# Patient Record
Sex: MALE | Race: WHITE | NOT HISPANIC OR LATINO | Employment: FULL TIME | URBAN - NONMETROPOLITAN AREA
[De-identification: names, ages, dates, MRNs, and addresses within clinical notes are randomized per-mention and may not be internally consistent; named-entity substitution may affect disease eponyms.]

---

## 2017-04-12 DIAGNOSIS — I73.9 PAD (PERIPHERAL ARTERY DISEASE) (HCC): Primary | ICD-10-CM

## 2017-04-13 ENCOUNTER — OFFICE VISIT (OUTPATIENT)
Dept: CARDIAC SURGERY | Facility: CLINIC | Age: 42
End: 2017-04-13

## 2017-04-13 VITALS
HEART RATE: 72 BPM | SYSTOLIC BLOOD PRESSURE: 148 MMHG | DIASTOLIC BLOOD PRESSURE: 80 MMHG | BODY MASS INDEX: 28.19 KG/M2 | HEIGHT: 68 IN | WEIGHT: 186 LBS

## 2017-04-13 DIAGNOSIS — I73.9 PERIPHERAL ARTERIAL DISEASE (HCC): Primary | ICD-10-CM

## 2017-04-13 PROCEDURE — 99205 OFFICE O/P NEW HI 60 MIN: CPT | Performed by: THORACIC SURGERY (CARDIOTHORACIC VASCULAR SURGERY)

## 2017-04-13 RX ORDER — METFORMIN HYDROCHLORIDE 750 MG/1
750 TABLET, EXTENDED RELEASE ORAL
Status: ON HOLD | COMMUNITY
End: 2017-04-25

## 2017-04-13 RX ORDER — FENOFIBRATE 160 MG/1
160 TABLET ORAL NIGHTLY
COMMUNITY

## 2017-04-13 RX ORDER — ROSUVASTATIN CALCIUM 20 MG/1
20 TABLET, COATED ORAL NIGHTLY
COMMUNITY

## 2017-04-13 RX ORDER — LISINOPRIL 20 MG/1
20 TABLET ORAL NIGHTLY
COMMUNITY

## 2017-04-13 RX ORDER — HYDROCODONE BITARTRATE AND ACETAMINOPHEN 7.5; 325 MG/1; MG/1
1 TABLET ORAL EVERY 6 HOURS PRN
COMMUNITY

## 2017-04-13 RX ORDER — ASPIRIN 81 MG/1
81 TABLET ORAL NIGHTLY
COMMUNITY

## 2017-04-13 RX ORDER — CLOPIDOGREL BISULFATE 75 MG/1
75 TABLET ORAL NIGHTLY
COMMUNITY

## 2017-04-13 NOTE — PROGRESS NOTES
04/13/2017  Patient Information  Justo Oquendo                                                                                          [address] [place] [address]   1975  'PCP/Referring Physician'  Melo Whitaker MD  [phone]  No ref. provider found    Chief Complaint   Patient presents with   • Consult     NP per Dr. Melo Whitaker for PAD       History of Present Illness:   This patient was referred to me with significant peripheral vascular disease.  This is a very pleasant patient who is only 41 years of age.  However he has smoked for many years and has ongoing tobacco abuse.  Recently he was noted to have diabetes and he and his wife suspect he could have had diabetes for a number of years but had not seen a physician.  He has claudication in both calves.  He does not have rest pain.  He does not have slow or nonhealing ulcers.  He does not have thyroid or buttocks claudication.  However his calf claudication is significant.  He cannot [facility] without significant pain.  He said the left calf is the most painful and it starts hurting prior to the right.  After a period of rest, the pain resolves.  He had a CT angiogram that demonstrated bilateral SFA occlusions that appeared to reconstitute at the above-knee popliteal.  However he only has 2 vessel runoff to each foot.  Patient Active Problem List   Diagnosis   • Peripheral arterial disease     Past Medical History:   Diagnosis Date   • Arthritis    • Diabetes    • Dyslipidemia    • HTN (hypertension)      Past Surgical History:   Procedure Laterality Date   • CYST REMOVAL      OFF OF BACK       Current Outpatient Prescriptions:   •  aspirin 81 MG EC tablet, Take 81 mg by mouth Daily., Disp: , Rfl:   •  clopidogrel (PLAVIX) 75 MG tablet, Take 75 mg by mouth Daily., Disp: , Rfl:   •  fenofibrate 160 MG tablet, Take 160 mg by mouth Daily., Disp: , Rfl:   •  HYDROcodone-acetaminophen (NORCO) 7.5-325 MG  per tablet, Take 1 tablet by mouth Every 6 (Six) Hours As Needed for Moderate Pain (4-6)., Disp: , Rfl:   •  lisinopril (PRINIVIL,ZESTRIL) 20 MG tablet, Take 20 mg by mouth Daily., Disp: , Rfl:   •  metFORMIN ER (GLUCOPHAGE-XR) 750 MG 24 hr tablet, Take 750 mg by mouth Daily With Breakfast., Disp: , Rfl:   •  rosuvastatin (CRESTOR) 20 MG tablet, Take 20 mg by mouth Daily., Disp: , Rfl:   No Known Allergies  Social History     Social History   • Marital status:      Spouse name: N/A   • Number of children: 2   • Years of education: N/A     Occupational History   •       [employer]     Social History Main Topics   • Smoking status: Current Every Day Smoker     Packs/day: 0.50     Years: 30.00     Types: Cigarettes   • Smokeless tobacco: Never Used   • Alcohol use No   • Drug use: No   • Sexual activity: Not on file     Other Topics Concern   • Not on file     Social History Narrative     Family History   Problem Relation Age of Onset   • Arthritis Mother    • Liver disease Father    • Kidney disease Father      Review of Systems   Constitution: Positive for malaise/fatigue. Negative for chills, fever, night sweats and weight loss.   HENT: Negative for headaches, hearing loss, odynophagia and sore throat.    Eyes: Positive for blurred vision.   Cardiovascular: Positive for chest pain and palpitations. Negative for dyspnea on exertion, leg swelling and orthopnea.   Respiratory: Positive for cough. Negative for hemoptysis.    Endocrine: Negative for cold intolerance, heat intolerance, polydipsia, polyphagia and polyuria.   Hematologic/Lymphatic: Does not bruise/bleed easily.   Skin: Positive for poor wound healing. Negative for itching and rash.   Musculoskeletal: Positive for back pain, joint pain, joint swelling, muscle cramps, neck pain and stiffness. Negative for myalgias.   Gastrointestinal: Positive for constipation. Negative for abdominal pain, diarrhea, hematemesis, hematochezia,  "melena, nausea and vomiting.   Genitourinary: Negative for dysuria, frequency and hematuria.   Neurological: Positive for numbness (and tingling in feet and toes). Negative for focal weakness and seizures.   Psychiatric/Behavioral: Negative for suicidal ideas. The patient is nervous/anxious.    Allergic/Immunologic: Positive for environmental allergies.   All other systems reviewed and are negative.    Vitals:    04/13/17 0848   BP: 148/80   BP Location: Left arm   Patient Position: Sitting   Pulse: 72   Weight: 186 lb (84.4 kg)   Height: 68\" (172.7 cm)      Physical Exam   CONSTITUTIONAL: Alert and conversant, Well dressed, Well nourished, No acute distress  EYES: Sclera clean, Anicteric, Pupils equal  ENT: No nasal deviation, Trachea midline  NECK: No neck masses, Supple  LUNGS: No wheezing, Cough, non-congested  HEART: No rubs, No murmurs  ABDOMEN: Soft, non-distended, No masses, Non tender to palpation  NEURO: No motor deficits, No sensory deficits, Cranial Nerves 2 through 12 grossly intact  PSYCHIATRIC: Oriented to person, place and time, No memory deficits, Mood appropriate  VASCULAR:  The feet are warm and viable; however, the toes are somewhat cool bilaterally and are hyperemic.  I am unable to palpate a posterior tibial or dorsalis pedis pulse in either foot.    Labs/Imaging:   I have reviewed the CT angiogram report and have reviewed the CT angiogram images.  The patient's wife is here to confirm his smoking history and his recent diagnosis of diabetes and the fact that he has not seen a physician in a number of years and his diabetes may be longer standing than is known.    Assessment/Plan:   Significant vascular disease in this 41-year-old patient.  He is a smoker with recently diagnosed diabetes which may have been unrecognized for a period of time.  CT angiogram demonstrates occlusion of the superficial femoral arteries bilaterally and two-vessel runoff.  His left leg is by far the most symptomatic.  " We have discussed various options and ultimately may come to bilateral femoral to popliteal bypass.  However my plan is for general anesthesia in our hybrid room 15 operating room.  I will plan to proceed with a left femoral artery cutdown with a formal aortogram and runoff to get a good assessment of his vasculature.  If I am able to open up the chronic total occlusion of the left superficial femoral artery we will place a stent.  If we are unable to open this, we will proceed with femoral to popliteal bypass.  We could then return at a later time to address the right leg.  He is aware that this is a complex procedure, that he might have either a stent and/or bypass graft, that there is a risk of bleeding, infection, graft failure and infection and limb loss.  He and his wife understand this and agree to proceed.     Patient Active Problem List   Diagnosis   • Peripheral arterial disease     Signed by: Brett Ryan M.D.    4/13/2017    CC:  MD Katlin Brown, , editing for Brett Ryan M.D.    I, Brett Ryan, have read and agree with the editing done by Margot Rees, .

## 2017-04-17 ENCOUNTER — PREP FOR SURGERY (OUTPATIENT)
Dept: CARDIAC SURGERY | Facility: CLINIC | Age: 42
End: 2017-04-17

## 2017-04-17 DIAGNOSIS — I73.9 PAD (PERIPHERAL ARTERY DISEASE) (HCC): Primary | ICD-10-CM

## 2017-04-17 RX ORDER — CHLORHEXIDINE GLUCONATE 500 MG/1
1 CLOTH TOPICAL EVERY 12 HOURS PRN
Status: CANCELLED | OUTPATIENT
Start: 2017-04-24

## 2017-04-24 ENCOUNTER — HOSPITAL ENCOUNTER (OUTPATIENT)
Dept: GENERAL RADIOLOGY | Facility: HOSPITAL | Age: 42
Discharge: HOME | End: 2017-04-24
Admitting: PHYSICIAN ASSISTANT

## 2017-04-24 ENCOUNTER — ANESTHESIA EVENT (OUTPATIENT)
Dept: PERIOP | Facility: HOSPITAL | Age: 42
End: 2017-04-24

## 2017-04-24 ENCOUNTER — APPOINTMENT (OUTPATIENT)
Dept: PREADMISSION TESTING | Facility: HOSPITAL | Age: 42
End: 2017-04-24

## 2017-04-24 VITALS — HEIGHT: 68 IN | WEIGHT: 186.51 LBS | BODY MASS INDEX: 28.27 KG/M2 | OXYGEN SATURATION: 99 %

## 2017-04-24 DIAGNOSIS — I73.9 PAD (PERIPHERAL ARTERY DISEASE) (HCC): ICD-10-CM

## 2017-04-24 LAB
ABO GROUP BLD: NORMAL
ANION GAP SERPL CALCULATED.3IONS-SCNC: 7 MMOL/L (ref 3–11)
APTT PPP: 29.4 SECONDS (ref 24–31)
BLD GP AB SCN SERPL QL: NEGATIVE
BUN BLD-MCNC: 14 MG/DL (ref 9–23)
BUN/CREAT SERPL: 15.6 (ref 7–25)
CALCIUM SPEC-SCNC: 10.2 MG/DL (ref 8.7–10.4)
CHLORIDE SERPL-SCNC: 107 MMOL/L (ref 99–109)
CO2 SERPL-SCNC: 27 MMOL/L (ref 20–31)
CREAT BLD-MCNC: 0.9 MG/DL (ref 0.6–1.3)
DEPRECATED RDW RBC AUTO: 41.6 FL (ref 37–54)
ERYTHROCYTE [DISTWIDTH] IN BLOOD BY AUTOMATED COUNT: 12.8 % (ref 11.3–14.5)
GFR SERPL CREATININE-BSD FRML MDRD: 92 ML/MIN/1.73
GLUCOSE BLD-MCNC: 165 MG/DL (ref 70–100)
HBA1C MFR BLD: 8.2 % (ref 4.8–5.6)
HCT VFR BLD AUTO: 37.8 % (ref 38.9–50.9)
HGB BLD-MCNC: 12.8 G/DL (ref 13.1–17.5)
INR PPP: 1.06
MCH RBC QN AUTO: 30.5 PG (ref 27–31)
MCHC RBC AUTO-ENTMCNC: 33.9 G/DL (ref 32–36)
MCV RBC AUTO: 90 FL (ref 80–99)
PLATELET # BLD AUTO: 175 10*3/MM3 (ref 150–450)
PMV BLD AUTO: 10.5 FL (ref 6–12)
POTASSIUM BLD-SCNC: 4.5 MMOL/L (ref 3.5–5.5)
PROTHROMBIN TIME: 11.6 SECONDS (ref 9.6–11.5)
RBC # BLD AUTO: 4.2 10*6/MM3 (ref 4.2–5.76)
RH BLD: POSITIVE
SODIUM BLD-SCNC: 141 MMOL/L (ref 132–146)
WBC NRBC COR # BLD: 6.32 10*3/MM3 (ref 3.5–10.8)

## 2017-04-24 PROCEDURE — 86920 COMPATIBILITY TEST SPIN: CPT

## 2017-04-25 ENCOUNTER — APPOINTMENT (OUTPATIENT)
Dept: GENERAL RADIOLOGY | Facility: HOSPITAL | Age: 42
End: 2017-04-25

## 2017-04-25 ENCOUNTER — ANESTHESIA (OUTPATIENT)
Dept: PERIOP | Facility: HOSPITAL | Age: 42
End: 2017-04-25

## 2017-04-25 ENCOUNTER — HOSPITAL ENCOUNTER (OUTPATIENT)
Facility: HOSPITAL | Age: 42
Setting detail: OBSERVATION
Discharge: HOME | End: 2017-04-26
Attending: THORACIC SURGERY (CARDIOTHORACIC VASCULAR SURGERY) | Admitting: THORACIC SURGERY (CARDIOTHORACIC VASCULAR SURGERY)

## 2017-04-25 DIAGNOSIS — I73.9 PAD (PERIPHERAL ARTERY DISEASE) (HCC): ICD-10-CM

## 2017-04-25 PROBLEM — E11.9 DIABETES MELLITUS TYPE II, NON INSULIN DEPENDENT (HCC): Status: ACTIVE | Noted: 2017-04-25

## 2017-04-25 PROBLEM — Z72.0 TOBACCO ABUSE: Status: ACTIVE | Noted: 2017-04-25

## 2017-04-25 PROBLEM — E78.5 HYPERLIPIDEMIA: Status: ACTIVE | Noted: 2017-04-25

## 2017-04-25 PROBLEM — I10 HYPERTENSION: Status: ACTIVE | Noted: 2017-04-25

## 2017-04-25 LAB
ABO GROUP BLD: NORMAL
GLUCOSE BLDC GLUCOMTR-MCNC: 121 MG/DL (ref 70–130)
GLUCOSE BLDC GLUCOMTR-MCNC: 127 MG/DL (ref 70–130)
GLUCOSE BLDC GLUCOMTR-MCNC: 169 MG/DL (ref 70–130)
GLUCOSE BLDC GLUCOMTR-MCNC: 200 MG/DL (ref 70–130)
RH BLD: POSITIVE

## 2017-04-25 PROCEDURE — 25010000002 ONDANSETRON PER 1 MG: Performed by: NURSE ANESTHETIST, CERTIFIED REGISTERED

## 2017-04-25 PROCEDURE — 75716 ARTERY X-RAYS ARMS/LEGS: CPT | Performed by: THORACIC SURGERY (CARDIOTHORACIC VASCULAR SURGERY)

## 2017-04-25 PROCEDURE — 75625 CONTRAST EXAM ABDOMINL AORTA: CPT

## 2017-04-25 PROCEDURE — 86900 BLOOD TYPING SEROLOGIC ABO: CPT

## 2017-04-25 PROCEDURE — C1887 CATHETER, GUIDING: HCPCS | Performed by: THORACIC SURGERY (CARDIOTHORACIC VASCULAR SURGERY)

## 2017-04-25 PROCEDURE — 25010000002 PHENYLEPHRINE PER 1 ML: Performed by: NURSE ANESTHETIST, CERTIFIED REGISTERED

## 2017-04-25 PROCEDURE — 25010000002 CEFUROXIME: Performed by: PHYSICIAN ASSISTANT

## 2017-04-25 PROCEDURE — 25010000002 HEPARIN (PORCINE) PER 1000 UNITS: Performed by: THORACIC SURGERY (CARDIOTHORACIC VASCULAR SURGERY)

## 2017-04-25 PROCEDURE — 86901 BLOOD TYPING SEROLOGIC RH(D): CPT

## 2017-04-25 PROCEDURE — G0378 HOSPITAL OBSERVATION PER HR: HCPCS

## 2017-04-25 PROCEDURE — C1894 INTRO/SHEATH, NON-LASER: HCPCS | Performed by: THORACIC SURGERY (CARDIOTHORACIC VASCULAR SURGERY)

## 2017-04-25 PROCEDURE — 25010000002 NEOSTIGMINE PER 0.5 MG: Performed by: NURSE ANESTHETIST, CERTIFIED REGISTERED

## 2017-04-25 PROCEDURE — 25010000002 MIDAZOLAM PER 1 MG: Performed by: NURSE ANESTHETIST, CERTIFIED REGISTERED

## 2017-04-25 PROCEDURE — 63710000001 INSULIN LISPRO (HUMAN) PER 5 UNITS: Performed by: PHYSICIAN ASSISTANT

## 2017-04-25 PROCEDURE — 99243 OFF/OP CNSLTJ NEW/EST LOW 30: CPT | Performed by: PHYSICIAN ASSISTANT

## 2017-04-25 PROCEDURE — 36200 PLACE CATHETER IN AORTA: CPT | Performed by: THORACIC SURGERY (CARDIOTHORACIC VASCULAR SURGERY)

## 2017-04-25 PROCEDURE — C1769 GUIDE WIRE: HCPCS | Performed by: THORACIC SURGERY (CARDIOTHORACIC VASCULAR SURGERY)

## 2017-04-25 PROCEDURE — 25010000002 FENTANYL CITRATE (PF) 100 MCG/2ML SOLUTION: Performed by: NURSE ANESTHETIST, CERTIFIED REGISTERED

## 2017-04-25 PROCEDURE — 25010000002 PROPOFOL 10 MG/ML EMULSION: Performed by: NURSE ANESTHETIST, CERTIFIED REGISTERED

## 2017-04-25 PROCEDURE — 75716 ARTERY X-RAYS ARMS/LEGS: CPT

## 2017-04-25 PROCEDURE — 25010000002 DEXAMETHASONE PER 1 MG: Performed by: NURSE ANESTHETIST, CERTIFIED REGISTERED

## 2017-04-25 PROCEDURE — 75625 CONTRAST EXAM ABDOMINL AORTA: CPT | Performed by: THORACIC SURGERY (CARDIOTHORACIC VASCULAR SURGERY)

## 2017-04-25 PROCEDURE — 82962 GLUCOSE BLOOD TEST: CPT

## 2017-04-25 RX ORDER — DEXTROSE MONOHYDRATE 25 G/50ML
25 INJECTION, SOLUTION INTRAVENOUS
Status: DISCONTINUED | OUTPATIENT
Start: 2017-04-25 | End: 2017-04-26 | Stop reason: HOSPADM

## 2017-04-25 RX ORDER — FAMOTIDINE 10 MG/ML
20 INJECTION, SOLUTION INTRAVENOUS ONCE
Status: DISCONTINUED | OUTPATIENT
Start: 2017-04-25 | End: 2017-04-25 | Stop reason: HOSPADM

## 2017-04-25 RX ORDER — ATORVASTATIN CALCIUM 40 MG/1
40 TABLET, FILM COATED ORAL NIGHTLY
Status: DISCONTINUED | OUTPATIENT
Start: 2017-04-25 | End: 2017-04-26 | Stop reason: HOSPADM

## 2017-04-25 RX ORDER — HYDROCODONE BITARTRATE AND ACETAMINOPHEN 5; 325 MG/1; MG/1
1 TABLET ORAL EVERY 6 HOURS PRN
Status: DISCONTINUED | OUTPATIENT
Start: 2017-04-25 | End: 2017-04-26 | Stop reason: HOSPADM

## 2017-04-25 RX ORDER — LABETALOL HYDROCHLORIDE 5 MG/ML
5 INJECTION, SOLUTION INTRAVENOUS
Status: DISCONTINUED | OUTPATIENT
Start: 2017-04-25 | End: 2017-04-25 | Stop reason: HOSPADM

## 2017-04-25 RX ORDER — CLOPIDOGREL BISULFATE 75 MG/1
75 TABLET ORAL DAILY
Status: DISCONTINUED | OUTPATIENT
Start: 2017-04-26 | End: 2017-04-26 | Stop reason: HOSPADM

## 2017-04-25 RX ORDER — SODIUM CHLORIDE 0.9 % (FLUSH) 0.9 %
1-10 SYRINGE (ML) INJECTION AS NEEDED
Status: DISCONTINUED | OUTPATIENT
Start: 2017-04-25 | End: 2017-04-25 | Stop reason: HOSPADM

## 2017-04-25 RX ORDER — NICOTINE 21 MG/24HR
1 PATCH, TRANSDERMAL 24 HOURS TRANSDERMAL EVERY 24 HOURS
Qty: 28 PATCH | Refills: 0 | Status: CANCELLED | OUTPATIENT
Start: 2017-04-25

## 2017-04-25 RX ORDER — HYDROCODONE BITARTRATE AND ACETAMINOPHEN 7.5; 325 MG/1; MG/1
1 TABLET ORAL EVERY 4 HOURS PRN
Status: DISCONTINUED | OUTPATIENT
Start: 2017-04-25 | End: 2017-04-26 | Stop reason: HOSPADM

## 2017-04-25 RX ORDER — DEXAMETHASONE SODIUM PHOSPHATE 4 MG/ML
INJECTION, SOLUTION INTRA-ARTICULAR; INTRALESIONAL; INTRAMUSCULAR; INTRAVENOUS; SOFT TISSUE AS NEEDED
Status: DISCONTINUED | OUTPATIENT
Start: 2017-04-25 | End: 2017-04-25 | Stop reason: SURG

## 2017-04-25 RX ORDER — CHLORHEXIDINE GLUCONATE 500 MG/1
1 CLOTH TOPICAL EVERY 12 HOURS PRN
Status: DISCONTINUED | OUTPATIENT
Start: 2017-04-25 | End: 2017-04-25 | Stop reason: HOSPADM

## 2017-04-25 RX ORDER — ONDANSETRON 2 MG/ML
INJECTION INTRAMUSCULAR; INTRAVENOUS AS NEEDED
Status: DISCONTINUED | OUTPATIENT
Start: 2017-04-25 | End: 2017-04-25 | Stop reason: SURG

## 2017-04-25 RX ORDER — LIDOCAINE HYDROCHLORIDE 10 MG/ML
1 INJECTION, SOLUTION EPIDURAL; INFILTRATION; INTRACAUDAL; PERINEURAL ONCE
Status: COMPLETED | OUTPATIENT
Start: 2017-04-25 | End: 2017-04-25

## 2017-04-25 RX ORDER — LISINOPRIL 20 MG/1
20 TABLET ORAL DAILY
Status: DISCONTINUED | OUTPATIENT
Start: 2017-04-25 | End: 2017-04-26 | Stop reason: HOSPADM

## 2017-04-25 RX ORDER — METFORMIN HYDROCHLORIDE 750 MG/1
750 TABLET, EXTENDED RELEASE ORAL
Status: DISCONTINUED | OUTPATIENT
Start: 2017-04-25 | End: 2017-04-25

## 2017-04-25 RX ORDER — PROPOFOL 10 MG/ML
VIAL (ML) INTRAVENOUS AS NEEDED
Status: DISCONTINUED | OUTPATIENT
Start: 2017-04-25 | End: 2017-04-25 | Stop reason: SURG

## 2017-04-25 RX ORDER — FENTANYL CITRATE 50 UG/ML
50 INJECTION, SOLUTION INTRAMUSCULAR; INTRAVENOUS
Status: DISCONTINUED | OUTPATIENT
Start: 2017-04-25 | End: 2017-04-25 | Stop reason: HOSPADM

## 2017-04-25 RX ORDER — ONDANSETRON 2 MG/ML
4 INJECTION INTRAMUSCULAR; INTRAVENOUS EVERY 6 HOURS PRN
Status: DISCONTINUED | OUTPATIENT
Start: 2017-04-25 | End: 2017-04-26 | Stop reason: HOSPADM

## 2017-04-25 RX ORDER — SODIUM CHLORIDE, SODIUM LACTATE, POTASSIUM CHLORIDE, CALCIUM CHLORIDE 600; 310; 30; 20 MG/100ML; MG/100ML; MG/100ML; MG/100ML
9 INJECTION, SOLUTION INTRAVENOUS CONTINUOUS
Status: DISCONTINUED | OUTPATIENT
Start: 2017-04-25 | End: 2017-04-26 | Stop reason: HOSPADM

## 2017-04-25 RX ORDER — SODIUM CHLORIDE 450 MG/100ML
100 INJECTION, SOLUTION INTRAVENOUS CONTINUOUS
Status: DISCONTINUED | OUTPATIENT
Start: 2017-04-25 | End: 2017-04-26 | Stop reason: HOSPADM

## 2017-04-25 RX ORDER — ONDANSETRON 2 MG/ML
4 INJECTION INTRAMUSCULAR; INTRAVENOUS ONCE AS NEEDED
Status: DISCONTINUED | OUTPATIENT
Start: 2017-04-25 | End: 2017-04-25 | Stop reason: HOSPADM

## 2017-04-25 RX ORDER — FAMOTIDINE 20 MG/1
20 TABLET, FILM COATED ORAL ONCE
Status: COMPLETED | OUTPATIENT
Start: 2017-04-25 | End: 2017-04-25

## 2017-04-25 RX ORDER — MIDAZOLAM HYDROCHLORIDE 1 MG/ML
INJECTION INTRAMUSCULAR; INTRAVENOUS AS NEEDED
Status: DISCONTINUED | OUTPATIENT
Start: 2017-04-25 | End: 2017-04-25 | Stop reason: SURG

## 2017-04-25 RX ORDER — LIDOCAINE HYDROCHLORIDE 40 MG/ML
SOLUTION TOPICAL AS NEEDED
Status: DISCONTINUED | OUTPATIENT
Start: 2017-04-25 | End: 2017-04-25 | Stop reason: SURG

## 2017-04-25 RX ORDER — ATRACURIUM BESYLATE 10 MG/ML
INJECTION, SOLUTION INTRAVENOUS AS NEEDED
Status: DISCONTINUED | OUTPATIENT
Start: 2017-04-25 | End: 2017-04-25 | Stop reason: SURG

## 2017-04-25 RX ORDER — LIDOCAINE HYDROCHLORIDE 10 MG/ML
INJECTION, SOLUTION INFILTRATION; PERINEURAL AS NEEDED
Status: DISCONTINUED | OUTPATIENT
Start: 2017-04-25 | End: 2017-04-25 | Stop reason: SURG

## 2017-04-25 RX ORDER — NICOTINE POLACRILEX 4 MG
15 LOZENGE BUCCAL
Status: DISCONTINUED | OUTPATIENT
Start: 2017-04-25 | End: 2017-04-26 | Stop reason: HOSPADM

## 2017-04-25 RX ORDER — ASPIRIN 81 MG/1
81 TABLET ORAL DAILY
Status: DISCONTINUED | OUTPATIENT
Start: 2017-04-26 | End: 2017-04-26 | Stop reason: HOSPADM

## 2017-04-25 RX ORDER — FENTANYL CITRATE 50 UG/ML
INJECTION, SOLUTION INTRAMUSCULAR; INTRAVENOUS AS NEEDED
Status: DISCONTINUED | OUTPATIENT
Start: 2017-04-25 | End: 2017-04-25 | Stop reason: SURG

## 2017-04-25 RX ORDER — GLYCOPYRROLATE 0.2 MG/ML
INJECTION INTRAMUSCULAR; INTRAVENOUS AS NEEDED
Status: DISCONTINUED | OUTPATIENT
Start: 2017-04-25 | End: 2017-04-25 | Stop reason: SURG

## 2017-04-25 RX ORDER — FENOFIBRATE 145 MG/1
145 TABLET, COATED ORAL NIGHTLY
Status: DISCONTINUED | OUTPATIENT
Start: 2017-04-25 | End: 2017-04-26 | Stop reason: HOSPADM

## 2017-04-25 RX ORDER — METFORMIN HYDROCHLORIDE 750 MG/1
750 TABLET, EXTENDED RELEASE ORAL
Start: 2017-04-25 | End: 2017-04-26 | Stop reason: HOSPADM

## 2017-04-25 RX ADMIN — LIDOCAINE HYDROCHLORIDE 1 EACH: 40 SOLUTION TOPICAL at 10:19

## 2017-04-25 RX ADMIN — FENTANYL CITRATE 50 MCG: 50 INJECTION, SOLUTION INTRAMUSCULAR; INTRAVENOUS at 10:19

## 2017-04-25 RX ADMIN — DEXAMETHASONE SODIUM PHOSPHATE 4 MG: 4 INJECTION, SOLUTION INTRAMUSCULAR; INTRAVENOUS at 10:26

## 2017-04-25 RX ADMIN — LIDOCAINE HYDROCHLORIDE 50 MG: 10 INJECTION, SOLUTION INFILTRATION; PERINEURAL at 10:19

## 2017-04-25 RX ADMIN — PHENYLEPHRINE HYDROCHLORIDE 100 MCG: 10 INJECTION INTRAVENOUS at 10:37

## 2017-04-25 RX ADMIN — ATRACURIUM BESYLATE 30 MG: 10 INJECTION, SOLUTION INTRAVENOUS at 10:19

## 2017-04-25 RX ADMIN — EPHEDRINE SULFATE 5 MG: 50 INJECTION INTRAMUSCULAR; INTRAVENOUS; SUBCUTANEOUS at 10:27

## 2017-04-25 RX ADMIN — PHENYLEPHRINE HYDROCHLORIDE 100 MCG: 10 INJECTION INTRAVENOUS at 10:32

## 2017-04-25 RX ADMIN — EPHEDRINE SULFATE 10 MG: 50 INJECTION INTRAMUSCULAR; INTRAVENOUS; SUBCUTANEOUS at 10:40

## 2017-04-25 RX ADMIN — LIDOCAINE HYDROCHLORIDE 0.5 ML: 10 INJECTION, SOLUTION EPIDURAL; INFILTRATION; INTRACAUDAL; PERINEURAL at 08:25

## 2017-04-25 RX ADMIN — EPHEDRINE SULFATE 5 MG: 50 INJECTION INTRAMUSCULAR; INTRAVENOUS; SUBCUTANEOUS at 10:32

## 2017-04-25 RX ADMIN — HYDROCODONE BITARTRATE AND ACETAMINOPHEN 1 TABLET: 7.5; 325 TABLET ORAL at 12:33

## 2017-04-25 RX ADMIN — EPHEDRINE SULFATE 5 MG: 50 INJECTION INTRAMUSCULAR; INTRAVENOUS; SUBCUTANEOUS at 10:37

## 2017-04-25 RX ADMIN — INSULIN LISPRO 4 UNITS: 100 INJECTION, SOLUTION INTRAVENOUS; SUBCUTANEOUS at 21:53

## 2017-04-25 RX ADMIN — ONDANSETRON 4 MG: 2 INJECTION INTRAMUSCULAR; INTRAVENOUS at 10:46

## 2017-04-25 RX ADMIN — INSULIN LISPRO 2 UNITS: 100 INJECTION, SOLUTION INTRAVENOUS; SUBCUTANEOUS at 17:20

## 2017-04-25 RX ADMIN — PROPOFOL 200 MG: 10 INJECTION, EMULSION INTRAVENOUS at 10:19

## 2017-04-25 RX ADMIN — SODIUM CHLORIDE, POTASSIUM CHLORIDE, SODIUM LACTATE AND CALCIUM CHLORIDE 9 ML/HR: 600; 310; 30; 20 INJECTION, SOLUTION INTRAVENOUS at 08:25

## 2017-04-25 RX ADMIN — MIDAZOLAM HYDROCHLORIDE 2 MG: 1 INJECTION, SOLUTION INTRAMUSCULAR; INTRAVENOUS at 10:10

## 2017-04-25 RX ADMIN — ROBINUL 0.1 MG: 0.2 INJECTION INTRAMUSCULAR; INTRAVENOUS at 10:40

## 2017-04-25 RX ADMIN — FAMOTIDINE 20 MG: 20 TABLET ORAL at 08:31

## 2017-04-25 RX ADMIN — SODIUM CHLORIDE 100 ML/HR: 4.5 INJECTION, SOLUTION INTRAVENOUS at 12:29

## 2017-04-25 RX ADMIN — LISINOPRIL 20 MG: 20 TABLET ORAL at 21:53

## 2017-04-25 RX ADMIN — CEFUROXIME 1.5 G: 1.5 INJECTION, POWDER, FOR SOLUTION INTRAVENOUS at 10:25

## 2017-04-25 RX ADMIN — FENOFIBRATE 145 MG: 145 TABLET ORAL at 21:53

## 2017-04-25 RX ADMIN — ATORVASTATIN CALCIUM 40 MG: 40 TABLET, FILM COATED ORAL at 21:53

## 2017-04-25 RX ADMIN — FENTANYL CITRATE 50 MCG: 50 INJECTION, SOLUTION INTRAMUSCULAR; INTRAVENOUS at 10:10

## 2017-04-25 RX ADMIN — Medication 2 MG: at 10:56

## 2017-04-25 RX ADMIN — PHENYLEPHRINE HYDROCHLORIDE 100 MCG: 10 INJECTION INTRAVENOUS at 10:27

## 2017-04-25 RX ADMIN — ROBINUL 0.4 MG: 0.2 INJECTION INTRAMUSCULAR; INTRAVENOUS at 10:56

## 2017-04-25 NOTE — ANESTHESIA PROCEDURE NOTES
Airway  Airway not difficult    General Information and Staff    Patient location during procedure: OR  CRNA: PATRICIA WARNER    Indications and Patient Condition  Indications for airway management: airway protection    Preoxygenated: yes  MILS not maintained throughout  Mask difficulty assessment: 1 - vent by mask    Final Airway Details  Final airway type: endotracheal airway      Successful airway: ETT  Cuffed: yes   Successful intubation technique: direct laryngoscopy  Facilitating devices/methods: Bougie  Endotracheal tube insertion site: oral  Blade: Salma  Blade size: #3  ETT size: 7.5 mm  Cormack-Lehane Classification: grade III - view of epiglottis only  Placement verified by: chest auscultation and capnometry   Measured from: lips  ETT to lips (cm): 23  Number of attempts at approach: 1    Additional Comments  Negative epigastric sounds, Breath sound equal bilaterally with symmetric chest rise and fall

## 2017-04-25 NOTE — ANESTHESIA POSTPROCEDURE EVALUATION
Patient: Justo Oquendo    Procedure Summary     Date Anesthesia Start Anesthesia Stop Room / Location    04/25/17 1009  [facility] / [facility]       Procedure Diagnosis Provider Provider    AORTAGRAM WITH OR WITHOUT RUNOFFS POSSIBLE STENT with left femoral cutdown (N/A Abdomen); FEMORAL POPLITEAL BYPASS (Left Thigh) PAD (peripheral artery disease)  (PAD (peripheral artery disease) [I73.9]) MD Christo Garrett MD          Anesthesia Type: MAC  Last vitals  /67 (04/25/17 1108)    Temp 97.9 °F (36.6 °C) (04/25/17 1108)    Pulse 68 (04/25/17 1108)   Resp 16 (04/25/17 1108)    SpO2 100 % (04/25/17 1108)      Post Anesthesia Care and Evaluation    Patient location during evaluation: PACU  Patient participation: complete - patient participated  Level of consciousness: awake and alert  Pain score: 0  Pain management: adequate  Airway patency: patent  Anesthetic complications: No anesthetic complications  PONV Status: none  Cardiovascular status: hemodynamically stable and acceptable  Respiratory status: nonlabored ventilation, acceptable and nasal cannula  Hydration status: acceptable

## 2017-04-25 NOTE — ANESTHESIA PREPROCEDURE EVALUATION
Anesthesia Evaluation     Patient summary reviewed and Nursing notes reviewed      Airway   Mallampati: I  TM distance: >3 FB  Neck ROM: full  Dental      Pulmonary - negative pulmonary ROS   Cardiovascular     (+) hypertension,       Neuro/Psych- negative ROS  GI/Hepatic/Renal/Endo    (+)  diabetes mellitus,     Musculoskeletal (-) negative ROS    Abdominal    Substance History - negative use     OB/GYN negative ob/gyn ROS         Other                                Anesthesia Plan    ASA 3     MAC     intravenous induction   Anesthetic plan and risks discussed with patient.    Plan discussed with CRNA.

## 2017-04-26 VITALS
WEIGHT: 186.51 LBS | HEIGHT: 68 IN | SYSTOLIC BLOOD PRESSURE: 125 MMHG | HEART RATE: 83 BPM | TEMPERATURE: 97.4 F | RESPIRATION RATE: 16 BRPM | BODY MASS INDEX: 28.27 KG/M2 | OXYGEN SATURATION: 97 % | DIASTOLIC BLOOD PRESSURE: 67 MMHG

## 2017-04-26 LAB
ANION GAP SERPL CALCULATED.3IONS-SCNC: 7 MMOL/L (ref 3–11)
BASOPHILS # BLD AUTO: 0.01 10*3/MM3 (ref 0–0.2)
BASOPHILS NFR BLD AUTO: 0.1 % (ref 0–1)
BUN BLD-MCNC: 18 MG/DL (ref 9–23)
BUN/CREAT SERPL: 22.5 (ref 7–25)
CALCIUM SPEC-SCNC: 10.3 MG/DL (ref 8.7–10.4)
CHLORIDE SERPL-SCNC: 102 MMOL/L (ref 99–109)
CO2 SERPL-SCNC: 27 MMOL/L (ref 20–31)
CREAT BLD-MCNC: 0.8 MG/DL (ref 0.6–1.3)
DEPRECATED RDW RBC AUTO: 44.1 FL (ref 37–54)
EOSINOPHIL # BLD AUTO: 0.03 10*3/MM3 (ref 0.1–0.3)
EOSINOPHIL NFR BLD AUTO: 0.2 % (ref 0–3)
ERYTHROCYTE [DISTWIDTH] IN BLOOD BY AUTOMATED COUNT: 13 % (ref 11.3–14.5)
FERRITIN SERPL-MCNC: 215 NG/ML (ref 22–322)
FOLATE SERPL-MCNC: 5.06 NG/ML (ref 3.2–20)
GFR SERPL CREATININE-BSD FRML MDRD: 106 ML/MIN/1.73
GLUCOSE BLD-MCNC: 158 MG/DL (ref 70–100)
GLUCOSE BLDC GLUCOMTR-MCNC: 156 MG/DL (ref 70–130)
HCT VFR BLD AUTO: 40.6 % (ref 38.9–50.9)
HGB BLD-MCNC: 13.1 G/DL (ref 13.1–17.5)
IMM GRANULOCYTES # BLD: 0.05 10*3/MM3 (ref 0–0.03)
IMM GRANULOCYTES NFR BLD: 0.4 % (ref 0–0.6)
IRON 24H UR-MRATE: 42 MCG/DL (ref 50–175)
IRON SATN MFR SERPL: 12 % (ref 20–50)
LYMPHOCYTES # BLD AUTO: 2.32 10*3/MM3 (ref 0.6–4.8)
LYMPHOCYTES NFR BLD AUTO: 16.3 % (ref 24–44)
MCH RBC QN AUTO: 29.8 PG (ref 27–31)
MCHC RBC AUTO-ENTMCNC: 32.3 G/DL (ref 32–36)
MCV RBC AUTO: 92.3 FL (ref 80–99)
MONOCYTES # BLD AUTO: 1.01 10*3/MM3 (ref 0–1)
MONOCYTES NFR BLD AUTO: 7.1 % (ref 0–12)
NEUTROPHILS # BLD AUTO: 10.77 10*3/MM3 (ref 1.5–8.3)
NEUTROPHILS NFR BLD AUTO: 75.9 % (ref 41–71)
PLATELET # BLD AUTO: 205 10*3/MM3 (ref 150–450)
PMV BLD AUTO: 10.8 FL (ref 6–12)
POTASSIUM BLD-SCNC: 3.8 MMOL/L (ref 3.5–5.5)
RBC # BLD AUTO: 4.4 10*6/MM3 (ref 4.2–5.76)
RETICS/RBC NFR AUTO: 1.2 % (ref 0.5–1.5)
SODIUM BLD-SCNC: 136 MMOL/L (ref 132–146)
TIBC SERPL-MCNC: 340 MCG/DL (ref 250–450)
VIT B12 BLD-MCNC: 534 PG/ML (ref 211–911)
WBC NRBC COR # BLD: 14.19 10*3/MM3 (ref 3.5–10.8)

## 2017-04-26 PROCEDURE — 80048 BASIC METABOLIC PNL TOTAL CA: CPT | Performed by: PHYSICIAN ASSISTANT

## 2017-04-26 PROCEDURE — 83540 ASSAY OF IRON: CPT | Performed by: PHYSICIAN ASSISTANT

## 2017-04-26 PROCEDURE — 82728 ASSAY OF FERRITIN: CPT | Performed by: PHYSICIAN ASSISTANT

## 2017-04-26 PROCEDURE — 85045 AUTOMATED RETICULOCYTE COUNT: CPT | Performed by: PHYSICIAN ASSISTANT

## 2017-04-26 PROCEDURE — 85025 COMPLETE CBC W/AUTO DIFF WBC: CPT | Performed by: PHYSICIAN ASSISTANT

## 2017-04-26 PROCEDURE — 82962 GLUCOSE BLOOD TEST: CPT

## 2017-04-26 PROCEDURE — 99225 PR SBSQ OBSERVATION CARE/DAY 25 MINUTES: CPT | Performed by: NURSE PRACTITIONER

## 2017-04-26 PROCEDURE — 99231 SBSQ HOSP IP/OBS SF/LOW 25: CPT | Performed by: THORACIC SURGERY (CARDIOTHORACIC VASCULAR SURGERY)

## 2017-04-26 PROCEDURE — 82607 VITAMIN B-12: CPT | Performed by: PHYSICIAN ASSISTANT

## 2017-04-26 PROCEDURE — 82746 ASSAY OF FOLIC ACID SERUM: CPT | Performed by: PHYSICIAN ASSISTANT

## 2017-04-26 PROCEDURE — 83550 IRON BINDING TEST: CPT | Performed by: PHYSICIAN ASSISTANT

## 2017-04-26 PROCEDURE — G0378 HOSPITAL OBSERVATION PER HR: HCPCS

## 2017-04-26 RX ORDER — NICOTINE 21 MG/24HR
1 PATCH, TRANSDERMAL 24 HOURS TRANSDERMAL EVERY 24 HOURS
Qty: 21 PATCH | Refills: 1 | Status: SHIPPED | OUTPATIENT
Start: 2017-04-26

## 2017-04-26 RX ADMIN — SODIUM CHLORIDE 100 ML/HR: 4.5 INJECTION, SOLUTION INTRAVENOUS at 03:15

## 2017-04-26 RX ADMIN — HYDROCODONE BITARTRATE AND ACETAMINOPHEN 1 TABLET: 7.5; 325 TABLET ORAL at 11:09

## 2017-04-26 RX ADMIN — HYDROCODONE BITARTRATE AND ACETAMINOPHEN 1 TABLET: 5; 325 TABLET ORAL at 05:55

## 2017-04-28 LAB
ABO + RH BLD: NORMAL
ABO + RH BLD: NORMAL
BH BB BLOOD EXPIRATION DATE: NORMAL
BH BB BLOOD EXPIRATION DATE: NORMAL
BH BB BLOOD TYPE BARCODE: 5100
BH BB BLOOD TYPE BARCODE: 5100
BH BB DISPENSE STATUS: NORMAL
BH BB DISPENSE STATUS: NORMAL
BH BB PRODUCT CODE: NORMAL
BH BB PRODUCT CODE: NORMAL
BH BB UNIT NUMBER: NORMAL
BH BB UNIT NUMBER: NORMAL
CROSSMATCH INTERPRETATION: NORMAL
CROSSMATCH INTERPRETATION: NORMAL
UNIT  ABO: NORMAL
UNIT  ABO: NORMAL
UNIT  RH: NORMAL
UNIT  RH: NORMAL

## 2017-05-11 ENCOUNTER — OFFICE VISIT (OUTPATIENT)
Dept: CARDIAC SURGERY | Facility: CLINIC | Age: 42
End: 2017-05-11

## 2017-05-11 VITALS
WEIGHT: 186 LBS | HEART RATE: 71 BPM | BODY MASS INDEX: 27.55 KG/M2 | SYSTOLIC BLOOD PRESSURE: 148 MMHG | DIASTOLIC BLOOD PRESSURE: 71 MMHG | HEIGHT: 69 IN

## 2017-05-11 DIAGNOSIS — I73.9 PAD (PERIPHERAL ARTERY DISEASE) (HCC): Primary | ICD-10-CM

## 2017-05-11 PROCEDURE — 99213 OFFICE O/P EST LOW 20 MIN: CPT | Performed by: THORACIC SURGERY (CARDIOTHORACIC VASCULAR SURGERY)

## 2017-05-11 RX ORDER — CEPHALEXIN 500 MG/1
500 CAPSULE ORAL 3 TIMES DAILY
COMMUNITY
End: 2017-06-26

## 2017-05-12 DIAGNOSIS — I73.9 PAD (PERIPHERAL ARTERY DISEASE) (HCC): Primary | ICD-10-CM

## 2017-06-08 ENCOUNTER — PREP FOR SURGERY (OUTPATIENT)
Dept: OTHER | Facility: HOSPITAL | Age: 42
End: 2017-06-08

## 2017-06-08 DIAGNOSIS — I73.9 PERIPHERAL ARTERIAL DISEASE (HCC): Primary | ICD-10-CM

## 2017-06-08 RX ORDER — CHLORHEXIDINE GLUCONATE 500 MG/1
1 CLOTH TOPICAL EVERY 12 HOURS PRN
Status: CANCELLED | OUTPATIENT
Start: 2017-06-26

## 2017-06-26 ENCOUNTER — HOSPITAL ENCOUNTER (OUTPATIENT)
Dept: GENERAL RADIOLOGY | Facility: HOSPITAL | Age: 42
Discharge: HOME | End: 2017-06-26
Admitting: PHYSICIAN ASSISTANT

## 2017-06-26 ENCOUNTER — APPOINTMENT (OUTPATIENT)
Dept: PREADMISSION TESTING | Facility: HOSPITAL | Age: 42
End: 2017-06-26

## 2017-06-26 VITALS — HEIGHT: 69 IN | WEIGHT: 188.49 LBS | BODY MASS INDEX: 27.92 KG/M2 | OXYGEN SATURATION: 97 %

## 2017-06-26 DIAGNOSIS — I73.9 PERIPHERAL ARTERIAL DISEASE (HCC): ICD-10-CM

## 2017-06-26 LAB
ABO GROUP BLD: NORMAL
ANION GAP SERPL CALCULATED.3IONS-SCNC: 11 MMOL/L (ref 3–11)
APTT PPP: 28.5 SECONDS (ref 24–31)
BLD GP AB SCN SERPL QL: NEGATIVE
BUN BLD-MCNC: 22 MG/DL (ref 9–23)
BUN/CREAT SERPL: 16.9 (ref 7–25)
CALCIUM SPEC-SCNC: 9.9 MG/DL (ref 8.7–10.4)
CHLORIDE SERPL-SCNC: 105 MMOL/L (ref 99–109)
CO2 SERPL-SCNC: 26 MMOL/L (ref 20–31)
CREAT BLD-MCNC: 1.3 MG/DL (ref 0.6–1.3)
DEPRECATED RDW RBC AUTO: 41.4 FL (ref 37–54)
ERYTHROCYTE [DISTWIDTH] IN BLOOD BY AUTOMATED COUNT: 12.3 % (ref 11.3–14.5)
GFR SERPL CREATININE-BSD FRML MDRD: 60 ML/MIN/1.73
GLUCOSE BLD-MCNC: 113 MG/DL (ref 70–100)
HBA1C MFR BLD: 6.8 % (ref 4.8–5.6)
HCT VFR BLD AUTO: 42.4 % (ref 38.9–50.9)
HGB BLD-MCNC: 14 G/DL (ref 13.1–17.5)
INR PPP: 1.01
MCH RBC QN AUTO: 30.2 PG (ref 27–31)
MCHC RBC AUTO-ENTMCNC: 33 G/DL (ref 32–36)
MCV RBC AUTO: 91.6 FL (ref 80–99)
PLATELET # BLD AUTO: 166 10*3/MM3 (ref 150–450)
PMV BLD AUTO: 10.5 FL (ref 6–12)
POTASSIUM BLD-SCNC: 4.5 MMOL/L (ref 3.5–5.5)
PROTHROMBIN TIME: 11 SECONDS (ref 9.6–11.5)
RBC # BLD AUTO: 4.63 10*6/MM3 (ref 4.2–5.76)
RH BLD: POSITIVE
SODIUM BLD-SCNC: 142 MMOL/L (ref 132–146)
WBC NRBC COR # BLD: 6.61 10*3/MM3 (ref 3.5–10.8)

## 2017-06-26 PROCEDURE — 36415 COLL VENOUS BLD VENIPUNCTURE: CPT

## 2017-06-26 PROCEDURE — 85610 PROTHROMBIN TIME: CPT | Performed by: PHYSICIAN ASSISTANT

## 2017-06-26 PROCEDURE — 86920 COMPATIBILITY TEST SPIN: CPT

## 2017-06-26 PROCEDURE — 85027 COMPLETE CBC AUTOMATED: CPT | Performed by: PHYSICIAN ASSISTANT

## 2017-06-26 PROCEDURE — 71020 HC CHEST PA AND LATERAL: CPT

## 2017-06-26 PROCEDURE — 86850 RBC ANTIBODY SCREEN: CPT | Performed by: PHYSICIAN ASSISTANT

## 2017-06-26 PROCEDURE — 83036 HEMOGLOBIN GLYCOSYLATED A1C: CPT | Performed by: PHYSICIAN ASSISTANT

## 2017-06-26 PROCEDURE — 85730 THROMBOPLASTIN TIME PARTIAL: CPT | Performed by: PHYSICIAN ASSISTANT

## 2017-06-26 PROCEDURE — 86901 BLOOD TYPING SEROLOGIC RH(D): CPT | Performed by: PHYSICIAN ASSISTANT

## 2017-06-26 PROCEDURE — 80048 BASIC METABOLIC PNL TOTAL CA: CPT | Performed by: PHYSICIAN ASSISTANT

## 2017-06-26 PROCEDURE — 86900 BLOOD TYPING SEROLOGIC ABO: CPT | Performed by: PHYSICIAN ASSISTANT

## 2017-06-26 RX ORDER — METFORMIN HYDROCHLORIDE 750 MG/1
750 TABLET, EXTENDED RELEASE ORAL NIGHTLY
COMMUNITY

## 2017-06-27 ENCOUNTER — ANESTHESIA EVENT (OUTPATIENT)
Dept: PERIOP | Facility: HOSPITAL | Age: 42
End: 2017-06-27

## 2017-06-27 ENCOUNTER — HOSPITAL ENCOUNTER (INPATIENT)
Facility: HOSPITAL | Age: 42
LOS: 2 days | Discharge: HOME | End: 2017-06-29
Attending: THORACIC SURGERY (CARDIOTHORACIC VASCULAR SURGERY) | Admitting: THORACIC SURGERY (CARDIOTHORACIC VASCULAR SURGERY)

## 2017-06-27 ENCOUNTER — ANESTHESIA (OUTPATIENT)
Dept: PERIOP | Facility: HOSPITAL | Age: 42
End: 2017-06-27

## 2017-06-27 DIAGNOSIS — I73.9 PAD (PERIPHERAL ARTERY DISEASE) (HCC): ICD-10-CM

## 2017-06-27 DIAGNOSIS — I73.9 PERIPHERAL ARTERIAL DISEASE (HCC): ICD-10-CM

## 2017-06-27 LAB
GLUCOSE BLDC GLUCOMTR-MCNC: 130 MG/DL (ref 70–130)
GLUCOSE BLDC GLUCOMTR-MCNC: 158 MG/DL (ref 70–130)
GLUCOSE BLDC GLUCOMTR-MCNC: 167 MG/DL (ref 70–130)
GLUCOSE BLDC GLUCOMTR-MCNC: 185 MG/DL (ref 70–130)
GLUCOSE BLDC GLUCOMTR-MCNC: 205 MG/DL (ref 70–130)

## 2017-06-27 PROCEDURE — 25010000002 HEPARIN (PORCINE) PER 1000 UNITS: Performed by: NURSE ANESTHETIST, CERTIFIED REGISTERED

## 2017-06-27 PROCEDURE — 25010000002 DEXAMETHASONE PER 1 MG: Performed by: NURSE ANESTHETIST, CERTIFIED REGISTERED

## 2017-06-27 PROCEDURE — 63710000001 INSULIN LISPRO (HUMAN) PER 5 UNITS: Performed by: NURSE ANESTHETIST, CERTIFIED REGISTERED

## 2017-06-27 PROCEDURE — 88311 DECALCIFY TISSUE: CPT | Performed by: THORACIC SURGERY (CARDIOTHORACIC VASCULAR SURGERY)

## 2017-06-27 PROCEDURE — 88304 TISSUE EXAM BY PATHOLOGIST: CPT | Performed by: THORACIC SURGERY (CARDIOTHORACIC VASCULAR SURGERY)

## 2017-06-27 PROCEDURE — S0260 H&P FOR SURGERY: HCPCS | Performed by: THORACIC SURGERY (CARDIOTHORACIC VASCULAR SURGERY)

## 2017-06-27 PROCEDURE — 35656 BPG FEMORAL-POPLITEAL: CPT | Performed by: THORACIC SURGERY (CARDIOTHORACIC VASCULAR SURGERY)

## 2017-06-27 PROCEDURE — 041L0JL BYPASS LEFT FEMORAL ARTERY TO POPLITEAL ARTERY WITH SYNTHETIC SUBSTITUTE, OPEN APPROACH: ICD-10-PCS | Performed by: THORACIC SURGERY (CARDIOTHORACIC VASCULAR SURGERY)

## 2017-06-27 PROCEDURE — 99233 SBSQ HOSP IP/OBS HIGH 50: CPT | Performed by: INTERNAL MEDICINE

## 2017-06-27 PROCEDURE — 25010000002 HEPARIN (PORCINE) PER 1000 UNITS: Performed by: THORACIC SURGERY (CARDIOTHORACIC VASCULAR SURGERY)

## 2017-06-27 PROCEDURE — 25010000002 PHENYLEPHRINE PER 1 ML: Performed by: NURSE ANESTHETIST, CERTIFIED REGISTERED

## 2017-06-27 PROCEDURE — 25010000002 FENTANYL CITRATE (PF) 100 MCG/2ML SOLUTION: Performed by: NURSE ANESTHETIST, CERTIFIED REGISTERED

## 2017-06-27 PROCEDURE — 25010000002 PROPOFOL 10 MG/ML EMULSION: Performed by: NURSE ANESTHETIST, CERTIFIED REGISTERED

## 2017-06-27 PROCEDURE — 25010000002 MORPHINE SULFATE (PF) 2 MG/ML SOLUTION: Performed by: THORACIC SURGERY (CARDIOTHORACIC VASCULAR SURGERY)

## 2017-06-27 PROCEDURE — 35371 RECHANNELING OF ARTERY: CPT | Performed by: THORACIC SURGERY (CARDIOTHORACIC VASCULAR SURGERY)

## 2017-06-27 PROCEDURE — 94799 UNLISTED PULMONARY SVC/PX: CPT

## 2017-06-27 PROCEDURE — 35372 RECHANNELING OF ARTERY: CPT | Performed by: THORACIC SURGERY (CARDIOTHORACIC VASCULAR SURGERY)

## 2017-06-27 PROCEDURE — 25010000002 PROTAMINE SULFATE PER 10 MG: Performed by: NURSE ANESTHETIST, CERTIFIED REGISTERED

## 2017-06-27 PROCEDURE — C1768 GRAFT, VASCULAR: HCPCS | Performed by: THORACIC SURGERY (CARDIOTHORACIC VASCULAR SURGERY)

## 2017-06-27 PROCEDURE — 25010000002 CEFUROXIME: Performed by: PHYSICIAN ASSISTANT

## 2017-06-27 PROCEDURE — 35656 BPG FEMORAL-POPLITEAL: CPT | Performed by: PHYSICIAN ASSISTANT

## 2017-06-27 PROCEDURE — 63710000001 INSULIN LISPRO (HUMAN) PER 5 UNITS: Performed by: NURSE PRACTITIONER

## 2017-06-27 PROCEDURE — 35372 RECHANNELING OF ARTERY: CPT | Performed by: PHYSICIAN ASSISTANT

## 2017-06-27 PROCEDURE — 25010000002 MIDAZOLAM PER 1 MG: Performed by: NURSE ANESTHETIST, CERTIFIED REGISTERED

## 2017-06-27 PROCEDURE — 25010000002 ONDANSETRON PER 1 MG: Performed by: NURSE ANESTHETIST, CERTIFIED REGISTERED

## 2017-06-27 PROCEDURE — 25010000002 ONDANSETRON PER 1 MG: Performed by: NURSE PRACTITIONER

## 2017-06-27 PROCEDURE — 25010000002 NEOSTIGMINE PER 0.5 MG: Performed by: NURSE ANESTHETIST, CERTIFIED REGISTERED

## 2017-06-27 PROCEDURE — 82962 GLUCOSE BLOOD TEST: CPT

## 2017-06-27 DEVICE — GRFT VASC PROPAT THNSTRCH REMVRNG8X80X70: Type: IMPLANTABLE DEVICE | Status: FUNCTIONAL

## 2017-06-27 RX ORDER — PROTAMINE SULFATE 10 MG/ML
INJECTION, SOLUTION INTRAVENOUS AS NEEDED
Status: DISCONTINUED | OUTPATIENT
Start: 2017-06-27 | End: 2017-06-27 | Stop reason: SURG

## 2017-06-27 RX ORDER — DEXTROSE MONOHYDRATE 25 G/50ML
25 INJECTION, SOLUTION INTRAVENOUS
Status: DISCONTINUED | OUTPATIENT
Start: 2017-06-27 | End: 2017-06-29 | Stop reason: HOSPADM

## 2017-06-27 RX ORDER — NICOTINE 21 MG/24HR
1 PATCH, TRANSDERMAL 24 HOURS TRANSDERMAL EVERY 24 HOURS
Status: DISCONTINUED | OUTPATIENT
Start: 2017-06-27 | End: 2017-06-29 | Stop reason: HOSPADM

## 2017-06-27 RX ORDER — ATRACURIUM BESYLATE 10 MG/ML
INJECTION, SOLUTION INTRAVENOUS AS NEEDED
Status: DISCONTINUED | OUTPATIENT
Start: 2017-06-27 | End: 2017-06-27 | Stop reason: SURG

## 2017-06-27 RX ORDER — PROMETHAZINE HYDROCHLORIDE 25 MG/1
25 SUPPOSITORY RECTAL ONCE AS NEEDED
Status: DISCONTINUED | OUTPATIENT
Start: 2017-06-27 | End: 2017-06-27 | Stop reason: HOSPADM

## 2017-06-27 RX ORDER — FENTANYL CITRATE 50 UG/ML
INJECTION, SOLUTION INTRAMUSCULAR; INTRAVENOUS AS NEEDED
Status: DISCONTINUED | OUTPATIENT
Start: 2017-06-27 | End: 2017-06-27 | Stop reason: SURG

## 2017-06-27 RX ORDER — PROMETHAZINE HYDROCHLORIDE 25 MG/ML
6.25 INJECTION, SOLUTION INTRAMUSCULAR; INTRAVENOUS ONCE AS NEEDED
Status: DISCONTINUED | OUTPATIENT
Start: 2017-06-27 | End: 2017-06-27 | Stop reason: HOSPADM

## 2017-06-27 RX ORDER — MORPHINE SULFATE 2 MG/ML
2 INJECTION, SOLUTION INTRAMUSCULAR; INTRAVENOUS EVERY 4 HOURS PRN
Status: DISCONTINUED | OUTPATIENT
Start: 2017-06-27 | End: 2017-06-29 | Stop reason: HOSPADM

## 2017-06-27 RX ORDER — HEPARIN SODIUM 1000 [USP'U]/ML
INJECTION, SOLUTION INTRAVENOUS; SUBCUTANEOUS AS NEEDED
Status: DISCONTINUED | OUTPATIENT
Start: 2017-06-27 | End: 2017-06-27 | Stop reason: SURG

## 2017-06-27 RX ORDER — FENOFIBRATE 145 MG/1
145 TABLET, COATED ORAL NIGHTLY
Status: DISCONTINUED | OUTPATIENT
Start: 2017-06-27 | End: 2017-06-29 | Stop reason: HOSPADM

## 2017-06-27 RX ORDER — FENOFIBRATE 48 MG/1
48 TABLET, COATED ORAL DAILY
Status: DISCONTINUED | OUTPATIENT
Start: 2017-06-27 | End: 2017-06-27

## 2017-06-27 RX ORDER — CLOPIDOGREL BISULFATE 75 MG/1
75 TABLET ORAL NIGHTLY
Status: DISCONTINUED | OUTPATIENT
Start: 2017-06-27 | End: 2017-06-29 | Stop reason: HOSPADM

## 2017-06-27 RX ORDER — HYDROMORPHONE HYDROCHLORIDE 1 MG/ML
0.5 INJECTION, SOLUTION INTRAMUSCULAR; INTRAVENOUS; SUBCUTANEOUS
Status: DISCONTINUED | OUTPATIENT
Start: 2017-06-27 | End: 2017-06-27 | Stop reason: HOSPADM

## 2017-06-27 RX ORDER — GLYCOPYRROLATE 0.2 MG/ML
INJECTION INTRAMUSCULAR; INTRAVENOUS AS NEEDED
Status: DISCONTINUED | OUTPATIENT
Start: 2017-06-27 | End: 2017-06-27 | Stop reason: SURG

## 2017-06-27 RX ORDER — LIDOCAINE HYDROCHLORIDE 10 MG/ML
INJECTION, SOLUTION INFILTRATION; PERINEURAL AS NEEDED
Status: DISCONTINUED | OUTPATIENT
Start: 2017-06-27 | End: 2017-06-27 | Stop reason: SURG

## 2017-06-27 RX ORDER — DEXAMETHASONE SODIUM PHOSPHATE 4 MG/ML
INJECTION, SOLUTION INTRA-ARTICULAR; INTRALESIONAL; INTRAMUSCULAR; INTRAVENOUS; SOFT TISSUE AS NEEDED
Status: DISCONTINUED | OUTPATIENT
Start: 2017-06-27 | End: 2017-06-27 | Stop reason: SURG

## 2017-06-27 RX ORDER — FAMOTIDINE 20 MG/1
20 TABLET, FILM COATED ORAL ONCE
Status: COMPLETED | OUTPATIENT
Start: 2017-06-27 | End: 2017-06-27

## 2017-06-27 RX ORDER — NICOTINE POLACRILEX 4 MG
15 LOZENGE BUCCAL
Status: DISCONTINUED | OUTPATIENT
Start: 2017-06-27 | End: 2017-06-29 | Stop reason: HOSPADM

## 2017-06-27 RX ORDER — ONDANSETRON 2 MG/ML
4 INJECTION INTRAMUSCULAR; INTRAVENOUS ONCE AS NEEDED
Status: DISCONTINUED | OUTPATIENT
Start: 2017-06-27 | End: 2017-06-27 | Stop reason: HOSPADM

## 2017-06-27 RX ORDER — HYDROCODONE BITARTRATE AND ACETAMINOPHEN 7.5; 325 MG/1; MG/1
1 TABLET ORAL ONCE AS NEEDED
Status: DISCONTINUED | OUTPATIENT
Start: 2017-06-27 | End: 2017-06-27 | Stop reason: HOSPADM

## 2017-06-27 RX ORDER — ONDANSETRON 2 MG/ML
4 INJECTION INTRAMUSCULAR; INTRAVENOUS EVERY 6 HOURS PRN
Status: DISCONTINUED | OUTPATIENT
Start: 2017-06-27 | End: 2017-06-29 | Stop reason: HOSPADM

## 2017-06-27 RX ORDER — FENOFIBRATE 48 MG/1
48 TABLET, COATED ORAL NIGHTLY
Status: DISCONTINUED | OUTPATIENT
Start: 2017-06-27 | End: 2017-06-27

## 2017-06-27 RX ORDER — ASPIRIN 81 MG/1
81 TABLET ORAL NIGHTLY
Status: DISCONTINUED | OUTPATIENT
Start: 2017-06-27 | End: 2017-06-29 | Stop reason: HOSPADM

## 2017-06-27 RX ORDER — PROPOFOL 10 MG/ML
VIAL (ML) INTRAVENOUS AS NEEDED
Status: DISCONTINUED | OUTPATIENT
Start: 2017-06-27 | End: 2017-06-27 | Stop reason: SURG

## 2017-06-27 RX ORDER — METFORMIN HYDROCHLORIDE 750 MG/1
750 TABLET, EXTENDED RELEASE ORAL NIGHTLY
Status: DISCONTINUED | OUTPATIENT
Start: 2017-06-27 | End: 2017-06-27

## 2017-06-27 RX ORDER — FENTANYL CITRATE 50 UG/ML
50 INJECTION, SOLUTION INTRAMUSCULAR; INTRAVENOUS
Status: DISCONTINUED | OUTPATIENT
Start: 2017-06-27 | End: 2017-06-27 | Stop reason: HOSPADM

## 2017-06-27 RX ORDER — SENNA AND DOCUSATE SODIUM 50; 8.6 MG/1; MG/1
2 TABLET, FILM COATED ORAL NIGHTLY
Status: DISCONTINUED | OUTPATIENT
Start: 2017-06-27 | End: 2017-06-29 | Stop reason: HOSPADM

## 2017-06-27 RX ORDER — PROMETHAZINE HYDROCHLORIDE 25 MG/1
25 TABLET ORAL ONCE AS NEEDED
Status: DISCONTINUED | OUTPATIENT
Start: 2017-06-27 | End: 2017-06-27 | Stop reason: HOSPADM

## 2017-06-27 RX ORDER — SODIUM CHLORIDE, SODIUM LACTATE, POTASSIUM CHLORIDE, CALCIUM CHLORIDE 600; 310; 30; 20 MG/100ML; MG/100ML; MG/100ML; MG/100ML
9 INJECTION, SOLUTION INTRAVENOUS CONTINUOUS
Status: DISCONTINUED | OUTPATIENT
Start: 2017-06-27 | End: 2017-06-27

## 2017-06-27 RX ORDER — LISINOPRIL 20 MG/1
20 TABLET ORAL NIGHTLY
Status: DISCONTINUED | OUTPATIENT
Start: 2017-06-27 | End: 2017-06-29 | Stop reason: HOSPADM

## 2017-06-27 RX ORDER — SODIUM CHLORIDE 0.9 % (FLUSH) 0.9 %
1-10 SYRINGE (ML) INJECTION AS NEEDED
Status: DISCONTINUED | OUTPATIENT
Start: 2017-06-27 | End: 2017-06-27 | Stop reason: HOSPADM

## 2017-06-27 RX ORDER — ROSUVASTATIN CALCIUM 20 MG/1
20 TABLET, COATED ORAL NIGHTLY
Status: DISCONTINUED | OUTPATIENT
Start: 2017-06-27 | End: 2017-06-29 | Stop reason: HOSPADM

## 2017-06-27 RX ORDER — MORPHINE SULFATE 10 MG/ML
2 INJECTION INTRAMUSCULAR; INTRAVENOUS; SUBCUTANEOUS EVERY 4 HOURS PRN
Status: DISCONTINUED | OUTPATIENT
Start: 2017-06-27 | End: 2017-06-27

## 2017-06-27 RX ORDER — SODIUM CHLORIDE 9 MG/ML
50 INJECTION, SOLUTION INTRAVENOUS CONTINUOUS
Status: DISCONTINUED | OUTPATIENT
Start: 2017-06-27 | End: 2017-06-29 | Stop reason: HOSPADM

## 2017-06-27 RX ORDER — HYDROCODONE BITARTRATE AND ACETAMINOPHEN 7.5; 325 MG/1; MG/1
1 TABLET ORAL EVERY 6 HOURS PRN
Status: DISCONTINUED | OUTPATIENT
Start: 2017-06-27 | End: 2017-06-29 | Stop reason: HOSPADM

## 2017-06-27 RX ORDER — THROMBIN HUMAN AND FIBRINOGEN 2; 5.5 [USP'U]/1; MG/1
PATCH TOPICAL AS NEEDED
Status: DISCONTINUED | OUTPATIENT
Start: 2017-06-27 | End: 2017-06-27 | Stop reason: HOSPADM

## 2017-06-27 RX ORDER — MIDAZOLAM HYDROCHLORIDE 1 MG/ML
INJECTION INTRAMUSCULAR; INTRAVENOUS AS NEEDED
Status: DISCONTINUED | OUTPATIENT
Start: 2017-06-27 | End: 2017-06-27 | Stop reason: SURG

## 2017-06-27 RX ORDER — ONDANSETRON 2 MG/ML
INJECTION INTRAMUSCULAR; INTRAVENOUS AS NEEDED
Status: DISCONTINUED | OUTPATIENT
Start: 2017-06-27 | End: 2017-06-27 | Stop reason: SURG

## 2017-06-27 RX ORDER — CHLORHEXIDINE GLUCONATE 500 MG/1
1 CLOTH TOPICAL EVERY 12 HOURS PRN
Status: DISCONTINUED | OUTPATIENT
Start: 2017-06-27 | End: 2017-06-27 | Stop reason: HOSPADM

## 2017-06-27 RX ADMIN — FENTANYL CITRATE 50 MCG: 50 INJECTION, SOLUTION INTRAMUSCULAR; INTRAVENOUS at 08:21

## 2017-06-27 RX ADMIN — PHENYLEPHRINE HYDROCHLORIDE 100 MCG: 10 INJECTION INTRAVENOUS at 07:55

## 2017-06-27 RX ADMIN — MORPHINE SULFATE 2 MG: 2 INJECTION, SOLUTION INTRAMUSCULAR; INTRAVENOUS at 20:25

## 2017-06-27 RX ADMIN — SODIUM CHLORIDE, POTASSIUM CHLORIDE, SODIUM LACTATE AND CALCIUM CHLORIDE: 600; 310; 30; 20 INJECTION, SOLUTION INTRAVENOUS at 08:02

## 2017-06-27 RX ADMIN — PHENYLEPHRINE HYDROCHLORIDE 100 MCG: 10 INJECTION INTRAVENOUS at 07:45

## 2017-06-27 RX ADMIN — FAMOTIDINE 20 MG: 20 TABLET, FILM COATED ORAL at 06:43

## 2017-06-27 RX ADMIN — Medication 2 TABLET: at 20:30

## 2017-06-27 RX ADMIN — HYDROCODONE BITARTRATE AND ACETAMINOPHEN 1 TABLET: 7.5; 325 TABLET ORAL at 10:38

## 2017-06-27 RX ADMIN — FENTANYL CITRATE 50 MCG: 50 INJECTION, SOLUTION INTRAMUSCULAR; INTRAVENOUS at 07:02

## 2017-06-27 RX ADMIN — PHENYLEPHRINE HYDROCHLORIDE 100 MCG: 10 INJECTION INTRAVENOUS at 07:10

## 2017-06-27 RX ADMIN — ATRACURIUM BESYLATE 40 MG: 10 INJECTION, SOLUTION INTRAVENOUS at 07:02

## 2017-06-27 RX ADMIN — SODIUM CHLORIDE 50 ML/HR: 9 INJECTION, SOLUTION INTRAVENOUS at 09:35

## 2017-06-27 RX ADMIN — ROBINUL 0.4 MG: 0.2 INJECTION INTRAMUSCULAR; INTRAVENOUS at 08:16

## 2017-06-27 RX ADMIN — FENTANYL CITRATE 50 MCG: 50 INJECTION INTRAMUSCULAR; INTRAVENOUS at 09:15

## 2017-06-27 RX ADMIN — EPHEDRINE SULFATE 10 MG: 50 INJECTION INTRAMUSCULAR; INTRAVENOUS; SUBCUTANEOUS at 08:01

## 2017-06-27 RX ADMIN — PHENYLEPHRINE HYDROCHLORIDE 100 MCG: 10 INJECTION INTRAVENOUS at 08:05

## 2017-06-27 RX ADMIN — FENTANYL CITRATE 50 MCG: 50 INJECTION, SOLUTION INTRAMUSCULAR; INTRAVENOUS at 08:15

## 2017-06-27 RX ADMIN — Medication 3 MG: at 08:16

## 2017-06-27 RX ADMIN — LIDOCAINE HYDROCHLORIDE 50 MG: 10 INJECTION, SOLUTION INFILTRATION; PERINEURAL at 07:02

## 2017-06-27 RX ADMIN — SODIUM CHLORIDE, POTASSIUM CHLORIDE, SODIUM LACTATE AND CALCIUM CHLORIDE 9 ML/HR: 600; 310; 30; 20 INJECTION, SOLUTION INTRAVENOUS at 06:44

## 2017-06-27 RX ADMIN — ASPIRIN 81 MG: 81 TABLET, COATED ORAL at 20:30

## 2017-06-27 RX ADMIN — LISINOPRIL 20 MG: 20 TABLET ORAL at 20:30

## 2017-06-27 RX ADMIN — PHENYLEPHRINE HYDROCHLORIDE 100 MCG: 10 INJECTION INTRAVENOUS at 07:37

## 2017-06-27 RX ADMIN — HYDROCODONE BITARTRATE AND ACETAMINOPHEN 1 TABLET: 7.5; 325 TABLET ORAL at 16:37

## 2017-06-27 RX ADMIN — FENTANYL CITRATE 50 MCG: 50 INJECTION INTRAMUSCULAR; INTRAVENOUS at 09:04

## 2017-06-27 RX ADMIN — HEPARIN SODIUM 5000 UNITS: 1000 INJECTION, SOLUTION INTRAVENOUS; SUBCUTANEOUS at 07:27

## 2017-06-27 RX ADMIN — MORPHINE SULFATE 2 MG: 2 INJECTION, SOLUTION INTRAMUSCULAR; INTRAVENOUS at 10:49

## 2017-06-27 RX ADMIN — SODIUM CHLORIDE, POTASSIUM CHLORIDE, SODIUM LACTATE AND CALCIUM CHLORIDE: 600; 310; 30; 20 INJECTION, SOLUTION INTRAVENOUS at 06:58

## 2017-06-27 RX ADMIN — CLOPIDOGREL BISULFATE 75 MG: 75 TABLET ORAL at 20:30

## 2017-06-27 RX ADMIN — PHENYLEPHRINE HYDROCHLORIDE 100 MCG: 10 INJECTION INTRAVENOUS at 07:16

## 2017-06-27 RX ADMIN — ROSUVASTATIN CALCIUM 20 MG: 20 TABLET, FILM COATED ORAL at 20:30

## 2017-06-27 RX ADMIN — INSULIN LISPRO 2 UNITS: 100 INJECTION, SOLUTION INTRAVENOUS; SUBCUTANEOUS at 17:08

## 2017-06-27 RX ADMIN — PROPOFOL 200 MG: 10 INJECTION, EMULSION INTRAVENOUS at 07:02

## 2017-06-27 RX ADMIN — MORPHINE SULFATE 2 MG: 2 INJECTION, SOLUTION INTRAMUSCULAR; INTRAVENOUS at 14:10

## 2017-06-27 RX ADMIN — FENTANYL CITRATE 50 MCG: 50 INJECTION, SOLUTION INTRAMUSCULAR; INTRAVENOUS at 08:18

## 2017-06-27 RX ADMIN — CEFUROXIME 1.5 G: 1.5 INJECTION, POWDER, FOR SOLUTION INTRAVENOUS at 06:58

## 2017-06-27 RX ADMIN — HYDROCODONE BITARTRATE AND ACETAMINOPHEN 1 TABLET: 7.5; 325 TABLET ORAL at 22:13

## 2017-06-27 RX ADMIN — ONDANSETRON 4 MG: 2 INJECTION INTRAMUSCULAR; INTRAVENOUS at 12:08

## 2017-06-27 RX ADMIN — INSULIN LISPRO 3 UNITS: 100 INJECTION, SOLUTION INTRAVENOUS; SUBCUTANEOUS at 20:35

## 2017-06-27 RX ADMIN — MIDAZOLAM HYDROCHLORIDE 2 MG: 1 INJECTION, SOLUTION INTRAMUSCULAR; INTRAVENOUS at 06:58

## 2017-06-27 RX ADMIN — PHENYLEPHRINE HYDROCHLORIDE 100 MCG: 10 INJECTION INTRAVENOUS at 07:08

## 2017-06-27 RX ADMIN — PROTAMINE SULFATE 25 MG: 10 INJECTION, SOLUTION INTRAVENOUS at 08:00

## 2017-06-27 RX ADMIN — INSULIN LISPRO 2 UNITS: 100 INJECTION, SOLUTION INTRAVENOUS; SUBCUTANEOUS at 12:08

## 2017-06-27 RX ADMIN — DEXAMETHASONE SODIUM PHOSPHATE 4 MG: 4 INJECTION, SOLUTION INTRAMUSCULAR; INTRAVENOUS at 07:13

## 2017-06-27 RX ADMIN — ONDANSETRON 4 MG: 2 INJECTION INTRAMUSCULAR; INTRAVENOUS at 07:55

## 2017-06-27 RX ADMIN — INSULIN LISPRO 2 UNITS: 100 INJECTION, SOLUTION INTRAVENOUS; SUBCUTANEOUS at 09:01

## 2017-06-27 RX ADMIN — PROTAMINE SULFATE 25 MG: 10 INJECTION, SOLUTION INTRAVENOUS at 07:58

## 2017-06-27 NOTE — ANESTHESIA POSTPROCEDURE EVALUATION
Patient: Justo Oquendo    Procedure Summary     Date Anesthesia Start Anesthesia Stop Room / Location    06/27/17 0658 0836 [facility]       Procedure Diagnosis Surgeon Provider    LEFT FEMORAL ENDARTECTOMYN LEFT FEMORAL POPLITEAL BYPASS (Left Thigh) PAD (peripheral artery disease)  (PAD (peripheral artery disease) [I73.9]) MD Josh Garrett MD          Anesthesia Type: general  Last vitals  /54 (06/27/17 0836)    Temp 97.7 °F (36.5 °C) (06/27/17 0836)    Pulse 66 (06/27/17 0836)   Resp 18 (06/27/17 0836)    SpO2 100 % (06/27/17 0836)      Post Anesthesia Care and Evaluation    Patient location during evaluation: PACU  Patient participation: waiting for patient participation  Level of consciousness: sleepy but conscious  Pain score: 0  Pain management: adequate  Airway patency: patent  Anesthetic complications: No anesthetic complications  PONV Status: none  Cardiovascular status: hemodynamically stable and acceptable  Respiratory status: nonlabored ventilation, acceptable, nasal cannula and oral airway  Hydration status: acceptable

## 2017-06-27 NOTE — ANESTHESIA PROCEDURE NOTES
Airway  Urgency: elective    Airway not difficult    General Information and Staff    Patient location during procedure: OR  CRNA: RAMA CAGLE    Indications and Patient Condition  Indications for airway management: airway protection    Preoxygenated: yes  MILS not maintained throughout  Mask difficulty assessment: 1 - vent by mask    Final Airway Details  Final airway type: endotracheal airway      Successful airway: ETT  Cuffed: yes   Successful intubation technique: direct laryngoscopy  Facilitating devices/methods: intubating stylet  Endotracheal tube insertion site: oral  Blade: Salma  Blade size: #3  ETT size: 7.5 mm  Cormack-Lehane Classification: grade IIa - partial view of glottis  Placement verified by: chest auscultation and capnometry   Measured from: lips  ETT to lips (cm): 21  Number of attempts at approach: 1    Additional Comments  Negative epigastric sounds, Breath sound equal bilaterally with symmetric chest rise and fall

## 2017-06-27 NOTE — ANESTHESIA PREPROCEDURE EVALUATION
Anesthesia Evaluation     Patient summary reviewed and Nursing notes reviewed   NPO Solid Status: > 8 hours  NPO Liquid Status: > 8 hours     Airway   Mallampati: II  TM distance: >3 FB  Neck ROM: full  no difficulty expected  Dental      Pulmonary    (+) a smoker,   Cardiovascular     ECG reviewed    (+) hypertension, PVD, hyperlipidemia    ROS comment: EKG NSR    Neuro/Psych  GI/Hepatic/Renal/Endo    Diabetes: .    Musculoskeletal     (+) back pain,   Abdominal    Substance History      OB/GYN          Other   (+) arthritis     ROS/Med Hx Other: Plavix                                Anesthesia Plan    ASA 3     general     intravenous induction   Anesthetic plan and risks discussed with patient.    Plan discussed with CRNA.

## 2017-06-28 LAB
ALBUMIN SERPL-MCNC: 4.3 G/DL (ref 3.2–4.8)
ALBUMIN/GLOB SERPL: 1.7 G/DL (ref 1.5–2.5)
ALP SERPL-CCNC: 61 U/L (ref 25–100)
ALT SERPL W P-5'-P-CCNC: 18 U/L (ref 7–40)
ANION GAP SERPL CALCULATED.3IONS-SCNC: 9 MMOL/L (ref 3–11)
AST SERPL-CCNC: 15 U/L (ref 0–33)
BASOPHILS # BLD AUTO: 0.01 10*3/MM3 (ref 0–0.2)
BASOPHILS NFR BLD AUTO: 0.1 % (ref 0–1)
BILIRUB SERPL-MCNC: 0.3 MG/DL (ref 0.3–1.2)
BUN BLD-MCNC: 17 MG/DL (ref 9–23)
BUN/CREAT SERPL: 18.9 (ref 7–25)
CALCIUM SPEC-SCNC: 9.9 MG/DL (ref 8.7–10.4)
CHLORIDE SERPL-SCNC: 102 MMOL/L (ref 99–109)
CO2 SERPL-SCNC: 25 MMOL/L (ref 20–31)
CREAT BLD-MCNC: 0.9 MG/DL (ref 0.6–1.3)
DEPRECATED RDW RBC AUTO: 41.2 FL (ref 37–54)
EOSINOPHIL # BLD AUTO: 0.01 10*3/MM3 (ref 0–0.3)
EOSINOPHIL NFR BLD AUTO: 0.1 % (ref 0–3)
ERYTHROCYTE [DISTWIDTH] IN BLOOD BY AUTOMATED COUNT: 12.1 % (ref 11.3–14.5)
GFR SERPL CREATININE-BSD FRML MDRD: 92 ML/MIN/1.73
GLOBULIN UR ELPH-MCNC: 2.5 GM/DL
GLUCOSE BLD-MCNC: 145 MG/DL (ref 70–100)
GLUCOSE BLDC GLUCOMTR-MCNC: 130 MG/DL (ref 70–130)
GLUCOSE BLDC GLUCOMTR-MCNC: 156 MG/DL (ref 70–130)
GLUCOSE BLDC GLUCOMTR-MCNC: 171 MG/DL (ref 70–130)
GLUCOSE BLDC GLUCOMTR-MCNC: 182 MG/DL (ref 70–130)
HCT VFR BLD AUTO: 40 % (ref 38.9–50.9)
HGB BLD-MCNC: 13.1 G/DL (ref 13.1–17.5)
IMM GRANULOCYTES # BLD: 0.04 10*3/MM3 (ref 0–0.03)
IMM GRANULOCYTES NFR BLD: 0.4 % (ref 0–0.6)
LYMPHOCYTES # BLD AUTO: 2.45 10*3/MM3 (ref 0.6–4.8)
LYMPHOCYTES NFR BLD AUTO: 21.8 % (ref 24–44)
MAGNESIUM SERPL-MCNC: 2.2 MG/DL (ref 1.3–2.7)
MCH RBC QN AUTO: 30 PG (ref 27–31)
MCHC RBC AUTO-ENTMCNC: 32.8 G/DL (ref 32–36)
MCV RBC AUTO: 91.7 FL (ref 80–99)
MONOCYTES # BLD AUTO: 1.1 10*3/MM3 (ref 0–1)
MONOCYTES NFR BLD AUTO: 9.8 % (ref 0–12)
NEUTROPHILS # BLD AUTO: 7.63 10*3/MM3 (ref 1.5–8.3)
NEUTROPHILS NFR BLD AUTO: 67.8 % (ref 41–71)
PHOSPHATE SERPL-MCNC: 4 MG/DL (ref 2.4–5.1)
PLATELET # BLD AUTO: 175 10*3/MM3 (ref 150–450)
PMV BLD AUTO: 10.5 FL (ref 6–12)
POTASSIUM BLD-SCNC: 4.2 MMOL/L (ref 3.5–5.5)
PROT SERPL-MCNC: 6.8 G/DL (ref 5.7–8.2)
RBC # BLD AUTO: 4.36 10*6/MM3 (ref 4.2–5.76)
SODIUM BLD-SCNC: 136 MMOL/L (ref 132–146)
WBC NRBC COR # BLD: 11.24 10*3/MM3 (ref 3.5–10.8)

## 2017-06-28 PROCEDURE — 84100 ASSAY OF PHOSPHORUS: CPT | Performed by: NURSE PRACTITIONER

## 2017-06-28 PROCEDURE — 82962 GLUCOSE BLOOD TEST: CPT

## 2017-06-28 PROCEDURE — 99232 SBSQ HOSP IP/OBS MODERATE 35: CPT | Performed by: INTERNAL MEDICINE

## 2017-06-28 PROCEDURE — 83735 ASSAY OF MAGNESIUM: CPT | Performed by: NURSE PRACTITIONER

## 2017-06-28 PROCEDURE — 25010000002 MORPHINE SULFATE (PF) 2 MG/ML SOLUTION: Performed by: THORACIC SURGERY (CARDIOTHORACIC VASCULAR SURGERY)

## 2017-06-28 PROCEDURE — 93005 ELECTROCARDIOGRAM TRACING: CPT | Performed by: PHYSICIAN ASSISTANT

## 2017-06-28 PROCEDURE — 99024 POSTOP FOLLOW-UP VISIT: CPT | Performed by: THORACIC SURGERY (CARDIOTHORACIC VASCULAR SURGERY)

## 2017-06-28 PROCEDURE — 63710000001 INSULIN DETEMIR PER 5 UNITS: Performed by: INTERNAL MEDICINE

## 2017-06-28 PROCEDURE — 80053 COMPREHEN METABOLIC PANEL: CPT | Performed by: NURSE PRACTITIONER

## 2017-06-28 PROCEDURE — 85025 COMPLETE CBC W/AUTO DIFF WBC: CPT | Performed by: NURSE PRACTITIONER

## 2017-06-28 RX ORDER — NICOTINE POLACRILEX 4 MG
15 LOZENGE BUCCAL
Status: DISCONTINUED | OUTPATIENT
Start: 2017-06-28 | End: 2017-06-29 | Stop reason: HOSPADM

## 2017-06-28 RX ORDER — DEXTROSE MONOHYDRATE 25 G/50ML
25 INJECTION, SOLUTION INTRAVENOUS
Status: DISCONTINUED | OUTPATIENT
Start: 2017-06-28 | End: 2017-06-29 | Stop reason: HOSPADM

## 2017-06-28 RX ADMIN — HYDROCODONE BITARTRATE AND ACETAMINOPHEN 1 TABLET: 7.5; 325 TABLET ORAL at 22:01

## 2017-06-28 RX ADMIN — Medication 2 TABLET: at 20:35

## 2017-06-28 RX ADMIN — SODIUM CHLORIDE 50 ML/HR: 9 INJECTION, SOLUTION INTRAVENOUS at 04:06

## 2017-06-28 RX ADMIN — INSULIN LISPRO 2 UNITS: 100 INJECTION, SOLUTION INTRAVENOUS; SUBCUTANEOUS at 17:17

## 2017-06-28 RX ADMIN — NICOTINE 1 PATCH: 14 PATCH TRANSDERMAL at 10:08

## 2017-06-28 RX ADMIN — FENOFIBRATE 145 MG: 145 TABLET ORAL at 20:35

## 2017-06-28 RX ADMIN — FENOFIBRATE 145 MG: 145 TABLET ORAL at 00:19

## 2017-06-28 RX ADMIN — MORPHINE SULFATE 2 MG: 2 INJECTION, SOLUTION INTRAMUSCULAR; INTRAVENOUS at 12:03

## 2017-06-28 RX ADMIN — HYDROCODONE BITARTRATE AND ACETAMINOPHEN 1 TABLET: 7.5; 325 TABLET ORAL at 10:07

## 2017-06-28 RX ADMIN — INSULIN DETEMIR 10 UNITS: 100 INJECTION, SOLUTION SUBCUTANEOUS at 11:56

## 2017-06-28 RX ADMIN — MORPHINE SULFATE 2 MG: 2 INJECTION, SOLUTION INTRAMUSCULAR; INTRAVENOUS at 08:18

## 2017-06-28 RX ADMIN — MORPHINE SULFATE 2 MG: 2 INJECTION, SOLUTION INTRAMUSCULAR; INTRAVENOUS at 16:40

## 2017-06-28 RX ADMIN — MORPHINE SULFATE 2 MG: 2 INJECTION, SOLUTION INTRAMUSCULAR; INTRAVENOUS at 19:44

## 2017-06-28 RX ADMIN — CLOPIDOGREL BISULFATE 75 MG: 75 TABLET ORAL at 20:35

## 2017-06-28 RX ADMIN — ROSUVASTATIN CALCIUM 20 MG: 20 TABLET, FILM COATED ORAL at 20:35

## 2017-06-28 RX ADMIN — MORPHINE SULFATE 2 MG: 2 INJECTION, SOLUTION INTRAMUSCULAR; INTRAVENOUS at 00:18

## 2017-06-28 RX ADMIN — INSULIN LISPRO 2 UNITS: 100 INJECTION, SOLUTION INTRAVENOUS; SUBCUTANEOUS at 08:03

## 2017-06-28 RX ADMIN — LISINOPRIL 20 MG: 20 TABLET ORAL at 20:35

## 2017-06-28 RX ADMIN — INSULIN LISPRO 2 UNITS: 100 INJECTION, SOLUTION INTRAVENOUS; SUBCUTANEOUS at 11:57

## 2017-06-28 RX ADMIN — ASPIRIN 81 MG: 81 TABLET, COATED ORAL at 20:35

## 2017-06-28 RX ADMIN — HYDROCODONE BITARTRATE AND ACETAMINOPHEN 1 TABLET: 7.5; 325 TABLET ORAL at 16:23

## 2017-06-28 RX ADMIN — HYDROCODONE BITARTRATE AND ACETAMINOPHEN 1 TABLET: 7.5; 325 TABLET ORAL at 04:04

## 2017-06-29 VITALS
HEART RATE: 90 BPM | TEMPERATURE: 98.1 F | WEIGHT: 188.5 LBS | DIASTOLIC BLOOD PRESSURE: 68 MMHG | SYSTOLIC BLOOD PRESSURE: 114 MMHG | RESPIRATION RATE: 16 BRPM | BODY MASS INDEX: 27.92 KG/M2 | OXYGEN SATURATION: 94 % | HEIGHT: 69 IN

## 2017-06-29 LAB — GLUCOSE BLDC GLUCOMTR-MCNC: 139 MG/DL (ref 70–130)

## 2017-06-29 PROCEDURE — 99024 POSTOP FOLLOW-UP VISIT: CPT | Performed by: THORACIC SURGERY (CARDIOTHORACIC VASCULAR SURGERY)

## 2017-06-29 PROCEDURE — 99024 POSTOP FOLLOW-UP VISIT: CPT | Performed by: PHYSICIAN ASSISTANT

## 2017-06-29 PROCEDURE — 82962 GLUCOSE BLOOD TEST: CPT

## 2017-06-29 PROCEDURE — 63710000001 INSULIN DETEMIR PER 5 UNITS: Performed by: INTERNAL MEDICINE

## 2017-06-29 PROCEDURE — 25010000002 MORPHINE SULFATE (PF) 2 MG/ML SOLUTION: Performed by: THORACIC SURGERY (CARDIOTHORACIC VASCULAR SURGERY)

## 2017-06-29 PROCEDURE — 63710000001 INSULIN LISPRO (HUMAN) PER 5 UNITS: Performed by: INTERNAL MEDICINE

## 2017-06-29 RX ORDER — HYDROCODONE BITARTRATE AND ACETAMINOPHEN 7.5; 325 MG/1; MG/1
1 TABLET ORAL EVERY 6 HOURS PRN
Qty: 20 TABLET | Refills: 0
Start: 2017-06-29 | End: 2017-07-07

## 2017-06-29 RX ADMIN — NICOTINE 1 PATCH: 14 PATCH TRANSDERMAL at 09:20

## 2017-06-29 RX ADMIN — INSULIN DETEMIR 10 UNITS: 100 INJECTION, SOLUTION SUBCUTANEOUS at 06:36

## 2017-06-29 RX ADMIN — INSULIN LISPRO 3 UNITS: 100 INJECTION, SOLUTION INTRAVENOUS; SUBCUTANEOUS at 08:23

## 2017-06-29 RX ADMIN — HYDROCODONE BITARTRATE AND ACETAMINOPHEN 1 TABLET: 7.5; 325 TABLET ORAL at 09:21

## 2017-06-29 RX ADMIN — MORPHINE SULFATE 2 MG: 2 INJECTION, SOLUTION INTRAMUSCULAR; INTRAVENOUS at 02:14

## 2017-06-29 RX ADMIN — HYDROCODONE BITARTRATE AND ACETAMINOPHEN 1 TABLET: 7.5; 325 TABLET ORAL at 06:36

## 2017-06-29 RX ADMIN — MORPHINE SULFATE 2 MG: 2 INJECTION, SOLUTION INTRAMUSCULAR; INTRAVENOUS at 08:23

## 2017-06-30 LAB
ABO + RH BLD: NORMAL
ABO + RH BLD: NORMAL
BH BB BLOOD EXPIRATION DATE: NORMAL
BH BB BLOOD EXPIRATION DATE: NORMAL
BH BB BLOOD TYPE BARCODE: 5100
BH BB BLOOD TYPE BARCODE: 5100
BH BB DISPENSE STATUS: NORMAL
BH BB DISPENSE STATUS: NORMAL
BH BB PRODUCT CODE: NORMAL
BH BB PRODUCT CODE: NORMAL
BH BB UNIT NUMBER: NORMAL
BH BB UNIT NUMBER: NORMAL
CROSSMATCH INTERPRETATION: NORMAL
CROSSMATCH INTERPRETATION: NORMAL
CYTO UR: NORMAL
LAB AP CASE REPORT: NORMAL
LAB AP CLINICAL INFORMATION: NORMAL
Lab: NORMAL
PATH REPORT.FINAL DX SPEC: NORMAL
PATH REPORT.GROSS SPEC: NORMAL
UNIT  ABO: NORMAL
UNIT  ABO: NORMAL
UNIT  RH: NORMAL
UNIT  RH: NORMAL

## 2017-07-17 ENCOUNTER — OFFICE VISIT (OUTPATIENT)
Dept: CARDIAC SURGERY | Facility: CLINIC | Age: 42
End: 2017-07-17

## 2017-07-17 VITALS
TEMPERATURE: 97.2 F | OXYGEN SATURATION: 99 % | HEIGHT: 69 IN | BODY MASS INDEX: 27.4 KG/M2 | HEART RATE: 69 BPM | WEIGHT: 185 LBS | DIASTOLIC BLOOD PRESSURE: 80 MMHG | SYSTOLIC BLOOD PRESSURE: 120 MMHG

## 2017-07-17 DIAGNOSIS — I73.9 PAD (PERIPHERAL ARTERY DISEASE) (HCC): Primary | ICD-10-CM

## 2017-07-17 PROCEDURE — 99024 POSTOP FOLLOW-UP VISIT: CPT | Performed by: THORACIC SURGERY (CARDIOTHORACIC VASCULAR SURGERY)

## 2017-07-17 NOTE — PROGRESS NOTES
07/17/2017  Patient Information  Justo Oquendo                                                                                          [address] [address]   1975  'PCP/Referring Physician'  Melo Whitaker MD  [phone]  No ref. provider found    Chief Complaint   Patient presents with   • Post-op Follow-up     Hosp D/C Left Fem Pop 6/27/17       History of Present Illness:  The patient is status post left femoral to popliteal bypass on June 27, 2017.  His knee incision and groin incision are healing well without signs of infection.  He is ambulatory.  He has some hyperesthesia on the skin of the left pretibial area but his claudication symptoms have resolved.  He states it was very difficult to control his blood sugar after surgery but it is improving.  Patient Active Problem List   Diagnosis   • Peripheral arterial disease   • PAD (peripheral artery disease)   • Hypertension   • Hyperlipidemia   • Tobacco abuse   • Diabetes mellitus type II, non insulin dependent     Past Medical History:   Diagnosis Date   • Arthritis    • Back pain    • Diabetes     SINCE MARCH 2017   • Dyslipidemia    • HTN (hypertension)    • PVD (peripheral vascular disease)    • Wears partial dentures      Past Surgical History:   Procedure Laterality Date   • AORTAGRAM N/A 4/25/2017    Procedure: AORTAGRAM WITH OR WITHOUT RUNOFFS POSSIBLE STENT with left femoral cutdown;  Surgeon: Brett Ryan MD;  Location:  [facility] OR 15;  Service:    • CYST REMOVAL      OFF OF BACK   • DC VEIN IN SITU BYPASS GRAFT,FEM-POP Left 4/25/2017    Procedure: FEMORAL POPLITEAL BYPASS;  Surgeon: Brett Ryan MD;  Location:  [facility] OR 15;  Service: Vascular   • DC VEIN IN SITU BYPASS GRAFT,FEM-POP Left 6/27/2017    Procedure: LEFT FEMORAL ENDARTECTOMYN LEFT FEMORAL POPLITEAL BYPASS;  Surgeon: Brett Ryan MD;  Location:  [facility];  Service: Vascular       Current Outpatient Prescriptions:   •  aspirin  "81 MG EC tablet, Take 81 mg by mouth Every Night., Disp: , Rfl:   •  Canagliflozin (INVOKANA) 100 MG tablet, Take 100 mg by mouth Daily., Disp: , Rfl:   •  clopidogrel (PLAVIX) 75 MG tablet, Take 75 mg by mouth Every Night., Disp: , Rfl:   •  fenofibrate 160 MG tablet, Take 160 mg by mouth Every Night., Disp: , Rfl:   •  HYDROcodone-acetaminophen (NORCO) 7.5-325 MG per tablet, Take 1 tablet by mouth Every 6 (Six) Hours As Needed for Moderate Pain (4-6)., Disp: , Rfl:   •  lisinopril (PRINIVIL,ZESTRIL) 20 MG tablet, Take 20 mg by mouth Every Night., Disp: , Rfl:   •  metFORMIN ER (GLUCOPHAGE-XR) 750 MG 24 hr tablet, Take 750 mg by mouth Every Night., Disp: , Rfl:   •  nicotine (NICOTINE STEP 2) 14 MG/24HR patch, Place 1 patch on the skin Daily., Disp: 21 patch, Rfl: 1  •  rosuvastatin (CRESTOR) 20 MG tablet, Take 20 mg by mouth Every Night., Disp: , Rfl:   No Known Allergies  Social History     Social History   • Marital status:      Spouse name: N/A   • Number of children: 2   • Years of education: N/A     Occupational History   •       [employer]     Social History Main Topics   • Smoking status: Current Every Day Smoker     Packs/day: 1.00     Years: 30.00     Types: Cigarettes, Electronic Cigarette   • Smokeless tobacco: Never Used      Comment: NOW DOWN TO 2-3 CIGS PER DAY SINCE APRIL 2017   • Alcohol use No   • Drug use: No   • Sexual activity: Yes     Partners: Female     Birth control/ protection: None     Other Topics Concern   • Not on file     Social History Narrative     Family History   Problem Relation Age of Onset   • Arthritis Mother    • Liver disease Father    • Kidney disease Father      ROS  Vitals:    07/17/17 0743   BP: 120/80   BP Location: Left arm   Patient Position: Sitting   Pulse: 69   Temp: 97.2 °F (36.2 °C)   SpO2: 99%   Weight: 185 lb (83.9 kg)   Height: 69\" (175.3 cm)      Physical Exam   VASCULAR:  The groin and knee incisions are healing well at this time " 4 cm or 5 cm distal to the knee incision is a completely  pimple with an area of pustule that is very small he says this just appeared.  His posterior tibial pulses palpable    Assessment/Plan:   Patient with poorly controlled diabetes but this is improving.  Currently his incisions are healing well.  I will give him a seven-day course of Keflex and see him back in the office in 2 weeks.            Patient Active Problem List   Diagnosis   • Peripheral arterial disease   • PAD (peripheral artery disease)   • Hypertension   • Hyperlipidemia   • Tobacco abuse   • Diabetes mellitus type II, non insulin dependent     Signed by: Brett Ryan M.D.    7/17/2017    CC:  MD Katlin Brown, , editing for Brett Ryan M.D.    I, Brett Ryan MD, have read and agree with the editing done by Margot Rees, .

## 2017-07-31 ENCOUNTER — OFFICE VISIT (OUTPATIENT)
Dept: CARDIAC SURGERY | Facility: CLINIC | Age: 42
End: 2017-07-31

## 2017-07-31 VITALS
HEART RATE: 68 BPM | TEMPERATURE: 97.3 F | SYSTOLIC BLOOD PRESSURE: 112 MMHG | BODY MASS INDEX: 27.67 KG/M2 | OXYGEN SATURATION: 98 % | DIASTOLIC BLOOD PRESSURE: 64 MMHG | HEIGHT: 69 IN | WEIGHT: 186.8 LBS

## 2017-07-31 DIAGNOSIS — I73.9 PAD (PERIPHERAL ARTERY DISEASE) (HCC): Primary | ICD-10-CM

## 2017-07-31 PROCEDURE — 99024 POSTOP FOLLOW-UP VISIT: CPT | Performed by: THORACIC SURGERY (CARDIOTHORACIC VASCULAR SURGERY)

## 2017-07-31 NOTE — PROGRESS NOTES
07/31/2017  Patient Information  Justo Oquendo                                                                                          [address] [place] [address]   1975  'PCP/Referring Physician'  Melo Whitaker MD  [phone]  No ref. provider found    Chief Complaint   Patient presents with   • Post-op     2wk hosp f/u left fem-pop       History of Present Illness:  The patient is status post left femoral to popliteal bypass.  When I saw him a week or 2 ago, there was a small area that had not completely healed and I wanted to see him back in follow-up at this time.  At this point the patient reports his wounds have completely healed without any problems and his claudication symptoms have totally resolved.      Patient Active Problem List   Diagnosis   • Peripheral arterial disease   • PAD (peripheral artery disease)   • Hypertension   • Hyperlipidemia   • Tobacco abuse   • Diabetes mellitus type II, non insulin dependent     Past Medical History:   Diagnosis Date   • Arthritis    • Back pain    • Diabetes     SINCE MARCH 2017   • Dyslipidemia    • HTN (hypertension)    • PVD (peripheral vascular disease)    • Wears partial dentures      Past Surgical History:   Procedure Laterality Date   • AORTAGRAM N/A 4/25/2017    Procedure: AORTAGRAM WITH OR WITHOUT RUNOFFS POSSIBLE STENT with left femoral cutdown;  Surgeon: Brett Ryan MD;  Location:  [facility];  Service:    • CYST REMOVAL      OFF OF BACK   • FL VEIN IN SITU BYPASS GRAFT,FEM-POP Left 4/25/2017    Procedure: FEMORAL POPLITEAL BYPASS;  Surgeon: Brett Ryan MD;  Location:  [facility]BR[facility] OR 15;  Service: Vascular   • FL VEIN IN SITU BYPASS GRAFT,FEM-POP Left 6/27/2017    Procedure: LEFT FEMORAL ENDARTECTOMYN LEFT FEMORAL POPLITEAL BYPASS;  Surgeon: Brett Ryan MD;  Location:  [facility];  Service: Vascular       Current Outpatient Prescriptions:   •  aspirin 81 MG EC tablet, Take 81 mg by mouth Every  "Night., Disp: , Rfl:   •  Canagliflozin (INVOKANA) 100 MG tablet, Take 100 mg by mouth Daily., Disp: , Rfl:   •  clopidogrel (PLAVIX) 75 MG tablet, Take 75 mg by mouth Every Night., Disp: , Rfl:   •  fenofibrate 160 MG tablet, Take 160 mg by mouth Every Night., Disp: , Rfl:   •  HYDROcodone-acetaminophen (NORCO) 7.5-325 MG per tablet, Take 1 tablet by mouth Every 6 (Six) Hours As Needed for Moderate Pain (4-6)., Disp: , Rfl:   •  lisinopril (PRINIVIL,ZESTRIL) 20 MG tablet, Take 20 mg by mouth Every Night., Disp: , Rfl:   •  metFORMIN ER (GLUCOPHAGE-XR) 750 MG 24 hr tablet, Take 750 mg by mouth Every Night., Disp: , Rfl:   •  nicotine (NICOTINE STEP 2) 14 MG/24HR patch, Place 1 patch on the skin Daily., Disp: 21 patch, Rfl: 1  •  rosuvastatin (CRESTOR) 20 MG tablet, Take 20 mg by mouth Every Night., Disp: , Rfl:   No Known Allergies  Social History     Social History   • Marital status:      Spouse name: N/A   • Number of children: 2   • Years of education: N/A     Occupational History   •       [employer]     Social History Main Topics   • Smoking status: Current Every Day Smoker     Packs/day: 1.00     Years: 30.00     Types: Cigarettes, Electronic Cigarette   • Smokeless tobacco: Never Used      Comment: NOW DOWN TO 2-3 CIGS PER DAY SINCE APRIL 2017   • Alcohol use No   • Drug use: No   • Sexual activity: Yes     Partners: Female     Birth control/ protection: None     Other Topics Concern   • Not on file     Social History Narrative     Family History   Problem Relation Age of Onset   • Arthritis Mother    • Liver disease Father    • Kidney disease Father      ROS  Vitals:    07/31/17 0728   BP: 112/64   BP Location: Right arm   Patient Position: Sitting   Pulse: 68   Temp: 97.3 °F (36.3 °C)   TempSrc: Temporal Artery    SpO2: 98%   Weight: 186 lb 12.8 oz (84.7 kg)   Height: 69\" (175.3 cm)      Physical Exam   VASCULAR:  The left leg groin and knee incisions have completely healed " without any areas of dehiscence or erythema or warmth or redness or drainage.  The small pimple-like area near the incision has totally resolved also.    Assessment/Plan:   Satisfactory progress thus far.  He is to continue with antiplatelet therapy.  The patient is also on statin medications and good glucose control at this time.        Patient Active Problem List   Diagnosis   • Peripheral arterial disease   • PAD (peripheral artery disease)   • Hypertension   • Hyperlipidemia   • Tobacco abuse   • Diabetes mellitus type II, non insulin dependent     Signed by: Brett Ryan M.D.    7/31/2017    CC:  MD Katlin Brown, , editing for Brett Ryan M.D.    I, Brett Ryan MD, have read and agree with the editing done by Katlin Rees, .

## 2019-01-18 ENCOUNTER — TELEPHONE (OUTPATIENT)
Dept: CARDIAC SURGERY | Facility: CLINIC | Age: 44
End: 2019-01-18

## 2019-01-18 NOTE — TELEPHONE ENCOUNTER
Old 2017 patient of Dr. Ryan, s/p left fem pop bypass on 6/27/17. Patient was referred back to see Dr. Ryan for right leg pain and claudication. Patient no showed to this appointment on 9/10/18. The patient's records were placed on Dr. Ryan's desk for review. He states that we will see the patient back PRN. See note from Dr. Ryan scanned in under nurses note on 1/18/19.

## 2023-11-20 ENCOUNTER — OFFICE VISIT (OUTPATIENT)
Dept: CARDIAC SURGERY | Facility: CLINIC | Age: 48
End: 2023-11-20
Payer: COMMERCIAL

## 2023-11-20 ENCOUNTER — ANESTHESIA EVENT (OUTPATIENT)
Dept: PERIOP | Facility: HOSPITAL | Age: 48
End: 2023-11-20
Payer: COMMERCIAL

## 2023-11-20 ENCOUNTER — APPOINTMENT (OUTPATIENT)
Dept: CT IMAGING | Facility: HOSPITAL | Age: 48
DRG: 253 | End: 2023-11-20
Payer: COMMERCIAL

## 2023-11-20 ENCOUNTER — HOSPITAL ENCOUNTER (INPATIENT)
Facility: HOSPITAL | Age: 48
LOS: 4 days | Discharge: HOME | DRG: 253 | End: 2023-11-24
Attending: THORACIC SURGERY (CARDIOTHORACIC VASCULAR SURGERY) | Admitting: THORACIC SURGERY (CARDIOTHORACIC VASCULAR SURGERY)
Payer: COMMERCIAL

## 2023-11-20 VITALS
BODY MASS INDEX: 28.69 KG/M2 | OXYGEN SATURATION: 98 % | HEART RATE: 80 BPM | DIASTOLIC BLOOD PRESSURE: 78 MMHG | WEIGHT: 182.8 LBS | SYSTOLIC BLOOD PRESSURE: 140 MMHG | TEMPERATURE: 98.7 F | HEIGHT: 67 IN

## 2023-11-20 DIAGNOSIS — I73.9 PAD (PERIPHERAL ARTERY DISEASE): Primary | ICD-10-CM

## 2023-11-20 DIAGNOSIS — I73.9 PAD (PERIPHERAL ARTERY DISEASE): ICD-10-CM

## 2023-11-20 LAB
ABO GROUP BLD: NORMAL
ALBUMIN SERPL-MCNC: 4.6 G/DL (ref 3.5–5.2)
ALBUMIN/GLOB SERPL: 2.1 G/DL
ALP SERPL-CCNC: 108 U/L (ref 39–117)
ALT SERPL W P-5'-P-CCNC: 29 U/L (ref 1–41)
AMPHET+METHAMPHET UR QL: NEGATIVE
AMPHETAMINES UR QL: NEGATIVE
ANION GAP SERPL CALCULATED.3IONS-SCNC: 11 MMOL/L (ref 5–15)
APTT PPP: 31 SECONDS (ref 60–90)
AST SERPL-CCNC: 21 U/L (ref 1–40)
BARBITURATES UR QL SCN: NEGATIVE
BASOPHILS # BLD AUTO: 0.03 10*3/MM3 (ref 0–0.2)
BASOPHILS NFR BLD AUTO: 0.3 % (ref 0–1.5)
BENZODIAZ UR QL SCN: NEGATIVE
BILIRUB SERPL-MCNC: 0.3 MG/DL (ref 0–1.2)
BLD GP AB SCN SERPL QL: NEGATIVE
BUN SERPL-MCNC: 15 MG/DL (ref 6–20)
BUN/CREAT SERPL: 16.9 (ref 7–25)
BUPRENORPHINE SERPL-MCNC: NEGATIVE NG/ML
CALCIUM SPEC-SCNC: 10.2 MG/DL (ref 8.6–10.5)
CANNABINOIDS SERPL QL: NEGATIVE
CHLORIDE SERPL-SCNC: 103 MMOL/L (ref 98–107)
CO2 SERPL-SCNC: 25 MMOL/L (ref 22–29)
COCAINE UR QL: NEGATIVE
CREAT SERPL-MCNC: 0.89 MG/DL (ref 0.76–1.27)
D-LACTATE SERPL-SCNC: 1.1 MMOL/L (ref 0.5–2)
D-LACTATE SERPL-SCNC: 2.1 MMOL/L (ref 0.5–2)
DEPRECATED RDW RBC AUTO: 39.3 FL (ref 37–54)
EGFRCR SERPLBLD CKD-EPI 2021: 104.4 ML/MIN/1.73
EOSINOPHIL # BLD AUTO: 0.13 10*3/MM3 (ref 0–0.4)
EOSINOPHIL NFR BLD AUTO: 1.4 % (ref 0.3–6.2)
ERYTHROCYTE [DISTWIDTH] IN BLOOD BY AUTOMATED COUNT: 11.7 % (ref 12.3–15.4)
FENTANYL UR-MCNC: NEGATIVE NG/ML
GLOBULIN UR ELPH-MCNC: 2.2 GM/DL
GLUCOSE BLDC GLUCOMTR-MCNC: 202 MG/DL (ref 70–130)
GLUCOSE BLDC GLUCOMTR-MCNC: 225 MG/DL (ref 70–130)
GLUCOSE BLDC GLUCOMTR-MCNC: 289 MG/DL (ref 70–130)
GLUCOSE SERPL-MCNC: 287 MG/DL (ref 65–99)
HBA1C MFR BLD: 10.6 % (ref 4.8–5.6)
HCT VFR BLD AUTO: 43.5 % (ref 37.5–51)
HGB BLD-MCNC: 14.7 G/DL (ref 13–17.7)
IMM GRANULOCYTES # BLD AUTO: 0.08 10*3/MM3 (ref 0–0.05)
IMM GRANULOCYTES NFR BLD AUTO: 0.9 % (ref 0–0.5)
INR PPP: 0.93 (ref 0.89–1.12)
LYMPHOCYTES # BLD AUTO: 2.13 10*3/MM3 (ref 0.7–3.1)
LYMPHOCYTES NFR BLD AUTO: 23.3 % (ref 19.6–45.3)
MCH RBC QN AUTO: 30.9 PG (ref 26.6–33)
MCHC RBC AUTO-ENTMCNC: 33.8 G/DL (ref 31.5–35.7)
MCV RBC AUTO: 91.4 FL (ref 79–97)
METHADONE UR QL SCN: NEGATIVE
MONOCYTES # BLD AUTO: 0.7 10*3/MM3 (ref 0.1–0.9)
MONOCYTES NFR BLD AUTO: 7.7 % (ref 5–12)
NEUTROPHILS NFR BLD AUTO: 6.07 10*3/MM3 (ref 1.7–7)
NEUTROPHILS NFR BLD AUTO: 66.4 % (ref 42.7–76)
NRBC BLD AUTO-RTO: 0 /100 WBC (ref 0–0.2)
OPIATES UR QL: POSITIVE
OXYCODONE UR QL SCN: NEGATIVE
PCP UR QL SCN: NEGATIVE
PLATELET # BLD AUTO: 174 10*3/MM3 (ref 140–450)
PMV BLD AUTO: 10.8 FL (ref 6–12)
POTASSIUM SERPL-SCNC: 4.5 MMOL/L (ref 3.5–5.2)
PROT SERPL-MCNC: 6.8 G/DL (ref 6–8.5)
PROTHROMBIN TIME: 12.6 SECONDS (ref 12.2–14.5)
QT INTERVAL: 350 MS
QTC INTERVAL: 430 MS
RBC # BLD AUTO: 4.76 10*6/MM3 (ref 4.14–5.8)
RH BLD: POSITIVE
SODIUM SERPL-SCNC: 139 MMOL/L (ref 136–145)
T&S EXPIRATION DATE: NORMAL
TRICYCLICS UR QL SCN: NEGATIVE
UFH PPP CHRO-ACNC: 0.1 IU/ML (ref 0.3–0.7)
UFH PPP CHRO-ACNC: 0.1 IU/ML (ref 0.3–0.7)
WBC NRBC COR # BLD AUTO: 9.14 10*3/MM3 (ref 3.4–10.8)

## 2023-11-20 PROCEDURE — 25010000002 HEPARIN (PORCINE) PER 1000 UNITS

## 2023-11-20 PROCEDURE — 25510000001 IOPAMIDOL PER 1 ML: Performed by: THORACIC SURGERY (CARDIOTHORACIC VASCULAR SURGERY)

## 2023-11-20 PROCEDURE — 99221 1ST HOSP IP/OBS SF/LOW 40: CPT | Performed by: INTERNAL MEDICINE

## 2023-11-20 PROCEDURE — 86900 BLOOD TYPING SEROLOGIC ABO: CPT

## 2023-11-20 PROCEDURE — 83605 ASSAY OF LACTIC ACID: CPT

## 2023-11-20 PROCEDURE — 83036 HEMOGLOBIN GLYCOSYLATED A1C: CPT

## 2023-11-20 PROCEDURE — 63710000001 INSULIN LISPRO (HUMAN) PER 5 UNITS

## 2023-11-20 PROCEDURE — 86850 RBC ANTIBODY SCREEN: CPT

## 2023-11-20 PROCEDURE — 93010 ELECTROCARDIOGRAM REPORT: CPT | Performed by: INTERNAL MEDICINE

## 2023-11-20 PROCEDURE — 99222 1ST HOSP IP/OBS MODERATE 55: CPT

## 2023-11-20 PROCEDURE — 75635 CT ANGIO ABDOMINAL ARTERIES: CPT

## 2023-11-20 PROCEDURE — 25010000002 HEPARIN (PORCINE) 25000-0.45 UT/250ML-% SOLUTION

## 2023-11-20 PROCEDURE — 86901 BLOOD TYPING SEROLOGIC RH(D): CPT

## 2023-11-20 PROCEDURE — 80053 COMPREHEN METABOLIC PANEL: CPT | Performed by: THORACIC SURGERY (CARDIOTHORACIC VASCULAR SURGERY)

## 2023-11-20 PROCEDURE — 85520 HEPARIN ASSAY: CPT

## 2023-11-20 PROCEDURE — 93005 ELECTROCARDIOGRAM TRACING: CPT

## 2023-11-20 PROCEDURE — 99205 OFFICE O/P NEW HI 60 MIN: CPT | Performed by: THORACIC SURGERY (CARDIOTHORACIC VASCULAR SURGERY)

## 2023-11-20 PROCEDURE — 86923 COMPATIBILITY TEST ELECTRIC: CPT

## 2023-11-20 PROCEDURE — 85730 THROMBOPLASTIN TIME PARTIAL: CPT

## 2023-11-20 PROCEDURE — 80305 DRUG TEST PRSMV DIR OPT OBS: CPT

## 2023-11-20 PROCEDURE — 85025 COMPLETE CBC W/AUTO DIFF WBC: CPT

## 2023-11-20 PROCEDURE — 85610 PROTHROMBIN TIME: CPT

## 2023-11-20 PROCEDURE — 82948 REAGENT STRIP/BLOOD GLUCOSE: CPT

## 2023-11-20 PROCEDURE — 25010000002 CEFAZOLIN PER 500 MG

## 2023-11-20 PROCEDURE — 80307 DRUG TEST PRSMV CHEM ANLYZR: CPT

## 2023-11-20 RX ORDER — CHOLECALCIFEROL (VITAMIN D3) 125 MCG
5 CAPSULE ORAL NIGHTLY PRN
Status: DISCONTINUED | OUTPATIENT
Start: 2023-11-20 | End: 2023-11-24 | Stop reason: HOSPADM

## 2023-11-20 RX ORDER — EMPAGLIFLOZIN AND METFORMIN HYDROCHLORIDE 12.5; 1 MG/1; MG/1
TABLET ORAL
COMMUNITY

## 2023-11-20 RX ORDER — ONDANSETRON 2 MG/ML
4 INJECTION INTRAMUSCULAR; INTRAVENOUS EVERY 6 HOURS PRN
Status: DISCONTINUED | OUTPATIENT
Start: 2023-11-20 | End: 2023-11-21 | Stop reason: SDUPTHER

## 2023-11-20 RX ORDER — POLYETHYLENE GLYCOL 3350 17 G/17G
17 POWDER, FOR SOLUTION ORAL DAILY PRN
Status: DISCONTINUED | OUTPATIENT
Start: 2023-11-20 | End: 2023-11-21

## 2023-11-20 RX ORDER — NICOTINE 21 MG/24HR
1 PATCH, TRANSDERMAL 24 HOURS TRANSDERMAL EVERY 24 HOURS
Status: DISCONTINUED | OUTPATIENT
Start: 2023-11-20 | End: 2023-11-24 | Stop reason: HOSPADM

## 2023-11-20 RX ORDER — HEPARIN SODIUM 1000 [USP'U]/ML
2000 INJECTION, SOLUTION INTRAVENOUS; SUBCUTANEOUS AS NEEDED
Status: DISCONTINUED | OUTPATIENT
Start: 2023-11-20 | End: 2023-11-20

## 2023-11-20 RX ORDER — FAMOTIDINE 20 MG/1
40 TABLET, FILM COATED ORAL DAILY
Status: DISCONTINUED | OUTPATIENT
Start: 2023-11-20 | End: 2023-11-21 | Stop reason: SDUPTHER

## 2023-11-20 RX ORDER — BISACODYL 10 MG
10 SUPPOSITORY, RECTAL RECTAL DAILY PRN
Status: DISCONTINUED | OUTPATIENT
Start: 2023-11-20 | End: 2023-11-21

## 2023-11-20 RX ORDER — IBUPROFEN 600 MG/1
1 TABLET ORAL
Status: DISCONTINUED | OUTPATIENT
Start: 2023-11-20 | End: 2023-11-24 | Stop reason: HOSPADM

## 2023-11-20 RX ORDER — HEPARIN SODIUM 1000 [USP'U]/ML
4000 INJECTION, SOLUTION INTRAVENOUS; SUBCUTANEOUS AS NEEDED
Status: DISCONTINUED | OUTPATIENT
Start: 2023-11-20 | End: 2023-11-20

## 2023-11-20 RX ORDER — SODIUM CHLORIDE 0.9 % (FLUSH) 0.9 %
10 SYRINGE (ML) INJECTION EVERY 12 HOURS SCHEDULED
Status: DISCONTINUED | OUTPATIENT
Start: 2023-11-20 | End: 2023-11-24 | Stop reason: HOSPADM

## 2023-11-20 RX ORDER — SODIUM CHLORIDE 9 MG/ML
40 INJECTION, SOLUTION INTRAVENOUS AS NEEDED
Status: DISCONTINUED | OUTPATIENT
Start: 2023-11-20 | End: 2023-11-24 | Stop reason: HOSPADM

## 2023-11-20 RX ORDER — SODIUM CHLORIDE 0.9 % (FLUSH) 0.9 %
10 SYRINGE (ML) INJECTION AS NEEDED
Status: DISCONTINUED | OUTPATIENT
Start: 2023-11-20 | End: 2023-11-24 | Stop reason: HOSPADM

## 2023-11-20 RX ORDER — LISINOPRIL 20 MG/1
20 TABLET ORAL NIGHTLY
Status: DISCONTINUED | OUTPATIENT
Start: 2023-11-20 | End: 2023-11-23

## 2023-11-20 RX ORDER — HEPARIN SODIUM 10000 [USP'U]/100ML
17 INJECTION, SOLUTION INTRAVENOUS
Status: DISCONTINUED | OUTPATIENT
Start: 2023-11-20 | End: 2023-11-21

## 2023-11-20 RX ORDER — HEPARIN SODIUM 1000 [USP'U]/ML
4000 INJECTION, SOLUTION INTRAVENOUS; SUBCUTANEOUS ONCE
Status: COMPLETED | OUTPATIENT
Start: 2023-11-20 | End: 2023-11-20

## 2023-11-20 RX ORDER — ROSUVASTATIN CALCIUM 20 MG/1
20 TABLET, COATED ORAL NIGHTLY
Status: DISCONTINUED | OUTPATIENT
Start: 2023-11-20 | End: 2023-11-24 | Stop reason: HOSPADM

## 2023-11-20 RX ORDER — ONDANSETRON 4 MG/1
4 TABLET, FILM COATED ORAL EVERY 6 HOURS PRN
Status: DISCONTINUED | OUTPATIENT
Start: 2023-11-20 | End: 2023-11-21 | Stop reason: SDUPTHER

## 2023-11-20 RX ORDER — ASPIRIN 81 MG/1
81 TABLET ORAL NIGHTLY
Status: DISCONTINUED | OUTPATIENT
Start: 2023-11-20 | End: 2023-11-24 | Stop reason: HOSPADM

## 2023-11-20 RX ORDER — INSULIN LISPRO 100 [IU]/ML
2-9 INJECTION, SOLUTION INTRAVENOUS; SUBCUTANEOUS
Status: DISCONTINUED | OUTPATIENT
Start: 2023-11-20 | End: 2023-11-24 | Stop reason: HOSPADM

## 2023-11-20 RX ORDER — BISACODYL 5 MG/1
5 TABLET, DELAYED RELEASE ORAL DAILY PRN
Status: DISCONTINUED | OUTPATIENT
Start: 2023-11-20 | End: 2023-11-21

## 2023-11-20 RX ORDER — DEXTROSE MONOHYDRATE 25 G/50ML
25 INJECTION, SOLUTION INTRAVENOUS
Status: DISCONTINUED | OUTPATIENT
Start: 2023-11-20 | End: 2023-11-24 | Stop reason: HOSPADM

## 2023-11-20 RX ORDER — HYDROCODONE BITARTRATE AND ACETAMINOPHEN 7.5; 325 MG/1; MG/1
1 TABLET ORAL EVERY 6 HOURS PRN
Status: DISCONTINUED | OUTPATIENT
Start: 2023-11-20 | End: 2023-11-21

## 2023-11-20 RX ORDER — AMOXICILLIN 250 MG
2 CAPSULE ORAL 2 TIMES DAILY
Status: DISCONTINUED | OUTPATIENT
Start: 2023-11-20 | End: 2023-11-21

## 2023-11-20 RX ORDER — NICOTINE POLACRILEX 4 MG
15 LOZENGE BUCCAL
Status: DISCONTINUED | OUTPATIENT
Start: 2023-11-20 | End: 2023-11-24 | Stop reason: HOSPADM

## 2023-11-20 RX ADMIN — HYDROCODONE BITARTRATE AND ACETAMINOPHEN 1 TABLET: 7.5; 325 TABLET ORAL at 20:24

## 2023-11-20 RX ADMIN — ROSUVASTATIN 20 MG: 20 TABLET, FILM COATED ORAL at 19:51

## 2023-11-20 RX ADMIN — INSULIN LISPRO 3 UNITS: 100 INJECTION, SOLUTION INTRAVENOUS; SUBCUTANEOUS at 22:02

## 2023-11-20 RX ADMIN — Medication 10 ML: at 22:04

## 2023-11-20 RX ADMIN — LISINOPRIL 20 MG: 20 TABLET ORAL at 19:51

## 2023-11-20 RX ADMIN — IOPAMIDOL 115 ML: 755 INJECTION, SOLUTION INTRAVENOUS at 14:47

## 2023-11-20 RX ADMIN — SENNOSIDES AND DOCUSATE SODIUM 2 TABLET: 8.6; 5 TABLET ORAL at 19:51

## 2023-11-20 RX ADMIN — NICOTINE 1 PATCH: 14 PATCH, EXTENDED RELEASE TRANSDERMAL at 11:51

## 2023-11-20 RX ADMIN — INSULIN LISPRO 4 UNITS: 100 INJECTION, SOLUTION INTRAVENOUS; SUBCUTANEOUS at 16:58

## 2023-11-20 RX ADMIN — SODIUM CHLORIDE 2000 MG: 900 INJECTION INTRAVENOUS at 23:11

## 2023-11-20 RX ADMIN — HEPARIN SODIUM 4000 UNITS: 1000 INJECTION INTRAVENOUS; SUBCUTANEOUS at 22:02

## 2023-11-20 RX ADMIN — INSULIN LISPRO 6 UNITS: 100 INJECTION, SOLUTION INTRAVENOUS; SUBCUTANEOUS at 12:17

## 2023-11-20 RX ADMIN — Medication 5 MG: at 23:48

## 2023-11-20 RX ADMIN — HEPARIN SODIUM 12 UNITS/KG/HR: 10000 INJECTION, SOLUTION INTRAVENOUS at 12:18

## 2023-11-20 RX ADMIN — FAMOTIDINE 40 MG: 20 TABLET, FILM COATED ORAL at 11:51

## 2023-11-20 RX ADMIN — ASPIRIN 81 MG: 81 TABLET, COATED ORAL at 19:51

## 2023-11-20 NOTE — H&P
[facility] Cardiothoracic Surgery                            History & Physical    Name:  Justo Oquendo  MRN Number:  [id number]  Date of Admission:  11/20/2023    PCP: Melo Whitaker MD    History of Present Illness:    Justo Oquendo is a 48 y.o. male with a history of type 2 diabetes, hypertension, PVD s/p left femoral to above-the-knee popliteal bypass with Dr. Ryan on 4/25/2017, and current smoker.  He was seen in office today by Dr. Ryan to evaluate left foot pain.  It was noted that he had a very cold left foot with no detectable Doppler signal in the left DP or DP.  He was admitted to our facility for further evaluation and treatment.    Review of Systems:  Constitutional: Negative for decreased appetite, fever, malaise/fatigue, weight gain and weight loss.   HENT:  Negative for hearing loss.    Cardiovascular:  Negative for chest pain, claudication, cyanosis, dyspnea on exertion, irregular heartbeat, leg swelling, near-syncope, orthopnea, palpitations, paroxysmal nocturnal dyspnea and syncope.   Endocrine: Negative for polydipsia, polyphagia and polyuria.   Hematologic/Lymphatic: Positive for bleeding problem. Negative for adenopathy. Bruises/bleeds easily.   Skin:  Positive for color change (left foot) and poor wound healing.   Musculoskeletal:  Negative for arthritis, falls, gout, joint pain, joint swelling, muscle weakness and myalgias.   Gastrointestinal:  Negative for abdominal pain, anorexia, dysphagia, heartburn, nausea and vomiting.   Genitourinary:  Negative for dysuria and hematuria.   Neurological:  Positive for numbness (in the left lower foot and leg, cold to the touch). Negative for dizziness, focal weakness, headaches, loss of balance, seizures, vertigo and weakness.   Psychiatric/Behavioral:  Negative for altered mental status, depression, substance abuse and suicidal ideas. The patient is not nervous/anxious.    Allergic/Immunologic: Negative for HIV exposure  and persistent infections.     Past Medical History:    Past Medical History:   Diagnosis Date    Arthritis     Back pain     Diabetes     SINCE MARCH 2017    Dyslipidemia     HTN (hypertension)     PVD (peripheral vascular disease)     Wears partial dentures        Past Surgical History:    Past Surgical History:   Procedure Laterality Date    AORTAGRAM N/A 04/25/2017    Procedure: AORTAGRAM WITH OR WITHOUT RUNOFFS POSSIBLE STENT with left femoral cutdown;  Surgeon: Brett Ryan MD;  Location:  [facility];  Service:     CHOLECYSTECTOMY      CYST REMOVAL      OFF OF BACK    AK IN-SITU VEIN BYPASS FEMORAL-POPLITEAL Left 04/25/2017    Procedure: FEMORAL POPLITEAL BYPASS;  Surgeon: Brett Ryan MD;  Location:  [facility];  Service: Vascular    AK IN-SITU VEIN BYPASS FEMORAL-POPLITEAL Left 06/27/2017    Procedure: LEFT FEMORAL ENDARTECTOMYN LEFT FEMORAL POPLITEAL BYPASS;  Surgeon: Brett Ryan MD;  Location:  [facility];  Service: Vascular       Family History:    Family History   Problem Relation Age of Onset    Arthritis Mother     Liver disease Father     Kidney disease Father        Social History:    Social History     Socioeconomic History    Marital status:     Number of children: 2   Tobacco Use    Smoking status: Every Day     Packs/day: 1.00     Years: 30.00     Additional pack years: 0.00     Total pack years: 30.00     Types: Cigarettes, Electronic Cigarette    Smokeless tobacco: Never    Tobacco comments:     Pt states that he is trying to quit, was once only smoking a few a day but has increased to 1 ppd, Pt states that he has smoked 30 plus years.   Vaping Use    Vaping Use: Never used   Substance and Sexual Activity    Alcohol use: No    Drug use: No    Sexual activity: Yes     Partners: Female     Birth control/protection: None       Allergies:  No Known Allergies    Medications:      Current Facility-Administered Medications:     aspirin EC tablet 81 mg, 81 mg,  Oral, Nightly, Justine Hancock APRN    sennosides-docusate (PERICOLACE) 8.6-50 MG per tablet 2 tablet, 2 tablet, Oral, BID **AND** polyethylene glycol (MIRALAX) packet 17 g, 17 g, Oral, Daily PRN **AND** bisacodyl (DULCOLAX) EC tablet 5 mg, 5 mg, Oral, Daily PRN **AND** bisacodyl (DULCOLAX) suppository 10 mg, 10 mg, Rectal, Daily PRN, Justine Hancock APRN    [START ON 11/21/2023] ceFAZolin 2000 mg IVPB in 100 mL NS (MBP), 2,000 mg, Intravenous, Once, Justine Hancock APRN    famotidine (PEPCID) tablet 40 mg, 40 mg, Oral, Daily, Justine Hancock APRN    heparin (porcine) injection 2,000 Units, 2,000 Units, Intravenous, PRN, Justine Hancock, PRIMO    heparin (porcine) injection 4,000 Units, 4,000 Units, Intravenous, PRN, Justine Hancock APRN    heparin 91404 units/250 mL (100 units/mL) in 0.45 % NaCl infusion, 12 Units/kg/hr, Intravenous, Titrated, Justine Hancock APRN    HYDROcodone-acetaminophen (NORCO) 7.5-325 MG per tablet 1 tablet, 1 tablet, Oral, Q6H PRN, Justine Hancock APRN    lisinopril (PRINIVIL,ZESTRIL) tablet 20 mg, 20 mg, Oral, Nightly, Justine Hancock APRN    nicotine (NICODERM CQ) 14 MG/24HR patch 1 patch, 1 patch, Transdermal, Q24H, Justine Hancock APRN    ondansetron (ZOFRAN) tablet 4 mg, 4 mg, Oral, Q6H PRN **OR** ondansetron (ZOFRAN) injection 4 mg, 4 mg, Intravenous, Q6H PRN, Justine Hancock, PRIMO    Pharmacy to Dose Heparin, , Does not apply, Continuous PRN, Justine Hancock APRN    rosuvastatin (CRESTOR) tablet 20 mg, 20 mg, Oral, Nightly, Karissa, Justine, APRN    sodium chloride 0.9 % flush 10 mL, 10 mL, Intravenous, Q12H, Karissa, Justine, APRN    sodium chloride 0.9 % flush 10 mL, 10 mL, Intravenous, PRN, Karissa, Justine, APRN    sodium chloride 0.9 % infusion 40 mL, 40 mL, Intravenous, PRN, Karissa, Justine, APRN    Vital Signs:    Vitals:    11/20/23 1046 11/20/23 1100   BP: 126/96    BP Location: Right arm    Patient Position: Lying    Pulse: 95 87   Resp: 16    Temp: 97.3 °F (36.3 °C)   "  TempSrc: Oral    SpO2: 98% 92%   Weight: 82.6 kg (182 lb)    Height: 170.2 cm (67\")      Body mass index is 28.51 kg/m².     Physical Exam:  CONSTITUTIONAL: Alert and conversant, Well dressed, Well nourished, No acute distress  EYES: Sclera clean, Anicteric, Pupils equal  ENT: No nasal deviation, Trachea midline  NECK: No neck masses, Supple  LUNGS: No wheezing, Cough, non-congested  HEART: No rubs, No murmurs  GASTROINTESTINAL: Soft, non-distended, No masses, Non tender  to palpation, normal bowel sounds  NEURO: No motor deficits, No sensory deficits, Cranial Nerves 2 through 12 grossly intact  PSYCHIATRIC: Oriented to person, place and time, No memory deficits, Mood appropriate  VASCULAR: No carotid bruits, Femoral pulses palpable and symmetric.  The left foot is cool to touch there is an extremely dampened left posterior tibial Doppler signal and a much stronger right posterior tibial Doppler signal.  We discussed smoking cessation and indicated his long history of smoking coupled with his diabetes is poses a high risk for eventual bilateral amputation with his underlying disease documented by arteriography.  He denies advanced directive  MUSKULOSKELETAL: No extremity trauma or extremity asymmetry        Assessment:    Claudication of lower extremity      Plan:  Start heparin drip  CTA bilateral iliofemoral with complete runoff 3D recon  Preop for arteriogram possible intervention tomorrow with Dr. Ryan  N.p.o. after midnight      Justine Hancock, APRN  11/20/23  11:46 EST      "

## 2023-11-20 NOTE — PLAN OF CARE
Problem: Adult Inpatient Plan of Care  Goal: Plan of Care Review  Outcome: Ongoing, Progressing  Flowsheets (Taken 11/20/2023 1833)  Outcome Evaluation: VSS. RA. No PRN's given for pain. CTA obtained. NPO after midnight for procedure tomorrow. Heparin gtt infusing. No pulse detected in L foot, numbness present. No other concerns at this time. Will continue with the plan of care.   Goal Outcome Evaluation:              Outcome Evaluation: VSS. RA. No PRN's given for pain. CTA obtained. NPO after midnight for procedure tomorrow. Heparin gtt infusing. No pulse detected in L foot, numbness present. No other concerns at this time. Will continue with the plan of care.

## 2023-11-20 NOTE — PROGRESS NOTES
HEPARIN INFUSION  Justo Oquendo is a  48 y.o. male receiving heparin infusion.     Therapy for (VTE/Cardiac):   cardiac  Patient Weight: 80.9 kg standing  Initial Bolus (Y/N):   no initial  Any Bolus (Y/N):   yes  Signs or Symptoms of Bleeding: none noted    Cardiac or Other (Not VTE)   Initial Bolus: 60 units/kg (Max 4,000 units)  Initial rate: 12 units/kg/hr (Max 1,000 units/hr)   Anti Xa Rebolus Infusion Hold time Change infusion Dose (Units/kg/hr) Next Anti Xa or aPTT Level Due   < 0.11 50 Units/kg  (4000 Units Max) None Increase by  3 Units/kg/hr 6 hours   0.11- 0.19 25 Units/kg  (2000 Units Max) None Increase by  2 Units/kg/hr 6 hours   0.2 - 0.29 0 None Increase by  1 Units/kg/hr 6 hours   0.3 - 0.5 0 None No Change 6 hours (after 2 consecutive levels in range check qAM)   0.51 - 0.6 0 None Decrease by  1 Units/kg/hr 6 hours   0.61 - 0.8 0 30 Minutes Decrease by  2 Units/kg/hr 6 hours   0.81 - 1 0 60 Minutes Decrease by  3 Units/kg/hr 6 hours   >1 0 Hold  After Anti Xa less than 0.5 decrease previous rate by  4 Units/kg/hr  Every 2 hours until Anti Xa  less than 0.5 then when infusion restarts in 6 hours     Recommend Xa every 6 hours.            Date   Time   Anti-Xa Current Rate (Unit/kg/hr) Bolus   (Units) Rate Change   (Unit/kg/hr) New Rate (Unit/kg/hr) Next   Anti-Xa Comments  Pump Check Daily   11/20 1148  pending new No initial +12 12 1800 Dw Cheryl RN                                                                                                                                                                                                                                 Catrachita Abdul, PharmD  11/20/2023  11:50 EST

## 2023-11-20 NOTE — CONSULTS
[facility] Medicine Services  CONSULT NOTE      Patient Name: Justo Oquendo  : 1975  MRN: [id number]    Primary Care Physician: Melo Whitaker MD  Provider requesting consultation: No Known Provider    Subjective   Subjective     Reason for Consultation:  diabetes    HPI:  Justo Oquendo is a 48 y.o. male w/ DM2, HTN, PVD s/p prior revascularization, admitted by CTS for cold limb. He takes Syndardy only for his diabetes, was previously Rx'ed Ozempic but has not yet started. Recently Rx'ed a new med but has not yet started. He denies acute complaints at this time.        Personal History     Past Medical History:   Diagnosis Date    Arthritis     Back pain     Diabetes     SINCE 2017    Dyslipidemia     HTN (hypertension)     PVD (peripheral vascular disease)     Wears partial dentures        Past Surgical History:   Procedure Laterality Date    AORTAGRAM N/A 2017    Procedure: AORTAGRAM WITH OR WITHOUT RUNOFFS POSSIBLE STENT with left femoral cutdown;  Surgeon: Brett Ryan MD;  Location:  [facility] ;  Service:     CHOLECYSTECTOMY      CYST REMOVAL      OFF OF BACK    VT IN-SITU VEIN BYPASS FEMORAL-POPLITEAL Left 2017    Procedure: FEMORAL POPLITEAL BYPASS;  Surgeon: Brett Ryan MD;  Location:  [facility];  Service: Vascular    VT IN-SITU VEIN BYPASS FEMORAL-POPLITEAL Left 2017    Procedure: LEFT FEMORAL ENDARTECTOMYN LEFT FEMORAL POPLITEAL BYPASS;  Surgeon: Brett Ryan MD;  Location:  [facility];  Service: Vascular       Family History: family history includes Arthritis in his mother; Kidney disease in his father; Liver disease in his father. Otherwise pertinent FHx was reviewed and unremarkable.     Social History:  reports that he has been smoking cigarettes and electronic cigarette. He has a 30.00 pack-year smoking history. He has never used smokeless tobacco. He reports that he does not drink alcohol and  does not use drugs.    Medications:  Canagliflozin, Empagliflozin-metFORMIN HCl, HYDROcodone-acetaminophen, aspirin, clopidogrel, fenofibrate, lisinopril, metFORMIN ER, nicotine, and rosuvastatin    Scheduled Meds:aspirin, 81 mg, Oral, Nightly  [START ON 11/21/2023] ceFAZolin, 2,000 mg, Intravenous, Once  famotidine, 40 mg, Oral, Daily  insulin lispro, 2-9 Units, Subcutaneous, 4x Daily AC & at Bedtime  lisinopril, 20 mg, Oral, Nightly  nicotine, 1 patch, Transdermal, Q24H  rosuvastatin, 20 mg, Oral, Nightly  senna-docusate sodium, 2 tablet, Oral, BID  sodium chloride, 10 mL, Intravenous, Q12H      Continuous Infusions:heparin, 12 Units/kg/hr, Last Rate: 12 Units/kg/hr (11/20/23 1218)  Pharmacy to Dose Heparin,       PRN Meds:.  senna-docusate sodium **AND** polyethylene glycol **AND** bisacodyl **AND** bisacodyl    dextrose    dextrose    glucagon (human recombinant)    HYDROcodone-acetaminophen    ondansetron **OR** ondansetron    Pharmacy to Dose Heparin    sodium chloride    sodium chloride    No Known Allergies    Objective   Objective     Vital Signs:   Temp:  [97.3 °F (36.3 °C)-98.7 °F (37.1 °C)] 97.3 °F (36.3 °C)  Heart Rate:  [80-95] 88  Resp:  [16] 16  BP: (126-140)/(78-96) 126/96    Physical Exam  Constitutional: Awake, alert, sitting on edge of bed in NAD  HENT: NCAT, mucous membranes moist  Respiratory: Clear to auscultation bilaterally, respiratory effort normal   Cardiovascular: RRR  Gastrointestinal: Soft, ND  Musculoskeletal: No bilateral ankle edema  Psychiatric: Appropriate affect, cooperative      Result Review:  I have personally reviewed the results from the time of admission and agree with these findings:  []  Laboratory  []  Radiology  []  EKG/Telemetry   []  Pathology  [x]  Old records  []  Other:  Most notable findings include:H&P     LAB RESULTS:      Lab 11/20/23  1148   WBC 9.14   HEMOGLOBIN 14.7   HEMATOCRIT 43.5   PLATELETS 174   NEUTROS ABS 6.07   IMMATURE GRANS (ABS) 0.08*   LYMPHS  ABS 2.13   MONOS ABS 0.70   EOS ABS 0.13   MCV 91.4   PROTIME 12.6   APTT 31.0*   HEPARIN ANTI-XA 0.10*                 Lab 11/20/23  1148   PROTIME 12.6   INR 0.93                 Brief Urine Lab Results       None          Microbiology Results (last 10 days)       ** No results found for the last 240 hours. **            No radiology results from the last 24 hrs        Assessment & Plan   Assessment & Plan     Active Hospital Problems    Diagnosis  POA    **Claudication of lower extremity [I73.9]  Yes      Resolved Hospital Problems   No resolved problems to display.     Summary: This is a 47 y/o male w/ DM2, HTN, PVD admitted to CTS for cold limb, medicine has been consulted to diabetes management    Assessment/Plan    PAD  -management per primary service    DM type 2, w/o insulin use  -hold Synjardy; patient is NOT on invokana at home  -agree with SSI    HTN  -lisinopril      Thank you for allowing [facility] Medicine Service to provide consultative care for your patient, we will continue to follow while clinically appropriate.    James Rob, DO  11/20/23

## 2023-11-20 NOTE — PROGRESS NOTES
11/20/2023  Patient Information  Justo Oquendo                                                                                          [address] [place] [address]   1975  'PCP/Referring Physician'  Melo Whitaker MD  [phone]  No ref. provider found    Chief Complaint   Patient presents with    Consult     Former pt last seen in 2017 referred back per Dr. Whitaker for cold lower left leg, with pain in the left foot and leg.        History of Present Illness:   This patient is a 48-year-old male who was referred to me to evaluate left foot pain. This is a gentleman with longstanding diabetes. He potentially had diabetes longer than had been diagnosed as he had not seen a physician in many years. He also is a current active smoker.  Approximately, 7 years ago, I performed a left femoral to above-knee popliteal bypass and he had done well since that time but in the last 2 weeks he has developed pain in the left foot when he walks.  He denies rest pain or true calf claudication but does have a very cold left foot. Today in the office I cannot detect a significant Doppler signal in the left posterior tibial or dorsalis pedis but I can detect a signal in the right DP and PT.      Patient Active Problem List   Diagnosis    Peripheral arterial disease    PAD (peripheral artery disease)    Hypertension    Hyperlipidemia    Tobacco abuse    Diabetes mellitus type II, non insulin dependent     Past Medical History:   Diagnosis Date    Arthritis     Back pain     Diabetes     SINCE MARCH 2017    Dyslipidemia     HTN (hypertension)     PVD (peripheral vascular disease)     Wears partial dentures      Past Surgical History:   Procedure Laterality Date    AORTAGRAM N/A 04/25/2017    Procedure: AORTAGRAM WITH OR WITHOUT RUNOFFS POSSIBLE STENT with left femoral cutdown;  Surgeon: Brett Ryan MD;  Location: [place] [id number];  Service:     CHOLECYSTECTOMY      CYST REMOVAL      OFF OF BACK    OR  IN-SITU VEIN BYPASS FEMORAL-POPLITEAL Left 04/25/2017    Procedure: FEMORAL POPLITEAL BYPASS;  Surgeon: Brett Ryan MD;  Location:  [facility];  Service: Vascular    MI IN-SITU VEIN BYPASS FEMORAL-POPLITEAL Left 06/27/2017    Procedure: LEFT FEMORAL ENDARTECTOMYN LEFT FEMORAL POPLITEAL BYPASS;  Surgeon: Brett Ryan MD;  Location:  [facility];  Service: Vascular     No current outpatient medications on file.  No Known Allergies  Social History     Socioeconomic History    Marital status:     Number of children: 2   Tobacco Use    Smoking status: Every Day     Packs/day: 1.00     Years: 30.00     Additional pack years: 0.00     Total pack years: 30.00     Types: Cigarettes, Electronic Cigarette    Smokeless tobacco: Never    Tobacco comments:     Pt states that he is trying to quit, was once only smoking a few a day but has increased to 1 ppd, Pt states that he has smoked 30 plus years.   Vaping Use    Vaping Use: Never used   Substance and Sexual Activity    Alcohol use: No    Drug use: No    Sexual activity: Yes     Partners: Female     Birth control/protection: None     Family History   Problem Relation Age of Onset    Arthritis Mother     Liver disease Father     Kidney disease Father      Review of Systems   Constitutional: Negative for decreased appetite, fever, malaise/fatigue, weight gain and weight loss.   HENT:  Negative for hearing loss.    Cardiovascular:  Negative for chest pain, claudication, cyanosis, dyspnea on exertion, irregular heartbeat, leg swelling, near-syncope, orthopnea, palpitations, paroxysmal nocturnal dyspnea and syncope.   Endocrine: Negative for polydipsia, polyphagia and polyuria.   Hematologic/Lymphatic: Positive for bleeding problem. Negative for adenopathy. Bruises/bleeds easily.   Skin:  Positive for color change (left foot) and poor wound healing.   Musculoskeletal:  Negative for arthritis, falls, gout, joint pain, joint swelling, muscle weakness and  "myalgias.   Gastrointestinal:  Negative for abdominal pain, anorexia, dysphagia, heartburn, nausea and vomiting.   Genitourinary:  Negative for dysuria and hematuria.   Neurological:  Positive for numbness (in the left lower foot and leg, cold to the touch). Negative for dizziness, focal weakness, headaches, loss of balance, seizures, vertigo and weakness.   Psychiatric/Behavioral:  Negative for altered mental status, depression, substance abuse and suicidal ideas. The patient is not nervous/anxious.    Allergic/Immunologic: Negative for HIV exposure and persistent infections.     Vitals:    11/20/23 0904   BP: 140/78   Pulse: 80   Temp: 98.7 °F (37.1 °C)   SpO2: 98%   Weight: 82.9 kg (182 lb 12.8 oz)   Height: 170.2 cm (67\")      Physical Exam   CONSTITUTIONAL: Alert and conversant, Well dressed, Well nourished, No acute distress  EYES: Sclera clean, Anicteric, Pupils equal  ENT: No nasal deviation, Trachea midline  NECK: No neck masses, Supple  LUNGS: No wheezing, Cough, non-congested  HEART: No rubs, No murmurs  GASTROINTESTINAL: Soft, non-distended, No masses, Non tender  to palpation, normal bowel sounds  NEURO: No motor deficits, No sensory deficits, Cranial Nerves 2 through 12 grossly intact  PSYCHIATRIC: Oriented to person, place and time, No memory deficits, Mood appropriate  VASCULAR: No carotid bruits, Femoral pulses palpable and symmetric.  The left foot is cool to touch there is an extremely dampened left posterior tibial Doppler signal and a much stronger right posterior tibial Doppler signal.  We discussed smoking cessation and indicated his long history of smoking coupled with his diabetes is poses a high risk for eventual bilateral amputation with his underlying disease documented by arteriography.  He denies advanced directive  MUSKULOSKELETAL: No extremity trauma or extremity asymmetry    The ROS, past medical history, surgical history, family history, social history, and vitals were reviewed by " myself and corrected as needed.      Labs/Imaging:  I reviewed the aortogram from 2017 and as I mentioned above under the vascular section. I discussed smoking cessation with this patient and how smoking will highly increase his rate of amputation. He denies having an advanced directive on file at this time.     Assessment/Plan:   This patient is a 48-year-old male every day smoker who has very significant peripheral arterial vascular disease. He has had diabetes which was only diagnosed on his first visit to see me as he had not seen a physician in many years. He also is continuing to smoke. At this point, his left foot is profoundly cool with a very dampened posterior tibial signal. It has been going on for 2 weeks. I will admit this patient to the hospital today and begin him on heparin. We will obtain a CT angiogram of the aorta with runoff and I will schedule him for a formal arteriogram with possible catheter-based intervention for tomorrow, so he will have to be n.p.o. after midnight. His risk of left-sided limb loss is high and he is agreeable to our plan.    Patient Active Problem List   Diagnosis    Peripheral arterial disease    PAD (peripheral artery disease)    Hypertension    Hyperlipidemia    Tobacco abuse    Diabetes mellitus type II, non insulin dependent       CC:  MD Chioma Brown editing for Brett Ryan MD       I, Brett Ryan MD, have read and agree with the editing done by Chioma Petty, .

## 2023-11-21 ENCOUNTER — ANESTHESIA (OUTPATIENT)
Dept: PERIOP | Facility: HOSPITAL | Age: 48
End: 2023-11-21
Payer: COMMERCIAL

## 2023-11-21 ENCOUNTER — APPOINTMENT (OUTPATIENT)
Dept: GENERAL RADIOLOGY | Facility: HOSPITAL | Age: 48
DRG: 253 | End: 2023-11-21
Payer: COMMERCIAL

## 2023-11-21 LAB
ANION GAP SERPL CALCULATED.3IONS-SCNC: 9 MMOL/L (ref 5–15)
BASOPHILS # BLD AUTO: 0.04 10*3/MM3 (ref 0–0.2)
BASOPHILS NFR BLD AUTO: 0.4 % (ref 0–1.5)
BUN SERPL-MCNC: 19 MG/DL (ref 6–20)
BUN/CREAT SERPL: 23.2 (ref 7–25)
CALCIUM SPEC-SCNC: 9.7 MG/DL (ref 8.6–10.5)
CHLORIDE SERPL-SCNC: 101 MMOL/L (ref 98–107)
CO2 SERPL-SCNC: 26 MMOL/L (ref 22–29)
CREAT SERPL-MCNC: 0.82 MG/DL (ref 0.76–1.27)
DEPRECATED RDW RBC AUTO: 39.8 FL (ref 37–54)
EGFRCR SERPLBLD CKD-EPI 2021: 107 ML/MIN/1.73
EOSINOPHIL # BLD AUTO: 0.2 10*3/MM3 (ref 0–0.4)
EOSINOPHIL NFR BLD AUTO: 2.1 % (ref 0.3–6.2)
ERYTHROCYTE [DISTWIDTH] IN BLOOD BY AUTOMATED COUNT: 11.8 % (ref 12.3–15.4)
GLUCOSE BLDC GLUCOMTR-MCNC: 174 MG/DL (ref 70–130)
GLUCOSE BLDC GLUCOMTR-MCNC: 179 MG/DL (ref 70–130)
GLUCOSE BLDC GLUCOMTR-MCNC: 191 MG/DL (ref 70–130)
GLUCOSE BLDC GLUCOMTR-MCNC: 204 MG/DL (ref 70–130)
GLUCOSE BLDC GLUCOMTR-MCNC: 215 MG/DL (ref 70–130)
GLUCOSE SERPL-MCNC: 277 MG/DL (ref 65–99)
HCT VFR BLD AUTO: 43.3 % (ref 37.5–51)
HGB BLD-MCNC: 14.4 G/DL (ref 13–17.7)
IMM GRANULOCYTES # BLD AUTO: 0.08 10*3/MM3 (ref 0–0.05)
IMM GRANULOCYTES NFR BLD AUTO: 0.8 % (ref 0–0.5)
LYMPHOCYTES # BLD AUTO: 3.04 10*3/MM3 (ref 0.7–3.1)
LYMPHOCYTES NFR BLD AUTO: 31.8 % (ref 19.6–45.3)
MCH RBC QN AUTO: 30.6 PG (ref 26.6–33)
MCHC RBC AUTO-ENTMCNC: 33.3 G/DL (ref 31.5–35.7)
MCV RBC AUTO: 92.1 FL (ref 79–97)
MONOCYTES # BLD AUTO: 0.77 10*3/MM3 (ref 0.1–0.9)
MONOCYTES NFR BLD AUTO: 8.1 % (ref 5–12)
NEUTROPHILS NFR BLD AUTO: 5.42 10*3/MM3 (ref 1.7–7)
NEUTROPHILS NFR BLD AUTO: 56.8 % (ref 42.7–76)
NRBC BLD AUTO-RTO: 0 /100 WBC (ref 0–0.2)
PLATELET # BLD AUTO: 187 10*3/MM3 (ref 140–450)
PMV BLD AUTO: 10.7 FL (ref 6–12)
POTASSIUM SERPL-SCNC: 4.1 MMOL/L (ref 3.5–5.2)
RBC # BLD AUTO: 4.7 10*6/MM3 (ref 4.14–5.8)
SODIUM SERPL-SCNC: 136 MMOL/L (ref 136–145)
UFH PPP CHRO-ACNC: 0.16 IU/ML (ref 0.3–0.7)
WBC NRBC COR # BLD AUTO: 9.55 10*3/MM3 (ref 3.4–10.8)

## 2023-11-21 PROCEDURE — 04CN0ZZ EXTIRPATION OF MATTER FROM LEFT POPLITEAL ARTERY, OPEN APPROACH: ICD-10-PCS | Performed by: THORACIC SURGERY (CARDIOTHORACIC VASCULAR SURGERY)

## 2023-11-21 PROCEDURE — 25010000002 FENTANYL CITRATE (PF) 100 MCG/2ML SOLUTION: Performed by: ANESTHESIOLOGY

## 2023-11-21 PROCEDURE — 25010000002 HYDROMORPHONE PER 4 MG: Performed by: NURSE ANESTHETIST, CERTIFIED REGISTERED

## 2023-11-21 PROCEDURE — 25010000002 HEPARIN (PORCINE) PER 1000 UNITS: Performed by: NURSE ANESTHETIST, CERTIFIED REGISTERED

## 2023-11-21 PROCEDURE — 25010000002 ONDANSETRON PER 1 MG: Performed by: NURSE ANESTHETIST, CERTIFIED REGISTERED

## 2023-11-21 PROCEDURE — 99233 SBSQ HOSP IP/OBS HIGH 50: CPT | Performed by: INTERNAL MEDICINE

## 2023-11-21 PROCEDURE — 85520 HEPARIN ASSAY: CPT

## 2023-11-21 PROCEDURE — 25010000002 FENTANYL CITRATE (PF) 100 MCG/2ML SOLUTION: Performed by: NURSE ANESTHETIST, CERTIFIED REGISTERED

## 2023-11-21 PROCEDURE — C1769 GUIDE WIRE: HCPCS | Performed by: THORACIC SURGERY (CARDIOTHORACIC VASCULAR SURGERY)

## 2023-11-21 PROCEDURE — 25010000002 CEFAZOLIN PER 500 MG: Performed by: THORACIC SURGERY (CARDIOTHORACIC VASCULAR SURGERY)

## 2023-11-21 PROCEDURE — 25010000002 ESMOLOL 100 MG/10ML SOLUTION: Performed by: NURSE ANESTHETIST, CERTIFIED REGISTERED

## 2023-11-21 PROCEDURE — 25810000003 LACTATED RINGERS PER 1000 ML: Performed by: ANESTHESIOLOGY

## 2023-11-21 PROCEDURE — 25010000002 LIDOCAINE 1 % SOLUTION: Performed by: NURSE ANESTHETIST, CERTIFIED REGISTERED

## 2023-11-21 PROCEDURE — 82948 REAGENT STRIP/BLOOD GLUCOSE: CPT

## 2023-11-21 PROCEDURE — 25010000002 GENTAMICIN PER 80 MG: Performed by: THORACIC SURGERY (CARDIOTHORACIC VASCULAR SURGERY)

## 2023-11-21 PROCEDURE — 63710000001 INSULIN LISPRO (HUMAN) PER 5 UNITS

## 2023-11-21 PROCEDURE — 25010000002 PROPOFOL 10 MG/ML EMULSION: Performed by: NURSE ANESTHETIST, CERTIFIED REGISTERED

## 2023-11-21 PROCEDURE — 25010000002 HYDROMORPHONE PER 4 MG: Performed by: PHYSICIAN ASSISTANT

## 2023-11-21 PROCEDURE — 25810000003 SODIUM CHLORIDE 0.9 % SOLUTION 250 ML FLEX CONT: Performed by: PHYSICIAN ASSISTANT

## 2023-11-21 PROCEDURE — 04CL0ZZ EXTIRPATION OF MATTER FROM LEFT FEMORAL ARTERY, OPEN APPROACH: ICD-10-PCS | Performed by: THORACIC SURGERY (CARDIOTHORACIC VASCULAR SURGERY)

## 2023-11-21 PROCEDURE — 25010000002 FENTANYL CITRATE (PF) 50 MCG/ML SOLUTION: Performed by: NURSE ANESTHETIST, CERTIFIED REGISTERED

## 2023-11-21 PROCEDURE — C1768 GRAFT, VASCULAR: HCPCS | Performed by: THORACIC SURGERY (CARDIOTHORACIC VASCULAR SURGERY)

## 2023-11-21 PROCEDURE — 25010000002 HEPARIN (PORCINE) PER 1000 UNITS: Performed by: THORACIC SURGERY (CARDIOTHORACIC VASCULAR SURGERY)

## 2023-11-21 PROCEDURE — 25510000001 IOPAMIDOL 61 % SOLUTION: Performed by: THORACIC SURGERY (CARDIOTHORACIC VASCULAR SURGERY)

## 2023-11-21 PROCEDURE — 25010000002 MORPHINE PER 10 MG: Performed by: THORACIC SURGERY (CARDIOTHORACIC VASCULAR SURGERY)

## 2023-11-21 PROCEDURE — 63710000001 INSULIN LISPRO (HUMAN) PER 5 UNITS: Performed by: PHYSICIAN ASSISTANT

## 2023-11-21 PROCEDURE — C1757 CATH, THROMBECTOMY/EMBOLECT: HCPCS | Performed by: THORACIC SURGERY (CARDIOTHORACIC VASCULAR SURGERY)

## 2023-11-21 PROCEDURE — 25010000002 PROTAMINE SULFATE PER 10 MG: Performed by: NURSE ANESTHETIST, CERTIFIED REGISTERED

## 2023-11-21 PROCEDURE — 63710000001 INSULIN DETEMIR PER 5 UNITS: Performed by: PHYSICIAN ASSISTANT

## 2023-11-21 PROCEDURE — C1887 CATHETER, GUIDING: HCPCS | Performed by: THORACIC SURGERY (CARDIOTHORACIC VASCULAR SURGERY)

## 2023-11-21 PROCEDURE — 041K0JJ BYPASS RIGHT FEMORAL ARTERY TO LEFT FEMORAL ARTERY WITH SYNTHETIC SUBSTITUTE, OPEN APPROACH: ICD-10-PCS | Performed by: THORACIC SURGERY (CARDIOTHORACIC VASCULAR SURGERY)

## 2023-11-21 PROCEDURE — 80048 BASIC METABOLIC PNL TOTAL CA: CPT

## 2023-11-21 PROCEDURE — 99024 POSTOP FOLLOW-UP VISIT: CPT | Performed by: THORACIC SURGERY (CARDIOTHORACIC VASCULAR SURGERY)

## 2023-11-21 PROCEDURE — C2615 SEALANT, PULMONARY, LIQUID: HCPCS | Performed by: THORACIC SURGERY (CARDIOTHORACIC VASCULAR SURGERY)

## 2023-11-21 PROCEDURE — C1894 INTRO/SHEATH, NON-LASER: HCPCS | Performed by: THORACIC SURGERY (CARDIOTHORACIC VASCULAR SURGERY)

## 2023-11-21 PROCEDURE — 25010000002 HEPARIN (PORCINE) PER 1000 UNITS

## 2023-11-21 PROCEDURE — 63710000001 INSULIN DETEMIR PER 5 UNITS: Performed by: INTERNAL MEDICINE

## 2023-11-21 PROCEDURE — 85025 COMPLETE CBC W/AUTO DIFF WBC: CPT

## 2023-11-21 PROCEDURE — 25010000002 SUGAMMADEX 200 MG/2ML SOLUTION: Performed by: NURSE ANESTHETIST, CERTIFIED REGISTERED

## 2023-11-21 PROCEDURE — 25010000002 CEFAZOLIN PER 500 MG: Performed by: PHYSICIAN ASSISTANT

## 2023-11-21 PROCEDURE — 25010000002 PHENYLEPHRINE 10 MG/ML SOLUTION: Performed by: NURSE ANESTHETIST, CERTIFIED REGISTERED

## 2023-11-21 PROCEDURE — 25010000002 PHENYLEPHRINE 10 MG/ML SOLUTION 1 ML VIAL: Performed by: NURSE ANESTHETIST, CERTIFIED REGISTERED

## 2023-11-21 PROCEDURE — 88304 TISSUE EXAM BY PATHOLOGIST: CPT | Performed by: THORACIC SURGERY (CARDIOTHORACIC VASCULAR SURGERY)

## 2023-11-21 PROCEDURE — 25810000003 SODIUM CHLORIDE 0.9 % SOLUTION 250 ML FLEX CONT: Performed by: NURSE ANESTHETIST, CERTIFIED REGISTERED

## 2023-11-21 DEVICE — GRFT VASC PROPAT THNSTRCH REMVRNG8X30X40: Type: IMPLANTABLE DEVICE | Site: GROIN | Status: FUNCTIONAL

## 2023-11-21 DEVICE — PROGEL, PLEURAL AIR LEAK SEALANT, 4 ML KIT
Type: IMPLANTABLE DEVICE | Site: AORTA | Status: FUNCTIONAL
Brand: PROGEL

## 2023-11-21 RX ORDER — POLYETHYLENE GLYCOL 3350 17 G/17G
17 POWDER, FOR SOLUTION ORAL DAILY PRN
Status: DISCONTINUED | OUTPATIENT
Start: 2023-11-21 | End: 2023-11-24 | Stop reason: HOSPADM

## 2023-11-21 RX ORDER — SODIUM CHLORIDE 0.9 % (FLUSH) 0.9 %
3 SYRINGE (ML) INJECTION EVERY 12 HOURS SCHEDULED
Status: DISCONTINUED | OUTPATIENT
Start: 2023-11-21 | End: 2023-11-21 | Stop reason: HOSPADM

## 2023-11-21 RX ORDER — MEPERIDINE HYDROCHLORIDE 25 MG/ML
12.5 INJECTION INTRAMUSCULAR; INTRAVENOUS; SUBCUTANEOUS
Status: DISCONTINUED | OUTPATIENT
Start: 2023-11-21 | End: 2023-11-21 | Stop reason: HOSPADM

## 2023-11-21 RX ORDER — THROMBIN HUMAN AND FIBRINOGEN 2; 5.5 [USP'U]/1; MG/1
PATCH TOPICAL AS NEEDED
Status: DISCONTINUED | OUTPATIENT
Start: 2023-11-21 | End: 2023-11-21 | Stop reason: HOSPADM

## 2023-11-21 RX ORDER — DROPERIDOL 2.5 MG/ML
0.62 INJECTION, SOLUTION INTRAMUSCULAR; INTRAVENOUS
Status: DISCONTINUED | OUTPATIENT
Start: 2023-11-21 | End: 2023-11-21 | Stop reason: HOSPADM

## 2023-11-21 RX ORDER — FENTANYL CITRATE 50 UG/ML
50 INJECTION, SOLUTION INTRAMUSCULAR; INTRAVENOUS ONCE
Status: COMPLETED | OUTPATIENT
Start: 2023-11-21 | End: 2023-11-21

## 2023-11-21 RX ORDER — ESMOLOL HYDROCHLORIDE 10 MG/ML
INJECTION INTRAVENOUS AS NEEDED
Status: DISCONTINUED | OUTPATIENT
Start: 2023-11-21 | End: 2023-11-21 | Stop reason: SURG

## 2023-11-21 RX ORDER — SODIUM CHLORIDE 9 MG/ML
40 INJECTION, SOLUTION INTRAVENOUS AS NEEDED
Status: DISCONTINUED | OUTPATIENT
Start: 2023-11-21 | End: 2023-11-21 | Stop reason: HOSPADM

## 2023-11-21 RX ORDER — FAMOTIDINE 20 MG/1
20 TABLET, FILM COATED ORAL EVERY 12 HOURS SCHEDULED
Status: DISCONTINUED | OUTPATIENT
Start: 2023-11-21 | End: 2023-11-24 | Stop reason: HOSPADM

## 2023-11-21 RX ORDER — AMOXICILLIN 250 MG
2 CAPSULE ORAL 2 TIMES DAILY
Status: DISCONTINUED | OUTPATIENT
Start: 2023-11-21 | End: 2023-11-24 | Stop reason: HOSPADM

## 2023-11-21 RX ORDER — SODIUM CHLORIDE 450 MG/100ML
100 INJECTION, SOLUTION INTRAVENOUS CONTINUOUS
Status: DISCONTINUED | OUTPATIENT
Start: 2023-11-21 | End: 2023-11-24 | Stop reason: HOSPADM

## 2023-11-21 RX ORDER — HYDROMORPHONE HYDROCHLORIDE 1 MG/ML
0.5 INJECTION, SOLUTION INTRAMUSCULAR; INTRAVENOUS; SUBCUTANEOUS
Status: DISCONTINUED | OUTPATIENT
Start: 2023-11-21 | End: 2023-11-21 | Stop reason: HOSPADM

## 2023-11-21 RX ORDER — PROMETHAZINE HYDROCHLORIDE 25 MG/1
25 SUPPOSITORY RECTAL ONCE AS NEEDED
Status: DISCONTINUED | OUTPATIENT
Start: 2023-11-21 | End: 2023-11-21 | Stop reason: HOSPADM

## 2023-11-21 RX ORDER — HYDROCODONE BITARTRATE AND ACETAMINOPHEN 7.5; 325 MG/1; MG/1
1 TABLET ORAL EVERY 4 HOURS PRN
Status: DISCONTINUED | OUTPATIENT
Start: 2023-11-21 | End: 2023-11-21

## 2023-11-21 RX ORDER — HYDROCODONE BITARTRATE AND ACETAMINOPHEN 5; 325 MG/1; MG/1
1 TABLET ORAL ONCE AS NEEDED
Status: DISCONTINUED | OUTPATIENT
Start: 2023-11-21 | End: 2023-11-21 | Stop reason: HOSPADM

## 2023-11-21 RX ORDER — FENTANYL CITRATE 50 UG/ML
50 INJECTION, SOLUTION INTRAMUSCULAR; INTRAVENOUS
Status: DISCONTINUED | OUTPATIENT
Start: 2023-11-21 | End: 2023-11-21 | Stop reason: HOSPADM

## 2023-11-21 RX ORDER — IPRATROPIUM BROMIDE AND ALBUTEROL SULFATE 2.5; .5 MG/3ML; MG/3ML
3 SOLUTION RESPIRATORY (INHALATION) ONCE AS NEEDED
Status: DISCONTINUED | OUTPATIENT
Start: 2023-11-21 | End: 2023-11-21 | Stop reason: HOSPADM

## 2023-11-21 RX ORDER — MIDAZOLAM HYDROCHLORIDE 1 MG/ML
1 INJECTION INTRAMUSCULAR; INTRAVENOUS
Status: DISCONTINUED | OUTPATIENT
Start: 2023-11-21 | End: 2023-11-21 | Stop reason: HOSPADM

## 2023-11-21 RX ORDER — PROMETHAZINE HYDROCHLORIDE 25 MG/1
25 TABLET ORAL ONCE AS NEEDED
Status: DISCONTINUED | OUTPATIENT
Start: 2023-11-21 | End: 2023-11-21 | Stop reason: HOSPADM

## 2023-11-21 RX ORDER — BISACODYL 5 MG/1
5 TABLET, DELAYED RELEASE ORAL DAILY PRN
Status: DISCONTINUED | OUTPATIENT
Start: 2023-11-21 | End: 2023-11-24 | Stop reason: HOSPADM

## 2023-11-21 RX ORDER — PROPOFOL 10 MG/ML
VIAL (ML) INTRAVENOUS AS NEEDED
Status: DISCONTINUED | OUTPATIENT
Start: 2023-11-21 | End: 2023-11-21 | Stop reason: SURG

## 2023-11-21 RX ORDER — PHENYLEPHRINE HYDROCHLORIDE 10 MG/ML
INJECTION INTRAVENOUS AS NEEDED
Status: DISCONTINUED | OUTPATIENT
Start: 2023-11-21 | End: 2023-11-21 | Stop reason: SURG

## 2023-11-21 RX ORDER — ONDANSETRON 4 MG/1
4 TABLET, FILM COATED ORAL EVERY 6 HOURS PRN
Status: DISCONTINUED | OUTPATIENT
Start: 2023-11-21 | End: 2023-11-21 | Stop reason: SDUPTHER

## 2023-11-21 RX ORDER — HYDROMORPHONE HYDROCHLORIDE 1 MG/ML
0.5 INJECTION, SOLUTION INTRAMUSCULAR; INTRAVENOUS; SUBCUTANEOUS
Status: DISCONTINUED | OUTPATIENT
Start: 2023-11-21 | End: 2023-11-24 | Stop reason: HOSPADM

## 2023-11-21 RX ORDER — LABETALOL HYDROCHLORIDE 5 MG/ML
INJECTION, SOLUTION INTRAVENOUS AS NEEDED
Status: DISCONTINUED | OUTPATIENT
Start: 2023-11-21 | End: 2023-11-21 | Stop reason: SURG

## 2023-11-21 RX ORDER — ONDANSETRON 4 MG/1
4 TABLET, FILM COATED ORAL EVERY 6 HOURS PRN
Status: DISCONTINUED | OUTPATIENT
Start: 2023-11-21 | End: 2023-11-24 | Stop reason: HOSPADM

## 2023-11-21 RX ORDER — NITROGLYCERIN 0.4 MG/1
0.4 TABLET SUBLINGUAL
Status: DISCONTINUED | OUTPATIENT
Start: 2023-11-21 | End: 2023-11-24 | Stop reason: HOSPADM

## 2023-11-21 RX ORDER — LIDOCAINE HYDROCHLORIDE 10 MG/ML
0.5 INJECTION, SOLUTION EPIDURAL; INFILTRATION; INTRACAUDAL; PERINEURAL ONCE AS NEEDED
Status: DISCONTINUED | OUTPATIENT
Start: 2023-11-21 | End: 2023-11-21 | Stop reason: HOSPADM

## 2023-11-21 RX ORDER — BISACODYL 10 MG
10 SUPPOSITORY, RECTAL RECTAL DAILY PRN
Status: DISCONTINUED | OUTPATIENT
Start: 2023-11-21 | End: 2023-11-24 | Stop reason: HOSPADM

## 2023-11-21 RX ORDER — ONDANSETRON 2 MG/ML
INJECTION INTRAMUSCULAR; INTRAVENOUS AS NEEDED
Status: DISCONTINUED | OUTPATIENT
Start: 2023-11-21 | End: 2023-11-21 | Stop reason: SURG

## 2023-11-21 RX ORDER — SODIUM CHLORIDE, SODIUM LACTATE, POTASSIUM CHLORIDE, CALCIUM CHLORIDE 600; 310; 30; 20 MG/100ML; MG/100ML; MG/100ML; MG/100ML
9 INJECTION, SOLUTION INTRAVENOUS CONTINUOUS
Status: DISCONTINUED | OUTPATIENT
Start: 2023-11-21 | End: 2023-11-22

## 2023-11-21 RX ORDER — AMOXICILLIN 250 MG
2 CAPSULE ORAL 2 TIMES DAILY
Status: DISCONTINUED | OUTPATIENT
Start: 2023-11-21 | End: 2023-11-21

## 2023-11-21 RX ORDER — ROCURONIUM BROMIDE 10 MG/ML
INJECTION, SOLUTION INTRAVENOUS AS NEEDED
Status: DISCONTINUED | OUTPATIENT
Start: 2023-11-21 | End: 2023-11-21 | Stop reason: SURG

## 2023-11-21 RX ORDER — FENTANYL CITRATE 50 UG/ML
INJECTION, SOLUTION INTRAMUSCULAR; INTRAVENOUS AS NEEDED
Status: DISCONTINUED | OUTPATIENT
Start: 2023-11-21 | End: 2023-11-21 | Stop reason: SURG

## 2023-11-21 RX ORDER — MORPHINE SULFATE 4 MG/ML
4 INJECTION, SOLUTION INTRAMUSCULAR; INTRAVENOUS
Status: DISCONTINUED | OUTPATIENT
Start: 2023-11-21 | End: 2023-11-21

## 2023-11-21 RX ORDER — POLYETHYLENE GLYCOL 3350 17 G/17G
17 POWDER, FOR SOLUTION ORAL DAILY
Status: DISCONTINUED | OUTPATIENT
Start: 2023-11-21 | End: 2023-11-21

## 2023-11-21 RX ORDER — FAMOTIDINE 20 MG/1
20 TABLET, FILM COATED ORAL ONCE
Status: DISCONTINUED | OUTPATIENT
Start: 2023-11-21 | End: 2023-11-21

## 2023-11-21 RX ORDER — BISACODYL 5 MG/1
5 TABLET, DELAYED RELEASE ORAL DAILY PRN
Status: DISCONTINUED | OUTPATIENT
Start: 2023-11-21 | End: 2023-11-21

## 2023-11-21 RX ORDER — FAMOTIDINE 10 MG/ML
20 INJECTION, SOLUTION INTRAVENOUS EVERY 12 HOURS SCHEDULED
Status: DISCONTINUED | OUTPATIENT
Start: 2023-11-21 | End: 2023-11-24 | Stop reason: HOSPADM

## 2023-11-21 RX ORDER — DROPERIDOL 2.5 MG/ML
0.62 INJECTION, SOLUTION INTRAMUSCULAR; INTRAVENOUS ONCE AS NEEDED
Status: DISCONTINUED | OUTPATIENT
Start: 2023-11-21 | End: 2023-11-21 | Stop reason: HOSPADM

## 2023-11-21 RX ORDER — ONDANSETRON 2 MG/ML
4 INJECTION INTRAMUSCULAR; INTRAVENOUS ONCE AS NEEDED
Status: DISCONTINUED | OUTPATIENT
Start: 2023-11-21 | End: 2023-11-21 | Stop reason: HOSPADM

## 2023-11-21 RX ORDER — HEPARIN SODIUM 1000 [USP'U]/ML
2000 INJECTION, SOLUTION INTRAVENOUS; SUBCUTANEOUS ONCE
Status: COMPLETED | OUTPATIENT
Start: 2023-11-21 | End: 2023-11-21

## 2023-11-21 RX ORDER — PROTAMINE SULFATE 10 MG/ML
INJECTION, SOLUTION INTRAVENOUS AS NEEDED
Status: DISCONTINUED | OUTPATIENT
Start: 2023-11-21 | End: 2023-11-21 | Stop reason: SURG

## 2023-11-21 RX ORDER — ONDANSETRON 2 MG/ML
4 INJECTION INTRAMUSCULAR; INTRAVENOUS EVERY 6 HOURS PRN
Status: DISCONTINUED | OUTPATIENT
Start: 2023-11-21 | End: 2023-11-24 | Stop reason: HOSPADM

## 2023-11-21 RX ORDER — OXYCODONE AND ACETAMINOPHEN 10; 325 MG/1; MG/1
1 TABLET ORAL EVERY 4 HOURS PRN
Status: DISCONTINUED | OUTPATIENT
Start: 2023-11-21 | End: 2023-11-24 | Stop reason: HOSPADM

## 2023-11-21 RX ORDER — HYDRALAZINE HYDROCHLORIDE 20 MG/ML
5 INJECTION INTRAMUSCULAR; INTRAVENOUS
Status: DISCONTINUED | OUTPATIENT
Start: 2023-11-21 | End: 2023-11-21 | Stop reason: HOSPADM

## 2023-11-21 RX ORDER — LABETALOL HYDROCHLORIDE 5 MG/ML
5 INJECTION, SOLUTION INTRAVENOUS
Status: DISCONTINUED | OUTPATIENT
Start: 2023-11-21 | End: 2023-11-21 | Stop reason: HOSPADM

## 2023-11-21 RX ORDER — LIDOCAINE HYDROCHLORIDE 10 MG/ML
INJECTION, SOLUTION INFILTRATION; PERINEURAL AS NEEDED
Status: DISCONTINUED | OUTPATIENT
Start: 2023-11-21 | End: 2023-11-21 | Stop reason: SURG

## 2023-11-21 RX ORDER — HEPARIN SODIUM 1000 [USP'U]/ML
INJECTION, SOLUTION INTRAVENOUS; SUBCUTANEOUS AS NEEDED
Status: DISCONTINUED | OUTPATIENT
Start: 2023-11-21 | End: 2023-11-21 | Stop reason: SURG

## 2023-11-21 RX ORDER — SODIUM CHLORIDE 0.9 % (FLUSH) 0.9 %
3-10 SYRINGE (ML) INJECTION AS NEEDED
Status: DISCONTINUED | OUTPATIENT
Start: 2023-11-21 | End: 2023-11-21 | Stop reason: HOSPADM

## 2023-11-21 RX ORDER — NALOXONE HCL 0.4 MG/ML
0.4 VIAL (ML) INJECTION AS NEEDED
Status: DISCONTINUED | OUTPATIENT
Start: 2023-11-21 | End: 2023-11-21 | Stop reason: HOSPADM

## 2023-11-21 RX ORDER — BISACODYL 10 MG
10 SUPPOSITORY, RECTAL RECTAL DAILY PRN
Status: DISCONTINUED | OUTPATIENT
Start: 2023-11-21 | End: 2023-11-21

## 2023-11-21 RX ADMIN — INSULIN LISPRO 4 UNITS: 100 INJECTION, SOLUTION INTRAVENOUS; SUBCUTANEOUS at 08:37

## 2023-11-21 RX ADMIN — LABETALOL 20 MG/4 ML (5 MG/ML) INTRAVENOUS SYRINGE 10 MG: at 13:00

## 2023-11-21 RX ADMIN — HYDROMORPHONE HYDROCHLORIDE 0.5 MG: 1 INJECTION, SOLUTION INTRAMUSCULAR; INTRAVENOUS; SUBCUTANEOUS at 19:28

## 2023-11-21 RX ADMIN — FAMOTIDINE 40 MG: 20 TABLET, FILM COATED ORAL at 08:36

## 2023-11-21 RX ADMIN — Medication 10 ML: at 08:37

## 2023-11-21 RX ADMIN — INSULIN LISPRO 2 UNITS: 100 INJECTION, SOLUTION INTRAVENOUS; SUBCUTANEOUS at 21:14

## 2023-11-21 RX ADMIN — SODIUM CHLORIDE 100 ML/HR: 4.5 INJECTION, SOLUTION INTRAVENOUS at 14:12

## 2023-11-21 RX ADMIN — Medication 3 ML: at 14:13

## 2023-11-21 RX ADMIN — NICARDIPINE HYDROCHLORIDE 12.5 MG/HR: 25 INJECTION, SOLUTION INTRAVENOUS at 18:47

## 2023-11-21 RX ADMIN — HYDROCODONE BITARTRATE AND ACETAMINOPHEN 1 TABLET: 7.5; 325 TABLET ORAL at 14:08

## 2023-11-21 RX ADMIN — SODIUM CHLORIDE 2000 MG: 900 INJECTION INTRAVENOUS at 16:39

## 2023-11-21 RX ADMIN — HYDROMORPHONE HYDROCHLORIDE 0.5 MG: 1 INJECTION, SOLUTION INTRAMUSCULAR; INTRAVENOUS; SUBCUTANEOUS at 21:25

## 2023-11-21 RX ADMIN — Medication 10 ML: at 21:15

## 2023-11-21 RX ADMIN — FENTANYL CITRATE 50 MCG: 50 INJECTION, SOLUTION INTRAMUSCULAR; INTRAVENOUS at 12:36

## 2023-11-21 RX ADMIN — ROSUVASTATIN 20 MG: 20 TABLET, FILM COATED ORAL at 21:14

## 2023-11-21 RX ADMIN — SUGAMMADEX 200 MG: 100 INJECTION, SOLUTION INTRAVENOUS at 12:36

## 2023-11-21 RX ADMIN — NICARDIPINE HYDROCHLORIDE 5 MG/HR: 25 INJECTION, SOLUTION INTRAVENOUS at 14:12

## 2023-11-21 RX ADMIN — NICARDIPINE HYDROCHLORIDE 15 MG/HR: 25 INJECTION, SOLUTION INTRAVENOUS at 21:13

## 2023-11-21 RX ADMIN — PHENYLEPHRINE HYDROCHLORIDE 80 MCG: 10 INJECTION INTRAVENOUS at 12:21

## 2023-11-21 RX ADMIN — HYDROMORPHONE HYDROCHLORIDE 0.5 MG: 1 INJECTION, SOLUTION INTRAMUSCULAR; INTRAVENOUS; SUBCUTANEOUS at 17:32

## 2023-11-21 RX ADMIN — FENTANYL CITRATE 50 MCG: 50 INJECTION, SOLUTION INTRAMUSCULAR; INTRAVENOUS at 13:10

## 2023-11-21 RX ADMIN — INSULIN DETEMIR 8 UNITS: 100 INJECTION, SOLUTION SUBCUTANEOUS at 08:37

## 2023-11-21 RX ADMIN — SODIUM CHLORIDE, POTASSIUM CHLORIDE, SODIUM LACTATE AND CALCIUM CHLORIDE: 600; 310; 30; 20 INJECTION, SOLUTION INTRAVENOUS at 12:17

## 2023-11-21 RX ADMIN — OXYCODONE HYDROCHLORIDE AND ACETAMINOPHEN 1 TABLET: 10; 325 TABLET ORAL at 22:19

## 2023-11-21 RX ADMIN — FENTANYL CITRATE 50 MCG: 50 INJECTION, SOLUTION INTRAMUSCULAR; INTRAVENOUS at 10:55

## 2023-11-21 RX ADMIN — HYDROCODONE BITARTRATE AND ACETAMINOPHEN 1 TABLET: 7.5; 325 TABLET ORAL at 16:36

## 2023-11-21 RX ADMIN — FENTANYL CITRATE 50 MCG: 50 INJECTION, SOLUTION INTRAMUSCULAR; INTRAVENOUS at 12:37

## 2023-11-21 RX ADMIN — INSULIN LISPRO 4 UNITS: 100 INJECTION, SOLUTION INTRAVENOUS; SUBCUTANEOUS at 18:40

## 2023-11-21 RX ADMIN — FENTANYL CITRATE 50 MCG: 50 INJECTION, SOLUTION INTRAMUSCULAR; INTRAVENOUS at 13:35

## 2023-11-21 RX ADMIN — ASPIRIN 81 MG: 81 TABLET, COATED ORAL at 21:14

## 2023-11-21 RX ADMIN — PROTAMINE SULFATE 75 MG: 10 INJECTION, SOLUTION INTRAVENOUS at 12:12

## 2023-11-21 RX ADMIN — OXYCODONE HYDROCHLORIDE AND ACETAMINOPHEN 1 TABLET: 10; 325 TABLET ORAL at 17:41

## 2023-11-21 RX ADMIN — ONDANSETRON 4 MG: 2 INJECTION INTRAMUSCULAR; INTRAVENOUS at 12:29

## 2023-11-21 RX ADMIN — SENNOSIDES AND DOCUSATE SODIUM 2 TABLET: 8.6; 5 TABLET ORAL at 21:14

## 2023-11-21 RX ADMIN — SODIUM CHLORIDE 2000 MG: 900 INJECTION INTRAVENOUS at 11:10

## 2023-11-21 RX ADMIN — PHENYLEPHRINE HYDROCHLORIDE 160 MCG: 10 INJECTION INTRAVENOUS at 11:15

## 2023-11-21 RX ADMIN — LIDOCAINE HYDROCHLORIDE 100 MG: 10 INJECTION, SOLUTION INFILTRATION; PERINEURAL at 11:06

## 2023-11-21 RX ADMIN — SENNOSIDES AND DOCUSATE SODIUM 2 TABLET: 8.6; 5 TABLET ORAL at 08:37

## 2023-11-21 RX ADMIN — ESMOLOL HYDROCHLORIDE 70 MG: 10 INJECTION, SOLUTION INTRAVENOUS at 11:06

## 2023-11-21 RX ADMIN — SODIUM CHLORIDE, POTASSIUM CHLORIDE, SODIUM LACTATE AND CALCIUM CHLORIDE 9 ML/HR: 600; 310; 30; 20 INJECTION, SOLUTION INTRAVENOUS at 09:15

## 2023-11-21 RX ADMIN — SENNOSIDES AND DOCUSATE SODIUM 2 TABLET: 8.6; 5 TABLET ORAL at 14:12

## 2023-11-21 RX ADMIN — NICARDIPINE HYDROCHLORIDE 15 MG/HR: 25 INJECTION, SOLUTION INTRAVENOUS at 23:07

## 2023-11-21 RX ADMIN — Medication 5 MG: at 21:14

## 2023-11-21 RX ADMIN — ROCURONIUM BROMIDE 50 MG: 10 SOLUTION INTRAVENOUS at 11:06

## 2023-11-21 RX ADMIN — LABETALOL 20 MG/4 ML (5 MG/ML) INTRAVENOUS SYRINGE 10 MG: at 13:02

## 2023-11-21 RX ADMIN — FAMOTIDINE 20 MG: 20 TABLET, FILM COATED ORAL at 21:14

## 2023-11-21 RX ADMIN — PHENYLEPHRINE HYDROCHLORIDE 0.8 MCG/KG/MIN: 10 INJECTION INTRAVENOUS at 11:15

## 2023-11-21 RX ADMIN — HYDROMORPHONE HYDROCHLORIDE 0.5 MG: 1 INJECTION, SOLUTION INTRAMUSCULAR; INTRAVENOUS; SUBCUTANEOUS at 13:30

## 2023-11-21 RX ADMIN — MORPHINE SULFATE 4 MG: 4 INJECTION, SOLUTION INTRAMUSCULAR; INTRAVENOUS at 14:21

## 2023-11-21 RX ADMIN — HYDROMORPHONE HYDROCHLORIDE 0.5 MG: 1 INJECTION, SOLUTION INTRAMUSCULAR; INTRAVENOUS; SUBCUTANEOUS at 15:29

## 2023-11-21 RX ADMIN — HEPARIN SODIUM 2000 UNITS: 1000 INJECTION INTRAVENOUS; SUBCUTANEOUS at 05:51

## 2023-11-21 RX ADMIN — LISINOPRIL 20 MG: 20 TABLET ORAL at 21:14

## 2023-11-21 RX ADMIN — PROPOFOL 200 MG: 10 INJECTION, EMULSION INTRAVENOUS at 11:06

## 2023-11-21 RX ADMIN — FENTANYL CITRATE 50 MCG: 50 INJECTION, SOLUTION INTRAMUSCULAR; INTRAVENOUS at 09:47

## 2023-11-21 RX ADMIN — INSULIN DETEMIR 8 UNITS: 100 INJECTION, SOLUTION SUBCUTANEOUS at 21:15

## 2023-11-21 RX ADMIN — HEPARIN SODIUM 7000 UNITS: 1000 INJECTION INTRAVENOUS; SUBCUTANEOUS at 11:34

## 2023-11-21 NOTE — PROGRESS NOTES
[facility] Medicine Services  CLINICAL REVIEW NOTE    Patient Name: Justo Oquendo  : 1975  MRN: [id number]    Date of Admission: 2023  Length of Stay: 1  Attending Service:  CTS    Chart reviewed this AM, BG remains slightly above target. Cont SSI, added levemir 8U BID based on patient weight; planned for surgery today and anticipated ICU bed post-op. Please contact us with concerns, we will be happy to resume following the patient once out of the ICU.

## 2023-11-21 NOTE — ANESTHESIA POSTPROCEDURE EVALUATION
Patient: Justo Oquendo    Procedure Summary       Date: 11/21/23 Room / Location: [facility] OR 01 / [facility]    Anesthesia Start: 1102 Anesthesia Stop: 1303    Procedure: THROMECTOMY OF LEFT GRAFT, AORTAGRAM, FEMORAL TO FEMORAL BYPASS (Abdomen) Diagnosis:       PAD (peripheral artery disease)      (PAD (peripheral artery disease) [I73.9])    Surgeons: Brett Ryan MD Provider: Kwabena Laura MD    Anesthesia Type: general ASA Status: 3            Anesthesia Type: general    Vitals  Vitals Value Taken Time   /76 11/21/23 1300   Temp     Pulse 85 11/21/23 1302   Resp     SpO2 98 % 11/21/23 1302   Vitals shown include unfiled device data.        Post Anesthesia Care and Evaluation    Patient location during evaluation: PACU  Patient participation: complete - patient participated  Level of consciousness: awake and alert  Pain management: adequate    Airway patency: patent  Anesthetic complications: No anesthetic complications  PONV Status: none  Cardiovascular status: hemodynamically stable and acceptable  Respiratory status: nonlabored ventilation, acceptable, nasal cannula and spontaneous ventilation  Hydration status: acceptable

## 2023-11-21 NOTE — CASE MANAGEMENT/SOCIAL WORK
Continued Stay Note  [facility]     Patient Name: Justo Oquendo  MRN: [id number]  Today's Date: 11/21/2023    Admit Date: 11/20/2023    Plan: TBD   Discharge Plan       Row Name 11/21/23 1746       Plan    Plan TBD    Patient/Family in Agreement with Plan unable to assess    Plan Comments Per MDR, Mr Oquendo to have Status post femoral to femoral bypass today.  There is no family available at this time.  CM will f/u with Mr. Oquendo in the am to initiate discharge planning.    Final Discharge Disposition Code 30 - still a patient                   Discharge Codes    No documentation.                       Nini Burgess RN

## 2023-11-21 NOTE — ANESTHESIA PREPROCEDURE EVALUATION
Anesthesia Evaluation     Patient summary reviewed and Nursing notes reviewed   NPO Solid Status: > 8 hours  NPO Liquid Status: > 2 hours           Airway   Mallampati: II  TM distance: >3 FB  Neck ROM: full  No difficulty expected  Dental          Pulmonary    (+) a smoker Current,  Cardiovascular     ECG reviewed  Rhythm: regular  Rate: normal    (+) hypertension, PVD, hyperlipidemia      Neuro/Psych  GI/Hepatic/Renal/Endo    (+) diabetes mellitus    Musculoskeletal     (+) back pain  Abdominal    Substance History      OB/GYN          Other   arthritis,     ROS/Med Hx Other: Successful intubation technique: direct laryngoscopy  Facilitating devices/methods: intubating stylet  Endotracheal tube insertion site: oral  Blade: Salma  Blade size: #3  ETT size: 7.5 mm  Cormack-Lehane Classification: grade IIa - partial view of glottis                Anesthesia Plan    ASA 3     general     intravenous induction     Anesthetic plan, risks, benefits, and alternatives have been provided, discussed and informed consent has been obtained with: patient.    Plan discussed with CRNA.    CODE STATUS:    Code Status (Patient has no pulse and is not breathing): CPR (Attempt to Resuscitate)  Medical Interventions (Patient has pulse or is breathing): Full Support

## 2023-11-21 NOTE — PROGRESS NOTES
INTENSIVIST   PROGRESS NOTE     Hospital:  LOS: 1 day     Chief Complaint: Postoperative management    Subjective   S     Interval History: Justo Oquendo is a 48-year-old male with past medical history of diabetes, hypertension, tobacco abuse, PVD status post left femoral above-the-knee popliteal bypass (April 2017).  Patient was evaluated in the office by CT surgery and directly admitted afterwards with a cold, left lower extremity with no detectable dopplerable pulse in the left DP.     The patient's relevant past medical, surgical and social history were reviewed and updated in Epic as appropriate.      ROS: (+) LLE pain, left groin pain, lower back pain     Objective   O     Intake/Ouptut 24 hrs (7:00AM - 6:59 AM)  Intake & Output (last 3 days)         11/18 0701 11/19 0700 11/19 0701 11/20 0700 11/20 0701 11/21 0700 11/21 0701 11/22 0700    P.O.   200     I.V. (mL/kg)    1500 (18.5)    IV Piggyback    100    Total Intake(mL/kg)   200 (2.5) 1600 (19.8)    Net   +200 +1600            Urine Unmeasured Occurrence   3 x           Medications (drips):  heparin, Last Rate: Stopped (11/21/23 0930)  lactated ringers, Last Rate: Stopped (11/21/23 1251)  niCARdipine, Last Rate: 5 mg/hr (11/21/23 1412)  Pharmacy to Dose Heparin  sodium chloride, Last Rate: 100 mL/hr (11/21/23 1412)    Respiratory Support: Room air    Physical Examination:  Vitals:    11/21/23 1412   BP: 124/63   Pulse: 65   Resp:    Temp:    SpO2:      General: The patient appears in no acute distress. Alert, cooperative and interactive.  Chest: Clear to auscultation bilaterally, No wheezing, rhonchi, or rales. Normal work of breathing. Equal chest rise.  Cardiac: Normal rate, S1S2 auscultated. No murmurs, rubs or gallops.   Extremities: Nonpitting lower extremity edema.  Slight fullness at left, surgical site without overt palpable hematoma.  No bruising or redness visible.  Lower extremity slightly cool but pink.  Skin: No rashes, open wounds, or  bruising. Warm, dry, well-perfused.  Neuro: Motor power grossly intact bilaterally. Sensation intact. Speech fluid and fluent. Thought process coherent.  Psych: Alert and oriented x3. Mood stable.    Lines, Drains & Airways       Active LDAs       Name Placement date Placement time Site Days    Peripheral IV 11/20/23 1119 Anterior;Distal;Left;Upper Arm 11/20/23  1119  Arm  1        Results from last 7 days   Lab Units 11/21/23  0356 11/20/23  1148   WBC 10*3/mm3 9.55 9.14   HEMOGLOBIN g/dL 14.4 14.7   MCV fL 92.1 91.4   PLATELETS 10*3/mm3 187 174     Results from last 7 days   Lab Units 11/21/23  0356 11/20/23  1253   SODIUM mmol/L 136 139   POTASSIUM mmol/L 4.1 4.5   CO2 mmol/L 26.0 25.0   CREATININE mg/dL 0.82 0.89   GLUCOSE mg/dL 277* 287*     Estimated Creatinine Clearance: 109.8 mL/min (by C-G formula based on SCr of 0.82 mg/dL).  Results from last 7 days   Lab Units 11/20/23  1253   ALK PHOS U/L 108   BILIRUBIN mg/dL 0.3   ALT (SGPT) U/L 29   AST (SGOT) U/L 21     CTA abdominal aorta (11/20/2023):   Radiology Impression:   -Occlusion of the left common iliac artery, external iliac artery, common femoral artery, and femoral-popliteal bypass graft. Reconstitution of the popliteal artery via profunda collaterals at the knee joint. The popliteal and distal runoff vessels are of very small caliber. All 3 trifurcation vessels are occluded in the mid to distal calf  -Occlusion of the right superficial femoral artery. Reconstitution of the popliteal artery below the abductor canal. Two-vessel distal runoff  -No significant inflow stenosis in the right lower extremity  -Incidental nonvascular findings as noted above     Results: Reviewed.  I reviewed the patient's new laboratory and imaging results.  I independently reviewed the patient's new images.    Medications: Reviewed.    Assessment & Plan    A / P     Active Hospital Problems    Diagnosis     **Claudication of lower extremity     PAD (peripheral artery disease)       -Status post femoral to femoral bypass on 11/21/2023  -ICU to follow  -Postoperative orders per CT surgery.  Thus far postoperative course relatively uncomplicated except for ongoing pain. As needed opioids in place  -Scheduled insulin and sliding scale per unit protocol   -Continue aspirin/statin  -Continue lisinopril for hypertension   -Continue nicotine replacement  -PT/OT to follow  -DVT prophylaxis when cleared by CTS   -AM labs   -Perioperative cefazolin otherwise no indication for ongoing antimicrobials at this time     Advance Directives:   Code Status and Medical Interventions:   Ordered at: 11/21/23 1319     Code Status (Patient has no pulse and is not breathing):    CPR (Attempt to Resuscitate)     Medical Interventions (Patient has pulse or is breathing):    Full Support     High level of risk due to severe exacerbation of chronic illness and illness with threat to life or bodily function.    I conducted multidisciplinary rounds in the plan of care was discussed with the multidisciplinary team at that time. In attendance at multidisciplinary rounds was clinical pharmacist, dietitian, nursing staff and case management.    I discussed the patient's findings and my recommendations with patient, family, and nursing staff    -- Jorge Sorto MD  Pulmonary/Critical Care

## 2023-11-21 NOTE — PROGRESS NOTES
CTS Progress Note       LOS: 1 day   Patient Care Team:  Melo Whitaker MD as PCP - General (Family Medicine)  Niall Mcfarlane MD as Consulting Physician (Cardiology)    Chief Complaint: Claudication of lower extremity    Vital Signs:  Temp:  [97.3 °F (36.3 °C)-98.7 °F (37.1 °C)] 98.5 °F (36.9 °C)  Heart Rate:  [] 74  Resp:  [16-18] 18  BP: (118-145)/(71-96) 118/72    Physical Exam: Left foot cool to touch       Results:     Results from last 7 days   Lab Units 11/21/23  0356   WBC 10*3/mm3 9.55   HEMOGLOBIN g/dL 14.4   HEMATOCRIT % 43.3   PLATELETS 10*3/mm3 187     Results from last 7 days   Lab Units 11/21/23  0356   SODIUM mmol/L 136   POTASSIUM mmol/L 4.1   CHLORIDE mmol/L 101   CO2 mmol/L 26.0   BUN mg/dL 19   CREATININE mg/dL 0.82   GLUCOSE mg/dL 277*   CALCIUM mg/dL 9.7           Imaging Results (Last 24 Hours)       Procedure Component Value Units Date/Time    CT Angio Abdominal Aorta Bilateral Iliofem Runoff [[id number]] Collected: 11/20/23 1455     Updated: 11/20/23 1514    Narrative:      CT ANGIO ABDOMINAL AORTA BILAT ILIOFEM RUNOFF    Date of Exam: 11/20/2023 2:38 PM EST    Indication: Claudication or leg ischemia  Claudication/concern for ischemia  /concern for ischemia.    Comparison: None available.    Technique: CTA of the abdomen, pelvis and both lower extremities was performed after the uneventful intravenous administration of 125 cc Isovue-370. Reconstructed coronal and sagittal images were also obtained. In addition, a 3-D volume rendered image   was created for interpretation. Automated exposure control and iterative reconstruction methods were used.      Findings:  Nonvascular: There is mild interstitial fibrosis at the lung bases. The liver and spleen have a normal appearance. The gallbladder is surgically absent. The adrenal glands and the pancreas have a normal appearance. The right and left kidney are normal.   No dilated or thickened loops of small or large bowel are  identified. There is colonic diverticulosis without diverticulitis. The appendix is normal. The urinary bladder is moderately distended. The prostate does not appear enlarged. There is   degenerative disc disease and facet disease in the lower lumbar spine.    Vascular: The aorta is of normal caliber. The SMA and celiac axis appear patent without significant narrowing. The right and left renal arteries are patent without significant narrowing. The JONAH is patent. There is atherosclerotic plaque in the distal   abdominal aorta. There is dense atherosclerotic plaque extending into both common iliac arteries. There is complete occlusion of the left common iliac artery and left external iliac artery. There is no hemodynamically significant stenosis in the right   common iliac artery or right external iliac artery.    In the right lower extremity, the superficial femoral artery is occluded. The profundofemoral artery is patent. There is reconstitution of the popliteal artery below the abductor canal. The popliteal artery is patent without significant narrowing. There   is mild plaque deposition in the tibioperoneal trunk. There is two-vessel distal runoff via the anterior posterior tibial artery.    The left lower extremity, there is a femoral-popliteal bypass graft that is occluded. There is reconstitution of the profundofemoral artery in its proximal portion. There is collateral filling of the popliteal artery just above the knee joint. It is of   very small caliber. The peroneal and posterior tibial arteries are patent in the calf although they appear significantly diseased. The anterior tibial artery is occluded in the distal calf. The posterior tibial artery does not cross the ankle. The   peroneal artery is occluded in the distal calf.      Impression:      Impression:    1. Occlusion of the left common iliac artery, external iliac artery, common femoral artery, and femoral-popliteal bypass graft. Reconstitution of  the popliteal artery via profunda collaterals at the knee joint. The popliteal and distal runoff vessels are   of very small caliber. All 3 trifurcation vessels are occluded in the mid to distal calf.  2. Occlusion of the right superficial femoral artery. Reconstitution of the popliteal artery below the abductor canal. Two-vessel distal runoff.  3. No significant inflow stenosis in the right lower extremity.  4. Incidental nonvascular findings as noted above        Electronically Signed: George Greer MD    11/20/2023 3:11 PM EST    Workstation ID: [id number]            Assessment      Claudication of lower extremity    PAD (peripheral artery disease)    On my arrival in the room patient is sleeping with his left foot hanging off the side of the bed.  I reviewed his CT angiogram his graft is failed he has incredibly poor runoff disease he has had undiagnosed diabetes for years and has continued to smoke.  He also now has a new finding of an occluded left common and external iliac artery.  We discussed options I believe a right femoral to left sided femoral/profunda bypass will improve his inflow.  I believe that we will salvage his leg but obviously not make flow normal but he is currently lost all flow to his profundus and his common femoral.  This will at least provide flow into the profundus artery is distal disease is essentially not unreconstructable.    Plan   For femoral to femoral bypass later today.  Patient is aware that surgery carries with the risk of limb loss death bleeding infection graft failure and infection and agrees to proceed    Please note that portions of this note were completed with a voice recognition program. Efforts were made to edit the dictations, but occasionally words are mistranscribed.    Brett Ryan MD  11/21/23  07:11 EST

## 2023-11-21 NOTE — ANESTHESIA PROCEDURE NOTES
Airway  Urgency: elective    Date/Time: 11/21/2023 11:07 AM  Airway not difficult    General Information and Staff    Patient location during procedure: OR  SRNA: Carmen Lee, ARISTEO  Indications and Patient Condition  Indications for airway management: airway protection    Preoxygenated: yes  MILS not maintained throughout  Mask difficulty assessment: 1 - vent by mask    Final Airway Details  Final airway type: endotracheal airway      Successful airway: ETT  Cuffed: yes   Successful intubation technique: direct laryngoscopy  Endotracheal tube insertion site: oral  Blade: Valle  Blade size: 2  ETT size (mm): 7.5  Cormack-Lehane Classification: grade IIa - partial view of glottis  Placement verified by: capnometry   Measured from: lips  ETT/EBT  to lips (cm): 22  Number of attempts at approach: 1  Assessment: lips, teeth, and gum same as pre-op and atraumatic intubation    Additional Comments  Negative epigastric sounds, Breath sound equal bilaterally with symmetric chest rise and fall

## 2023-11-21 NOTE — PAYOR COMM NOTE
"Sebastian Reed \"Gal\" (48 y.o. Male)       Date of Birth   1975    Social Security Number       Address   [address] [place] [address]    Home Phone   [phone]    MRN   [id number]       Zoroastrian   Anglican    Marital Status                               Admission Date   11/20/23    Admission Type   Urgent    Admitting Provider   Brett Ryan MD    Attending Provider   Brett Ryan MD    Department, Room/Bed   [facility] OR, [facility] OR/MAIN OR       Discharge Date       Discharge Disposition       Discharge Destination                                 Attending Provider: Brett Ryan MD    Allergies: No Known Allergies    Isolation: None   Infection: None   Code Status: CPR    Ht: 170.2 cm (67\")   Wt: 80.9 kg (178 lb 6.4 oz)    Admission Cmt: None   Principal Problem: Claudication of lower extremity [I73.9]                   Active Insurance as of 11/20/2023       Primary Coverage       Payor Plan Insurance Group Employer/Plan Group    [employer]OSS [employer]UE [employer] BLUE [employer] PPO [id number]       Payor Plan Address Payor Plan Phone Number Payor Plan Fax Number Effective Dates    [address] [phone]  1/1/2017 - None Entered    [place] [address]         Subscriber Name Subscriber Birth Date Member ID       SEBASTIAN REED 1975 [id number]                     Emergency Contacts        (Rel.) Home Phone Work Phone Mobile Phone    Christine Reed (Spouse) [phone] -- [phone]                 History & Physical        Justine Hancock APRN at 11/20/23 1145       Attestation signed by Brett Ryan MD at 11/20/23 0969    I have reviewed this documentation and agree.                       [facility] Cardiothoracic Surgery                            History & Physical    Name:  Sebastian Reed  MRN Number:  [id number]  Date of Admission:  11/20/2023    PCP: Melo Whitaker MD    History of Present " Illness:    Justo Oquendo is a 48 y.o. male with a history of type 2 diabetes, hypertension, PVD s/p left femoral to above-the-knee popliteal bypass with Dr. Ryan on 4/25/2017, and current smoker.  He was seen in office today by Dr. Ryan to evaluate left foot pain.  It was noted that he had a very cold left foot with no detectable Doppler signal in the left DP or DP.  He was admitted to our facility for further evaluation and treatment.    Review of Systems:  Constitutional: Negative for decreased appetite, fever, malaise/fatigue, weight gain and weight loss.   HENT:  Negative for hearing loss.    Cardiovascular:  Negative for chest pain, claudication, cyanosis, dyspnea on exertion, irregular heartbeat, leg swelling, near-syncope, orthopnea, palpitations, paroxysmal nocturnal dyspnea and syncope.   Endocrine: Negative for polydipsia, polyphagia and polyuria.   Hematologic/Lymphatic: Positive for bleeding problem. Negative for adenopathy. Bruises/bleeds easily.   Skin:  Positive for color change (left foot) and poor wound healing.   Musculoskeletal:  Negative for arthritis, falls, gout, joint pain, joint swelling, muscle weakness and myalgias.   Gastrointestinal:  Negative for abdominal pain, anorexia, dysphagia, heartburn, nausea and vomiting.   Genitourinary:  Negative for dysuria and hematuria.   Neurological:  Positive for numbness (in the left lower foot and leg, cold to the touch). Negative for dizziness, focal weakness, headaches, loss of balance, seizures, vertigo and weakness.   Psychiatric/Behavioral:  Negative for altered mental status, depression, substance abuse and suicidal ideas. The patient is not nervous/anxious.    Allergic/Immunologic: Negative for HIV exposure and persistent infections.     Past Medical History:    Past Medical History:   Diagnosis Date    Arthritis     Back pain     Diabetes     SINCE MARCH 2017    Dyslipidemia     HTN (hypertension)     PVD (peripheral vascular  disease)     Wears partial dentures        Past Surgical History:    Past Surgical History:   Procedure Laterality Date    AORTAGRAM N/A 04/25/2017    Procedure: AORTAGRAM WITH OR WITHOUT RUNOFFS POSSIBLE STENT with left femoral cutdown;  Surgeon: Brett Ryan MD;  Location:  [facility];  Service:     CHOLECYSTECTOMY      CYST REMOVAL      OFF OF BACK    DE IN-SITU VEIN BYPASS FEMORAL-POPLITEAL Left 04/25/2017    Procedure: FEMORAL POPLITEAL BYPASS;  Surgeon: Brett Ryan MD;  Location:  [facility];  Service: Vascular    DE IN-SITU VEIN BYPASS FEMORAL-POPLITEAL Left 06/27/2017    Procedure: LEFT FEMORAL ENDARTECTOMYN LEFT FEMORAL POPLITEAL BYPASS;  Surgeon: Brett Ryan MD;  Location:  [facility];  Service: Vascular       Family History:    Family History   Problem Relation Age of Onset    Arthritis Mother     Liver disease Father     Kidney disease Father        Social History:    Social History     Socioeconomic History    Marital status:     Number of children: 2   Tobacco Use    Smoking status: Every Day     Packs/day: 1.00     Years: 30.00     Additional pack years: 0.00     Total pack years: 30.00     Types: Cigarettes, Electronic Cigarette    Smokeless tobacco: Never    Tobacco comments:     Pt states that he is trying to quit, was once only smoking a few a day but has increased to 1 ppd, Pt states that he has smoked 30 plus years.   Vaping Use    Vaping Use: Never used   Substance and Sexual Activity    Alcohol use: No    Drug use: No    Sexual activity: Yes     Partners: Female     Birth control/protection: None       Allergies:  No Known Allergies    Medications:      Current Facility-Administered Medications:     aspirin EC tablet 81 mg, 81 mg, Oral, Nightly, Justine Hancock APRN    sennosides-docusate (PERICOLACE) 8.6-50 MG per tablet 2 tablet, 2 tablet, Oral, BID **AND** polyethylene glycol (MIRALAX) packet 17 g, 17 g, Oral, Daily PRN **AND** bisacodyl (DULCOLAX)  "EC tablet 5 mg, 5 mg, Oral, Daily PRN **AND** bisacodyl (DULCOLAX) suppository 10 mg, 10 mg, Rectal, Daily PRN, Justine Hancock, APRN    [START ON 11/21/2023] ceFAZolin 2000 mg IVPB in 100 mL NS (MBP), 2,000 mg, Intravenous, Once, Karissa, Justine, APRN    famotidine (PEPCID) tablet 40 mg, 40 mg, Oral, Daily, Karissa, Justine, APRN    heparin (porcine) injection 2,000 Units, 2,000 Units, Intravenous, PRN, Karissa, Justine, APRN    heparin (porcine) injection 4,000 Units, 4,000 Units, Intravenous, PRN, Karissa, Justine, APRN    heparin 81918 units/250 mL (100 units/mL) in 0.45 % NaCl infusion, 12 Units/kg/hr, Intravenous, Titrated, Karissa, Justine, APRN    HYDROcodone-acetaminophen (NORCO) 7.5-325 MG per tablet 1 tablet, 1 tablet, Oral, Q6H PRN, Hi Hancockory, APRN    lisinopril (PRINIVIL,ZESTRIL) tablet 20 mg, 20 mg, Oral, Nightly, KarissaHiJustine, APRN    nicotine (NICODERM CQ) 14 MG/24HR patch 1 patch, 1 patch, Transdermal, Q24H, Karissa, Justine, APRN    ondansetron (ZOFRAN) tablet 4 mg, 4 mg, Oral, Q6H PRN **OR** ondansetron (ZOFRAN) injection 4 mg, 4 mg, Intravenous, Q6H PRN, Hi Hancockory, APRN    Pharmacy to Dose Heparin, , Does not apply, Continuous PRN, KarissaHiJustine, APRN    rosuvastatin (CRESTOR) tablet 20 mg, 20 mg, Oral, Nightly, Karissa, Justine, APRN    sodium chloride 0.9 % flush 10 mL, 10 mL, Intravenous, Q12H, Karissa, Justine, APRN    sodium chloride 0.9 % flush 10 mL, 10 mL, Intravenous, PRN, Karissa, Justine, APRN    sodium chloride 0.9 % infusion 40 mL, 40 mL, Intravenous, PRN, Karissa, Justine, APRN    Vital Signs:    Vitals:    11/20/23 1046 11/20/23 1100   BP: 126/96    BP Location: Right arm    Patient Position: Lying    Pulse: 95 87   Resp: 16    Temp: 97.3 °F (36.3 °C)    TempSrc: Oral    SpO2: 98% 92%   Weight: 82.6 kg (182 lb)    Height: 170.2 cm (67\")      Body mass index is 28.51 kg/m².     Physical Exam:  CONSTITUTIONAL: Alert and conversant, Well dressed, Well nourished, No acute " distress  EYES: Sclera clean, Anicteric, Pupils equal  ENT: No nasal deviation, Trachea midline  NECK: No neck masses, Supple  LUNGS: No wheezing, Cough, non-congested  HEART: No rubs, No murmurs  GASTROINTESTINAL: Soft, non-distended, No masses, Non tender  to palpation, normal bowel sounds  NEURO: No motor deficits, No sensory deficits, Cranial Nerves 2 through 12 grossly intact  PSYCHIATRIC: Oriented to person, place and time, No memory deficits, Mood appropriate  VASCULAR: No carotid bruits, Femoral pulses palpable and symmetric.  The left foot is cool to touch there is an extremely dampened left posterior tibial Doppler signal and a much stronger right posterior tibial Doppler signal.  We discussed smoking cessation and indicated his long history of smoking coupled with his diabetes is poses a high risk for eventual bilateral amputation with his underlying disease documented by arteriography.  He denies advanced directive  MUSKULOSKELETAL: No extremity trauma or extremity asymmetry        Assessment:    Claudication of lower extremity      Plan:  Start heparin drip  CTA bilateral iliofemoral with complete runoff 3D recon  Preop for arteriogram possible intervention tomorrow with Dr. Ryan  N.p.o. after midnight      Justine Hancock, PRIMO  11/20/23  11:46 EST        Electronically signed by Brett Ryan MD at 11/20/23 3267       Lab Results (last 24 hours)       Procedure Component Value Units Date/Time    POC Glucose Once [[id number]]  (Abnormal) Collected: 11/21/23 0751    Specimen: Blood Updated: 11/21/23 0752     Glucose 215 mg/dL     Basic Metabolic Panel [[id number]]  (Abnormal) Collected: 11/21/23 0356    Specimen: Blood Updated: 11/21/23 0435     Glucose 277 mg/dL      BUN 19 mg/dL      Creatinine 0.82 mg/dL      Sodium 136 mmol/L      Potassium 4.1 mmol/L      Comment: Slight hemolysis detected by analyzer. Result may be falsely elevated.        Chloride 101 mmol/L      CO2 26.0 mmol/L       Calcium 9.7 mg/dL      BUN/Creatinine Ratio 23.2     Anion Gap 9.0 mmol/L      eGFR 107.0 mL/min/1.73     Narrative:      GFR Normal >60  Chronic Kidney Disease <60  Kidney Failure <15      Heparin Anti-Xa [[id number]]  (Abnormal) Collected: 11/21/23 0356    Specimen: Blood Updated: 11/21/23 0434     Heparin Anti-Xa (UFH) 0.16 IU/ml     CBC & Differential [[id number]]  (Abnormal) Collected: 11/21/23 0356    Specimen: Blood Updated: 11/21/23 0421    Narrative:      The following orders were created for panel order CBC & Differential.  Procedure                               Abnormality         Status                     ---------                               -----------         ------                     CBC Auto Differential[[id number]]        Abnormal            Final result                 Please view results for these tests on the individual orders.    CBC Auto Differential [[id number]]  (Abnormal) Collected: 11/21/23 0356    Specimen: Blood Updated: 11/21/23 0421     WBC 9.55 10*3/mm3      RBC 4.70 10*6/mm3      Hemoglobin 14.4 g/dL      Hematocrit 43.3 %      MCV 92.1 fL      MCH 30.6 pg      MCHC 33.3 g/dL      RDW 11.8 %      RDW-SD 39.8 fl      MPV 10.7 fL      Platelets 187 10*3/mm3      Neutrophil % 56.8 %      Lymphocyte % 31.8 %      Monocyte % 8.1 %      Eosinophil % 2.1 %      Basophil % 0.4 %      Immature Grans % 0.8 %      Neutrophils, Absolute 5.42 10*3/mm3      Lymphocytes, Absolute 3.04 10*3/mm3      Monocytes, Absolute 0.77 10*3/mm3      Eosinophils, Absolute 0.20 10*3/mm3      Basophils, Absolute 0.04 10*3/mm3      Immature Grans, Absolute 0.08 10*3/mm3      nRBC 0.0 /100 WBC     Heparin Anti-Xa [[id number]]  (Abnormal) Collected: 11/20/23 2001    Specimen: Blood Updated: 11/20/23 2124     Heparin Anti-Xa (UFH) 0.10 IU/ml     POC Glucose Once [[id number]]  (Abnormal) Collected: 11/20/23 2108    Specimen: Blood Updated: 11/20/23 2110     Glucose 225 mg/dL     Fentanyl, Urine - Urine, Clean Catch  [[id number]]  (Normal) Collected: 11/20/23 1811    Specimen: Urine, Clean Catch Updated: 11/20/23 1845     Fentanyl, Urine Negative    Narrative:      Negative Threshold:      Fentanyl 5 ng/mL     The normal value for the drug tested is negative. This report includes final unconfirmed screening results to be used for medical treatment purposes only. Unconfirmed results must not be used for non-medical purposes such as employment or legal testing. Clinical consideration should be applied to any drug of abuse test, particularly when unconfirmed results are used.           Urine Drug Screen - Urine, Clean Catch [[id number]]  (Abnormal) Collected: 11/20/23 1811    Specimen: Urine, Clean Catch Updated: 11/20/23 1827     THC, Screen, Urine Negative     Phencyclidine (PCP), Urine Negative     Cocaine Screen, Urine Negative     Methamphetamine, Ur Negative     Opiate Screen Positive     Amphetamine Screen, Urine Negative     Benzodiazepine Screen, Urine Negative     Tricyclic Antidepressants Screen Negative     Methadone Screen, Urine Negative     Barbiturates Screen, Urine Negative     Oxycodone Screen, Urine Negative     Buprenorphine, Screen, Urine Negative    Narrative:      Cutoff For Drugs Screened:    Amphetamines               500 ng/ml  Barbiturates               200 ng/ml  Benzodiazepines            150 ng/ml  Cocaine                    150 ng/ml  Methadone                  200 ng/ml  Opiates                    100 ng/ml  Phencyclidine               25 ng/ml  THC                         50 ng/ml  Methamphetamine            500 ng/ml  Tricyclic Antidepressants  300 ng/ml  Oxycodone                  100 ng/ml  Buprenorphine               10 ng/ml    The normal value for all drugs tested is negative. This report includes unconfirmed screening results, with the cutoff values listed, to be used for medical treatment purposes only.  Unconfirmed results must not be used for non-medical purposes such as employment or legal  testing.  Clinical consideration should be applied to any drug of abuse test, particularly when unconfirmed results are used.      Nicotine Screen, Urine - Urine, Clean Catch [[id number]] Collected: 11/20/23 1811    Specimen: Urine, Clean Catch Updated: 11/20/23 1811    POC Glucose Once [[id number]]  (Abnormal) Collected: 11/20/23 1628    Specimen: Blood Updated: 11/20/23 1631     Glucose 202 mg/dL     STAT Lactic Acid, Reflex [[id number]]  (Normal) Collected: 11/20/23 1544    Specimen: Blood Updated: 11/20/23 1623     Lactate 1.1 mmol/L      Comment: Falsely depressed results may occur on samples drawn from patients receiving N-Acetylcysteine (NAC) or Metamizole.       Comprehensive Metabolic Panel [[id number]]  (Abnormal) Collected: 11/20/23 1253    Specimen: Blood Updated: 11/20/23 1355     Glucose 287 mg/dL      BUN 15 mg/dL      Creatinine 0.89 mg/dL      Sodium 139 mmol/L      Potassium 4.5 mmol/L      Chloride 103 mmol/L      CO2 25.0 mmol/L      Calcium 10.2 mg/dL      Total Protein 6.8 g/dL      Albumin 4.6 g/dL      ALT (SGPT) 29 U/L      AST (SGOT) 21 U/L      Alkaline Phosphatase 108 U/L      Total Bilirubin 0.3 mg/dL      Globulin 2.2 gm/dL      Comment: Calculated Result        A/G Ratio 2.1 g/dL      BUN/Creatinine Ratio 16.9     Anion Gap 11.0 mmol/L      eGFR 104.4 mL/min/1.73     Narrative:      GFR Normal >60  Chronic Kidney Disease <60  Kidney Failure <15      Lactic Acid, Plasma [[id number]]  (Abnormal) Collected: 11/20/23 1253    Specimen: Blood Updated: 11/20/23 1349     Lactate 2.1 mmol/L      Comment: Falsely depressed results may occur on samples drawn from patients receiving N-Acetylcysteine (NAC) or Metamizole.       Hemoglobin A1c [[id number]]  (Abnormal) Collected: 11/20/23 1253    Specimen: Blood Updated: 11/20/23 1344     Hemoglobin A1C 10.60 %     Narrative:      Hemoglobin A1C Ranges:    Increased Risk for Diabetes  5.7% to 6.4%  Diabetes                     >= 6.5%  Diabetic Goal                 < 7.0%    Heparin Anti-Xa [[id number]]  (Abnormal) Collected: 11/20/23 1148    Specimen: Blood Updated: 11/20/23 1222     Heparin Anti-Xa (UFH) 0.10 IU/ml     Protime-INR [[id number]]  (Normal) Collected: 11/20/23 1148    Specimen: Blood Updated: 11/20/23 1221     Protime 12.6 Seconds      INR 0.93    aPTT [[id number]]  (Abnormal) Collected: 11/20/23 1148    Specimen: Blood Updated: 11/20/23 1221     PTT 31.0 seconds     Narrative:      PTT = The equivalent PTT values for the therapeutic range of heparin levels at 0.3 to 0.5 U/ml are 60 to 70 seconds.    CBC & Differential [[id number]]  (Abnormal) Collected: 11/20/23 1148    Specimen: Blood Updated: 11/20/23 1207    Narrative:      The following orders were created for panel order CBC & Differential.  Procedure                               Abnormality         Status                     ---------                               -----------         ------                     CBC Auto Differential[[id number]]        Abnormal            Final result                 Please view results for these tests on the individual orders.    CBC Auto Differential [[id number]]  (Abnormal) Collected: 11/20/23 1148    Specimen: Blood Updated: 11/20/23 1207     WBC 9.14 10*3/mm3      RBC 4.76 10*6/mm3      Hemoglobin 14.7 g/dL      Hematocrit 43.5 %      MCV 91.4 fL      MCH 30.9 pg      MCHC 33.8 g/dL      RDW 11.7 %      RDW-SD 39.3 fl      MPV 10.8 fL      Platelets 174 10*3/mm3      Neutrophil % 66.4 %      Lymphocyte % 23.3 %      Monocyte % 7.7 %      Eosinophil % 1.4 %      Basophil % 0.3 %      Immature Grans % 0.9 %      Neutrophils, Absolute 6.07 10*3/mm3      Lymphocytes, Absolute 2.13 10*3/mm3      Monocytes, Absolute 0.70 10*3/mm3      Eosinophils, Absolute 0.13 10*3/mm3      Basophils, Absolute 0.03 10*3/mm3      Immature Grans, Absolute 0.08 10*3/mm3      nRBC 0.0 /100 WBC     POC Glucose Once [[id number]]  (Abnormal) Collected: 11/20/23 1205    Specimen: Blood Updated:  11/20/23 1206     Glucose 289 mg/dL           Imaging Results (Last 24 Hours)       Procedure Component Value Units Date/Time    CT Angio Abdominal Aorta Bilateral Iliofem Runoff [[id number]] Collected: 11/20/23 1455     Updated: 11/20/23 1514    Narrative:      CT ANGIO ABDOMINAL AORTA BILAT ILIOFEM RUNOFF    Date of Exam: 11/20/2023 2:38 PM EST    Indication: Claudication or leg ischemia  Claudication/concern for ischemia  /concern for ischemia.    Comparison: None available.    Technique: CTA of the abdomen, pelvis and both lower extremities was performed after the uneventful intravenous administration of 125 cc Isovue-370. Reconstructed coronal and sagittal images were also obtained. In addition, a 3-D volume rendered image   was created for interpretation. Automated exposure control and iterative reconstruction methods were used.      Findings:  Nonvascular: There is mild interstitial fibrosis at the lung bases. The liver and spleen have a normal appearance. The gallbladder is surgically absent. The adrenal glands and the pancreas have a normal appearance. The right and left kidney are normal.   No dilated or thickened loops of small or large bowel are identified. There is colonic diverticulosis without diverticulitis. The appendix is normal. The urinary bladder is moderately distended. The prostate does not appear enlarged. There is   degenerative disc disease and facet disease in the lower lumbar spine.    Vascular: The aorta is of normal caliber. The SMA and celiac axis appear patent without significant narrowing. The right and left renal arteries are patent without significant narrowing. The JONAH is patent. There is atherosclerotic plaque in the distal   abdominal aorta. There is dense atherosclerotic plaque extending into both common iliac arteries. There is complete occlusion of the left common iliac artery and left external iliac artery. There is no hemodynamically significant stenosis in the right    common iliac artery or right external iliac artery.    In the right lower extremity, the superficial femoral artery is occluded. The profundofemoral artery is patent. There is reconstitution of the popliteal artery below the abductor canal. The popliteal artery is patent without significant narrowing. There   is mild plaque deposition in the tibioperoneal trunk. There is two-vessel distal runoff via the anterior posterior tibial artery.    The left lower extremity, there is a femoral-popliteal bypass graft that is occluded. There is reconstitution of the profundofemoral artery in its proximal portion. There is collateral filling of the popliteal artery just above the knee joint. It is of   very small caliber. The peroneal and posterior tibial arteries are patent in the calf although they appear significantly diseased. The anterior tibial artery is occluded in the distal calf. The posterior tibial artery does not cross the ankle. The   peroneal artery is occluded in the distal calf.      Impression:      Impression:    1. Occlusion of the left common iliac artery, external iliac artery, common femoral artery, and femoral-popliteal bypass graft. Reconstitution of the popliteal artery via profunda collaterals at the knee joint. The popliteal and distal runoff vessels are   of very small caliber. All 3 trifurcation vessels are occluded in the mid to distal calf.  2. Occlusion of the right superficial femoral artery. Reconstitution of the popliteal artery below the abductor canal. Two-vessel distal runoff.  3. No significant inflow stenosis in the right lower extremity.  4. Incidental nonvascular findings as noted above        Electronically Signed: George Greer MD    11/20/2023 3:11 PM EST    Workstation ID: [id number]             Physician Progress Notes (last 24 hours)        Brett Ryan MD at 11/21/23 0710          CTS Progress Note       LOS: 1 day   Patient Care Team:  Melo Whitaker MD as PCP -  General (Family Medicine)  Niall Mcfarlane MD as Consulting Physician (Cardiology)    Chief Complaint: Claudication of lower extremity    Vital Signs:  Temp:  [97.3 °F (36.3 °C)-98.7 °F (37.1 °C)] 98.5 °F (36.9 °C)  Heart Rate:  [] 74  Resp:  [16-18] 18  BP: (118-145)/(71-96) 118/72    Physical Exam: Left foot cool to touch       Results:     Results from last 7 days   Lab Units 11/21/23  0356   WBC 10*3/mm3 9.55   HEMOGLOBIN g/dL 14.4   HEMATOCRIT % 43.3   PLATELETS 10*3/mm3 187     Results from last 7 days   Lab Units 11/21/23  0356   SODIUM mmol/L 136   POTASSIUM mmol/L 4.1   CHLORIDE mmol/L 101   CO2 mmol/L 26.0   BUN mg/dL 19   CREATININE mg/dL 0.82   GLUCOSE mg/dL 277*   CALCIUM mg/dL 9.7           Imaging Results (Last 24 Hours)       Procedure Component Value Units Date/Time    CT Angio Abdominal Aorta Bilateral Iliofem Runoff [[id number]] Collected: 11/20/23 1455     Updated: 11/20/23 1514    Narrative:      CT ANGIO ABDOMINAL AORTA BILAT ILIOFEM RUNOFF    Date of Exam: 11/20/2023 2:38 PM EST    Indication: Claudication or leg ischemia  Claudication/concern for ischemia  /concern for ischemia.    Comparison: None available.    Technique: CTA of the abdomen, pelvis and both lower extremities was performed after the uneventful intravenous administration of 125 cc Isovue-370. Reconstructed coronal and sagittal images were also obtained. In addition, a 3-D volume rendered image   was created for interpretation. Automated exposure control and iterative reconstruction methods were used.      Findings:  Nonvascular: There is mild interstitial fibrosis at the lung bases. The liver and spleen have a normal appearance. The gallbladder is surgically absent. The adrenal glands and the pancreas have a normal appearance. The right and left kidney are normal.   No dilated or thickened loops of small or large bowel are identified. There is colonic diverticulosis without diverticulitis. The appendix is normal.  The urinary bladder is moderately distended. The prostate does not appear enlarged. There is   degenerative disc disease and facet disease in the lower lumbar spine.    Vascular: The aorta is of normal caliber. The SMA and celiac axis appear patent without significant narrowing. The right and left renal arteries are patent without significant narrowing. The JONAH is patent. There is atherosclerotic plaque in the distal   abdominal aorta. There is dense atherosclerotic plaque extending into both common iliac arteries. There is complete occlusion of the left common iliac artery and left external iliac artery. There is no hemodynamically significant stenosis in the right   common iliac artery or right external iliac artery.    In the right lower extremity, the superficial femoral artery is occluded. The profundofemoral artery is patent. There is reconstitution of the popliteal artery below the abductor canal. The popliteal artery is patent without significant narrowing. There   is mild plaque deposition in the tibioperoneal trunk. There is two-vessel distal runoff via the anterior posterior tibial artery.    The left lower extremity, there is a femoral-popliteal bypass graft that is occluded. There is reconstitution of the profundofemoral artery in its proximal portion. There is collateral filling of the popliteal artery just above the knee joint. It is of   very small caliber. The peroneal and posterior tibial arteries are patent in the calf although they appear significantly diseased. The anterior tibial artery is occluded in the distal calf. The posterior tibial artery does not cross the ankle. The   peroneal artery is occluded in the distal calf.      Impression:      Impression:    1. Occlusion of the left common iliac artery, external iliac artery, common femoral artery, and femoral-popliteal bypass graft. Reconstitution of the popliteal artery via profunda collaterals at the knee joint. The popliteal and distal  runoff vessels are   of very small caliber. All 3 trifurcation vessels are occluded in the mid to distal calf.  2. Occlusion of the right superficial femoral artery. Reconstitution of the popliteal artery below the abductor canal. Two-vessel distal runoff.  3. No significant inflow stenosis in the right lower extremity.  4. Incidental nonvascular findings as noted above        Electronically Signed: George Greer MD    11/20/2023 3:11 PM EST    Workstation ID: [id number]            Assessment      Claudication of lower extremity    PAD (peripheral artery disease)    On my arrival in the room patient is sleeping with his left foot hanging off the side of the bed.  I reviewed his CT angiogram his graft is failed he has incredibly poor runoff disease he has had undiagnosed diabetes for years and has continued to smoke.  He also now has a new finding of an occluded left common and external iliac artery.  We discussed options I believe a right femoral to left sided femoral/profunda bypass will improve his inflow.  I believe that we will salvage his leg but obviously not make flow normal but he is currently lost all flow to his profundus and his common femoral.  This will at least provide flow into the profundus artery is distal disease is essentially not unreconstructable.    Plan   For femoral to femoral bypass later today.  Patient is aware that surgery carries with the risk of limb loss death bleeding infection graft failure and infection and agrees to proceed    Please note that portions of this note were completed with a voice recognition program. Efforts were made to edit the dictations, but occasionally words are mistranscribed.    Brett Ryan MD  11/21/23  07:11 EST        Electronically signed by Brett Ryan MD at 11/21/23 0712          Consult Notes (last 24 hours)        James Rob DO at 11/20/23 1325               [facility] Medicine Services  CONSULT  NOTE      Patient Name: Justo Oquendo  : 1975  MRN: [id number]    Primary Care Physician: Melo Whitaker MD  Provider requesting consultation: No Known Provider    Subjective   Subjective     Reason for Consultation:  diabetes    HPI:  Justo Oquendo is a 48 y.o. male w/ DM2, HTN, PVD s/p prior revascularization, admitted by CTS for cold limb. He takes Syndardy only for his diabetes, was previously Rx'ed Ozempic but has not yet started. Recently Rx'ed a new med but has not yet started. He denies acute complaints at this time.        Personal History     Past Medical History:   Diagnosis Date    Arthritis     Back pain     Diabetes     SINCE 2017    Dyslipidemia     HTN (hypertension)     PVD (peripheral vascular disease)     Wears partial dentures        Past Surgical History:   Procedure Laterality Date    AORTAGRAM N/A 2017    Procedure: AORTAGRAM WITH OR WITHOUT RUNOFFS POSSIBLE STENT with left femoral cutdown;  Surgeon: Brett Ryan MD;  Location:  [facility] OR 15;  Service:     CHOLECYSTECTOMY      CYST REMOVAL      OFF OF BACK    OK IN-SITU VEIN BYPASS FEMORAL-POPLITEAL Left 2017    Procedure: FEMORAL POPLITEAL BYPASS;  Surgeon: Brett Ryan MD;  Location:  [facility];  Service: Vascular    OK IN-SITU VEIN BYPASS FEMORAL-POPLITEAL Left 2017    Procedure: LEFT FEMORAL ENDARTECTOMYN LEFT FEMORAL POPLITEAL BYPASS;  Surgeon: Brett Ryan MD;  Location: [facility] OR;  Service: Vascular       Family History: family history includes Arthritis in his mother; Kidney disease in his father; Liver disease in his father. Otherwise pertinent FHx was reviewed and unremarkable.     Social History:  reports that he has been smoking cigarettes and electronic cigarette. He has a 30.00 pack-year smoking history. He has never used smokeless tobacco. He reports that he does not drink alcohol and does not use drugs.    Medications:  Canagliflozin,  Empagliflozin-metFORMIN HCl, HYDROcodone-acetaminophen, aspirin, clopidogrel, fenofibrate, lisinopril, metFORMIN ER, nicotine, and rosuvastatin    Scheduled Meds:aspirin, 81 mg, Oral, Nightly  [START ON 11/21/2023] ceFAZolin, 2,000 mg, Intravenous, Once  famotidine, 40 mg, Oral, Daily  insulin lispro, 2-9 Units, Subcutaneous, 4x Daily AC & at Bedtime  lisinopril, 20 mg, Oral, Nightly  nicotine, 1 patch, Transdermal, Q24H  rosuvastatin, 20 mg, Oral, Nightly  senna-docusate sodium, 2 tablet, Oral, BID  sodium chloride, 10 mL, Intravenous, Q12H      Continuous Infusions:heparin, 12 Units/kg/hr, Last Rate: 12 Units/kg/hr (11/20/23 1218)  Pharmacy to Dose Heparin,       PRN Meds:.  senna-docusate sodium **AND** polyethylene glycol **AND** bisacodyl **AND** bisacodyl    dextrose    dextrose    glucagon (human recombinant)    HYDROcodone-acetaminophen    ondansetron **OR** ondansetron    Pharmacy to Dose Heparin    sodium chloride    sodium chloride    No Known Allergies    Objective   Objective     Vital Signs:   Temp:  [97.3 °F (36.3 °C)-98.7 °F (37.1 °C)] 97.3 °F (36.3 °C)  Heart Rate:  [80-95] 88  Resp:  [16] 16  BP: (126-140)/(78-96) 126/96    Physical Exam  Constitutional: Awake, alert, sitting on edge of bed in NAD  HENT: NCAT, mucous membranes moist  Respiratory: Clear to auscultation bilaterally, respiratory effort normal   Cardiovascular: RRR  Gastrointestinal: Soft, ND  Musculoskeletal: No bilateral ankle edema  Psychiatric: Appropriate affect, cooperative      Result Review:  I have personally reviewed the results from the time of admission and agree with these findings:  []  Laboratory  []  Radiology  []  EKG/Telemetry   []  Pathology  [x]  Old records  []  Other:  Most notable findings include:H&P     LAB RESULTS:      Lab 11/20/23  1148   WBC 9.14   HEMOGLOBIN 14.7   HEMATOCRIT 43.5   PLATELETS 174   NEUTROS ABS 6.07   IMMATURE GRANS (ABS) 0.08*   LYMPHS ABS 2.13   MONOS ABS 0.70   EOS ABS 0.13   MCV 91.4    PROTIME 12.6   APTT 31.0*   HEPARIN ANTI-XA 0.10*                 Lab 11/20/23  1148   PROTIME 12.6   INR 0.93                 Brief Urine Lab Results       None          Microbiology Results (last 10 days)       ** No results found for the last 240 hours. **            No radiology results from the last 24 hrs        Assessment & Plan   Assessment & Plan     Active Hospital Problems    Diagnosis  POA    **Claudication of lower extremity [I73.9]  Yes      Resolved Hospital Problems   No resolved problems to display.     Summary: This is a 49 y/o male w/ DM2, HTN, PVD admitted to CTS for cold limb, medicine has been consulted to diabetes management    Assessment/Plan    PAD  -management per primary service    DM type 2, w/o insulin use  -hold Synjardy; patient is NOT on invokana at home  -agree with SSI    HTN  -lisinopril      Thank you for allowing [facility] Medicine Service to provide consultative care for your patient, we will continue to follow while clinically appropriate.    James Rob DO  11/20/23              Electronically signed by James Rob DO at 11/20/23 3442

## 2023-11-22 LAB
ANION GAP SERPL CALCULATED.3IONS-SCNC: 9 MMOL/L (ref 5–15)
BUN SERPL-MCNC: 15 MG/DL (ref 6–20)
BUN/CREAT SERPL: 18.8 (ref 7–25)
CALCIUM SPEC-SCNC: 8.4 MG/DL (ref 8.6–10.5)
CHLORIDE SERPL-SCNC: 101 MMOL/L (ref 98–107)
CO2 SERPL-SCNC: 24 MMOL/L (ref 22–29)
COTININE UR QL SCN: NEGATIVE NG/ML
CREAT SERPL-MCNC: 0.8 MG/DL (ref 0.76–1.27)
CYTO UR: NORMAL
DEPRECATED RDW RBC AUTO: 39.8 FL (ref 37–54)
EGFRCR SERPLBLD CKD-EPI 2021: 107.8 ML/MIN/1.73
ERYTHROCYTE [DISTWIDTH] IN BLOOD BY AUTOMATED COUNT: 11.7 % (ref 12.3–15.4)
GLUCOSE BLDC GLUCOMTR-MCNC: 171 MG/DL (ref 70–130)
GLUCOSE BLDC GLUCOMTR-MCNC: 210 MG/DL (ref 70–130)
GLUCOSE BLDC GLUCOMTR-MCNC: 245 MG/DL (ref 70–130)
GLUCOSE BLDC GLUCOMTR-MCNC: 283 MG/DL (ref 70–130)
GLUCOSE SERPL-MCNC: 148 MG/DL (ref 65–99)
HCT VFR BLD AUTO: 34.2 % (ref 37.5–51)
HGB BLD-MCNC: 11.6 G/DL (ref 13–17.7)
LAB AP CASE REPORT: NORMAL
LAB AP CLINICAL INFORMATION: NORMAL
Lab: NORMAL
MCH RBC QN AUTO: 31.3 PG (ref 26.6–33)
MCHC RBC AUTO-ENTMCNC: 33.9 G/DL (ref 31.5–35.7)
MCV RBC AUTO: 92.2 FL (ref 79–97)
PATH REPORT.FINAL DX SPEC: NORMAL
PATH REPORT.GROSS SPEC: NORMAL
PLATELET # BLD AUTO: 161 10*3/MM3 (ref 140–450)
PMV BLD AUTO: 10.6 FL (ref 6–12)
POTASSIUM SERPL-SCNC: 4.2 MMOL/L (ref 3.5–5.2)
RBC # BLD AUTO: 3.71 10*6/MM3 (ref 4.14–5.8)
SODIUM SERPL-SCNC: 134 MMOL/L (ref 136–145)
WBC NRBC COR # BLD AUTO: 9.43 10*3/MM3 (ref 3.4–10.8)

## 2023-11-22 PROCEDURE — 99233 SBSQ HOSP IP/OBS HIGH 50: CPT | Performed by: INTERNAL MEDICINE

## 2023-11-22 PROCEDURE — 25810000003 SODIUM CHLORIDE 0.9 % SOLUTION 250 ML FLEX CONT: Performed by: PHYSICIAN ASSISTANT

## 2023-11-22 PROCEDURE — 99024 POSTOP FOLLOW-UP VISIT: CPT | Performed by: THORACIC SURGERY (CARDIOTHORACIC VASCULAR SURGERY)

## 2023-11-22 PROCEDURE — 99222 1ST HOSP IP/OBS MODERATE 55: CPT | Performed by: INTERNAL MEDICINE

## 2023-11-22 PROCEDURE — 63710000001 INSULIN LISPRO (HUMAN) PER 5 UNITS: Performed by: PHYSICIAN ASSISTANT

## 2023-11-22 PROCEDURE — 34201 REMOVAL OF ARTERY CLOT: CPT | Performed by: PHYSICIAN ASSISTANT

## 2023-11-22 PROCEDURE — 35661 BPG FEMORAL-FEMORAL: CPT | Performed by: THORACIC SURGERY (CARDIOTHORACIC VASCULAR SURGERY)

## 2023-11-22 PROCEDURE — 63710000001 INSULIN DETEMIR PER 5 UNITS: Performed by: PHYSICIAN ASSISTANT

## 2023-11-22 PROCEDURE — 25010000002 CEFAZOLIN PER 500 MG: Performed by: PHYSICIAN ASSISTANT

## 2023-11-22 PROCEDURE — 35875 REMOVAL OF CLOT IN GRAFT: CPT | Performed by: THORACIC SURGERY (CARDIOTHORACIC VASCULAR SURGERY)

## 2023-11-22 PROCEDURE — 80048 BASIC METABOLIC PNL TOTAL CA: CPT

## 2023-11-22 PROCEDURE — 34201 REMOVAL OF ARTERY CLOT: CPT | Performed by: THORACIC SURGERY (CARDIOTHORACIC VASCULAR SURGERY)

## 2023-11-22 PROCEDURE — 25010000002 HYDROMORPHONE PER 4 MG: Performed by: PHYSICIAN ASSISTANT

## 2023-11-22 PROCEDURE — 35661 BPG FEMORAL-FEMORAL: CPT | Performed by: PHYSICIAN ASSISTANT

## 2023-11-22 PROCEDURE — 82948 REAGENT STRIP/BLOOD GLUCOSE: CPT

## 2023-11-22 PROCEDURE — 85027 COMPLETE CBC AUTOMATED: CPT

## 2023-11-22 PROCEDURE — 35875 REMOVAL OF CLOT IN GRAFT: CPT | Performed by: PHYSICIAN ASSISTANT

## 2023-11-22 RX ORDER — ALPRAZOLAM 0.25 MG/1
0.25 TABLET ORAL ONCE
Qty: 1 TABLET | Refills: 0 | Status: COMPLETED | OUTPATIENT
Start: 2023-11-22 | End: 2023-11-22

## 2023-11-22 RX ORDER — SIMETHICONE 80 MG
80 TABLET,CHEWABLE ORAL 4 TIMES DAILY PRN
Status: DISCONTINUED | OUTPATIENT
Start: 2023-11-22 | End: 2023-11-24 | Stop reason: HOSPADM

## 2023-11-22 RX ORDER — CLOPIDOGREL BISULFATE 75 MG/1
75 TABLET ORAL DAILY
Status: DISCONTINUED | OUTPATIENT
Start: 2023-11-23 | End: 2023-11-24 | Stop reason: HOSPADM

## 2023-11-22 RX ORDER — CLONIDINE HYDROCHLORIDE 0.1 MG/1
0.1 TABLET ORAL
Status: DISCONTINUED | OUTPATIENT
Start: 2023-11-22 | End: 2023-11-24 | Stop reason: HOSPADM

## 2023-11-22 RX ORDER — CLOPIDOGREL BISULFATE 75 MG/1
150 TABLET ORAL ONCE
Status: COMPLETED | OUTPATIENT
Start: 2023-11-22 | End: 2023-11-22

## 2023-11-22 RX ORDER — NICARDIPINE HYDROCHLORIDE 2.5 MG/ML
INJECTION INTRAVENOUS
Status: ACTIVE
Start: 2023-11-22 | End: 2023-11-22

## 2023-11-22 RX ADMIN — HYDROMORPHONE HYDROCHLORIDE 0.5 MG: 1 INJECTION, SOLUTION INTRAMUSCULAR; INTRAVENOUS; SUBCUTANEOUS at 08:24

## 2023-11-22 RX ADMIN — NICARDIPINE HYDROCHLORIDE 15 MG/HR: 25 INJECTION, SOLUTION INTRAVENOUS at 09:17

## 2023-11-22 RX ADMIN — SODIUM CHLORIDE 2000 MG: 900 INJECTION INTRAVENOUS at 00:03

## 2023-11-22 RX ADMIN — CLOPIDOGREL BISULFATE 150 MG: 75 TABLET ORAL at 11:07

## 2023-11-22 RX ADMIN — NICARDIPINE HYDROCHLORIDE 15 MG/HR: 25 INJECTION, SOLUTION INTRAVENOUS at 02:23

## 2023-11-22 RX ADMIN — METOPROLOL TARTRATE 25 MG: 25 TABLET, FILM COATED ORAL at 10:10

## 2023-11-22 RX ADMIN — METOPROLOL TARTRATE 5 MG: 5 INJECTION INTRAVENOUS at 00:03

## 2023-11-22 RX ADMIN — INSULIN LISPRO 4 UNITS: 100 INJECTION, SOLUTION INTRAVENOUS; SUBCUTANEOUS at 08:33

## 2023-11-22 RX ADMIN — INSULIN LISPRO 2 UNITS: 100 INJECTION, SOLUTION INTRAVENOUS; SUBCUTANEOUS at 21:18

## 2023-11-22 RX ADMIN — NICARDIPINE HYDROCHLORIDE 15 MG/HR: 25 INJECTION, SOLUTION INTRAVENOUS at 06:42

## 2023-11-22 RX ADMIN — Medication 10 ML: at 08:25

## 2023-11-22 RX ADMIN — NICOTINE 1 PATCH: 14 PATCH, EXTENDED RELEASE TRANSDERMAL at 12:57

## 2023-11-22 RX ADMIN — INSULIN DETEMIR 8 UNITS: 100 INJECTION, SOLUTION SUBCUTANEOUS at 08:33

## 2023-11-22 RX ADMIN — OXYCODONE HYDROCHLORIDE AND ACETAMINOPHEN 1 TABLET: 10; 325 TABLET ORAL at 18:31

## 2023-11-22 RX ADMIN — FAMOTIDINE 20 MG: 20 TABLET, FILM COATED ORAL at 08:32

## 2023-11-22 RX ADMIN — SENNOSIDES AND DOCUSATE SODIUM 2 TABLET: 8.6; 5 TABLET ORAL at 08:33

## 2023-11-22 RX ADMIN — HYDROMORPHONE HYDROCHLORIDE 0.5 MG: 1 INJECTION, SOLUTION INTRAMUSCULAR; INTRAVENOUS; SUBCUTANEOUS at 12:57

## 2023-11-22 RX ADMIN — INSULIN LISPRO 6 UNITS: 100 INJECTION, SOLUTION INTRAVENOUS; SUBCUTANEOUS at 17:28

## 2023-11-22 RX ADMIN — HYDROMORPHONE HYDROCHLORIDE 0.5 MG: 1 INJECTION, SOLUTION INTRAMUSCULAR; INTRAVENOUS; SUBCUTANEOUS at 15:10

## 2023-11-22 RX ADMIN — HYDROMORPHONE HYDROCHLORIDE 0.5 MG: 1 INJECTION, SOLUTION INTRAMUSCULAR; INTRAVENOUS; SUBCUTANEOUS at 10:39

## 2023-11-22 RX ADMIN — ALPRAZOLAM 0.25 MG: 0.25 TABLET ORAL at 20:12

## 2023-11-22 RX ADMIN — HYDROMORPHONE HYDROCHLORIDE 0.5 MG: 1 INJECTION, SOLUTION INTRAMUSCULAR; INTRAVENOUS; SUBCUTANEOUS at 05:15

## 2023-11-22 RX ADMIN — OXYCODONE HYDROCHLORIDE AND ACETAMINOPHEN 1 TABLET: 10; 325 TABLET ORAL at 10:39

## 2023-11-22 RX ADMIN — OXYCODONE HYDROCHLORIDE AND ACETAMINOPHEN 1 TABLET: 10; 325 TABLET ORAL at 14:48

## 2023-11-22 RX ADMIN — Medication 10 ML: at 21:19

## 2023-11-22 RX ADMIN — HYDROMORPHONE HYDROCHLORIDE 0.5 MG: 1 INJECTION, SOLUTION INTRAMUSCULAR; INTRAVENOUS; SUBCUTANEOUS at 03:12

## 2023-11-22 RX ADMIN — SIMETHICONE 80 MG: 80 TABLET, CHEWABLE ORAL at 03:12

## 2023-11-22 RX ADMIN — OXYCODONE HYDROCHLORIDE AND ACETAMINOPHEN 1 TABLET: 10; 325 TABLET ORAL at 06:42

## 2023-11-22 RX ADMIN — ASPIRIN 81 MG: 81 TABLET, COATED ORAL at 20:13

## 2023-11-22 RX ADMIN — SENNOSIDES AND DOCUSATE SODIUM 2 TABLET: 8.6; 5 TABLET ORAL at 20:12

## 2023-11-22 RX ADMIN — NICARDIPINE HYDROCHLORIDE 10 MG/HR: 25 INJECTION, SOLUTION INTRAVENOUS at 11:07

## 2023-11-22 RX ADMIN — SODIUM CHLORIDE 2000 MG: 900 INJECTION INTRAVENOUS at 08:31

## 2023-11-22 RX ADMIN — OXYCODONE HYDROCHLORIDE AND ACETAMINOPHEN 1 TABLET: 10; 325 TABLET ORAL at 02:20

## 2023-11-22 RX ADMIN — HYDROMORPHONE HYDROCHLORIDE 0.5 MG: 1 INJECTION, SOLUTION INTRAMUSCULAR; INTRAVENOUS; SUBCUTANEOUS at 20:12

## 2023-11-22 RX ADMIN — NICARDIPINE HYDROCHLORIDE 15 MG/HR: 25 INJECTION, SOLUTION INTRAVENOUS at 21:14

## 2023-11-22 RX ADMIN — OXYCODONE HYDROCHLORIDE AND ACETAMINOPHEN 1 TABLET: 10; 325 TABLET ORAL at 22:35

## 2023-11-22 RX ADMIN — NICARDIPINE HYDROCHLORIDE 10 MG/HR: 25 INJECTION, SOLUTION INTRAVENOUS at 14:49

## 2023-11-22 RX ADMIN — NICARDIPINE HYDROCHLORIDE 15 MG/HR: 25 INJECTION, SOLUTION INTRAVENOUS at 19:15

## 2023-11-22 RX ADMIN — HYDROMORPHONE HYDROCHLORIDE 0.5 MG: 1 INJECTION, SOLUTION INTRAMUSCULAR; INTRAVENOUS; SUBCUTANEOUS at 23:26

## 2023-11-22 RX ADMIN — HYDROMORPHONE HYDROCHLORIDE 0.5 MG: 1 INJECTION, SOLUTION INTRAMUSCULAR; INTRAVENOUS; SUBCUTANEOUS at 00:03

## 2023-11-22 RX ADMIN — INSULIN DETEMIR 8 UNITS: 100 INJECTION, SOLUTION SUBCUTANEOUS at 21:18

## 2023-11-22 RX ADMIN — HYDROMORPHONE HYDROCHLORIDE 0.5 MG: 1 INJECTION, SOLUTION INTRAMUSCULAR; INTRAVENOUS; SUBCUTANEOUS at 17:22

## 2023-11-22 RX ADMIN — FAMOTIDINE 20 MG: 20 TABLET, FILM COATED ORAL at 20:13

## 2023-11-22 RX ADMIN — ROSUVASTATIN 20 MG: 20 TABLET, FILM COATED ORAL at 20:13

## 2023-11-22 RX ADMIN — NICARDIPINE HYDROCHLORIDE 15 MG/HR: 25 INJECTION, SOLUTION INTRAVENOUS at 22:52

## 2023-11-22 RX ADMIN — NICARDIPINE HYDROCHLORIDE 12.5 MG/HR: 25 INJECTION, SOLUTION INTRAVENOUS at 17:22

## 2023-11-22 RX ADMIN — NICARDIPINE HYDROCHLORIDE 15 MG/HR: 25 INJECTION, SOLUTION INTRAVENOUS at 00:34

## 2023-11-22 RX ADMIN — NICARDIPINE HYDROCHLORIDE 15 MG/HR: 25 INJECTION, SOLUTION INTRAVENOUS at 04:05

## 2023-11-22 RX ADMIN — LISINOPRIL 20 MG: 20 TABLET ORAL at 20:13

## 2023-11-22 RX ADMIN — METOPROLOL TARTRATE 25 MG: 25 TABLET, FILM COATED ORAL at 20:13

## 2023-11-22 RX ADMIN — NICARDIPINE HYDROCHLORIDE 15 MG/HR: 25 INJECTION, SOLUTION INTRAVENOUS at 07:33

## 2023-11-22 RX ADMIN — INSULIN LISPRO 4 UNITS: 100 INJECTION, SOLUTION INTRAVENOUS; SUBCUTANEOUS at 12:57

## 2023-11-22 NOTE — CONSULTS
[facility] at [facility]  Cardiovascular Consultation Note    Reason for consultation: Postop management of hypertension    History of Present Illness:  48-year-old male with diabetes, hypertension, hyperlipidemia, smoker with prior history of peripheral vascular disease and prior bypass surgery.  No history of cardiac issues.  Patient was recently seen by Dr. Ryan at that time was noted to have a cold foot.  Yesterday he underwent a femorofemoral bypass.  His blood pressures been elevated postop requiring Cardene.  We are asked to see the patient for management of hypertension.  The patient states his blood pressure runs well at home..  He thinks the elevated blood pressure secondary to pain.  He denies tightness heaviness squeezing pressure chest jaw throat arms.  He has had no palpitations.  States he does wheeze on occasion.  Has noticed dyspnea on exertion for about 5 months but has not progressed.  He smokes a pack a day.  He states since surgery his left foot feels better    Cardiac risk factors: #1 male sex #2 diabetes #3 hywetension #4 hyperlipidemia #5 smoker    Past medical and surgical history, social and family history reviewed in EMR.    REVIEW OF SYSTEMS:     Past Medical History:   Diagnosis Date    Arthritis     Back pain     Diabetes     SINCE MARCH 2017    Dyslipidemia     HTN (hypertension)     PVD (peripheral vascular disease)     Wears partial dentures      Past Surgical History:   Procedure Laterality Date    AORTAGRAM N/A 04/25/2017    Procedure: AORTAGRAM WITH OR WITHOUT RUNOFFS POSSIBLE STENT with left femoral cutdown;  Surgeon: Brett Ryan MD;  Location: [place] [id number];  Service:     CHOLECYSTECTOMY      CYST REMOVAL      OFF OF BACK    HI IN-SITU VEIN BYPASS FEMORAL-POPLITEAL Left 04/25/2017    Procedure: FEMORAL POPLITEAL BYPASS;  Surgeon: Brett Ryan MD;  Location: [facility] OR ;  Service: Vascular    HI IN-SITU VEIN BYPASS  FEMORAL-POPLITEAL Left 06/27/2017    Procedure: LEFT FEMORAL ENDARTECTOMYN LEFT FEMORAL POPLITEAL BYPASS;  Surgeon: Brett Ryan MD;  Location: [facility];  Service: Vascular     Social History     Socioeconomic History    Marital status:     Number of children: 2   Tobacco Use    Smoking status: Every Day     Packs/day: 1.00     Years: 30.00     Additional pack years: 0.00     Total pack years: 30.00     Types: Cigarettes    Smokeless tobacco: Never    Tobacco comments:     Pt states that he is trying to quit, was once only smoking a few a day but has increased to 1 ppd, Pt states that he has smoked 30 plus years.   Vaping Use    Vaping Use: Never used   Substance and Sexual Activity    Alcohol use: No    Drug use: No    Sexual activity: Yes     Partners: Female     Birth control/protection: None     Family History   Problem Relation Age of Onset    Arthritis Mother     Liver disease Father     Kidney disease Father        H&P ROS reviewed and pertinent CV ROS as noted in HPI.    Cardiac: No palpitations.  No anginal pain.  No edema.  Respiratory: Does wheeze on occasion and has a 5-month history of stable dyspnea  Lower Extremities: Has peripheral vascular disease with prior bypass.  Now status post femorofemoral bypass with [place]-Hank graft  GI: No nausea vomiting diarrhea bright blood per rectum or melena no postprandial pain  Neuro: No history of stroke TIA or neurologic event  Hematology: No bleeding diathesis ecchymosis or petechia  Renal: No history of hematuria, dysuria or CKD  Musculoskeletal: Does not complain of any specific joint pain  Endocrine: Has diabetes.  No hypothyroidism  Constitutional: No fever chills or weight loss  Psych: No depression or suicidal ideation      Physical Exam: General very pleasant well-developed male sitting in his bed 75 degree angle not dyspneic nontachypneic current systolic blood pressure 138 heart rate in the 80s       HEENT: No JVP.  No icterus.  No obvious  carotid bruits       Respiratory: Equal bilateral symmetrical expansion with mildly reduced breath sounds rare crackle       Cardiovascular: Regular rate and rhythm without gallop and no edema palpation       GI: Soft and flat       Lower Extremities: Both lower extremities are cool to the touch       Neuro: Facial expressions symmetrical moves all 4 extremities       Skin: Warm and dry no edema to palpation       Psych: Pleasant affect oriented x 3    Results Review: EKG shows sinus rhythm T wave tenting unchanged from EKG 2017.  Patient is a net 3.6 L positive since admission.  Heart rate 70s to 90s hemoglobin 11.6 .8           Vital Sign Min/Max for last 24 hours  Temp  Min: 97.5 °F (36.4 °C)  Max: 98.6 °F (37 °C)   BP  Min: 97/56  Max: 160/79   Pulse  Min: 64  Max: 92   Resp  Min: 16  Max: 20   SpO2  Min: 90 %  Max: 99 %   No data recorded      Intake/Output Summary (Last 24 hours) at 11/22/2023 0906  Last data filed at 11/22/2023 0831  Gross per 24 hour   Intake 5714.06 ml   Output 1900 ml   Net 3814.06 ml             Current Facility-Administered Medications:     aspirin EC tablet 81 mg, 81 mg, Oral, Nightly, Alma Chester PA-C, 81 mg at 11/21/23 2114    sennosides-docusate (PERICOLACE) 8.6-50 MG per tablet 2 tablet, 2 tablet, Oral, BID, 2 tablet at 11/22/23 0833 **AND** polyethylene glycol (MIRALAX) packet 17 g, 17 g, Oral, Daily PRN **AND** bisacodyl (DULCOLAX) EC tablet 5 mg, 5 mg, Oral, Daily PRN **AND** bisacodyl (DULCOLAX) suppository 10 mg, 10 mg, Rectal, Daily PRN, Alma Chester PA-C    cloNIDine (CATAPRES) tablet 0.1 mg, 0.1 mg, Oral, Q1H PRN, Brett Beckham MD    dextrose (D50W) (25 g/50 mL) IV injection 25 g, 25 g, Intravenous, Q15 Min PRN, Alma Chester PA-C    dextrose (GLUTOSE) oral gel 15 g, 15 g, Oral, Q15 Min PRN, Alma Chester PA-C    Famotidine (PF) (PEPCID) injection 20 mg, 20 mg, Intravenous, Q12H **OR** famotidine (PEPCID) tablet 20 mg, 20 mg, Oral, Q12H, Nemo,  CHEYENNE Marquez, 20 mg at 11/22/23 0832    glucagon (GLUCAGEN) injection 1 mg, 1 mg, Intramuscular, Q15 Min PRN, Alma Chester PA-C    HYDROmorphone (DILAUDID) injection 0.5 mg, 0.5 mg, Intravenous, Q2H PRN, Alma Chester PA-C, 0.5 mg at 11/22/23 0824    insulin detemir (LEVEMIR) injection 8 Units, 8 Units, Subcutaneous, Q12H, Alma Chester PA-C, 8 Units at 11/22/23 0833    Insulin Lispro (humaLOG) injection 2-9 Units, 2-9 Units, Subcutaneous, 4x Daily AC & at Bedtime, Alma Chester PA-C, 4 Units at 11/22/23 0833    lisinopril (PRINIVIL,ZESTRIL) tablet 20 mg, 20 mg, Oral, Nightly, Justine Hancock, APRN, 20 mg at 11/21/23 2114    melatonin tablet 5 mg, 5 mg, Oral, Nightly PRN, Alma Chester PA-C, 5 mg at 11/21/23 2114    metoprolol tartrate (LOPRESSOR) tablet 25 mg, 25 mg, Oral, Q12H, Brett Beckham MD    niCARdipine (CARDENE) 2.5 MG/ML injection  - ADS Override Pull, , , ,     niCARdipine (CARDENE) 25mg in 250mL NS infusion, 5-15 mg/hr, Intravenous, Titrated, Alma Chester PA-C, Last Rate: 150 mL/hr at 11/22/23 0733, 15 mg/hr at 11/22/23 0733    nicotine (NICODERM CQ) 14 MG/24HR patch 1 patch, 1 patch, Transdermal, Q24H, Alma Chester PA-C, 1 patch at 11/20/23 1151    nitroglycerin (NITROSTAT) SL tablet 0.4 mg, 0.4 mg, Sublingual, Q5 Min PRN, Alma Chester PA-C    ondansetron (ZOFRAN) tablet 4 mg, 4 mg, Oral, Q6H PRN **OR** ondansetron (ZOFRAN) injection 4 mg, 4 mg, Intravenous, Q6H PRN, Alma Chester PA-C    oxyCODONE-acetaminophen (PERCOCET)  MG per tablet 1 tablet, 1 tablet, Oral, Q4H PRN, Luca Huff PA, 1 tablet at 11/22/23 0642    rosuvastatin (CRESTOR) tablet 20 mg, 20 mg, Oral, Nightly, Alma Chester PA-C, 20 mg at 11/21/23 2114    simethicone (MYLICON) chewable tablet 80 mg, 80 mg, Oral, 4x Daily PRN, Abby Waite APRN, 80 mg at 11/22/23 0312    sodium chloride 0.45 % infusion, 100 mL/hr, Intravenous, Continuous, Alma Chester PA-C, Last Rate: 100 mL/hr at 11/21/23  1412, 100 mL/hr at 11/21/23 1412    sodium chloride 0.9 % flush 10 mL, 10 mL, Intravenous, Q12H, Alma Chester PA-COREY, 10 mL at 11/22/23 0825    sodium chloride 0.9 % flush 10 mL, 10 mL, Intravenous, PRN, Alma Chester PA-COREY    sodium chloride 0.9 % infusion 40 mL, 40 mL, Intravenous, PRN, Alma Chester PA-COREY    Lab Review:   Results from last 7 days   Lab Units 11/22/23  0313 11/21/23  0356 11/20/23  1148   WBC 10*3/mm3 9.43 9.55 9.14   HEMOGLOBIN g/dL 11.6* 14.4 14.7   PLATELETS 10*3/mm3 161 187 174     Results from last 7 days   Lab Units 11/22/23  0313 11/21/23  0356 11/20/23  1253   SODIUM mmol/L 134* 136 139   POTASSIUM mmol/L 4.2 4.1 4.5   CO2 mmol/L 24.0 26.0 25.0   BUN mg/dL 15 19 15   CREATININE mg/dL 0.80 0.82 0.89   GLUCOSE mg/dL 148* 277* 287*     Estimated Creatinine Clearance: 121.1 mL/min (by C-G formula based on SCr of 0.8 mg/dL).    Results from last 7 days   Lab Units 11/20/23  1253   HEMOGLOBIN A1C % 10.60*     .lipd  Lab Results   Component Value Date    AST 21 11/20/2023    ALT 29 11/20/2023       Radiology Reports:  Imaging Results (Last 72 Hours)       Procedure Component Value Units Date/Time    XR [place] OR Procedure [[id number]] Resulted: 11/21/23 1328     Updated: 11/21/23 1328    CT Angio Abdominal Aorta Bilateral Iliofem Runoff [[id number]] Collected: 11/20/23 1455     Updated: 11/20/23 1514    Narrative:      CT ANGIO ABDOMINAL AORTA BILAT ILIOFEM RUNOFF    Date of Exam: 11/20/2023 2:38 PM EST    Indication: Claudication or leg ischemia  Claudication/concern for ischemia  /concern for ischemia.    Comparison: None available.    Technique: CTA of the abdomen, pelvis and both lower extremities was performed after the uneventful intravenous administration of 125 cc Isovue-370. Reconstructed coronal and sagittal images were also obtained. In addition, a 3-D volume rendered image   was created for interpretation. Automated exposure control and iterative reconstruction methods were  used.      Findings:  Nonvascular: There is mild interstitial fibrosis at the lung bases. The liver and spleen have a normal appearance. The gallbladder is surgically absent. The adrenal glands and the pancreas have a normal appearance. The right and left kidney are normal.   No dilated or thickened loops of small or large bowel are identified. There is colonic diverticulosis without diverticulitis. The appendix is normal. The urinary bladder is moderately distended. The prostate does not appear enlarged. There is   degenerative disc disease and facet disease in the lower lumbar spine.    Vascular: The aorta is of normal caliber. The SMA and celiac axis appear patent without significant narrowing. The right and left renal arteries are patent without significant narrowing. The JONAH is patent. There is atherosclerotic plaque in the distal   abdominal aorta. There is dense atherosclerotic plaque extending into both common iliac arteries. There is complete occlusion of the left common iliac artery and left external iliac artery. There is no hemodynamically significant stenosis in the right   common iliac artery or right external iliac artery.    In the right lower extremity, the superficial femoral artery is occluded. The profundofemoral artery is patent. There is reconstitution of the popliteal artery below the abductor canal. The popliteal artery is patent without significant narrowing. There   is mild plaque deposition in the tibioperoneal trunk. There is two-vessel distal runoff via the anterior posterior tibial artery.    The left lower extremity, there is a femoral-popliteal bypass graft that is occluded. There is reconstitution of the profundofemoral artery in its proximal portion. There is collateral filling of the popliteal artery just above the knee joint. It is of   very small caliber. The peroneal and posterior tibial arteries are patent in the calf although they appear significantly diseased. The anterior  tibial artery is occluded in the distal calf. The posterior tibial artery does not cross the ankle. The   peroneal artery is occluded in the distal calf.      Impression:      Impression:    1. Occlusion of the left common iliac artery, external iliac artery, common femoral artery, and femoral-popliteal bypass graft. Reconstitution of the popliteal artery via profunda collaterals at the knee joint. The popliteal and distal runoff vessels are   of very small caliber. All 3 trifurcation vessels are occluded in the mid to distal calf.  2. Occlusion of the right superficial femoral artery. Reconstitution of the popliteal artery below the abductor canal. Two-vessel distal runoff.  3. No significant inflow stenosis in the right lower extremity.  4. Incidental nonvascular findings as noted above        Electronically Signed: George Greer MD    11/20/2023 3:11 PM EST    Workstation ID: [id number]            Assessment/Plan: Patient has significant hypertension requiring Cardene postop.  The patient feels that the hypertension is secondary to pain.  He states at home his blood pressure is well-controlled.  For now I will start him on metoprolol 25 mg every 12 hours and as needed clonidine.  Hopefully blood pressure will improve and he can be transferred to the floor later.  Does have a history of new onset dyspnea for the last 5 months and a stable pattern.  He has multiple CAD risk factors.  Consider outpatient nuclear perfusion imaging      Brett Beckham MD  11/22/23  09:06 EST

## 2023-11-22 NOTE — PROGRESS NOTES
INTENSIVIST   PROGRESS NOTE     Hospital:  LOS: 2 days     Chief Complaint: Postoperative management    Subjective   S     Interval History: Ongoing with issues with hypertension overnight requiring Cardene.  Otherwise stable.     The patient's relevant past medical, surgical and social history were reviewed and updated in Epic as appropriate.      ROS: (+) LLE pain, left groin pain, lower back pain     Objective   O     Intake/Ouptut 24 hrs (7:00AM - 6:59 AM)  Intake & Output (last 3 days)         11/19 0701 11/20 0700 11/20 0701 11/21 0700 11/21 0701 11/22 0700 11/22 0701 11/23 0700    P.O.  200      I.V. (mL/kg)   1500 (15.9) 4114.1 (43.5)    IV Piggyback   100     Total Intake(mL/kg)  200 (2.5) 1600 (16.9) 4114.1 (43.5)    Urine (mL/kg/hr)   1450 (0.6) 450 (0.4)    Total Output   1450 450    Net  +200 +150 +3664.1            Urine Unmeasured Occurrence  3 x            Medications (drips):  niCARdipine, Last Rate: 12.5 mg/hr (11/22/23 1722)  sodium chloride, Last Rate: 100 mL/hr (11/21/23 1412)    Respiratory Support: Room air    Physical Examination:  Vitals:    11/22/23 1722   BP: 137/65   Pulse: 96   Resp:    Temp:    SpO2:      General: The patient appears in no acute distress. Alert, cooperative and interactive.  Chest: Clear to auscultation bilaterally, No wheezing, rhonchi, or rales. Normal work of breathing. Equal chest rise.  Cardiac: Normal rate, S1S2 auscultated. No murmurs, rubs or gallops.   Extremities: Nonpitting lower extremity edema.  Slight fullness at left, surgical site without overt palpable hematoma.  No bruising or redness visible.  Lower extremity slightly cool but pink.  Skin: No rashes, open wounds, or bruising. Warm, dry, well-perfused.  Neuro: Motor power grossly intact bilaterally. Sensation intact. Speech fluid and fluent. Thought process coherent.  Psych: Alert and oriented x3. Mood stable.    Lines, Drains & Airways       Active LDAs       Name Placement date Placement time  Site Days    Peripheral IV 11/20/23 1119 Anterior;Distal;Left;Upper Arm 11/20/23  1119  Arm  1        Results from last 7 days   Lab Units 11/22/23  0313 11/21/23  0356 11/20/23  1148   WBC 10*3/mm3 9.43 9.55 9.14   HEMOGLOBIN g/dL 11.6* 14.4 14.7   MCV fL 92.2 92.1 91.4   PLATELETS 10*3/mm3 161 187 174     Results from last 7 days   Lab Units 11/22/23  0313 11/21/23  0356 11/20/23  1253   SODIUM mmol/L 134* 136 139   POTASSIUM mmol/L 4.2 4.1 4.5   CO2 mmol/L 24.0 26.0 25.0   CREATININE mg/dL 0.80 0.82 0.89   GLUCOSE mg/dL 148* 277* 287*     Estimated Creatinine Clearance: 121.1 mL/min (by C-G formula based on SCr of 0.8 mg/dL).  Results from last 7 days   Lab Units 11/20/23  1253   ALK PHOS U/L 108   BILIRUBIN mg/dL 0.3   ALT (SGPT) U/L 29   AST (SGOT) U/L 21     CTA abdominal aorta (11/20/2023):   Radiology Impression:   -Occlusion of the left common iliac artery, external iliac artery, common femoral artery, and femoral-popliteal bypass graft. Reconstitution of the popliteal artery via profunda collaterals at the knee joint. The popliteal and distal runoff vessels are of very small caliber. All 3 trifurcation vessels are occluded in the mid to distal calf  -Occlusion of the right superficial femoral artery. Reconstitution of the popliteal artery below the abductor canal. Two-vessel distal runoff  -No significant inflow stenosis in the right lower extremity  -Incidental nonvascular findings as noted above     Results: Reviewed.  I reviewed the patient's new laboratory and imaging results.  I independently reviewed the patient's new images.    Medications: Reviewed.    Assessment & Plan    A / P     Active Hospital Problems    Diagnosis     **Claudication of lower extremity     PAD (peripheral artery disease)      Justo Oquendo is a 48-year-old male with past medical history of diabetes, hypertension, tobacco abuse, PVD status post left femoral above-the-knee popliteal bypass (April 2017).  Patient was evaluated in  the office by CT surgery and directly admitted afterwards with a cold, left lower extremity with no detectable dopplerable pulse in the left DP.     -Status post femoral to femoral bypass on 11/21/2023  -ICU to follow  -Postoperative orders per CT surgery.  Thus far postoperative course relatively uncomplicated except for ongoing pain and HTN. As needed opioids in place. Lisinopril, Metoprolol 25 mg every 12 hours and as needed clonidine for BP--to wean Cardene over the next 12 hours  -Scheduled insulin and sliding scale per unit protocol   -Continue aspirin/statin  -Continue nicotine replacement  -PT/OT to follow  -DVT prophylaxis when cleared by CTS   -AM labs   -Perioperative cefazolin otherwise no indication for ongoing antimicrobials at this time     Advance Directives:   Code Status and Medical Interventions:   Ordered at: 11/21/23 1319     Code Status (Patient has no pulse and is not breathing):    CPR (Attempt to Resuscitate)     Medical Interventions (Patient has pulse or is breathing):    Full Support     High level of risk due to severe exacerbation of chronic illness and illness with threat to life or bodily function.    I conducted multidisciplinary rounds in the plan of care was discussed with the multidisciplinary team at that time. In attendance at multidisciplinary rounds was clinical pharmacist, dietitian, nursing staff and case management.    I discussed the patient's findings and my recommendations with patient, family, and nursing staff    -- Jorge Sorto MD  Pulmonary/Critical Care

## 2023-11-22 NOTE — PROGRESS NOTES
CTS Progress Note       LOS: 2 days   Patient Care Team:  Melo Whitaker MD as PCP - General (Family Medicine)  Niall Mcfarlane MD as Consulting Physician (Cardiology)    Chief Complaint: Claudication of lower extremity    Vital Signs:  Temp:  [97.5 °F (36.4 °C)-98.6 °F (37 °C)] 98.4 °F (36.9 °C)  Heart Rate:  [64-92] 78  Resp:  [16-20] 18  BP: ()/(56-80) 122/66    Physical Exam: Feet warm and viable with Doppler signal in the posterior tibial arteries bilaterally groins are satisfactory       Results:     Results from last 7 days   Lab Units 11/22/23  0313   WBC 10*3/mm3 9.43   HEMOGLOBIN g/dL 11.6*   HEMATOCRIT % 34.2*   PLATELETS 10*3/mm3 161     Results from last 7 days   Lab Units 11/22/23  0313   SODIUM mmol/L 134*   POTASSIUM mmol/L 4.2   CHLORIDE mmol/L 101   CO2 mmol/L 24.0   BUN mg/dL 15   CREATININE mg/dL 0.80   GLUCOSE mg/dL 148*   CALCIUM mg/dL 8.4*           Imaging Results (Last 24 Hours)       Procedure Component Value Units Date/Time    XR [place] OR Procedure [[id number]] Resulted: 11/21/23 1328     Updated: 11/21/23 1328            Assessment      Claudication of lower extremity    PAD (peripheral artery disease)        Plan   Wean and discontinue Cardene for blood pressure 130 systolic or less.  Need to keep blood pressure less than 130 systolic as [place] is sutured to [place]  Plavix 150 mg p.o. this morning and 75 mg p.o. daily thereafter  Aspirin 81 mg p.o. today and daily thereafter  May ambulate but do not sit at 90 degrees    Please note that portions of this note were completed with a voice recognition program. Efforts were made to edit the dictations, but occasionally words are mistranscribed.    Brett Ryan MD  11/22/23  09:00 EST

## 2023-11-22 NOTE — CASE MANAGEMENT/SOCIAL WORK
Discharge Planning Assessment  [facility]     Patient Name: Justo Oquendo  MRN: [id number]  Today's Date: 11/22/2023    Admit Date: 11/20/2023    Plan: Home with Family   Discharge Needs Assessment       Row Name 11/22/23 1047       Living Environment    People in Home child(janis), adult;spouse    Name(s) of People in Home Christine Oquendo- wife, Evan Oquendo- daughter    Current Living Arrangements home    Potentially Unsafe Housing Conditions none    Primary Care Provided by self    Provides Primary Care For no one    Family Caregiver if Needed spouse    Family Caregiver Names Christine Oquendo- wife    Quality of Family Relationships helpful;involved;supportive    Able to Return to Prior Arrangements yes       Transition Planning    Patient/Family Anticipates Transition to home with family    Transportation Anticipated family or friend will provide       Discharge Needs Assessment    Readmission Within the Last 30 Days no previous admission in last 30 days    Equipment Currently Used at Home none    Concerns to be Addressed discharge planning    Anticipated Changes Related to Illness none    Equipment Needed After Discharge none    Current Discharge Risk chronically ill                   Discharge Plan       Row Name 11/22/23 1048       Plan    Plan Home with Family    Patient/Family in Agreement with Plan yes    Plan Comments I have spoken to Mrs. Oquendo today by phone to initiate a discharge plan.  Mr. Oquendo is receiving nursing care at this time and is unable to participate in conversation.  She states that her  lives with she and her daughter in [facility]. She reports that he is independent with activities of daily living and mobility.  He denies use of DME and current receipt of home health services.  Mrs. Oquendo anticipates no discharge needs and states that she will provide assistance and transportation as needed.  She reports that Mr. Oquendo's PCP is Melo Whitaker MD and his insurance is [employer].  She confirms that he has prescription coverage.  CM will cont to follow the plan of care amd assist with discharge needs as recommendations become available.    Final Discharge Disposition Code 01 - home or self-care                  Continued Care and Services - Admitted Since 11/20/2023    Coordination has not been started for this encounter.          Demographic Summary       Row Name 11/22/23 1046       General Information    Admission Type inpatient    Arrived From home    Referral Source admission list    Reason for Consult discharge planning       Contact Information    Permission Granted to Share Info With                    Functional Status       Row Name 11/22/23 1046       Functional Status, IADL    Medications independent    Meal Preparation independent    Housekeeping independent    Laundry independent    Shopping independent       Employment/    Employment Status employed full-time                   Psychosocial    No documentation.                  Abuse/Neglect    No documentation.                  Legal    No documentation.                  Substance Abuse    No documentation.                  Patient Forms    No documentation.                     Nini Burgess RN

## 2023-11-23 ENCOUNTER — APPOINTMENT (OUTPATIENT)
Dept: CARDIOLOGY | Facility: HOSPITAL | Age: 48
DRG: 253 | End: 2023-11-23
Payer: COMMERCIAL

## 2023-11-23 PROBLEM — I73.9 PAD (PERIPHERAL ARTERY DISEASE): Status: RESOLVED | Noted: 2017-04-25 | Resolved: 2023-11-23

## 2023-11-23 LAB
ANION GAP SERPL CALCULATED.3IONS-SCNC: 10 MMOL/L (ref 5–15)
BH BB BLOOD EXPIRATION DATE: NORMAL
BH BB BLOOD TYPE BARCODE: 5100
BH BB DISPENSE STATUS: NORMAL
BH BB PRODUCT CODE: NORMAL
BH BB UNIT NUMBER: NORMAL
BH CV ECHO MEAS - AO MAX PG: 23.8 MMHG
BH CV ECHO MEAS - AO MEAN PG: 13 MMHG
BH CV ECHO MEAS - AO ROOT DIAM: 3.3 CM
BH CV ECHO MEAS - AO V2 MAX: 244 CM/SEC
BH CV ECHO MEAS - AO V2 VTI: 41.2 CM
BH CV ECHO MEAS - AVA(I,D): 2.27 CM2
BH CV ECHO MEAS - EDV(CUBED): 110.6 ML
BH CV ECHO MEAS - EDV(MOD-SP2): 130 ML
BH CV ECHO MEAS - EDV(MOD-SP4): 126 ML
BH CV ECHO MEAS - EF(MOD-BP): 70.4 %
BH CV ECHO MEAS - EF(MOD-SP2): 67.7 %
BH CV ECHO MEAS - EF(MOD-SP4): 71.9 %
BH CV ECHO MEAS - ESV(CUBED): 24.4 ML
BH CV ECHO MEAS - ESV(MOD-SP2): 42 ML
BH CV ECHO MEAS - ESV(MOD-SP4): 35.4 ML
BH CV ECHO MEAS - FS: 39.6 %
BH CV ECHO MEAS - IVS/LVPW: 0.9 CM
BH CV ECHO MEAS - IVSD: 0.9 CM
BH CV ECHO MEAS - LA DIMENSION: 3.4 CM
BH CV ECHO MEAS - LAT PEAK E' VEL: 13.6 CM/SEC
BH CV ECHO MEAS - LV MASS(C)D: 158.8 GRAMS
BH CV ECHO MEAS - LV MAX PG: 12.4 MMHG
BH CV ECHO MEAS - LV MEAN PG: 7 MMHG
BH CV ECHO MEAS - LV V1 MAX: 176 CM/SEC
BH CV ECHO MEAS - LV V1 VTI: 29.8 CM
BH CV ECHO MEAS - LVIDD: 4.8 CM
BH CV ECHO MEAS - LVIDS: 2.9 CM
BH CV ECHO MEAS - LVOT AREA: 3.1 CM2
BH CV ECHO MEAS - LVOT DIAM: 2 CM
BH CV ECHO MEAS - LVPWD: 1 CM
BH CV ECHO MEAS - MED PEAK E' VEL: 15.3 CM/SEC
BH CV ECHO MEAS - MV A MAX VEL: 83.6 CM/SEC
BH CV ECHO MEAS - MV DEC SLOPE: 868 CM/SEC2
BH CV ECHO MEAS - MV DEC TIME: 0.19 SEC
BH CV ECHO MEAS - MV E MAX VEL: 108 CM/SEC
BH CV ECHO MEAS - MV E/A: 1.29
BH CV ECHO MEAS - MV P1/2T: 51 MSEC
BH CV ECHO MEAS - MVA(P1/2T): 4.3 CM2
BH CV ECHO MEAS - PA ACC TIME: 0.14 SEC
BH CV ECHO MEAS - PA V2 MAX: 152 CM/SEC
BH CV ECHO MEAS - SV(LVOT): 93.6 ML
BH CV ECHO MEAS - SV(MOD-SP2): 88 ML
BH CV ECHO MEAS - SV(MOD-SP4): 90.6 ML
BH CV ECHO MEAS - TAPSE (>1.6): 3 CM
BH CV ECHO MEASUREMENTS AVERAGE E/E' RATIO: 7.47
BH CV VAS BP LEFT ARM: NORMAL MMHG
BH CV XLRA - TDI S': 23.9 CM/SEC
BUN SERPL-MCNC: 11 MG/DL (ref 6–20)
BUN/CREAT SERPL: 14.3 (ref 7–25)
CALCIUM SPEC-SCNC: 8.8 MG/DL (ref 8.6–10.5)
CHLORIDE SERPL-SCNC: 102 MMOL/L (ref 98–107)
CO2 SERPL-SCNC: 23 MMOL/L (ref 22–29)
CREAT SERPL-MCNC: 0.77 MG/DL (ref 0.76–1.27)
CROSSMATCH INTERPRETATION: NORMAL
DEPRECATED RDW RBC AUTO: 39.9 FL (ref 37–54)
EGFRCR SERPLBLD CKD-EPI 2021: 109.1 ML/MIN/1.73
ERYTHROCYTE [DISTWIDTH] IN BLOOD BY AUTOMATED COUNT: 11.8 % (ref 12.3–15.4)
GLUCOSE BLDC GLUCOMTR-MCNC: 188 MG/DL (ref 70–130)
GLUCOSE BLDC GLUCOMTR-MCNC: 201 MG/DL (ref 70–130)
GLUCOSE BLDC GLUCOMTR-MCNC: 220 MG/DL (ref 70–130)
GLUCOSE SERPL-MCNC: 172 MG/DL (ref 65–99)
HCT VFR BLD AUTO: 33 % (ref 37.5–51)
HGB BLD-MCNC: 11.1 G/DL (ref 13–17.7)
MCH RBC QN AUTO: 31.4 PG (ref 26.6–33)
MCHC RBC AUTO-ENTMCNC: 33.6 G/DL (ref 31.5–35.7)
MCV RBC AUTO: 93.5 FL (ref 79–97)
PLATELET # BLD AUTO: 150 10*3/MM3 (ref 140–450)
PMV BLD AUTO: 10.5 FL (ref 6–12)
POTASSIUM SERPL-SCNC: 4.3 MMOL/L (ref 3.5–5.2)
RBC # BLD AUTO: 3.53 10*6/MM3 (ref 4.14–5.8)
SODIUM SERPL-SCNC: 135 MMOL/L (ref 136–145)
UNIT  ABO: NORMAL
UNIT  RH: NORMAL
WBC NRBC COR # BLD AUTO: 11.02 10*3/MM3 (ref 3.4–10.8)

## 2023-11-23 PROCEDURE — 63710000001 INSULIN LISPRO (HUMAN) PER 5 UNITS: Performed by: INTERNAL MEDICINE

## 2023-11-23 PROCEDURE — 93306 TTE W/DOPPLER COMPLETE: CPT

## 2023-11-23 PROCEDURE — 99232 SBSQ HOSP IP/OBS MODERATE 35: CPT | Performed by: INTERNAL MEDICINE

## 2023-11-23 PROCEDURE — 63710000001 INSULIN DETEMIR PER 5 UNITS: Performed by: PHYSICIAN ASSISTANT

## 2023-11-23 PROCEDURE — 82948 REAGENT STRIP/BLOOD GLUCOSE: CPT

## 2023-11-23 PROCEDURE — 63710000001 INSULIN DETEMIR PER 5 UNITS: Performed by: INTERNAL MEDICINE

## 2023-11-23 PROCEDURE — 85027 COMPLETE CBC AUTOMATED: CPT

## 2023-11-23 PROCEDURE — 25010000002 HYDROMORPHONE PER 4 MG: Performed by: PHYSICIAN ASSISTANT

## 2023-11-23 PROCEDURE — 80048 BASIC METABOLIC PNL TOTAL CA: CPT

## 2023-11-23 PROCEDURE — 93306 TTE W/DOPPLER COMPLETE: CPT | Performed by: INTERNAL MEDICINE

## 2023-11-23 PROCEDURE — 25810000003 SODIUM CHLORIDE 0.9 % SOLUTION 250 ML FLEX CONT: Performed by: PHYSICIAN ASSISTANT

## 2023-11-23 PROCEDURE — 99024 POSTOP FOLLOW-UP VISIT: CPT | Performed by: THORACIC SURGERY (CARDIOTHORACIC VASCULAR SURGERY)

## 2023-11-23 PROCEDURE — 99233 SBSQ HOSP IP/OBS HIGH 50: CPT | Performed by: INTERNAL MEDICINE

## 2023-11-23 PROCEDURE — 63710000001 INSULIN LISPRO (HUMAN) PER 5 UNITS: Performed by: PHYSICIAN ASSISTANT

## 2023-11-23 RX ORDER — BUMETANIDE 0.25 MG/ML
1 INJECTION INTRAMUSCULAR; INTRAVENOUS ONCE
Status: CANCELLED | OUTPATIENT
Start: 2023-11-24 | End: 2023-11-24

## 2023-11-23 RX ORDER — LISINOPRIL 20 MG/1
20 TABLET ORAL ONCE
Status: COMPLETED | OUTPATIENT
Start: 2023-11-23 | End: 2023-11-23

## 2023-11-23 RX ORDER — INSULIN LISPRO 100 [IU]/ML
5 INJECTION, SOLUTION INTRAVENOUS; SUBCUTANEOUS
Status: DISCONTINUED | OUTPATIENT
Start: 2023-11-23 | End: 2023-11-24 | Stop reason: HOSPADM

## 2023-11-23 RX ORDER — LISINOPRIL 40 MG/1
40 TABLET ORAL NIGHTLY
Status: DISCONTINUED | OUTPATIENT
Start: 2023-11-23 | End: 2023-11-24 | Stop reason: HOSPADM

## 2023-11-23 RX ORDER — NICARDIPINE HYDROCHLORIDE 20 MG/1
20 CAPSULE ORAL EVERY 8 HOURS SCHEDULED
Status: DISCONTINUED | OUTPATIENT
Start: 2023-11-23 | End: 2023-11-24 | Stop reason: HOSPADM

## 2023-11-23 RX ORDER — HYDRALAZINE HYDROCHLORIDE 25 MG/1
25 TABLET, FILM COATED ORAL EVERY 8 HOURS SCHEDULED
Status: DISCONTINUED | OUTPATIENT
Start: 2023-11-23 | End: 2023-11-24 | Stop reason: HOSPADM

## 2023-11-23 RX ADMIN — OXYCODONE HYDROCHLORIDE AND ACETAMINOPHEN 1 TABLET: 10; 325 TABLET ORAL at 10:40

## 2023-11-23 RX ADMIN — INSULIN LISPRO 5 UNITS: 100 INJECTION, SOLUTION INTRAVENOUS; SUBCUTANEOUS at 12:27

## 2023-11-23 RX ADMIN — NICARDIPINE HYDROCHLORIDE 20 MG: 20 CAPSULE ORAL at 13:45

## 2023-11-23 RX ADMIN — INSULIN LISPRO 2 UNITS: 100 INJECTION, SOLUTION INTRAVENOUS; SUBCUTANEOUS at 20:25

## 2023-11-23 RX ADMIN — NICARDIPINE HYDROCHLORIDE 10 MG/HR: 25 INJECTION, SOLUTION INTRAVENOUS at 18:47

## 2023-11-23 RX ADMIN — NICARDIPINE HYDROCHLORIDE 10 MG/HR: 25 INJECTION, SOLUTION INTRAVENOUS at 09:45

## 2023-11-23 RX ADMIN — HYDROMORPHONE HYDROCHLORIDE 0.5 MG: 1 INJECTION, SOLUTION INTRAMUSCULAR; INTRAVENOUS; SUBCUTANEOUS at 08:06

## 2023-11-23 RX ADMIN — HYDROMORPHONE HYDROCHLORIDE 0.5 MG: 1 INJECTION, SOLUTION INTRAMUSCULAR; INTRAVENOUS; SUBCUTANEOUS at 18:12

## 2023-11-23 RX ADMIN — SENNOSIDES AND DOCUSATE SODIUM 2 TABLET: 8.6; 5 TABLET ORAL at 08:06

## 2023-11-23 RX ADMIN — INSULIN LISPRO 4 UNITS: 100 INJECTION, SOLUTION INTRAVENOUS; SUBCUTANEOUS at 17:29

## 2023-11-23 RX ADMIN — CLONIDINE HYDROCHLORIDE 0.1 MG: 0.1 TABLET ORAL at 16:05

## 2023-11-23 RX ADMIN — HYDRALAZINE HYDROCHLORIDE 25 MG: 25 TABLET ORAL at 20:11

## 2023-11-23 RX ADMIN — OXYCODONE HYDROCHLORIDE AND ACETAMINOPHEN 1 TABLET: 10; 325 TABLET ORAL at 18:12

## 2023-11-23 RX ADMIN — OXYCODONE HYDROCHLORIDE AND ACETAMINOPHEN 1 TABLET: 10; 325 TABLET ORAL at 02:42

## 2023-11-23 RX ADMIN — OXYCODONE HYDROCHLORIDE AND ACETAMINOPHEN 1 TABLET: 10; 325 TABLET ORAL at 22:14

## 2023-11-23 RX ADMIN — NICARDIPINE HYDROCHLORIDE 15 MG/HR: 25 INJECTION, SOLUTION INTRAVENOUS at 00:47

## 2023-11-23 RX ADMIN — HYDROMORPHONE HYDROCHLORIDE 0.5 MG: 1 INJECTION, SOLUTION INTRAMUSCULAR; INTRAVENOUS; SUBCUTANEOUS at 03:27

## 2023-11-23 RX ADMIN — FAMOTIDINE 20 MG: 20 TABLET, FILM COATED ORAL at 08:06

## 2023-11-23 RX ADMIN — LISINOPRIL 40 MG: 40 TABLET ORAL at 20:10

## 2023-11-23 RX ADMIN — Medication 10 ML: at 22:50

## 2023-11-23 RX ADMIN — ROSUVASTATIN 20 MG: 20 TABLET, FILM COATED ORAL at 20:10

## 2023-11-23 RX ADMIN — NICARDIPINE HYDROCHLORIDE 12.5 MG/HR: 25 INJECTION, SOLUTION INTRAVENOUS at 04:45

## 2023-11-23 RX ADMIN — OXYCODONE HYDROCHLORIDE AND ACETAMINOPHEN 1 TABLET: 10; 325 TABLET ORAL at 14:35

## 2023-11-23 RX ADMIN — NICOTINE 1 PATCH: 14 PATCH, EXTENDED RELEASE TRANSDERMAL at 12:26

## 2023-11-23 RX ADMIN — NICARDIPINE HYDROCHLORIDE 15 MG/HR: 25 INJECTION, SOLUTION INTRAVENOUS at 02:44

## 2023-11-23 RX ADMIN — CLONIDINE HYDROCHLORIDE 0.1 MG: 0.1 TABLET ORAL at 04:31

## 2023-11-23 RX ADMIN — Medication 5 MG: at 20:12

## 2023-11-23 RX ADMIN — FAMOTIDINE 20 MG: 20 TABLET, FILM COATED ORAL at 20:10

## 2023-11-23 RX ADMIN — HYDROMORPHONE HYDROCHLORIDE 0.5 MG: 1 INJECTION, SOLUTION INTRAMUSCULAR; INTRAVENOUS; SUBCUTANEOUS at 20:13

## 2023-11-23 RX ADMIN — NICARDIPINE HYDROCHLORIDE 7.5 MG/HR: 25 INJECTION, SOLUTION INTRAVENOUS at 15:39

## 2023-11-23 RX ADMIN — NICARDIPINE HYDROCHLORIDE 5 MG/HR: 25 INJECTION, SOLUTION INTRAVENOUS at 22:13

## 2023-11-23 RX ADMIN — INSULIN DETEMIR 8 UNITS: 100 INJECTION, SOLUTION SUBCUTANEOUS at 08:06

## 2023-11-23 RX ADMIN — HYDRALAZINE HYDROCHLORIDE 25 MG: 25 TABLET ORAL at 13:45

## 2023-11-23 RX ADMIN — HYDROMORPHONE HYDROCHLORIDE 0.5 MG: 1 INJECTION, SOLUTION INTRAMUSCULAR; INTRAVENOUS; SUBCUTANEOUS at 16:05

## 2023-11-23 RX ADMIN — INSULIN DETEMIR 15 UNITS: 100 INJECTION, SOLUTION SUBCUTANEOUS at 20:10

## 2023-11-23 RX ADMIN — INSULIN LISPRO 4 UNITS: 100 INJECTION, SOLUTION INTRAVENOUS; SUBCUTANEOUS at 08:06

## 2023-11-23 RX ADMIN — INSULIN LISPRO 5 UNITS: 100 INJECTION, SOLUTION INTRAVENOUS; SUBCUTANEOUS at 17:29

## 2023-11-23 RX ADMIN — HYDRALAZINE HYDROCHLORIDE 25 MG: 25 TABLET ORAL at 22:50

## 2023-11-23 RX ADMIN — METOPROLOL TARTRATE 25 MG: 25 TABLET, FILM COATED ORAL at 20:13

## 2023-11-23 RX ADMIN — CLOPIDOGREL BISULFATE 75 MG: 75 TABLET ORAL at 06:53

## 2023-11-23 RX ADMIN — HYDROMORPHONE HYDROCHLORIDE 0.5 MG: 1 INJECTION, SOLUTION INTRAMUSCULAR; INTRAVENOUS; SUBCUTANEOUS at 12:31

## 2023-11-23 RX ADMIN — OXYCODONE HYDROCHLORIDE AND ACETAMINOPHEN 1 TABLET: 10; 325 TABLET ORAL at 06:49

## 2023-11-23 RX ADMIN — SENNOSIDES AND DOCUSATE SODIUM 2 TABLET: 8.6; 5 TABLET ORAL at 20:10

## 2023-11-23 RX ADMIN — NICARDIPINE HYDROCHLORIDE 12.5 MG/HR: 25 INJECTION, SOLUTION INTRAVENOUS at 06:49

## 2023-11-23 RX ADMIN — INSULIN LISPRO 2 UNITS: 100 INJECTION, SOLUTION INTRAVENOUS; SUBCUTANEOUS at 12:27

## 2023-11-23 RX ADMIN — LISINOPRIL 20 MG: 20 TABLET ORAL at 09:45

## 2023-11-23 RX ADMIN — ASPIRIN 81 MG: 81 TABLET, COATED ORAL at 20:13

## 2023-11-23 RX ADMIN — METOPROLOL TARTRATE 25 MG: 25 TABLET, FILM COATED ORAL at 08:06

## 2023-11-23 RX ADMIN — NICARDIPINE HYDROCHLORIDE 20 MG: 20 CAPSULE ORAL at 22:14

## 2023-11-23 NOTE — PLAN OF CARE
Goal Outcome Evaluation:  Plan of Care Reviewed With: patient           Outcome Evaluation: Pt neuro intact.  Pt ambulated 60ft.  UOP 1100ml. Dr. Beckham consulted today for blood pressure management. Pain meds given for discomfort.will continue to monitor.  at 11/22/2023 2017

## 2023-11-23 NOTE — OP NOTE
Operative Report    Preop Diagnosis: Hemic left lower extremity.  Peripheral arterial vascular disease.  Previous femoral to popliteal bypass.  Diabetes.  Ongoing tobacco abuse              Procedure: Reoperative left femoral artery cutdown.  Thromboembolectomy of a left femoral popliteal bypass graft.  Thromboembolectomy of the profundus femoral artery and thromboembolectomy of the left left popliteal artery.  Aortogram.  Right femoral to left femoral bypass with 8 mm PTFE graft        Surgeons: Brett Ryan MD      Assistant: Alma Chester PA-C    The Assistant provided services of suctioning, irrigation and retraction.  Also, assisted in suture closure of parts of the skin incision.      Indication: This patient is fairly well-known to me and his course is well-documented elsewhere.  I had performed a left femoral to popliteal bypass approximately 6-1/2 years ago.  At the time of that surgery we made a new diagnosis of diabetes and it is suspected this patient has been diabetic for many years that was unrecognized because he had not seen a physician prior to that.  Unfortunately he has continued to smoke since his surgery and presented with a 2-week history of left foot pain and coolness.  CT angiogram demonstrated is a left femoral to popliteal bypass graft had occluded and he had extremely poor runoff in the tibial vessels which almost appeared not unreconstructable.  However the major new finding was that since his surgery he is now occluded both his left common and external iliac arteries from the aorta to the proximal anastomosis of the graft which is essentially cut off all inflow to the left leg including the profundus femoral artery.  He understood our plan for the procedure.  In reviewing his old arteriograms from 6-1/2 years ago his iliac arteries were hypoplastic measuring 3.2 to 3.5 mm in diameter.  He was aware that the surgery had with the risk of limb loss death graft failure graft infection  and no guarantees were made as to outcome        Description: Supine position sterile prep and drape.  Antibiotics given general endotracheal anesthesia.  A reoperative left femoral artery cutdown was performed and a cutdown on the right femoral artery was performed and a 5 [place] sheath was placed in the right common femoral artery.  Heparin was given.  Aortogram again confirmed occlusion of the left common and external iliac artery and left profundus femoral artery.  The right sided iliac system was patent but extremely small.  We then performed a cutdown on the top of the luna of the left proximal graft and I performed thromboembolectomy using a Elliott embolectomy catheter of the left popliteal graft in the left popliteal artery.  It was also noted that the profundus femoral was completely occluded with fresh clot and old clot.  Somewhat remarkable this patient's leg had survived.  I was able to perform a fairly extensive thromboembolectomy of the left profundus femoral artery and obtained good backbleeding.      I then tunneled an 8 mm ringed PTFE propatent graft in the suprapubic space from the right common femoral artery to the left common femoral artery at the luna of the previously placed left femoral to popliteal bypass.  The right common femoral artery was opened and an endarterectomy was performed with specimen sent to pathology.  The graft was then sutured with 6-0 Prolene to the right common femoral arteriotomy.  We then turned our attention to the left leg and the left side of the PTFE graft was fashioned into a cobra head configuration and sutured to the arteriotomy over the left common femoral extending onto the luna of the old vein graft.  This then provided flow to the graft as well as the profundus femoral on the left.  Good hemostasis was obtained protamine was given to reverse the heparin and we detected a fairly good quality Doppler signal in the left posterior tibial vessel following the  procedure.  The incisions were closed with multiple layers of Vicryl suture the sponge and needle count reported as correct.  Estimated blood loss less than 150 mL and no blood was transfused.  There was no apparent early complications.  Total fluoroscopy time approximately 12 seconds.  Total contrast given 45 mL      Please note that portions of this note were completed with a voice recognition program. Efforts were made to edit the dictations, but occasionally words are mistranscribed.

## 2023-11-23 NOTE — PROGRESS NOTES
Subjective:     Encounter Date:11/20/2023      Patient ID: Justo Oquendo is a 48 y.o. male.    Chief Complaint:  History of Present Illness    History of Present Illness:  48-year-old male with diabetes, hypertension, hyperlipidemia, smoker with prior history of peripheral vascular disease and prior bypass surgery.  No history of cardiac issues.  Patient was recently seen by Dr. Ryan at that time was noted to have a cold foot.  Yesterday he underwent a femorofemoral bypass.  His blood pressures been elevated postop requiring Cardene.  We are asked to see the patient for management of hypertension.  The patient states his blood pressure runs well at home..  He thinks the elevated blood pressure secondary to pain.  He denies tightness heaviness squeezing pressure chest jaw throat arms.  He has had no palpitations.  States he does wheeze on occasion.  Has noticed dyspnea on exertion for about 5 months but has not progressed.  He smokes a pack a day.  He states since surgery his left foot feels better     Cardiac risk factors: #1 male sex #2 diabetes #3 hywetension #4 hyperlipidemia #5 smoker     Past medical and surgical history, social and family history reviewed in EMR.       Update 11/23/23  Remains on cardene drip.  About the same    ROS    Procedures       Objective:     Constitutional:       Appearance: Healthy appearance. Not in distress.   Neck:      Vascular: No JVR. JVD normal.   Pulmonary:      Effort: Pulmonary effort is normal.      Breath sounds: Normal breath sounds. No wheezing. No rhonchi. No rales.   Chest:      Chest wall: Not tender to palpatation.   Cardiovascular:      PMI at left midclavicular line. Normal rate. Regular rhythm. Normal S1. Normal S2.       Murmurs: There is no murmur.      No gallop.  No click. No rub.   Pulses:     Intact distal pulses.   Edema:     Peripheral edema absent.   Abdominal:      Palpations: Abdomen is soft.      Tenderness: There is no abdominal tenderness.    Musculoskeletal: Normal range of motion.         General: No tenderness. Skin:     General: Skin is warm and dry.   Neurological:      General: No focal deficit present.      Mental Status: Alert and oriented to person, place and time.         Lab Review:       Assessment/Plan: Patient has significant hypertension requiring Cardene postop.  The patient feels that the hypertension is secondary to pain.  He states at home his blood pressure is well-controlled.  started on clonidine as needed.  Also started on BB.  On ACE.  Will start oral nicardipine and try to titrate the IV off.   Does have a history of new onset dyspnea for the last 5 months and a stable pattern.  He has multiple CAD risk factors.  Consider outpatient eval.  Will get an echo today.  Had CAC and atherosclerosis of arota.  On DAPT and statin

## 2023-11-23 NOTE — PROGRESS NOTES
CTS Progress Note       LOS: 3 days   Patient Care Team:  Melo Whitaker MD as PCP - General (Family Medicine)  Niall Mcfarlane MD as Consulting Physician (Cardiology)    Chief Complaint: Claudication of lower extremity    Vital Signs:  Temp:  [98.1 °F (36.7 °C)-99.7 °F (37.6 °C)] 99.4 °F (37.4 °C)  Heart Rate:  [] 98  Resp:  [18-22] 20  BP: (104-157)/(59-74) 126/63  Groin site satisfactory feet are warm and viable       Results:     Results from last 7 days   Lab Units 11/23/23  0335   WBC 10*3/mm3 11.02*   HEMOGLOBIN g/dL 11.1*   HEMATOCRIT % 33.0*   PLATELETS 10*3/mm3 150     Results from last 7 days   Lab Units 11/23/23  0335   SODIUM mmol/L 135*   POTASSIUM mmol/L 4.3   CHLORIDE mmol/L 102   CO2 mmol/L 23.0   BUN mg/dL 11   CREATININE mg/dL 0.77   GLUCOSE mg/dL 172*   CALCIUM mg/dL 8.8           Imaging Results (Last 24 Hours)       ** No results found for the last 24 hours. **            Assessment      Claudication of lower extremity    PAD (peripheral artery disease)    Patient is ambulatory but is still on some Cardene to keep blood pressure under control.  I discussed this with the patient and he had a rather difficult bypass with PTFE graft sutured to the PTFE graft.  I believe it would be best to let him go home tomorrow Friday, November 24 and he is agreeable to that plan.  I will transition him to Xarelto to start on Monday so he should need a meds to beds prescription for Xarelto to be delivered today or tomorrow so that he can begin this Monday morning at home    Plan   Anticipate discharge home tomorrow  Will need meds to bed prescription Xarelto 2.5 mg p.o. twice daily to begin on Monday    Please note that portions of this note were completed with a voice recognition program. Efforts were made to edit the dictations, but occasionally words are mistranscribed.    Brett Ryan MD  11/23/23  05:50 EST

## 2023-11-23 NOTE — PROGRESS NOTES
Intensive Care Follow-up     Hospital:  LOS: 3 days   Mr. Justo Oquendo, 48 y.o. male is followed for:   Claudication of lower extremity            History of present illness:   Justo Oquendo is a 48-year-old male with past medical history of diabetes, hypertension, tobacco abuse, PVD status post left femoral above-the-knee popliteal bypass (April 2017).  Patient was evaluated in the office by CT surgery and directly admitted afterwards with a cold, left lower extremity with no detectable dopplerable pulse in the left DP.       Subjective   Interval History:  Patient doing well today still on nicardipine.  But no other acute complaints.             The patient's past medical, surgical and social history were reviewed and updated in Epic as appropriate.       Objective     Infusions:  niCARdipine, 5-15 mg/hr, Last Rate: 12.5 mg/hr (11/23/23 0649)  sodium chloride, 100 mL/hr, Last Rate: 100 mL/hr (11/21/23 1412)      Medications:  aspirin, 81 mg, Oral, Nightly  clopidogrel, 75 mg, Oral, Daily  famotidine, 20 mg, Intravenous, Q12H   Or  famotidine, 20 mg, Oral, Q12H  hydrALAZINE, 25 mg, Oral, Q8H  insulin detemir, 8 Units, Subcutaneous, Q12H  insulin lispro, 2-9 Units, Subcutaneous, 4x Daily AC & at Bedtime  lisinopril, 20 mg, Oral, Once  lisinopril, 40 mg, Oral, Nightly  metoprolol tartrate, 25 mg, Oral, Q12H  nicotine, 1 patch, Transdermal, Q24H  rosuvastatin, 20 mg, Oral, Nightly  senna-docusate sodium, 2 tablet, Oral, BID  sodium chloride, 10 mL, Intravenous, Q12H      I reviewed the patient's medications.    Vital Sign Min/Max for last 24 hours  Temp  Min: 98.1 °F (36.7 °C)  Max: 99.7 °F (37.6 °C)   BP  Min: 104/69  Max: 157/65   Pulse  Min: 68  Max: 113   Resp  Min: 16  Max: 22   SpO2  Min: 87 %  Max: 95 %   Flow (L/min)  Min: 2  Max: 2       Input/Output for last 24 hour shift  11/22 0701 - 11/23 0700  In: 7763.1 [I.V.:7763.1]  Out: 4250 [Urine:4250]      GENERAL : NAD, conversant  RESPIRATORY/THORAX : normal  respiratory effort and no intercostal retractions, CTAB  CARDIOVASCULAR : Normal S1/S2, RRR. no lower ext edema.  GASTROINTESTINAL : Soft, NT/ND. BS x 4 normoactive. No hepatosplenomegaly.  MUSCULOSKELETAL : No cyanosis, clubbing, or ischemia  NEUROLOGICAL: alert and oriented to person, place and time  PSYCHOLOGICAL : Appropriate affect     Results from last 7 days   Lab Units 11/23/23  0335 11/22/23 0313 11/21/23  0356   WBC 10*3/mm3 11.02* 9.43 9.55   HEMOGLOBIN g/dL 11.1* 11.6* 14.4   PLATELETS 10*3/mm3 150 161 187     Results from last 7 days   Lab Units 11/23/23  0335 11/22/23 0313 11/21/23  0356   SODIUM mmol/L 135* 134* 136   POTASSIUM mmol/L 4.3 4.2 4.1   CO2 mmol/L 23.0 24.0 26.0   BUN mg/dL 11 15 19   CREATININE mg/dL 0.77 0.80 0.82   GLUCOSE mg/dL 172* 148* 277*     Estimated Creatinine Clearance: 125.8 mL/min (by C-G formula based on SCr of 0.77 mg/dL).          I reviewed the patient's new clinical results.  I reviewed the patient's new imaging results/reports including actual images and agree with reports.         Assessment & Plan   Impression        Claudication of lower extremity s/p femorofemoral bypass    Hypertension    Hyperlipidemia    Tobacco abuse    Diabetes mellitus type II, non insulin dependent       Plan        48-year-old male with a past medical history significant for hypertension, hyperlipidemia, peripheral arterial disease and diabetes mellitus type 2.  Who presented to [facility] you status post femorofemoral bypass per Dr. Ryan on 11/21/2023.    -Postop orders per surgery  -Nicardipine for blood pressure control per protocol, continue lisinopril and metoprolol we will increase lisinopril to 40 mg daily and add hydralazine and attempt trying off the nicardipine.  -Monitor peripheral pulses per protocol  -Aspirin/statin/Plavix  -Continue insulin for blood sugar control, goal blood glucose less than 180  -Ensure adequate pain control  -Mobilize patient per  protocol  -Aggressive pulmonary toilet, goal oxygen saturation greater than 88%  -SCDs for DVT prophylaxis     I conducted multidisciplinary rounds in the plan of care was discussed with the multidisciplinary team at that time. In attendance at multidisciplinary rounds was clinical pharmacist, dietitian, nursing staff, and case management.    I discussed the patient's findings and my recommendations with patient and nursing staff       He Salinas, DO  Pulmonary, Critical care and Sleep Medicine

## 2023-11-24 ENCOUNTER — READMISSION MANAGEMENT (OUTPATIENT)
Dept: CALL CENTER | Facility: HOSPITAL | Age: 48
End: 2023-11-24
Payer: COMMERCIAL

## 2023-11-24 VITALS
OXYGEN SATURATION: 95 % | RESPIRATION RATE: 16 BRPM | DIASTOLIC BLOOD PRESSURE: 73 MMHG | BODY MASS INDEX: 32.7 KG/M2 | HEIGHT: 67 IN | SYSTOLIC BLOOD PRESSURE: 157 MMHG | HEART RATE: 96 BPM | TEMPERATURE: 98.8 F | WEIGHT: 208.34 LBS

## 2023-11-24 LAB
ANION GAP SERPL CALCULATED.3IONS-SCNC: 10 MMOL/L (ref 5–15)
BASOPHILS # BLD AUTO: 0.03 10*3/MM3 (ref 0–0.2)
BASOPHILS NFR BLD AUTO: 0.3 % (ref 0–1.5)
BUN SERPL-MCNC: 11 MG/DL (ref 6–20)
BUN/CREAT SERPL: 18.3 (ref 7–25)
CALCIUM SPEC-SCNC: 9.2 MG/DL (ref 8.6–10.5)
CHLORIDE SERPL-SCNC: 101 MMOL/L (ref 98–107)
CO2 SERPL-SCNC: 26 MMOL/L (ref 22–29)
CREAT SERPL-MCNC: 0.6 MG/DL (ref 0.76–1.27)
DEPRECATED RDW RBC AUTO: 39 FL (ref 37–54)
EGFRCR SERPLBLD CKD-EPI 2021: 117.6 ML/MIN/1.73
EOSINOPHIL # BLD AUTO: 0.1 10*3/MM3 (ref 0–0.4)
EOSINOPHIL NFR BLD AUTO: 0.9 % (ref 0.3–6.2)
ERYTHROCYTE [DISTWIDTH] IN BLOOD BY AUTOMATED COUNT: 11.6 % (ref 12.3–15.4)
GLUCOSE BLDC GLUCOMTR-MCNC: 163 MG/DL (ref 70–130)
GLUCOSE SERPL-MCNC: 160 MG/DL (ref 65–99)
HCT VFR BLD AUTO: 32.7 % (ref 37.5–51)
HGB BLD-MCNC: 10.9 G/DL (ref 13–17.7)
IMM GRANULOCYTES # BLD AUTO: 0.07 10*3/MM3 (ref 0–0.05)
IMM GRANULOCYTES NFR BLD AUTO: 0.7 % (ref 0–0.5)
LYMPHOCYTES # BLD AUTO: 1.26 10*3/MM3 (ref 0.7–3.1)
LYMPHOCYTES NFR BLD AUTO: 11.8 % (ref 19.6–45.3)
MCH RBC QN AUTO: 30.6 PG (ref 26.6–33)
MCHC RBC AUTO-ENTMCNC: 33.3 G/DL (ref 31.5–35.7)
MCV RBC AUTO: 91.9 FL (ref 79–97)
MONOCYTES # BLD AUTO: 1.26 10*3/MM3 (ref 0.1–0.9)
MONOCYTES NFR BLD AUTO: 11.8 % (ref 5–12)
NEUTROPHILS NFR BLD AUTO: 7.97 10*3/MM3 (ref 1.7–7)
NEUTROPHILS NFR BLD AUTO: 74.5 % (ref 42.7–76)
NRBC BLD AUTO-RTO: 0 /100 WBC (ref 0–0.2)
PLATELET # BLD AUTO: 151 10*3/MM3 (ref 140–450)
PMV BLD AUTO: 10.6 FL (ref 6–12)
POTASSIUM SERPL-SCNC: 3.9 MMOL/L (ref 3.5–5.2)
RBC # BLD AUTO: 3.56 10*6/MM3 (ref 4.14–5.8)
SODIUM SERPL-SCNC: 137 MMOL/L (ref 136–145)
WBC NRBC COR # BLD AUTO: 10.69 10*3/MM3 (ref 3.4–10.8)

## 2023-11-24 PROCEDURE — 99232 SBSQ HOSP IP/OBS MODERATE 35: CPT | Performed by: INTERNAL MEDICINE

## 2023-11-24 PROCEDURE — 99024 POSTOP FOLLOW-UP VISIT: CPT | Performed by: THORACIC SURGERY (CARDIOTHORACIC VASCULAR SURGERY)

## 2023-11-24 PROCEDURE — 25810000003 SODIUM CHLORIDE 0.9 % SOLUTION 250 ML FLEX CONT: Performed by: PHYSICIAN ASSISTANT

## 2023-11-24 PROCEDURE — 80048 BASIC METABOLIC PNL TOTAL CA: CPT

## 2023-11-24 PROCEDURE — 63710000001 INSULIN LISPRO (HUMAN) PER 5 UNITS: Performed by: INTERNAL MEDICINE

## 2023-11-24 PROCEDURE — 85025 COMPLETE CBC W/AUTO DIFF WBC: CPT

## 2023-11-24 PROCEDURE — 25010000002 HYDROMORPHONE PER 4 MG: Performed by: PHYSICIAN ASSISTANT

## 2023-11-24 PROCEDURE — 63710000001 INSULIN DETEMIR PER 5 UNITS: Performed by: INTERNAL MEDICINE

## 2023-11-24 PROCEDURE — 63710000001 INSULIN LISPRO (HUMAN) PER 5 UNITS: Performed by: PHYSICIAN ASSISTANT

## 2023-11-24 PROCEDURE — 82948 REAGENT STRIP/BLOOD GLUCOSE: CPT

## 2023-11-24 RX ORDER — LISINOPRIL 40 MG/1
40 TABLET ORAL NIGHTLY
Qty: 30 TABLET | Refills: 6 | Status: SHIPPED | OUTPATIENT
Start: 2023-11-24

## 2023-11-24 RX ORDER — CLOPIDOGREL BISULFATE 75 MG/1
75 TABLET ORAL NIGHTLY
Qty: 3 TABLET | Refills: 0 | Status: SHIPPED | OUTPATIENT
Start: 2023-11-24

## 2023-11-24 RX ORDER — HYDROCODONE BITARTRATE AND ACETAMINOPHEN 10; 325 MG/1; MG/1
1 TABLET ORAL EVERY 8 HOURS PRN
COMMUNITY

## 2023-11-24 RX ADMIN — OXYCODONE HYDROCHLORIDE AND ACETAMINOPHEN 1 TABLET: 10; 325 TABLET ORAL at 02:27

## 2023-11-24 RX ADMIN — INSULIN LISPRO 5 UNITS: 100 INJECTION, SOLUTION INTRAVENOUS; SUBCUTANEOUS at 08:26

## 2023-11-24 RX ADMIN — HYDROMORPHONE HYDROCHLORIDE 0.5 MG: 1 INJECTION, SOLUTION INTRAMUSCULAR; INTRAVENOUS; SUBCUTANEOUS at 02:27

## 2023-11-24 RX ADMIN — SENNOSIDES AND DOCUSATE SODIUM 2 TABLET: 8.6; 5 TABLET ORAL at 08:25

## 2023-11-24 RX ADMIN — NICARDIPINE HYDROCHLORIDE 20 MG: 20 CAPSULE ORAL at 05:00

## 2023-11-24 RX ADMIN — CLOPIDOGREL BISULFATE 75 MG: 75 TABLET ORAL at 08:25

## 2023-11-24 RX ADMIN — METOPROLOL TARTRATE 25 MG: 25 TABLET, FILM COATED ORAL at 08:24

## 2023-11-24 RX ADMIN — FAMOTIDINE 20 MG: 20 TABLET, FILM COATED ORAL at 08:24

## 2023-11-24 RX ADMIN — INSULIN DETEMIR 15 UNITS: 100 INJECTION, SOLUTION SUBCUTANEOUS at 08:26

## 2023-11-24 RX ADMIN — HYDRALAZINE HYDROCHLORIDE 25 MG: 25 TABLET ORAL at 04:19

## 2023-11-24 RX ADMIN — HYDROMORPHONE HYDROCHLORIDE 0.5 MG: 1 INJECTION, SOLUTION INTRAMUSCULAR; INTRAVENOUS; SUBCUTANEOUS at 04:18

## 2023-11-24 RX ADMIN — HYDROMORPHONE HYDROCHLORIDE 0.5 MG: 1 INJECTION, SOLUTION INTRAMUSCULAR; INTRAVENOUS; SUBCUTANEOUS at 00:10

## 2023-11-24 RX ADMIN — NICARDIPINE HYDROCHLORIDE 20 MG: 20 CAPSULE ORAL at 04:20

## 2023-11-24 RX ADMIN — OXYCODONE HYDROCHLORIDE AND ACETAMINOPHEN 1 TABLET: 10; 325 TABLET ORAL at 08:25

## 2023-11-24 RX ADMIN — NICARDIPINE HYDROCHLORIDE 5 MG/HR: 25 INJECTION, SOLUTION INTRAVENOUS at 05:40

## 2023-11-24 RX ADMIN — CLONIDINE HYDROCHLORIDE 0.1 MG: 0.1 TABLET ORAL at 04:22

## 2023-11-24 RX ADMIN — INSULIN LISPRO 2 UNITS: 100 INJECTION, SOLUTION INTRAVENOUS; SUBCUTANEOUS at 08:26

## 2023-11-24 RX ADMIN — HYDRALAZINE HYDROCHLORIDE 25 MG: 25 TABLET ORAL at 05:00

## 2023-11-24 NOTE — OUTREACH NOTE
Prep Survey      Flowsheet Row Responses   [facility] facility patient discharged from? [facility]   Is LACE score < 7 ? No   Eligibility Readm Mgmt   Discharge diagnosis THROMBECTOMY OF LEFT GRAFT, AORTAGRAM, FEMORAL TO FEMORAL BYPASS   Does the patient have one of the following disease processes/diagnoses(primary or secondary)? General Surgery   Does the patient have Home health ordered? No   Is there a DME ordered? No   Prep survey completed? Yes            Delfina BARRERA - Registered Nurse

## 2023-11-24 NOTE — PROGRESS NOTES
Intensive Care Follow-up     Hospital:  LOS: 4 days   Mr. Justo Oquendo, 48 y.o. male is followed for:   Claudication of lower extremity            History of present illness:   Justo Oquendo is a 48-year-old male with past medical history of diabetes, hypertension, tobacco abuse, PVD status post left femoral above-the-knee popliteal bypass (April 2017).  Patient was evaluated in the office by CT surgery and directly admitted afterwards with a cold, left lower extremity with no detectable dopplerable pulse in the left DP.        Subjective   Interval History:  Patient doing well this morning.  Off nicardipine blood pressure well-controlled now.  Denies any chest pain, nausea, fever, or chills.             The patient's past medical, surgical and social history were reviewed and updated in Epic as appropriate.       Objective     Infusions:  niCARdipine, 5-15 mg/hr, Last Rate: 5 mg/hr (11/24/23 0540)  sodium chloride, 100 mL/hr, Last Rate: 100 mL/hr (11/21/23 1412)      Medications:  aspirin, 81 mg, Oral, Nightly  clopidogrel, 75 mg, Oral, Daily  famotidine, 20 mg, Intravenous, Q12H   Or  famotidine, 20 mg, Oral, Q12H  hydrALAZINE, 25 mg, Oral, Q8H  insulin detemir, 15 Units, Subcutaneous, Q12H  insulin lispro, 2-9 Units, Subcutaneous, 4x Daily AC & at Bedtime  Insulin Lispro, 5 Units, Subcutaneous, TID With Meals  lisinopril, 40 mg, Oral, Nightly  metoprolol tartrate, 25 mg, Oral, Q12H  niCARdipine, 20 mg, Oral, Q8H  nicotine, 1 patch, Transdermal, Q24H  rosuvastatin, 20 mg, Oral, Nightly  senna-docusate sodium, 2 tablet, Oral, BID  sodium chloride, 10 mL, Intravenous, Q12H      I reviewed the patient's medications.    Vital Sign Min/Max for last 24 hours  Temp  Min: 98.2 °F (36.8 °C)  Max: 99.3 °F (37.4 °C)   BP  Min: 108/57  Max: 158/75   Pulse  Min: 79  Max: 112   Resp  Min: 16  Max: 22   SpO2  Min: 89 %  Max: 99 %   Flow (L/min)  Min: 2  Max: 2       Input/Output for last 24 hour shift  11/23 0701 - 11/24  0700  In: -   Out: 5650 [Urine:5650]      GENERAL : NAD, conversant  RESPIRATORY/THORAX : normal respiratory effort and no intercostal retractions, CTAB  CARDIOVASCULAR : Normal S1/S2, RRR. no lower ext edema.  GASTROINTESTINAL : Soft, NT/ND. BS x 4 normoactive. No hepatosplenomegaly.  MUSCULOSKELETAL : No cyanosis, clubbing, or ischemia  NEUROLOGICAL: alert and oriented to person, place and time  PSYCHOLOGICAL : Appropriate affect     Results from last 7 days   Lab Units 11/24/23  0439 11/23/23 0335 11/22/23 0313   WBC 10*3/mm3 10.69 11.02* 9.43   HEMOGLOBIN g/dL 10.9* 11.1* 11.6*   PLATELETS 10*3/mm3 151 150 161     Results from last 7 days   Lab Units 11/24/23 0420 11/23/23 0335 11/22/23 0313   SODIUM mmol/L 137 135* 134*   POTASSIUM mmol/L 3.9 4.3 4.2   CO2 mmol/L 26.0 23.0 24.0   BUN mg/dL 11 11 15   CREATININE mg/dL 0.60* 0.77 0.80   GLUCOSE mg/dL 160* 172* 148*     Estimated Creatinine Clearance: 161.5 mL/min (A) (by C-G formula based on SCr of 0.6 mg/dL (L)).          I reviewed the patient's new clinical results.  I reviewed the patient's new imaging results/reports including actual images and agree with reports.           Assessment & Plan   Impression        Claudication of lower extremity s/p femorofemoral bypass    Hypertension    Hyperlipidemia    Tobacco abuse    Diabetes mellitus type II, non insulin dependent       Plan        48-year-old male with a past medical history significant for hypertension, hyperlipidemia, peripheral arterial disease and diabetes mellitus type 2.  Who presented to [facility] you status post femorofemoral bypass per Dr. Ryan on 11/21/2023.     -Postop orders per surgery  Continue lisinopril 40 mg daily and 25 mg hydralazine 3 times daily.  I did asked the patient to measure his blood pressure throughout the day when he gets home if his systolic blood pressure is less than 120 that he should not take the hydralazine.  Also recommend he follow-up with  his PCP in 2 weeks for optimizing his blood pressure medications.  Likely could benefit from something that is less frequent than hydralazine.  -Monitor peripheral pulses per protocol  -Aspirin/statin/Plavix  -Continue insulin for blood sugar control, goal blood glucose less than 180  -Aggressive pulmonary toilet, goal oxygen saturation greater than 88%  -Medically okay to be discharged    I conducted multidisciplinary rounds in the plan of care was discussed with the multidisciplinary team at that time. In attendance at multidisciplinary rounds was clinical pharmacist, dietitian, nursing staff, and case management.    I discussed the patient's findings and my recommendations with patient, family, and nursing staff       He Salinas, DO  Pulmonary, Critical care and Sleep Medicine

## 2023-11-24 NOTE — PROGRESS NOTES
CTS Progress Note       LOS: 4 days   Patient Care Team:  Melo Whitaker MD as PCP - General (Family Medicine)  Niall Mcfarlane MD as Consulting Physician (Cardiology)    Chief Complaint: Claudication of lower extremity    Vital Signs:  Temp:  [98.2 °F (36.8 °C)-99.3 °F (37.4 °C)] 99 °F (37.2 °C)  Heart Rate:  [] 93  Resp:  [16-22] 20  BP: (108-158)/(55-77) 158/75    Physical Exam: Feet are viable       Results:     Results from last 7 days   Lab Units 11/24/23  0439   WBC 10*3/mm3 10.69   HEMOGLOBIN g/dL 10.9*   HEMATOCRIT % 32.7*   PLATELETS 10*3/mm3 151     Results from last 7 days   Lab Units 11/24/23  0420   SODIUM mmol/L 137   POTASSIUM mmol/L 3.9   CHLORIDE mmol/L 101   CO2 mmol/L 26.0   BUN mg/dL 11   CREATININE mg/dL 0.60*   GLUCOSE mg/dL 160*   CALCIUM mg/dL 9.2           Imaging Results (Last 24 Hours)       ** No results found for the last 24 hours. **            Assessment      Claudication of lower extremity s/p femorofemoral bypass    Hypertension    Hyperlipidemia    Tobacco abuse    Diabetes mellitus type II, non insulin dependent    Patient has been ambulatory and is ready for discharge.  We discussed the plan in detail.  I will outline the list below we have decided to begin his Xarelto on [place] morning 11/26/2023    Plan   Discharge home today  Patient will need Narco prescription at discharge check with my nurse practitioner  Patient to remove groin dressings on Monday 11 27 2023  Patient needs some meds to beds prescription for Xarelto 2.5 p.o. twice daily.  He will begin his Xarelto on [place] morning 11/26/2023 and take his last Plavix dose [place] morning 11/27/2023  Needs to follow-up in my office in just 7 to 9 days for staple removal and groin wound incision check.... must not be longer than 9 days for follow-up    Please note that portions of this note were completed with a voice recognition program. Efforts were made to edit the dictations, but occasionally words  are mistranscribed.    Brett Ryan MD  11/24/23  06:19 EST

## 2023-11-28 ENCOUNTER — READMISSION MANAGEMENT (OUTPATIENT)
Dept: CALL CENTER | Facility: HOSPITAL | Age: 48
End: 2023-11-28
Payer: COMMERCIAL

## 2023-11-28 NOTE — DISCHARGE SUMMARY
CTS Discharge Summary    Patient Care Team:  Melo Whitaker MD as PCP - General (Family Medicine)  Niall Mcfarlane MD as Consulting Physician (Cardiology)  Consults:   Consults       No orders found from 10/22/2023 to 11/21/2023.            Date of Admission: 11/20/2023  9:56 AM  Date of Discharge:  11/24/2023    Discharge Diagnosis  Past Medical History:   Diagnosis Date    Arthritis     Back pain     Diabetes     SINCE MARCH 2017    Dyslipidemia     HTN (hypertension)     PVD (peripheral vascular disease)     Wears partial dentures      PAD (peripheral artery disease) [I73.9]  Claudication of lower extremity [I73.9]     Procedures Performed  Procedure(s):  THROMBECTOMY OF LEFT GRAFT, AORTAGRAM, FEMORAL TO FEMORAL BYPASS       Operative Pathology:   Final Diagnosis   LEFT GROIN, THROMBUS:  Benign thrombus       History of Present Illness  Justo Oquendo is a 48 y.o. male with a history of type 2 diabetes, hypertension, PVD s/p left femoral to above-the-knee popliteal bypass with Dr. Ryan on 4/25/2017, and current smoker.  He was seen in office today by Dr. Ryan to evaluate left foot pain.  It was noted that he had a very cold left foot with no detectable Doppler signal in the left DP or DP.  He was admitted to our facility for further evaluation and treatment.     Hospital Course  Patient presented to the OR on 11/21/2023 for Reoperative left femoral artery cutdown.  Thromboembolectomy of a left femoral popliteal bypass graft.  Thromboembolectomy of the profundus femoral artery and thromboembolectomy of the left left popliteal artery.  Aortogram.  Right femoral to left femoral bypass with 8 mm PTFE graft  The patient was transported to recovery in stable condition.    POD1 Feet warm and viable with Doppler signal in the posterior tibial arteries bilaterally groins are satisfactory. Patient remains on cardene for BP control. Plavix started  POD2 Remains on cardene  POD3 The patient met  discharge criteria and was discharged to home. Patient to remove groin dressings on Monday 11 27 2023  Patient needs some meds to beds prescription for Xarelto 2.5 p.o. twice daily.  He will begin his Xarelto on [place] morning 11/26/2023 and take his last Plavix dose [place] morning 11/27/2023  Needs to follow-up in my office in just 7 to 9 days for staple removal and groin wound incision check.... must not be longer than 9 days for follow-up        Discharge Medications     Discharge Medications        New Medications        Instructions Start Date   metoprolol tartrate 25 MG tablet  Commonly known as: LOPRESSOR   25 mg, Oral, Every 12 Hours Scheduled      Xarelto 2.5 MG tablet  Generic drug: Rivaroxaban   Take 1 tablet by mouth 2 (Two) Times a Day. Start taking on Sunday, November 26. This will be your first dose. Take your last dose of Clopidogrel on Sunday, November 26 and stop the Clopidogrel.             Changes to Medications        Instructions Start Date   clopidogrel 75 MG tablet  Commonly known as: PLAVIX  What changed: additional instructions   75 mg, Oral, Nightly, Take your last dose of Plavix on Sunday, November 26. Then stop      lisinopril 40 MG tablet  Commonly known as: PRINIVIL,ZESTRIL  What changed:   medication strength  how much to take   40 mg, Oral, Nightly             Continue These Medications        Instructions Start Date   aspirin 81 MG EC tablet   81 mg, Oral, Nightly      fenofibrate 160 MG tablet   160 mg, Oral, Nightly      HYDROcodone-acetaminophen  MG per tablet  Commonly known as: NORCO   1 tablet, Oral, Every 8 Hours PRN      rosuvastatin 20 MG tablet  Commonly known as: CRESTOR   20 mg, Oral, Nightly      Synjardy 12.5-1000 MG tablet  Generic drug: Empagliflozin-metFORMIN HCl   Oral             ASK your doctor about these medications        Instructions Start Date   Invokana 100 MG tablet tablet  Generic drug: Canagliflozin   100 mg, Daily      metFORMIN  MG 24 hr  tablet  Commonly known as: GLUCOPHAGE-XR   750 mg, Nightly      nicotine 14 MG/24HR patch  Commonly known as: Nicotine Step 2   1 patch, Transdermal, Every 24 Hours                 Activity at Discharge:   Activity Instructions       Bathing Restrictions      Shower daily.  Use a clean wash cloth and antibacterial bar or liquid soap to clean incisions. No tub bath, swimming or hot tub until instructed by MD.    Type of Restriction: Bathing    Bathing Restrictions: No Tub Bath    Driving Restrictions      Type of Restriction: Driving    Driving Restrictions: No Driving Until Next Appointment    Lifting Restrictions      Type of Restriction: Lifting    Lifting Restrictions: Lifting Restriction (Indicate Limit)    Weight Limit (Pounds): 10    Length of Lifting Restriction: until next appointment    Other Activity Instructions      Activity Instructions: Do not sit at 90 degrees until follow up appointment            Follow-up Appointments  Future Appointments   Date Time Provider Department Center   12/8/2023  9:45 AM Celso Hoyt MD [facility]   12/20/2023  1:00 PM Madina Jesus APRN [facility]        WOUND CARE:  Do NOT remove staples or sutures, they will be addressed at the clinic in follow up.   Monitor surgical wounds daily. Keep incisons clean and dry.   Call CT Surgery office [phone]  with any questions or concerns, specifically let them know of increased redness, drainage, or opening up of incision.       Alma Chester PA-C  11/28/23  10:24 EST

## 2023-11-28 NOTE — OUTREACH NOTE
General Surgery Week 1 Survey      Flowsheet Row Responses   [facility] facility patient discharged from? [place]   Does the patient have one of the following disease processes/diagnoses(primary or secondary)? General Surgery   Week 1 attempt successful? No   Unsuccessful attempts Attempt 1            AYDEE Blanca Registered Nurse

## 2023-11-30 LAB — GLUCOSE BLDC GLUCOMTR-MCNC: 173 MG/DL (ref 70–130)

## 2023-12-01 ENCOUNTER — READMISSION MANAGEMENT (OUTPATIENT)
Dept: CALL CENTER | Facility: HOSPITAL | Age: 48
End: 2023-12-01
Payer: COMMERCIAL

## 2023-12-01 NOTE — OUTREACH NOTE
General Surgery Week 1 Survey      Flowsheet Row Responses   [facility] patient discharged from? [place]   Does the patient have one of the following disease processes/diagnoses(primary or secondary)? General Surgery   Week 1 attempt successful? No   Unsuccessful attempts Attempt 2            [place] Blanca Registered Nurse

## 2023-12-07 ENCOUNTER — TELEPHONE (OUTPATIENT)
Dept: CARDIOLOGY | Facility: CLINIC | Age: 48
End: 2023-12-07

## 2023-12-11 ENCOUNTER — READMISSION MANAGEMENT (OUTPATIENT)
Dept: CALL CENTER | Facility: HOSPITAL | Age: 48
End: 2023-12-11
Payer: COMMERCIAL

## 2023-12-11 NOTE — OUTREACH NOTE
General Surgery Week 3 Survey      Flowsheet Row Responses   [facility] patient discharged from? [place]   Does the patient have one of the following disease processes/diagnoses(primary or secondary)? General Surgery   Week 3 attempt successful? No   Unsuccessful attempts Attempt 1   Revoke Decline to participate  [utrx3]            Margie BARRERA - Registered Nurse

## 2023-12-20 ENCOUNTER — HOSPITAL ENCOUNTER (OUTPATIENT)
Dept: CT IMAGING | Facility: HOSPITAL | Age: 48
Discharge: HOME | End: 2023-12-20
Payer: COMMERCIAL

## 2023-12-20 ENCOUNTER — OFFICE VISIT (OUTPATIENT)
Dept: CARDIAC SURGERY | Facility: CLINIC | Age: 48
End: 2023-12-20
Payer: COMMERCIAL

## 2023-12-20 VITALS
BODY MASS INDEX: 26 KG/M2 | SYSTOLIC BLOOD PRESSURE: 139 MMHG | HEART RATE: 105 BPM | DIASTOLIC BLOOD PRESSURE: 70 MMHG | WEIGHT: 166 LBS | OXYGEN SATURATION: 99 % | TEMPERATURE: 98.5 F

## 2023-12-20 DIAGNOSIS — I73.9 PAD (PERIPHERAL ARTERY DISEASE): ICD-10-CM

## 2023-12-20 DIAGNOSIS — I73.9 PAD (PERIPHERAL ARTERY DISEASE): Primary | ICD-10-CM

## 2023-12-20 PROCEDURE — 75635 CT ANGIO ABDOMINAL ARTERIES: CPT

## 2023-12-20 PROCEDURE — 99024 POSTOP FOLLOW-UP VISIT: CPT

## 2023-12-20 PROCEDURE — 25510000001 IOPAMIDOL PER 1 ML

## 2023-12-20 RX ORDER — OXYCODONE HCL 20 MG/1
20 TABLET, FILM COATED, EXTENDED RELEASE ORAL EVERY 12 HOURS
COMMUNITY

## 2023-12-20 RX ORDER — OXYCODONE HYDROCHLORIDE 20 MG/1
20 TABLET ORAL EVERY 4 HOURS PRN
COMMUNITY

## 2023-12-20 RX ADMIN — IOPAMIDOL 114 ML: 755 INJECTION, SOLUTION INTRAVENOUS at 19:13

## 2023-12-20 NOTE — PROGRESS NOTES
[facility] Cardiothoracic Surgery Office Follow Up Note     Date of Encounter: 2023     Name: Justo Oquendo  : 1975     Referred By: No ref. provider found  PCP: Melo Whitaker MD    Chief Complaint:    Chief Complaint   Patient presents with    Peripheral Vascular Disease     Follow-up to evaluate swelling and pain in left ankle/foot. Patient has some discoloration redness in left foot/toes. Swelling has started in the last week        Subjective      History of Present Illness:    Justo Oquendo is a 48 y.o. male with a history of arthritis, type 2 diabetes, hypertension, hyperlipidemia, tobacco abuse, PVD and PAD.  He underwent recent thrombectomy of left femoral-popliteal bypass graft as well as a femorofemoral bypass with Dr. Ryan on 2023.  Pathology was consistent with left groin thrombus and he was discharged home on POD # 4 in stable condition.  He was seen yesterday by his PCP who recommended close follow-up by our office due to LLE swelling and presents today to be seen. He reports 1 week history of swelling to his LLE and redness. This occurred after he was working in his attic and bent over. He was taken off of Plavix but has been compliant with Xarelto and ASA.  He unfortunately continues to smoke but has been trying to quit. He does have a stable, unchanged ulceration to his left third toe that has been present prior to surgery. He denies any claudication or rest pain.     Review of Systems:  Review of Systems   Constitutional: Negative for chills, decreased appetite, diaphoresis, fever, malaise/fatigue, night sweats, weight gain and weight loss.   HENT:  Negative for hoarse voice.    Eyes:  Negative for blurred vision, double vision and visual disturbance.   Cardiovascular:  Positive for leg swelling. Negative for chest pain, claudication, dyspnea on exertion, irregular heartbeat, near-syncope, orthopnea, palpitations, paroxysmal nocturnal dyspnea and  syncope.   Respiratory:  Negative for cough, hemoptysis, shortness of breath, sputum production and wheezing.    Hematologic/Lymphatic: Negative for adenopathy and bleeding problem. Does not bruise/bleed easily.   Skin:  Negative for color change, nail changes, poor wound healing and rash.   Musculoskeletal:  Positive for back pain. Negative for falls and muscle cramps.   Gastrointestinal:  Negative for abdominal pain, dysphagia and heartburn.   Genitourinary:  Negative for flank pain.   Neurological:  Negative for brief paralysis, disturbances in coordination, dizziness, focal weakness, headaches, light-headedness, loss of balance, numbness, paresthesias, sensory change, vertigo and weakness.   Psychiatric/Behavioral:  Positive for depression. Negative for suicidal ideas. The patient is not nervous/anxious.    Allergic/Immunologic: Negative for persistent infections.       I have reviewed the following portions of the patient's history: problem list, current medications, allergies, past surgical history, past medical history, past social history, past family history, and ROS and confirm it's accurate.    Allergies:  No Known Allergies    Medications:      Current Outpatient Medications:     aspirin 81 MG EC tablet, Take 1 tablet by mouth Every Night., Disp: , Rfl:     Canagliflozin (INVOKANA) 100 MG tablet, Take 1 tablet by mouth Daily., Disp: , Rfl:     Empagliflozin-metFORMIN HCl (Synjardy) 12.5-1000 MG tablet, Take  by mouth., Disp: , Rfl:     fenofibrate 160 MG tablet, Take 1 tablet by mouth Every Night., Disp: , Rfl:     HYDROcodone-acetaminophen (NORCO)  MG per tablet, Take 1 tablet by mouth Every 8 (Eight) Hours As Needed for Moderate Pain., Disp: , Rfl:     lisinopril (PRINIVIL,ZESTRIL) 40 MG tablet, Take 1 tablet by mouth Every Night., Disp: 30 tablet, Rfl: 6    metoprolol tartrate (LOPRESSOR) 25 MG tablet, Take 1 tablet by mouth Every 12 (Twelve) Hours., Disp: 30 tablet, Rfl: 6    nicotine (NICOTINE  STEP 2) 14 MG/24HR patch, Place 1 patch on the skin Daily., Disp: 21 patch, Rfl: 1    oxyCODONE (ROXICODONE) 20 MG tablet, Take 1 tablet by mouth Every 4 (Four) Hours As Needed for Moderate Pain., Disp: , Rfl:     oxyCODONE ER (oxyCONTIN) 20 MG 12 hr tablet, Take 1 tablet by mouth Every 12 (Twelve) Hours., Disp: , Rfl:     Rivaroxaban (XARELTO) 2.5 MG tablet, Take 1 tablet by mouth 2 (Two) Times a Day. Start taking on Sunday, November 26. This will be your first dose. Take your last dose of Clopidogrel on Sunday, November 26 and stop the Clopidogrel., Disp: 60 tablet, Rfl: 6    rosuvastatin (CRESTOR) 20 MG tablet, Take 1 tablet by mouth Every Night., Disp: , Rfl:     clopidogrel (PLAVIX) 75 MG tablet, Take 1 tablet by mouth Every Night. Take your last dose of Plavix on Sunday, November 26. Then stop (Patient not taking: Reported on 12/20/2023), Disp: 3 tablet, Rfl: 0    metFORMIN ER (GLUCOPHAGE-XR) 750 MG 24 hr tablet, Take 750 mg by mouth Every Night. (Patient not taking: Reported on 11/20/2023), Disp: , Rfl:     History:   Past Medical History:   Diagnosis Date    Arthritis     Back pain     Diabetes     SINCE MARCH 2017    Dyslipidemia     HTN (hypertension)     PVD (peripheral vascular disease)     Wears partial dentures        Past Surgical History:   Procedure Laterality Date    AORTAGRAM N/A 04/25/2017    Procedure: AORTAGRAM WITH OR WITHOUT RUNOFFS POSSIBLE STENT with left femoral cutdown;  Surgeon: Brett Ryan MD;  Location:  [facility];  Service:     AORTAGRAM N/A 11/21/2023    Procedure: THROMBECTOMY OF LEFT GRAFT, AORTAGRAM, FEMORAL TO FEMORAL BYPASS;  Surgeon: Brett Ryan MD;  Location:  [facility];  Service: Vascular;  Laterality: N/A;  FLUORO- .06 SECS  DOSE- 10 MGY  CONTRAST- 10 ML ISO    CHOLECYSTECTOMY      CYST REMOVAL      OFF OF BACK    PA IN-SITU VEIN BYPASS FEMORAL-POPLITEAL Left 04/25/2017    Procedure: FEMORAL POPLITEAL BYPASS;  Surgeon: Brett Ryan MD;   Location:  [facility];  Service: Vascular    MT IN-SITU VEIN BYPASS FEMORAL-POPLITEAL Left 06/27/2017    Procedure: LEFT FEMORAL ENDARTECTOMYN LEFT FEMORAL POPLITEAL BYPASS;  Surgeon: Brett Ryan MD;  [facility];  Service: Vascular       Social History     Socioeconomic History    Marital status:     Number of children: 2   Tobacco Use    Smoking status: Every Day     Packs/day: 1.00     Years: 30.00     Additional pack years: 0.00     Total pack years: 30.00     Types: Cigarettes    Smokeless tobacco: Never    Tobacco comments:     Pt states that he is trying to quit, was once only smoking a few a day but has increased to 1 ppd, Pt states that he has smoked 30 plus years.   Vaping Use    Vaping Use: Never used   Substance and Sexual Activity    Alcohol use: No    Drug use: No    Sexual activity: Yes     Partners: Female     Birth control/protection: None        Family History   Problem Relation Age of Onset    Arthritis Mother     Liver disease Father     Kidney disease Father        Objective     Physical Exam:  Vitals:    12/20/23 0839   BP: 139/70   BP Location: Right arm   Patient Position: Sitting   Pulse: 105   Temp: 98.5 °F (36.9 °C)   SpO2: 99%   Weight: 75.3 kg (166 lb)      Body mass index is 26 kg/m².    Physical Exam  Vitals and nursing note reviewed.   Constitutional:       Appearance: Normal appearance.   HENT:      Head: Normocephalic.      Mouth/Throat:      Pharynx: Oropharynx is clear.   Eyes:      Pupils: Pupils are equal, round, and reactive to light.   Cardiovascular:      Rate and Rhythm: Normal rate.      Pulses:           Dorsalis pedis pulses are detected w/ Doppler on the right side.      Comments: Monophasic left DP and PT-very faint on doppler  Pulmonary:      Effort: Pulmonary effort is normal.   Abdominal:      General: Bowel sounds are normal.   Musculoskeletal:      Cervical back: Normal range of motion.      Comments: Sensation is intact, LLE is viable    Skin:     Comments: See images below   Neurological:      General: No focal deficit present.      Mental Status: He is alert.   Psychiatric:         Mood and Affect: Mood normal.         Behavior: Behavior normal.   LLE:        Right groin:      Left groin:      Assessment / Plan    Justo Oquendo is a 48 y.o. male with a history of arthritis, type 2 diabetes, hypertension, hyperlipidemia, tobacco abuse, PVD and PAD.  He underwent recent thrombectomy of left femoral-popliteal bypass graft as well as a femorofemoral bypass with Dr. Ryan on 11/21/2023.  Pathology was consistent with left groin thrombus and he was discharged home on POD # 4 in stable condition.  He was seen yesterday by his PCP who recommended close follow-up by our office due to LLE swelling and presents today to be seen. He reports 1 week history of swelling to his LLE and redness. This occurred after he was working in his attic and bent over. He was taken off of Plavix but has been compliant with Xarelto and ASA.  He unfortunately continues to smoke but has been trying to quit. He does have a stable, unchanged ulceration to his left third toe that has been present prior to surgery. He denies any claudication or rest pain.     Assessment / Plan:  Diagnoses and all orders for this visit:    1. PAD (peripheral artery disease) (Primary)    S/p left femoral-popliteal bypass graft thrombectomy and femorofemoral bypass with Dr. Ryan on 11/21/2023  Bilateral groin staples (10) were removed in office without difficulty or complication  STAT CT angio abdominal aorta bilateral iliofemoral w/ runoff and 3D Recon  Case was discussed with Dr. Ryan who recommended STAT CT angio abdominal aorta and bilateral iliofemoral with runoff with 3D recon. No need for inpatient hospitalization at this time.  Continue ASA, Xarelto  Discussed CT scan findings with patient. 3 vessel runoff noted to left foot. Also discussed with Dr. Ryan, felt to be  reperfusion pain.     Patient Education:  Instructed patient to go to the nearest ER with any acute changes to his LLE such as decreased sensation, pain, movement, and coolness. He understood these instructions and had no further questions or concerns.     Follow Up:   As needed    Or sooner for any further concerns or worsening sign and symptoms. If unable to reach us in the office please dial 911 or go to the nearest emergency department.      PRIMO Slater  [facility] Cardiothoracic Surgery        Time Spent: I spent 45 minutes caring for Justo on this date of service. This time includes time spent by me in the following activities: preparing for the visit, reviewing tests, obtaining and/or reviewing a separately obtained history, performing a medically appropriate examination and/or evaluation, counseling and educating the patient/family/caregiver, ordering medications, tests, or procedures, referring and communicating with other health care professionals, documenting information in the medical record, independently interpreting results and communicating that information with the patient/family/caregiver, and care coordination.

## 2023-12-21 ENCOUNTER — TELEPHONE (OUTPATIENT)
Dept: CARDIAC SURGERY | Facility: CLINIC | Age: 48
End: 2023-12-21

## 2023-12-21 NOTE — TELEPHONE ENCOUNTER
Caller: Christine Oquendo    Relationship: Emergency Contact    Best call back number: [phone]    What is the best time to reach you: ANY    Who are you requesting to speak with (clinical staff, provider,  specific staff member): CLINICAL    Do you know the name of the person who called: NA    What was the call regarding: PT SPOUSE CONCERNED REGARDING  CT ANGIO TEST RESULTS FROM 12/20/23    Is it okay if the provider responds through MyChart: N/A

## 2024-01-05 ENCOUNTER — TELEPHONE (OUTPATIENT)
Dept: CARDIAC SURGERY | Facility: CLINIC | Age: 49
End: 2024-01-05
Payer: COMMERCIAL

## 2024-01-05 NOTE — TELEPHONE ENCOUNTER
Dr. Whitaker office called concerned with patient's left foot. Patient had video visit this morning. Dr. Whitaker office states that foot was black and patient was having a lot of pain.   I explained to Dr. Whitaker office, if foot is black patient will need to be evaluated in the ER for ischemic limb.   I spoke with patient- he states he continues to have left foot pain, ulceration on left 4th toe is about the size of a dime and is dark reddish in color. When he is up walking on leg foot is purplish, swells, and painful. He elevates leg and normal color returns, swelling decreases. He denies worsening discoloration in foot.   Appointment with PRIMO Fontenot has been moved up to 1/8/24. Patient has spouse aware.   I have attempted to call Dr. Whitaker office to inform them.

## 2024-01-08 ENCOUNTER — OFFICE VISIT (OUTPATIENT)
Dept: CARDIAC SURGERY | Facility: CLINIC | Age: 49
End: 2024-01-08
Payer: COMMERCIAL

## 2024-01-08 DIAGNOSIS — I73.9 PAD (PERIPHERAL ARTERY DISEASE): Primary | ICD-10-CM

## 2024-01-08 RX ORDER — GABAPENTIN 300 MG/1
CAPSULE ORAL
COMMUNITY
Start: 2023-12-26 | End: 2024-01-14

## 2024-01-08 RX ORDER — GABAPENTIN 600 MG/1
600 TABLET ORAL 3 TIMES DAILY
Status: ON HOLD | COMMUNITY
Start: 2024-01-05

## 2024-01-08 NOTE — PROGRESS NOTES
[facility] Cardiothoracic Surgery Office Follow Up Note    Date of Encounter: 2024     Name: Justo Oquendo  : 1975     Referred By: No ref. provider found  PCP: Melo Whitaker MD    Chief Complaint:    No chief complaint on file.      Subjective      History of Present Illness:    It was nice to see Justo Oquendo in follow up.  He is a pleasant 48 y.o. male with PMH significant for arthritis, type II diabetes mellitus, hypertension, hyperlipidemia on statin therapy, current tobacco dependence, peripheral vascular and arterial disease s/p thrombectomy of left femoral-popliteal bypass graft as well as a femorofemoral bypass by Dr. Ryan on 2023.  Hospital course was uncomplicated and he was deemed appropriate for discharge home on POD #4.    Patient was last seen in office by PRIMO Plaza on 2023 at which time he reported left lower extremity swelling and redness x 1 week.  He had a stable unchanged ulceration to his left third toe that was present prior to surgery.  Patient denied symptoms of claudication or rest pain.  Mr. Oquendo presents today with complaint of constant throbbing pain, redness, and discoloration of the left foot.  He reports pain as being worse at night, preventing him from sleeping.  He reports medication compliance with Xarelto and aspirin but he unfortunately does continue to smoke.     Review of Systems:  Review of Systems   Constitutional: Positive for decreased appetite and malaise/fatigue. Negative for diaphoresis, fever, night sweats, weight gain and weight loss. Chills: 50/50.  HENT:  Negative for hoarse voice.    Eyes:  Negative for blurred vision, double vision and visual disturbance.   Cardiovascular:  Positive for leg swelling. Negative for chest pain, claudication, dyspnea on exertion, irregular heartbeat, near-syncope, orthopnea, palpitations, paroxysmal nocturnal dyspnea and syncope.   Respiratory:  Negative for cough,  hemoptysis, shortness of breath, sputum production and wheezing.    Hematologic/Lymphatic: Positive for bleeding problem. Negative for adenopathy. Bruises/bleeds easily.   Skin:  Positive for color change (left foot), dry skin (left foot) and poor wound healing. Negative for nail changes and rash.   Musculoskeletal:  Positive for joint pain (left foot pain constantly dailt but mouch worse at night). Negative for back pain, falls and muscle cramps.   Gastrointestinal:  Negative for abdominal pain, dysphagia and heartburn.   Genitourinary:  Negative for flank pain.   Neurological:  Positive for dizziness (when standing and walking) and numbness. Negative for brief paralysis, disturbances in coordination, focal weakness, headaches, light-headedness, loss of balance, paresthesias, sensory change, vertigo and weakness.   Psychiatric/Behavioral:  Positive for depression. Negative for suicidal ideas. The patient has insomnia (pt states that he can't sleep for the pain).    Allergic/Immunologic: Negative for persistent infections.       I have reviewed the following portions of the patient's history: problem list, current medications, allergies, past surgical history, past medical history, past social history, past family history, and ROS and confirm it's accurate.    Allergies:  No Known Allergies    Medications:      Current Outpatient Medications:     aspirin 81 MG EC tablet, Take 1 tablet by mouth Every Night., Disp: , Rfl:     Canagliflozin (INVOKANA) 100 MG tablet, Take 1 tablet by mouth Daily., Disp: , Rfl:     Empagliflozin-metFORMIN HCl (Synjardy) 12.5-1000 MG tablet, Take  by mouth., Disp: , Rfl:     fenofibrate 160 MG tablet, Take 1 tablet by mouth Every Night., Disp: , Rfl:     gabapentin (NEURONTIN) 300 MG capsule, , Disp: , Rfl:     gabapentin (NEURONTIN) 600 MG tablet, , Disp: , Rfl:     HYDROcodone-acetaminophen (NORCO)  MG per tablet, Take 1 tablet by mouth Every 8 (Eight) Hours As Needed for Moderate  Pain., Disp: , Rfl:     lisinopril (PRINIVIL,ZESTRIL) 40 MG tablet, Take 1 tablet by mouth Every Night., Disp: 30 tablet, Rfl: 6    metoprolol tartrate (LOPRESSOR) 25 MG tablet, Take 1 tablet by mouth Every 12 (Twelve) Hours., Disp: 30 tablet, Rfl: 6    nicotine (NICOTINE STEP 2) 14 MG/24HR patch, Place 1 patch on the skin Daily., Disp: 21 patch, Rfl: 1    oxyCODONE (ROXICODONE) 20 MG tablet, Take 1 tablet by mouth Every 4 (Four) Hours As Needed for Moderate Pain., Disp: , Rfl:     oxyCODONE ER (oxyCONTIN) 20 MG 12 hr tablet, Take 1 tablet by mouth Every 12 (Twelve) Hours., Disp: , Rfl:     Rivaroxaban (XARELTO) 2.5 MG tablet, Take 1 tablet by mouth 2 (Two) Times a Day. Start taking on Sunday, November 26. This will be your first dose. Take your last dose of Clopidogrel on Sunday, November 26 and stop the Clopidogrel., Disp: 60 tablet, Rfl: 6    rosuvastatin (CRESTOR) 20 MG tablet, Take 1 tablet by mouth Every Night., Disp: , Rfl:     History:   Past Medical History:   Diagnosis Date    Arthritis     Back pain     Diabetes     SINCE MARCH 2017    Dyslipidemia     HTN (hypertension)     PVD (peripheral vascular disease)     Wears partial dentures        Past Surgical History:   Procedure Laterality Date    AORTAGRAM N/A 04/25/2017    Procedure: AORTAGRAM WITH OR WITHOUT RUNOFFS POSSIBLE STENT with left femoral cutdown;  Surgeon: Brett Ryan MD;  [facility]: [facility];  Service:     AORTAGRAM N/A 11/21/2023    Procedure: THROMBECTOMY OF LEFT GRAFT, AORTAGRAM, FEMORAL TO FEMORAL BYPASS;  Surgeon: Brett Ryan MD;  Location: [facility];  Service: Vascular;  Laterality: N/A;  FLUORO- .06 SECS  DOSE- 10 MGY  CONTRAST- 10 ML ISO    CHOLECYSTECTOMY      CYST REMOVAL      OFF OF BACK    HI IN-SITU VEIN BYPASS FEMORAL-POPLITEAL Left 04/25/2017    Procedure: FEMORAL POPLITEAL BYPASS;  Surgeon: Brett Ryan MD;  Location:  [facility];  Service: Vascular    HI IN-SITU VEIN BYPASS  FEMORAL-POPLITEAL Left 06/27/2017    Procedure: LEFT FEMORAL ENDARTECTOMYN LEFT FEMORAL POPLITEAL BYPASS;  Surgeon: Brett Ryan MD;  Location: [facility];  Service: Vascular       Social History     Socioeconomic History    Marital status:     Number of children: 2   Tobacco Use    Smoking status: Every Day     Packs/day: 1.00     Years: 30.00     Additional pack years: 0.00     Total pack years: 30.00     Types: Cigarettes    Smokeless tobacco: Never    Tobacco comments:     Pt states that he is trying to quit, was once only smoking a few a day but has increased to 1 ppd, Pt states that he has smoked 30 plus years. Pt is smoking 7 or 8 a day as of 12/20/23.    Vaping Use    Vaping Use: Never used   Substance and Sexual Activity    Alcohol use: No    Drug use: No    Sexual activity: Yes     Partners: Female     Birth control/protection: None        Family History   Problem Relation Age of Onset    Arthritis Mother     Liver disease Father     Kidney disease Father        Objective     Physical Exam:  There were no vitals filed for this visit.   There is no height or weight on file to calculate BMI.    Physical Exam  Vitals reviewed.   Constitutional:       General: He is not in acute distress.     Appearance: He is not ill-appearing.   Eyes:      Pupils: Pupils are equal, round, and reactive to light.   Cardiovascular:      Pulses:           Dorsalis pedis pulses are 0 on the right side.        Posterior tibial pulses are 0 on the right side.   Pulmonary:      Effort: Pulmonary effort is normal.   Feet:      Right foot:      Skin integrity: Erythema present.      Comments: Left 2nd-4th toe with discoloration.   Neurological:      General: No focal deficit present.      Mental Status: He is alert and oriented to person, place, and time.   Psychiatric:         Mood and Affect: Mood normal.         Behavior: Behavior normal.         Thought Content: Thought content normal.         Judgment: Judgment  normal.         Imaging/Labs:  CT Angio Abdominal Aorta Bilateral Iliofem Runoff    Result Date: 12/20/2023  Impression: 1. 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 Occluded left femoral-popliteal graft, as on prior study. 2. New femoral-femoral graft which appears normally patent. 3. The new femoral graft supplies deep femoral branch vessels, with small caliber branches eventually reconstituting the popliteal artery, and with three-vessel runoff to the left foot. 4. Occluded right superficial femoral artery again noted. Deep femoral artery branch reconstitution of the right popliteal artery, with two-vessel runoff to the foot. Note: I discussed the patient's findings with him and his wife while he remained here in the CT scan suite. He was offered transport to the emergency room, but he says his left lower extremity symptoms are not currently worsening, and they prefer to drive back home, and consult with vascular surgery tomorrow. The indicate they will return here, to the ER if the patient has significant worsening symptoms overnight. Electronically Signed: Antonio Lamb MD  12/20/2023 7:56 PM EST  Workstation ID: [id number]        Assessment / Plan      Assessment / Plan:  PMH significant for arthritis, type II diabetes mellitus, hypertension, hyperlipidemia on statin therapy, current tobacco dependence, peripheral vascular and arterial disease s/p thrombectomy of left femoral-popliteal bypass graft as well as a femorofemoral bypass by Dr. Ryan on 11/21/2023.  Hospital course was uncomplicated and he was deemed appropriate for discharge home on POD #4.    Peripheral vascular disease  Peripheral arterial disease  S/p thrombectomy of left femoral-popliteal bypass graft as well as a femorofemoral bypass by Dr. Ryan on 11/21/2023.  Last seen in office on 12/20/2023.  Reported LLE swelling and redness x 1 week.   Stable ulceration to his left third toe that was present prior to surgery.  Denied symptoms  of claudication or rest pain.   A STAT CT angio abdominal aorta and bilateral iliofemoral with runoff and 3D recon was recommended, as resulted above under imaging/labs.  There was 3 vessel runoff noted to the foot.   Findings were discussed with patient and Dr. Ryan.   Presents today with complain of constant throbbing pain, redness, and discoloration of the left foot.   Reports pain as being worse at night, preventing him from sleeping.   Upon examination I was unable to obtain a DP or PT.  I did obtain a popliteal with ultrasound.    The foot is with new onset discoloration and worsening ulceration of left 3rd toe.    Case was discussed with Dr. Ryan.   Unfortunately, there is nothing further to offer from a vascular standpoint.  Patient had been prescribed gabapentin by another provider but per his wife had not been taking them as it made him dizzy.    I encouraged patient to try taking the gabapentin as it targets neuropathy pain which is what he is experiencing.    Plan of care was discussed with patient/wife.  Verbalized understanding.     Follow Up:   Refer to Dr. Christo Peres  We will plan for follow up in 1 year.   Patient encouraged to call the office with any questions or concerns.     Thank you for allowing me to participate in your care.  Best Regards,    Madina Jesus, PRIMO  [facility] Cardiothoracic Surgery  01/09/24  18:53 EST

## 2024-01-11 ENCOUNTER — TELEPHONE (OUTPATIENT)
Dept: CARDIOLOGY | Facility: CLINIC | Age: 49
End: 2024-01-11
Payer: COMMERCIAL

## 2024-01-11 NOTE — TELEPHONE ENCOUNTER
Received call from Dr. Peres's office at [facility] requesting medical clearance for this patient. Patient has not been seen by Dr. Hoyt as he has canceled one appointment and was a no-show for his rescheduled follow-up. Rhonda made aware and all questions answered at this time

## 2024-01-14 ENCOUNTER — HOSPITAL ENCOUNTER (INPATIENT)
Facility: HOSPITAL | Age: 49
LOS: 10 days | Discharge: HOME | End: 2024-01-24
Attending: EMERGENCY MEDICINE | Admitting: HOSPITALIST
Payer: COMMERCIAL

## 2024-01-14 ENCOUNTER — APPOINTMENT (OUTPATIENT)
Dept: GENERAL RADIOLOGY | Facility: HOSPITAL | Age: 49
End: 2024-01-14
Payer: COMMERCIAL

## 2024-01-14 ENCOUNTER — APPOINTMENT (OUTPATIENT)
Dept: CT IMAGING | Facility: HOSPITAL | Age: 49
End: 2024-01-14
Payer: COMMERCIAL

## 2024-01-14 DIAGNOSIS — M79.672 ISCHEMIC PAIN OF LEFT FOOT: Primary | ICD-10-CM

## 2024-01-14 DIAGNOSIS — Z78.9 IMPAIRED MOBILITY AND ADLS: ICD-10-CM

## 2024-01-14 DIAGNOSIS — I95.9 SEPSIS ASSOCIATED HYPOTENSION: ICD-10-CM

## 2024-01-14 DIAGNOSIS — A41.9 SEPSIS ASSOCIATED HYPOTENSION: ICD-10-CM

## 2024-01-14 DIAGNOSIS — I73.9 CLAUDICATION OF LOWER EXTREMITY: ICD-10-CM

## 2024-01-14 DIAGNOSIS — I99.8 ISCHEMIC PAIN OF LEFT FOOT: Primary | ICD-10-CM

## 2024-01-14 DIAGNOSIS — R79.89 ELEVATED SERUM CREATININE: ICD-10-CM

## 2024-01-14 DIAGNOSIS — Z72.0 TOBACCO ABUSE: ICD-10-CM

## 2024-01-14 DIAGNOSIS — I96 ISCHEMIC NECROSIS OF TOE: ICD-10-CM

## 2024-01-14 DIAGNOSIS — N17.0 ATN (ACUTE TUBULAR NECROSIS): ICD-10-CM

## 2024-01-14 DIAGNOSIS — I99.8 ISCHEMIC FOOT: ICD-10-CM

## 2024-01-14 DIAGNOSIS — I73.9 PVD (PERIPHERAL VASCULAR DISEASE): ICD-10-CM

## 2024-01-14 DIAGNOSIS — I73.9 PERIPHERAL ARTERIAL DISEASE: ICD-10-CM

## 2024-01-14 DIAGNOSIS — Z74.09 IMPAIRED MOBILITY AND ADLS: ICD-10-CM

## 2024-01-14 DIAGNOSIS — E11.9 DIABETES MELLITUS TYPE II, NON INSULIN DEPENDENT: ICD-10-CM

## 2024-01-14 LAB
ALBUMIN SERPL-MCNC: 3.8 G/DL (ref 3.5–5.2)
ALBUMIN/GLOB SERPL: 1 G/DL
ALP SERPL-CCNC: 83 U/L (ref 39–117)
ALT SERPL W P-5'-P-CCNC: 13 U/L (ref 1–41)
ANION GAP SERPL CALCULATED.3IONS-SCNC: 13 MMOL/L (ref 5–15)
APTT PPP: 38.7 SECONDS (ref 60–90)
AST SERPL-CCNC: 14 U/L (ref 1–40)
BASOPHILS # BLD AUTO: 0.03 10*3/MM3 (ref 0–0.2)
BASOPHILS NFR BLD AUTO: 0.2 % (ref 0–1.5)
BILIRUB SERPL-MCNC: 0.2 MG/DL (ref 0–1.2)
BUN SERPL-MCNC: 30 MG/DL (ref 6–20)
BUN/CREAT SERPL: 14.5 (ref 7–25)
CALCIUM SPEC-SCNC: 10 MG/DL (ref 8.6–10.5)
CHLORIDE SERPL-SCNC: 96 MMOL/L (ref 98–107)
CK SERPL-CCNC: 57 U/L (ref 20–200)
CO2 SERPL-SCNC: 25 MMOL/L (ref 22–29)
CREAT SERPL-MCNC: 2.07 MG/DL (ref 0.76–1.27)
CRP SERPL-MCNC: 2.6 MG/DL (ref 0–0.5)
D-LACTATE SERPL-SCNC: 1.2 MMOL/L (ref 0.5–2)
DEPRECATED RDW RBC AUTO: 39.8 FL (ref 37–54)
EGFRCR SERPLBLD CKD-EPI 2021: 38.3 ML/MIN/1.73
EOSINOPHIL # BLD AUTO: 0.1 10*3/MM3 (ref 0–0.4)
EOSINOPHIL NFR BLD AUTO: 0.7 % (ref 0.3–6.2)
ERYTHROCYTE [DISTWIDTH] IN BLOOD BY AUTOMATED COUNT: 11.8 % (ref 12.3–15.4)
ERYTHROCYTE [SEDIMENTATION RATE] IN BLOOD: 100 MM/HR (ref 0–20)
GLOBULIN UR ELPH-MCNC: 3.7 GM/DL
GLUCOSE BLDC GLUCOMTR-MCNC: 154 MG/DL (ref 70–130)
GLUCOSE SERPL-MCNC: 302 MG/DL (ref 65–99)
HCT VFR BLD AUTO: 36.9 % (ref 37.5–51)
HGB BLD-MCNC: 11.8 G/DL (ref 13–17.7)
HOLD SPECIMEN: NORMAL
IMM GRANULOCYTES # BLD AUTO: 0.17 10*3/MM3 (ref 0–0.05)
IMM GRANULOCYTES NFR BLD AUTO: 1.2 % (ref 0–0.5)
INR PPP: 1.17 (ref 0.89–1.12)
LYMPHOCYTES # BLD AUTO: 2.57 10*3/MM3 (ref 0.7–3.1)
LYMPHOCYTES NFR BLD AUTO: 18.6 % (ref 19.6–45.3)
MAGNESIUM SERPL-MCNC: 2.1 MG/DL (ref 1.6–2.6)
MCH RBC QN AUTO: 29.7 PG (ref 26.6–33)
MCHC RBC AUTO-ENTMCNC: 32 G/DL (ref 31.5–35.7)
MCV RBC AUTO: 92.9 FL (ref 79–97)
MONOCYTES # BLD AUTO: 1.48 10*3/MM3 (ref 0.1–0.9)
MONOCYTES NFR BLD AUTO: 10.7 % (ref 5–12)
MRSA DNA SPEC QL NAA+PROBE: NORMAL
NEUTROPHILS NFR BLD AUTO: 68.6 % (ref 42.7–76)
NEUTROPHILS NFR BLD AUTO: 9.47 10*3/MM3 (ref 1.7–7)
NRBC BLD AUTO-RTO: 0 /100 WBC (ref 0–0.2)
NT-PROBNP SERPL-MCNC: 63.8 PG/ML (ref 0–900)
PLATELET # BLD AUTO: 333 10*3/MM3 (ref 140–450)
PMV BLD AUTO: 10.2 FL (ref 6–12)
POTASSIUM SERPL-SCNC: 4.1 MMOL/L (ref 3.5–5.2)
PROCALCITONIN SERPL-MCNC: 0.19 NG/ML (ref 0–0.25)
PROT SERPL-MCNC: 7.5 G/DL (ref 6–8.5)
PROTHROMBIN TIME: 15 SECONDS (ref 12.2–14.5)
RBC # BLD AUTO: 3.97 10*6/MM3 (ref 4.14–5.8)
SODIUM SERPL-SCNC: 134 MMOL/L (ref 136–145)
TROPONIN T SERPL HS-MCNC: 18 NG/L
UFH PPP CHRO-ACNC: 0.14 IU/ML (ref 0.3–0.7)
WBC NRBC COR # BLD AUTO: 13.82 10*3/MM3 (ref 3.4–10.8)
WHOLE BLOOD HOLD COAG: NORMAL
WHOLE BLOOD HOLD SPECIMEN: NORMAL

## 2024-01-14 PROCEDURE — 25810000003 SODIUM CHLORIDE 0.9 % SOLUTION

## 2024-01-14 PROCEDURE — 83880 ASSAY OF NATRIURETIC PEPTIDE: CPT

## 2024-01-14 PROCEDURE — 71045 X-RAY EXAM CHEST 1 VIEW: CPT

## 2024-01-14 PROCEDURE — 25010000002 HYDROCORTISONE SOD SUC (PF) 100 MG RECONSTITUTED SOLUTION: Performed by: FAMILY MEDICINE

## 2024-01-14 PROCEDURE — 84145 PROCALCITONIN (PCT): CPT

## 2024-01-14 PROCEDURE — 25010000002 MORPHINE PER 10 MG: Performed by: EMERGENCY MEDICINE

## 2024-01-14 PROCEDURE — 85025 COMPLETE CBC W/AUTO DIFF WBC: CPT | Performed by: EMERGENCY MEDICINE

## 2024-01-14 PROCEDURE — 85730 THROMBOPLASTIN TIME PARTIAL: CPT | Performed by: FAMILY MEDICINE

## 2024-01-14 PROCEDURE — 82948 REAGENT STRIP/BLOOD GLUCOSE: CPT

## 2024-01-14 PROCEDURE — 25510000001 IOPAMIDOL PER 1 ML: Performed by: EMERGENCY MEDICINE

## 2024-01-14 PROCEDURE — 87641 MR-STAPH DNA AMP PROBE: CPT

## 2024-01-14 PROCEDURE — 85520 HEPARIN ASSAY: CPT | Performed by: FAMILY MEDICINE

## 2024-01-14 PROCEDURE — 25010000002 VANCOMYCIN 10 G RECONSTITUTED SOLUTION

## 2024-01-14 PROCEDURE — 99223 1ST HOSP IP/OBS HIGH 75: CPT | Performed by: FAMILY MEDICINE

## 2024-01-14 PROCEDURE — 82550 ASSAY OF CK (CPK): CPT | Performed by: INTERNAL MEDICINE

## 2024-01-14 PROCEDURE — 36415 COLL VENOUS BLD VENIPUNCTURE: CPT

## 2024-01-14 PROCEDURE — 25010000002 CEFTRIAXONE PER 250 MG

## 2024-01-14 PROCEDURE — 63710000001 INSULIN LISPRO (HUMAN) PER 5 UNITS: Performed by: FAMILY MEDICINE

## 2024-01-14 PROCEDURE — 86140 C-REACTIVE PROTEIN: CPT

## 2024-01-14 PROCEDURE — 93005 ELECTROCARDIOGRAM TRACING: CPT | Performed by: EMERGENCY MEDICINE

## 2024-01-14 PROCEDURE — 99285 EMERGENCY DEPT VISIT HI MDM: CPT

## 2024-01-14 PROCEDURE — 85610 PROTHROMBIN TIME: CPT | Performed by: FAMILY MEDICINE

## 2024-01-14 PROCEDURE — 83735 ASSAY OF MAGNESIUM: CPT | Performed by: EMERGENCY MEDICINE

## 2024-01-14 PROCEDURE — 25010000002 HEPARIN (PORCINE) 25000-0.45 UT/250ML-% SOLUTION: Performed by: FAMILY MEDICINE

## 2024-01-14 PROCEDURE — 85652 RBC SED RATE AUTOMATED: CPT

## 2024-01-14 PROCEDURE — 87040 BLOOD CULTURE FOR BACTERIA: CPT

## 2024-01-14 PROCEDURE — 83605 ASSAY OF LACTIC ACID: CPT

## 2024-01-14 PROCEDURE — 75635 CT ANGIO ABDOMINAL ARTERIES: CPT

## 2024-01-14 PROCEDURE — 80053 COMPREHEN METABOLIC PANEL: CPT | Performed by: EMERGENCY MEDICINE

## 2024-01-14 PROCEDURE — 84484 ASSAY OF TROPONIN QUANT: CPT | Performed by: EMERGENCY MEDICINE

## 2024-01-14 PROCEDURE — 25810000003 SODIUM CHLORIDE 0.9 % SOLUTION: Performed by: FAMILY MEDICINE

## 2024-01-14 RX ORDER — MORPHINE SULFATE 4 MG/ML
4 INJECTION, SOLUTION INTRAMUSCULAR; INTRAVENOUS ONCE
Status: COMPLETED | OUTPATIENT
Start: 2024-01-14 | End: 2024-01-14

## 2024-01-14 RX ORDER — SODIUM CHLORIDE 9 MG/ML
125 INJECTION, SOLUTION INTRAVENOUS CONTINUOUS
Status: DISCONTINUED | OUTPATIENT
Start: 2024-01-14 | End: 2024-01-17

## 2024-01-14 RX ORDER — INSULIN LISPRO 100 [IU]/ML
2-7 INJECTION, SOLUTION INTRAVENOUS; SUBCUTANEOUS
Status: DISCONTINUED | OUTPATIENT
Start: 2024-01-14 | End: 2024-01-24 | Stop reason: HOSPADM

## 2024-01-14 RX ORDER — ASPIRIN 81 MG/1
81 TABLET ORAL NIGHTLY
Status: DISCONTINUED | OUTPATIENT
Start: 2024-01-14 | End: 2024-01-24 | Stop reason: HOSPADM

## 2024-01-14 RX ORDER — NITROGLYCERIN 0.4 MG/1
0.4 TABLET SUBLINGUAL
Status: DISCONTINUED | OUTPATIENT
Start: 2024-01-14 | End: 2024-01-24 | Stop reason: HOSPADM

## 2024-01-14 RX ORDER — NICOTINE POLACRILEX 4 MG
15 LOZENGE BUCCAL
Status: DISCONTINUED | OUTPATIENT
Start: 2024-01-14 | End: 2024-01-24 | Stop reason: HOSPADM

## 2024-01-14 RX ORDER — HEPARIN SODIUM 1000 [USP'U]/ML
50 INJECTION, SOLUTION INTRAVENOUS; SUBCUTANEOUS AS NEEDED
Status: DISCONTINUED | OUTPATIENT
Start: 2024-01-14 | End: 2024-01-14

## 2024-01-14 RX ORDER — ONDANSETRON 2 MG/ML
4 INJECTION INTRAMUSCULAR; INTRAVENOUS EVERY 6 HOURS PRN
Status: DISCONTINUED | OUTPATIENT
Start: 2024-01-14 | End: 2024-01-24 | Stop reason: HOSPADM

## 2024-01-14 RX ORDER — VANCOMYCIN/0.9 % SOD CHLORIDE 1.5G/250ML
20 PLASTIC BAG, INJECTION (ML) INTRAVENOUS ONCE
Qty: 500 ML | Refills: 0 | Status: COMPLETED | OUTPATIENT
Start: 2024-01-14 | End: 2024-01-14

## 2024-01-14 RX ORDER — GABAPENTIN 300 MG/1
600 CAPSULE ORAL EVERY 8 HOURS SCHEDULED
Status: DISCONTINUED | OUTPATIENT
Start: 2024-01-14 | End: 2024-01-24 | Stop reason: HOSPADM

## 2024-01-14 RX ORDER — DEXTROSE MONOHYDRATE 25 G/50ML
25 INJECTION, SOLUTION INTRAVENOUS
Status: DISCONTINUED | OUTPATIENT
Start: 2024-01-14 | End: 2024-01-24 | Stop reason: HOSPADM

## 2024-01-14 RX ORDER — IBUPROFEN 600 MG/1
1 TABLET ORAL
Status: DISCONTINUED | OUTPATIENT
Start: 2024-01-14 | End: 2024-01-24 | Stop reason: HOSPADM

## 2024-01-14 RX ORDER — NALOXONE HCL 0.4 MG/ML
0.4 VIAL (ML) INJECTION
Status: DISCONTINUED | OUTPATIENT
Start: 2024-01-14 | End: 2024-01-18

## 2024-01-14 RX ORDER — TEMAZEPAM 15 MG/1
15 CAPSULE ORAL NIGHTLY PRN
Status: DISPENSED | OUTPATIENT
Start: 2024-01-14 | End: 2024-01-19

## 2024-01-14 RX ORDER — HEPARIN SODIUM 10000 [USP'U]/100ML
27 INJECTION, SOLUTION INTRAVENOUS
Status: DISPENSED | OUTPATIENT
Start: 2024-01-14 | End: 2024-01-19

## 2024-01-14 RX ORDER — AMOXICILLIN 250 MG
2 CAPSULE ORAL 2 TIMES DAILY
Status: DISCONTINUED | OUTPATIENT
Start: 2024-01-14 | End: 2024-01-24 | Stop reason: HOSPADM

## 2024-01-14 RX ORDER — ONDANSETRON 4 MG/1
4 TABLET, ORALLY DISINTEGRATING ORAL EVERY 6 HOURS PRN
Status: DISCONTINUED | OUTPATIENT
Start: 2024-01-14 | End: 2024-01-24 | Stop reason: HOSPADM

## 2024-01-14 RX ORDER — SODIUM CHLORIDE 0.9 % (FLUSH) 0.9 %
10 SYRINGE (ML) INJECTION EVERY 12 HOURS SCHEDULED
Status: DISCONTINUED | OUTPATIENT
Start: 2024-01-14 | End: 2024-01-24 | Stop reason: HOSPADM

## 2024-01-14 RX ORDER — BISACODYL 10 MG
10 SUPPOSITORY, RECTAL RECTAL DAILY PRN
Status: DISCONTINUED | OUTPATIENT
Start: 2024-01-14 | End: 2024-01-24 | Stop reason: HOSPADM

## 2024-01-14 RX ORDER — ROSUVASTATIN CALCIUM 20 MG/1
40 TABLET, COATED ORAL NIGHTLY
Status: DISCONTINUED | OUTPATIENT
Start: 2024-01-14 | End: 2024-01-24 | Stop reason: HOSPADM

## 2024-01-14 RX ORDER — CYCLOBENZAPRINE HCL 10 MG
10 TABLET ORAL 3 TIMES DAILY PRN
COMMUNITY

## 2024-01-14 RX ORDER — SODIUM CHLORIDE 0.9 % (FLUSH) 0.9 %
10 SYRINGE (ML) INJECTION AS NEEDED
Status: DISCONTINUED | OUTPATIENT
Start: 2024-01-14 | End: 2024-01-15

## 2024-01-14 RX ORDER — OXYCODONE HYDROCHLORIDE 10 MG/1
10 TABLET ORAL 4 TIMES DAILY
Status: DISCONTINUED | OUTPATIENT
Start: 2024-01-14 | End: 2024-01-18

## 2024-01-14 RX ORDER — HEPARIN SODIUM 1000 [USP'U]/ML
25 INJECTION, SOLUTION INTRAVENOUS; SUBCUTANEOUS AS NEEDED
Status: DISCONTINUED | OUTPATIENT
Start: 2024-01-14 | End: 2024-01-14

## 2024-01-14 RX ORDER — SODIUM CHLORIDE 9 MG/ML
40 INJECTION, SOLUTION INTRAVENOUS AS NEEDED
Status: DISCONTINUED | OUTPATIENT
Start: 2024-01-14 | End: 2024-01-24 | Stop reason: HOSPADM

## 2024-01-14 RX ORDER — OXYCODONE HYDROCHLORIDE 10 MG/1
10 TABLET ORAL EVERY 4 HOURS PRN
Status: DISCONTINUED | OUTPATIENT
Start: 2024-01-14 | End: 2024-01-18

## 2024-01-14 RX ORDER — LORAZEPAM 0.5 MG/1
0.5 TABLET ORAL EVERY 8 HOURS PRN
Status: DISPENSED | OUTPATIENT
Start: 2024-01-14 | End: 2024-01-19

## 2024-01-14 RX ORDER — NICOTINE 21 MG/24HR
1 PATCH, TRANSDERMAL 24 HOURS TRANSDERMAL NIGHTLY
Status: DISCONTINUED | OUTPATIENT
Start: 2024-01-14 | End: 2024-01-24 | Stop reason: HOSPADM

## 2024-01-14 RX ORDER — BISACODYL 5 MG/1
5 TABLET, DELAYED RELEASE ORAL DAILY PRN
Status: DISCONTINUED | OUTPATIENT
Start: 2024-01-14 | End: 2024-01-24 | Stop reason: HOSPADM

## 2024-01-14 RX ORDER — SODIUM CHLORIDE 0.9 % (FLUSH) 0.9 %
10 SYRINGE (ML) INJECTION AS NEEDED
Status: DISCONTINUED | OUTPATIENT
Start: 2024-01-14 | End: 2024-01-24 | Stop reason: HOSPADM

## 2024-01-14 RX ORDER — CYCLOBENZAPRINE HCL 10 MG
10 TABLET ORAL 3 TIMES DAILY PRN
Status: DISCONTINUED | OUTPATIENT
Start: 2024-01-14 | End: 2024-01-24 | Stop reason: HOSPADM

## 2024-01-14 RX ORDER — POLYETHYLENE GLYCOL 3350 17 G/17G
17 POWDER, FOR SOLUTION ORAL DAILY PRN
Status: DISCONTINUED | OUTPATIENT
Start: 2024-01-14 | End: 2024-01-24 | Stop reason: HOSPADM

## 2024-01-14 RX ADMIN — Medication: at 22:00

## 2024-01-14 RX ADMIN — VANCOMYCIN HYDROCHLORIDE 1500 MG: 10 INJECTION, POWDER, LYOPHILIZED, FOR SOLUTION INTRAVENOUS at 18:15

## 2024-01-14 RX ADMIN — HEPARIN SODIUM 12 UNITS/KG/HR: 10000 INJECTION, SOLUTION INTRAVENOUS at 22:36

## 2024-01-14 RX ADMIN — Medication 10 ML: at 22:00

## 2024-01-14 RX ADMIN — SODIUM CHLORIDE 1000 ML: 9 INJECTION, SOLUTION INTRAVENOUS at 16:18

## 2024-01-14 RX ADMIN — METOPROLOL TARTRATE 25 MG: 25 TABLET, FILM COATED ORAL at 22:06

## 2024-01-14 RX ADMIN — ROSUVASTATIN CALCIUM 40 MG: 20 TABLET, FILM COATED ORAL at 22:05

## 2024-01-14 RX ADMIN — SENNOSIDES AND DOCUSATE SODIUM 2 TABLET: 8.6; 5 TABLET ORAL at 22:06

## 2024-01-14 RX ADMIN — SODIUM CHLORIDE 1000 ML: 9 INJECTION, SOLUTION INTRAVENOUS at 15:15

## 2024-01-14 RX ADMIN — LORAZEPAM 0.5 MG: 0.5 TABLET ORAL at 22:11

## 2024-01-14 RX ADMIN — ASPIRIN 81 MG: 81 TABLET, COATED ORAL at 22:06

## 2024-01-14 RX ADMIN — SODIUM CHLORIDE 190 ML: 9 INJECTION, SOLUTION INTRAVENOUS at 18:07

## 2024-01-14 RX ADMIN — Medication 1 PATCH: at 22:05

## 2024-01-14 RX ADMIN — HYDROCORTISONE SODIUM SUCCINATE 100 MG: 100 INJECTION, POWDER, FOR SOLUTION INTRAMUSCULAR; INTRAVENOUS at 22:06

## 2024-01-14 RX ADMIN — IOPAMIDOL 120 ML: 755 INJECTION, SOLUTION INTRAVENOUS at 17:57

## 2024-01-14 RX ADMIN — OXYCODONE HYDROCHLORIDE 10 MG: 10 TABLET ORAL at 22:05

## 2024-01-14 RX ADMIN — SODIUM CHLORIDE 125 ML/HR: 9 INJECTION, SOLUTION INTRAVENOUS at 22:00

## 2024-01-14 RX ADMIN — INSULIN LISPRO 2 UNITS: 100 INJECTION, SOLUTION INTRAVENOUS; SUBCUTANEOUS at 22:06

## 2024-01-14 RX ADMIN — SODIUM CHLORIDE 1000 MG: 900 INJECTION INTRAVENOUS at 16:25

## 2024-01-14 RX ADMIN — MORPHINE SULFATE 4 MG: 4 INJECTION, SOLUTION INTRAMUSCULAR; INTRAVENOUS at 15:13

## 2024-01-14 RX ADMIN — GABAPENTIN 600 MG: 300 CAPSULE ORAL at 22:06

## 2024-01-14 RX ADMIN — MORPHINE SULFATE 4 MG: 4 INJECTION, SOLUTION INTRAMUSCULAR; INTRAVENOUS at 18:04

## 2024-01-14 RX ADMIN — Medication: at 17:47

## 2024-01-14 NOTE — Clinical Note
Level of Care: Telemetry [5]   Diagnosis: Ischemic foot [[id number]]   Admitting Physician: MALENA CONWAY [[id number]   Attending Physician: MALENA CONWAY [[id number]]

## 2024-01-14 NOTE — ED PROVIDER NOTES
Subjective   History of Present Illness is a 48-year-old gentleman who presents via wheelchair to the emergency department accompanied by his wife.  His wife reports for the past week he has been feeling increasingly weak, fatigued in the last 48 hours confused, especially in the last 24 hours and has had high blood sugar readings.  Pertinently, patient is scheduled for a left below the knee amputation due to vascular insufficiencies, after a interventional femoropopliteal this past year.  Patient's left lower extremity has notable coolness, erythema, tenderness to touch with concern for acute on chronic limb ischemia.  Especially the third and fourth distal toes on the left foot, which were photographed for the clinical media.  His wife also reports he has had less urine output only 1 episode yesterday.    Review of Systems   Constitutional:  Positive for fatigue.   HENT: Negative.     Respiratory: Negative.  Negative for cough and shortness of breath.    Cardiovascular:  Positive for leg swelling.   Gastrointestinal: Negative.    Genitourinary:  Positive for decreased urine volume.   Musculoskeletal:  Positive for joint swelling.   Skin:  Positive for color change and wound.   Neurological:  Positive for weakness.       Past Medical History:   Diagnosis Date    Arthritis     Back pain     Diabetes     SINCE MARCH 2017    Dyslipidemia     HTN (hypertension)     PVD (peripheral vascular disease)     Wears partial dentures        No Known Allergies    Past Surgical History:   Procedure Laterality Date    AORTAGRAM N/A 04/25/2017    Procedure: AORTAGRAM WITH OR WITHOUT RUNOFFS POSSIBLE STENT with left femoral cutdown;  Surgeon: Brett Ryan MD;  Location: [facility] OR 15;  Service:     AORTAGRAM N/A 11/21/2023    Procedure: THROMBECTOMY OF LEFT GRAFT, AORTAGRAM, FEMORAL TO FEMORAL BYPASS;  Surgeon: Brett Ryan MD;  Location: [facility];  Service: Vascular;  Laterality: N/A;  FLUORO- .06  SECS  DOSE- 10 MGY  CONTRAST- 10 ML ISO    CHOLECYSTECTOMY      CYST REMOVAL      OFF OF BACK    NC IN-SITU VEIN BYPASS FEMORAL-POPLITEAL Left 04/25/2017    Procedure: FEMORAL POPLITEAL BYPASS;  Surgeon: Brett Ryan MD;  Location:  [facility];  Service: Vascular    NC IN-SITU VEIN BYPASS FEMORAL-POPLITEAL Left 06/27/2017    Procedure: LEFT FEMORAL ENDARTECTOMYN LEFT FEMORAL POPLITEAL BYPASS;  Surgeon: Brett Ryan MD;  Location:  [facility] OR;  Service: Vascular       Family History   Problem Relation Age of Onset    Arthritis Mother     Liver disease Father     Kidney disease Father        Social History     Socioeconomic History    Marital status:     Number of children: 2   Tobacco Use    Smoking status: Every Day     Packs/day: 1.00     Years: 30.00     Additional pack years: 0.00     Total pack years: 30.00     Types: Cigarettes    Smokeless tobacco: Never    Tobacco comments:     Pt states that he is trying to quit, was once only smoking a few a day but has increased to 1 ppd, Pt states that he has smoked 30 plus years. Pt is smoking 7 or 8 a day as of 12/20/23.    Vaping Use    Vaping Use: Never used   Substance and Sexual Activity    Alcohol use: No    Drug use: No    Sexual activity: Yes     Partners: Female     Birth control/protection: None           Objective   Physical Exam  Constitutional:       Appearance: He is well-developed and normal weight. He is ill-appearing and toxic-appearing.   HENT:      Head: Normocephalic and atraumatic.      Mouth/Throat:      Mouth: Mucous membranes are moist.      Pharynx: Oropharynx is clear.   Eyes:      General: Scleral icterus present.      Extraocular Movements: Extraocular movements intact.      Pupils: Pupils are equal, round, and reactive to light.   Cardiovascular:      Rate and Rhythm: Tachycardia present.   Pulmonary:      Effort: Pulmonary effort is normal.      Breath sounds: Normal breath sounds.   Abdominal:      General: Bowel  sounds are normal.      Palpations: Abdomen is soft.   Musculoskeletal:         General: Swelling and tenderness present. Normal range of motion.      Cervical back: Normal range of motion.   Skin:     Capillary Refill: Left lower extremity with obvious vascular insufficiency, has been evaluated for amputation by specialty services, capillary refill outside of normal limits.     Findings: Erythema present.   Neurological:      Mental Status: He is alert. He is lethargic.      GCS: GCS eye subscore is 4. GCS verbal subscore is 5. GCS motor subscore is 6.      Motor: Weakness present.   Psychiatric:         Mood and Affect: Mood is depressed.         Procedures           ED Course  ED Course as of 01/14/24 1905   Sun Jan 14, 2024   1429 Initially evaluated ED pit area accompanied by his wife [JH]   1504 Has been room 2 6 in the ED, repeat blood pressure indicates hypotension 83/52 with tachycardic rate, IV fluid bolus ordered. [JH]   1529 Negative chest film of the patient.  His IV fluids have been initiated per medication status, and labs collected, repeat blood pressure 84/49 remaining tachycardic 108. [JH]   1534 Reevaluated in room 6, daughter present at bedside.  Patient's blood pressure cuff moved to left upper extremity, recycled systolic 92/62 with rate 101.  Patient remains lethargic but arousable. [JH]   1541 Patient CBC resulted, notable for leukocytosis [JH]   1554 Consultation with Dr. Wyatt, will order CT angiogram of the abdominal aorta with bilateral iliofemoral runoffs, evaluating for soft tissue ischemia and or gas formation in the left lower extremity specifically. [JH]   1625 30 mL/kg fluid bolus balance ordered for total of 2190 mL.  Pharmacy to dose vancomycin ordered and requested from ED pharmacist   [JH]   1719 Noted patient's systolic blood pressures have at times increased over 100 systolic, as well as tachycardia, to normal sinus rhythm.  Still awaiting patient transport to CT. [JH]   1821  Reached out to the hospitalist Dr Conway regarding admission recommendations [JH]   1904 Back from Dr. Peres, who quested admit the patient to the medicine service and keep n.p.o. at midnight.  N.p.o. order placed.  Home Conway and Jesus notified. [JH]      ED Course User Index  [JH] Samuel Dave APRN                                             Medical Decision Making  The patient's presenting symptoms, previous medical history, frontal diagnosis includes diabetic ketoacidosis, sepsis, acute liver failure, electrolyte derangement, limb ischemia, urinary tract infection, hypotension, pneumonia, acute kidney injury/.  Patient will have serum screening labs, urinalysis, imaging of the chest, twelve-lead EKG, blood cultures obtained, serum inflammatory markers evaluated.  Specialty consultation with vascular surgery as appropriate will be considered.    Amount and/or Complexity of Data Reviewed  Labs: ordered.  Radiology: ordered.  ECG/medicine tests: ordered.    Risk  Prescription drug management.        Final diagnoses:   Ischemic pain of left foot       ED Disposition  ED Disposition       ED Disposition   Decision to Admit    Condition   --    Comment   Level of Care: Telemetry [5]   Diagnosis: Ischemic foot [[id number]]   Admitting Physician: MALENA CONWAY [[id number]]   Attending Physician: MALENA CONWAY [[id number]]   Certification: I Certify That Inpatient Hospital Services Are Medically Necessary For Greater Than 2 Midnights                 No follow-up provider specified.       Medication List        ASK your doctor about these medications      gabapentin 600 MG tablet  Commonly known as: NEURONTIN  Ask about: Which instructions should I use?     oxyCODONE 20 MG tablet  Commonly known as: ROXICODONE  Ask about: Which instructions should I use?                 Samuel Dave, PRIMO  01/14/24 [id number]

## 2024-01-15 LAB
ANION GAP SERPL CALCULATED.3IONS-SCNC: 9 MMOL/L (ref 5–15)
APTT PPP: 36.1 SECONDS (ref 60–90)
APTT PPP: 38.3 SECONDS (ref 60–90)
APTT PPP: 38.5 SECONDS (ref 60–90)
BASOPHILS # BLD AUTO: 0.01 10*3/MM3 (ref 0–0.2)
BASOPHILS NFR BLD AUTO: 0.1 % (ref 0–1.5)
BUN SERPL-MCNC: 22 MG/DL (ref 6–20)
BUN/CREAT SERPL: 24.4 (ref 7–25)
CALCIUM SPEC-SCNC: 9.5 MG/DL (ref 8.6–10.5)
CHLORIDE SERPL-SCNC: 106 MMOL/L (ref 98–107)
CO2 SERPL-SCNC: 23 MMOL/L (ref 22–29)
CREAT SERPL-MCNC: 0.9 MG/DL (ref 0.76–1.27)
DEPRECATED RDW RBC AUTO: 39.9 FL (ref 37–54)
EGFRCR SERPLBLD CKD-EPI 2021: 104 ML/MIN/1.73
EOSINOPHIL # BLD AUTO: 0 10*3/MM3 (ref 0–0.4)
EOSINOPHIL NFR BLD AUTO: 0 % (ref 0.3–6.2)
ERYTHROCYTE [DISTWIDTH] IN BLOOD BY AUTOMATED COUNT: 11.9 % (ref 12.3–15.4)
GLUCOSE BLDC GLUCOMTR-MCNC: 104 MG/DL (ref 70–130)
GLUCOSE BLDC GLUCOMTR-MCNC: 147 MG/DL (ref 70–130)
GLUCOSE BLDC GLUCOMTR-MCNC: 203 MG/DL (ref 70–130)
GLUCOSE BLDC GLUCOMTR-MCNC: 292 MG/DL (ref 70–130)
GLUCOSE SERPL-MCNC: 221 MG/DL (ref 65–99)
HCT VFR BLD AUTO: 32.5 % (ref 37.5–51)
HGB BLD-MCNC: 10.7 G/DL (ref 13–17.7)
IMM GRANULOCYTES # BLD AUTO: 0.09 10*3/MM3 (ref 0–0.05)
IMM GRANULOCYTES NFR BLD AUTO: 0.8 % (ref 0–0.5)
LYMPHOCYTES # BLD AUTO: 1.24 10*3/MM3 (ref 0.7–3.1)
LYMPHOCYTES NFR BLD AUTO: 10.5 % (ref 19.6–45.3)
MCH RBC QN AUTO: 30.4 PG (ref 26.6–33)
MCHC RBC AUTO-ENTMCNC: 32.9 G/DL (ref 31.5–35.7)
MCV RBC AUTO: 92.3 FL (ref 79–97)
MONOCYTES # BLD AUTO: 0.36 10*3/MM3 (ref 0.1–0.9)
MONOCYTES NFR BLD AUTO: 3 % (ref 5–12)
NEUTROPHILS NFR BLD AUTO: 10.16 10*3/MM3 (ref 1.7–7)
NEUTROPHILS NFR BLD AUTO: 85.6 % (ref 42.7–76)
NRBC BLD AUTO-RTO: 0 /100 WBC (ref 0–0.2)
PLATELET # BLD AUTO: 257 10*3/MM3 (ref 140–450)
PMV BLD AUTO: 9.8 FL (ref 6–12)
POTASSIUM SERPL-SCNC: 4.9 MMOL/L (ref 3.5–5.2)
QT INTERVAL: 332 MS
QTC INTERVAL: 430 MS
RBC # BLD AUTO: 3.52 10*6/MM3 (ref 4.14–5.8)
SODIUM SERPL-SCNC: 138 MMOL/L (ref 136–145)
VANCOMYCIN SERPL-MCNC: 9.5 MCG/ML (ref 5–40)
WBC NRBC COR # BLD AUTO: 11.86 10*3/MM3 (ref 3.4–10.8)

## 2024-01-15 PROCEDURE — 25010000002 HYDROMORPHONE 1 MG/ML SOLUTION: Performed by: FAMILY MEDICINE

## 2024-01-15 PROCEDURE — 25010000002 HYDROCORTISONE SOD SUC (PF) 100 MG RECONSTITUTED SOLUTION: Performed by: FAMILY MEDICINE

## 2024-01-15 PROCEDURE — 25010000002 HEPARIN (PORCINE) 25000-0.45 UT/250ML-% SOLUTION

## 2024-01-15 PROCEDURE — 25010000002 VANCOMYCIN PER 500 MG

## 2024-01-15 PROCEDURE — 85025 COMPLETE CBC W/AUTO DIFF WBC: CPT | Performed by: FAMILY MEDICINE

## 2024-01-15 PROCEDURE — 80048 BASIC METABOLIC PNL TOTAL CA: CPT

## 2024-01-15 PROCEDURE — 99233 SBSQ HOSP IP/OBS HIGH 50: CPT | Performed by: INTERNAL MEDICINE

## 2024-01-15 PROCEDURE — 63710000001 INSULIN LISPRO (HUMAN) PER 5 UNITS: Performed by: FAMILY MEDICINE

## 2024-01-15 PROCEDURE — 25010000002 HEPARIN (PORCINE) PER 1000 UNITS

## 2024-01-15 PROCEDURE — 85730 THROMBOPLASTIN TIME PARTIAL: CPT

## 2024-01-15 PROCEDURE — 80202 ASSAY OF VANCOMYCIN: CPT

## 2024-01-15 PROCEDURE — 82948 REAGENT STRIP/BLOOD GLUCOSE: CPT

## 2024-01-15 PROCEDURE — 25010000002 CEFTRIAXONE PER 250 MG: Performed by: FAMILY MEDICINE

## 2024-01-15 RX ORDER — VANCOMYCIN HYDROCHLORIDE 1 G/200ML
1000 INJECTION, SOLUTION INTRAVENOUS EVERY 12 HOURS SCHEDULED
Status: DISCONTINUED | OUTPATIENT
Start: 2024-01-15 | End: 2024-01-16

## 2024-01-15 RX ORDER — HEPARIN SODIUM 1000 [USP'U]/ML
3600 INJECTION, SOLUTION INTRAVENOUS; SUBCUTANEOUS ONCE
Status: COMPLETED | OUTPATIENT
Start: 2024-01-15 | End: 2024-01-15

## 2024-01-15 RX ORDER — HEPARIN SODIUM 1000 [USP'U]/ML
3800 INJECTION, SOLUTION INTRAVENOUS; SUBCUTANEOUS ONCE
Status: COMPLETED | OUTPATIENT
Start: 2024-01-15 | End: 2024-01-15

## 2024-01-15 RX ADMIN — Medication 1 PATCH: at 20:06

## 2024-01-15 RX ADMIN — Medication 10 ML: at 21:00

## 2024-01-15 RX ADMIN — INSULIN LISPRO 3 UNITS: 100 INJECTION, SOLUTION INTRAVENOUS; SUBCUTANEOUS at 09:05

## 2024-01-15 RX ADMIN — HYDROMORPHONE HYDROCHLORIDE 1 MG: 1 INJECTION, SOLUTION INTRAMUSCULAR; INTRAVENOUS; SUBCUTANEOUS at 08:46

## 2024-01-15 RX ADMIN — ROSUVASTATIN CALCIUM 40 MG: 20 TABLET, FILM COATED ORAL at 20:07

## 2024-01-15 RX ADMIN — SENNOSIDES AND DOCUSATE SODIUM 2 TABLET: 8.6; 5 TABLET ORAL at 20:07

## 2024-01-15 RX ADMIN — OXYCODONE HYDROCHLORIDE 10 MG: 10 TABLET ORAL at 05:18

## 2024-01-15 RX ADMIN — SODIUM CHLORIDE 1000 MG: 900 INJECTION INTRAVENOUS at 12:36

## 2024-01-15 RX ADMIN — OXYCODONE HYDROCHLORIDE 10 MG: 10 TABLET ORAL at 12:36

## 2024-01-15 RX ADMIN — SENNOSIDES AND DOCUSATE SODIUM 2 TABLET: 8.6; 5 TABLET ORAL at 08:41

## 2024-01-15 RX ADMIN — HYDROMORPHONE HYDROCHLORIDE 1 MG: 1 INJECTION, SOLUTION INTRAMUSCULAR; INTRAVENOUS; SUBCUTANEOUS at 13:52

## 2024-01-15 RX ADMIN — METOPROLOL TARTRATE 25 MG: 25 TABLET, FILM COATED ORAL at 08:41

## 2024-01-15 RX ADMIN — OXYCODONE HYDROCHLORIDE 10 MG: 10 TABLET ORAL at 21:34

## 2024-01-15 RX ADMIN — OXYCODONE HYDROCHLORIDE 10 MG: 10 TABLET ORAL at 18:37

## 2024-01-15 RX ADMIN — GABAPENTIN 600 MG: 300 CAPSULE ORAL at 15:29

## 2024-01-15 RX ADMIN — HYDROCORTISONE SODIUM SUCCINATE 100 MG: 100 INJECTION, POWDER, FOR SOLUTION INTRAMUSCULAR; INTRAVENOUS at 05:18

## 2024-01-15 RX ADMIN — HYDROMORPHONE HYDROCHLORIDE 1 MG: 1 INJECTION, SOLUTION INTRAMUSCULAR; INTRAVENOUS; SUBCUTANEOUS at 01:27

## 2024-01-15 RX ADMIN — VANCOMYCIN HYDROCHLORIDE 1000 MG: 1 INJECTION, SOLUTION INTRAVENOUS at 08:52

## 2024-01-15 RX ADMIN — GABAPENTIN 600 MG: 300 CAPSULE ORAL at 05:18

## 2024-01-15 RX ADMIN — HEPARIN SODIUM 18 UNITS/KG/HR: 10000 INJECTION, SOLUTION INTRAVENOUS at 21:53

## 2024-01-15 RX ADMIN — HEPARIN SODIUM 3800 UNITS: 1000 INJECTION INTRAVENOUS; SUBCUTANEOUS at 05:25

## 2024-01-15 RX ADMIN — METOPROLOL TARTRATE 25 MG: 25 TABLET, FILM COATED ORAL at 20:07

## 2024-01-15 RX ADMIN — ASPIRIN 81 MG: 81 TABLET, COATED ORAL at 20:07

## 2024-01-15 RX ADMIN — HYDROMORPHONE HYDROCHLORIDE 1 MG: 1 INJECTION, SOLUTION INTRAMUSCULAR; INTRAVENOUS; SUBCUTANEOUS at 20:07

## 2024-01-15 RX ADMIN — INSULIN LISPRO 4 UNITS: 100 INJECTION, SOLUTION INTRAVENOUS; SUBCUTANEOUS at 21:34

## 2024-01-15 RX ADMIN — HYDROMORPHONE HYDROCHLORIDE 1 MG: 1 INJECTION, SOLUTION INTRAMUSCULAR; INTRAVENOUS; SUBCUTANEOUS at 16:21

## 2024-01-15 RX ADMIN — HYDROCORTISONE SODIUM SUCCINATE 100 MG: 100 INJECTION, POWDER, FOR SOLUTION INTRAMUSCULAR; INTRAVENOUS at 15:29

## 2024-01-15 RX ADMIN — GABAPENTIN 600 MG: 300 CAPSULE ORAL at 20:07

## 2024-01-15 RX ADMIN — Medication 10 ML: at 09:06

## 2024-01-15 RX ADMIN — HYDROMORPHONE HYDROCHLORIDE 1 MG: 1 INJECTION, SOLUTION INTRAMUSCULAR; INTRAVENOUS; SUBCUTANEOUS at 10:53

## 2024-01-15 RX ADMIN — HEPARIN SODIUM 3600 UNITS: 1000 INJECTION INTRAVENOUS; SUBCUTANEOUS at 16:21

## 2024-01-15 NOTE — CONSULTS
[facility]  [address] [facility] [address]  [phone]       Orthopedic Consult    Patient: Justo Oquendo    Date of Admission: 1/14/2024  2:52 PM    YOB: 1975    Medical Record Number: [id number]    Attending Physician: Rayna Sanchez MD    Consulting Physician: Christo Peres Jr, MD    Chief Complaint: Ischemic foot [I99.8]    History of Present Illness: 48 y.o. male admitted to [facility] with Ischemic foot [I99.8].  He is status post bypass in 2017, thrombectomy more recently, with critical limb ischemia.     No Known Allergies     Home Medications:  Medications Prior to Admission   Medication Sig Dispense Refill Last Dose    aspirin 81 MG EC tablet Take 1 tablet by mouth Every Night.   1/13/2024    cyclobenzaprine (FLEXERIL) 10 MG tablet Take 1 tablet by mouth 3 (Three) Times a Day As Needed for Muscle Spasms.   Past Week    diphenhydrAMINE HCl (NERVINE PO) Take 1 capsule by mouth Daily.   1/13/2024    Empagliflozin-metFORMIN HCl (Synjardy) 12.5-1000 MG tablet Take 1 tablet by mouth 2 (Two) Times a Day. Patient receives samples of this med from his PCP   1/13/2024    FENOFIBRATE PO Take 200 mg by mouth Every Night.   1/13/2024    gabapentin (NEURONTIN) 600 MG tablet Take 1 tablet by mouth 3 (Three) Times a Day.   1/13/2024    lisinopril (PRINIVIL,ZESTRIL) 40 MG tablet Take 1 tablet by mouth Every Night. 30 tablet 6 1/13/2024    metoprolol tartrate (LOPRESSOR) 25 MG tablet Take 1 tablet by mouth Every 12 (Twelve) Hours. 30 tablet 6 1/13/2024    oxyCODONE (ROXICODONE) 20 MG tablet Take 1 tablet by mouth Every 4 (Four) Hours As Needed for Moderate Pain.   Past Week    Rivaroxaban (XARELTO) 2.5 MG tablet Take 1 tablet by mouth 2 (Two) Times a Day. Start taking on Sunday, November 26. This will be your first dose. Take your last dose of Clopidogrel on Sunday, November 26 and stop the Clopidogrel. 60 tablet 6 1/13/2024    rosuvastatin (CRESTOR) 40 MG tablet Take 1  tablet by mouth Every Night.   1/13/2024    Semaglutide 3 MG tablet Take 1 tablet by mouth Daily. Patient receives samples of this med from his PCP   1/13/2024    HYDROcodone-acetaminophen (NORCO)  MG per tablet Take 1 tablet by mouth Every 8 (Eight) Hours As Needed for Moderate Pain. (Patient not taking: Reported on 1/14/2024)   Not Taking       Current Medications:  Scheduled Meds:aspirin, 81 mg, Oral, Nightly  cefTRIAXone, 1,000 mg, Intravenous, Q24H  gabapentin, 600 mg, Oral, Q8H  hydrocortisone sodium succinate, 100 mg, Intravenous, Q8H  insulin lispro, 2-7 Units, Subcutaneous, 4x Daily AC & at Bedtime  metoprolol tartrate, 25 mg, Oral, Q12H  nicotine, 1 patch, Transdermal, Nightly  oxyCODONE, 10 mg, Oral, 4x Daily  rosuvastatin, 40 mg, Oral, Nightly  senna-docusate sodium, 2 tablet, Oral, BID  sodium chloride, 10 mL, Intravenous, Q12H  vancomycin, 1,000 mg, Intravenous, Q12H      Continuous Infusions:heparin, 15 Units/kg/hr, Last Rate: 15 Units/kg/hr (01/15/24 6865)  Pharmacy to Dose Heparin,   Pharmacy to dose vancomycin,   sodium chloride, 125 mL/hr, Last Rate: 125 mL/hr (01/14/24 2200)      PRN Meds:.  senna-docusate sodium **AND** polyethylene glycol **AND** bisacodyl **AND** bisacodyl    Calcium Replacement - Follow Nurse / BPA Driven Protocol    cyclobenzaprine    dextrose    dextrose    glucagon (human recombinant)    HYDROmorphone **AND** naloxone    LORazepam    Magnesium Standard Dose Replacement - Follow Nurse / BPA Driven Protocol    nicotine polacrilex    nitroglycerin    ondansetron ODT **OR** ondansetron    oxyCODONE    Pharmacy to Dose Heparin    Pharmacy to dose vancomycin    Phosphorus Replacement - Follow Nurse / BPA Driven Protocol    Potassium Replacement - Follow Nurse / BPA Driven Protocol    sodium chloride    sodium chloride    temazepam    Past Medical History:   Diagnosis Date    Arthritis     Back pain     Diabetes     SINCE MARCH 2017    Dyslipidemia     HTN (hypertension)      PVD (peripheral vascular disease)     Wears partial dentures         Past Surgical History:   Procedure Laterality Date    AORTAGRAM N/A 04/25/2017    Procedure: AORTAGRAM WITH OR WITHOUT RUNOFFS POSSIBLE STENT with left femoral cutdown;  Surgeon: Brett Ryan MD;  Location: [facility] HYBRID OR 15;  Service:     AORTAGRAM N/A 11/21/2023    Procedure: THROMBECTOMY OF LEFT GRAFT, AORTAGRAM, FEMORAL TO FEMORAL BYPASS;  Surgeon: Brett Ryan MD;  Location: [facility] HYBRID MARIE;  Service: Vascular;  Laterality: N/A;  FLUORO- .06 SECS  DOSE- 10 MGY  CONTRAST- 10 ML ISO    CHOLECYSTECTOMY      CYST REMOVAL      OFF OF BACK    WY IN-SITU VEIN BYPASS FEMORAL-POPLITEAL Left 04/25/2017    Procedure: FEMORAL POPLITEAL BYPASS;  Surgeon: Brett Ryan MD;  Location:  [facility] HYBRID OR 15;  Service: Vascular    WY IN-SITU VEIN BYPASS FEMORAL-POPLITEAL Left 06/27/2017    Procedure: LEFT FEMORAL ENDARTECTOMYN LEFT FEMORAL POPLITEAL BYPASS;  Surgeon: Brett Ryan MD;  Location: [facility] OR;  Service: Vascular        Social History     Occupational History    Occupation:      Comment: [employer]   Tobacco Use    Smoking status: Every Day     Packs/day: 1.00     Years: 30.00     Additional pack years: 0.00     Total pack years: 30.00     Types: Cigarettes    Smokeless tobacco: Never    Tobacco comments:     Pt states that he is trying to quit, was once only smoking a few a day but has increased to 1 ppd, Pt states that he has smoked 30 plus years. Pt is smoking 7 or 8 a day as of 12/20/23.    Vaping Use    Vaping Use: Never used   Substance and Sexual Activity    Alcohol use: No    Drug use: No    Sexual activity: Yes     Partners: Female     Birth control/protection: None      Social History     Social History Narrative    Lives in [place], [place]        Family History   Problem Relation Age of Onset    Arthritis Mother     Liver disease Father     Kidney disease Father          Review of Systems:    HEENT: Patient denies any headaches, vision changes, change in hearing, or tinnitus, Patient denies any rhinorrhea,epistaxis, sinus pain, mouth or dental problems, sore throat or hoarseness, or dysphagia  Pulmonary: Patient denies any cough, congestion, SOA, or wheezing  Cardiovascular: Patient denies any chest pain, dyspnea, palpitations, weakness, intolerance of exercise, varicosities, swelling of extremities, known murmur  Gastrointestinal:  Patient denies nausea, vomiting, diarrhea, constipation, loss  of appetite, change in appetite, dysphagia, gas, heartburn, melena, change in bowel habits, use of laxatives or other drugs to alter the function of the gastrointestinal tract.  Genital/Urinary: Patient denies dysuria, change in color of urine, change in frequency of urination, pain with urgency, incontinence, retention, or nocturia.  Musculoskeletal: Patient denies increased warmth; redness; or swelling of joints; limitation of function; deformity; crepitation: pain in a joint or an extremity, the neck, or the back, especially with movement.  Neurological: Patient denies dizziness, tremor, ataxia, difficulty in speaking, change in speech, paresthesia, loss of sensation, seizures, syncope, changes in memory.  Endocrine system: Patient denies tremors, palpitations, intolerance of heat or cold, polyuria, polydipsia, polyphagia, diaphoresis, exophthalmos, or goiter.  Psychological: Patient denies thoughts/plans or harming self or other; depression,  insomnia, night terrors, khoi, memory loss, disorientation.  Skin: Patient denies any bruising, rashes, discoloration, pruritus, wounds, ulcers, decubiti, changes in the hair or nails  Hematopoietic: Patient denies history of spontaneous or excessive bleeding, epistaxis, hematuria, melena, fatigue, enlarged or tender lymph nodes, pallor, history of anemia.    Physical Exam: 48 y.o. male  General Appearance:    Alert, cooperative, in no acute distress                    Vitals:    01/15/24 0100 01/15/24 0300 01/15/24 0405 01/15/24 0500   BP:   141/82    BP Location:   Left arm    Patient Position:   Sitting    Pulse: 103 95 99 99   Resp:   16    Temp:   96.6 °F (35.9 °C)    TempSrc:   Oral    SpO2: 93% 93% 96% 95%   Weight:       Height:            Head:    Normocephalic, without obvious abnormality, atraumatic   Eyes:            Lids and lashes normal, conjunctivae and sclerae normal, no icterus, no pallor, corneas clear, PERRLA   Ears:    Ears appear intact with no abnormalities noted   Throat:   No oral lesions, no thrush, oral mucosa moist   Back:     No C-T-L spine tenderness   Lungs:     Clear to auscultation,respirations regular, even and                unlabored   Chest Wall:    No abnormalities observed   Abdomen:     Normal bowel sounds, no masses, no organomegaly, soft      non-tender, non-distended, no guarding, no rebound              tenderness   Extremities: Gangrenous changes of second through fourth toe.  Nonpalpable pulses.  Redness about the foot.   Pulses:   Pulses palpable and equal bilaterally   Skin:   No bleeding, bruising or rash   Lymph nodes:   No palpable adenopathy   Neurologic:  Cranial nerves 2 - 12 grossly intact, sensation intact, DTR     present and equal bilaterally           Diagnostic Tests:    Admission on 01/14/2024   Component Date Value Ref Range Status    QT Interval 01/14/2024 332  ms Preliminary    QTC Interval 01/14/2024 430  ms Preliminary    Glucose 01/14/2024 302 (H)  65 - 99 mg/dL Final    BUN 01/14/2024 30 (H)  6 - 20 mg/dL Final    Creatinine 01/14/2024 2.07 (H)  0.76 - 1.27 mg/dL Final    Sodium 01/14/2024 134 (L)  136 - 145 mmol/L Final    Potassium 01/14/2024 4.1  3.5 - 5.2 mmol/L Final    Chloride 01/14/2024 96 (L)  98 - 107 mmol/L Final    CO2 01/14/2024 25.0  22.0 - 29.0 mmol/L Final    Calcium 01/14/2024 10.0  8.6 - 10.5 mg/dL Final    Total Protein 01/14/2024 7.5  6.0 - 8.5 g/dL Final    Albumin 01/14/2024 3.8  3.5 - 5.2  g/dL Final    ALT (SGPT) 01/14/2024 13  1 - 41 U/L Final    AST (SGOT) 01/14/2024 14  1 - 40 U/L Final    Alkaline Phosphatase 01/14/2024 83  39 - 117 U/L Final    Total Bilirubin 01/14/2024 0.2  0.0 - 1.2 mg/dL Final    Globulin 01/14/2024 3.7  gm/dL Final    Calculated Result    A/G Ratio 01/14/2024 1.0  g/dL Final    BUN/Creatinine Ratio 01/14/2024 14.5  7.0 - 25.0 Final    Anion Gap 01/14/2024 13.0  5.0 - 15.0 mmol/L Final    eGFR 01/14/2024 38.3 (L)  >60.0 mL/min/1.73 Final    HS Troponin T 01/14/2024 18  <22 ng/L Final    Magnesium 01/14/2024 2.1  1.6 - 2.6 mg/dL Final    Extra Tube 01/14/2024 Hold for add-ons.   Final    Auto resulted.    Extra Tube 01/14/2024 hold for add-on   Final    Auto resulted    Extra Tube 01/14/2024 Hold for add-ons.   Final    Auto resulted.    Extra Tube 01/14/2024 Hold for add-ons.   Final    Auto resulted.    Extra Tube 01/14/2024 Hold for add-ons.   Final    Auto resulted    WBC 01/14/2024 13.82 (H)  3.40 - 10.80 10*3/mm3 Final    RBC 01/14/2024 3.97 (L)  4.14 - 5.80 10*6/mm3 Final    Hemoglobin 01/14/2024 11.8 (L)  13.0 - 17.7 g/dL Final    Hematocrit 01/14/2024 36.9 (L)  37.5 - 51.0 % Final    MCV 01/14/2024 92.9  79.0 - 97.0 fL Final    MCH 01/14/2024 29.7  26.6 - 33.0 pg Final    MCHC 01/14/2024 32.0  31.5 - 35.7 g/dL Final    RDW 01/14/2024 11.8 (L)  12.3 - 15.4 % Final    RDW-SD 01/14/2024 39.8  37.0 - 54.0 fl Final    MPV 01/14/2024 10.2  6.0 - 12.0 fL Final    Platelets 01/14/2024 333  140 - 450 10*3/mm3 Final    Neutrophil % 01/14/2024 68.6  42.7 - 76.0 % Final    Lymphocyte % 01/14/2024 18.6 (L)  19.6 - 45.3 % Final    Monocyte % 01/14/2024 10.7  5.0 - 12.0 % Final    Eosinophil % 01/14/2024 0.7  0.3 - 6.2 % Final    Basophil % 01/14/2024 0.2  0.0 - 1.5 % Final    Immature Grans % 01/14/2024 1.2 (H)  0.0 - 0.5 % Final    Neutrophils, Absolute 01/14/2024 9.47 (H)  1.70 - 7.00 10*3/mm3 Final    Lymphocytes, Absolute 01/14/2024 2.57  0.70 - 3.10 10*3/mm3 Final     Monocytes, Absolute 01/14/2024 1.48 (H)  0.10 - 0.90 10*3/mm3 Final    Eosinophils, Absolute 01/14/2024 0.10  0.00 - 0.40 10*3/mm3 Final    Basophils, Absolute 01/14/2024 0.03  0.00 - 0.20 10*3/mm3 Final    Immature Grans, Absolute 01/14/2024 0.17 (H)  0.00 - 0.05 10*3/mm3 Final    nRBC 01/14/2024 0.0  0.0 - 0.2 /100 WBC Final    Lactate 01/14/2024 1.2  0.5 - 2.0 mmol/L Final    Falsely depressed results may occur on samples drawn from patients receiving N-Acetylcysteine (NAC) or Metamizole.    C-Reactive Protein 01/14/2024 2.60 (H)  0.00 - 0.50 mg/dL Final    Sed Rate 01/14/2024 100 (H)  0 - 20 mm/hr Final    Procalcitonin 01/14/2024 0.19  0.00 - 0.25 ng/mL Final    proBNP 01/14/2024 63.8  0.0 - 900.0 pg/mL Final    MRSA PCR 01/14/2024 No MRSA Detected  No MRSA Detected Final    Creatine Kinase 01/14/2024 57  20 - 200 U/L Final    Glucose 01/14/2024 154 (H)  70 - 130 mg/dL Final    Heparin Anti-Xa (UFH) 01/14/2024 0.14 (L)  0.30 - 0.70 IU/ml Final    Protime 01/14/2024 15.0 (H)  12.2 - 14.5 Seconds Final    INR 01/14/2024 1.17 (H)  0.89 - 1.12 Final    PTT 01/14/2024 38.7 (L)  60.0 - 90.0 seconds Final    Glucose 01/15/2024 221 (H)  65 - 99 mg/dL Final    BUN 01/15/2024 22 (H)  6 - 20 mg/dL Final    Creatinine 01/15/2024 0.90  0.76 - 1.27 mg/dL Final    Sodium 01/15/2024 138  136 - 145 mmol/L Final    Potassium 01/15/2024 4.9  3.5 - 5.2 mmol/L Final    Chloride 01/15/2024 106  98 - 107 mmol/L Final    CO2 01/15/2024 23.0  22.0 - 29.0 mmol/L Final    Calcium 01/15/2024 9.5  8.6 - 10.5 mg/dL Final    BUN/Creatinine Ratio 01/15/2024 24.4  7.0 - 25.0 Final    Anion Gap 01/15/2024 9.0  5.0 - 15.0 mmol/L Final    eGFR 01/15/2024 104.0  >60.0 mL/min/1.73 Final    Vancomycin Random 01/15/2024 9.50  5.00 - 40.00 mcg/mL Final    WBC 01/15/2024 11.86 (H)  3.40 - 10.80 10*3/mm3 Final    RBC 01/15/2024 3.52 (L)  4.14 - 5.80 10*6/mm3 Final    Hemoglobin 01/15/2024 10.7 (L)  13.0 - 17.7 g/dL Final    Hematocrit 01/15/2024 32.5  (L)  37.5 - 51.0 % Final    MCV 01/15/2024 92.3  79.0 - 97.0 fL Final    MCH 01/15/2024 30.4  26.6 - 33.0 pg Final    MCHC 01/15/2024 32.9  31.5 - 35.7 g/dL Final    RDW 01/15/2024 11.9 (L)  12.3 - 15.4 % Final    RDW-SD 01/15/2024 39.9  37.0 - 54.0 fl Final    MPV 01/15/2024 9.8  6.0 - 12.0 fL Final    Platelets 01/15/2024 257  140 - 450 10*3/mm3 Final    Neutrophil % 01/15/2024 85.6 (H)  42.7 - 76.0 % Final    Lymphocyte % 01/15/2024 10.5 (L)  19.6 - 45.3 % Final    Monocyte % 01/15/2024 3.0 (L)  5.0 - 12.0 % Final    Eosinophil % 01/15/2024 0.0 (L)  0.3 - 6.2 % Final    Basophil % 01/15/2024 0.1  0.0 - 1.5 % Final    Immature Grans % 01/15/2024 0.8 (H)  0.0 - 0.5 % Final    Neutrophils, Absolute 01/15/2024 10.16 (H)  1.70 - 7.00 10*3/mm3 Final    Lymphocytes, Absolute 01/15/2024 1.24  0.70 - 3.10 10*3/mm3 Final    Monocytes, Absolute 01/15/2024 0.36  0.10 - 0.90 10*3/mm3 Final    Eosinophils, Absolute 01/15/2024 0.00  0.00 - 0.40 10*3/mm3 Final    Basophils, Absolute 01/15/2024 0.01  0.00 - 0.20 10*3/mm3 Final    Immature Grans, Absolute 01/15/2024 0.09 (H)  0.00 - 0.05 10*3/mm3 Final    nRBC 01/15/2024 0.0  0.0 - 0.2 /100 WBC Final    PTT 01/15/2024 38.3 (L)  60.0 - 90.0 seconds Final       CT Angio Abdominal Aorta Bilateral Iliofem Runoff    Result Date: 1/14/2024  Narrative: CT ANGIO ABDOMINAL AORTA BILAT ILIOFEM RUNOFF Date of Exam: 1/14/2024 5:34 PM EST Indication: Claudication or leg ischemia Previous fem pop graft occlusion, c/o increased LLE pain, color changes, third/fourth toe ischemia, leukocytosis, r/o gas. Comparison: None available. Technique: CTA of the abdomen, pelvis and both lower extremities was performed after the uneventful intravenous administration of 120 cc Isovue-370 . Reconstructed coronal and sagittal images were also obtained. In addition, a 3-D volume rendered image was created for interpretation. Automated exposure control and iterative reconstruction methods were used. The origins  of the celiac axis and superior mesenteric arteries are normal. The origins of the renal arteries are normal. Mild atheromatous disease of the abdominal aorta with subtle ectasia. The origin of the inferior mesenteric artery is patent. Complete occlusion of the left common iliac artery with continued complete loss of flow within the left internal and external iliac arteries. Multi focal atheromatous disease of the right common iliac artery as well as the internal and external iliac arteries. Femorofemoral bypass. Complete occlusion of the left femoral artery bypass with no underlying flow. Reconstitution of flow distally at the level of the popliteal artery due to branches from the deep femoral artery. The superior segments of the left anterior and posterior tibial arteries are patent. Eventual loss of flow within the posterior tibial artery seen on axial image 383. Continued flow within the dorsalis pedis with eventual loss of flow within the arteries. On the right there is continued flow within the right deep femoral artery. Complete occlusion of the left femoral artery with distal reconstitution from branches of the deep femoral artery. The popliteal artery is patent. The anterior and posterior tibial vessels are patent proximally. Loss of flow within the anterior tibial artery on axial image #343. Loss of the contrast column within the posterior tibial artery on axial image 417. The liver appears normal. Calcified granulomata within the spleen. Normal pancreas. Prior cholecystectomy. The adrenal glands are normal. No renal masses. No hydroureteronephrosis. The ureters and urinary bladder are normal. The small bowel is nonobstructed. Normal appendix. Colonic diverticulosis without evidence of diverticulitis. No ascites or pneumoperitoneum. No adenopathy. Degenerative changes of the hips and lumbar spine.     Impression: Complete occlusion of the left femoral artery bypass graft. Complete occlusion of the right  femoral artery. Eventual distal reconstitution of both vessels due to branches of the deep femoral arteries. Loss of flow within the left posterior tibial artery. Loss of flow within the right anterior tibial artery. Eventual loss of flow within the contrast column on both sides with no residual flow to the digital arteries. Electronically Signed: Kee Cartagena MD  1/14/2024 6:08 PM EST  Workstation ID: [id number]    XR Chest 1 View    Result Date: 1/14/2024  Narrative: XR CHEST 1 VW Date of Exam: 1/14/2024 3:02 PM EST Indication: Weak/Dizzy/AMS triage protocol Comparison: None available. Findings: No focal consolidation. No pneumothorax or pleural effusion. Cardiac size is normal. The visualized clavicles appear intact. No displaced rib fractures. The visualized upper abdomen is normal.     Impression: Impression: No acute cardiopulmonary disease. Electronically Signed: Kee Cartagena MD  1/14/2024 3:18 PM EST  Workstation ID: [id number]    CT Angio Abdominal Aorta Bilateral Iliofem Runoff    Result Date: 12/20/2023  Narrative: CT ANGIO ABDOMINAL AORTA BILAT ILIOFEM RUNOFF Date of Exam: 12/20/2023 7:09 PM EST Indication: PAD. Comparison: 11/20/2023 CTA Technique: CTA of the abdomen, pelvis and both lower extremities was performed after the uneventful intravenous administration of 114 mL Isovue 370. Reconstructed coronal and sagittal images were also obtained. In addition, a 3-D volume rendered image was created for interpretation. Automated exposure control and iterative reconstruction methods were used. Findings: History indicates left foot swelling. Office notes from today note swelling and some discoloration of the left foot and toes for the past week. Recent thrombectomy and left femoral-popliteal bypass graft, and femoral-femoral bypass 11/21/2023. Previous 11/20/2023 exam report noted occlusion of left common iliac artery external iliac artery common femoral artery, and femoral-popliteal bypass graft. Reconstitution  of the popliteal artery via profunda collaterals. Very small caliber distal runoff vessels.  Also occlusion of right superficial femoral artery reconstitution of right popliteal artery below the abductor canal. Today's study shows the patient's new right to left femoral bypass graft to appear patent. It supplies branches that reconstitute the deep system. Left femoral-popliteal graft is again seen to be occluded. Deep femoral artery branches reconstitute a small popliteal artery, with what appears to be three-vessel runoff to the left ankle, although with multisegmental disease of the anterior and posterior tibial arteries. Elsewhere, abdominal aorta is normal in caliber. Right common and external iliac arteries appear normal in caliber. Right superficial femoral artery is again noted to be occluded. Deep femoral artery branches reconstitute the popliteal artery, and there is two-vessel runoff to the foot as before. Remainder of the scan shows fatty liver change. Granulomatous calcifications are seen in the spleen. Pancreas, included portions of the adrenal glands and kidneys appear unremarkable. Celiac axis, SMA and renal arteries appear normally patent. JONAH is patent. No ascites adenopathy or acute inflammatory changes seen. Bladder is distended to approximately 14 cm and appears grossly normal. No intrapelvic free fluid or inflammatory change is seen. :    Impression: Impression: 1. 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 Occluded left femoral-popliteal graft, as on prior study. 2. New femoral-femoral graft which appears normally patent. 3. The new femoral graft supplies deep femoral branch vessels, with small caliber branches eventually reconstituting the popliteal artery, and with three-vessel runoff to the left foot. 4. Occluded right superficial femoral artery again noted. Deep femoral artery branch reconstitution of the right popliteal artery, with two-vessel runoff to the foot. Note: I discussed  the patient's findings with him and his wife while he remained here in the CT scan suite. He was offered transport to the emergency room, but he says his left lower extremity symptoms are not currently worsening, and they prefer to drive back home, and consult with vascular surgery tomorrow. The indicate they will return here, to the ER if the patient has significant worsening symptoms overnight. 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    Plan: Based on the CTA I think he would have a difficult time healing anything other than an above-knee amputation.  I plan to obtain a second opinion regarding potential revascularization options with the goal of potentially salvaging a transmetatarsal amputation versus below-knee amputation.  Would plan to perform amputation after vascular surgery evaluation / potential intervention.            Date: 1/15/2024    Christo Peres Jr, MD

## 2024-01-15 NOTE — ED NOTES
Justo Oquendo    Nursing Report ED to Floor:  Mental status: A&O x 4  Ambulatory status: Up with One  Oxygen Therapy:  RA  Cardiac Rhythm: NSR  Admitted from: ER  Safety Concerns:  None  Social Issues: None  ED Room #:  6    ED Nurse Phone Extension - [phone] or may call [phone].      HPI:   Chief Complaint   Patient presents with    Altered Mental Status       Past Medical History:  Past Medical History:   Diagnosis Date    Arthritis     Back pain     Diabetes     SINCE MARCH 2017    Dyslipidemia     HTN (hypertension)     PVD (peripheral vascular disease)     Wears partial dentures         Past Surgical History:  Past Surgical History:   Procedure Laterality Date    AORTAGRAM N/A 04/25/2017    Procedure: AORTAGRAM WITH OR WITHOUT RUNOFFS POSSIBLE STENT with left femoral cutdown;  Surgeon: Brett Ryan MD;  Location:  [facility];  Service:     AORTAGRAM N/A 11/21/2023    Procedure: THROMBECTOMY OF LEFT GRAFT, AORTAGRAM, FEMORAL TO FEMORAL BYPASS;  Surgeon: Brett Ryan MD;  Location: [facility];  Service: Vascular;  Laterality: N/A;  FLUORO- .06 SECS  DOSE- 10 MGY  CONTRAST- 10 ML ISO    CHOLECYSTECTOMY      CYST REMOVAL      OFF OF BACK    NE IN-SITU VEIN BYPASS FEMORAL-POPLITEAL Left 04/25/2017    Procedure: FEMORAL POPLITEAL BYPASS;  Surgeon: Brett Ryan MD;  Location:  [facility];  Service: Vascular    NE IN-SITU VEIN BYPASS FEMORAL-POPLITEAL Left 06/27/2017    Procedure: LEFT FEMORAL ENDARTECTOMYN LEFT FEMORAL POPLITEAL BYPASS;  Surgeon: Brett Ryan MD;  Location: [facility] OR;  Service: Vascular        Admitting Doctor:   Ashley Lee MD    Consulting Provider(s):  Consults       No orders found from 12/16/2023 to 1/15/2024.             Admitting Diagnosis:   The encounter diagnosis was Ischemic pain of left foot.    Most Recent Vitals:   Vitals:    01/14/24 1815 01/14/24 1830 01/14/24 1845 01/14/24 1900   BP: 103/54 103/62 109/58 106/54   BP Location:       Patient  Position:       Pulse: 104 107 107 106   Resp:       Temp:       TempSrc:       SpO2: 98% 98% 96% 95%   Weight:       Height:           Active LDAs/IV Access:   Lines, Drains & Airways       Active LDAs       Name Placement date Placement time Site Days    Peripheral IV 01/14/24 1513 Anterior;Right Forearm 01/14/24  1513  Forearm  less than 1    Peripheral IV 01/14/24 1619 Left Antecubital 01/14/24  1619  Antecubital  less than 1                    Labs (abnormal labs have a star):   Labs Reviewed   COMPREHENSIVE METABOLIC PANEL - Abnormal; Notable for the following components:       Result Value    Glucose 302 (*)     BUN 30 (*)     Creatinine 2.07 (*)     Sodium 134 (*)     Chloride 96 (*)     eGFR 38.3 (*)     All other components within normal limits    Narrative:     GFR Normal >60  Chronic Kidney Disease <60  Kidney Failure <15     CBC WITH AUTO DIFFERENTIAL - Abnormal; Notable for the following components:    WBC 13.82 (*)     RBC 3.97 (*)     Hemoglobin 11.8 (*)     Hematocrit 36.9 (*)     RDW 11.8 (*)     Lymphocyte % 18.6 (*)     Immature Grans % 1.2 (*)     Neutrophils, Absolute 9.47 (*)     Monocytes, Absolute 1.48 (*)     Immature Grans, Absolute 0.17 (*)     All other components within normal limits   C-REACTIVE PROTEIN - Abnormal; Notable for the following components:    C-Reactive Protein 2.60 (*)     All other components within normal limits   SEDIMENTATION RATE - Abnormal; Notable for the following components:    Sed Rate 100 (*)     All other components within normal limits   SINGLE HSTROPONIN T - Normal    Narrative:     High Sensitive Troponin T Reference Range:  <14.0 ng/L- Negative Female for AMI  <22.0 ng/L- Negative Male for AMI  >=14 - Abnormal Female indicating possible myocardial injury.  >=22 - Abnormal Male indicating possible myocardial injury.   Clinicians would have to utilize clinical acumen, EKG, Troponin, and serial changes to determine if it is an Acute Myocardial Infarction or  "myocardial injury due to an underlying chronic condition.        MAGNESIUM - Normal   LACTIC ACID, PLASMA - Normal   PROCALCITONIN - Normal    Narrative:     As a Marker for Sepsis (Non-Neonates):    1. <0.5 ng/mL represents a low risk of severe sepsis and/or septic shock.  2. >2 ng/mL represents a high risk of severe sepsis and/or septic shock.    As a Marker for Lower Respiratory Tract Infections that require antibiotic therapy:    PCT on Admission    Antibiotic Therapy       6-12 Hrs later    >0.5                Strongly Recommended  >0.25 - <0.5        Recommended   0.1 - 0.25          Discouraged              Remeasure/reassess PCT  <0.1                Strongly Discouraged     Remeasure/reassess PCT    As 28 day mortality risk marker: \"Change in Procalcitonin Result\" (>80% or <=80%) if Day 0 (or Day 1) and Day 4 values are available. Refer to http://www.[employer]-pct-calculator.com    Change in PCT <=80%  A decrease of PCT levels below or equal to 80% defines a positive change in PCT test result representing a higher risk for 28-day all-cause mortality of patients diagnosed with severe sepsis for septic shock.    Change in PCT >80%  A decrease of PCT levels of more than 80% defines a negative change in PCT result representing a lower risk for 28-day all-cause mortality of patients diagnosed with severe sepsis or septic shock.      BNP (IN-HOUSE) - Normal    Narrative:     This assay is used as an aid in the diagnosis of individuals suspected of having heart failure. It can be used as an aid in the diagnosis of acute decompensated heart failure (ADHF) in patients presenting with signs and symptoms of ADHF to the emergency department (ED). In addition, NT-proBNP of <300 pg/mL indicates ADHF is not likely.    Age Range Result Interpretation  NT-proBNP Concentration (pg/mL:      <50             Positive            >450                   Gray                 300-450                    Negative             <300    50-75 "           Positive            >900                  Gray                300-900                  Negative            <300      >75             Positive            >1800                  Gray                300-1800                  Negative            <300   CK - Normal   BLOOD CULTURE   BLOOD CULTURE   MRSA SCREEN, PCR   RAINBOW DRAW    Narrative:     The following orders were created for panel order [place] Draw.  Procedure                               Abnormality         Status                     ---------                               -----------         ------                     Green Top (Gel)[[id number]]                                  Final result               Lavender Top[[id number]]                                     Final result               Gold Top - SST[[id number]]                                   Final result               Reyes Top[[id number]]                                         In process                 Light Blue Top[[id number]]                                   Final result                 Please view results for these tests on the individual orders.   URINALYSIS W/ MICROSCOPIC IF INDICATED (NO CULTURE)   POCT GLUCOSE FINGERSTICK   POCT GLUCOSE FINGERSTICK   POCT GLUCOSE FINGERSTICK   POCT GLUCOSE FINGERSTICK   POCT GLUCOSE FINGERSTICK   POCT GLUCOSE FINGERSTICK   POCT GLUCOSE FINGERSTICK   POCT GLUCOSE FINGERSTICK   POCT GLUCOSE FINGERSTICK   CBC AND DIFFERENTIAL    Narrative:     The following orders were created for panel order CBC & Differential.  Procedure                               Abnormality         Status                     ---------                               -----------         ------                     CBC Auto Differential[[id number]]        Abnormal            Final result                 Please view results for these tests on the individual orders.   GREEN TOP   LAVENDER TOP   GOLD TOP - SST   LIGHT BLUE TOP   GRAY TOP       Meds Given in ED:   Medications   sodium  chloride 0.9 % flush 10 mL (has no administration in time range)   vancomycin IVPB 1500 mg in 0.9% NaCl (Premix) 500 mL (1,500 mg Intravenous New Bag 1/14/24 1815)   Morphine sulfate (PF) injection 4 mg (4 mg Intravenous Given 1/14/24 1513)   sodium chloride 0.9 % bolus 1,000 mL (0 mL Intravenous Stopped 1/14/24 1638)   cefTRIAXone (ROCEPHIN) 1,000 mg in sodium chloride 0.9 % 100 mL IVPB (0 mg Intravenous Stopped 1/14/24 1701)   sodium chloride 0.9 % bolus 1,000 mL (0 mL Intravenous Stopped 1/14/24 1804)   sodium chloride 0.9 % bolus 190 mL (0 mL Intravenous Stopped 1/14/24 1807)   Pharmacy to dose vancomycin ( Does not apply Given 1/14/24 1747)   Morphine sulfate (PF) injection 4 mg (4 mg Intravenous Given 1/14/24 1804)   iopamidol (ISOVUE-370) 76 % injection 150 mL (120 mL Intravenous Given 1/14/24 1757)

## 2024-01-15 NOTE — H&P
[facility] Medicine Services  HISTORY AND PHYSICAL    Patient Name: Justo Oquendo  : 1975  MRN: [id number]  Primary Care Physician: Melo Whitaker MD  Date of admission: 2024      Subjective   Subjective     Chief Complaint:  Claudication pain     HPI:  Justo Oquendo is a 48 y.o. male ongoing heavy tobacco smoker with a known history of severe PAD, status post thrombectomy of left femoropopliteal bypass graft as well as placement of a femorofemoral bypass by Dr. Ryan on 2023.  He has been compliant with his Xarelto and aspirin, however continues to heavily smoke.  He has had worsening claudication symptoms and was seen in outpatient follow-up by CT surgery in late December, outpatient referral was made to Dr. Peres of orthopedic surgery for consideration of definitive BKA for critical PAD.  Patient was to have his initial preoperative appointment tomorrow on 1/15/2024.    Patient presents to [facility] ED tonight with his wife accompanying him.  Wife states that patient has been more malaised and less interactive for approximately 2 days, she also noted patient with decreased urine output and believes he possibly only urinated once yesterday.  Patient is alert but encephalopathic although will answer simple questions, states that his leg pain is worsening and now unable to bear to put to gravity.    New development of cold dark blue toes third and fourth of the left foot, previously with more of a rubor appearance chronically.  Denies fevers, rigors, nausea or vomiting, CP, SOA.       ED evaluation is significant for hypotension and tachycardia and an encephalopathic patient and a finding of new worsened ischemia in the left lower extremity.  Third and fourth toes on the left foot with cool discoloration and early ischemic necrosis.  CTA with runoff was performed and compared to recent imaging confirms fem-fem occlusion.      Personal  History     Past Medical History:   Diagnosis Date    Arthritis     Back pain     Diabetes     SINCE MARCH 2017    Dyslipidemia     HTN (hypertension)     PVD (peripheral vascular disease)     Wears partial dentures            Past Surgical History:   Procedure Laterality Date    AORTAGRAM N/A 04/25/2017    Procedure: AORTAGRAM WITH OR WITHOUT RUNOFFS POSSIBLE STENT with left femoral cutdown;  Surgeon: Brett Ryan MD;  Location: [facility];  Service:     AORTAGRAM N/A 11/21/2023    Procedure: THROMBECTOMY OF LEFT GRAFT, AORTAGRAM, FEMORAL TO FEMORAL BYPASS;  Surgeon: Brett Ryan MD;  Location: [facility];  Service: Vascular;  Laterality: N/A;  FLUORO- .06 SECS  DOSE- 10 MGY  CONTRAST- 10 ML ISO    CHOLECYSTECTOMY      CYST REMOVAL      OFF OF BACK    OK IN-SITU VEIN BYPASS FEMORAL-POPLITEAL Left 04/25/2017    Procedure: FEMORAL POPLITEAL BYPASS;  Surgeon: Brett Ryan MD;  Location:  [facility];  Service: Vascular    OK IN-SITU VEIN BYPASS FEMORAL-POPLITEAL Left 06/27/2017    Procedure: LEFT FEMORAL ENDARTECTOMYN LEFT FEMORAL POPLITEAL BYPASS;  Surgeon: Brett Ryan MD;  Location: [facility] OR;  Service: Vascular       Family History: family history includes Arthritis in his mother; Kidney disease in his father; Liver disease in his father.     Social History:  reports that he has been smoking cigarettes. He has a 30.00 pack-year smoking history. He has never used smokeless tobacco. He reports that he does not drink alcohol and does not use drugs.  Social History     Social History Narrative    Lives in [place], [place]       Medications:  Available home medication information reviewed.  Empagliflozin-metFORMIN HCl, Fenofibrate, HYDROcodone-acetaminophen, Rivaroxaban, Semaglutide, aspirin, cyclobenzaprine, diphenhydrAMINE HCl, gabapentin, lisinopril, metoprolol tartrate, oxyCODONE, and rosuvastatin    No Known Allergies    Objective   Objective     Vital Signs:   Temp:   [98.4 °F (36.9 °C)-100 °F (37.8 °C)] 98.7 °F (37.1 °C)  Heart Rate:  [] 101  Resp:  [14-16] 16  BP: ()/(46-77) 112/61       Physical Exam   Constitutional: Mildly ill-appearing, groggy, normal body habitus  HENT: mucous membranes dry   Respiratory: Clear to auscultation bilaterally, good effort, nonlabored respirations   Cardiovascular: RRR, no murmur  Musculoskeletal: 1+ nonpitting peripheral edema, normal muscle tone for age  Skin: left greater than right lower extremity dependent rubor, cold dark blue third and fourth digits of the left foot without obvious skin break but early signs of impending tissue necrosis ,        LAB RESULTS:      Lab 01/14/24  1512   WBC 13.82*   HEMOGLOBIN 11.8*   HEMATOCRIT 36.9*   PLATELETS 333   NEUTROS ABS 9.47*   IMMATURE GRANS (ABS) 0.17*   LYMPHS ABS 2.57   MONOS ABS 1.48*   EOS ABS 0.10   MCV 92.9   SED RATE 100*   CRP 2.60*   PROCALCITONIN 0.19   LACTATE 1.2         Lab 01/14/24  1512   SODIUM 134*   POTASSIUM 4.1   CHLORIDE 96*   CO2 25.0   ANION GAP 13.0   BUN 30*   CREATININE 2.07*   EGFR 38.3*   GLUCOSE 302*   CALCIUM 10.0   MAGNESIUM 2.1         Lab 01/14/24  1512   TOTAL PROTEIN 7.5   ALBUMIN 3.8   GLOBULIN 3.7   ALT (SGPT) 13   AST (SGOT) 14   BILIRUBIN 0.2   ALK PHOS 83         Lab 01/14/24  1512   PROBNP 63.8   HSTROP T 18                     Microbiology Results (last 10 days)       ** No results found for the last 240 hours. **            CT Angio Abdominal Aorta Bilateral Iliofem Runoff    Result Date: 1/14/2024  CT ANGIO ABDOMINAL AORTA BILAT ILIOFEM RUNOFF Date of Exam: 1/14/2024 5:34 PM EST Indication: Claudication or leg ischemia Previous fem pop graft occlusion, c/o increased LLE pain, color changes, third/fourth toe ischemia, leukocytosis, r/o gas. Comparison: None available. Technique: CTA of the abdomen, pelvis and both lower extremities was performed after the uneventful intravenous administration of 120 cc Isovue-370 . Reconstructed coronal and  sagittal images were also obtained. In addition, a 3-D volume rendered image was created for interpretation. Automated exposure control and iterative reconstruction methods were used. The origins of the celiac axis and superior mesenteric arteries are normal. The origins of the renal arteries are normal. Mild atheromatous disease of the abdominal aorta with subtle ectasia. The origin of the inferior mesenteric artery is patent. Complete occlusion of the left common iliac artery with continued complete loss of flow within the left internal and external iliac arteries. Multi focal atheromatous disease of the right common iliac artery as well as the internal and external iliac arteries. Femorofemoral bypass. Complete occlusion of the left femoral artery bypass with no underlying flow. Reconstitution of flow distally at the level of the popliteal artery due to branches from the deep femoral artery. The superior segments of the left anterior and posterior tibial arteries are patent. Eventual loss of flow within the posterior tibial artery seen on axial image 383. Continued flow within the dorsalis pedis with eventual loss of flow within the arteries. On the right there is continued flow within the right deep femoral artery. Complete occlusion of the left femoral artery with distal reconstitution from branches of the deep femoral artery. The popliteal artery is patent. The anterior and posterior tibial vessels are patent proximally. Loss of flow within the anterior tibial artery on axial image #343. Loss of the contrast column within the posterior tibial artery on axial image 417. The liver appears normal. Calcified granulomata within the spleen. Normal pancreas. Prior cholecystectomy. The adrenal glands are normal. No renal masses. No hydroureteronephrosis. The ureters and urinary bladder are normal. The small bowel is nonobstructed. Normal appendix. Colonic diverticulosis without evidence of diverticulitis. No ascites  or pneumoperitoneum. No adenopathy. Degenerative changes of the hips and lumbar spine.     Impression: Complete occlusion of the left femoral artery bypass graft. Complete occlusion of the right femoral artery. Eventual distal reconstitution of both vessels due to branches of the deep femoral arteries. Loss of flow within the left posterior tibial artery. Loss of flow within the right anterior tibial artery. Eventual loss of flow within the contrast column on both sides with no residual flow to the digital arteries. Electronically Signed: Kee Cartagena MD  1/14/2024 6:08 PM EST  Workstation ID: [id number]    XR Chest 1 View    Result Date: 1/14/2024  XR CHEST 1 VW Date of Exam: 1/14/2024 3:02 PM EST Indication: Weak/Dizzy/AMS triage protocol Comparison: None available. Findings: No focal consolidation. No pneumothorax or pleural effusion. Cardiac size is normal. The visualized clavicles appear intact. No displaced rib fractures. The visualized upper abdomen is normal.     Impression: Impression: No acute cardiopulmonary disease. Electronically Signed: Kee Cartagena MD  1/14/2024 3:18 PM EST  Workstation ID: [id number]     Results for orders placed during the hospital encounter of 11/20/23    Adult Transthoracic Echo Complete W/ Cont if Necessary Per Protocol    Interpretation Summary    Left ventricular systolic function is hyperdynamic (EF > 70%). Calculated left ventricular EF = 70.4%    Left ventricular diastolic function was normal.    No significant structural or functional valvular disease.      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 Peres of Ortho for BKA by Dr. Ryan of CT Surg (pt's pre-op appt was to be tomorrow 1/15/24)  - Dr. Peres aware of admit, feels likely would need AKA given severity of PAD and risk on non-healing   - per Dr. Peres's recommendation consult with Dr. Pena for consideration of proximal fem-fem bypass revision prior to operative definitive planning  - hold home Xarelto -- start heparin gtt  - continue ASA and HD statin  - opioid tolerant --- schedule PO oxy QID with additional PRN for breakthrough, also prn IV dilaudid    ATN of sepsis   Elevated creatinine  - does not meet dx for ALEJANDRO currently  - pre-renal, likely hypotension related  - solu-cortef 80mg IV x 3 doses q8hrs to help with relative adrenal suppression while volume resuscitate  - s/p 2.2L bolus in ED, continue maintenance fluids   - repeat am BMP    NIDDM2  -LDSSI    HTN  HL  - hold ACEI due to elevated creatinine/hypotension    Ongoing heavy tobacco smoker  - nicotine patch and gum prn         DVT prophylaxis:  Medical DVT prophylaxis orders are present.      CODE STATUS:    Code Status and Medical Interventions:   Ordered at: 01/14/24 2055     Code Status (Patient has no pulse and is not breathing):    CPR (Attempt to Resuscitate)     Medical Interventions (Patient has pulse or is breathing):    Full Support       Expected Discharge   Expected discharge date/ time has not been documented.     Ashley Lee MD  01/14/24

## 2024-01-15 NOTE — CONSULTS
Inpatient Vascular Surgery Consult  Consult performed by: Vin Pena MD  Consult ordered by: Christo Peres Jr., MD  Reason for consult: LEFT 2nd-4th toe gangrene  Assessment/Recommendations:   - needs cardiac optimization  - tentatively plan for graft thrombectomy, redo bypass, tibial angioplasty 1/18/24  - heparin drip          Patient Care Team:  Melo Whitaker MD as PCP - General (Family Medicine)  Niall Mcfarlane MD as Consulting Physician (Cardiology)    Chief complaint:  LEFT 2nd-4th toe gangrene    Subjective     History of Present Illness  48 year old  gentleman, a patient of Dr. Melo Whitaker, who previously underwent 6/27/17 LEFT CFA-AKP prosthetic bypass and 11/21/23 fem-fem bypass.  He developed LEFT 4th toe dry gangrenous changes in early January '24 with worsening discoloration of the 2nd-3rd toes.  He was seen previously and recommended for LEFT leg amputation.  He presented to [facility] ER 1/14/24 and a CTA was obtained demonstrating multi-level disease including LEFT CFA-AKP occlusion with reconstitution of the BKP with possible tibial reconstitution.  He was admitted on a heparin drip and vascular surgery consultation is requested to improve perfusion for possible amputation.      Review of Systems   Constitutional: Negative.    HENT: Negative.     Eyes: Negative.    Respiratory: Negative.     Cardiovascular: Negative.    Gastrointestinal: Negative.    Endocrine: Negative.    Genitourinary: Negative.    Musculoskeletal: Negative.    Skin:  Positive for color change (LEFT 2nd-4th toes).   Allergic/Immunologic: Negative.    Neurological: Negative.    Hematological: Negative.    Psychiatric/Behavioral: Negative.          Past Medical History:   Diagnosis Date    Arthritis     Back pain     Diabetes     SINCE MARCH 2017    Dyslipidemia     HTN (hypertension)     PVD (peripheral vascular disease)     Wears partial dentures    ,   Past Surgical History:   Procedure  Laterality Date    AORTAGRAM N/A 04/25/2017    Procedure: AORTAGRAM WITH OR WITHOUT RUNOFFS POSSIBLE STENT with left femoral cutdown;  Surgeon: Brett Ryan MD;  Location:  [facility];  Service:     AORTAGRAM N/A 11/21/2023    Procedure: THROMBECTOMY OF LEFT GRAFT, AORTAGRAM, FEMORAL TO FEMORAL BYPASS;  Surgeon: Brett Ryan MD;  Location:  [facility];  Service: Vascular;  Laterality: N/A;  FLUORO- .06 SECS  DOSE- 10 MGY  CONTRAST- 10 ML ISO    CHOLECYSTECTOMY      CYST REMOVAL      OFF OF BACK    GA IN-SITU VEIN BYPASS FEMORAL-POPLITEAL Left 04/25/2017    Procedure: FEMORAL POPLITEAL BYPASS;  Surgeon: Brett Ryan MD;  Location:  [facility];  Service: Vascular    GA IN-SITU VEIN BYPASS FEMORAL-POPLITEAL Left 06/27/2017    Procedure: LEFT FEMORAL ENDARTECTOMYN LEFT FEMORAL POPLITEAL BYPASS;  Surgeon: Brett Ryan MD;  Location:  [facility];  Service: Vascular   ,   Family History   Problem Relation Age of Onset    Arthritis Mother     Liver disease Father     Kidney disease Father    ,   Medications Prior to Admission   Medication Sig Dispense Refill Last Dose    aspirin 81 MG EC tablet Take 1 tablet by mouth Every Night.   1/13/2024    cyclobenzaprine (FLEXERIL) 10 MG tablet Take 1 tablet by mouth 3 (Three) Times a Day As Needed for Muscle Spasms.   Past Week    diphenhydrAMINE HCl (NERVINE PO) Take 1 capsule by mouth Daily.   1/13/2024    Empagliflozin-metFORMIN HCl (Synjardy) 12.5-1000 MG tablet Take 1 tablet by mouth 2 (Two) Times a Day. Patient receives samples of this med from his PCP   1/13/2024    FENOFIBRATE PO Take 200 mg by mouth Every Night.   1/13/2024    gabapentin (NEURONTIN) 600 MG tablet Take 1 tablet by mouth 3 (Three) Times a Day.   1/13/2024    lisinopril (PRINIVIL,ZESTRIL) 40 MG tablet Take 1 tablet by mouth Every Night. 30 tablet 6 1/13/2024    metoprolol tartrate (LOPRESSOR) 25 MG tablet Take 1 tablet by mouth Every 12 (Twelve) Hours. 30 tablet 6  1/13/2024    oxyCODONE (ROXICODONE) 20 MG tablet Take 1 tablet by mouth Every 4 (Four) Hours As Needed for Moderate Pain.   Past Week    Rivaroxaban (XARELTO) 2.5 MG tablet Take 1 tablet by mouth 2 (Two) Times a Day. Start taking on Sunday, November 26. This will be your first dose. Take your last dose of Clopidogrel on Sunday, November 26 and stop the Clopidogrel. 60 tablet 6 1/13/2024    rosuvastatin (CRESTOR) 40 MG tablet Take 1 tablet by mouth Every Night.   1/13/2024    Semaglutide 3 MG tablet Take 1 tablet by mouth Daily. Patient receives samples of this med from his PCP   1/13/2024    HYDROcodone-acetaminophen (NORCO)  MG per tablet Take 1 tablet by mouth Every 8 (Eight) Hours As Needed for Moderate Pain. (Patient not taking: Reported on 1/14/2024)   Not Taking   , and Allergies:  Patient has no known allergies.    Objective      Vital Signs  Temp:  [96.6 °F (35.9 °C)-98.7 °F (37.1 °C)] 96.6 °F (35.9 °C)  Heart Rate:  [] 84  Resp:  [16] 16  BP: ()/(46-82) 140/66    Physical Exam  Vitals reviewed. Chaperone present: wife and daughters x2 at bedside.   Constitutional:       General: He is not in acute distress.     Appearance: Normal appearance. He is normal weight. He is not ill-appearing or toxic-appearing.   HENT:      Head: Normocephalic and atraumatic.      Nose: Nose normal.      Mouth/Throat:      Mouth: Mucous membranes are moist.      Pharynx: Oropharynx is clear.   Eyes:      Extraocular Movements: Extraocular movements intact.      Conjunctiva/sclera: Conjunctivae normal.      Pupils: Pupils are equal, round, and reactive to light.   Cardiovascular:      Rate and Rhythm: Normal rate and regular rhythm.   Pulmonary:      Effort: Pulmonary effort is normal. No respiratory distress.   Abdominal:      General: Abdomen is flat. There is no distension.      Palpations: Abdomen is soft.      Tenderness: There is no abdominal tenderness. There is no guarding or rebound.   Musculoskeletal:       Cervical back: Normal range of motion and neck supple.   Skin:     Comments: LEFT 2nd-4th toe dry gangrene without erythema   Neurological:      General: No focal deficit present.      Mental Status: He is alert and oriented to person, place, and time.      Motor: No weakness (LEFT foot motor preserved).   Psychiatric:         Mood and Affect: Mood normal.         Behavior: Behavior normal.         Results Review:    I reviewed the patient's new clinical results.  I reviewed the patient's new imaging results and agree with the interpretation.  Discussed with Home Peres and Daniel      Discussion  I diagrammed and discussed the CTA findings with the patient and his family specifically highlighting his graft anatomy on the LEFT.  His fem-fem graft is patent with flow seen into the PFA, but the CFA-AKP bypass is occluded with faint reconstitution of the BKP with poor visualization of the tibial run-off.  I reviewed the possibility of graft thrombectomy with possible jump-graft to the BKP as well.  He will need cardiac optimization in anticipation of this procedure.  All questions answered to the family's and patient's satisfaction.      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  Time spent in counseling 20 min. Counseling included the following topics: diabetes, smoking cessation, arterial anatomy, operative considerations, possible post-surgical outcomes

## 2024-01-15 NOTE — PROGRESS NOTES
"Pharmacy Consult-Vancomycin Dosing  Justo Oquendo is a  48 y.o. male receiving vancomycin therapy.     Indication: SSTI  Consulting Provider: Hospitalist  ID Consult: N    Goal Trough: 10-20    Current Antimicrobial Therapy  Anti-Infectives (From admission, onward)      Ordered     Dose/Rate Route Frequency Start Stop    01/14/24 2129  cefTRIAXone (ROCEPHIN) 1,000 mg in sodium chloride 0.9 % 100 mL IVPB        Ordering Provider: Ashley Lee MD    1,000 mg  200 mL/hr over 30 Minutes Intravenous Every 24 Hours 01/15/24 1200 01/22/24 1159    01/15/24 0517  vancomycin (dosing per levels)        Ordering Provider: Chana Lara RPH     Does not apply Daily 01/15/24 0900 01/22/24 0859    01/14/24 2129  Pharmacy to dose vancomycin        Ordering Provider: Ashley Lee MD     Does not apply Continuous PRN 01/14/24 2129 01/21/24 2128    01/14/24 1638  vancomycin IVPB 1500 mg in 0.9% NaCl (Premix) 500 mL        Ordering Provider: Ramakrishna Claudio IV, PharmD    20 mg/kg × 73.5 kg  333.3 mL/hr over 90 Minutes Intravenous Once 01/14/24 1730 01/14/24 1945    01/14/24 1624  Pharmacy to dose vancomycin        Ordering Provider: Samuel Dave APRN     Does not apply Once 01/14/24 1640 01/14/24 1747    01/14/24 1546  cefTRIAXone (ROCEPHIN) 1,000 mg in sodium chloride 0.9 % 100 mL IVPB        Ordering Provider: Samuel Dave APRN    1,000 mg  200 mL/hr over 30 Minutes Intravenous Once 01/14/24 1602 01/14/24 1701            Allergies  Allergies as of 01/14/2024    (No Known Allergies)       Labs    Results from last 7 days   Lab Units 01/15/24  0359 01/14/24  1512   BUN mg/dL 22* 30*   CREATININE mg/dL 0.90 2.07*       Results from last 7 days   Lab Units 01/15/24  0359 01/14/24  1512   WBC 10*3/mm3 11.86* 13.82*       Evaluation of Dosing     Last Dose Received in the ED/Outside Facility: vanc 1500mg x1 1/14 @ 1815  Is Patient on Dialysis or Renal Replacement: N    Ht - 172.7 cm (68\")  Wt - 73.5 kg (162 lb)    Estimated " Creatinine Clearance: 102.1 mL/min (by C-G formula based on SCr of 0.9 mg/dL).    Intake & Output (last 3 days)         01/12 0701 01/13 0700 01/13 0701 01/14 0700 01/14 0701  01/15 0700    IV Piggyback   100    Total Intake(mL/kg)   100 (1.4)    Net   +100                   Microbiology and Radiology  Microbiology Results (last 10 days)       Procedure Component Value - Date/Time    MRSA Screen, PCR (Inpatient) - Swab, Nares [[id number]]  (Normal) Collected: 01/14/24 1817    Lab Status: Final result Specimen: Swab from Nares Updated: 01/14/24 2152     MRSA PCR No MRSA Detected    Narrative:      The negative predictive value of this diagnostic test is high and should only be used to consider de-escalating anti-MRSA therapy. A positive result may indicate colonization with MRSA and must be correlated clinically.            Vancomycin Levels:    Results from last 7 days   Lab Units 01/15/24  0359   VANCOMYCIN RM mcg/mL 9.50                   [facility] AUC Calculation:    Current AUC:   281 mg/L*hr    Predicted Steady State AUC on Current Dose: -- mg/L*hr  _________________________________    Predicted Steady State AUC on New Dose:   481 mg/L*hr    Assessment/Plan:  Pharmacy consulted to dose vancomycin for SSTI  Patient loaded with vancomycin 1500mg ( ~ 20 mg/kg) IV x1 on 1/14 @ 1815  Blood cultures in progress. MRSA PCR is negative.  Patient remains afebrile, serum creatinine has improved significantly (baseline ~0.8)  Will initiate a maintenance dose of vancomycin 1,000 mg IV q12h (~13.6 mg/kg)  A vancomycin level will be assessed on 1/16 with AM labs, before the 3rd maintenance dose.  Will continue to follow and adjust vancomycin dose as needed based on renal function, cultures, and patient clinical status.    Thank you,    Nathanael Kay [facility]  1/15/2024  07:03 EST

## 2024-01-15 NOTE — PROGRESS NOTES
HEPARIN INFUSION  Justo Oquendo is a  48 y.o. male receiving heparin infusion.     Therapy for (VTE/Cardiac):   Cardiac  Patient Weight: 73.5kg  Initial Bolus (Y/N):   N  Any Bolus (Y/N):   Y        Signs or Symptoms of Bleeding: None per RN      Cardiac or Other (Not VTE)  Initial rate: 12 units/kg/hr (Max 1,000 units/hr)   aPTT  Rebolus Infusion Hold time Change infusion Dose (Units/kg/hr) Next Anti Xa or aPTT Level Due   <45 50 Units/kg  (4000 Units Max) None Increase by  3 Units/kg/hr 6 hours   45 - 52 25 Units/kg  (2000 Units Max) None Increase by  2 Units/kg/hr 6 hours   53 - 59 0 None Increase by  1 Units/kg/hr 6 hours   60 - 75 0 None No Change 6 hours (after 2 consecutive levels in range check qAM)   76 - 83 0 None Decrease by  1 Units/kg/hr 6 hours   84 - 98 0 30 Minutes Decrease by  2 Units/kg/hr 6 hours   99 - 113 0 60 Minutes Decrease by  3 Units/kg/hr 6 hours   >113 0 Hold  After aPTT less than 75 decrease previous rate by  4 Units/kg/hr  Every 2 hours until aPTT less than 75 then when infusion restarts in 6 hours       Results from last 7 days   Lab Units 01/15/24  0359 01/14/24 2111 01/14/24  1512   INR   --  1.17*  --    HEMOGLOBIN g/dL 10.7*  --  11.8*   HEMATOCRIT % 32.5*  --  36.9*   PLATELETS 10*3/mm3 257  --  333          Date   Time   aPTT Current Rate (Unit/kg/hr) Bolus   (Units) Rate Change   (Unit/kg/hr) New Rate (Unit/kg/hr) Next   aPTT Comments  Pump Check Daily   1/14 2111 38.7 NEW START -- +12 12 0400 DW RN   1/14 0359 38.3 12 3600 +3 15 1200 Discussed w/ nurse   1/15 1431 36.1 15 3600 +3 18 2200 DW DAVID Kay, [facility]  1/15/2024  15:34 EST

## 2024-01-15 NOTE — PROGRESS NOTES
[facility]  PROGRESS NOTE    Patient Name: Justo Oquendo  : 1975  MRN: [id number]    Date of Admission: 2024  Primary Care Physician: Melo Whitaker MD    Subjective   Subjective     CC:  Left toe ischemia    HPI:  Patient continues to have significant pain of his left lower extremity.  Afebrile overnight, awake and alert.      Objective   Objective     Vital Signs:   Temp:  [96.6 °F (35.9 °C)-100 °F (37.8 °C)] 96.6 °F (35.9 °C)  Heart Rate:  [] 99  Resp:  [14-16] 16  BP: ()/(46-82) 141/82     Physical Exam:  Constitutional: No acute distress, awake, alert  Respiratory: Clear to auscultation bilaterally, respiratory effort normal   Cardiovascular: RRR, no murmurs, rubs, or gallops  Gastrointestinal: Positive bowel sounds, soft, nontender, nondistended  Musculoskeletal: No bilateral ankle edema  Psychiatric: Appropriate affect, cooperative  Neurologic: Oriented x 3, no focal deficits  Skin: Necrosis of left second third and fourth toes, rubor to mid calf      Results Reviewed:  LAB RESULTS:      Lab 01/15/24  0359 24  21124  1512   WBC 11.86*  --  13.82*   HEMOGLOBIN 10.7*  --  11.8*   HEMATOCRIT 32.5*  --  36.9*   PLATELETS 257  --  333   NEUTROS ABS 10.16*  --  9.47*   IMMATURE GRANS (ABS) 0.09*  --  0.17*   LYMPHS ABS 1.24  --  2.57   MONOS ABS 0.36  --  1.48*   EOS ABS 0.00  --  0.10   MCV 92.3  --  92.9   SED RATE  --   --  100*   CRP  --   --  2.60*   PROCALCITONIN  --   --  0.19   LACTATE  --   --  1.2   PROTIME  --  15.0*  --    APTT 38.3* 38.7*  --    HEPARIN ANTI-XA  --  0.14*  --          Lab 01/15/24  0359 24  1512   SODIUM 138 134*   POTASSIUM 4.9 4.1   CHLORIDE 106 96*   CO2 23.0 25.0   ANION GAP 9.0 13.0   BUN 22* 30*   CREATININE 0.90 2.07*   EGFR 104.0 38.3*   GLUCOSE 221* 302*   CALCIUM 9.5 10.0   MAGNESIUM  --  2.1         Lab 24  1512   TOTAL PROTEIN 7.5   ALBUMIN 3.8   GLOBULIN 3.7   ALT  (SGPT) 13   AST (SGOT) 14   BILIRUBIN 0.2   ALK PHOS 83         Lab 01/14/24  2111 01/14/24  1512   PROBNP  --  63.8   HSTROP T  --  18   PROTIME 15.0*  --    INR 1.17*  --                  Brief Urine Lab Results       None            Microbiology Results Abnormal       Procedure Component Value - Date/Time    MRSA Screen, PCR (Inpatient) - Swab, Nares [[id number]]  (Normal) Collected: 01/14/24 1817    Lab Status: Final result Specimen: Swab from Nares Updated: 01/14/24 2152     MRSA PCR No MRSA Detected    Narrative:      The negative predictive value of this diagnostic test is high and should only be used to consider de-escalating anti-MRSA therapy. A positive result may indicate colonization with MRSA and must be correlated clinically.            CT Angio Abdominal Aorta Bilateral Iliofem Runoff    Result Date: 1/14/2024  CT ANGIO ABDOMINAL AORTA BILAT ILIOFEM RUNOFF Date of Exam: 1/14/2024 5:34 PM EST Indication: Claudication or leg ischemia Previous fem pop graft occlusion, c/o increased LLE pain, color changes, third/fourth toe ischemia, leukocytosis, r/o gas. Comparison: None available. Technique: CTA of the abdomen, pelvis and both lower extremities was performed after the uneventful intravenous administration of 120 cc Isovue-370 . Reconstructed coronal and sagittal images were also obtained. In addition, a 3-D volume rendered image was created for interpretation. Automated exposure control and iterative reconstruction methods were used. The origins of the celiac axis and superior mesenteric arteries are normal. The origins of the renal arteries are normal. Mild atheromatous disease of the abdominal aorta with subtle ectasia. The origin of the inferior mesenteric artery is patent. Complete occlusion of the left common iliac artery with continued complete loss of flow within the left internal and external iliac arteries. Multi focal atheromatous disease of the right common iliac artery as well as the  internal and external iliac arteries. Femorofemoral bypass. Complete occlusion of the left femoral artery bypass with no underlying flow. Reconstitution of flow distally at the level of the popliteal artery due to branches from the deep femoral artery. The superior segments of the left anterior and posterior tibial arteries are patent. Eventual loss of flow within the posterior tibial artery seen on axial image 383. Continued flow within the dorsalis pedis with eventual loss of flow within the arteries. On the right there is continued flow within the right deep femoral artery. Complete occlusion of the left femoral artery with distal reconstitution from branches of the deep femoral artery. The popliteal artery is patent. The anterior and posterior tibial vessels are patent proximally. Loss of flow within the anterior tibial artery on axial image #343. Loss of the contrast column within the posterior tibial artery on axial image 417. The liver appears normal. Calcified granulomata within the spleen. Normal pancreas. Prior cholecystectomy. The adrenal glands are normal. No renal masses. No hydroureteronephrosis. The ureters and urinary bladder are normal. The small bowel is nonobstructed. Normal appendix. Colonic diverticulosis without evidence of diverticulitis. No ascites or pneumoperitoneum. No adenopathy. Degenerative changes of the hips and lumbar spine.     Impression: Complete occlusion of the left femoral artery bypass graft. Complete occlusion of the right femoral artery. Eventual distal reconstitution of both vessels due to branches of the deep femoral arteries. Loss of flow within the left posterior tibial artery. Loss of flow within the right anterior tibial artery. Eventual loss of flow within the contrast column on both sides with no residual flow to the digital arteries. Electronically Signed: eKe Cartagena MD  1/14/2024 6:08 PM EST  Workstation ID: [id number]    XR Chest 1 View    Result Date:  1/14/2024  XR CHEST 1 VW Date of Exam: 1/14/2024 3:02 PM EST Indication: Weak/Dizzy/AMS triage protocol Comparison: None available. Findings: No focal consolidation. No pneumothorax or pleural effusion. Cardiac size is normal. The visualized clavicles appear intact. No displaced rib fractures. The visualized upper abdomen is normal.     Impression: Impression: No acute cardiopulmonary disease. Electronically Signed: Kee Cartagena MD  1/14/2024 3:18 PM EST  Workstation ID: [id number]     Results for orders placed during the hospital encounter of 11/20/23    Adult Transthoracic Echo Complete W/ Cont if Necessary Per Protocol    Interpretation Summary    Left ventricular systolic function is hyperdynamic (EF > 70%). Calculated left ventricular EF = 70.4%    Left ventricular diastolic function was normal.    No significant structural or functional valvular disease.      Current medications:  Scheduled Meds:aspirin, 81 mg, Oral, Nightly  cefTRIAXone, 1,000 mg, Intravenous, Q24H  gabapentin, 600 mg, Oral, Q8H  hydrocortisone sodium succinate, 100 mg, Intravenous, Q8H  insulin lispro, 2-7 Units, Subcutaneous, 4x Daily AC & at Bedtime  metoprolol tartrate, 25 mg, Oral, Q12H  nicotine, 1 patch, Transdermal, Nightly  oxyCODONE, 10 mg, Oral, 4x Daily  rosuvastatin, 40 mg, Oral, Nightly  senna-docusate sodium, 2 tablet, Oral, BID  sodium chloride, 10 mL, Intravenous, Q12H  vancomycin, 1,000 mg, Intravenous, Q12H      Continuous Infusions:heparin, 15 Units/kg/hr, Last Rate: 15 Units/kg/hr (01/15/24 6643)  Pharmacy to Dose Heparin,   Pharmacy to dose vancomycin,   sodium chloride, 125 mL/hr, Last Rate: 125 mL/hr (01/14/24 2200)      PRN Meds:.  senna-docusate sodium **AND** polyethylene glycol **AND** bisacodyl **AND** bisacodyl    Calcium Replacement - Follow Nurse / BPA Driven Protocol    cyclobenzaprine    dextrose    dextrose    glucagon (human recombinant)    HYDROmorphone **AND** naloxone    LORazepam    Magnesium Standard  Dose Replacement - Follow Nurse / BPA Driven Protocol    nicotine polacrilex    nitroglycerin    ondansetron ODT **OR** ondansetron    oxyCODONE    Pharmacy to Dose Heparin    Pharmacy to dose vancomycin    Phosphorus Replacement - Follow Nurse / BPA Driven Protocol    Potassium Replacement - Follow Nurse / BPA Driven Protocol    sodium chloride    sodium chloride    temazepam    Assessment & Plan   Assessment & Plan     Active Hospital Problems    Diagnosis  POA    **Ischemic foot [I99.8]  Yes    Sepsis associated hypotension [A41.9, I95.9]  Yes    ATN (acute tubular necrosis) [N17.0]  Yes    Elevated serum creatinine [R79.89]  Yes    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 [I96]  Unknown    Claudication of lower extremity s/p femorofemoral bypass [I73.9]  Yes    Hypertension [I10]  Yes    Hyperlipidemia [E78.5]  Yes    Tobacco abuse [Z72.0]  Yes    Diabetes mellitus type II, non insulin dependent [E11.9]  Yes    Peripheral arterial disease [I73.9]  Yes      Resolved Hospital Problems   No resolved problems to display.        Brief Hospital Course to date:  Justo Oquendo is a 48 y.o. male ongoing heavy tobacco smoker with a known history of severe PAD, status post thrombectomy of left femoropopliteal bypass graft as well as placement of a femorofemoral bypass by Dr. Ryan on 11/21/2023 who presented with lethargy, decreased urine output and confusion.  Patient saw CT surgery in December 2023 and outpatient referral was made to Dr. Peres of orthopedic surgery for consideration of BKA for critical focal peripheral arterial disease.    Sepsis (AMS, WBC, hypotension, ischemic necrosis)  Metabolic encephalopathy  Ischemic ulcers and necrosis of LLE 3-4th digits  Progressive PAD with occlusion of Fem-Fem graft  -Initially hypotensive with systolics in the 80s, now improved after receiving hydrocortisone and 2.2 L of fluids.  - CTA of the abdominal aorta with runoff shows complete occlusion of the left femoral artery  bypass graft, complete occlusion of the right femoral artery.  Eventual distal reconstitution of both vessels due to the branches of the deep femoral arteries, loss of flow within the left posterior tibial artery, loss of flow within the right anterior tibial artery.  No residual flow to the distal arteries.  -Blood cultures obtained and remain negative  -Continue IV Rocephin and Vanc empirically   -Continue IV heparin.  Holding home Xarelto  -Continue ASA, statin  -Consult Dr. Peres as patient likely requiring amputation (patient previously had an outpatient appointment for preop with Dr. Peres on 1/15/2024).  -Consulted Dr. Pena for consideration of proximal femorofemoral bypass revision per Dr. Peres's recommendations     Uncontrolled pain   - opioid tolerant.continue scheduled PO oxy QID with additional PRN for breakthrough, also prn IV dilaudid     ALEJANDRO, resolved  -Creatinine significantly elevated 2.07 on presentation, down to baseline of 0.9  - pre-renal, likely hypotension related, now resolved with hydrocortisone  -Continue IV fluids  - repeat am BMP     NIDDM2  -Continue sliding scale insulin     HTN  HL  - hold ACEI due to elevated creatinine/hypotension     Ongoing heavy tobacco smoker  - nicotine patch and gum prn        Expected Discharge Location and Transportation: home vs rehab  Expected Discharge   Expected Discharge Date: 1/19/2024; Expected Discharge Time:      DVT prophylaxis:  Medical DVT prophylaxis orders are present.     AM-PAC 6 Clicks Score (PT): 20 (01/15/24 7496)    CODE STATUS:   Code Status and Medical Interventions:   Ordered at: 01/14/24 2055     Code Status (Patient has no pulse and is not breathing):    CPR (Attempt to Resuscitate)     Medical Interventions (Patient has pulse or is breathing):    Full Support     I have prepared this progress note with copied portions of the prior day's progress note of my own authorship to preserve accuracy and maintain consistency of  documentation. I have reviewed these portions and edited them for correctness. I verify that the above documentation accurately and truly represents the evaluation and management performed on today's date.       Rayna Sanchez MD  01/15/24

## 2024-01-15 NOTE — CASE MANAGEMENT/SOCIAL WORK
Discharge Planning Assessment  [facility]     Patient Name: Justo Oquendo  MRN: [id number]  Today's Date: 1/15/2024    Admit Date: 1/14/2024    Plan: Home with family   Discharge Needs Assessment       Row Name 01/15/24 0834       Living Environment    People in Home spouse;child(janis), adult    Name(s) of People in Home Christine (spouse) [phone]    Current Living Arrangements home    Potentially Unsafe Housing Conditions none    Primary Care Provided by self    Provides Primary Care For no one    Family Caregiver if Needed child(janis), adult;spouse    Able to Return to Prior Arrangements yes       Resource/Environmental Concerns    Resource/Environmental Concerns none    Transportation Concerns none       Transition Planning    Patient/Family Anticipates Transition to home with family    Patient/Family Anticipated Services at Transition none    Transportation Anticipated family or friend will provide       Discharge Needs Assessment    Readmission Within the Last 30 Days no previous admission in last 30 days    Equipment Currently Used at Home none    Concerns to be Addressed denies needs/concerns at this time    Anticipated Changes Related to Illness none    Equipment Needed After Discharge none                   Discharge Plan       Row Name 01/15/24 0818       Plan    Plan Home with family    Patient/Family in Agreement with Plan yes    Plan Comments Spoke with patient at bedside. Lives with Christine (spouse) [phone] in [facility]. Is independent with ADL's. No problems with [place] [employer] or medications. Uses no DME. No advanced directives. PCP is Melo Whitaker. Plan is home with family. CM will continue to follow.    Final Discharge Disposition Code 01 - home or self-care                  Continued Care and Services - Admitted Since 1/14/2024    Coordination has not been started for this encounter.       Expected Discharge Date and Time       Expected Discharge Date Expected Discharge Time    Jan 19, 2024             Demographic Summary       Row Name 01/15/24 0833       General Information    Admission Type inpatient    Arrived From emergency department    Referral Source admission list    Reason for Consult discharge planning    Preferred Language English       Contact Information    Permission Granted to Share Info With     Contact Information Obtained for                    Functional Status       Row Name 01/15/24 0833       Functional Status    Usual Activity Tolerance good    Current Activity Tolerance good       Functional Status, IADL    Medications independent    Meal Preparation independent    Housekeeping independent    Laundry independent    Shopping independent       Mental Status    General Appearance WDL WDL       Mental Status Summary    Recent Changes in Mental Status/Cognitive Functioning no changes       Employment/    Employment Status employed full-time                   Psychosocial    No documentation.                  Abuse/Neglect    No documentation.                  Legal    No documentation.                  Substance Abuse    No documentation.                  Patient Forms    No documentation.                     Gilberto Barry, RN

## 2024-01-16 LAB
ANION GAP SERPL CALCULATED.3IONS-SCNC: 10 MMOL/L (ref 5–15)
APTT PPP: 52.4 SECONDS (ref 60–90)
APTT PPP: 55.4 SECONDS (ref 60–90)
BASOPHILS # BLD AUTO: 0.02 10*3/MM3 (ref 0–0.2)
BASOPHILS NFR BLD AUTO: 0.3 % (ref 0–1.5)
BUN SERPL-MCNC: 21 MG/DL (ref 6–20)
BUN/CREAT SERPL: 26.3 (ref 7–25)
CALCIUM SPEC-SCNC: 9.5 MG/DL (ref 8.6–10.5)
CHLORIDE SERPL-SCNC: 106 MMOL/L (ref 98–107)
CO2 SERPL-SCNC: 25 MMOL/L (ref 22–29)
CREAT SERPL-MCNC: 0.8 MG/DL (ref 0.76–1.27)
DEPRECATED RDW RBC AUTO: 37.3 FL (ref 37–54)
EGFRCR SERPLBLD CKD-EPI 2021: 107.8 ML/MIN/1.73
EOSINOPHIL # BLD AUTO: 0.04 10*3/MM3 (ref 0–0.4)
EOSINOPHIL NFR BLD AUTO: 0.6 % (ref 0.3–6.2)
ERYTHROCYTE [DISTWIDTH] IN BLOOD BY AUTOMATED COUNT: 11.5 % (ref 12.3–15.4)
GLUCOSE BLDC GLUCOMTR-MCNC: 160 MG/DL (ref 70–130)
GLUCOSE BLDC GLUCOMTR-MCNC: 231 MG/DL (ref 70–130)
GLUCOSE BLDC GLUCOMTR-MCNC: 231 MG/DL (ref 70–130)
GLUCOSE BLDC GLUCOMTR-MCNC: 249 MG/DL (ref 70–130)
GLUCOSE SERPL-MCNC: 149 MG/DL (ref 65–99)
HCT VFR BLD AUTO: 33.9 % (ref 37.5–51)
HGB BLD-MCNC: 11 G/DL (ref 13–17.7)
IMM GRANULOCYTES # BLD AUTO: 0.04 10*3/MM3 (ref 0–0.05)
IMM GRANULOCYTES NFR BLD AUTO: 0.6 % (ref 0–0.5)
LYMPHOCYTES # BLD AUTO: 2.67 10*3/MM3 (ref 0.7–3.1)
LYMPHOCYTES NFR BLD AUTO: 38.7 % (ref 19.6–45.3)
MCH RBC QN AUTO: 29 PG (ref 26.6–33)
MCHC RBC AUTO-ENTMCNC: 32.4 G/DL (ref 31.5–35.7)
MCV RBC AUTO: 89.4 FL (ref 79–97)
MONOCYTES # BLD AUTO: 0.51 10*3/MM3 (ref 0.1–0.9)
MONOCYTES NFR BLD AUTO: 7.4 % (ref 5–12)
NEUTROPHILS NFR BLD AUTO: 3.62 10*3/MM3 (ref 1.7–7)
NEUTROPHILS NFR BLD AUTO: 52.4 % (ref 42.7–76)
NRBC BLD AUTO-RTO: 0 /100 WBC (ref 0–0.2)
PLATELET # BLD AUTO: 264 10*3/MM3 (ref 140–450)
PMV BLD AUTO: 9.8 FL (ref 6–12)
POTASSIUM SERPL-SCNC: 4.1 MMOL/L (ref 3.5–5.2)
RBC # BLD AUTO: 3.79 10*6/MM3 (ref 4.14–5.8)
SODIUM SERPL-SCNC: 141 MMOL/L (ref 136–145)
VANCOMYCIN SERPL-MCNC: 13.3 MCG/ML (ref 5–40)
WBC NRBC COR # BLD AUTO: 6.9 10*3/MM3 (ref 3.4–10.8)

## 2024-01-16 PROCEDURE — 97165 OT EVAL LOW COMPLEX 30 MIN: CPT | Performed by: OCCUPATIONAL THERAPIST

## 2024-01-16 PROCEDURE — 99231 SBSQ HOSP IP/OBS SF/LOW 25: CPT | Performed by: FAMILY MEDICINE

## 2024-01-16 PROCEDURE — 25010000002 VANCOMYCIN 10 G RECONSTITUTED SOLUTION

## 2024-01-16 PROCEDURE — 63710000001 INSULIN LISPRO (HUMAN) PER 5 UNITS: Performed by: FAMILY MEDICINE

## 2024-01-16 PROCEDURE — 25010000002 VANCOMYCIN PER 500 MG

## 2024-01-16 PROCEDURE — 99222 1ST HOSP IP/OBS MODERATE 55: CPT | Performed by: INTERNAL MEDICINE

## 2024-01-16 PROCEDURE — 25810000003 SODIUM CHLORIDE 0.9 % SOLUTION

## 2024-01-16 PROCEDURE — 25010000002 HEPARIN (PORCINE) 25000-0.45 UT/250ML-% SOLUTION

## 2024-01-16 PROCEDURE — 80202 ASSAY OF VANCOMYCIN: CPT

## 2024-01-16 PROCEDURE — 25010000002 HEPARIN (PORCINE) PER 1000 UNITS

## 2024-01-16 PROCEDURE — 82948 REAGENT STRIP/BLOOD GLUCOSE: CPT

## 2024-01-16 PROCEDURE — 80048 BASIC METABOLIC PNL TOTAL CA: CPT

## 2024-01-16 PROCEDURE — 85730 THROMBOPLASTIN TIME PARTIAL: CPT

## 2024-01-16 PROCEDURE — 85025 COMPLETE CBC W/AUTO DIFF WBC: CPT | Performed by: INTERNAL MEDICINE

## 2024-01-16 PROCEDURE — 25010000002 CEFTRIAXONE PER 250 MG: Performed by: FAMILY MEDICINE

## 2024-01-16 PROCEDURE — 25010000002 HYDROMORPHONE 1 MG/ML SOLUTION: Performed by: FAMILY MEDICINE

## 2024-01-16 RX ORDER — AMLODIPINE BESYLATE 10 MG/1
10 TABLET ORAL
Status: DISCONTINUED | OUTPATIENT
Start: 2024-01-16 | End: 2024-01-18

## 2024-01-16 RX ORDER — HEPARIN SODIUM 1000 [USP'U]/ML
3600 INJECTION, SOLUTION INTRAVENOUS; SUBCUTANEOUS ONCE
Status: COMPLETED | OUTPATIENT
Start: 2024-01-16 | End: 2024-01-16

## 2024-01-16 RX ORDER — HEPARIN SODIUM 1000 [USP'U]/ML
1800 INJECTION, SOLUTION INTRAVENOUS; SUBCUTANEOUS ONCE
Status: COMPLETED | OUTPATIENT
Start: 2024-01-16 | End: 2024-01-16

## 2024-01-16 RX ORDER — LISINOPRIL 40 MG/1
40 TABLET ORAL
Status: DISCONTINUED | OUTPATIENT
Start: 2024-01-16 | End: 2024-01-24 | Stop reason: HOSPADM

## 2024-01-16 RX ADMIN — GABAPENTIN 600 MG: 300 CAPSULE ORAL at 13:56

## 2024-01-16 RX ADMIN — VANCOMYCIN HYDROCHLORIDE 1000 MG: 1 INJECTION, SOLUTION INTRAVENOUS at 00:21

## 2024-01-16 RX ADMIN — SODIUM CHLORIDE 1000 MG: 900 INJECTION INTRAVENOUS at 12:14

## 2024-01-16 RX ADMIN — HEPARIN SODIUM 3600 UNITS: 1000 INJECTION INTRAVENOUS; SUBCUTANEOUS at 00:21

## 2024-01-16 RX ADMIN — CYCLOBENZAPRINE 10 MG: 10 TABLET, FILM COATED ORAL at 20:26

## 2024-01-16 RX ADMIN — INSULIN LISPRO 3 UNITS: 100 INJECTION, SOLUTION INTRAVENOUS; SUBCUTANEOUS at 11:19

## 2024-01-16 RX ADMIN — INSULIN LISPRO 3 UNITS: 100 INJECTION, SOLUTION INTRAVENOUS; SUBCUTANEOUS at 08:13

## 2024-01-16 RX ADMIN — HEPARIN SODIUM 1800 UNITS: 1000 INJECTION INTRAVENOUS; SUBCUTANEOUS at 08:13

## 2024-01-16 RX ADMIN — OXYCODONE HYDROCHLORIDE 10 MG: 10 TABLET ORAL at 20:25

## 2024-01-16 RX ADMIN — INSULIN LISPRO 2 UNITS: 100 INJECTION, SOLUTION INTRAVENOUS; SUBCUTANEOUS at 17:54

## 2024-01-16 RX ADMIN — INSULIN LISPRO 3 UNITS: 100 INJECTION, SOLUTION INTRAVENOUS; SUBCUTANEOUS at 20:33

## 2024-01-16 RX ADMIN — VANCOMYCIN HYDROCHLORIDE 1250 MG: 10 INJECTION, POWDER, LYOPHILIZED, FOR SOLUTION INTRAVENOUS at 21:00

## 2024-01-16 RX ADMIN — HYDROMORPHONE HYDROCHLORIDE 1 MG: 1 INJECTION, SOLUTION INTRAMUSCULAR; INTRAVENOUS; SUBCUTANEOUS at 06:25

## 2024-01-16 RX ADMIN — METOPROLOL TARTRATE 25 MG: 25 TABLET, FILM COATED ORAL at 20:22

## 2024-01-16 RX ADMIN — METOPROLOL TARTRATE 25 MG: 25 TABLET, FILM COATED ORAL at 08:12

## 2024-01-16 RX ADMIN — HYDROMORPHONE HYDROCHLORIDE 1 MG: 1 INJECTION, SOLUTION INTRAMUSCULAR; INTRAVENOUS; SUBCUTANEOUS at 18:53

## 2024-01-16 RX ADMIN — GABAPENTIN 600 MG: 300 CAPSULE ORAL at 06:25

## 2024-01-16 RX ADMIN — OXYCODONE HYDROCHLORIDE 10 MG: 10 TABLET ORAL at 11:20

## 2024-01-16 RX ADMIN — Medication 1 PATCH: at 20:24

## 2024-01-16 RX ADMIN — OXYCODONE HYDROCHLORIDE 10 MG: 10 TABLET ORAL at 14:30

## 2024-01-16 RX ADMIN — VANCOMYCIN HYDROCHLORIDE 1250 MG: 10 INJECTION, POWDER, LYOPHILIZED, FOR SOLUTION INTRAVENOUS at 10:28

## 2024-01-16 RX ADMIN — HEPARIN SODIUM 23 UNITS/KG/HR: 10000 INJECTION, SOLUTION INTRAVENOUS at 13:55

## 2024-01-16 RX ADMIN — ROSUVASTATIN CALCIUM 40 MG: 20 TABLET, FILM COATED ORAL at 20:19

## 2024-01-16 RX ADMIN — TEMAZEPAM 15 MG: 15 CAPSULE ORAL at 20:19

## 2024-01-16 RX ADMIN — HYDROMORPHONE HYDROCHLORIDE 1 MG: 1 INJECTION, SOLUTION INTRAMUSCULAR; INTRAVENOUS; SUBCUTANEOUS at 22:06

## 2024-01-16 RX ADMIN — Medication 10 ML: at 08:17

## 2024-01-16 RX ADMIN — HYDROMORPHONE HYDROCHLORIDE 1 MG: 1 INJECTION, SOLUTION INTRAMUSCULAR; INTRAVENOUS; SUBCUTANEOUS at 00:25

## 2024-01-16 RX ADMIN — AMLODIPINE BESYLATE 10 MG: 10 TABLET ORAL at 13:56

## 2024-01-16 RX ADMIN — HYDROMORPHONE HYDROCHLORIDE 1 MG: 1 INJECTION, SOLUTION INTRAMUSCULAR; INTRAVENOUS; SUBCUTANEOUS at 15:33

## 2024-01-16 RX ADMIN — ASPIRIN 81 MG: 81 TABLET, COATED ORAL at 20:22

## 2024-01-16 RX ADMIN — OXYCODONE HYDROCHLORIDE 10 MG: 10 TABLET ORAL at 08:12

## 2024-01-16 RX ADMIN — GABAPENTIN 600 MG: 300 CAPSULE ORAL at 20:20

## 2024-01-16 RX ADMIN — LISINOPRIL 40 MG: 20 TABLET ORAL at 13:56

## 2024-01-16 RX ADMIN — SENNOSIDES AND DOCUSATE SODIUM 2 TABLET: 8.6; 5 TABLET ORAL at 08:13

## 2024-01-16 RX ADMIN — OXYCODONE HYDROCHLORIDE 10 MG: 10 TABLET ORAL at 17:38

## 2024-01-16 NOTE — PROGRESS NOTES
Pharmacy Consult-Vancomycin Dosing  Justo Oquendo is a  48 y.o. male receiving vancomycin therapy.     Indication: SSTI  Consulting Provider: Hospitalist  ID Consult: N    Goal Trough: 10-20    Current Antimicrobial Therapy  Anti-Infectives (From admission, onward)      Ordered     Dose/Rate Route Frequency Start Stop    01/16/24 0747  vancomycin 1250 mg/250 mL 0.9% NS IVPB ([facility])        Ordering Provider: Nathanael Kay RPH    1,250 mg  over 75 Minutes Intravenous Every 12 Hours Scheduled 01/16/24 0900 01/20/24 0859    01/14/24 2129  cefTRIAXone (ROCEPHIN) 1,000 mg in sodium chloride 0.9 % 100 mL IVPB        Ordering Provider: Ashley Lee MD    1,000 mg  200 mL/hr over 30 Minutes Intravenous Every 24 Hours 01/15/24 1200 01/22/24 1159    01/14/24 2129  Pharmacy to dose vancomycin        Ordering Provider: Ashley Lee MD     Does not apply Continuous PRN 01/14/24 2129 01/21/24 2128    01/14/24 1638  vancomycin IVPB 1500 mg in 0.9% NaCl (Premix) 500 mL        Ordering Provider: Ramakrishna Claudio IV, PharmD    20 mg/kg × 73.5 kg  333.3 mL/hr over 90 Minutes Intravenous Once 01/14/24 1730 01/14/24 1945    01/14/24 1624  Pharmacy to dose vancomycin        Ordering Provider: Samuel Dave APRN     Does not apply Once 01/14/24 1640 01/14/24 1747    01/14/24 1546  cefTRIAXone (ROCEPHIN) 1,000 mg in sodium chloride 0.9 % 100 mL IVPB        Ordering Provider: Samuel Dave APRN    1,000 mg  200 mL/hr over 30 Minutes Intravenous Once 01/14/24 1602 01/14/24 1701            Allergies  Allergies as of 01/14/2024    (No Known Allergies)       Labs    Results from last 7 days   Lab Units 01/16/24  0648 01/15/24  0359 01/14/24  1512   BUN mg/dL 21* 22* 30*   CREATININE mg/dL 0.80 0.90 2.07*       Results from last 7 days   Lab Units 01/16/24  0648 01/15/24  0359 01/14/24  1512   WBC 10*3/mm3 6.90 11.86* 13.82*       Evaluation of Dosing     Last Dose Received in the ED/Outside Facility: vanc 1500mg x1 1/14 @ 1818  Is Patient  "on Dialysis or Renal Replacement: N    Ht - 172.7 cm (68\")  Wt - 73.5 kg (162 lb)    Estimated Creatinine Clearance: 114.8 mL/min (by C-G formula based on SCr of 0.8 mg/dL).    Intake & Output (last 3 days)         01/12 0701  01/13 0700 01/13 0701  01/14 0700 01/14 0701  01/15 0700    IV Piggyback   100    Total Intake(mL/kg)   100 (1.4)    Net   +100                   Microbiology and Radiology  Microbiology Results (last 10 days)       Procedure Component Value - Date/Time    MRSA Screen, PCR (Inpatient) - Swab, Nares [[id number]]  (Normal) Collected: 01/14/24 1817    Lab Status: Final result Specimen: Swab from Nares Updated: 01/14/24 2152     MRSA PCR No MRSA Detected    Narrative:      The negative predictive value of this diagnostic test is high and should only be used to consider de-escalating anti-MRSA therapy. A positive result may indicate colonization with MRSA and must be correlated clinically.    Blood Culture - Blood, Arm, Left [[id number]]  (Normal) Collected: 01/14/24 1512    Lab Status: Preliminary result Specimen: Blood from Arm, Left Updated: 01/15/24 1900     Blood Culture No growth at 24 hours    Blood Culture - Blood, Arm, Right [[id number]]  (Normal) Collected: 01/14/24 1510    Lab Status: Preliminary result Specimen: Blood from Arm, Right Updated: 01/15/24 1631     Blood Culture No growth at 24 hours            Vancomycin Levels:    Results from last 7 days   Lab Units 01/16/24  0648 01/15/24  0359   VANCOMYCIN RM mcg/mL 13.30 9.50                   [employer] AUC Calculation:    Current AUC:   302 mg/L*hr    Predicted Steady State AUC on Current Dose: 411 mg/L*hr  _________________________________    Predicted Steady State AUC on New Dose:   512 mg/L*hr    Assessment/Plan:  Pharmacy consulted to dose vancomycin for SSTI  Patient loaded with vancomycin 1500mg ( ~ 20 mg/kg) IV x1 on 1/14 @ 1815  Blood cultures show no growth at 24 hours. MRSA PCR is negative.  Patient remains afebrile with a " decrease in WBC, serum creatinine has improved significantly and is back to baseline (baseline ~0.8)  A maintenance dose of vancomycin 1,000 mg IV q12h (~13.6 mg/kg) was initiated starting 1/15.  A vancomycin level was drawn today @ 0648 (~6.5 hours post-dose) and resulted as 13.30 mcg/mL.  Given the vancomycin level and improvement in renal function, will increase to vancomycin 1250 mg IV q12h (17 mg/kg).  A vancomycin level will be assessed on 1/17 with AM labs, before the 3rd new maintenance dose.  Will continue to follow and adjust vancomycin dose as needed based on renal function, cultures, and patient clinical status.    Thank you,    Nathanael Kay [facility]  1/16/2024  07:49 EST

## 2024-01-16 NOTE — THERAPY EVALUATION
Patient Name: Justo Oquendo  : 1975    MRN: [id number]                              Today's Date: 2024       Admit Date: 2024    Visit Dx:     ICD-10-CM ICD-9-CM   1. Ischemic pain of left foot  M79.672 459.9    I99.8 729.5   2. 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     Past Medical History:   Diagnosis Date    Arthritis     Back pain     Diabetes     SINCE 2017    Dyslipidemia     HTN (hypertension)     PVD (peripheral vascular disease)     Wears partial dentures      Past Surgical History:   Procedure Laterality Date    AORTAGRAM N/A 2017    Procedure: AORTAGRAM WITH OR WITHOUT RUNOFFS POSSIBLE STENT with left femoral cutdown;  Surgeon: Brett Ryan MD;  Location:  [facility];  Service:     AORTAGRAM N/A 2023    Procedure: THROMBECTOMY OF LEFT GRAFT, AORTAGRAM, FEMORAL TO FEMORAL BYPASS;  Surgeon: Brett Ryan MD;  Location:  [facility];  Service: Vascular;  Laterality: N/A;  FLUORO- .06 SECS  DOSE- 10 MGY  CONTRAST- 10 ML ISO    CHOLECYSTECTOMY      CYST REMOVAL      OFF OF BACK    VT IN-SITU VEIN BYPASS FEMORAL-POPLITEAL Left 2017    Procedure: FEMORAL POPLITEAL BYPASS;  Surgeon: Brett Ryan MD;  Location:  [facility];  Service: Vascular    VT IN-SITU VEIN BYPASS FEMORAL-POPLITEAL Left 2017    Procedure: LEFT FEMORAL ENDARTECTOMYN LEFT FEMORAL POPLITEAL BYPASS;  Surgeon: Brett Ryan MD;  Location:  [facility];  Service: Vascular      General Information       Row Name 24 0754          OT Time and Intention     Document Type evaluation  -SD     Mode of Treatment occupational therapy  -SD       Row Name 01/16/24 0754          General Information    Patient Profile Reviewed yes  -SD     Prior Level of Function independent:;all household mobility;community mobility;gait;transfer;bed mobility;ADL's;work;using stairs;shopping;driving;home management  pt. works in property management & hanging drywall, on his feet all the time, climbing ladders, etc...  -SD     Existing Precautions/Restrictions fall  -SD     Barriers to Rehab medically complex  -SD       Row Name 01/16/24 0754          Living Environment    People in Home spouse;child(janis), adult  -SD       Row Name 01/16/24 0754          Home Main Entrance    Number of Stairs, Main Entrance none  -SD       Row Name 01/16/24 0754          Stairs Within Home, Primary    Number of Stairs, Within Home, Primary none  -SD       Row Name 01/16/24 0754          Cognition    Orientation Status (Cognition) oriented x 4  -SD       Row Name 01/16/24 0754          Safety Issues, Functional Mobility    Safety Issues Affecting Function (Mobility) insight into deficits/self-awareness  -SD     Impairments Affecting Function (Mobility) balance;endurance/activity tolerance;pain  -SD               User Key  (r) = Recorded By, (t) = Taken By, (c) = Cosigned By      Initials Name Provider Type    SD Denise Cotto OT Occupational Therapist                     Mobility/ADL's       Row Name 01/16/24 0832          Bed Mobility    Bed Mobility rolling left;scooting/bridging;supine-sit  -SD     Rolling Left [place] (Bed Mobility) standby assist  -SD     Scooting/Bridging [place] (Bed Mobility) standby assist  -SD     Supine-Sit [place] (Bed Mobility) standby assist  -SD     Bed Mobility, Safety Issues decreased use of legs for bridging/pushing  -SD     Assistive Device (Bed Mobility) bed rails;head of bed elevated  -SD       Row Name 01/16/24 0895          Transfers    Transfers  sit-stand transfer;stand-sit transfer;bed-chair transfer  -SD       Row Name 01/16/24 0832          Bed-Chair Transfer    Bed-Chair [place] (Transfers) contact guard;1 person assist  -SD     Assistive Device (Bed-Chair Transfers) walker, front-wheeled  -SD       Row Name 01/16/24 0832          Sit-Stand Transfer    Sit-Stand [place] (Transfers) supervision;verbal cues;1 person assist  -SD     Assistive Device (Sit-Stand Transfers) walker, front-wheeled  -SD       Row Name 01/16/24 0832          Stand-Sit Transfer    Stand-Sit [place] (Transfers) supervision;verbal cues;1 person assist  -SD     Assistive Device (Stand-Sit Transfers) walker, front-wheeled  -SD       Row Name 01/16/24 08          Functional Mobility    Functional Mobility- Ind. Level contact guard assist;1 person;verbal cues required  -SD     Functional Mobility- Device walker, front-wheeled  -SD     Functional Mobility-Distance (Feet) 70  -SD     Patient was able to Ambulate yes  -SD       Row Name 01/16/24 0832          Activities of Daily Living    BADL Assessment/Intervention lower body dressing;upper body dressing;grooming  -SD       Row Name 01/16/24 08          Lower Body Dressing Assessment/Training    [place] Level (Lower Body Dressing) don;socks;set up  -SD     Position (Lower Body Dressing) long sitting  -SD       Row Name 01/16/24 0832          Upper Body Dressing Assessment/Training    [place] Level (Upper Body Dressing) don;pajama/robe;contact guard assist  -SD     Position (Upper Body Dressing) edge of bed sitting  -SD       Row Name 01/16/24 0832          Grooming Assessment/Training    [place] Level (Grooming) wash face, hands;set up  -SD     Position (Grooming) sitting up in bed  -SD               User Key  (r) = Recorded By, (t) = Taken By, (c) = Cosigned By      Initials Name Provider Type    Denise Meyers OT Occupational Therapist                   Obj/Interventions       Row Name  01/16/24 0834          Sensory Assessment (Somatosensory)    Sensory Assessment (Somatosensory) UE sensation intact  -SD       Row Name 01/16/24 0834          Vision Assessment/Intervention    Visual Impairment/Limitations WNL  -SD       [facility] Name 01/16/24 0834          Range of Motion Comprehensive    General Range of Motion bilateral upper extremity ROM WNL  -SD       [facility] Name 01/16/24 0834          Strength Comprehensive (MMT)    General Manual Muscle Testing (MMT) Assessment no strength deficits identified  -SD       [facility] Name 01/16/24 0834          Balance    Balance Assessment sitting static balance;sitting dynamic balance;sit to stand dynamic balance;standing static balance;standing dynamic balance  -SD     Static Sitting Balance modified independence  -SD     Dynamic Sitting Balance standby assist  -SD     Position, Sitting Balance unsupported;sitting edge of bed  -SD     Sit to Stand Dynamic Balance contact guard  -SD     Static Standing Balance supervision  -SD     Dynamic Standing Balance contact guard  -SD     Position/Device Used, Standing Balance supported;walker, front-wheeled  -SD               User Key  (r) = Recorded By, (t) = Taken By, (c) = Cosigned By      Initials Name Provider Type    Denise Meyers OT Occupational Therapist                   Goals/Plan       Row Name 01/16/24 0839          Bed Mobility Goal 1 (OT)    Activity/Assistive Device (Bed Mobility Goal 1, OT) bed mobility activities, all  -SD     [place] Level/Cues Needed (Bed Mobility Goal 1, OT) independent  -SD     Time Frame (Bed Mobility Goal 1, OT) short term goal (STG);5 days  -SD     Progress/Outcomes (Bed Mobility Goal 1, OT) goal ongoing  -SD       [facility] Name 01/16/24 0839          Transfer Goal 1 (OT)    Activity/Assistive Device (Transfer Goal 1, OT) sit-to-stand/stand-to-sit;bed-to-chair/chair-to-bed;toilet;shower chair;walker, rolling  -SD     [place] Level/Cues Needed (Transfer Goal 1, OT) modified  independence  -SD     Time Frame (Transfer Goal 1, OT) long term goal (LTG);10 days  -SD     Progress/Outcome (Transfer Goal 1, OT) goal ongoing  -SD       Row Name 01/16/24 0839          Dressing Goal 1 (OT)    Activity/Device (Dressing Goal 1, OT) dressing skills, all  -SD     [place]/Cues Needed (Dressing Goal 1, OT) modified independence  -SD     Time Frame (Dressing Goal 1, OT) long term goal (LTG);10 days  -SD     Progress/Outcome (Dressing Goal 1, OT) goal ongoing  -SD       Row Name 01/16/24 0839          Problem Specific Goal 1 (OT)    Problem Specific Goal 1 (OT) Pt. will verbalize understanding of [employer] Safety techniques to implement into home and work routines to increase independence.  -SD     Time Frame (Problem Specific Goal 1, OT) long term goal (LTG);10 days  -SD     Progress/Outcome (Problem Specific Goal 1, OT) goal ongoing  -SD       Row Name 01/16/24 0839          Therapy Assessment/Plan (OT)    Planned Therapy Interventions (OT) activity tolerance training;BADL retraining;functional balance retraining;occupation/activity based interventions;transfer/mobility retraining;strengthening exercise;ROM/therapeutic exercise;patient/caregiver education/training  -SD               User Key  (r) = Recorded By, (t) = Taken By, (c) = Cosigned By      Initials Name Provider Type    Denise Meyers, OT Occupational Therapist                   Clinical Impression       Row Name 01/16/24 0836          Pain Assessment    Pretreatment Pain Rating 8/10  -SD     Posttreatment Pain Rating 7/10  -SD     Pain Location - Side/Orientation Left  -SD     Pain Location - foot  -SD     Pain Intervention(s) Medication (See MAR);Nursing Notified;Ambulation/increased activity;Emotional support;Repositioned;Elevated  -SD       Row Name 01/16/24 0836          Plan of Care Review    Plan of Care Reviewed With patient  -SD     Progress no change  -SD     Outcome Evaluation OT IE completed. Pt.  presents below functional baseline in functional mobility, activity tolerance, ADL independence, and balance. Pt.'s work requires him to be on his feet & reports inability to complete his daily routine independently for past month. OT skilled services are warranted to return pt. to PLOF. Recommend home with assist & OP therapy upon d/c.  -SD       Row Name 01/16/24 0836          Therapy Assessment/Plan (OT)    Rehab Potential (OT) good, to achieve stated therapy goals  -SD     Criteria for Skilled Therapeutic Interventions Met (OT) yes;meets criteria;skilled treatment is necessary  -SD     Therapy Frequency (OT) daily  -SD       Row Name 01/16/24 0836          Therapy Plan Review/Discharge Plan (OT)    Equipment Needs Upon Discharge (OT) shower chair  -SD     Anticipated Discharge Disposition (OT) home with assist;home with outpatient therapy services  -SD       Row Name 01/16/24 0836          Vital Signs    Pre Systolic BP Rehab 134  -SD     Pre Treatment Diastolic BP 70  -SD     Post Systolic BP Rehab 157  -SD     Post Treatment Diastolic BP 78  -SD     Posttreatment Heart Rate (beats/min) 84  -SD     Pre SpO2 (%) 100  -SD     O2 Delivery Pre Treatment nasal cannula  -SD     O2 Delivery Intra Treatment room air  -SD     Post SpO2 (%) 97  -SD     O2 Delivery Post Treatment room air  -SD     Pre Patient Position Supine  -SD     Intra Patient Position Standing  -SD     Post Patient Position Sitting  -SD       Row Name 01/16/24 0836          Positioning and Restraints    Pre-Treatment Position in bed  -SD     Post Treatment Position chair  -SD     In Chair notified nsg;reclined;call light within reach;encouraged to call for assist;exit alarm on;waffle cushion;LLE elevated;with nsg  -SD               User Key  (r) = Recorded By, (t) = Taken By, (c) = Cosigned By      Initials Name Provider Type    Denise Meyers, OT Occupational Therapist                   Outcome Measures       Row Name 01/16/24 0754           How much help from another is currently needed...    Putting on and taking off regular lower body clothing? 3  -SD     Bathing (including washing, rinsing, and drying) 3  -SD     Toileting (which includes using toilet bed pan or urinal) 3  -SD     Putting on and taking off regular upper body clothing 3  -SD     Taking care of personal grooming (such as brushing teeth) 4  -SD     Eating meals 4  -SD     AM-PAC 6 Clicks Score (OT) 20  -SD       Row Name 01/16/24 0754          Functional Assessment    Outcome Measure Options AM-PAC 6 Clicks Daily Activity (OT)  -SD               User Key  (r) = Recorded By, (t) = Taken By, (c) = Cosigned By      Initials Name Provider Type    Denise Meyers, OT Occupational Therapist                    Occupational Therapy Education       Title: PT OT SLP Therapies (Done)       Topic: Occupational Therapy (Done)       Point: ADL training (Done)       Description:   Instruct learner(s) on proper safety adaptation and remediation techniques during self care or transfers.   Instruct in proper use of assistive devices.                  Learning Progress Summary             Patient Acceptance, E, VU,NR by SD at 1/16/2024 0843    Comment: Pt. educated on role of OT and is agreeable with OT POC.                         Point: Home exercise program (Done)       Description:   Instruct learner(s) on appropriate technique for monitoring, assisting and/or progressing therapeutic exercises/activities.                  Learning Progress Summary             Patient Acceptance, E, VU,NR by SD at 1/16/2024 0843    Comment: Pt. educated on role of OT and is agreeable with OT POC.                         Point: Precautions (Done)       Description:   Instruct learner(s) on prescribed precautions during self-care and functional transfers.                  Learning Progress Summary             Patient Acceptance, E, VU,NR by SD at 1/16/2024 0843    Comment: Pt. educated on role of OT and is  agreeable with OT POC.                         Point: Body mechanics (Done)       Description:   Instruct learner(s) on proper positioning and spine alignment during self-care, functional mobility activities and/or exercises.                  Learning Progress Summary             Patient Acceptance, E, VU,NR by SD at 1/16/2024 0843    Comment: Pt. educated on role of OT and is agreeable with OT POC.                                         User Key       Initials Effective Dates Name Provider Type Discipline    SD 07/11/23 -  Denise Cotto, OT Occupational Therapist OT                  OT Recommendation and Plan  Planned Therapy Interventions (OT): activity tolerance training, BADL retraining, functional balance retraining, occupation/activity based interventions, transfer/mobility retraining, strengthening exercise, ROM/therapeutic exercise, patient/caregiver education/training  Therapy Frequency (OT): daily  Plan of Care Review  Plan of Care Reviewed With: patient  Progress: no change  Outcome Evaluation: OT IE completed. Pt. presents below functional baseline in functional mobility, activity tolerance, ADL independence, and balance. Pt.'s work requires him to be on his feet & reports inability to complete his daily routine independently for past month. OT skilled services are warranted to return pt. to PLOF. Recommend home with assist & OP therapy upon d/c.     Time Calculation:   Evaluation Complexity (OT)  Review Occupational Profile/Medical/Therapy History Complexity: brief/low complexity  Assessment, Occupational Performance/Identification of Deficit Complexity: 3-5 performance deficits  Clinical Decision Making Complexity (OT): problem focused assessment/low complexity  Overall Complexity of Evaluation (OT): low complexity     Time Calculation- OT       Row Name 01/16/24 0844             Time Calculation- OT    OT Received On 01/16/24  -SD      OT Goal Re-Cert Due Date 01/26/24  -SD         Untimed  Charges    OT Eval/Re-eval Minutes 50  -SD         Total Minutes    Untimed Charges Total Minutes 50  -SD       Total Minutes 50  -SD                User Key  (r) = Recorded By, (t) = Taken By, (c) = Cosigned By      Initials Name Provider Type    Denise Meyers OT Occupational Therapist                  Therapy Charges for Today       Code Description Service Date Service Provider Modifiers Qty    50343997302 HC OT EVAL LOW COMPLEXITY 4 1/16/2024 Denise Cotto OT GO 1                 Denise Cotto OT  1/16/2024

## 2024-01-16 NOTE — PROGRESS NOTES
[facility] INFUSION  Justo Oquendo is a  48 y.o. male receiving heparin infusion.     Therapy for (VTE/Cardiac):   Cardiac  Patient Weight: 73.5 kg  Initial Bolus (Y/N):   No  Any Bolus (Y/N):   Yes      Signs or Symptoms of Bleeding: None per RN    Cardiac or Other (Not VTE)  Initial rate: 12 units/kg/hr (Max 1,000 units/hr)   aPTT  Rebolus Infusion Hold time Change infusion Dose (Units/kg/hr) Next Anti Xa or aPTT Level Due   <45 50 Units/kg  (4000 Units Max) None Increase by  3 Units/kg/hr 6 hours   45 - 52 25 Units/kg  (2000 Units Max) None Increase by  2 Units/kg/hr 6 hours   53 - 59 0 None Increase by  1 Units/kg/hr 6 hours   60 - 75 0 None No Change 6 hours (after 2 consecutive levels in range check qAM)   76 - 83 0 None Decrease by  1 Units/kg/hr 6 hours   84 - 98 0 30 Minutes Decrease by  2 Units/kg/hr 6 hours   99 - 113 0 60 Minutes Decrease by  3 Units/kg/hr 6 hours   >113 0 Hold  After aPTT less than 75 decrease previous rate by  4 Units/kg/hr  Every 2 hours until aPTT less than 75 then when infusion restarts in 6 hours       Results from last 7 days   Lab Units 01/16/24  0648 01/15/24  0359 01/14/24 2111 01/14/24  1512   INR   --   --  1.17*  --    HEMOGLOBIN g/dL 11.0* 10.7*  --  11.8*   HEMATOCRIT % 33.9* 32.5*  --  36.9*   PLATELETS 10*3/mm3 264 257  --  333          Date   Time   aPTT Current Rate (Unit/kg/hr) Bolus   (Units) Rate Change   (Unit/kg/hr) New Rate (Unit/kg/hr) Next   aPTT Comments  Pump Check Daily   1/14 2111 38.7 NEW START -- +12 12 0400 DW RN   1/14 0359 38.3 12 3600 +3 15 1200 Discussed w/ nurse   1/15 1431 36.1 15 3600 +3 18 2200 DW RN   1/15 2242 38.5 18 3600 +3 21 0600 Nurse confirmed pump   1/16 0648 52.4 21 1800 +2 23 1400 DW RN Meeta; pump check

## 2024-01-16 NOTE — CASE MANAGEMENT/SOCIAL WORK
Continued Stay Note  [facility]     Patient Name: Justo Oquendo  MRN: [id number]  Today's Date: 1/16/2024    Admit Date: 1/14/2024    Plan: Home with family   Discharge Plan       Row Name 01/16/24 1014       Plan    Plan Home with family    Patient/Family in Agreement with Plan yes    Plan Comments Plan is home with family, may need home health pending surgery later this week. CM will continue to follow.    Final Discharge Disposition Code 01 - home or self-care                   Discharge Codes    No documentation.                 Expected Discharge Date and Time       Expected Discharge Date Expected Discharge Time    Jan 19, 2024               Gilberto Barry RN

## 2024-01-16 NOTE — PROGRESS NOTES
Justo Oquendo       LOS: 2 days   Patient Care Team:  Melo Whitaker MD as PCP - General (Family Medicine)  Niall Mcfarlane MD as Consulting Physician (Cardiology)    Chief Complaint:  left foot ischemia    Subjective     Interval History:     Pain tolerable overnight.    Review of Systems:      Gen- No fevers, chills  CV- No chest pain, palpitations  Resp- No cough, dyspnea  GI- No N/V/D, abd pain    Objective     Vital Signs  Vital Signs (last 24 hours)         01/15 0700  01/16 0659 01/16 0700 01/16 0731   Most Recent      Temp (°F) 96.6 -  97.6       97.2 (36.2) 01/16 0652    Heart Rate 74 -  101       82 01/16 0652    Resp 14 -  16 16 01/16 0652    /70 -  157/76       134/70 01/16 0652    SpO2 (%) 93 -  99       97 01/16 0652    Flow (L/min)   2.5       2.5 01/16 0652              Physical Exam:     Alert, oriented.  No acute distress.  Nonlabored respirations.  Regular rate and rhythm.  Abdomen nondistended.  Left lower extremity: Less redness around the foot, ischemic changes of his second through fourth lesser toes, becoming better demarcated.     Results Review:     I reviewed the patient's new clinical results.    Medication Review:   Hospital Medications (active)         Dose Frequency Start End    aspirin EC tablet 81 mg 81 mg Nightly 1/14/2024 --    Admin Instructions: Do not crush or chew the capsules or tablets. The drug may not work as designed if the capsule or tablet is crushed or chewed. Swallow whole.  Do not exceed 4 grams of aspirin in a 24 hr period.    If given for pain, use the following pain scale:   Mild Pain = Pain Score of 1-3, CPOT 1-2  Moderate Pain = Pain Score of 4-6, CPOT 3-4  Severe Pain = Pain Score of 7-10, CPOT 5-8    Route: Oral    bisacodyl (DULCOLAX) EC tablet 5 mg 5 mg Daily PRN 1/14/2024 --    Admin Instructions: Use if no bowel movement after 12 hours.  Swallow whole. Do not crush, split, or chew tablet.    Route: Oral    Linked Group 1: See  "Hyperspace for full Linked Orders Report.        bisacodyl (DULCOLAX) suppository 10 mg 10 mg Daily PRN 1/14/2024 --    Admin Instructions: Use if no bowel movement after 12 hours.  Hold for diarrhea    Route: Rectal    Linked Group 1: See Hyperspace for full Linked Orders Report.        Calcium Replacement - Follow Nurse / BPA Driven Protocol  As Needed 1/14/2024 --    Admin Instructions: Open Order & Select \"BHS Electrolyte Replacement Protocol Algorithm\" to View Details    Route: Does not apply    cefTRIAXone (ROCEPHIN) 1,000 mg in sodium chloride 0.9 % 100 mL IVPB 1,000 mg Every 24 Hours 1/15/2024 1/22/2024    Admin Instructions: LR should be paused and flushing of the line with NS is recommended prior to and after completion of ceftriaxone infusion due to incompatibility. Do not co-adminster with calcium-containing solutions.  Caution: Look alike/sound alike drug alert    Route: Intravenous    cyclobenzaprine (FLEXERIL) tablet 10 mg 10 mg 3 Times Daily PRN 1/14/2024 --    Route: Oral    dextrose (D50W) (25 g/50 mL) IV injection 25 g 25 g Every 15 Minutes PRN 1/14/2024 --    Admin Instructions: Blood sugar less than 70; patient has IV access - Unresponsive, NPO or Unable To Safely Swallow    Route: Intravenous    dextrose (GLUTOSE) oral gel 15 g 15 g Every 15 Minutes PRN 1/14/2024 --    Admin Instructions: BS<70, Patient Alert, Is not NPO, Can safely swallow.    Route: Oral    gabapentin (NEURONTIN) capsule 600 mg 600 mg Every 8 Hours Scheduled 1/14/2024 --    Admin Instructions:     Route: Oral    glucagon (GLUCAGEN) injection 1 mg 1 mg Every 15 Minutes PRN 1/14/2024 --    Admin Instructions: Blood Glucose Less Than 70 - Patient Without IV Access - Unresponsive, NPO or Unable To Safely Swallow  Reconstitute powder for injection by adding 1 mL of -supplied sterile diluent or sterile water for injection to a vial containing 1 mg of the drug, to provide solutions containing 1 mg/mL. Shake vial gently " "to dissolve.    Route: Intramuscular    heparin 97270 units/250 mL (100 units/mL) in 0.45 % NaCl infusion 21 Units/kg/hr × 73.5 kg Titrated 1/14/2024 --    Admin Instructions: Pharmacy dosing - Cardiac or Other NOT VTE - Boluses (No initial bolus)    Route: Intravenous    HYDROmorphone (DILAUDID) injection 1 mg 1 mg Every 2 Hours PRN 1/14/2024 1/19/2024    Admin Instructions: Based on patient request - if ordered for moderate or severe pain, provider allows for administration of a medication prescribed for a lower pain scale.      Caution: Look alike/sound alike drug alert    If given for pain, use the following pain scale:  Mild Pain = Pain Score of 1-3, CPOT 1-2  Moderate Pain = Pain Score of 4-6, CPOT 3-4  Severe Pain = Pain Score of 7-10, CPOT 5-8    Route: Intravenous    Linked Group 2: [facility] for [facility] Orders Report.        Insulin Lispro (humaLOG) injection 2-7 Units 2-7 Units 4 Times Daily Before Meals & Nightly 1/14/2024 --    Admin Instructions: Correction Insulin - Low Dose - Total Insulin Dose Less Than 40 units/day (Lean, Elderly or Renal Patients)    Blood Glucose 150-199 mg/dL - 2 units  Blood Glucose 200-249 mg/dL - 3 units  Blood Glucose 250-299 mg/dL - 4 units  Blood Glucose 300-349 mg/dL - 5 units  Blood Glucose 350-400 mg/dL - 6 units  Blood Glucose Greater Than 400 mg/dL - 7 units & Call Provider   Caution: Look alike/sound alike drug alert    Route: Subcutaneous    LORazepam (ATIVAN) tablet 0.5 mg 0.5 mg Every 8 Hours PRN 1/14/2024 1/19/2024    Admin Instructions:  Caution: Look alike/sound alike drug alert    Route: Oral    Magnesium Standard Dose Replacement - Follow Nurse / BPA Driven Protocol  As Needed 1/14/2024 --    Admin Instructions: Open Order & Select \"BHS Electrolyte Replacement Protocol Algorithm\" to View Details    Route: Does not apply    metoprolol tartrate (LOPRESSOR) tablet 25 mg 25 mg Every 12 Hours Scheduled 1/14/2024 --    Admin Instructions: Hold for SBP less " "than 100, DBP less than 60, or heart rate less than 50    Route: Oral    naloxone (NARCAN) injection 0.4 mg 0.4 mg Every 5 Minutes PRN 1/14/2024 --    Admin Instructions: If Respiratory Rate Less Than 8 or Patient is Difficult to Arouse, Stop ALL Narcotics & Contact Provider.  Administer Slow IV Push.  Repeat As Ordered Until Respiratory Rate is Greater Than 12.    Route: Intravenous    Linked Group 2: See Hyperspace for full Linked Orders Report.        nicotine (NICODERM CQ) 21 MG/24HR patch 1 patch 1 patch Nightly 1/14/2024 --    Admin Instructions: Apply Patch to Clean, Dry, Hairless Area Daily - Rotating Sites.  REMOVE Old Patch Prior to Applying New Patch.  May Remove Patch at Bedtime If Needed to Prevent Insomnia.  Do Not Use Other Nicotine Products.  At Discharge, Follow Package Instructions.  Dispose of nicotine replacement therapies and their wrappers in non-hazardous pharmaceutical waste or in regular trash.    Route: Transdermal    nicotine polacrilex (NICORETTE) gum 2 mg 2 mg Every 1 Hour PRN 1/14/2024 --    Admin Instructions: Maximum 24 pieces in 24 hours.    Gum should be chewed until it tingles, then \"park\" between the gum and cheek.   When the tingling is gone, chew again until the tingle returns and once again \"park\" between gum and cheek.   Repeat until tingling is gone and discard.  At discharge, follow instructions on package  Dispose of nicotine replacement therapies and their wrappers in non-hazardous pharmaceutical waste or in regular trash.    Route: Mouth/Throat    nitroglycerin (NITROSTAT) SL tablet 0.4 mg 0.4 mg Every 5 Minutes PRN 1/14/2024 --    Admin Instructions: If Pain Unrelieved After 3 Doses Notify MD  May administer up to 3 doses per episode.    Route: Sublingual    ondansetron (ZOFRAN) injection 4 mg 4 mg Every 6 Hours PRN 1/14/2024 --    Admin Instructions: If BOTH ondansetron (ZOFRAN) & promethazine (PHENERGAN) Ordered, Use ondansetron First & THEN promethazine IF ondansetron " "Ineffective.    Route: Intravenous    Linked Group 3: See Hyperspace for full Linked Orders Report.        ondansetron ODT (ZOFRAN-ODT) disintegrating tablet 4 mg 4 mg Every 6 Hours PRN 1/14/2024 --    Admin Instructions: If BOTH ondansetron (ZOFRAN) & promethazine (PHENERGAN) Ordered, Use ondansetron First & THEN promethazine IF ondansetron Ineffective.  Place on tongue and allow to dissolve.    Route: Oral    Linked Group 3: See Hyperspace for full Linked Orders Report.        oxyCODONE (ROXICODONE) immediate release tablet 10 mg 10 mg 4 Times Daily 1/14/2024 1/19/2024    Admin Instructions: HOLD for sedation  If given for pain, use the following pain scale:  Mild Pain = Pain Score of 1-3, CPOT 1-2  Moderate Pain = Pain Score of 4-6, CPOT 3-4  Severe Pain = Pain Score of 7-10, CPOT 5-8    Route: Oral    oxyCODONE (ROXICODONE) immediate release tablet 10 mg 10 mg Every 4 Hours PRN 1/14/2024 1/19/2024    Admin Instructions: Based on patient request - if ordered for moderate or severe pain, provider allows for administration of a medication prescribed for a lower pain scale.  If given for pain, use the following pain scale:  Mild Pain = Pain Score of 1-3, CPOT 1-2  Moderate Pain = Pain Score of 4-6, CPOT 3-4  Severe Pain = Pain Score of 7-10, CPOT 5-8    Route: Oral    Pharmacy to Dose Heparin  Continuous PRN 1/14/2024 --    Admin Instructions: Boluses (No initial bolus)    Route: Does not apply    Pharmacy to dose vancomycin  Continuous PRN 1/14/2024 1/21/2024    Route: Does not apply    Phosphorus Replacement - Follow Nurse / BPA Driven Protocol  As Needed 1/14/2024 --    Admin Instructions: Open Order & Select \"BHS Electrolyte Replacement Protocol Algorithm\" to View Details    Route: Does not apply    polyethylene glycol (MIRALAX) packet 17 g 17 g Daily PRN 1/14/2024 --    Admin Instructions: Use if no bowel movement after 12 hours. Mix in 6-8 ounces of water.  Use 4-8 ounces of water, tea, or juice for each 17 " "gram dose.    Route: Oral    Linked Group 1: See Hyperspace for full Linked Orders Report.        Potassium Replacement - Follow Nurse / BPA Driven Protocol  As Needed 1/14/2024 --    Admin Instructions: Open Order & Select \"BHS Electrolyte Replacement Protocol Algorithm\" to View Details    Route: Does not apply    rosuvastatin (CRESTOR) tablet 40 mg 40 mg Nightly 1/14/2024 --    Admin Instructions: Avoid grapefruit juice.    Route: Oral    sennosides-docusate (PERICOLACE) 8.6-50 MG per tablet 2 tablet 2 tablet 2 Times Daily 1/14/2024 --    Admin Instructions: HOLD MEDICATION IF PATIENT HAS HAD BOWEL MOVEMENT. Start bowel management regimen if patient has not had a bowel movement after 12 hours.    Route: Oral    Linked Group 1: See Hyperspace for full Linked Orders Report.        sodium chloride 0.9 % flush 10 mL 10 mL Every 12 Hours Scheduled 1/14/2024 --    Route: Intravenous    sodium chloride 0.9 % flush 10 mL 10 mL As Needed 1/14/2024 --    Route: Intravenous    sodium chloride 0.9 % infusion 40 mL 40 mL As Needed 1/14/2024 --    Admin Instructions: Following administration of an IV intermittent medication, flush line with 40mL NS at 100mL/hr.    Route: Intravenous    sodium chloride 0.9 % infusion 125 mL/hr Continuous 1/14/2024 --    Route: Intravenous    temazepam (RESTORIL) capsule 15 mg 15 mg Nightly PRN 1/14/2024 1/19/2024    Admin Instructions: Group 2 (Pink) Hazardous Drug - Reproductive Risk Only - See Handling Guide    Route: Oral    vancomycin (VANCOCIN) 1000 mg/200 mL dextrose 5% IVPB 1,000 mg Every 12 Hours Scheduled 1/15/2024 1/20/2024    Route: Intravenous              Assessment & Plan     48-year-old male with peripheral vascular disease, left second through fourth toe dry gangrene.      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 Pena yesterday.  Tentative plan is for revision bypass later this week.  Would tentatively plan for amputation, level to be determined by intraoperative findings during his vascular procedure, Friday versus Sunday as appropriate.      Christo Peres Jr, MD  01/16/24  07:31 EST

## 2024-01-16 NOTE — PROGRESS NOTES
[facility] Medicine Services  PROGRESS NOTE    Patient Name: Justo Oquendo  : 1975  MRN: [id number]    Date of Admission: 2024  Primary Care Physician: Melo Whitaker MD    Subjective   Subjective     CC:  Left toe ischemia    HPI:  Pt reports that pain in toes/foot is now controlled with pain medication. He is hopeful that surgery goes well on Thursday. Currently no chest pain or shortness of breath       Objective   Objective     Vital Signs:   Temp:  [97.1 °F (36.2 °C)-98.3 °F (36.8 °C)] 98.3 °F (36.8 °C)  Heart Rate:  [] 79  Resp:  [14-18] 18  BP: (134-157)/(70-85) 157/85  Flow (L/min):  [2.5] 2.5     Physical Exam:  Constitutional: No acute distress, awake, alert  HENT: NCAT,   Respiratory: respiratory effort normal   Cardiovascular: RRR, no murmurs, rubs, or gallops  Gastrointestinal: nondistended  Psychiatric: Appropriate affect, cooperative  Neurologic: Oriented x 3,  speech clear  Skin: Erythema noted on left calf, foot wrapped         Results Reviewed:  LAB RESULTS:      Lab 24  0648 01/15/24  2241 01/15/24  1431 01/15/24  0359 24  2111 24  1512   WBC 6.90  --   --  11.86*  --  13.82*   HEMOGLOBIN 11.0*  --   --  10.7*  --  11.8*   HEMATOCRIT 33.9*  --   --  32.5*  --  36.9*   PLATELETS 264  --   --  257  --  333   NEUTROS ABS 3.62  --   --  10.16*  --  9.47*   IMMATURE GRANS (ABS) 0.04  --   --  0.09*  --  0.17*   LYMPHS ABS 2.67  --   --  1.24  --  2.57   MONOS ABS 0.51  --   --  0.36  --  1.48*   EOS ABS 0.04  --   --  0.00  --  0.10   MCV 89.4  --   --  92.3  --  92.9   SED RATE  --   --   --   --   --  100*   CRP  --   --   --   --   --  2.60*   PROCALCITONIN  --   --   --   --   --  0.19   LACTATE  --   --   --   --   --  1.2   PROTIME  --   --   --   --  15.0*  --    APTT 52.4* 38.5* 36.1* 38.3* 38.7*  --    HEPARIN ANTI-XA  --   --   --   --  0.14*  --          Lab 24  0648 01/15/24  0359 24  1512   SODIUM 141  138 134*   POTASSIUM 4.1 4.9 4.1   CHLORIDE 106 106 96*   CO2 25.0 23.0 25.0   ANION GAP 10.0 9.0 13.0   BUN 21* 22* 30*   CREATININE 0.80 0.90 2.07*   EGFR 107.8 104.0 38.3*   GLUCOSE 149* 221* 302*   CALCIUM 9.5 9.5 10.0   MAGNESIUM  --   --  2.1         Lab 01/14/24  1512   TOTAL PROTEIN 7.5   ALBUMIN 3.8   GLOBULIN 3.7   ALT (SGPT) 13   AST (SGOT) 14   BILIRUBIN 0.2   ALK PHOS 83         Lab 01/14/24  2111 01/14/24  1512   PROBNP  --  63.8   HSTROP T  --  18   PROTIME 15.0*  --    INR 1.17*  --                  Brief Urine Lab Results       None            Microbiology Results Abnormal       Procedure Component Value - Date/Time    Blood Culture - Blood, Arm, Left [[id number]]  (Normal) Collected: 01/14/24 1512    Lab Status: Preliminary result Specimen: Blood from Arm, Left Updated: 01/15/24 1900     Blood Culture No growth at 24 hours    Blood Culture - Blood, Arm, Right [[id number]]  (Normal) Collected: 01/14/24 1510    Lab Status: Preliminary result Specimen: Blood from Arm, Right Updated: 01/15/24 1631     Blood Culture No growth at 24 hours    MRSA Screen, PCR (Inpatient) - Swab, Nares [[id number]]  (Normal) Collected: 01/14/24 1817    Lab Status: Final result Specimen: Swab from Nares Updated: 01/14/24 2152     MRSA PCR No MRSA Detected    Narrative:      The negative predictive value of this diagnostic test is high and should only be used to consider de-escalating anti-MRSA therapy. A positive result may indicate colonization with MRSA and must be correlated clinically.            CT Angio Abdominal Aorta Bilateral Iliofem Runoff    Result Date: 1/14/2024  CT ANGIO ABDOMINAL AORTA BILAT ILIOFEM RUNOFF Date of Exam: 1/14/2024 5:34 PM EST Indication: Claudication or leg ischemia Previous fem pop graft occlusion, c/o increased LLE pain, color changes, third/fourth toe ischemia, leukocytosis, r/o gas. Comparison: None available. Technique: CTA of the abdomen, pelvis and both lower extremities was performed  after the uneventful intravenous administration of 120 cc Isovue-370 . Reconstructed coronal and sagittal images were also obtained. In addition, a 3-D volume rendered image was created for interpretation. Automated exposure control and iterative reconstruction methods were used. The origins of the celiac axis and superior mesenteric arteries are normal. The origins of the renal arteries are normal. Mild atheromatous disease of the abdominal aorta with subtle ectasia. The origin of the inferior mesenteric artery is patent. Complete occlusion of the left common iliac artery with continued complete loss of flow within the left internal and external iliac arteries. Multi focal atheromatous disease of the right common iliac artery as well as the internal and external iliac arteries. Femorofemoral bypass. Complete occlusion of the left femoral artery bypass with no underlying flow. Reconstitution of flow distally at the level of the popliteal artery due to branches from the deep femoral artery. The superior segments of the left anterior and posterior tibial arteries are patent. Eventual loss of flow within the posterior tibial artery seen on axial image 383. Continued flow within the dorsalis pedis with eventual loss of flow within the arteries. On the right there is continued flow within the right deep femoral artery. Complete occlusion of the left femoral artery with distal reconstitution from branches of the deep femoral artery. The popliteal artery is patent. The anterior and posterior tibial vessels are patent proximally. Loss of flow within the anterior tibial artery on axial image #343. Loss of the contrast column within the posterior tibial artery on axial image 417. The liver appears normal. Calcified granulomata within the spleen. Normal pancreas. Prior cholecystectomy. The adrenal glands are normal. No renal masses. No hydroureteronephrosis. The ureters and urinary bladder are normal. The small bowel is  nonobstructed. Normal appendix. Colonic diverticulosis without evidence of diverticulitis. No ascites or pneumoperitoneum. No adenopathy. Degenerative changes of the hips and lumbar spine.     Impression: Complete occlusion of the left femoral artery bypass graft. Complete occlusion of the right femoral artery. Eventual distal reconstitution of both vessels due to branches of the deep femoral arteries. Loss of flow within the left posterior tibial artery. Loss of flow within the right anterior tibial artery. Eventual loss of flow within the contrast column on both sides with no residual flow to the digital arteries. Electronically Signed: Kee Cartagena MD  1/14/2024 6:08 PM EST  Workstation ID: [id number]    XR Chest 1 View    Result Date: 1/14/2024  XR CHEST 1 VW Date of Exam: 1/14/2024 3:02 PM EST Indication: Weak/Dizzy/AMS triage protocol Comparison: None available. Findings: No focal consolidation. No pneumothorax or pleural effusion. Cardiac size is normal. The visualized clavicles appear intact. No displaced rib fractures. The visualized upper abdomen is normal.     Impression: Impression: No acute cardiopulmonary disease. Electronically Signed: Kee Cartagena MD  1/14/2024 3:18 PM EST  Workstation ID: [id number]     Results for orders placed during the hospital encounter of 11/20/23    Adult Transthoracic Echo Complete W/ Cont if Necessary Per Protocol    Interpretation Summary    Left ventricular systolic function is hyperdynamic (EF > 70%). Calculated left ventricular EF = 70.4%    Left ventricular diastolic function was normal.    No significant structural or functional valvular disease.      Current medications:  Scheduled Meds:amLODIPine, 10 mg, Oral, Q24H  aspirin, 81 mg, Oral, Nightly  cefTRIAXone, 1,000 mg, Intravenous, Q24H  gabapentin, 600 mg, Oral, Q8H  insulin lispro, 2-7 Units, Subcutaneous, 4x Daily AC & at Bedtime  lisinopril, 40 mg, Oral, Q24H  metoprolol tartrate, 25 mg, Oral, Q12H  nicotine, 1  patch, Transdermal, Nightly  oxyCODONE, 10 mg, Oral, 4x Daily  rosuvastatin, 40 mg, Oral, Nightly  senna-docusate sodium, 2 tablet, Oral, BID  sodium chloride, 10 mL, Intravenous, Q12H  vancomycin, 1,250 mg, Intravenous, Q12H      Continuous Infusions:heparin, 23 Units/kg/hr, Last Rate: 23 Units/kg/hr (01/16/24 1355)  Pharmacy to Dose Heparin,   Pharmacy to dose vancomycin,   sodium chloride, 125 mL/hr, Last Rate: 125 mL/hr (01/14/24 2200)      PRN Meds:.  senna-docusate sodium **AND** polyethylene glycol **AND** bisacodyl **AND** bisacodyl    Calcium Replacement - Follow Nurse / BPA Driven Protocol    cyclobenzaprine    dextrose    dextrose    glucagon (human recombinant)    HYDROmorphone **AND** naloxone    LORazepam    Magnesium Standard Dose Replacement - Follow Nurse / BPA Driven Protocol    nicotine polacrilex    nitroglycerin    ondansetron ODT **OR** ondansetron    oxyCODONE    Pharmacy to Dose Heparin    Pharmacy to dose vancomycin    Phosphorus Replacement - Follow Nurse / BPA Driven Protocol    Potassium Replacement - Follow Nurse / BPA Driven Protocol    sodium chloride    sodium chloride    temazepam    Assessment & Plan   Assessment & Plan     Active Hospital Problems    Diagnosis  POA    **Ischemic foot [I99.8]  Yes    Sepsis associated hypotension [A41.9, I95.9]  Yes    ATN (acute tubular necrosis) [N17.0]  Yes    Elevated serum creatinine [R79.89]  Yes    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 [I96]  Unknown    Claudication of lower extremity s/p femorofemoral bypass [I73.9]  Yes    Hypertension [I10]  Yes    Hyperlipidemia [E78.5]  Yes    Tobacco abuse [Z72.0]  Yes    Diabetes mellitus type II, non insulin dependent [E11.9]  Yes    Peripheral arterial disease [I73.9]  Yes      Resolved Hospital Problems   No resolved problems to display.        Brief Hospital Course to date:  Justo Oquendo is a 48 y.o. male ongoing heavy tobacco smoker with a known history of severe PAD, status post thrombectomy of  left femoropopliteal bypass graft as well as placement of a femorofemoral bypass by Dr. Ryan on 11/21/2023 who presented with lethargy, decreased urine output and confusion.  Patient saw CT surgery in December 2023 and outpatient referral was made to Dr. Peres of orthopedic surgery for consideration of BKA for critical focal peripheral arterial disease.    Sepsis (AMS, WBC, hypotension, ischemic necrosis)  Metabolic encephalopathy  Ischemic ulcers and necrosis of LLE 3-4th digits  Progressive PAD with occlusion of Fem-Fem graft  -Initially hypotensive with systolics in the 80s, now improved after receiving hydrocortisone and 2.2 L of fluids.  - CTA of the abdominal aorta with runoff shows complete occlusion of the left femoral artery bypass graft, complete occlusion of the right femoral artery.  Eventual distal reconstitution of both vessels due to the branches of the deep femoral arteries, loss of flow within the left posterior tibial artery, loss of flow within the right anterior tibial artery.  No residual flow to the distal arteries.  -Blood cultures obtained and remain negative  -Continue IV Rocephin and Vanc empirically   -Continue IV heparin.  Holding home Xarelto  -Continue ASA, statin  -Dr. Peres following, may require  amputation (patient previously had an outpatient appointment for preop with Dr. Peres on 1/15/2024).  -Vascular following, plan to OR on Thursday      Uncontrolled pain- improved   - opioid tolerant.continue scheduled PO oxy QID with additional PRN for breakthrough, also prn IV dilaudid     ALEJANDRO, resolved  -Creatinine significantly elevated 2.07 on presentation, down to baseline of 0.9  - pre-renal, likely hypotension related, now resolved with hydrocortisone  -Continue IV fluids    NIDDM2  -Continue sliding scale insulin     HTN  HL  - hold ACEI due to elevated creatinine/hypotension- maybe resume post operatively      Ongoing heavy tobacco smoker  - nicotine patch and gum prn         Expected Discharge Location and Transportation: home vs rehab  Expected Discharge   Expected Discharge Date: 1/19/2024; Expected Discharge Time:      DVT prophylaxis:  Medical DVT prophylaxis orders are present.     AM-PAC 6 Clicks Score (PT): 19 (01/16/24 0812)    CODE STATUS:   Code Status and Medical Interventions:   Ordered at: 01/14/24 2055     Code Status (Patient has no pulse and is not breathing):    CPR (Attempt to Resuscitate)     Medical Interventions (Patient has pulse or is breathing):    Full Support     I have prepared this progress note with copied portions of the prior day's progress note of my own authorship to preserve accuracy and maintain consistency of documentation. I have reviewed these portions and edited them for correctness. I verify that the above documentation accurately and truly represents the evaluation and management performed on today's date.       Aurelia Delarosa, DO  01/16/24

## 2024-01-16 NOTE — PLAN OF CARE
Problem: Adult Inpatient Plan of Care  Goal: Plan of Care Review  Recent Flowsheet Documentation  Taken 1/16/2024 0836 by Denise Cotto OT  Progress: no change  Plan of Care Reviewed With: patient  Outcome Evaluation: OT IE completed. Pt. presents below functional baseline in functional mobility, activity tolerance, ADL independence, and balance. Pt.'s work requires him to be on his feet & reports inability to complete his daily routine independently for past month. OT skilled services are warranted to return pt. to PLOF. Recommend home with assist & OP therapy upon d/c.   Goal Outcome Evaluation:  Plan of Care Reviewed With: patient        Progress: no change  Outcome Evaluation: OT IE completed. Pt. presents below functional baseline in functional mobility, activity tolerance, ADL independence, and balance. Pt.'s work requires him to be on his feet & reports inability to complete his daily routine independently for past month. OT skilled services are warranted to return pt. to PLOF. Recommend home with assist & OP therapy upon d/c.      Anticipated Discharge Disposition (OT): home with assist, home with outpatient therapy services

## 2024-01-16 NOTE — CONSULTS
[facility]   Consult Note    Patient Name: Justo Oquendo  : 1975  MRN: [id number]  Primary Care Physician:  Melo Whitaker MD  Referring Physician: No ref. provider found  Date of admission: 2024    Inpatient Cardiology Consult  Consult performed by: Celso Hoyt MD  Consult ordered by: Rayna Sanchez MD  Reason for consult: HTN        Subjective   Subjective     Problem List  -femorofemoral bypass complicated by complete occlusion of left femoral artery bypass graft.  Ischemic left foot with plans for surgery on on 24  -poorly controlled BP  -DM  -HLD  -smoker  -EKG sinus tachycardia, echo preserved LV function      Mr. Oquendo is a 48 year old man who cardiology consulted for medical optimization prior to vascular surgery Thursday.  No shortness of breath, chest pain or palpitations.  I saw him last year in the hospital for uncontrolled BP.  Was reasonably controlled at discharge.  Currently elevated.  He was supposed to see me for various reasons didn't.  Little concerned regarding if taking his medications appropriately.  Currently admitted with gangernous toes and foot ischemia.  He smokes daily.  ROS negative except for the above.        Review of Systems     Personal History     Past Medical History:   Diagnosis Date    Arthritis     Back pain     Diabetes     SINCE 2017    Dyslipidemia     HTN (hypertension)     PVD (peripheral vascular disease)     Wears partial dentures        Past Surgical History:   Procedure Laterality Date    AORTAGRAM N/A 2017    Procedure: AORTAGRAM WITH OR WITHOUT RUNOFFS POSSIBLE STENT with left femoral cutdown;  Surgeon: Brett Ryan MD;  Location:  [facility];  Service:     AORTAGRAM N/A 2023    Procedure: THROMBECTOMY OF LEFT GRAFT, AORTAGRAM, FEMORAL TO FEMORAL BYPASS;  Surgeon: Brett Ryan MD;  Location:  [facility];  Service: Vascular;  Laterality: N/A;  FLUORO- .06 SECS  DOSE- 10  MGY  CONTRAST- 10 ML ISO    CHOLECYSTECTOMY      CYST REMOVAL      OFF OF BACK    TN IN-SITU VEIN BYPASS FEMORAL-POPLITEAL Left 04/25/2017    Procedure: FEMORAL POPLITEAL BYPASS;  Surgeon: Brett Ryan MD;  Location:  [facility];  Service: Vascular    TN IN-SITU VEIN BYPASS FEMORAL-POPLITEAL Left 06/27/2017    Procedure: LEFT FEMORAL ENDARTECTOMYN LEFT FEMORAL POPLITEAL BYPASS;  Surgeon: Brett Ryan MD;  Location: [facility] OR;  Service: Vascular       Family History: family history includes Arthritis in his mother; Kidney disease in his father; Liver disease in his father. Otherwise pertinent FHx was reviewed and not pertinent to current issue.    Social History:  reports that he has been smoking cigarettes. He has a 30.00 pack-year smoking history. He has never used smokeless tobacco. He reports that he does not drink alcohol and does not use drugs.    Home Medications:   Empagliflozin-metFORMIN HCl, Fenofibrate, HYDROcodone-acetaminophen, Rivaroxaban, Semaglutide, aspirin, cyclobenzaprine, diphenhydrAMINE HCl, gabapentin, lisinopril, metoprolol tartrate, oxyCODONE, and rosuvastatin    Allergies:  No Known Allergies    Objective    Objective     Vitals:  Temp:  [97.1 °F (36.2 °C)-98.3 °F (36.8 °C)] 98.3 °F (36.8 °C)  Heart Rate:  [] 73  Resp:  [14-18] 18  BP: (134-157)/(70-79) 143/76  Flow (L/min):  [2.5] 2.5    Physical Exam  HENT:      Head: Normocephalic.   Eyes:      Extraocular Movements: Extraocular movements intact.   Cardiovascular:      Rate and Rhythm: Normal rate and regular rhythm.      Heart sounds: No murmur heard.     No gallop.   Pulmonary:      Breath sounds: Normal breath sounds.   Abdominal:      Palpations: Abdomen is soft.   Musculoskeletal:      Right lower leg: No edema.      Left lower leg: No edema.   Skin:     General: Skin is warm and dry.   Neurological:      General: No focal deficit present.      Mental Status: He is alert.   Psychiatric:         Mood and  Affect: Mood normal.         Result Review    Result Review:  I have personally reviewed the results from the time of this admission to 1/16/2024 12:04 EST and agree with these findings:  []  Laboratory list / accordion  []  Microbiology  []  Radiology  []  EKG/Telemetry   []  Cardiology/Vascular   []  Pathology  []  Old records  []  Other:      Assessment & Plan   Assessment / Plan     Assessment  -femorofemoral bypass complicated by complete occlusion of left femoral artery bypass graft.  Ischemic left foot with plans for surgery on on 1/18/24  -poorly controlled BP  -DM  -HLD  -smoker  -EKG sinus tachycardia, echo preserved LV function    Plan:   -He is higher risk for surgery however has leg ischemia and need urgent surgery.  Not having active cardiac complaints.  Can proceed with surgery without further CV risk stratification.    -on metoprolol.  Resume lisinopril 40mg.  Add 10mg of amlodipine  -agree with antiplatelet therapy, current anticoagulation and statin  -will follow       Celso Hoyt MD

## 2024-01-16 NOTE — PLAN OF CARE
Goal Outcome Evaluation:      On room air, was able to ambulate to the bathroom with a walker and stand by assist . Patient complain of pain, pain management with scheduled and PRN med's, A/O x4. Family at bedside, call light within reach

## 2024-01-17 LAB
ANION GAP SERPL CALCULATED.3IONS-SCNC: 9 MMOL/L (ref 5–15)
APTT PPP: 44.9 SECONDS (ref 60–90)
APTT PPP: 59 SECONDS (ref 60–90)
APTT PPP: 60.1 SECONDS (ref 60–90)
BUN SERPL-MCNC: 14 MG/DL (ref 6–20)
BUN/CREAT SERPL: 21.5 (ref 7–25)
CALCIUM SPEC-SCNC: 9.1 MG/DL (ref 8.6–10.5)
CHLORIDE SERPL-SCNC: 104 MMOL/L (ref 98–107)
CO2 SERPL-SCNC: 25 MMOL/L (ref 22–29)
CREAT SERPL-MCNC: 0.6 MG/DL (ref 0.76–1.27)
CREAT SERPL-MCNC: 0.65 MG/DL (ref 0.76–1.27)
EGFRCR SERPLBLD CKD-EPI 2021: 114.8 ML/MIN/1.73
EGFRCR SERPLBLD CKD-EPI 2021: 117.6 ML/MIN/1.73
GLUCOSE BLDC GLUCOMTR-MCNC: 127 MG/DL (ref 70–130)
GLUCOSE BLDC GLUCOMTR-MCNC: 136 MG/DL (ref 70–130)
GLUCOSE BLDC GLUCOMTR-MCNC: 137 MG/DL (ref 70–130)
GLUCOSE BLDC GLUCOMTR-MCNC: 170 MG/DL (ref 70–130)
GLUCOSE SERPL-MCNC: 145 MG/DL (ref 65–99)
POTASSIUM SERPL-SCNC: 3.8 MMOL/L (ref 3.5–5.2)
SODIUM SERPL-SCNC: 138 MMOL/L (ref 136–145)
VANCOMYCIN SERPL-MCNC: 15.6 MCG/ML (ref 5–40)

## 2024-01-17 PROCEDURE — 25810000003 SODIUM CHLORIDE 0.9 % SOLUTION

## 2024-01-17 PROCEDURE — 25010000002 CEFTRIAXONE PER 250 MG: Performed by: FAMILY MEDICINE

## 2024-01-17 PROCEDURE — 82565 ASSAY OF CREATININE: CPT

## 2024-01-17 PROCEDURE — 82948 REAGENT STRIP/BLOOD GLUCOSE: CPT

## 2024-01-17 PROCEDURE — 25010000002 VANCOMYCIN 10 G RECONSTITUTED SOLUTION

## 2024-01-17 PROCEDURE — 85730 THROMBOPLASTIN TIME PARTIAL: CPT | Performed by: FAMILY MEDICINE

## 2024-01-17 PROCEDURE — 99232 SBSQ HOSP IP/OBS MODERATE 35: CPT | Performed by: INTERNAL MEDICINE

## 2024-01-17 PROCEDURE — 80048 BASIC METABOLIC PNL TOTAL CA: CPT

## 2024-01-17 PROCEDURE — 63710000001 INSULIN LISPRO (HUMAN) PER 5 UNITS: Performed by: FAMILY MEDICINE

## 2024-01-17 PROCEDURE — 25010000002 HYDROMORPHONE 1 MG/ML SOLUTION: Performed by: FAMILY MEDICINE

## 2024-01-17 PROCEDURE — 25010000002 HEPARIN (PORCINE) PER 1000 UNITS: Performed by: FAMILY MEDICINE

## 2024-01-17 PROCEDURE — 25010000002 HEPARIN (PORCINE) 25000-0.45 UT/250ML-% SOLUTION

## 2024-01-17 PROCEDURE — 25810000003 SODIUM CHLORIDE 0.9 % SOLUTION: Performed by: FAMILY MEDICINE

## 2024-01-17 PROCEDURE — 80202 ASSAY OF VANCOMYCIN: CPT

## 2024-01-17 PROCEDURE — 85730 THROMBOPLASTIN TIME PARTIAL: CPT

## 2024-01-17 PROCEDURE — 25010000002 HEPARIN (PORCINE) 25000-0.45 UT/250ML-% SOLUTION: Performed by: FAMILY MEDICINE

## 2024-01-17 RX ORDER — HEPARIN SODIUM 1000 [USP'U]/ML
1900 INJECTION, SOLUTION INTRAVENOUS; SUBCUTANEOUS ONCE
Status: COMPLETED | OUTPATIENT
Start: 2024-01-17 | End: 2024-01-17

## 2024-01-17 RX ORDER — HYDRALAZINE HYDROCHLORIDE 50 MG/1
25 TABLET, FILM COATED ORAL EVERY 8 HOURS SCHEDULED
Status: DISCONTINUED | OUTPATIENT
Start: 2024-01-17 | End: 2024-01-24 | Stop reason: HOSPADM

## 2024-01-17 RX ADMIN — OXYCODONE HYDROCHLORIDE 10 MG: 10 TABLET ORAL at 08:35

## 2024-01-17 RX ADMIN — ROSUVASTATIN CALCIUM 40 MG: 20 TABLET, FILM COATED ORAL at 20:23

## 2024-01-17 RX ADMIN — HEPARIN SODIUM 25 UNITS/KG/HR: 10000 INJECTION, SOLUTION INTRAVENOUS at 03:13

## 2024-01-17 RX ADMIN — Medication 10 ML: at 08:36

## 2024-01-17 RX ADMIN — SENNOSIDES AND DOCUSATE SODIUM 2 TABLET: 8.6; 5 TABLET ORAL at 08:35

## 2024-01-17 RX ADMIN — OXYCODONE HYDROCHLORIDE 10 MG: 10 TABLET ORAL at 17:55

## 2024-01-17 RX ADMIN — HYDROMORPHONE HYDROCHLORIDE 1 MG: 1 INJECTION, SOLUTION INTRAMUSCULAR; INTRAVENOUS; SUBCUTANEOUS at 16:52

## 2024-01-17 RX ADMIN — CYCLOBENZAPRINE 10 MG: 10 TABLET, FILM COATED ORAL at 20:25

## 2024-01-17 RX ADMIN — Medication 1 PATCH: at 20:40

## 2024-01-17 RX ADMIN — HYDRALAZINE HYDROCHLORIDE 25 MG: 50 TABLET, FILM COATED ORAL at 13:33

## 2024-01-17 RX ADMIN — HYDROMORPHONE HYDROCHLORIDE 1 MG: 1 INJECTION, SOLUTION INTRAMUSCULAR; INTRAVENOUS; SUBCUTANEOUS at 13:39

## 2024-01-17 RX ADMIN — GABAPENTIN 600 MG: 300 CAPSULE ORAL at 06:01

## 2024-01-17 RX ADMIN — OXYCODONE HYDROCHLORIDE 10 MG: 10 TABLET ORAL at 02:28

## 2024-01-17 RX ADMIN — OXYCODONE HYDROCHLORIDE 10 MG: 10 TABLET ORAL at 12:21

## 2024-01-17 RX ADMIN — METOPROLOL TARTRATE 25 MG: 25 TABLET, FILM COATED ORAL at 20:27

## 2024-01-17 RX ADMIN — GABAPENTIN 600 MG: 300 CAPSULE ORAL at 13:33

## 2024-01-17 RX ADMIN — HYDROMORPHONE HYDROCHLORIDE 1 MG: 1 INJECTION, SOLUTION INTRAMUSCULAR; INTRAVENOUS; SUBCUTANEOUS at 00:23

## 2024-01-17 RX ADMIN — VANCOMYCIN HYDROCHLORIDE 1250 MG: 10 INJECTION, POWDER, LYOPHILIZED, FOR SOLUTION INTRAVENOUS at 20:40

## 2024-01-17 RX ADMIN — LISINOPRIL 40 MG: 20 TABLET ORAL at 08:36

## 2024-01-17 RX ADMIN — GABAPENTIN 600 MG: 300 CAPSULE ORAL at 20:26

## 2024-01-17 RX ADMIN — HEPARIN SODIUM 1900 UNITS: 1000 INJECTION INTRAVENOUS; SUBCUTANEOUS at 17:59

## 2024-01-17 RX ADMIN — AMLODIPINE BESYLATE 10 MG: 10 TABLET ORAL at 08:35

## 2024-01-17 RX ADMIN — ASPIRIN 81 MG: 81 TABLET, COATED ORAL at 20:25

## 2024-01-17 RX ADMIN — HYDRALAZINE HYDROCHLORIDE 25 MG: 50 TABLET, FILM COATED ORAL at 20:24

## 2024-01-17 RX ADMIN — SENNOSIDES AND DOCUSATE SODIUM 2 TABLET: 8.6; 5 TABLET ORAL at 20:25

## 2024-01-17 RX ADMIN — VANCOMYCIN HYDROCHLORIDE 1250 MG: 10 INJECTION, POWDER, LYOPHILIZED, FOR SOLUTION INTRAVENOUS at 08:35

## 2024-01-17 RX ADMIN — HYDROMORPHONE HYDROCHLORIDE 1 MG: 1 INJECTION, SOLUTION INTRAMUSCULAR; INTRAVENOUS; SUBCUTANEOUS at 20:21

## 2024-01-17 RX ADMIN — SODIUM CHLORIDE 125 ML/HR: 9 INJECTION, SOLUTION INTRAVENOUS at 04:35

## 2024-01-17 RX ADMIN — HEPARIN SODIUM 27 UNITS/KG/HR: 10000 INJECTION, SOLUTION INTRAVENOUS at 18:00

## 2024-01-17 RX ADMIN — HYDROMORPHONE HYDROCHLORIDE 1 MG: 1 INJECTION, SOLUTION INTRAMUSCULAR; INTRAVENOUS; SUBCUTANEOUS at 06:01

## 2024-01-17 RX ADMIN — INSULIN LISPRO 2 UNITS: 100 INJECTION, SOLUTION INTRAVENOUS; SUBCUTANEOUS at 17:55

## 2024-01-17 RX ADMIN — HYDROMORPHONE HYDROCHLORIDE 1 MG: 1 INJECTION, SOLUTION INTRAMUSCULAR; INTRAVENOUS; SUBCUTANEOUS at 10:17

## 2024-01-17 RX ADMIN — METOPROLOL TARTRATE 25 MG: 25 TABLET, FILM COATED ORAL at 08:35

## 2024-01-17 RX ADMIN — OXYCODONE HYDROCHLORIDE 10 MG: 10 TABLET ORAL at 23:44

## 2024-01-17 RX ADMIN — TEMAZEPAM 15 MG: 15 CAPSULE ORAL at 20:26

## 2024-01-17 RX ADMIN — SODIUM CHLORIDE 1000 MG: 900 INJECTION INTRAVENOUS at 12:21

## 2024-01-17 RX ADMIN — HYDROMORPHONE HYDROCHLORIDE 1 MG: 1 INJECTION, SOLUTION INTRAMUSCULAR; INTRAVENOUS; SUBCUTANEOUS at 03:05

## 2024-01-17 NOTE — PROGRESS NOTES
"   LOS: 3 days   Patient Care Team:  Melo Whitaker MD as PCP - General (Family Medicine)  Niall Mcfarlane MD as Consulting Physician (Cardiology)    Chief Complaint: LEFT 2nd-4th toe gangrene    Subjective     Subjective:  Symptoms:  Stable.    Diet:  Adequate intake.  No nausea or vomiting.    Activity level: Impaired due to pain.    Pain:  He complains of pain that is mild.  He reports pain is unchanged.  Pain is well controlled.        History taken from: patient    Objective     Vital Signs  Temp:  [97.9 °F (36.6 °C)-98.4 °F (36.9 °C)] 98.2 °F (36.8 °C)  Heart Rate:  [76-99] 99  Resp:  [18] 18  BP: (143-169)/(74-90) 168/85    Objective:  General Appearance:  Comfortable.    Vital signs: (most recent): Blood pressure 168/85, pulse 99, temperature 98.2 °F (36.8 °C), temperature source Oral, resp. rate 18, height 172.7 cm (68\"), weight 73.5 kg (162 lb), SpO2 96%.  Vital signs are normal.    HEENT: Normal HEENT exam.    Lungs:  Normal effort and normal respiratory rate.  He is not in respiratory distress.  No wheezes.    Heart: Normal rate.  Regular rhythm.    Chest: Symmetric chest wall expansion. No chest wall tenderness.    Abdomen: Abdomen is soft and non-distended.  There is no abdominal tenderness.     Neurological: Patient is alert and oriented to person, place and time.    Skin:  No ecchymosis. (LEFT 2nd-4th toe dry gangrenous changes)              Results Review:     I reviewed the patient's new clinical results.    Medication Review: Yes    Assessment & Plan       Ischemic foot    Peripheral arterial disease    Hypertension    Hyperlipidemia    Tobacco abuse    Diabetes mellitus type II, non insulin dependent    Claudication of lower extremity s/p femorofemoral bypass    Sepsis associated hypotension    ATN (acute tubular necrosis)    Elevated serum creatinine    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      Assessment & Plan  - NPO p MN  - to OR 1/18/24 for LEFT CFA-AKP graft thrombectomy " possible AKP-BKP jump graft with tibial angioplasty  - hold heparin drip 1/18/24 OCTOR  - risks, benefits, alternatives, complications, expectations discussed  - all questions answered to his and his wife's satisfaction        Vin Pena MD  01/17/24  18:36 EST    Time: More than 50% of time spent in counseling and coordination of care:  Total face-to-face/floor time 5 min.  Time spent in counseling 5 min. Counseling included the following topics: operative expectations

## 2024-01-17 NOTE — PROGRESS NOTES
[facility] Medicine Services  PROGRESS NOTE    Patient Name: Justo Oquendo  : 1975  MRN: [id number]    Date of Admission: 2024  Primary Care Physician: Melo Whitaker MD    Subjective   Subjective     CC:  F/U Ischemic left foot    HPI:  Pain control is better today.      Objective   Objective     Vital Signs:   Temp:  [96.8 °F (36 °C)-98.4 °F (36.9 °C)] 98.2 °F (36.8 °C)  Heart Rate:  [76-94] 76  Resp:  [18] 18  BP: (143-169)/(74-90) 150/77     Physical Exam:  Constitutional: No acute distress, sleeping comfortably, awakens to answer questions  HENT: NCAT, mucous membranes moist  Respiratory: Respiratory effort normal   Musculoskeletal: Left foot with distal erythema and black 3-4th toes  Psychiatric: Appropriate affect, cooperative  Neurologic: Speech clear and fluent        Results Reviewed:  LAB RESULTS:      Lab 24  0642 24  2323 24  1419 24  0648 01/15/24  2241 01/15/24  1431 01/15/24  0359 24  2111 24  2111 24  1512   WBC  --   --   --  6.90  --   --  11.86*  --   --  13.82*   HEMOGLOBIN  --   --   --  11.0*  --   --  10.7*  --   --  11.8*   HEMATOCRIT  --   --   --  33.9*  --   --  32.5*  --   --  36.9*   PLATELETS  --   --   --  264  --   --  257  --   --  333   NEUTROS ABS  --   --   --  3.62  --   --  10.16*  --   --  9.47*   IMMATURE GRANS (ABS)  --   --   --  0.04  --   --  0.09*  --   --  0.17*   LYMPHS ABS  --   --   --  2.67  --   --  1.24  --   --  2.57   MONOS ABS  --   --   --  0.51  --   --  0.36  --   --  1.48*   EOS ABS  --   --   --  0.04  --   --  0.00  --   --  0.10   MCV  --   --   --  89.4  --   --  92.3  --   --  92.9   SED RATE  --   --   --   --   --   --   --   --   --  100*   CRP  --   --   --   --   --   --   --   --   --  2.60*   PROCALCITONIN  --   --   --   --   --   --   --   --   --  0.19   LACTATE  --   --   --   --   --   --   --   --   --  1.2   PROTIME  --   --   --   --   --   --    --   --  15.0*  --    APTT 60.1 59.0* 55.4* 52.4* 38.5*   < > 38.3*   < > 38.7*  --    HEPARIN ANTI-XA  --   --   --   --   --   --   --   --  0.14*  --     < > = values in this interval not displayed.         Lab 01/17/24  0801 01/17/24  0641 01/16/24  0648 01/15/24  0359 01/14/24  1512   SODIUM  --  138 141 138 134*   POTASSIUM  --  3.8 4.1 4.9 4.1   CHLORIDE  --  104 106 106 96*   CO2  --  25.0 25.0 23.0 25.0   ANION GAP  --  9.0 10.0 9.0 13.0   BUN  --  14 21* 22* 30*   CREATININE 0.60* 0.65* 0.80 0.90 2.07*   EGFR 117.6 114.8 107.8 104.0 38.3*   GLUCOSE  --  145* 149* 221* 302*   CALCIUM  --  9.1 9.5 9.5 10.0   MAGNESIUM  --   --   --   --  2.1         Lab 01/14/24  1512   TOTAL PROTEIN 7.5   ALBUMIN 3.8   GLOBULIN 3.7   ALT (SGPT) 13   AST (SGOT) 14   BILIRUBIN 0.2   ALK PHOS 83         Lab 01/14/24  2111 01/14/24  1512   PROBNP  --  63.8   HSTROP T  --  18   PROTIME 15.0*  --    INR 1.17*  --                  Brief Urine Lab Results       None            Microbiology Results Abnormal       Procedure Component Value - Date/Time    Blood Culture - Blood, Arm, Left [[id number]]  (Normal) Collected: 01/14/24 1512    Lab Status: Preliminary result Specimen: Blood from Arm, Left Updated: 01/16/24 1900     Blood Culture No growth at 2 days    Blood Culture - Blood, Arm, Right [[id number]]  (Normal) Collected: 01/14/24 1510    Lab Status: Preliminary result Specimen: Blood from Arm, Right Updated: 01/16/24 1631     Blood Culture No growth at 2 days    MRSA Screen, PCR (Inpatient) - Swab, Nares [[id number]]  (Normal) Collected: 01/14/24 1817    Lab Status: Final result Specimen: Swab from Nares Updated: 01/14/24 2152     MRSA PCR No MRSA Detected    Narrative:      The negative predictive value of this diagnostic test is high and should only be used to consider de-escalating anti-MRSA therapy. A positive result may indicate colonization with MRSA and must be correlated clinically.            No radiology results from the  last 24 hrs    Results for orders placed during the hospital encounter of 11/20/23    Adult Transthoracic Echo Complete W/ Cont if Necessary Per Protocol    Interpretation Summary    Left ventricular systolic function is hyperdynamic (EF > 70%). Calculated left ventricular EF = 70.4%    Left ventricular diastolic function was normal.    No significant structural or functional valvular disease.      Current medications:  Scheduled Meds:amLODIPine, 10 mg, Oral, Q24H  aspirin, 81 mg, Oral, Nightly  cefTRIAXone, 1,000 mg, Intravenous, Q24H  gabapentin, 600 mg, Oral, Q8H  hydrALAZINE, 25 mg, Oral, Q8H  insulin lispro, 2-7 Units, Subcutaneous, 4x Daily AC & at Bedtime  lisinopril, 40 mg, Oral, Q24H  metoprolol tartrate, 25 mg, Oral, Q12H  nicotine, 1 patch, Transdermal, Nightly  oxyCODONE, 10 mg, Oral, 4x Daily  rosuvastatin, 40 mg, Oral, Nightly  senna-docusate sodium, 2 tablet, Oral, BID  sodium chloride, 10 mL, Intravenous, Q12H  vancomycin, 1,250 mg, Intravenous, Q12H      Continuous Infusions:heparin, 25 Units/kg/hr, Last Rate: 25 Units/kg/hr (01/17/24 0313)  Pharmacy to Dose Heparin,   Pharmacy to dose vancomycin,   sodium chloride, 125 mL/hr, Last Rate: 125 mL/hr (01/17/24 1285)      PRN Meds:.  senna-docusate sodium **AND** polyethylene glycol **AND** bisacodyl **AND** bisacodyl    Calcium Replacement - Follow Nurse / BPA Driven Protocol    cyclobenzaprine    dextrose    dextrose    glucagon (human recombinant)    HYDROmorphone **AND** naloxone    LORazepam    Magnesium Standard Dose Replacement - Follow Nurse / BPA Driven Protocol    nicotine polacrilex    nitroglycerin    ondansetron ODT **OR** ondansetron    oxyCODONE    Pharmacy to Dose Heparin    Pharmacy to dose vancomycin    Phosphorus Replacement - Follow Nurse / BPA Driven Protocol    Potassium Replacement - Follow Nurse / BPA Driven Protocol    sodium chloride    sodium chloride    temazepam    Assessment & Plan   Assessment & Plan     Active Hospital  Problems    Diagnosis  POA    **Ischemic foot [I99.8]  Yes    Sepsis associated hypotension [A41.9, I95.9]  Yes    ATN (acute tubular necrosis) [N17.0]  Yes    Elevated serum creatinine [R79.89]  Yes    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 [I96]  Unknown    Claudication of lower extremity s/p femorofemoral bypass [I73.9]  Yes    Hypertension [I10]  Yes    Hyperlipidemia [E78.5]  Yes    Tobacco abuse [Z72.0]  Yes    Diabetes mellitus type II, non insulin dependent [E11.9]  Yes    Peripheral arterial disease [I73.9]  Yes      Resolved Hospital Problems   No resolved problems to display.        Brief Hospital Course to date:  Justo Oquendo is a 48 y.o. male with ongoing heavy tobacco use, known history of severe PAD, status post thrombectomy of left femoropopliteal bypass graft as well as placement of a femorofemoral bypass by Dr. Ryan on 11/21/2023 who presented with lethargy, decreased urine output and confusion.  Patient saw CT surgery in December 2023 and outpatient referral was made to Dr. Peres of orthopedic surgery for consideration of BKA for critical focal peripheral arterial disease.    This patient's problems and plans were partially entered by my partner and updated as appropriate by me 01/17/24.     Sepsis (AMS, WBC, hypotension, ischemic necrosis)  Metabolic encephalopathy  Ischemic ulcers and necrosis of LLE 3-4th digits  Progressive PAD with occlusion of Fem-Fem graft  -Initially hypotensive with systolics in the 80s, now improved after receiving hydrocortisone and IV fluid  - CTA of the abdominal aorta with runoff shows complete occlusion of the left femoral artery bypass graft, complete occlusion of the right femoral artery.  Eventual distal reconstitution of both vessels due to the branches of the deep femoral arteries, loss of flow within the left posterior tibial artery, loss of flow within the right anterior tibial artery.  No residual flow to the distal arteries.  -Continue IV Rocephin  and Vancomycin empirically   -Continue IV heparin.  Holding home Xarelto  -Continue ASA, statin  -Dr. Peres following, may require  amputation (patient previously had an outpatient appointment for preop with Dr. Peres on 1/15/2024).  -Vascular following, tentatively planning revision bypass followed by amputation depending on intraoperative findings during vascular procedure.     Uncontrolled pain- improved   -Continue PO and IV narcotics as needed     ALEJANDRO, resolved  -Creatinine significantly elevated 2.07 on presentation, now back to baseline  -Pre-renal, likely hypotension related, now resolved with hydrocortisone    NIDDM2  -Continue sliding scale insulin     HTN  HL  -ACEI held due to elevated creatinine/hypotension- maybe resume post operatively      Ongoing heavy tobacco smoker  -NRT as needed       Expected Discharge Location and Transportation: home vs rehab  Expected Discharge   Expected Discharge Date: 1/22/2024; Expected Discharge Time:      DVT prophylaxis:  Medical DVT prophylaxis orders are present.     AM-PAC 6 Clicks Score (PT): 19 (01/17/24 0808)    CODE STATUS:   Code Status and Medical Interventions:   Ordered at: 01/14/24 2055     Code Status (Patient has no pulse and is not breathing):    CPR (Attempt to Resuscitate)     Medical Interventions (Patient has pulse or is breathing):    Full Support       Nadia Gatica MD  01/17/24

## 2024-01-17 NOTE — PROGRESS NOTES
Pharmacy Consult-Vancomycin Dosing  Justo Oquendo is a  48 y.o. male receiving vancomycin therapy.     Indication: SSTI  Consulting Provider: Hospitalist  ID Consult: N    Goal Trough: 10-20    Current Antimicrobial Therapy  Anti-Infectives (From admission, onward)      Ordered     Dose/Rate Route Frequency Start Stop    01/16/24 0747  vancomycin 1250 mg/250 mL 0.9% NS IVPB ([facility])        Ordering Provider: Nathanael Kay RPH    1,250 mg  over 75 Minutes Intravenous Every 12 Hours Scheduled 01/16/24 0900 01/20/24 0859    01/14/24 2129  cefTRIAXone (ROCEPHIN) 1,000 mg in sodium chloride 0.9 % 100 mL IVPB        Ordering Provider: Ashley Lee MD    1,000 mg  200 mL/hr over 30 Minutes Intravenous Every 24 Hours 01/15/24 1200 01/22/24 1159    01/14/24 2129  Pharmacy to dose vancomycin        Ordering Provider: Ashley Lee MD     Does not apply Continuous PRN 01/14/24 2129 01/21/24 2128    01/14/24 1638  vancomycin IVPB 1500 mg in 0.9% NaCl (Premix) 500 mL        Ordering Provider: Ramakrishna Claudio IV, PharmD    20 mg/kg × 73.5 kg  333.3 mL/hr over 90 Minutes Intravenous Once 01/14/24 1730 01/14/24 1945    01/14/24 1624  Pharmacy to dose vancomycin        Ordering Provider: Samuel Dave APRN     Does not apply Once 01/14/24 1640 01/14/24 1747    01/14/24 1546  cefTRIAXone (ROCEPHIN) 1,000 mg in sodium chloride 0.9 % 100 mL IVPB        Ordering Provider: Samuel Dave APRN    1,000 mg  200 mL/hr over 30 Minutes Intravenous Once 01/14/24 1602 01/14/24 1701            Allergies  Allergies as of 01/14/2024    (No Known Allergies)       Labs    Results from last 7 days   Lab Units 01/17/24  0801 01/17/24  0641 01/16/24  0648 01/15/24  0359   BUN mg/dL  --  14 21* 22*   CREATININE mg/dL 0.60* 0.65* 0.80 0.90       Results from last 7 days   Lab Units 01/16/24  0648 01/15/24  0359 01/14/24  1512   WBC 10*3/mm3 6.90 11.86* 13.82*       Evaluation of Dosing     Last Dose Received in the ED/Outside Facility: vanc 1500mg x1  "1/14 @ 1815  Is Patient on Dialysis or Renal Replacement: N    Ht - 172.7 cm (68\")  Wt - 73.5 kg (162 lb)    Estimated Creatinine Clearance: 153.1 mL/min (A) (by C-G formula based on SCr of 0.6 mg/dL (L)).    Intake & Output (last 3 days)         01/12 0701  01/13 0700 01/13 0701 01/14 0700 01/14 0701  01/15 0700    IV Piggyback   100    Total Intake(mL/kg)   100 (1.4)    Net   +100                   Microbiology and Radiology  Microbiology Results (last 10 days)       Procedure Component Value - Date/Time    MRSA Screen, PCR (Inpatient) - Swab, Nares [[id number]]  (Normal) Collected: 01/14/24 1817    Lab Status: Final result Specimen: Swab from Nares Updated: 01/14/24 2152     MRSA PCR No MRSA Detected    Narrative:      The negative predictive value of this diagnostic test is high and should only be used to consider de-escalating anti-MRSA therapy. A positive result may indicate colonization with MRSA and must be correlated clinically.    Blood Culture - Blood, Arm, Left [[id number]]  (Normal) Collected: 01/14/24 1512    Lab Status: Preliminary result Specimen: Blood from Arm, Left Updated: 01/16/24 1900     Blood Culture No growth at 2 days    Blood Culture - Blood, Arm, Right [[id number]]  (Normal) Collected: 01/14/24 1510    Lab Status: Preliminary result Specimen: Blood from Arm, Right Updated: 01/16/24 1631     Blood Culture No growth at 2 days            Vancomycin Levels:    Results from last 7 days   Lab Units 01/17/24  0641 01/16/24  0648 01/15/24  0359   VANCOMYCIN RM mcg/mL 15.60 13.30 9.50                   Insight[facility] AUC Calculation:    Current AUC:   302 mg/L*hr    Predicted Steady State AUC on Current Dose: 407 mg/L*hr  _________________________________    Predicted Steady State AUC on New Dose:  -- mg/L*hr    Assessment/Plan:  Pharmacy consulted to dose vancomycin for SSTI  Patient loaded with vancomycin 1500mg ( ~ 20 mg/kg) IV x1 on 1/14 @ 1810  Blood cultures show no growth at 24 hours. MRSA PCR is " negative.  Patient remains afebrile with adequate urine output.   Serum creatinine has dropped significantly, spoke to phlebotomy who informed me that the blood draw came from the patient's left hand (this leads to me being concerned of dilution as the patient was receiving Normal saline 125 mL/hr at the time, resulting in a falsely lower AUC).  A maintenance dose of vancomycin 1,000 mg IV q12h (~13.6 mg/kg) was initiated starting 1/15.  A vancomycin level was drawn today @ 0641 (~9.5 hours post-dose) and resulted as 15.60 mcg/mL.  Given the vancomycin level, will continue vancomycin 1250 mg IV q12h (17 mg/kg).  A vancomycin level will be assessed on 1/18 with AM labs.  Will continue to follow and adjust vancomycin dose as needed based on renal function, cultures, and patient clinical status.    Thank you,    Nathanael Kay [facility]  1/17/2024  13:10 EST

## 2024-01-17 NOTE — PROGRESS NOTES
HEPARIN INFUSION  Justo Oquendo is a  48 y.o. male receiving heparin infusion.     Therapy for (VTE/Cardiac):   Cardiac  Patient Weight: 73.5 kg  Initial Bolus (Y/N):   No  Any Bolus (Y/N):   Yes      Signs or Symptoms of Bleeding: None per RN    Cardiac or Other (Not VTE)  Initial rate: 12 units/kg/hr (Max 1,000 units/hr)   aPTT  Rebolus Infusion Hold time Change infusion Dose (Units/kg/hr) Next Anti Xa or aPTT Level Due   <45 50 Units/kg  (4000 Units Max) None Increase by  3 Units/kg/hr 6 hours   45 - 52 25 Units/kg  (2000 Units Max) None Increase by  2 Units/kg/hr 6 hours   53 - 59 0 None Increase by  1 Units/kg/hr 6 hours   60 - 75 0 None No Change 6 hours (after 2 consecutive levels in range check qAM)   76 - 83 0 None Decrease by  1 Units/kg/hr 6 hours   84 - 98 0 30 Minutes Decrease by  2 Units/kg/hr 6 hours   99 - 113 0 60 Minutes Decrease by  3 Units/kg/hr 6 hours   >113 0 Hold  After aPTT less than 75 decrease previous rate by  4 Units/kg/hr  Every 2 hours until aPTT less than 75 then when infusion restarts in 6 hours       Results from last 7 days   Lab Units 01/16/24  0648 01/15/24  0359 01/14/24 2111 01/14/24  1512   INR   --   --  1.17*  --    HEMOGLOBIN g/dL 11.0* 10.7*  --  11.8*   HEMATOCRIT % 33.9* 32.5*  --  36.9*   PLATELETS 10*3/mm3 264 257  --  333          Date   Time   aPTT Current Rate (Unit/kg/hr) Bolus   (Units) Rate Change   (Unit/kg/hr) New Rate (Unit/kg/hr) Next   aPTT Comments  Pump Check Daily   1/14 2111 38.7 NEW START -- +12 12 0400 DW RN   1/14 0359 38.3 12 3600 +3 15 1200 Discussed w/ nurse   1/15 1431 36.1 15 3600 +3 18 2200 DW RN   1/15 2242 38.5 18 3600 +3 21 0600 Nurse confirmed pump   1/16 0648 52.4 21 1800 +2 23 1400 DW DAVID Tim; pump check   1/16 1419 55.4 23 -- +1 24 2300 D/w DAVID Marquez   1/16 2323 59 24 -- +1 25 0630 Discussed w/ nurse   1/17 0642 60.1 25 -- -- 25 1400 DW DAVID Tim; pump check                                                                                   Nathanael Kay, PharmD  1/17/2024  07:48 EST

## 2024-01-17 NOTE — PLAN OF CARE
Goal Outcome Evaluation:      PRN and scheduled med's ambisinister for pain, On room air, Family at bedside. Surgery tomorrow. Call light within reach

## 2024-01-17 NOTE — CONSULTS
Reviewed chart for diabetes education consult.  Noted history of diabetes.  Noted A1c of 10.6% in November 2023.  Noted taking diabetes medication at home.  Spoke to Mr. Oquendo at the bedside this morning.  He states that he was diagnosed with diabetes in 2017.   He states that he sees Dr. Whitaker in [place] for diabetes care about every 3 months.  He states that he has a meter, but does not check very often.  He states that blood sugar when he does check either after breakfast or after supper is 210-240 most of the time.  Reviewed his A1c.  He stated that he had one in provider office after [place] that was about 9.6% he thinks.  He states that he is taking Synjardy BID and Jardiance and farxiga daily.  He states that he had circulation problems in his legs when he was diagnosed with diabetes the first time in 2017 and has continued to have issues.  He now has blockages in both legs.  Reviewed basic physiology of diabetes.   Reviewed complications of diabetes and how they occur related to blockages in blood vessels.  Encouraged him to attend a diabetes education class, as he has not attended one in the past.  He states that he lives at home with his wife.  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  He states that he works about 60 hours a week.  We discussed stress and how it affects blood sugar.  Reviewed stress management techniques.  Encouraged him to check his blood sugar either before meals or 2 hours after meals.  Explained that he needed to keep blood sugar between  before meals and  2 hours after meals in order to reduce risk for complications in his legs and other complications such as stroke and heart disease as well.  Reviewed low blood sugar symptoms and how to treat with rule of 15.  Reviewed high blood sugar symptoms and importance of staying hydrated and getting regular activity.  Reviewed DKA and reminded him  when to call provider and importance of being proactive at the first sign of vomiting and or shortness of breath.  He was given our What is Diabetes handout and our contact information.  Thank you fort his referral.

## 2024-01-17 NOTE — CASE MANAGEMENT/SOCIAL WORK
Continued Stay Note  [facility]     Patient Name: Justo Oquendo  MRN: [id number]  Today's Date: 1/17/2024    Admit Date: 1/14/2024    Plan: Home with family   Discharge Plan       Row Name 01/17/24 0914       Plan    Plan Home with family    Patient/Family in Agreement with Plan yes    Plan Comments Spoke with patient at bedside. Plan is home at discharge pending PT/OT recs after surgery tomorrow. Patient denies any discharge needs at this time. CM will continue to follow.    Final Discharge Disposition Code 01 - home or self-care                   Discharge Codes    No documentation.                 Expected Discharge Date and Time       Expected Discharge Date Expected Discharge Time    Jan 19, 2024               Gilberto Barry RN

## 2024-01-17 NOTE — PROGRESS NOTES
Subjective:     Encounter Date:01/14/2024      Patient ID: Justo Oquendo is a 48 y.o. male.    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  Ischemic left foot with plans for surgery on on 1/18/24  -poorly controlled BP  -DM  -HLD  -smoker  -EKG sinus tachycardia, echo preserved LV function        Mr. Oquendo is a 48 year old man who cardiology consulted for medical optimization prior to vascular surgery Thursday.  No shortness of breath, chest pain or palpitations.  I saw him last year in the hospital for uncontrolled BP.  Was reasonably controlled at discharge.  Currently elevated.  He was supposed to see me for various reasons didn't.  Little concerned regarding if taking his medications appropriately.  Currently admitted with gangernous toes and foot ischemia.  He smokes daily.  ROS negative except for the above.         Update 1/17/24  No acute changes.  BP remains elevated        Review of Systems   Psychiatric/Behavioral:  Positive for altered mental status.        Procedures       Objective:     Constitutional:       Appearance: Healthy appearance. Not in distress.   Neck:      Vascular: No JVR. JVD normal.   Pulmonary:      Effort: Pulmonary effort is normal.      Breath sounds: Normal breath sounds. No wheezing. No rhonchi. No rales.   Chest:      Chest wall: Not tender to palpatation.   Cardiovascular:      PMI at left midclavicular line. Normal rate. Regular rhythm. Normal S1. Normal S2.       Murmurs: There is no murmur.      No gallop.  No click. No rub.   Edema:     Peripheral edema absent.   Abdominal:      General: Bowel sounds are normal.      Palpations: Abdomen is soft.      Tenderness: There is no abdominal tenderness.   Musculoskeletal: Normal range of motion.         General: No tenderness. Skin:     General: Skin is warm and dry.   Neurological:      General: No focal deficit present.       Mental Status: Alert and oriented to person, place and time.         Lab Review:       Assessment:         Assessment  -femorofemoral bypass complicated by complete occlusion of left femoral artery bypass graft.  Ischemic left foot with plans for surgery on on 1/18/24  -poorly controlled BP  -DM  -HLD  -smoker  -EKG sinus tachycardia, echo preserved LV function     Plan:   -He is higher risk for surgery however has leg ischemia and need urgent surgery.  Not having active cardiac complaints.  Can proceed with surgery without further CV risk stratification.    -on metoprolol, lisinoppril and amlodipine.  May not need long term but will add hydralazine 25mg tid.   -agree with antiplatelet therapy, current anticoagulation and statin  -will follow         Celso Hoyt MD         Plan:

## 2024-01-18 ENCOUNTER — APPOINTMENT (OUTPATIENT)
Dept: GENERAL RADIOLOGY | Facility: HOSPITAL | Age: 49
End: 2024-01-18
Payer: COMMERCIAL

## 2024-01-18 ENCOUNTER — ANESTHESIA (OUTPATIENT)
Dept: PERIOP | Facility: HOSPITAL | Age: 49
End: 2024-01-18
Payer: COMMERCIAL

## 2024-01-18 ENCOUNTER — ANESTHESIA EVENT (OUTPATIENT)
Dept: PERIOP | Facility: HOSPITAL | Age: 49
End: 2024-01-18
Payer: COMMERCIAL

## 2024-01-18 ENCOUNTER — ANESTHESIA EVENT CONVERTED (OUTPATIENT)
Dept: ANESTHESIOLOGY | Facility: HOSPITAL | Age: 49
End: 2024-01-18
Payer: COMMERCIAL

## 2024-01-18 PROBLEM — I99.8 ISCHEMIC PAIN OF LEFT FOOT: Status: ACTIVE | Noted: 2024-01-14

## 2024-01-18 PROBLEM — M79.672 ISCHEMIC PAIN OF LEFT FOOT: Status: ACTIVE | Noted: 2024-01-14

## 2024-01-18 LAB
ALBUMIN SERPL-MCNC: 3.4 G/DL (ref 3.5–5.2)
ALBUMIN/GLOB SERPL: 1.2 G/DL
ALP SERPL-CCNC: 57 U/L (ref 39–117)
ALT SERPL W P-5'-P-CCNC: 25 U/L (ref 1–41)
ANION GAP SERPL CALCULATED.3IONS-SCNC: 12 MMOL/L (ref 5–15)
ANION GAP SERPL CALCULATED.3IONS-SCNC: 14 MMOL/L (ref 5–15)
APTT PPP: 55.3 SECONDS (ref 60–90)
APTT PPP: 79.4 SECONDS (ref 60–90)
AST SERPL-CCNC: 44 U/L (ref 1–40)
BASOPHILS # BLD AUTO: 0.02 10*3/MM3 (ref 0–0.2)
BASOPHILS NFR BLD AUTO: 0.3 % (ref 0–1.5)
BILIRUB SERPL-MCNC: 0.3 MG/DL (ref 0–1.2)
BUN SERPL-MCNC: 9 MG/DL (ref 6–20)
BUN SERPL-MCNC: 9 MG/DL (ref 6–20)
BUN/CREAT SERPL: 13.2 (ref 7–25)
BUN/CREAT SERPL: 15 (ref 7–25)
CALCIUM SPEC-SCNC: 9.3 MG/DL (ref 8.6–10.5)
CALCIUM SPEC-SCNC: 9.3 MG/DL (ref 8.6–10.5)
CHLORIDE SERPL-SCNC: 102 MMOL/L (ref 98–107)
CHLORIDE SERPL-SCNC: 103 MMOL/L (ref 98–107)
CO2 SERPL-SCNC: 21 MMOL/L (ref 22–29)
CO2 SERPL-SCNC: 24 MMOL/L (ref 22–29)
CREAT SERPL-MCNC: 0.6 MG/DL (ref 0.76–1.27)
CREAT SERPL-MCNC: 0.68 MG/DL (ref 0.76–1.27)
DEPRECATED RDW RBC AUTO: 36.3 FL (ref 37–54)
EGFRCR SERPLBLD CKD-EPI 2021: 113.2 ML/MIN/1.73
EGFRCR SERPLBLD CKD-EPI 2021: 117.6 ML/MIN/1.73
EOSINOPHIL # BLD AUTO: 0.07 10*3/MM3 (ref 0–0.4)
EOSINOPHIL NFR BLD AUTO: 1 % (ref 0.3–6.2)
ERYTHROCYTE [DISTWIDTH] IN BLOOD BY AUTOMATED COUNT: 11.3 % (ref 12.3–15.4)
GLOBULIN UR ELPH-MCNC: 2.8 GM/DL
GLUCOSE BLDC GLUCOMTR-MCNC: 121 MG/DL (ref 70–130)
GLUCOSE BLDC GLUCOMTR-MCNC: 129 MG/DL (ref 70–130)
GLUCOSE BLDC GLUCOMTR-MCNC: 141 MG/DL (ref 70–130)
GLUCOSE SERPL-MCNC: 128 MG/DL (ref 65–99)
GLUCOSE SERPL-MCNC: 151 MG/DL (ref 65–99)
HCT VFR BLD AUTO: 35.1 % (ref 37.5–51)
HGB BLD-MCNC: 11.9 G/DL (ref 13–17.7)
IMM GRANULOCYTES # BLD AUTO: 0.05 10*3/MM3 (ref 0–0.05)
IMM GRANULOCYTES NFR BLD AUTO: 0.7 % (ref 0–0.5)
LYMPHOCYTES # BLD AUTO: 1.49 10*3/MM3 (ref 0.7–3.1)
LYMPHOCYTES NFR BLD AUTO: 22 % (ref 19.6–45.3)
MCH RBC QN AUTO: 29.5 PG (ref 26.6–33)
MCHC RBC AUTO-ENTMCNC: 33.9 G/DL (ref 31.5–35.7)
MCV RBC AUTO: 87.1 FL (ref 79–97)
MONOCYTES # BLD AUTO: 0.65 10*3/MM3 (ref 0.1–0.9)
MONOCYTES NFR BLD AUTO: 9.6 % (ref 5–12)
NEUTROPHILS NFR BLD AUTO: 4.49 10*3/MM3 (ref 1.7–7)
NEUTROPHILS NFR BLD AUTO: 66.4 % (ref 42.7–76)
NRBC BLD AUTO-RTO: 0 /100 WBC (ref 0–0.2)
PLATELET # BLD AUTO: 230 10*3/MM3 (ref 140–450)
PMV BLD AUTO: 9.6 FL (ref 6–12)
POTASSIUM SERPL-SCNC: 3.6 MMOL/L (ref 3.5–5.2)
POTASSIUM SERPL-SCNC: 3.9 MMOL/L (ref 3.5–5.2)
PROT SERPL-MCNC: 6.2 G/DL (ref 6–8.5)
RBC # BLD AUTO: 4.03 10*6/MM3 (ref 4.14–5.8)
SODIUM SERPL-SCNC: 138 MMOL/L (ref 136–145)
SODIUM SERPL-SCNC: 138 MMOL/L (ref 136–145)
VANCOMYCIN SERPL-MCNC: 12.1 MCG/ML (ref 5–40)
WBC NRBC COR # BLD AUTO: 6.77 10*3/MM3 (ref 3.4–10.8)

## 2024-01-18 PROCEDURE — 25010000002 CEFTRIAXONE PER 250 MG: Performed by: INTERNAL MEDICINE

## 2024-01-18 PROCEDURE — 25010000002 NICARDIPINE 2.5 MG/ML SOLUTION 10 ML VIAL

## 2024-01-18 PROCEDURE — 25010000002 PHENYLEPHRINE 10 MG/ML SOLUTION: Performed by: NURSE ANESTHETIST, CERTIFIED REGISTERED

## 2024-01-18 PROCEDURE — 25010000002 CEFAZOLIN IN DEXTROSE 2-4 GM/100ML-% SOLUTION: Performed by: NURSE ANESTHETIST, CERTIFIED REGISTERED

## 2024-01-18 PROCEDURE — C1894 INTRO/SHEATH, NON-LASER: HCPCS | Performed by: SURGERY

## 2024-01-18 PROCEDURE — 99232 SBSQ HOSP IP/OBS MODERATE 35: CPT

## 2024-01-18 PROCEDURE — C1725 CATH, TRANSLUMIN NON-LASER: HCPCS | Performed by: SURGERY

## 2024-01-18 PROCEDURE — 047S0ZZ DILATION OF LEFT POSTERIOR TIBIAL ARTERY, OPEN APPROACH: ICD-10-PCS | Performed by: SURGERY

## 2024-01-18 PROCEDURE — 04CN0ZZ EXTIRPATION OF MATTER FROM LEFT POPLITEAL ARTERY, OPEN APPROACH: ICD-10-PCS | Performed by: SURGERY

## 2024-01-18 PROCEDURE — 80053 COMPREHEN METABOLIC PANEL: CPT | Performed by: INTERNAL MEDICINE

## 2024-01-18 PROCEDURE — C1768 GRAFT, VASCULAR: HCPCS | Performed by: SURGERY

## 2024-01-18 PROCEDURE — C1757 CATH, THROMBECTOMY/EMBOLECT: HCPCS | Performed by: SURGERY

## 2024-01-18 PROCEDURE — 82948 REAGENT STRIP/BLOOD GLUCOSE: CPT

## 2024-01-18 PROCEDURE — 85025 COMPLETE CBC W/AUTO DIFF WBC: CPT | Performed by: FAMILY MEDICINE

## 2024-01-18 PROCEDURE — 25810000003 SODIUM CHLORIDE 0.9 % SOLUTION

## 2024-01-18 PROCEDURE — 85730 THROMBOPLASTIN TIME PARTIAL: CPT

## 2024-01-18 PROCEDURE — 25010000002 HYDROMORPHONE 1 MG/ML SOLUTION: Performed by: SURGERY

## 2024-01-18 PROCEDURE — 25010000002 PROPOFOL 10 MG/ML EMULSION: Performed by: NURSE ANESTHETIST, CERTIFIED REGISTERED

## 2024-01-18 PROCEDURE — 047N0ZZ DILATION OF LEFT POPLITEAL ARTERY, OPEN APPROACH: ICD-10-PCS | Performed by: SURGERY

## 2024-01-18 PROCEDURE — 25810000003 LACTATED RINGERS PER 1000 ML: Performed by: ANESTHESIOLOGY

## 2024-01-18 PROCEDURE — 25010000002 VANCOMYCIN 10 G RECONSTITUTED SOLUTION

## 2024-01-18 PROCEDURE — 041N0JL BYPASS LEFT POPLITEAL ARTERY TO POPLITEAL ARTERY WITH SYNTHETIC SUBSTITUTE, OPEN APPROACH: ICD-10-PCS | Performed by: SURGERY

## 2024-01-18 PROCEDURE — 80202 ASSAY OF VANCOMYCIN: CPT

## 2024-01-18 PROCEDURE — 25010000002 FENTANYL CITRATE (PF) 100 MCG/2ML SOLUTION: Performed by: NURSE ANESTHETIST, CERTIFIED REGISTERED

## 2024-01-18 PROCEDURE — 25810000003 SODIUM CHLORIDE 0.9 % SOLUTION 250 ML FLEX CONT

## 2024-01-18 PROCEDURE — 25010000002 VANCOMYCIN 1 G RECONSTITUTED SOLUTION: Performed by: NURSE ANESTHETIST, CERTIFIED REGISTERED

## 2024-01-18 PROCEDURE — 25010000002 HYDROMORPHONE PER 4 MG: Performed by: NURSE ANESTHETIST, CERTIFIED REGISTERED

## 2024-01-18 PROCEDURE — 047U0ZZ DILATION OF LEFT PERONEAL ARTERY, OPEN APPROACH: ICD-10-PCS | Performed by: SURGERY

## 2024-01-18 PROCEDURE — 047W0ZZ DILATION OF LEFT FOOT ARTERY, OPEN APPROACH: ICD-10-PCS | Performed by: SURGERY

## 2024-01-18 PROCEDURE — 25010000002 HYDROMORPHONE 1 MG/ML SOLUTION: Performed by: FAMILY MEDICINE

## 2024-01-18 PROCEDURE — 99231 SBSQ HOSP IP/OBS SF/LOW 25: CPT | Performed by: INTERNAL MEDICINE

## 2024-01-18 PROCEDURE — 25010000002 HEPARIN (PORCINE) PER 1000 UNITS: Performed by: SURGERY

## 2024-01-18 PROCEDURE — 0 IODIXANOL PER 1 ML: Performed by: SURGERY

## 2024-01-18 PROCEDURE — 047Q0ZZ DILATION OF LEFT ANTERIOR TIBIAL ARTERY, OPEN APPROACH: ICD-10-PCS | Performed by: SURGERY

## 2024-01-18 PROCEDURE — 25010000002 VANCOMYCIN 1 G RECONSTITUTED SOLUTION: Performed by: SURGERY

## 2024-01-18 PROCEDURE — 25010000002 HEPARIN (PORCINE) 25000-0.45 UT/250ML-% SOLUTION

## 2024-01-18 PROCEDURE — 99232 SBSQ HOSP IP/OBS MODERATE 35: CPT | Performed by: INTERNAL MEDICINE

## 2024-01-18 PROCEDURE — C1769 GUIDE WIRE: HCPCS | Performed by: SURGERY

## 2024-01-18 PROCEDURE — 25010000002 NITROGLYCERIN 200 MCG/ML SOLUTION: Performed by: NURSE ANESTHETIST, CERTIFIED REGISTERED

## 2024-01-18 PROCEDURE — 25010000002 HEPARIN (PORCINE) PER 1000 UNITS: Performed by: NURSE ANESTHETIST, CERTIFIED REGISTERED

## 2024-01-18 DEVICE — GRFT VASC PROPAT THNSTRCH REMVRNG6X50X40: Type: IMPLANTABLE DEVICE | Site: LEG | Status: FUNCTIONAL

## 2024-01-18 DEVICE — HEMOST ABS SURGICEL SNOW 1X2IN: Type: IMPLANTABLE DEVICE | Site: LEG | Status: FUNCTIONAL

## 2024-01-18 RX ORDER — FENTANYL CITRATE 50 UG/ML
INJECTION, SOLUTION INTRAMUSCULAR; INTRAVENOUS AS NEEDED
Status: DISCONTINUED | OUTPATIENT
Start: 2024-01-18 | End: 2024-01-18 | Stop reason: SURG

## 2024-01-18 RX ORDER — MORPHINE SULFATE 2 MG/ML
2 INJECTION, SOLUTION INTRAMUSCULAR; INTRAVENOUS EVERY 4 HOURS PRN
Status: DISCONTINUED | OUTPATIENT
Start: 2024-01-18 | End: 2024-01-21

## 2024-01-18 RX ORDER — SODIUM CHLORIDE 0.9 % (FLUSH) 0.9 %
3-10 SYRINGE (ML) INJECTION AS NEEDED
Status: DISCONTINUED | OUTPATIENT
Start: 2024-01-18 | End: 2024-01-18 | Stop reason: HOSPADM

## 2024-01-18 RX ORDER — METOPROLOL TARTRATE 1 MG/ML
5 INJECTION, SOLUTION INTRAVENOUS ONCE
Status: COMPLETED | OUTPATIENT
Start: 2024-01-18 | End: 2024-01-18

## 2024-01-18 RX ORDER — DROPERIDOL 2.5 MG/ML
0.62 INJECTION, SOLUTION INTRAMUSCULAR; INTRAVENOUS
Status: DISCONTINUED | OUTPATIENT
Start: 2024-01-18 | End: 2024-01-18 | Stop reason: HOSPADM

## 2024-01-18 RX ORDER — OXYCODONE HYDROCHLORIDE 10 MG/1
10 TABLET ORAL EVERY 4 HOURS PRN
Status: DISCONTINUED | OUTPATIENT
Start: 2024-01-18 | End: 2024-01-21

## 2024-01-18 RX ORDER — LABETALOL HYDROCHLORIDE 5 MG/ML
5 INJECTION, SOLUTION INTRAVENOUS
Status: DISCONTINUED | OUTPATIENT
Start: 2024-01-18 | End: 2024-01-18 | Stop reason: HOSPADM

## 2024-01-18 RX ORDER — ONDANSETRON 2 MG/ML
4 INJECTION INTRAMUSCULAR; INTRAVENOUS ONCE AS NEEDED
Status: DISCONTINUED | OUTPATIENT
Start: 2024-01-18 | End: 2024-01-18 | Stop reason: HOSPADM

## 2024-01-18 RX ORDER — SODIUM CHLORIDE, SODIUM LACTATE, POTASSIUM CHLORIDE, CALCIUM CHLORIDE 600; 310; 30; 20 MG/100ML; MG/100ML; MG/100ML; MG/100ML
9 INJECTION, SOLUTION INTRAVENOUS CONTINUOUS PRN
Status: DISCONTINUED | OUTPATIENT
Start: 2024-01-18 | End: 2024-01-21

## 2024-01-18 RX ORDER — POTASSIUM CHLORIDE 20 MEQ/1
40 TABLET, EXTENDED RELEASE ORAL EVERY 4 HOURS
Qty: 4 TABLET | Refills: 0 | Status: DISPENSED | OUTPATIENT
Start: 2024-01-18 | End: 2024-01-18

## 2024-01-18 RX ORDER — HYDRALAZINE HYDROCHLORIDE 20 MG/ML
5 INJECTION INTRAMUSCULAR; INTRAVENOUS
Status: DISCONTINUED | OUTPATIENT
Start: 2024-01-18 | End: 2024-01-18 | Stop reason: HOSPADM

## 2024-01-18 RX ORDER — CEFAZOLIN SODIUM 2 G/100ML
INJECTION, SOLUTION INTRAVENOUS AS NEEDED
Status: DISCONTINUED | OUTPATIENT
Start: 2024-01-18 | End: 2024-01-18 | Stop reason: SURG

## 2024-01-18 RX ORDER — SODIUM CHLORIDE 0.9 % (FLUSH) 0.9 %
10 SYRINGE (ML) INJECTION EVERY 12 HOURS SCHEDULED
Status: DISCONTINUED | OUTPATIENT
Start: 2024-01-18 | End: 2024-01-18 | Stop reason: HOSPADM

## 2024-01-18 RX ORDER — DROPERIDOL 2.5 MG/ML
0.62 INJECTION, SOLUTION INTRAMUSCULAR; INTRAVENOUS ONCE AS NEEDED
Status: DISCONTINUED | OUTPATIENT
Start: 2024-01-18 | End: 2024-01-18 | Stop reason: HOSPADM

## 2024-01-18 RX ORDER — HYDROCODONE BITARTRATE AND ACETAMINOPHEN 5; 325 MG/1; MG/1
1 TABLET ORAL ONCE AS NEEDED
Status: DISCONTINUED | OUTPATIENT
Start: 2024-01-18 | End: 2024-01-18 | Stop reason: HOSPADM

## 2024-01-18 RX ORDER — PHENYLEPHRINE HYDROCHLORIDE 10 MG/ML
INJECTION INTRAVENOUS AS NEEDED
Status: DISCONTINUED | OUTPATIENT
Start: 2024-01-18 | End: 2024-01-18 | Stop reason: SURG

## 2024-01-18 RX ORDER — HYDROMORPHONE HYDROCHLORIDE 2 MG/ML
INJECTION, SOLUTION INTRAMUSCULAR; INTRAVENOUS; SUBCUTANEOUS AS NEEDED
Status: DISCONTINUED | OUTPATIENT
Start: 2024-01-18 | End: 2024-01-18 | Stop reason: SURG

## 2024-01-18 RX ORDER — MEPERIDINE HYDROCHLORIDE 25 MG/ML
12.5 INJECTION INTRAMUSCULAR; INTRAVENOUS; SUBCUTANEOUS
Status: DISCONTINUED | OUTPATIENT
Start: 2024-01-18 | End: 2024-01-18 | Stop reason: HOSPADM

## 2024-01-18 RX ORDER — IODIXANOL 320 MG/ML
INJECTION, SOLUTION INTRAVASCULAR AS NEEDED
Status: DISCONTINUED | OUTPATIENT
Start: 2024-01-18 | End: 2024-01-18 | Stop reason: HOSPADM

## 2024-01-18 RX ORDER — FENTANYL CITRATE 50 UG/ML
50 INJECTION, SOLUTION INTRAMUSCULAR; INTRAVENOUS
Status: DISCONTINUED | OUTPATIENT
Start: 2024-01-18 | End: 2024-01-18 | Stop reason: HOSPADM

## 2024-01-18 RX ORDER — VANCOMYCIN/0.9 % SOD CHLORIDE 1.5G/250ML
1500 PLASTIC BAG, INJECTION (ML) INTRAVENOUS EVERY 12 HOURS SCHEDULED
Status: DISCONTINUED | OUTPATIENT
Start: 2024-01-18 | End: 2024-01-19

## 2024-01-18 RX ORDER — HEPARIN SODIUM 1000 [USP'U]/ML
INJECTION, SOLUTION INTRAVENOUS; SUBCUTANEOUS AS NEEDED
Status: DISCONTINUED | OUTPATIENT
Start: 2024-01-18 | End: 2024-01-18 | Stop reason: SURG

## 2024-01-18 RX ORDER — NITROGLYCERIN 20 MG/100ML
INJECTION INTRAVENOUS AS NEEDED
Status: DISCONTINUED | OUTPATIENT
Start: 2024-01-18 | End: 2024-01-18 | Stop reason: SURG

## 2024-01-18 RX ORDER — SODIUM CHLORIDE 0.9 % (FLUSH) 0.9 %
10 SYRINGE (ML) INJECTION AS NEEDED
Status: DISCONTINUED | OUTPATIENT
Start: 2024-01-18 | End: 2024-01-18 | Stop reason: HOSPADM

## 2024-01-18 RX ORDER — ROCURONIUM BROMIDE 10 MG/ML
INJECTION, SOLUTION INTRAVENOUS AS NEEDED
Status: DISCONTINUED | OUTPATIENT
Start: 2024-01-18 | End: 2024-01-18 | Stop reason: SURG

## 2024-01-18 RX ORDER — LIDOCAINE HYDROCHLORIDE 10 MG/ML
INJECTION, SOLUTION EPIDURAL; INFILTRATION; INTRACAUDAL; PERINEURAL AS NEEDED
Status: DISCONTINUED | OUTPATIENT
Start: 2024-01-18 | End: 2024-01-18 | Stop reason: SURG

## 2024-01-18 RX ORDER — PANTOPRAZOLE SODIUM 40 MG/10ML
40 INJECTION, POWDER, LYOPHILIZED, FOR SOLUTION INTRAVENOUS
Status: DISCONTINUED | OUTPATIENT
Start: 2024-01-19 | End: 2024-01-20

## 2024-01-18 RX ORDER — SODIUM CHLORIDE 0.9 % (FLUSH) 0.9 %
3 SYRINGE (ML) INJECTION EVERY 12 HOURS SCHEDULED
Status: DISCONTINUED | OUTPATIENT
Start: 2024-01-18 | End: 2024-01-18 | Stop reason: HOSPADM

## 2024-01-18 RX ORDER — IPRATROPIUM BROMIDE AND ALBUTEROL SULFATE 2.5; .5 MG/3ML; MG/3ML
3 SOLUTION RESPIRATORY (INHALATION) ONCE AS NEEDED
Status: DISCONTINUED | OUTPATIENT
Start: 2024-01-18 | End: 2024-01-18 | Stop reason: HOSPADM

## 2024-01-18 RX ORDER — PROMETHAZINE HYDROCHLORIDE 25 MG/1
25 SUPPOSITORY RECTAL ONCE AS NEEDED
Status: DISCONTINUED | OUTPATIENT
Start: 2024-01-18 | End: 2024-01-18 | Stop reason: HOSPADM

## 2024-01-18 RX ORDER — NALOXONE HCL 0.4 MG/ML
0.4 VIAL (ML) INJECTION AS NEEDED
Status: DISCONTINUED | OUTPATIENT
Start: 2024-01-18 | End: 2024-01-18 | Stop reason: HOSPADM

## 2024-01-18 RX ORDER — PROPOFOL 10 MG/ML
VIAL (ML) INTRAVENOUS AS NEEDED
Status: DISCONTINUED | OUTPATIENT
Start: 2024-01-18 | End: 2024-01-18 | Stop reason: SURG

## 2024-01-18 RX ORDER — NALOXONE HCL 0.4 MG/ML
0.4 VIAL (ML) INJECTION
Status: DISCONTINUED | OUTPATIENT
Start: 2024-01-18 | End: 2024-01-21

## 2024-01-18 RX ORDER — PROMETHAZINE HYDROCHLORIDE 25 MG/1
25 TABLET ORAL ONCE AS NEEDED
Status: DISCONTINUED | OUTPATIENT
Start: 2024-01-18 | End: 2024-01-18 | Stop reason: HOSPADM

## 2024-01-18 RX ORDER — VANCOMYCIN HYDROCHLORIDE 1 G/20ML
INJECTION, POWDER, LYOPHILIZED, FOR SOLUTION INTRAVENOUS AS NEEDED
Status: DISCONTINUED | OUTPATIENT
Start: 2024-01-18 | End: 2024-01-18 | Stop reason: SURG

## 2024-01-18 RX ORDER — SODIUM CHLORIDE 9 MG/ML
40 INJECTION, SOLUTION INTRAVENOUS AS NEEDED
Status: DISCONTINUED | OUTPATIENT
Start: 2024-01-18 | End: 2024-01-18 | Stop reason: HOSPADM

## 2024-01-18 RX ORDER — FAMOTIDINE 20 MG/1
20 TABLET, FILM COATED ORAL
Status: COMPLETED | OUTPATIENT
Start: 2024-01-18 | End: 2024-01-18

## 2024-01-18 RX ORDER — VANCOMYCIN HYDROCHLORIDE 1 G/20ML
INJECTION, POWDER, LYOPHILIZED, FOR SOLUTION INTRAVENOUS AS NEEDED
Status: DISCONTINUED | OUTPATIENT
Start: 2024-01-18 | End: 2024-01-18 | Stop reason: HOSPADM

## 2024-01-18 RX ORDER — HYDROMORPHONE HYDROCHLORIDE 1 MG/ML
0.5 INJECTION, SOLUTION INTRAMUSCULAR; INTRAVENOUS; SUBCUTANEOUS
Status: DISCONTINUED | OUTPATIENT
Start: 2024-01-18 | End: 2024-01-18 | Stop reason: HOSPADM

## 2024-01-18 RX ADMIN — ASPIRIN 81 MG: 81 TABLET, COATED ORAL at 20:44

## 2024-01-18 RX ADMIN — PROPOFOL 200 MG: 10 INJECTION, EMULSION INTRAVENOUS at 13:36

## 2024-01-18 RX ADMIN — HYDRALAZINE HYDROCHLORIDE 25 MG: 50 TABLET, FILM COATED ORAL at 20:26

## 2024-01-18 RX ADMIN — ROSUVASTATIN CALCIUM 40 MG: 20 TABLET, FILM COATED ORAL at 20:25

## 2024-01-18 RX ADMIN — CEFAZOLIN SODIUM 2 G: 2 INJECTION, SOLUTION INTRAVENOUS at 14:16

## 2024-01-18 RX ADMIN — HYDROMORPHONE HYDROCHLORIDE 1 MG: 1 INJECTION, SOLUTION INTRAMUSCULAR; INTRAVENOUS; SUBCUTANEOUS at 01:57

## 2024-01-18 RX ADMIN — SODIUM CHLORIDE 1000 MG: 900 INJECTION INTRAVENOUS at 20:42

## 2024-01-18 RX ADMIN — NITROGLYCERIN 100 MCG: 20 INJECTION INTRAVENOUS at 16:10

## 2024-01-18 RX ADMIN — Medication 10 ML: at 08:25

## 2024-01-18 RX ADMIN — FENTANYL CITRATE 50 MCG: 50 INJECTION, SOLUTION INTRAMUSCULAR; INTRAVENOUS at 13:36

## 2024-01-18 RX ADMIN — HYDROMORPHONE HYDROCHLORIDE 1 MG: 2 INJECTION, SOLUTION INTRAMUSCULAR; INTRAVENOUS; SUBCUTANEOUS at 15:02

## 2024-01-18 RX ADMIN — PHENYLEPHRINE HYDROCHLORIDE 100 MCG: 10 INJECTION INTRAVENOUS at 15:38

## 2024-01-18 RX ADMIN — FAMOTIDINE 20 MG: 20 TABLET ORAL at 13:05

## 2024-01-18 RX ADMIN — VANCOMYCIN HYDROCHLORIDE 1500 MG: 1 INJECTION, POWDER, LYOPHILIZED, FOR SOLUTION INTRAVENOUS at 13:42

## 2024-01-18 RX ADMIN — FENTANYL CITRATE 25 MCG: 50 INJECTION, SOLUTION INTRAMUSCULAR; INTRAVENOUS at 14:51

## 2024-01-18 RX ADMIN — FENTANYL CITRATE 50 MCG: 50 INJECTION, SOLUTION INTRAMUSCULAR; INTRAVENOUS at 14:00

## 2024-01-18 RX ADMIN — FENTANYL CITRATE 25 MCG: 50 INJECTION, SOLUTION INTRAMUSCULAR; INTRAVENOUS at 14:54

## 2024-01-18 RX ADMIN — METOPROLOL TARTRATE 5 MG: 5 INJECTION INTRAVENOUS at 11:58

## 2024-01-18 RX ADMIN — FENTANYL CITRATE 50 MCG: 50 INJECTION, SOLUTION INTRAMUSCULAR; INTRAVENOUS at 14:59

## 2024-01-18 RX ADMIN — OXYCODONE HYDROCHLORIDE 10 MG: 10 TABLET ORAL at 08:24

## 2024-01-18 RX ADMIN — NITROGLYCERIN 100 MCG: 20 INJECTION INTRAVENOUS at 16:03

## 2024-01-18 RX ADMIN — PHENYLEPHRINE HYDROCHLORIDE 100 MCG: 10 INJECTION INTRAVENOUS at 13:45

## 2024-01-18 RX ADMIN — CYCLOBENZAPRINE 10 MG: 10 TABLET, FILM COATED ORAL at 08:24

## 2024-01-18 RX ADMIN — Medication 1 PATCH: at 20:32

## 2024-01-18 RX ADMIN — METOPROLOL TARTRATE 25 MG: 25 TABLET, FILM COATED ORAL at 20:31

## 2024-01-18 RX ADMIN — HEPARIN SODIUM 26 UNITS/KG/HR: 10000 INJECTION, SOLUTION INTRAVENOUS at 08:19

## 2024-01-18 RX ADMIN — HYDROMORPHONE HYDROCHLORIDE 1 MG: 1 INJECTION, SOLUTION INTRAMUSCULAR; INTRAVENOUS; SUBCUTANEOUS at 09:40

## 2024-01-18 RX ADMIN — SODIUM CHLORIDE, POTASSIUM CHLORIDE, SODIUM LACTATE AND CALCIUM CHLORIDE 9 ML/HR: 600; 310; 30; 20 INJECTION, SOLUTION INTRAVENOUS at 13:05

## 2024-01-18 RX ADMIN — LIDOCAINE HYDROCHLORIDE 50 MG: 10 INJECTION, SOLUTION EPIDURAL; INFILTRATION; INTRACAUDAL; PERINEURAL at 13:36

## 2024-01-18 RX ADMIN — NICARDIPINE HYDROCHLORIDE 5 MG/HR: 25 INJECTION, SOLUTION INTRAVENOUS at 19:03

## 2024-01-18 RX ADMIN — HYDROMORPHONE HYDROCHLORIDE 1 MG: 1 INJECTION, SOLUTION INTRAMUSCULAR; INTRAVENOUS; SUBCUTANEOUS at 18:26

## 2024-01-18 RX ADMIN — Medication 10 ML: at 20:26

## 2024-01-18 RX ADMIN — OXYCODONE HYDROCHLORIDE 10 MG: 10 TABLET ORAL at 21:17

## 2024-01-18 RX ADMIN — AMLODIPINE BESYLATE 10 MG: 10 TABLET ORAL at 08:24

## 2024-01-18 RX ADMIN — SENNOSIDES AND DOCUSATE SODIUM 2 TABLET: 8.6; 5 TABLET ORAL at 20:42

## 2024-01-18 RX ADMIN — SODIUM CHLORIDE, POTASSIUM CHLORIDE, SODIUM LACTATE AND CALCIUM CHLORIDE: 600; 310; 30; 20 INJECTION, SOLUTION INTRAVENOUS at 15:10

## 2024-01-18 RX ADMIN — GABAPENTIN 600 MG: 300 CAPSULE ORAL at 20:25

## 2024-01-18 RX ADMIN — HYDROMORPHONE HYDROCHLORIDE 1 MG: 1 INJECTION, SOLUTION INTRAMUSCULAR; INTRAVENOUS; SUBCUTANEOUS at 20:20

## 2024-01-18 RX ADMIN — HEPARIN SODIUM 27 UNITS/KG/HR: 10000 INJECTION, SOLUTION INTRAVENOUS at 20:25

## 2024-01-18 RX ADMIN — VANCOMYCIN HYDROCHLORIDE 1500 MG: 10 INJECTION, POWDER, LYOPHILIZED, FOR SOLUTION INTRAVENOUS at 12:58

## 2024-01-18 RX ADMIN — HYDROMORPHONE HYDROCHLORIDE 1 MG: 1 INJECTION, SOLUTION INTRAMUSCULAR; INTRAVENOUS; SUBCUTANEOUS at 05:19

## 2024-01-18 RX ADMIN — HEPARIN SODIUM 2000 UNITS: 1000 INJECTION INTRAVENOUS; SUBCUTANEOUS at 15:26

## 2024-01-18 RX ADMIN — Medication 10 ML: at 13:05

## 2024-01-18 RX ADMIN — HYDROMORPHONE HYDROCHLORIDE 1 MG: 2 INJECTION, SOLUTION INTRAMUSCULAR; INTRAVENOUS; SUBCUTANEOUS at 15:21

## 2024-01-18 RX ADMIN — LISINOPRIL 40 MG: 20 TABLET ORAL at 08:24

## 2024-01-18 RX ADMIN — GABAPENTIN 600 MG: 300 CAPSULE ORAL at 05:18

## 2024-01-18 RX ADMIN — HYDRALAZINE HYDROCHLORIDE 25 MG: 50 TABLET, FILM COATED ORAL at 05:18

## 2024-01-18 RX ADMIN — ROCURONIUM BROMIDE 50 MG: 10 SOLUTION INTRAVENOUS at 13:36

## 2024-01-18 RX ADMIN — POTASSIUM CHLORIDE 40 MEQ: 1500 TABLET, EXTENDED RELEASE ORAL at 09:41

## 2024-01-18 RX ADMIN — HYDROMORPHONE HYDROCHLORIDE 1 MG: 1 INJECTION, SOLUTION INTRAMUSCULAR; INTRAVENOUS; SUBCUTANEOUS at 22:15

## 2024-01-18 RX ADMIN — HEPARIN SODIUM 7000 UNITS: 1000 INJECTION INTRAVENOUS; SUBCUTANEOUS at 14:31

## 2024-01-18 NOTE — ANESTHESIA PREPROCEDURE EVALUATION
Anesthesia Evaluation     Patient summary reviewed and Nursing notes reviewed   no history of anesthetic complications:   NPO Solid Status: > 8 hours  NPO Liquid Status: > 2 hours           Airway   Mallampati: II  TM distance: >3 FB  Neck ROM: full  No difficulty expected  Dental - normal exam     Pulmonary - normal exam   (+) a smoker Current, Smoked day of surgery, cigarettes, COPD moderate,sleep apnea (no cpap)  (-) lung cancer  Cardiovascular - normal exam    ECG reviewed  PT is on anticoagulation therapy  Patient on routine beta blocker and Beta blocker given within 24 hours of surgery    (+) hypertension, PVD, hyperlipidemia  (-) angina    ROS comment: 11/23: Interpretation Summary       ·  Left ventricular systolic function is hyperdynamic (EF > 70%). Calculated left ventricular EF = 70.4%  ·  Left ventricular diastolic function was normal.  ·  No significant structural or functional valvular disease.         Neuro/Psych- negative ROS  GI/Hepatic/Renal/Endo    (+) renal disease-, diabetes mellitus type 2 well controlled  (-) GERD, hepatitis, liver disease    ROS Comment: PT ON SEMIGLUTIDE, 1/14/24    Musculoskeletal     (+) back pain  Abdominal    Substance History      OB/GYN          Other   arthritis, blood dyscrasia anemia,                 Anesthesia Plan    ASA 3     general     intravenous induction     Anesthetic plan, risks, benefits, and alternatives have been provided, discussed and informed consent has been obtained with: patient.    Plan discussed with CRNA.    CODE STATUS:    Code Status (Patient has no pulse and is not breathing): CPR (Attempt to Resuscitate)  Medical Interventions (Patient has pulse or is breathing): Full Support

## 2024-01-18 NOTE — CASE MANAGEMENT/SOCIAL WORK
Continued Stay Note   [place]     Patient Name: Justo Oquendo  MRN: [id number]  Today's Date: 1/18/2024    Admit Date: 1/14/2024    Plan: Home vs rehab   Discharge Plan       Row Name 01/18/24 0914       Plan    Plan Home vs rehab    Patient/Family in Agreement with Plan yes    Plan Comments Spoke to patient and spouse at bedside. Plan is home with spouse pending outcome of surgery today. Patient may need inpatient rehab. CM will continue to follow.    Final Discharge Disposition Code 30 - still a patient                   Discharge Codes    No documentation.                 Expected Discharge Date and Time       Expected Discharge Date Expected Discharge Time    Jan 22, 2024               Gilberto Barry RN

## 2024-01-18 NOTE — PROGRESS NOTES
[facility] Medicine Services  PROGRESS NOTE    Patient Name: Justo Oquendo  : 1975  MRN: [id number]    Date of Admission: 2024  Primary Care Physician: Melo Whitaker MD    Subjective   Subjective     CC:  F/U Ischemic left foot    HPI:  Complains of pain in the foot this morning.  Awaiting procedure today.      Objective   Objective     Vital Signs:   Temp:  [98.1 °F (36.7 °C)-98.6 °F (37 °C)] 98.1 °F (36.7 °C)  Heart Rate:  [76-99] 99  Resp:  [18-20] 18  BP: (150-170)/(77-88) 169/85     Physical Exam:  Constitutional: No acute distress, awake and alert, sitting up in bed   HENT: NCAT, mucous membranes moist  Respiratory: Respiratory effort normal   Musculoskeletal: Left foot with mild distal erythema and black 2nd-4th toes  Psychiatric: Appropriate affect, cooperative  Neurologic: Speech clear and fluent        Results Reviewed:  LAB RESULTS:      Lab 24  0730 24  2359 24  1441 24  0642 24  2323 24  1419 24  0648 01/15/24  1431 01/15/24  0359 24  21124  21124  1512   WBC 6.77  --   --   --   --   --  6.90  --  11.86*  --   --  13.82*   HEMOGLOBIN 11.9*  --   --   --   --   --  11.0*  --  10.7*  --   --  11.8*   HEMATOCRIT 35.1*  --   --   --   --   --  33.9*  --  32.5*  --   --  36.9*   PLATELETS 230  --   --   --   --   --  264  --  257  --   --  333   NEUTROS ABS 4.49  --   --   --   --   --  3.62  --  10.16*  --   --  9.47*   IMMATURE GRANS (ABS) 0.05  --   --   --   --   --  0.04  --  0.09*  --   --  0.17*   LYMPHS ABS 1.49  --   --   --   --   --  2.67  --  1.24  --   --  2.57   MONOS ABS 0.65  --   --   --   --   --  0.51  --  0.36  --   --  1.48*   EOS ABS 0.07  --   --   --   --   --  0.04  --  0.00  --   --  0.10   MCV 87.1  --   --   --   --   --  89.4  --  92.3  --   --  92.9   SED RATE  --   --   --   --   --   --   --   --   --   --   --  100*   CRP  --   --   --   --   --   --   --   --    --   --   --  2.60*   PROCALCITONIN  --   --   --   --   --   --   --   --   --   --   --  0.19   LACTATE  --   --   --   --   --   --   --   --   --   --   --  1.2   PROTIME  --   --   --   --   --   --   --   --   --   --  15.0*  --    APTT  --  79.4 44.9* 60.1 59.0* 55.4* 52.4*   < > 38.3*   < > 38.7*  --    HEPARIN ANTI-XA  --   --   --   --   --   --   --   --   --   --  0.14*  --     < > = values in this interval not displayed.         Lab 01/18/24  0730 01/17/24  0801 01/17/24  0641 01/16/24  0648 01/15/24  0359 01/14/24  1512   SODIUM 138  --  138 141 138 134*   POTASSIUM 3.6  --  3.8 4.1 4.9 4.1   CHLORIDE 102  --  104 106 106 96*   CO2 24.0  --  25.0 25.0 23.0 25.0   ANION GAP 12.0  --  9.0 10.0 9.0 13.0   BUN 9  --  14 21* 22* 30*   CREATININE 0.60* 0.60* 0.65* 0.80 0.90 2.07*   EGFR 117.6 117.6 114.8 107.8 104.0 38.3*   GLUCOSE 151*  --  145* 149* 221* 302*   CALCIUM 9.3  --  9.1 9.5 9.5 10.0   MAGNESIUM  --   --   --   --   --  2.1         Lab 01/14/24  1512   TOTAL PROTEIN 7.5   ALBUMIN 3.8   GLOBULIN 3.7   ALT (SGPT) 13   AST (SGOT) 14   BILIRUBIN 0.2   ALK PHOS 83         Lab 01/14/24  2111 01/14/24  1512   PROBNP  --  63.8   HSTROP T  --  18   PROTIME 15.0*  --    INR 1.17*  --                  Brief Urine Lab Results       None            Microbiology Results Abnormal       Procedure Component Value - Date/Time    Blood Culture - Blood, Arm, Left [[id number]]  (Normal) Collected: 01/14/24 1512    Lab Status: Preliminary result Specimen: Blood from Arm, Left Updated: 01/17/24 1900     Blood Culture No growth at 3 days    Blood Culture - Blood, Arm, Right [[id number]]  (Normal) Collected: 01/14/24 1510    Lab Status: Preliminary result Specimen: Blood from Arm, Right Updated: 01/17/24 1631     Blood Culture No growth at 3 days    MRSA Screen, PCR (Inpatient) - Swab, Nares [[id number]]  (Normal) Collected: 01/14/24 1817    Lab Status: Final result Specimen: Swab from Nares Updated: 01/14/24 2158      MRSA PCR No MRSA Detected    Narrative:      The negative predictive value of this diagnostic test is high and should only be used to consider de-escalating anti-MRSA therapy. A positive result may indicate colonization with MRSA and must be correlated clinically.            No radiology results from the last 24 hrs    Results for orders placed during the hospital encounter of 11/20/23    Adult Transthoracic Echo Complete W/ Cont if Necessary Per Protocol    Interpretation Summary    Left ventricular systolic function is hyperdynamic (EF > 70%). Calculated left ventricular EF = 70.4%    Left ventricular diastolic function was normal.    No significant structural or functional valvular disease.      Current medications:  Scheduled Meds:amLODIPine, 10 mg, Oral, Q24H  aspirin, 81 mg, Oral, Nightly  cefTRIAXone, 1,000 mg, Intravenous, Q24H  gabapentin, 600 mg, Oral, Q8H  hydrALAZINE, 25 mg, Oral, Q8H  insulin lispro, 2-7 Units, Subcutaneous, 4x Daily AC & at Bedtime  lisinopril, 40 mg, Oral, Q24H  metoprolol tartrate, 25 mg, Oral, Q12H  nicotine, 1 patch, Transdermal, Nightly  oxyCODONE, 10 mg, Oral, 4x Daily  rosuvastatin, 40 mg, Oral, Nightly  senna-docusate sodium, 2 tablet, Oral, BID  sodium chloride, 10 mL, Intravenous, Q12H  vancomycin, 1,250 mg, Intravenous, Q12H      Continuous Infusions:heparin, 26 Units/kg/hr, Last Rate: 26 Units/kg/hr (01/18/24 0155)  Pharmacy to Dose Heparin,   Pharmacy to dose vancomycin,       PRN Meds:.  senna-docusate sodium **AND** polyethylene glycol **AND** bisacodyl **AND** bisacodyl    Calcium Replacement - Follow Nurse / BPA Driven Protocol    cyclobenzaprine    dextrose    dextrose    glucagon (human recombinant)    HYDROmorphone **AND** naloxone    LORazepam    Magnesium Standard Dose Replacement - Follow Nurse / BPA Driven Protocol    nicotine polacrilex    nitroglycerin    ondansetron ODT **OR** ondansetron    oxyCODONE    Pharmacy to Dose Heparin    Pharmacy to dose  vancomycin    Phosphorus Replacement - Follow Nurse / BPA Driven Protocol    Potassium Replacement - Follow Nurse / BPA Driven Protocol    sodium chloride    sodium chloride    temazepam    Assessment & Plan   Assessment & Plan     Active Hospital Problems    Diagnosis  POA    **Ischemic foot [I99.8]  Yes    Sepsis associated hypotension [A41.9, I95.9]  Yes    ATN (acute tubular necrosis) [N17.0]  Yes    Elevated serum creatinine [R79.89]  Yes    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 [I96]  Unknown    Claudication of lower extremity s/p femorofemoral bypass [I73.9]  Yes    Hypertension [I10]  Yes    Hyperlipidemia [E78.5]  Yes    Tobacco abuse [Z72.0]  Yes    Diabetes mellitus type II, non insulin dependent [E11.9]  Yes    Peripheral arterial disease [I73.9]  Yes      Resolved Hospital Problems   No resolved problems to display.        Brief Hospital Course to date:  Justo Oquendo is a 48 y.o. male with ongoing heavy tobacco use, known history of severe PAD, status post thrombectomy of left femoropopliteal bypass graft as well as placement of a femorofemoral bypass by Dr. Ryan on 11/21/2023 who presented with lethargy, decreased urine output and confusion.  Patient saw CT surgery in December 2023 and outpatient referral was made to Dr. Peres of orthopedic surgery for consideration of BKA for critical focal peripheral arterial disease.    This patient's problems and plans were partially entered by my partner and updated as appropriate by me 01/18/24.     Sepsis (AMS, WBC, hypotension, ischemic necrosis)  Metabolic encephalopathy  Ischemic ulcers and necrosis of LLE 3-4th digits  Progressive PAD with occlusion of Fem-Fem graft  -Initially hypotensive with systolics in the 80s, now improved after receiving hydrocortisone and IV fluid  - CTA of the abdominal aorta with runoff shows complete occlusion of the left femoral artery bypass graft, complete occlusion of the right femoral artery.  Eventual distal  reconstitution of both vessels due to the branches of the deep femoral arteries, loss of flow within the left posterior tibial artery, loss of flow within the right anterior tibial artery.  No residual flow to the distal arteries.  -Continue IV Rocephin and Vancomycin empirically   -Continue IV heparin.  Holding home Xarelto  -Continue ASA, statin  -Dr. Peres following, may require  amputation (patient previously had an outpatient appointment for preop with Dr. Peres on 1/15/2024).  -Vascular following, planning vascular intervention today and further course including timing/level of amputation to be determined based on intraoperative findings during vascular procedure.     Uncontrolled pain- improved   -Continue PO and IV narcotics as needed     ALEJANDRO, resolved  -Creatinine significantly elevated 2.07 on presentation, now back to baseline  -Pre-renal, likely hypotension related, now resolved with hydrocortisone    NIDDM2  -Continue sliding scale insulin     HTN  HL  -ACEI held due to elevated creatinine/hypotension- maybe resume post operatively      Ongoing heavy tobacco smoker  -NRT as needed       Expected Discharge Location and Transportation: home vs rehab  Expected Discharge   Expected Discharge Date: 1/22/2024; Expected Discharge Time:      DVT prophylaxis:  Medical DVT prophylaxis orders are present.     AM-PAC 6 Clicks Score (PT): 19 (01/17/24 9940)    CODE STATUS:   Code Status and Medical Interventions:   Ordered at: 01/14/24 2055     Code Status (Patient has no pulse and is not breathing):    CPR (Attempt to Resuscitate)     Medical Interventions (Patient has pulse or is breathing):    Full Support       Nadia Gatica MD  01/18/24

## 2024-01-18 NOTE — ANESTHESIA PROCEDURE NOTES
Airway  Urgency: elective    Date/Time: 1/18/2024 1:39 PM  Airway not difficult    General Information and Staff    Patient location during procedure: OR  CRNA/CAA: Fallon Gr CRNA    Indications and Patient Condition  Indications for airway management: airway protection    Preoxygenated: yes  MILS not maintained throughout  Mask difficulty assessment: 1 - vent by mask    Final Airway Details  Final airway type: endotracheal airway      Successful airway: ETT  Cuffed: yes   Successful intubation technique: direct laryngoscopy  Endotracheal tube insertion site: oral  Blade: Salma  Blade size: 4  ETT size (mm): 7.5  Cormack-Lehane Classification: grade I - full view of glottis  Placement verified by: chest auscultation and capnometry   Measured from: lips  ETT/EBT  to lips (cm): 22  Number of attempts at approach: 1  Assessment: lips, teeth, and gum same as pre-op and atraumatic intubation    Additional Comments  Negative epigastric sounds, Breath sound equal bilaterally with symmetric chest rise and fall

## 2024-01-18 NOTE — PROGRESS NOTES
Justo Oquendo       LOS: 4 days   Patient Care Team:  Melo Whitaker MD as PCP - General (Family Medicine)  Niall Mcfarlane MD as Consulting Physician (Cardiology)    Chief Complaint:  left foot ischemia    Subjective     Interval History:     Resting comfortably in bed this morning.  Family present at bedside.  Pain tolerable, no acute events overnight.  Increased pain left lower extremity this morning.    Review of Systems:      Gen- No fevers, chills  CV- No chest pain, palpitations  Resp- No cough, dyspnea  GI- No N/V/D, abd pain    Objective     Vital Signs  Vital Signs (last 24 hours)         01/15 0700  01/16 0659 01/16 0700 01/16 0731   Most Recent      Temp (°F) 96.6 -  97.6       97.2 (36.2) 01/16 0652    Heart Rate 74 -  101       82 01/16 0652    Resp 14 -  16       16 01/16 0652    /70 -  157/76       134/70 01/16 0652    SpO2 (%) 93 -  99       97 01/16 0652    Flow (L/min)   2.5       2.5 01/16 0652              Physical Exam:     Alert, oriented.  No acute distress.  Nonlabored respirations.  Regular rate and rhythm.  Abdomen nondistended.  Left lower extremity: Less redness around the foot, ischemic changes of his second through fourth lesser toes, becoming better demarcated.     Results Review:     I reviewed the patient's new clinical results.    Medication Review:   Hospital Medications (active)         Dose Frequency Start End    aspirin EC tablet 81 mg 81 mg Nightly 1/14/2024 --    Admin Instructions: Do not crush or chew the capsules or tablets. The drug may not work as designed if the capsule or tablet is crushed or chewed. Swallow whole.  Do not exceed 4 grams of aspirin in a 24 hr period.    If given for pain, use the following pain scale:   Mild Pain = Pain Score of 1-3, CPOT 1-2  Moderate Pain = Pain Score of 4-6, CPOT 3-4  Severe Pain = Pain Score of 7-10, CPOT 5-8    Route: Oral    bisacodyl (DULCOLAX) EC tablet 5 mg 5 mg Daily PRN 1/14/2024 --    Admin  "Instructions: Use if no bowel movement after 12 hours.  Swallow whole. Do not crush, split, or chew tablet.    Route: Oral    Linked Group 1: See Hyperspace for full Linked Orders Report.        bisacodyl (DULCOLAX) suppository 10 mg 10 mg Daily PRN 1/14/2024 --    Admin Instructions: Use if no bowel movement after 12 hours.  Hold for diarrhea    Route: Rectal    Linked Group 1: See Hyperspace for full Linked Orders Report.        Calcium Replacement - Follow Nurse / BPA Driven Protocol  As Needed 1/14/2024 --    Admin Instructions: Open Order & Select \"BHS Electrolyte Replacement Protocol Algorithm\" to View Details    Route: Does not apply    cefTRIAXone (ROCEPHIN) 1,000 mg in sodium chloride 0.9 % 100 mL IVPB 1,000 mg Every 24 Hours 1/15/2024 1/22/2024    Admin Instructions: LR should be paused and flushing of the line with NS is recommended prior to and after completion of ceftriaxone infusion due to incompatibility. Do not co-adminster with calcium-containing solutions.  Caution: Look alike/sound alike drug alert    Route: Intravenous    cyclobenzaprine (FLEXERIL) tablet 10 mg 10 mg 3 Times Daily PRN 1/14/2024 --    Route: Oral    dextrose (D50W) (25 g/50 mL) IV injection 25 g 25 g Every 15 Minutes PRN 1/14/2024 --    Admin Instructions: Blood sugar less than 70; patient has IV access - Unresponsive, NPO or Unable To Safely Swallow    Route: Intravenous    dextrose (GLUTOSE) oral gel 15 g 15 g Every 15 Minutes PRN 1/14/2024 --    Admin Instructions: BS<70, Patient Alert, Is not NPO, Can safely swallow.    Route: Oral    gabapentin (NEURONTIN) capsule 600 mg 600 mg Every 8 Hours Scheduled 1/14/2024 --    Admin Instructions:     Route: Oral    glucagon (GLUCAGEN) injection 1 mg 1 mg Every 15 Minutes PRN 1/14/2024 --    Admin Instructions: Blood Glucose Less Than 70 - Patient Without IV Access - Unresponsive, NPO or Unable To Safely Swallow  Reconstitute powder for injection by adding 1 mL of -supplied " "sterile diluent or sterile water for injection to a vial containing 1 mg of the drug, to provide solutions containing 1 mg/mL. Shake vial gently to dissolve.    Route: Intramuscular    heparin 49802 units/250 mL (100 units/mL) in 0.45 % NaCl infusion 21 Units/kg/hr × 73.5 kg Titrated 1/14/2024 --    Admin Instructions: Pharmacy dosing - Cardiac or Other NOT VTE - Boluses (No initial bolus)    Route: Intravenous    HYDROmorphone (DILAUDID) injection 1 mg 1 mg Every 2 Hours PRN 1/14/2024 1/19/2024    Admin Instructions: Based on patient request - if ordered for moderate or severe pain, provider allows for administration of a medication prescribed for a lower pain scale.      Caution: Look alike/sound alike drug alert    If given for pain, use the following pain scale:  Mild Pain = Pain Score of 1-3, CPOT 1-2  Moderate Pain = Pain Score of 4-6, CPOT 3-4  Severe Pain = Pain Score of 7-10, CPOT 5-8    Route: Intravenous    Linked Group 2: See [facility] for full Linked Orders Report.        Insulin Lispro (humaLOG) injection 2-7 Units 2-7 Units 4 Times Daily Before Meals & Nightly 1/14/2024 --    Admin Instructions: Correction Insulin - Low Dose - Total Insulin Dose Less Than 40 units/day (Lean, Elderly or Renal Patients)    Blood Glucose 150-199 mg/dL - 2 units  Blood Glucose 200-249 mg/dL - 3 units  Blood Glucose 250-299 mg/dL - 4 units  Blood Glucose 300-349 mg/dL - 5 units  Blood Glucose 350-400 mg/dL - 6 units  Blood Glucose Greater Than 400 mg/dL - 7 units & Call Provider   Caution: Look alike/sound alike drug alert    Route: Subcutaneous    LORazepam (ATIVAN) tablet 0.5 mg 0.5 mg Every 8 Hours PRN 1/14/2024 1/19/2024    Admin Instructions:  Caution: Look alike/sound alike drug alert    Route: Oral    Magnesium Standard Dose Replacement - Follow Nurse / BPA Driven Protocol  As Needed 1/14/2024 --    Admin Instructions: Open Order & Select \"BHS Electrolyte Replacement Protocol Algorithm\" to View Details    Route: " "Does not apply    metoprolol tartrate (LOPRESSOR) tablet 25 mg 25 mg Every 12 Hours Scheduled 1/14/2024 --    Admin Instructions: Hold for SBP less than 100, DBP less than 60, or heart rate less than 50    Route: Oral    naloxone (NARCAN) injection 0.4 mg 0.4 mg Every 5 Minutes PRN 1/14/2024 --    Admin Instructions: If Respiratory Rate Less Than 8 or Patient is Difficult to Arouse, Stop ALL Narcotics & Contact Provider.  Administer Slow IV Push.  Repeat As Ordered Until Respiratory Rate is Greater Than 12.    Route: Intravenous    Linked Group 2: See Hyperspace for full Linked Orders Report.        nicotine (NICODERM CQ) 21 MG/24HR patch 1 patch 1 patch Nightly 1/14/2024 --    Admin Instructions: Apply Patch to Clean, Dry, Hairless Area Daily - Rotating Sites.  REMOVE Old Patch Prior to Applying New Patch.  May Remove Patch at Bedtime If Needed to Prevent Insomnia.  Do Not Use Other Nicotine Products.  At Discharge, Follow Package Instructions.  Dispose of nicotine replacement therapies and their wrappers in non-hazardous pharmaceutical waste or in regular trash.    Route: Transdermal    nicotine polacrilex (NICORETTE) gum 2 mg 2 mg Every 1 Hour PRN 1/14/2024 --    Admin Instructions: Maximum 24 pieces in 24 hours.    Gum should be chewed until it tingles, then \"park\" between the gum and cheek.   When the tingling is gone, chew again until the tingle returns and once again \"park\" between gum and cheek.   Repeat until tingling is gone and discard.  At discharge, follow instructions on package  Dispose of nicotine replacement therapies and their wrappers in non-hazardous pharmaceutical waste or in regular trash.    Route: Mouth/Throat    nitroglycerin (NITROSTAT) SL tablet 0.4 mg 0.4 mg Every 5 Minutes PRN 1/14/2024 --    Admin Instructions: If Pain Unrelieved After 3 Doses Notify MD  May administer up to 3 doses per episode.    Route: Sublingual    ondansetron (ZOFRAN) injection 4 mg 4 mg Every 6 Hours PRN 1/14/2024 " "--    Admin Instructions: If BOTH ondansetron (ZOFRAN) & promethazine (PHENERGAN) Ordered, Use ondansetron First & THEN promethazine IF ondansetron Ineffective.    Route: Intravenous    Linked Group 3: See Hyperspace for full Linked Orders Report.        ondansetron ODT (ZOFRAN-ODT) disintegrating tablet 4 mg 4 mg Every 6 Hours PRN 1/14/2024 --    Admin Instructions: If BOTH ondansetron (ZOFRAN) & promethazine (PHENERGAN) Ordered, Use ondansetron First & THEN promethazine IF ondansetron Ineffective.  Place on tongue and allow to dissolve.    Route: Oral    Linked Group 3: See Hyperspace for full Linked Orders Report.        oxyCODONE (ROXICODONE) immediate release tablet 10 mg 10 mg 4 Times Daily 1/14/2024 1/19/2024    Admin Instructions: HOLD for sedation  If given for pain, use the following pain scale:  Mild Pain = Pain Score of 1-3, CPOT 1-2  Moderate Pain = Pain Score of 4-6, CPOT 3-4  Severe Pain = Pain Score of 7-10, CPOT 5-8    Route: Oral    oxyCODONE (ROXICODONE) immediate release tablet 10 mg 10 mg Every 4 Hours PRN 1/14/2024 1/19/2024    Admin Instructions: Based on patient request - if ordered for moderate or severe pain, provider allows for administration of a medication prescribed for a lower pain scale.  If given for pain, use the following pain scale:  Mild Pain = Pain Score of 1-3, CPOT 1-2  Moderate Pain = Pain Score of 4-6, CPOT 3-4  Severe Pain = Pain Score of 7-10, CPOT 5-8    Route: Oral    Pharmacy to Dose Heparin  Continuous PRN 1/14/2024 --    Admin Instructions: Boluses (No initial bolus)    Route: Does not apply    Pharmacy to dose vancomycin  Continuous PRN 1/14/2024 1/21/2024    Route: Does not apply    Phosphorus Replacement - Follow Nurse / BPA Driven Protocol  As Needed 1/14/2024 --    Admin Instructions: Open Order & Select \"BHS Electrolyte Replacement Protocol Algorithm\" to View Details    Route: Does not apply    polyethylene glycol (MIRALAX) packet 17 g 17 g Daily PRN 1/14/2024 " "--    Admin Instructions: Use if no bowel movement after 12 hours. Mix in 6-8 ounces of water.  Use 4-8 ounces of water, tea, or juice for each 17 gram dose.    Route: Oral    Linked Group 1: See Hyperspace for full Linked Orders Report.        Potassium Replacement - Follow Nurse / BPA Driven Protocol  As Needed 1/14/2024 --    Admin Instructions: Open Order & Select \"BHS Electrolyte Replacement Protocol Algorithm\" to View Details    Route: Does not apply    rosuvastatin (CRESTOR) tablet 40 mg 40 mg Nightly 1/14/2024 --    Admin Instructions: Avoid grapefruit juice.    Route: Oral    sennosides-docusate (PERICOLACE) 8.6-50 MG per tablet 2 tablet 2 tablet 2 Times Daily 1/14/2024 --    Admin Instructions: HOLD MEDICATION IF PATIENT HAS HAD BOWEL MOVEMENT. Start bowel management regimen if patient has not had a bowel movement after 12 hours.    Route: Oral    Linked Group 1: See Hyperspace for full Linked Orders Report.        sodium chloride 0.9 % flush 10 mL 10 mL Every 12 Hours Scheduled 1/14/2024 --    Route: Intravenous    sodium chloride 0.9 % flush 10 mL 10 mL As Needed 1/14/2024 --    Route: Intravenous    sodium chloride 0.9 % infusion 40 mL 40 mL As Needed 1/14/2024 --    Admin Instructions: Following administration of an IV intermittent medication, flush line with 40mL NS at 100mL/hr.    Route: Intravenous    sodium chloride 0.9 % infusion 125 mL/hr Continuous 1/14/2024 --    Route: Intravenous    temazepam (RESTORIL) capsule 15 mg 15 mg Nightly PRN 1/14/2024 1/19/2024    Admin Instructions: Group 2 (Pink) Hazardous Drug - Reproductive Risk Only - See Handling Guide    Route: Oral    vancomycin (VANCOCIN) 1000 mg/200 mL dextrose 5% IVPB 1,000 mg Every 12 Hours Scheduled 1/15/2024 1/20/2024    Route: Intravenous              Assessment & Plan     48-year-old male with peripheral vascular disease, left second through fourth toe dry gangrene.      Ischemic foot    Peripheral arterial disease    Hypertension    " Hyperlipidemia    Tobacco abuse    Diabetes mellitus type II, non insulin dependent    Claudication of lower extremity s/p femorofemoral bypass    Sepsis associated hypotension    ATN (acute tubular necrosis)    Elevated serum creatinine    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    He was evaluated by Dr. Pena yesterday.  Tentative plan is for revision bypass later this week.  Would tentatively plan for amputation, level to be determined by intraoperative findings during his vascular procedure, Friday versus Sunday as appropriate.    1/18/2024: Patient is scheduled to undergo left lower extremity vascular intervention today.  Tentative plan for amputation, level to be determined pending vascular procedure either tomorrow or Sunday as appropriate.  Discussed further management options with the patient and his family, they verbalized understanding are amenable.      LELIA Tapia  01/18/24  07:13 EST

## 2024-01-18 NOTE — PROGRESS NOTES
Subjective:     Encounter Date:01/14/2024      Patient ID: Justo Oquendo is a 48 y.o. male.    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  Ischemic left foot with plans for surgery on on 1/18/24  -poorly controlled BP  -DM  -HLD  -smoker  -EKG sinus tachycardia, echo preserved LV function        Mr. Oquendo is a 48 year old man who cardiology consulted for medical optimization prior to vascular surgery Thursday.  No shortness of breath, chest pain or palpitations.  I saw him last year in the hospital for uncontrolled BP.  Was reasonably controlled at discharge.  Currently elevated.  He was supposed to see me for various reasons didn't.  Little concerned regarding if taking his medications appropriately.  Currently admitted with gangernous toes and foot ischemia.  He smokes daily.  ROS negative except for the above.         Update 1/17/24  No acute changes.  BP remains elevated    Update 1/18/24  In a lot of pain an BP quite elevated.  His hypertension is difficult to control when in pain.  Hopefully will improve after surgery.          Review of Systems   Psychiatric/Behavioral:  Positive for altered mental status.        Procedures       Objective:     Constitutional:       Appearance: Healthy appearance. Not in distress.   Neck:      Vascular: No JVR. JVD normal.   Pulmonary:      Effort: Pulmonary effort is normal.      Breath sounds: Normal breath sounds. No wheezing. No rhonchi. No rales.   Chest:      Chest wall: Not tender to palpatation.   Cardiovascular:      PMI at left midclavicular line. Normal rate. Regular rhythm. Normal S1. Normal S2.       Murmurs: There is no murmur.      No gallop.  No click. No rub.   Edema:     Peripheral edema absent.   Abdominal:      General: Bowel sounds are normal.      Palpations: Abdomen is soft.      Tenderness: There is no abdominal tenderness.    Musculoskeletal: Normal range of motion.         General: No tenderness. Skin:     General: Skin is warm and dry.   Neurological:      General: No focal deficit present.      Mental Status: Alert and oriented to person, place and time.         Lab Review:       Assessment:         Assessment  -femorofemoral bypass complicated by complete occlusion of left femoral artery bypass graft.  Ischemic left foot with plans for surgery on on 1/18/24  -poorly controlled BP  -DM  -HLD  -smoker  -EKG sinus tachycardia, echo preserved LV function     Plan:   -He is higher risk for surgery however has leg ischemia and need urgent surgery.  Not having active cardiac complaints.  Can proceed with surgery without further CV risk stratification.    -on metoprolol, lisinoppril, hydralazine and amlodipine (will hold when on cardene).   Will use cardene drip for systolic greater than 200  -agree with antiplatelet therapy, current anticoagulation and statin  -will follow         Celso Hoyt MD         Plan:

## 2024-01-18 NOTE — OP NOTE
FEMORAL POPLITEAL BYPASS  Procedure Report    Patient Name:  Justo qOuendo  YOB: 1975    Date of Surgery:  1/18/2024     Indications:  48 year old  gentleman, a patient of Dr. Melo Whitaker, who previously underwent 6/27/17 LEFT CFA-AKP prosthetic bypass and 11/21/23 fem-fem bypass.  He developed LEFT 4th toe dry gangrenous changes in early January '24 with worsening discoloration of the 2nd-3rd toes.  He was seen previously and recommended for LEFT leg amputation.  He presented to [facility] ER 1/14/24 and a CTA was obtained demonstrating multi-level disease including LEFT CFA-AKP occlusion with reconstitution of the BKP with possible tibial reconstitution.  He presents now for graft thrombectomy, AKP-BKP jump graft with tibial angioplasty.    Pre-op Diagnosis:   LEFT 2nd-4th toe gangrene  LEFT CFA-AKP bypass occlusion       Post-Op Diagnosis Codes:  LEFT 2nd-4th toe gangrene  LEFT CFA-AKP bypass occlusion  LEFT AT stenosis - 80% proximal, 50% diffuse  LEFT TPT stenosis - 60% proximal  LEFT PT occlusion - mid  LEFT DP stenosis - 80% proximal  LEFT plantar occlusion    Procedure/CPT® Codes:      Procedure(s):  LEFT AKP reoperative exploration  LEFT BPG thrombectomy  LEFT AKP-BKP prosthetic bypass (6mm [place] Propaten)  LEFT AT angioplasty (2z903eb Ultraverse Rx, 2-2.7x996pd Nanocross)  LEFT DP angioplasty (2a093cp Ultraverse Rx, 2-2.1v504ga Nanocross)  LEFT TPT angioplasty (1y239fx Ultraverse Rx, 2-2.7t757ls Nanocross)  LEFT PT angioplasty (8d637hd Ultraverse Rx, 2-2.1b751zy Nanocross)  LEFT plantar angioplasty (8d215mb Ultraverse Rx, 2-2.8i549tq Nanocross)    Staff:  Surgeon(s):  Vin Pena MD    Assistants:  CHEYENNE Turner PA-S    Circulator: Bebe Brothers RN  Perfusionist: Aurelia Pompa  Radiology Technologist: Justo Fay  Scrub Person: Sissy Zapata  Nursing Assistant: Mary Douglas PCT  Perfusion Tech: Perri Keys                  Anesthesia: General    Estimated Blood Loss:  300cc , 130cc returned as cell saver    Implants:    Implant Name Type Inv. Item Serial No.  Lot No. LRB No. Used Action   SOM RODRIGUEZC PROPAT SILVANANSTRJOSE G [id number] - [id number] - [id number] Implant SOM VASC PROPAT THNSTRCH [id number] [employer] NA Left 1 Implanted   HEMOST ABS SURGICEL SNOW 1X2IN - [id number] Implant HEMOST ABS SURGICEL SNOW 1X2IN  [employer]  DIV [employer] J [employer] J [id number] Left 2 Implanted       Specimen:          None        Findings: Prior CFA-AKP bypass graft thrombectomized with restoration of pulsatile arterial inflow.  6mm [place] Propaten tunneled anatomically from the distal CFA-AKP bypass to the distal BKP.  By angiogram, the bypass graft terminates into the BKP with a 80% proximal AT and 60% TPT stenoses seen.  The AT is diffusely diseased throughout and continues to the foot as the DP which has a proximal 80% stenosis.  The TPT continues into the PT and Pr with Pr appearing normal in caliber while the PT occludes in the proximal leg.    Complications: None    Description of Procedure: Patient was lying supine on the hybrid OR table when he surrendered to general anesthesia.  The LEFT groin and circumferential thigh/leg were then prepped and draped in usual sterile fashion.  The patient was identified as Justo Oquendo per time-out protocol.    The prior distal medial thigh incision was reopened and dissection was carried down to the prior CFA-AKP bypass graft (BPG) which was encircled proximally with a vessel loop.  A medial proximal leg incision was made and dissection was carried down to the level of the BKP which was encircled with a vessel loop.  Next, the 11 blade was used to make an arteriotomy in the distal BPG and semi-solid clot was encountered.  The over-the-wire Elliott embolectomy catheter was then advanced 10cm then 20cm then 30 cm with retrieval of semi-solid clot without return of arterial  inflow.  Next, the 035/260 glide advantage wire (GAW) was then advanced retrograde towards the CFA and the wire accessed the prior fem-fem graft.  The Elliott was then advanced to 40cm with retrieval of a solid plug with return of arterial inflow.  The Elliott was again passed with restoration of widely patent flow.  The graft was then flushed with heparinized saline and then clamped with a Elliott hydragrip clamp.  Next, the tunneled was then used to deliver the 6mm ringed [place] Propaten graft from the leg to the thigh incision.  The patient was then systemically heparinized.  The graft was then prepared for proximal anastomosis which was completed using 6-0 Prolene in running-continuous side-to-end fashion.  After restoration of flow, there is a strong pulse within the graft.  Heparinized saline was then instilled into the graft then clamped.    Attention was then turned to the BKP which was controlled as were the AT and TPT.  An arteriotomy was made in the distal BKP extending onto the TPT and the graft was prepared for distal anastomosis which was completed using 6-0 Prolene in running-continuous end-to-side fashion.  After release of the clamps, there is a strong pulse within the graft with a strong biphasic signal in the AT and TPT which is dependant on the bypass.      The proximal jump graft was then accessed in antegrade fashion using micropuncture technique and an arteriogram was obtained.  The 6Fr sheath was installed and the 014/300 GAW was then advanced into the AT and the 2mm monorail balloon was used to angioplasty the AT and DP.  The wire and balloon were then directed into the TPT then into the PT.  The 2mm monorail balloon was then exchanged for the 2mm tapered balloon and 200mcg of nitroglycerin was instilled into the distal PT.  Interval arteriogram now delineates a track towards the plantar.  The plantar was then accessed using the 014 wire and advanced to the pedal arch, but could not  successfully navigate the arch so as to emerge in the distal DP.  The plantar, PT and TPT were then angioplastied with the 2mm tapered balloon.  The wire and balloon were then advanced into the AT and further to the DP.  200mcg of nitroglycerin was administered in the mid DP, but the pedal arch could not be visualized by interval arteriogram.  The DP and AT were then angioplastied with the 2mm tapered balloon.  Completion arteriogram demonstrates dominant flow to the foot via the Pr with flow seen in the PT which continues into the plantar which is diffusely diseased.  The AT and DP are not well visualized.    A 6-0 Prolene was placed at the site of antegrade access for hemostasis after the sheath was removed.  The wounds were then inspected for hemostasis.  Surgicel snow with Vancomycin powder were placed into the wounds which were then closed in layers using 2-0 and 3-0 Vicryl with 4-0 Monocryl to approximate the skin edges.  Skin glue and Aquacel were applied to the incisions.  At the end of the case, the sponge and needle counts were reported as correct.      Vin Pena MD     Date: 1/18/2024  Time: 16:30 EST

## 2024-01-18 NOTE — PAYOR COMM NOTE
"  2nd Request  Ref# [id number]   1/18 Surgery is planned for today  1/14 Admission date    Utilization Review  Phone [phone]  Fax [phone]    [address] [address]       Harpreet Reedony MICHAEL \"Gal\" (48 y.o. Male)       Date of Birth   1975    Social Security Number       Address   [address] [place], [place] [address]    Home Phone   [phone]    MRN   [id number]       Church   [employer]    Marital Status                               Admission Date   1/14/24    Admission Type   Emergency    Admitting Provider   Nadia Gatica MD    Attending Provider   Nadia Gatica MD    Department, Room/Bed   [address], S476/1       Discharge Date       Discharge Disposition       Discharge Destination                                 Attending Provider: Nadia Gatica MD    Allergies: No Known Allergies    Isolation: None   Infection: None   Code Status: CPR    Ht: 172.7 cm (68\")   Wt: 73.5 kg (162 lb)    Admission Cmt: None   Principal Problem: Ischemic foot [I99.8]                   Active Insurance as of 1/14/2024       Primary Coverage       Payor Plan Insurance Group Employer/Plan Group    [employer] BLUE CROSS AN[employer]M BLUE CROSS BLUE SHIELD PPO [id number]       Payor Plan Address Payor Plan Phone Number Payor Plan Fax Number Effective Dates    [address] [phone]  1/1/2017 - None Entered    [facility] [id number]         Subscriber Name Subscriber Birth Date Member ID       SEBASTIAN REED 1975 [id number]                     Emergency Contacts        (Rel.) Home Phone Work Phone Mobile Phone    Christine Reed (Spouse) [phone] -- [phone]                 Physician Progress Notes (all)        Winter Santana PA at 01/18/24 0713          Sebastian Reed       LOS: 4 days   Patient Care Team:  Melo Whitaker MD as PCP - General (Family Medicine)  Niall Mcfarlane MD as Consulting " Physician (Cardiology)    Chief Complaint:  left foot ischemia    Subjective     Interval History:     Resting comfortably in bed this morning.  Family present at bedside.  Pain tolerable, no acute events overnight.  Increased pain left lower extremity this morning.    Review of Systems:      Gen- No fevers, chills  CV- No chest pain, palpitations  Resp- No cough, dyspnea  GI- No N/V/D, abd pain    Objective     Vital Signs  Vital Signs (last 24 hours)         01/15 0700  01/16 0659 01/16 0700 01/16 0731   Most Recent      Temp (°F) 96.6 -  97.6       97.2 (36.2) 01/16 0652    Heart Rate 74 -  101       82 01/16 0652    Resp 14 -  16       16 01/16 0652    /70 -  157/76       134/70 01/16 0652    SpO2 (%) 93 -  99       97 01/16 0652    Flow (L/min)   2.5       2.5 01/16 0652              Physical Exam:     Alert, oriented.  No acute distress.  Nonlabored respirations.  Regular rate and rhythm.  Abdomen nondistended.  Left lower extremity: Less redness around the foot, ischemic changes of his second through fourth lesser toes, becoming better demarcated.     Results Review:     I reviewed the patient's new clinical results.    Medication Review:   Hospital Medications (active)         Dose Frequency Start End    aspirin EC tablet 81 mg 81 mg Nightly 1/14/2024 --    Admin Instructions: Do not crush or chew the capsules or tablets. The drug may not work as designed if the capsule or tablet is crushed or chewed. Swallow whole.  Do not exceed 4 grams of aspirin in a 24 hr period.    If given for pain, use the following pain scale:   Mild Pain = Pain Score of 1-3, CPOT 1-2  Moderate Pain = Pain Score of 4-6, CPOT 3-4  Severe Pain = Pain Score of 7-10, CPOT 5-8    Route: Oral    bisacodyl (DULCOLAX) EC tablet 5 mg 5 mg Daily PRN 1/14/2024 --    Admin Instructions: Use if no bowel movement after 12 hours.  Swallow whole. Do not crush, split, or chew tablet.    Route: Oral    Linked Group 1: See Hyperspace for full  "Linked Orders Report.        bisacodyl (DULCOLAX) suppository 10 mg 10 mg Daily PRN 1/14/2024 --    Admin Instructions: Use if no bowel movement after 12 hours.  Hold for diarrhea    Route: Rectal    Linked Group 1: See Hyperspace for full Linked Orders Report.        Calcium Replacement - Follow Nurse / BPA Driven Protocol  As Needed 1/14/2024 --    Admin Instructions: Open Order & Select \"BHS Electrolyte Replacement Protocol Algorithm\" to View Details    Route: Does not apply    cefTRIAXone (ROCEPHIN) 1,000 mg in sodium chloride 0.9 % 100 mL IVPB 1,000 mg Every 24 Hours 1/15/2024 1/22/2024    Admin Instructions: LR should be paused and flushing of the line with NS is recommended prior to and after completion of ceftriaxone infusion due to incompatibility. Do not co-adminster with calcium-containing solutions.  Caution: Look alike/sound alike drug alert    Route: Intravenous    cyclobenzaprine (FLEXERIL) tablet 10 mg 10 mg 3 Times Daily PRN 1/14/2024 --    Route: Oral    dextrose (D50W) (25 g/50 mL) IV injection 25 g 25 g Every 15 Minutes PRN 1/14/2024 --    Admin Instructions: Blood sugar less than 70; patient has IV access - Unresponsive, NPO or Unable To Safely Swallow    Route: Intravenous    dextrose (GLUTOSE) oral gel 15 g 15 g Every 15 Minutes PRN 1/14/2024 --    Admin Instructions: BS<70, Patient Alert, Is not NPO, Can safely swallow.    Route: Oral    gabapentin (NEURONTIN) capsule 600 mg 600 mg Every 8 Hours Scheduled 1/14/2024 --    Admin Instructions:     Route: Oral    glucagon (GLUCAGEN) injection 1 mg 1 mg Every 15 Minutes PRN 1/14/2024 --    Admin Instructions: Blood Glucose Less Than 70 - Patient Without IV Access - Unresponsive, NPO or Unable To Safely Swallow  Reconstitute powder for injection by adding 1 mL of -supplied sterile diluent or sterile water for injection to a vial containing 1 mg of the drug, to provide solutions containing 1 mg/mL. Shake vial gently to dissolve.    " "Route: Intramuscular    heparin 25247 units/250 mL (100 units/mL) in 0.45 % NaCl infusion 21 Units/kg/hr × 73.5 kg Titrated 1/14/2024 --    Admin Instructions: Pharmacy dosing - Cardiac or Other NOT VTE - Boluses (No initial bolus)    Route: Intravenous    HYDROmorphone (DILAUDID) injection 1 mg 1 mg Every 2 Hours PRN 1/14/2024 1/19/2024    Admin Instructions: Based on patient request - if ordered for moderate or severe pain, provider allows for administration of a medication prescribed for a lower pain scale.      Caution: Look alike/sound alike drug alert    If given for pain, use the following pain scale:  Mild Pain = Pain Score of 1-3, CPOT 1-2  Moderate Pain = Pain Score of 4-6, CPOT 3-4  Severe Pain = Pain Score of 7-10, CPOT 5-8    Route: Intravenous    Linked Group 2: See [facility] for full Linked Orders Report.        Insulin Lispro (humaLOG) injection 2-7 Units 2-7 Units 4 Times Daily Before Meals & Nightly 1/14/2024 --    Admin Instructions: Correction Insulin - Low Dose - Total Insulin Dose Less Than 40 units/day (Lean, Elderly or Renal Patients)    Blood Glucose 150-199 mg/dL - 2 units  Blood Glucose 200-249 mg/dL - 3 units  Blood Glucose 250-299 mg/dL - 4 units  Blood Glucose 300-349 mg/dL - 5 units  Blood Glucose 350-400 mg/dL - 6 units  Blood Glucose Greater Than 400 mg/dL - 7 units & Call Provider   Caution: Look alike/sound alike drug alert    Route: Subcutaneous    LORazepam (ATIVAN) tablet 0.5 mg 0.5 mg Every 8 Hours PRN 1/14/2024 1/19/2024    Admin Instructions:  Caution: Look alike/sound alike drug alert    Route: Oral    Magnesium Standard Dose Replacement - Follow Nurse / BPA Driven Protocol  As Needed 1/14/2024 --    Admin Instructions: Open Order & Select \"BHS Electrolyte Replacement Protocol Algorithm\" to View Details    Route: Does not apply    metoprolol tartrate (LOPRESSOR) tablet 25 mg 25 mg Every 12 Hours Scheduled 1/14/2024 --    Admin Instructions: Hold for SBP less than 100, DBP " "less than 60, or heart rate less than 50    Route: Oral    naloxone (NARCAN) injection 0.4 mg 0.4 mg Every 5 Minutes PRN 1/14/2024 --    Admin Instructions: If Respiratory Rate Less Than 8 or Patient is Difficult to Arouse, Stop ALL Narcotics & Contact Provider.  Administer Slow IV Push.  Repeat As Ordered Until Respiratory Rate is Greater Than 12.    Route: Intravenous    Linked Group 2: See [facility] for full Linked Orders Report.        nicotine (NICODERM CQ) 21 MG/24HR patch 1 patch 1 patch Nightly 1/14/2024 --    Admin Instructions: Apply Patch to Clean, Dry, Hairless Area Daily - Rotating Sites.  REMOVE Old Patch Prior to Applying New Patch.  May Remove Patch at Bedtime If Needed to Prevent Insomnia.  Do Not Use Other Nicotine Products.  At Discharge, Follow Package Instructions.  Dispose of nicotine replacement therapies and their wrappers in non-hazardous pharmaceutical waste or in regular trash.    Route: Transdermal    nicotine polacrilex (NICORETTE) gum 2 mg 2 mg Every 1 Hour PRN 1/14/2024 --    Admin Instructions: Maximum 24 pieces in 24 hours.    Gum should be chewed until it tingles, then \"park\" between the gum and cheek.   When the tingling is gone, chew again until the tingle returns and once again \"park\" between gum and cheek.   Repeat until tingling is gone and discard.  At discharge, follow instructions on package  Dispose of nicotine replacement therapies and their wrappers in non-hazardous pharmaceutical waste or in regular trash.    Route: Mouth/Throat    nitroglycerin (NITROSTAT) SL tablet 0.4 mg 0.4 mg Every 5 Minutes PRN 1/14/2024 --    Admin Instructions: If Pain Unrelieved After 3 Doses Notify MD  May administer up to 3 doses per episode.    Route: Sublingual    ondansetron (ZOFRAN) injection 4 mg 4 mg Every 6 Hours PRN 1/14/2024 --    Admin Instructions: If BOTH ondansetron (ZOFRAN) & promethazine (PHENERGAN) Ordered, Use ondansetron First & THEN promethazine IF ondansetron Ineffective. " "   Route: Intravenous    Linked Group 3: See [facility] for full Linked Orders Report.        ondansetron ODT (ZOFRAN-ODT) disintegrating tablet 4 mg 4 mg Every 6 Hours PRN 1/14/2024 --    Admin Instructions: If BOTH ondansetron (ZOFRAN) & promethazine (PHENERGAN) Ordered, Use ondansetron First & THEN promethazine IF ondansetron Ineffective.  Place on tongue and allow to dissolve.    Route: Oral    Linked Group 3: See Hyperspace for full Linked Orders Report.        oxyCODONE (ROXICODONE) immediate release tablet 10 mg 10 mg 4 Times Daily 1/14/2024 1/19/2024    Admin Instructions: HOLD for sedation  If given for pain, use the following pain scale:  Mild Pain = Pain Score of 1-3, CPOT 1-2  Moderate Pain = Pain Score of 4-6, CPOT 3-4  Severe Pain = Pain Score of 7-10, CPOT 5-8    Route: Oral    oxyCODONE (ROXICODONE) immediate release tablet 10 mg 10 mg Every 4 Hours PRN 1/14/2024 1/19/2024    Admin Instructions: Based on patient request - if ordered for moderate or severe pain, provider allows for administration of a medication prescribed for a lower pain scale.  If given for pain, use the following pain scale:  Mild Pain = Pain Score of 1-3, CPOT 1-2  Moderate Pain = Pain Score of 4-6, CPOT 3-4  Severe Pain = Pain Score of 7-10, CPOT 5-8    Route: Oral    Pharmacy to Dose Heparin  Continuous PRN 1/14/2024 --    Admin Instructions: Boluses (No initial bolus)    Route: Does not apply    Pharmacy to dose vancomycin  Continuous PRN 1/14/2024 1/21/2024    Route: Does not apply    Phosphorus Replacement - Follow Nurse / BPA Driven Protocol  As Needed 1/14/2024 --    Admin Instructions: Open Order & Select \"BHS Electrolyte Replacement Protocol Algorithm\" to View Details    Route: Does not apply    polyethylene glycol (MIRALAX) packet 17 g 17 g Daily PRN 1/14/2024 --    Admin Instructions: Use if no bowel movement after 12 hours. Mix in 6-8 ounces of water.  Use 4-8 ounces of water, tea, or juice for each 17 gram dose.    " "Route: Oral    Linked Group 1: See Hyperspace for full Linked Orders Report.        Potassium Replacement - Follow Nurse / BPA Driven Protocol  As Needed 1/14/2024 --    Admin Instructions: Open Order & Select \"BHS Electrolyte Replacement Protocol Algorithm\" to View Details    Route: Does not apply    rosuvastatin (CRESTOR) tablet 40 mg 40 mg Nightly 1/14/2024 --    Admin Instructions: Avoid grapefruit juice.    Route: Oral    sennosides-docusate (PERICOLACE) 8.6-50 MG per tablet 2 tablet 2 tablet 2 Times Daily 1/14/2024 --    Admin Instructions: HOLD MEDICATION IF PATIENT HAS HAD BOWEL MOVEMENT. Start bowel management regimen if patient has not had a bowel movement after 12 hours.    Route: Oral    Linked Group 1: See Hyperspace for full Linked Orders Report.        sodium chloride 0.9 % flush 10 mL 10 mL Every 12 Hours Scheduled 1/14/2024 --    Route: Intravenous    sodium chloride 0.9 % flush 10 mL 10 mL As Needed 1/14/2024 --    Route: Intravenous    sodium chloride 0.9 % infusion 40 mL 40 mL As Needed 1/14/2024 --    Admin Instructions: Following administration of an IV intermittent medication, flush line with 40mL NS at 100mL/hr.    Route: Intravenous    sodium chloride 0.9 % infusion 125 mL/hr Continuous 1/14/2024 --    Route: Intravenous    temazepam (RESTORIL) capsule 15 mg 15 mg Nightly PRN 1/14/2024 1/19/2024    Admin Instructions: Group 2 (Pink) Hazardous Drug - Reproductive Risk Only - See Handling Guide    Route: Oral    vancomycin (VANCOCIN) 1000 mg/200 mL dextrose 5% IVPB 1,000 mg Every 12 Hours Scheduled 1/15/2024 1/20/2024    Route: Intravenous              Assessment & Plan     48-year-old male with peripheral vascular disease, left second through fourth toe dry gangrene.      Ischemic foot    Peripheral arterial disease    Hypertension    Hyperlipidemia    Tobacco abuse    Diabetes mellitus type II, non insulin dependent    Claudication of lower extremity s/p femorofemoral bypass    Sepsis " "associated hypotension    ATN (acute tubular necrosis)    Elevated serum creatinine    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    He was evaluated by Dr. Pena yesterday.  Tentative plan is for revision bypass later this week.  Would tentatively plan for amputation, level to be determined by intraoperative findings during his vascular procedure, Friday versus Sunday as appropriate.    1/18/2024: Patient is scheduled to undergo left lower extremity vascular intervention today.  Tentative plan for amputation, level to be determined pending vascular procedure either tomorrow or Sunday as appropriate.  Discussed further management options with the patient and his family, they verbalized understanding are amenable.      LELIA Tapia  01/18/24  07:13 EST             Electronically signed by Winter Santana PA at 01/18/24 0716       Vin Pena MD at 01/17/24 1836             LOS: 3 days   Patient Care Team:  Melo Whitaker MD as PCP - General (Family Medicine)  Niall Mcfarlane MD as Consulting Physician (Cardiology)    Chief Complaint: LEFT 2nd-4th toe gangrene    Subjective     Subjective:  Symptoms:  Stable.    Diet:  Adequate intake.  No nausea or vomiting.    Activity level: Impaired due to pain.    Pain:  He complains of pain that is mild.  He reports pain is unchanged.  Pain is well controlled.        History taken from: patient    Objective     Vital Signs  Temp:  [97.9 °F (36.6 °C)-98.4 °F (36.9 °C)] 98.2 °F (36.8 °C)  Heart Rate:  [76-99] 99  Resp:  [18] 18  BP: (143-169)/(74-90) 168/85    Objective:  General Appearance:  Comfortable.    Vital signs: (most recent): Blood pressure 168/85, pulse 99, temperature 98.2 °F (36.8 °C), temperature source Oral, resp. rate 18, height 172.7 cm (68\"), weight 73.5 kg (162 lb), SpO2 96%.  Vital signs are normal.    HEENT: Normal HEENT exam.    Lungs:  Normal effort and normal respiratory rate.  He is not in respiratory distress.  No wheezes.  "   Heart: Normal rate.  Regular rhythm.    Chest: Symmetric chest wall expansion. No chest wall tenderness.    Abdomen: Abdomen is soft and non-distended.  There is no abdominal tenderness.     Neurological: Patient is alert and oriented to person, place and time.    Skin:  No ecchymosis. (LEFT 2nd-4th toe dry gangrenous changes)              Results Review:     I reviewed the patient's new clinical results.    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  Time spent in counseling 5 min. Counseling included the following topics: operative expectations        Electronically signed by Vin Pena MD at 24 0270       Nadia Gatica MD at 24 1307              [facility] Medicine Services  PROGRESS NOTE    Patient Name: Justo Oquendo  : 1975  MRN: [id number]    Date of Admission: 2024  Primary Care Physician: Melo Whitaker MD    Subjective   Subjective     CC:  F/U Ischemic left foot    HPI:  Pain control is better today.      Objective   Objective     Vital Signs:   Temp:  [96.8 °F (36 °C)-98.4 °F (36.9 °C)] 98.2 °F (36.8 °C)  Heart Rate:  [76-94]  76  Resp:  [18] 18  BP: (143-169)/(74-90) 150/77     Physical Exam:  Constitutional: No acute distress, sleeping comfortably, awakens to answer questions  HENT: NCAT, mucous membranes moist  Respiratory: Respiratory effort normal   Musculoskeletal: Left foot with distal erythema and black 3-4th toes  Psychiatric: Appropriate affect, cooperative  Neurologic: Speech clear and fluent        Results Reviewed:  LAB RESULTS:      Lab 01/17/24  0642 01/16/24  2323 01/16/24  1419 01/16/24  0648 01/15/24  2241 01/15/24  1431 01/15/24  0359 01/14/24  2111 01/14/24  2111 01/14/24  1512   WBC  --   --   --  6.90  --   --  11.86*  --   --  13.82*   HEMOGLOBIN  --   --   --  11.0*  --   --  10.7*  --   --  11.8*   HEMATOCRIT  --   --   --  33.9*  --   --  32.5*  --   --  36.9*   PLATELETS  --   --   --  264  --   --  257  --   --  333   NEUTROS ABS  --   --   --  3.62  --   --  10.16*  --   --  9.47*   IMMATURE GRANS (ABS)  --   --   --  0.04  --   --  0.09*  --   --  0.17*   LYMPHS ABS  --   --   --  2.67  --   --  1.24  --   --  2.57   MONOS ABS  --   --   --  0.51  --   --  0.36  --   --  1.48*   EOS ABS  --   --   --  0.04  --   --  0.00  --   --  0.10   MCV  --   --   --  89.4  --   --  92.3  --   --  92.9   SED RATE  --   --   --   --   --   --   --   --   --  100*   CRP  --   --   --   --   --   --   --   --   --  2.60*   PROCALCITONIN  --   --   --   --   --   --   --   --   --  0.19   LACTATE  --   --   --   --   --   --   --   --   --  1.2   PROTIME  --   --   --   --   --   --   --   --  15.0*  --    APTT 60.1 59.0* 55.4* 52.4* 38.5*   < > 38.3*   < > 38.7*  --    HEPARIN ANTI-XA  --   --   --   --   --   --   --   --  0.14*  --     < > = values in this interval not displayed.         Lab 01/17/24  0801 01/17/24  0641 01/16/24  0648 01/15/24  0359 01/14/24  1512   SODIUM  --  138 141 138 134*   POTASSIUM  --  3.8 4.1 4.9 4.1   CHLORIDE  --  104 106 106 96*   CO2  --  25.0 25.0 23.0 25.0   ANION GAP  --  9.0 10.0 9.0  13.0   BUN  --  14 21* 22* 30*   CREATININE 0.60* 0.65* 0.80 0.90 2.07*   EGFR 117.6 114.8 107.8 104.0 38.3*   GLUCOSE  --  145* 149* 221* 302*   CALCIUM  --  9.1 9.5 9.5 10.0   MAGNESIUM  --   --   --   --  2.1         Lab 01/14/24  1512   TOTAL PROTEIN 7.5   ALBUMIN 3.8   GLOBULIN 3.7   ALT (SGPT) 13   AST (SGOT) 14   BILIRUBIN 0.2   ALK PHOS 83         Lab 01/14/24  2111 01/14/24  1512   PROBNP  --  63.8   HSTROP T  --  18   PROTIME 15.0*  --    INR 1.17*  --                  Brief Urine Lab Results       None            Microbiology Results Abnormal       Procedure Component Value - Date/Time    Blood Culture - Blood, Arm, Left [[id number]]  (Normal) Collected: 01/14/24 1512    Lab Status: Preliminary result Specimen: Blood from Arm, Left Updated: 01/16/24 1900     Blood Culture No growth at 2 days    Blood Culture - Blood, Arm, Right [[id number]]  (Normal) Collected: 01/14/24 1510    Lab Status: Preliminary result Specimen: Blood from Arm, Right Updated: 01/16/24 1631     Blood Culture No growth at 2 days    MRSA Screen, PCR (Inpatient) - Swab, Nares [[id number]]  (Normal) Collected: 01/14/24 1817    Lab Status: Final result Specimen: Swab from Nares Updated: 01/14/24 2152     MRSA PCR No MRSA Detected    Narrative:      The negative predictive value of this diagnostic test is high and should only be used to consider de-escalating anti-MRSA therapy. A positive result may indicate colonization with MRSA and must be correlated clinically.            No radiology results from the last 24 hrs    Results for orders placed during the hospital encounter of 11/20/23    Adult Transthoracic Echo Complete W/ Cont if Necessary Per Protocol    Interpretation Summary    Left ventricular systolic function is hyperdynamic (EF > 70%). Calculated left ventricular EF = 70.4%    Left ventricular diastolic function was normal.    No significant structural or functional valvular disease.      Current medications:  Scheduled  Meds:amLODIPine, 10 mg, Oral, Q24H  aspirin, 81 mg, Oral, Nightly  cefTRIAXone, 1,000 mg, Intravenous, Q24H  gabapentin, 600 mg, Oral, Q8H  hydrALAZINE, 25 mg, Oral, Q8H  insulin lispro, 2-7 Units, Subcutaneous, 4x Daily AC & at Bedtime  lisinopril, 40 mg, Oral, Q24H  metoprolol tartrate, 25 mg, Oral, Q12H  nicotine, 1 patch, Transdermal, Nightly  oxyCODONE, 10 mg, Oral, 4x Daily  rosuvastatin, 40 mg, Oral, Nightly  senna-docusate sodium, 2 tablet, Oral, BID  sodium chloride, 10 mL, Intravenous, Q12H  vancomycin, 1,250 mg, Intravenous, Q12H      Continuous Infusions:heparin, 25 Units/kg/hr, Last Rate: 25 Units/kg/hr (01/17/24 0313)  Pharmacy to Dose Heparin,   Pharmacy to dose vancomycin,   sodium chloride, 125 mL/hr, Last Rate: 125 mL/hr (01/17/24 7375)      PRN Meds:.  senna-docusate sodium **AND** polyethylene glycol **AND** bisacodyl **AND** bisacodyl    Calcium Replacement - Follow Nurse / BPA Driven Protocol    cyclobenzaprine    dextrose    dextrose    glucagon (human recombinant)    HYDROmorphone **AND** naloxone    LORazepam    Magnesium Standard Dose Replacement - Follow Nurse / BPA Driven Protocol    nicotine polacrilex    nitroglycerin    ondansetron ODT **OR** ondansetron    oxyCODONE    Pharmacy to Dose Heparin    Pharmacy to dose vancomycin    Phosphorus Replacement - Follow Nurse / BPA Driven Protocol    Potassium Replacement - Follow Nurse / BPA Driven Protocol    sodium chloride    sodium chloride    temazepam    Assessment & Plan   Assessment & Plan     Active Hospital Problems    Diagnosis  POA    **Ischemic foot [I99.8]  Yes    Sepsis associated hypotension [A41.9, I95.9]  Yes    ATN (acute tubular necrosis) [N17.0]  Yes    Elevated serum creatinine [R79.89]  Yes    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 [I96]  Unknown    Claudication of lower extremity s/p femorofemoral bypass [I73.9]  Yes    Hypertension [I10]  Yes    Hyperlipidemia [E78.5]  Yes    Tobacco abuse [Z72.0]  Yes    Diabetes mellitus  type II, non insulin dependent [E11.9]  Yes    Peripheral arterial disease [I73.9]  Yes      Resolved Hospital Problems   No resolved problems to display.        Brief Hospital Course to date:  Justo Oquendo is a 48 y.o. male with ongoing heavy tobacco use, known history of severe PAD, status post thrombectomy of left femoropopliteal bypass graft as well as placement of a femorofemoral bypass by Dr. Ryan on 11/21/2023 who presented with lethargy, decreased urine output and confusion.  Patient saw CT surgery in December 2023 and outpatient referral was made to Dr. Peres of orthopedic surgery for consideration of BKA for critical focal peripheral arterial disease.    This patient's problems and plans were partially entered by my partner and updated as appropriate by me 01/17/24.     Sepsis (AMS, WBC, hypotension, ischemic necrosis)  Metabolic encephalopathy  Ischemic ulcers and necrosis of LLE 3-4th digits  Progressive PAD with occlusion of Fem-Fem graft  -Initially hypotensive with systolics in the 80s, now improved after receiving hydrocortisone and IV fluid  - CTA of the abdominal aorta with runoff shows complete occlusion of the left femoral artery bypass graft, complete occlusion of the right femoral artery.  Eventual distal reconstitution of both vessels due to the branches of the deep femoral arteries, loss of flow within the left posterior tibial artery, loss of flow within the right anterior tibial artery.  No residual flow to the distal arteries.  -Continue IV Rocephin and Vancomycin empirically   -Continue IV heparin.  Holding home Xarelto  -Continue ASA, statin  -Dr. Peres following, may require  amputation (patient previously had an outpatient appointment for preop with Dr. Peres on 1/15/2024).  -Vascular following, tentatively planning revision bypass followed by amputation depending on intraoperative findings during vascular procedure.     Uncontrolled pain- improved   -Continue PO and IV  narcotics as needed     ALEJANDRO, resolved  -Creatinine significantly elevated 2.07 on presentation, now back to baseline  -Pre-renal, likely hypotension related, now resolved with hydrocortisone    NIDDM2  -Continue sliding scale insulin     HTN  HL  -ACEI held due to elevated creatinine/hypotension- maybe resume post operatively      Ongoing heavy tobacco smoker  -NRT as needed       Expected Discharge Location and Transportation: home vs rehab  Expected Discharge   Expected Discharge Date: 1/22/2024; Expected Discharge Time:      DVT prophylaxis:  Medical DVT prophylaxis orders are present.     AM-PAC 6 Clicks Score (PT): 19 (01/17/24 0835)    CODE STATUS:   Code Status and Medical Interventions:   Ordered at: 01/14/24 2055     Code Status (Patient has no pulse and is not breathing):    CPR (Attempt to Resuscitate)     Medical Interventions (Patient has pulse or is breathing):    Full Support       Nadia Gatica MD  01/17/24        Electronically signed by Nadia Gatica MD at 01/17/24 1812       Celso Hoyt MD at 01/17/24 1049              Subjective:     Encounter Date:01/14/2024    Subjective  Patient ID: Justo Oquendo is a 48 y.o. male.    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  Ischemic left foot with plans for surgery on on 1/18/24  -poorly controlled BP  -DM  -HLD  -smoker  -EKG sinus tachycardia, echo preserved LV function        Mr. Oquendo is a 48 year old man who cardiology consulted for medical optimization prior to vascular surgery Thursday.  No shortness of breath, chest pain or palpitations.  I saw him last year in the hospital for uncontrolled BP.  Was reasonably controlled at discharge.  Currently elevated.  He was supposed to see me for various reasons didn't.  Little concerned regarding if taking his medications appropriately.  Currently admitted with gangernous toes and foot  ischemia.  He smokes daily.  ROS negative except for the above.         Update 1/17/24  No acute changes.  BP remains elevated        Review of Systems   Psychiatric/Behavioral:  Positive for altered mental status.        Procedures      Objective:   Objective  Constitutional:       Appearance: Healthy appearance. Not in distress.   Neck:      Vascular: No JVR. JVD normal.   Pulmonary:      Effort: Pulmonary effort is normal.      Breath sounds: Normal breath sounds. No wheezing. No rhonchi. No rales.   Chest:      Chest wall: Not tender to palpatation.   Cardiovascular:      PMI at left midclavicular line. Normal rate. Regular rhythm. Normal S1. Normal S2.       Murmurs: There is no murmur.      No gallop.  No click. No rub.   Edema:     Peripheral edema absent.   Abdominal:      General: Bowel sounds are normal.      Palpations: Abdomen is soft.      Tenderness: There is no abdominal tenderness.   Musculoskeletal: Normal range of motion.         General: No tenderness. Skin:     General: Skin is warm and dry.   Neurological:      General: No focal deficit present.      Mental Status: Alert and oriented to person, place and time.         Lab Review:      Assessment:   Assessment      Assessment  -femorofemoral bypass complicated by complete occlusion of left femoral artery bypass graft.  Ischemic left foot with plans for surgery on on 1/18/24  -poorly controlled BP  -DM  -HLD  -smoker  -EKG sinus tachycardia, echo preserved LV function     Plan:   -He is higher risk for surgery however has leg ischemia and need urgent surgery.  Not having active cardiac complaints.  Can proceed with surgery without further CV risk stratification.    -on metoprolol, lisinoppril and amlodipine.  May not need long term but will add hydralazine 25mg tid.   -agree with antiplatelet therapy, current anticoagulation and statin  -will follow         Celso Hoyt MD        Plan:   Plan              Electronically signed by  Celso Hoyt MD at 24 1051       Aurelia Delarosa DO at 24 1401              [facility] Medicine Services  PROGRESS NOTE    Patient Name: Justo Oquendo  : 1975  MRN: [id number]    Date of Admission: 2024  Primary Care Physician: Melo Whitaker MD    Subjective   Subjective     CC:  Left toe ischemia    HPI:  Pt reports that pain in toes/foot is now controlled with pain medication. He is hopeful that surgery goes well on Thursday. Currently no chest pain or shortness of breath       Objective   Objective     Vital Signs:   Temp:  [97.1 °F (36.2 °C)-98.3 °F (36.8 °C)] 98.3 °F (36.8 °C)  Heart Rate:  [] 79  Resp:  [14-18] 18  BP: (134-157)/(70-85) 157/85  Flow (L/min):  [2.5] 2.5     Physical Exam:  Constitutional: No acute distress, awake, alert  HENT: NCAT,   Respiratory: respiratory effort normal   Cardiovascular: RRR, no murmurs, rubs, or gallops  Gastrointestinal: nondistended  Psychiatric: Appropriate affect, cooperative  Neurologic: Oriented x 3,  speech clear  Skin: Erythema noted on left calf, foot wrapped         Results Reviewed:  LAB RESULTS:      Lab 24  0648 01/15/24  2241 01/15/24  1431 01/15/24  0359 24  2111 24  1512   WBC 6.90  --   --  11.86*  --  13.82*   HEMOGLOBIN 11.0*  --   --  10.7*  --  11.8*   HEMATOCRIT 33.9*  --   --  32.5*  --  36.9*   PLATELETS 264  --   --  257  --  333   NEUTROS ABS 3.62  --   --  10.16*  --  9.47*   IMMATURE GRANS (ABS) 0.04  --   --  0.09*  --  0.17*   LYMPHS ABS 2.67  --   --  1.24  --  2.57   MONOS ABS 0.51  --   --  0.36  --  1.48*   EOS ABS 0.04  --   --  0.00  --  0.10   MCV 89.4  --   --  92.3  --  92.9   SED RATE  --   --   --   --   --  100*   CRP  --   --   --   --   --  2.60*   PROCALCITONIN  --   --   --   --   --  0.19   LACTATE  --   --   --   --   --  1.2   PROTIME  --   --   --   --  15.0*  --    APTT 52.4* 38.5* 36.1* 38.3* 38.7*  --    HEPARIN  ANTI-XA  --   --   --   --  0.14*  --          Lab 01/16/24  0648 01/15/24  0359 01/14/24  1512   SODIUM 141 138 134*   POTASSIUM 4.1 4.9 4.1   CHLORIDE 106 106 96*   CO2 25.0 23.0 25.0   ANION GAP 10.0 9.0 13.0   BUN 21* 22* 30*   CREATININE 0.80 0.90 2.07*   EGFR 107.8 104.0 38.3*   GLUCOSE 149* 221* 302*   CALCIUM 9.5 9.5 10.0   MAGNESIUM  --   --  2.1         Lab 01/14/24  1512   TOTAL PROTEIN 7.5   ALBUMIN 3.8   GLOBULIN 3.7   ALT (SGPT) 13   AST (SGOT) 14   BILIRUBIN 0.2   ALK PHOS 83         Lab 01/14/24  2111 01/14/24  1512   PROBNP  --  63.8   HSTROP T  --  18   PROTIME 15.0*  --    INR 1.17*  --                  Brief Urine Lab Results       None            Microbiology Results Abnormal       Procedure Component Value - Date/Time    Blood Culture - Blood, Arm, Left [[id number]]  (Normal) Collected: 01/14/24 1512    Lab Status: Preliminary result Specimen: Blood from Arm, Left Updated: 01/15/24 1900     Blood Culture No growth at 24 hours    Blood Culture - Blood, Arm, Right [[id number]]  (Normal) Collected: 01/14/24 1510    Lab Status: Preliminary result Specimen: Blood from Arm, Right Updated: 01/15/24 1631     Blood Culture No growth at 24 hours    MRSA Screen, PCR (Inpatient) - Swab, Nares [[id number]]  (Normal) Collected: 01/14/24 1817    Lab Status: Final result Specimen: Swab from Nares Updated: 01/14/24 2152     MRSA PCR No MRSA Detected    Narrative:      The negative predictive value of this diagnostic test is high and should only be used to consider de-escalating anti-MRSA therapy. A positive result may indicate colonization with MRSA and must be correlated clinically.            CT Angio Abdominal Aorta Bilateral Iliofem Runoff    Result Date: 1/14/2024  CT ANGIO ABDOMINAL AORTA BILAT ILIOFEM RUNOFF Date of Exam: 1/14/2024 5:34 PM EST Indication: Claudication or leg ischemia Previous fem pop graft occlusion, c/o increased LLE pain, color changes, third/fourth toe ischemia, leukocytosis, r/o gas.  Comparison: None available. Technique: CTA of the abdomen, pelvis and both lower extremities was performed after the uneventful intravenous administration of 120 cc Isovue-370 . Reconstructed coronal and sagittal images were also obtained. In addition, a 3-D volume rendered image was created for interpretation. Automated exposure control and iterative reconstruction methods were used. The origins of the celiac axis and superior mesenteric arteries are normal. The origins of the renal arteries are normal. Mild atheromatous disease of the abdominal aorta with subtle ectasia. The origin of the inferior mesenteric artery is patent. Complete occlusion of the left common iliac artery with continued complete loss of flow within the left internal and external iliac arteries. Multi focal atheromatous disease of the right common iliac artery as well as the internal and external iliac arteries. Femorofemoral bypass. Complete occlusion of the left femoral artery bypass with no underlying flow. Reconstitution of flow distally at the level of the popliteal artery due to branches from the deep femoral artery. The superior segments of the left anterior and posterior tibial arteries are patent. Eventual loss of flow within the posterior tibial artery seen on axial image 383. Continued flow within the dorsalis pedis with eventual loss of flow within the arteries. On the right there is continued flow within the right deep femoral artery. Complete occlusion of the left femoral artery with distal reconstitution from branches of the deep femoral artery. The popliteal artery is patent. The anterior and posterior tibial vessels are patent proximally. Loss of flow within the anterior tibial artery on axial image #343. Loss of the contrast column within the posterior tibial artery on axial image 417. The liver appears normal. Calcified granulomata within the spleen. Normal pancreas. Prior cholecystectomy. The adrenal glands are normal. No  renal masses. No hydroureteronephrosis. The ureters and urinary bladder are normal. The small bowel is nonobstructed. Normal appendix. Colonic diverticulosis without evidence of diverticulitis. No ascites or pneumoperitoneum. No adenopathy. Degenerative changes of the hips and lumbar spine.     Impression: Complete occlusion of the left femoral artery bypass graft. Complete occlusion of the right femoral artery. Eventual distal reconstitution of both vessels due to branches of the deep femoral arteries. Loss of flow within the left posterior tibial artery. Loss of flow within the right anterior tibial artery. Eventual loss of flow within the contrast column on both sides with no residual flow to the digital arteries. Electronically Signed: Kee Cartagena MD  1/14/2024 6:08 PM EST  Workstation ID: [id number]    XR Chest 1 View    Result Date: 1/14/2024  XR CHEST 1 VW Date of Exam: 1/14/2024 3:02 PM EST Indication: Weak/Dizzy/AMS triage protocol Comparison: None available. Findings: No focal consolidation. No pneumothorax or pleural effusion. Cardiac size is normal. The visualized clavicles appear intact. No displaced rib fractures. The visualized upper abdomen is normal.     Impression: Impression: No acute cardiopulmonary disease. Electronically Signed: Kee Cartagena MD  1/14/2024 3:18 PM EST  Workstation ID: [id number]     Results for orders placed during the hospital encounter of 11/20/23    Adult Transthoracic Echo Complete W/ Cont if Necessary Per Protocol    Interpretation Summary    Left ventricular systolic function is hyperdynamic (EF > 70%). Calculated left ventricular EF = 70.4%    Left ventricular diastolic function was normal.    No significant structural or functional valvular disease.      Current medications:  Scheduled Meds:amLODIPine, 10 mg, Oral, Q24H  aspirin, 81 mg, Oral, Nightly  cefTRIAXone, 1,000 mg, Intravenous, Q24H  gabapentin, 600 mg, Oral, Q8H  insulin lispro, 2-7 Units, Subcutaneous, 4x Daily  AC & at Bedtime  lisinopril, 40 mg, Oral, Q24H  metoprolol tartrate, 25 mg, Oral, Q12H  nicotine, 1 patch, Transdermal, Nightly  oxyCODONE, 10 mg, Oral, 4x Daily  rosuvastatin, 40 mg, Oral, Nightly  senna-docusate sodium, 2 tablet, Oral, BID  sodium chloride, 10 mL, Intravenous, Q12H  vancomycin, 1,250 mg, Intravenous, Q12H      Continuous Infusions:heparin, 23 Units/kg/hr, Last Rate: 23 Units/kg/hr (01/16/24 1355)  Pharmacy to Dose Heparin,   Pharmacy to dose vancomycin,   sodium chloride, 125 mL/hr, Last Rate: 125 mL/hr (01/14/24 2200)      PRN Meds:.  senna-docusate sodium **AND** polyethylene glycol **AND** bisacodyl **AND** bisacodyl    Calcium Replacement - Follow Nurse / BPA Driven Protocol    cyclobenzaprine    dextrose    dextrose    glucagon (human recombinant)    HYDROmorphone **AND** naloxone    LORazepam    Magnesium Standard Dose Replacement - Follow Nurse / BPA Driven Protocol    nicotine polacrilex    nitroglycerin    ondansetron ODT **OR** ondansetron    oxyCODONE    Pharmacy to Dose Heparin    Pharmacy to dose vancomycin    Phosphorus Replacement - Follow Nurse / BPA Driven Protocol    Potassium Replacement - Follow Nurse / BPA Driven Protocol    sodium chloride    sodium chloride    temazepam    Assessment & Plan   Assessment & Plan     Active Hospital Problems    Diagnosis  POA    **Ischemic foot [I99.8]  Yes    Sepsis associated hypotension [A41.9, I95.9]  Yes    ATN (acute tubular necrosis) [N17.0]  Yes    Elevated serum creatinine [R79.89]  Yes    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 [I96]  Unknown    Claudication of lower extremity s/p femorofemoral bypass [I73.9]  Yes    Hypertension [I10]  Yes    Hyperlipidemia [E78.5]  Yes    Tobacco abuse [Z72.0]  Yes    Diabetes mellitus type II, non insulin dependent [E11.9]  Yes    Peripheral arterial disease [I73.9]  Yes      Resolved Hospital Problems   No resolved problems to display.        Brief Hospital Course to date:  Justo Oquendo is a 48  y.o. male ongoing heavy tobacco smoker with a known history of severe PAD, status post thrombectomy of left femoropopliteal bypass graft as well as placement of a femorofemoral bypass by Dr. Ryan on 11/21/2023 who presented with lethargy, decreased urine output and confusion.  Patient saw CT surgery in December 2023 and outpatient referral was made to Dr. Peres of orthopedic surgery for consideration of BKA for critical focal peripheral arterial disease.    Sepsis (AMS, WBC, hypotension, ischemic necrosis)  Metabolic encephalopathy  Ischemic ulcers and necrosis of LLE 3-4th digits  Progressive PAD with occlusion of Fem-Fem graft  -Initially hypotensive with systolics in the 80s, now improved after receiving hydrocortisone and 2.2 L of fluids.  - CTA of the abdominal aorta with runoff shows complete occlusion of the left femoral artery bypass graft, complete occlusion of the right femoral artery.  Eventual distal reconstitution of both vessels due to the branches of the deep femoral arteries, loss of flow within the left posterior tibial artery, loss of flow within the right anterior tibial artery.  No residual flow to the distal arteries.  -Blood cultures obtained and remain negative  -Continue IV Rocephin and Vanc empirically   -Continue IV heparin.  Holding home Xarelto  -Continue ASA, statin  -Dr. Peres following, may require  amputation (patient previously had an outpatient appointment for preop with Dr. Peres on 1/15/2024).  -Vascular following, plan to OR on Thursday      Uncontrolled pain- improved   - opioid tolerant.continue scheduled PO oxy QID with additional PRN for breakthrough, also prn IV dilaudid     ALEJANDRO, resolved  -Creatinine significantly elevated 2.07 on presentation, down to baseline of 0.9  - pre-renal, likely hypotension related, now resolved with hydrocortisone  -Continue IV fluids    NIDDM2  -Continue sliding scale insulin     HTN  HL  - hold ACEI due to elevated  creatinine/hypotension- maybe resume post operatively      Ongoing heavy tobacco smoker  - nicotine patch and gum prn        Expected Discharge Location and Transportation: home vs rehab  Expected Discharge   Expected Discharge Date: 1/19/2024; Expected Discharge Time:      DVT prophylaxis:  Medical DVT prophylaxis orders are present.     AM-PAC 6 Clicks Score (PT): 19 (01/16/24 0812)    CODE STATUS:   Code Status and Medical Interventions:   Ordered at: 01/14/24 2055     Code Status (Patient has no pulse and is not breathing):    CPR (Attempt to Resuscitate)     Medical Interventions (Patient has pulse or is breathing):    Full Support     I have prepared this progress note with copied portions of the prior day's progress note of my own authorship to preserve accuracy and maintain consistency of documentation. I have reviewed these portions and edited them for correctness. I verify that the above documentation accurately and truly represents the evaluation and management performed on today's date.       Aurelia Delarosa DO  01/16/24        Electronically signed by Aurelia Delarosa DO at 01/16/24 1406       Christo Peres Jr., MD at 01/16/24 0721          Justo Oquendo       LOS: 2 days   Patient Care Team:  Melo Whitaker MD as PCP - General (Family Medicine)  Niall Mcfarlane MD as Consulting Physician (Cardiology)    Chief Complaint:  left foot ischemia    Subjective     Interval History:     Pain tolerable overnight.    Review of Systems:      Gen- No fevers, chills  CV- No chest pain, palpitations  Resp- No cough, dyspnea  GI- No N/V/D, abd pain    Objective     Vital Signs  Vital Signs (last 24 hours)         01/15 0700  01/16 0659 01/16 0700  01/16 0731   Most Recent      Temp (°F) 96.6 -  97.6       97.2 (36.2) 01/16 0652    Heart Rate 74 -  101       82 01/16 0652    Resp 14 -  16       16 01/16 0652    /70 -  157/76       134/70 01/16 0652    SpO2 (%) 93 -  99  "      97 01/16 0652    Flow (L/min)   2.5       2.5 01/16 0652              Physical Exam:     Alert, oriented.  No acute distress.  Nonlabored respirations.  Regular rate and rhythm.  Abdomen nondistended.  Left lower extremity: Less redness around the foot, ischemic changes of his second through fourth lesser toes, becoming better demarcated.     Results Review:     I reviewed the patient's new clinical results.    Medication Review:   Hospital Medications (active)         Dose Frequency Start End    aspirin EC tablet 81 mg 81 mg Nightly 1/14/2024 --    Admin Instructions: Do not crush or chew the capsules or tablets. The drug may not work as designed if the capsule or tablet is crushed or chewed. Swallow whole.  Do not exceed 4 grams of aspirin in a 24 hr period.    If given for pain, use the following pain scale:   Mild Pain = Pain Score of 1-3, CPOT 1-2  Moderate Pain = Pain Score of 4-6, CPOT 3-4  Severe Pain = Pain Score of 7-10, CPOT 5-8    Route: Oral    bisacodyl (DULCOLAX) EC tablet 5 mg 5 mg Daily PRN 1/14/2024 --    Admin Instructions: Use if no bowel movement after 12 hours.  Swallow whole. Do not crush, split, or chew tablet.    Route: Oral    Linked Group 1: See Hyperspace for full Linked Orders Report.        bisacodyl (DULCOLAX) suppository 10 mg 10 mg Daily PRN 1/14/2024 --    Admin Instructions: Use if no bowel movement after 12 hours.  Hold for diarrhea    Route: Rectal    Linked Group 1: See Hyperspace for full Linked Orders Report.        Calcium Replacement - Follow Nurse / BPA Driven Protocol  As Needed 1/14/2024 --    Admin Instructions: Open Order & Select \"[facility] Electrolyte Replacement Protocol Algorithm\" to View Details    Route: Does not apply    cefTRIAXone (ROCEPHIN) 1,000 mg in sodium chloride 0.9 % 100 mL IVPB 1,000 mg Every 24 Hours 1/15/2024 1/22/2024    Admin Instructions: LR should be paused and flushing of the line with NS is recommended prior to and after completion of " ceftriaxone infusion due to incompatibility. Do not co-adminster with calcium-containing solutions.  Caution: Look alike/sound alike drug alert    Route: Intravenous    cyclobenzaprine (FLEXERIL) tablet 10 mg 10 mg 3 Times Daily PRN 1/14/2024 --    Route: Oral    dextrose (D50W) (25 g/50 mL) IV injection 25 g 25 g Every 15 Minutes PRN 1/14/2024 --    Admin Instructions: Blood sugar less than 70; patient has IV access - Unresponsive, NPO or Unable To Safely Swallow    Route: Intravenous    dextrose (GLUTOSE) oral gel 15 g 15 g Every 15 Minutes PRN 1/14/2024 --    Admin Instructions: BS<70, Patient Alert, Is not NPO, Can safely swallow.    Route: Oral    gabapentin (NEURONTIN) capsule 600 mg 600 mg Every 8 Hours Scheduled 1/14/2024 --    Admin Instructions:     Route: Oral    glucagon (GLUCAGEN) injection 1 mg 1 mg Every 15 Minutes PRN 1/14/2024 --    Admin Instructions: Blood Glucose Less Than 70 - Patient Without IV Access - Unresponsive, NPO or Unable To Safely Swallow  Reconstitute powder for injection by adding 1 mL of -supplied sterile diluent or sterile water for injection to a vial containing 1 mg of the drug, to provide solutions containing 1 mg/mL. Shake vial gently to dissolve.    Route: Intramuscular    heparin 60416 units/250 mL (100 units/mL) in 0.45 % NaCl infusion 21 Units/kg/hr × 73.5 kg Titrated 1/14/2024 --    Admin Instructions: Pharmacy dosing - Cardiac or Other NOT VTE - Boluses (No initial bolus)    Route: Intravenous    HYDROmorphone (DILAUDID) injection 1 mg 1 mg Every 2 Hours PRN 1/14/2024 1/19/2024    Admin Instructions: Based on patient request - if ordered for moderate or severe pain, provider allows for administration of a medication prescribed for a lower pain scale.      Caution: Look alike/sound alike drug alert    If given for pain, use the following pain scale:  Mild Pain = Pain Score of 1-3, CPOT 1-2  Moderate Pain = Pain Score of 4-6, CPOT 3-4  Severe Pain = Pain Score  "of 7-10, CPOT 5-8    Route: Intravenous    Linked Group 2: See Hyperspace for full Linked Orders Report.        Insulin Lispro (humaLOG) injection 2-7 Units 2-7 Units 4 Times Daily Before Meals & Nightly 1/14/2024 --    Admin Instructions: Correction Insulin - Low Dose - Total Insulin Dose Less Than 40 units/day (Lean, Elderly or Renal Patients)    Blood Glucose 150-199 mg/dL - 2 units  Blood Glucose 200-249 mg/dL - 3 units  Blood Glucose 250-299 mg/dL - 4 units  Blood Glucose 300-349 mg/dL - 5 units  Blood Glucose 350-400 mg/dL - 6 units  Blood Glucose Greater Than 400 mg/dL - 7 units & Call Provider   Caution: Look alike/sound alike drug alert    Route: Subcutaneous    LORazepam (ATIVAN) tablet 0.5 mg 0.5 mg Every 8 Hours PRN 1/14/2024 1/19/2024    Admin Instructions:  Caution: Look alike/sound alike drug alert    Route: Oral    Magnesium Standard Dose Replacement - Follow Nurse / BPA Driven Protocol  As Needed 1/14/2024 --    Admin Instructions: Open Order & Select \"[facility] Electrolyte Replacement Protocol Algorithm\" to View Details    Route: Does not apply    metoprolol tartrate (LOPRESSOR) tablet 25 mg 25 mg Every 12 Hours Scheduled 1/14/2024 --    Admin Instructions: Hold for SBP less than 100, DBP less than 60, or heart rate less than 50    Route: Oral    naloxone (NARCAN) injection 0.4 mg 0.4 mg Every 5 Minutes PRN 1/14/2024 --    Admin Instructions: If Respiratory Rate Less Than 8 or Patient is Difficult to Arouse, Stop ALL Narcotics & Contact Provider.  Administer Slow IV Push.  Repeat As Ordered Until Respiratory Rate is Greater Than 12.    Route: Intravenous    Linked Group 2: See Hyperspace for full Linked Orders Report.        nicotine (NICODERM CQ) 21 MG/24HR patch 1 patch 1 patch Nightly 1/14/2024 --    Admin Instructions: Apply Patch to Clean, Dry, Hairless Area Daily - Rotating Sites.  REMOVE Old Patch Prior to Applying New Patch.  May Remove Patch at Bedtime If Needed to Prevent Insomnia.  Do Not Use " "Other Nicotine Products.  At Discharge, Follow Package Instructions.  Dispose of nicotine replacement therapies and their wrappers in non-hazardous pharmaceutical waste or in regular trash.    Route: Transdermal    nicotine polacrilex (NICORETTE) gum 2 mg 2 mg Every 1 Hour PRN 1/14/2024 --    Admin Instructions: Maximum 24 pieces in 24 hours.    Gum should be chewed until it tingles, then \"park\" between the gum and cheek.   When the tingling is gone, chew again until the tingle returns and once again \"park\" between gum and cheek.   Repeat until tingling is gone and discard.  At discharge, follow instructions on package  Dispose of nicotine replacement therapies and their wrappers in non-hazardous pharmaceutical waste or in regular trash.    Route: Mouth/Throat    nitroglycerin (NITROSTAT) SL tablet 0.4 mg 0.4 mg Every 5 Minutes PRN 1/14/2024 --    Admin Instructions: If Pain Unrelieved After 3 Doses Notify MD  May administer up to 3 doses per episode.    Route: Sublingual    ondansetron (ZOFRAN) injection 4 mg 4 mg Every 6 Hours PRN 1/14/2024 --    Admin Instructions: If BOTH ondansetron (ZOFRAN) & promethazine (PHENERGAN) Ordered, Use ondansetron First & THEN promethazine IF ondansetron Ineffective.    Route: Intravenous    Linked Group 3: See Hyperspace for full Linked Orders Report.        ondansetron ODT (ZOFRAN-ODT) disintegrating tablet 4 mg 4 mg Every 6 Hours PRN 1/14/2024 --    Admin Instructions: If BOTH ondansetron (ZOFRAN) & promethazine (PHENERGAN) Ordered, Use ondansetron First & THEN promethazine IF ondansetron Ineffective.  Place on tongue and allow to dissolve.    Route: Oral    Linked Group 3: See Hyperspace for full Linked Orders Report.        oxyCODONE (ROXICODONE) immediate release tablet 10 mg 10 mg 4 Times Daily 1/14/2024 1/19/2024    Admin Instructions: HOLD for sedation  If given for pain, use the following pain scale:  Mild Pain = Pain Score of 1-3, CPOT 1-2  Moderate Pain = Pain Score of " "4-6, CPOT 3-4  Severe Pain = Pain Score of 7-10, CPOT 5-8    Route: Oral    oxyCODONE (ROXICODONE) immediate release tablet 10 mg 10 mg Every 4 Hours PRN 1/14/2024 1/19/2024    Admin Instructions: Based on patient request - if ordered for moderate or severe pain, provider allows for administration of a medication prescribed for a lower pain scale.  If given for pain, use the following pain scale:  Mild Pain = Pain Score of 1-3, CPOT 1-2  Moderate Pain = Pain Score of 4-6, CPOT 3-4  Severe Pain = Pain Score of 7-10, CPOT 5-8    Route: Oral    Pharmacy to Dose Heparin  Continuous PRN 1/14/2024 --    Admin Instructions: Boluses (No initial bolus)    Route: Does not apply    Pharmacy to dose vancomycin  Continuous PRN 1/14/2024 1/21/2024    Route: Does not apply    Phosphorus Replacement - Follow Nurse / BPA Driven Protocol  As Needed 1/14/2024 --    Admin Instructions: Open Order & Select \"BHS Electrolyte Replacement Protocol Algorithm\" to View Details    Route: Does not apply    polyethylene glycol (MIRALAX) packet 17 g 17 g Daily PRN 1/14/2024 --    Admin Instructions: Use if no bowel movement after 12 hours. Mix in 6-8 ounces of water.  Use 4-8 ounces of water, tea, or juice for each 17 gram dose.    Route: Oral    Linked Group 1: See Bianca for full Linked Orders Report.        Potassium Replacement - Follow Nurse / BPA Driven Protocol  As Needed 1/14/2024 --    Admin Instructions: Open Order & Select \"BHS Electrolyte Replacement Protocol Algorithm\" to View Details    Route: Does not apply    rosuvastatin (CRESTOR) tablet 40 mg 40 mg Nightly 1/14/2024 --    Admin Instructions: Avoid grapefruit juice.    Route: Oral    sennosides-docusate (PERICOLACE) 8.6-50 MG per tablet 2 tablet 2 tablet 2 Times Daily 1/14/2024 --    Admin Instructions: HOLD MEDICATION IF PATIENT HAS HAD BOWEL MOVEMENT. Start bowel management regimen if patient has not had a bowel movement after 12 hours.    Route: Oral    Linked Group 1: See " Hyperspace for full Linked Orders Report.        sodium chloride 0.9 % flush 10 mL 10 mL Every 12 Hours Scheduled 2024 --    Route: Intravenous    sodium chloride 0.9 % flush 10 mL 10 mL As Needed 2024 --    Route: Intravenous    sodium chloride 0.9 % infusion 40 mL 40 mL As Needed 2024 --    Admin Instructions: Following administration of an IV intermittent medication, flush line with 40mL NS at 100mL/hr.    Route: Intravenous    sodium chloride 0.9 % infusion 125 mL/hr Continuous 2024 --    Route: Intravenous    temazepam (RESTORIL) capsule 15 mg 15 mg Nightly PRN 2024    Admin Instructions: Group 2 (Pink) Hazardous Drug - Reproductive Risk Only - See Handling Guide    Route: Oral    vancomycin (VANCOCIN) 1000 mg/200 mL dextrose 5% IVPB 1,000 mg Every 12 Hours Scheduled 1/15/2024 2024    Route: Intravenous              Assessment & Plan     48-year-old male with peripheral vascular disease, left second through fourth toe dry gangrene.      Ischemic foot    Peripheral arterial disease    Hypertension    Hyperlipidemia    Tobacco abuse    Diabetes mellitus type II, non insulin dependent    Claudication of lower extremity s/p femorofemoral bypass    Sepsis associated hypotension    ATN (acute tubular necrosis)    Elevated serum creatinine    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    He was evaluated by Dr. Pena yesterday.  Tentative plan is for revision bypass later this week.  Would tentatively plan for amputation, level to be determined by intraoperative findings during his vascular procedure, Friday versus  as appropriate.      Christo Peres Jr, MD  24  07:31 EST             Electronically signed by Christo Peres Jr., MD at 24 0734       Rayna Sanchez MD at 01/15/24 0713              [facility] Medicine Services  PROGRESS NOTE    Patient Name: Justo Oquendo  : 1975  MRN: [id number]    Date of Admission:  1/14/2024  Primary Care Physician: Melo Whitaker MD    Subjective   Subjective     CC:  Left toe ischemia    HPI:  Patient continues to have significant pain of his left lower extremity.  Afebrile overnight, awake and alert.      Objective   Objective     Vital Signs:   Temp:  [96.6 °F (35.9 °C)-100 °F (37.8 °C)] 96.6 °F (35.9 °C)  Heart Rate:  [] 99  Resp:  [14-16] 16  BP: ()/(46-82) 141/82     Physical Exam:  Constitutional: No acute distress, awake, alert  Respiratory: Clear to auscultation bilaterally, respiratory effort normal   Cardiovascular: RRR, no murmurs, rubs, or gallops  Gastrointestinal: Positive bowel sounds, soft, nontender, nondistended  Musculoskeletal: No bilateral ankle edema  Psychiatric: Appropriate affect, cooperative  Neurologic: Oriented x 3, no focal deficits  Skin: Necrosis of left second third and fourth toes, rubor to mid calf      Results Reviewed:  LAB RESULTS:      Lab 01/15/24  0359 01/14/24  2111 01/14/24  1512   WBC 11.86*  --  13.82*   HEMOGLOBIN 10.7*  --  11.8*   HEMATOCRIT 32.5*  --  36.9*   PLATELETS 257  --  333   NEUTROS ABS 10.16*  --  9.47*   IMMATURE GRANS (ABS) 0.09*  --  0.17*   LYMPHS ABS 1.24  --  2.57   MONOS ABS 0.36  --  1.48*   EOS ABS 0.00  --  0.10   MCV 92.3  --  92.9   SED RATE  --   --  100*   CRP  --   --  2.60*   PROCALCITONIN  --   --  0.19   LACTATE  --   --  1.2   PROTIME  --  15.0*  --    APTT 38.3* 38.7*  --    HEPARIN ANTI-XA  --  0.14*  --          Lab 01/15/24  0359 01/14/24  1512   SODIUM 138 134*   POTASSIUM 4.9 4.1   CHLORIDE 106 96*   CO2 23.0 25.0   ANION GAP 9.0 13.0   BUN 22* 30*   CREATININE 0.90 2.07*   EGFR 104.0 38.3*   GLUCOSE 221* 302*   CALCIUM 9.5 10.0   MAGNESIUM  --  2.1         Lab 01/14/24  1512   TOTAL PROTEIN 7.5   ALBUMIN 3.8   GLOBULIN 3.7   ALT (SGPT) 13   AST (SGOT) 14   BILIRUBIN 0.2   ALK PHOS 83         Lab 01/14/24  2111 01/14/24  1512   PROBNP  --  63.8   HSTROP T  --  18   PROTIME 15.0*  --    INR  1.17*  --                  Brief Urine Lab Results       None            Microbiology Results Abnormal       Procedure Component Value - Date/Time    MRSA Screen, PCR (Inpatient) - Swab, Nares [[id number]]  (Normal) Collected: 01/14/24 1817    Lab Status: Final result Specimen: Swab from Nares Updated: 01/14/24 2152     MRSA PCR No MRSA Detected    Narrative:      The negative predictive value of this diagnostic test is high and should only be used to consider de-escalating anti-MRSA therapy. A positive result may indicate colonization with MRSA and must be correlated clinically.            CT Angio Abdominal Aorta Bilateral Iliofem Runoff    Result Date: 1/14/2024  CT ANGIO ABDOMINAL AORTA BILAT ILIOFEM RUNOFF Date of Exam: 1/14/2024 5:34 PM EST Indication: Claudication or leg ischemia Previous fem pop graft occlusion, c/o increased LLE pain, color changes, third/fourth toe ischemia, leukocytosis, r/o gas. Comparison: None available. Technique: CTA of the abdomen, pelvis and both lower extremities was performed after the uneventful intravenous administration of 120 cc Isovue-370 . Reconstructed coronal and sagittal images were also obtained. In addition, a 3-D volume rendered image was created for interpretation. Automated exposure control and iterative reconstruction methods were used. The origins of the celiac axis and superior mesenteric arteries are normal. The origins of the renal arteries are normal. Mild atheromatous disease of the abdominal aorta with subtle ectasia. The origin of the inferior mesenteric artery is patent. Complete occlusion of the left common iliac artery with continued complete loss of flow within the left internal and external iliac arteries. Multi focal atheromatous disease of the right common iliac artery as well as the internal and external iliac arteries. Femorofemoral bypass. Complete occlusion of the left femoral artery bypass with no underlying flow. Reconstitution of flow distally  at the level of the popliteal artery due to branches from the deep femoral artery. The superior segments of the left anterior and posterior tibial arteries are patent. Eventual loss of flow within the posterior tibial artery seen on axial image 383. Continued flow within the dorsalis pedis with eventual loss of flow within the arteries. On the right there is continued flow within the right deep femoral artery. Complete occlusion of the left femoral artery with distal reconstitution from branches of the deep femoral artery. The popliteal artery is patent. The anterior and posterior tibial vessels are patent proximally. Loss of flow within the anterior tibial artery on axial image #343. Loss of the contrast column within the posterior tibial artery on axial image 417. The liver appears normal. Calcified granulomata within the spleen. Normal pancreas. Prior cholecystectomy. The adrenal glands are normal. No renal masses. No hydroureteronephrosis. The ureters and urinary bladder are normal. The small bowel is nonobstructed. Normal appendix. Colonic diverticulosis without evidence of diverticulitis. No ascites or pneumoperitoneum. No adenopathy. Degenerative changes of the hips and lumbar spine.     Impression: Complete occlusion of the left femoral artery bypass graft. Complete occlusion of the right femoral artery. Eventual distal reconstitution of both vessels due to branches of the deep femoral arteries. Loss of flow within the left posterior tibial artery. Loss of flow within the right anterior tibial artery. Eventual loss of flow within the contrast column on both sides with no residual flow to the digital arteries. Electronically Signed: Kee Cartagena MD  1/14/2024 6:08 PM EST  Workstation ID: [id number]    XR Chest 1 View    Result Date: 1/14/2024  XR CHEST 1 VW Date of Exam: 1/14/2024 3:02 PM EST Indication: Weak/Dizzy/AMS triage protocol Comparison: None available. Findings: No focal consolidation. No pneumothorax  or pleural effusion. Cardiac size is normal. The visualized clavicles appear intact. No displaced rib fractures. The visualized upper abdomen is normal.     Impression: Impression: No acute cardiopulmonary disease. Electronically Signed: Kee Cartagena MD  1/14/2024 3:18 PM EST  Workstation ID: [id number]     Results for orders placed during the hospital encounter of 11/20/23    Adult Transthoracic Echo Complete W/ Cont if Necessary Per Protocol    Interpretation Summary    Left ventricular systolic function is hyperdynamic (EF > 70%). Calculated left ventricular EF = 70.4%    Left ventricular diastolic function was normal.    No significant structural or functional valvular disease.      Current medications:  Scheduled Meds:aspirin, 81 mg, Oral, Nightly  cefTRIAXone, 1,000 mg, Intravenous, Q24H  gabapentin, 600 mg, Oral, Q8H  hydrocortisone sodium succinate, 100 mg, Intravenous, Q8H  insulin lispro, 2-7 Units, Subcutaneous, 4x Daily AC & at Bedtime  metoprolol tartrate, 25 mg, Oral, Q12H  nicotine, 1 patch, Transdermal, Nightly  oxyCODONE, 10 mg, Oral, 4x Daily  rosuvastatin, 40 mg, Oral, Nightly  senna-docusate sodium, 2 tablet, Oral, BID  sodium chloride, 10 mL, Intravenous, Q12H  vancomycin, 1,000 mg, Intravenous, Q12H      Continuous Infusions:heparin, 15 Units/kg/hr, Last Rate: 15 Units/kg/hr (01/15/24 0527)  Pharmacy to Dose Heparin,   Pharmacy to dose vancomycin,   sodium chloride, 125 mL/hr, Last Rate: 125 mL/hr (01/14/24 2200)      PRN Meds:.  senna-docusate sodium **AND** polyethylene glycol **AND** bisacodyl **AND** bisacodyl    Calcium Replacement - Follow Nurse / BPA Driven Protocol    cyclobenzaprine    dextrose    dextrose    glucagon (human recombinant)    HYDROmorphone **AND** naloxone    LORazepam    Magnesium Standard Dose Replacement - Follow Nurse / BPA Driven Protocol    nicotine polacrilex    nitroglycerin    ondansetron ODT **OR** ondansetron    oxyCODONE    Pharmacy to Dose Heparin    Pharmacy  to dose vancomycin    Phosphorus Replacement - Follow Nurse / BPA Driven Protocol    Potassium Replacement - Follow Nurse / BPA Driven Protocol    sodium chloride    sodium chloride    temazepam    Assessment & Plan   Assessment & Plan     Active Hospital Problems    Diagnosis  POA    **Ischemic foot [I99.8]  Yes    Sepsis associated hypotension [A41.9, I95.9]  Yes    ATN (acute tubular necrosis) [N17.0]  Yes    Elevated serum creatinine [R79.89]  Yes    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 [I96]  Unknown    Claudication of lower extremity s/p femorofemoral bypass [I73.9]  Yes    Hypertension [I10]  Yes    Hyperlipidemia [E78.5]  Yes    Tobacco abuse [Z72.0]  Yes    Diabetes mellitus type II, non insulin dependent [E11.9]  Yes    Peripheral arterial disease [I73.9]  Yes      Resolved Hospital Problems   No resolved problems to display.        Brief Hospital Course to date:  Justo Oquendo is a 48 y.o. male ongoing heavy tobacco smoker with a known history of severe PAD, status post thrombectomy of left femoropopliteal bypass graft as well as placement of a femorofemoral bypass by Dr. Ryan on 11/21/2023 who presented with lethargy, decreased urine output and confusion.  Patient saw CT surgery in December 2023 and outpatient referral was made to Dr. Peres of orthopedic surgery for consideration of BKA for critical focal peripheral arterial disease.    Sepsis (AMS, WBC, hypotension, ischemic necrosis)  Metabolic encephalopathy  Ischemic ulcers and necrosis of LLE 3-4th digits  Progressive PAD with occlusion of Fem-Fem graft  -Initially hypotensive with systolics in the 80s, now improved after receiving hydrocortisone and 2.2 L of fluids.  - CTA of the abdominal aorta with runoff shows complete occlusion of the left femoral artery bypass graft, complete occlusion of the right femoral artery.  Eventual distal reconstitution of both vessels due to the branches of the deep femoral arteries, loss of flow within the  left posterior tibial artery, loss of flow within the right anterior tibial artery.  No residual flow to the distal arteries.  -Blood cultures obtained and remain negative  -Continue IV Rocephin and Vanc empirically   -Continue IV heparin.  Holding home Xarelto  -Continue ASA, statin  -Consult Dr. ePres as patient likely requiring amputation (patient previously had an outpatient appointment for preop with Dr. Peres on 1/15/2024).  -Consulted Dr. Pena for consideration of proximal femorofemoral bypass revision per Dr. Peres's recommendations     Uncontrolled pain   - opioid tolerant.continue scheduled PO oxy QID with additional PRN for breakthrough, also prn IV dilaudid     ALEJANDRO, resolved  -Creatinine significantly elevated 2.07 on presentation, down to baseline of 0.9  - pre-renal, likely hypotension related, now resolved with hydrocortisone  -Continue IV fluids  - repeat am BMP     NIDDM2  -Continue sliding scale insulin     HTN  HL  - hold ACEI due to elevated creatinine/hypotension     Ongoing heavy tobacco smoker  - nicotine patch and gum prn        Expected Discharge Location and Transportation: home vs rehab  Expected Discharge   Expected Discharge Date: 1/19/2024; Expected Discharge Time:      DVT prophylaxis:  Medical DVT prophylaxis orders are present.     AM-PAC 6 Clicks Score (PT): 20 (01/15/24 2566)    CODE STATUS:   Code Status and Medical Interventions:   Ordered at: 01/14/24 2055     Code Status (Patient has no pulse and is not breathing):    CPR (Attempt to Resuscitate)     Medical Interventions (Patient has pulse or is breathing):    Full Support     I have prepared this progress note with copied portions of the prior day's progress note of my own authorship to preserve accuracy and maintain consistency of documentation. I have reviewed these portions and edited them for correctness. I verify that the above documentation accurately and truly represents the evaluation and management  performed on today's date.       Rayna Sanchez MD  01/15/24        Electronically signed by Rayna Sanchez MD at 01/15/24 1005          Consult Notes (all)        Celso Hoyt MD at 24 1204        Consult Orders    1. Inpatient Cardiology Consult [[id number]] ordered by Rayna Sanchez MD                 [facility]   Consult Note    Patient Name: Justo Oquendo  : 1975  MRN: [id number]  Primary Care Physician:  Melo Whitaker MD  Referring Physician: No ref. provider found  Date of admission: 2024    Inpatient Cardiology Consult  Consult performed by: Celso Hoyt MD  Consult ordered by: Rayna Sanchez MD  Reason for consult: HTN        Subjective   Subjective     Problem List  -femorofemoral bypass complicated by complete occlusion of left femoral artery bypass graft.  Ischemic left foot with plans for surgery on on 24  -poorly controlled BP  -DM  -HLD  -smoker  -EKG sinus tachycardia, echo preserved LV function      Mr. Oquendo is a 48 year old man who cardiology consulted for medical optimization prior to vascular surgery Thursday.  No shortness of breath, chest pain or palpitations.  I saw him last year in the hospital for uncontrolled BP.  Was reasonably controlled at discharge.  Currently elevated.  He was supposed to see me for various reasons didn't.  Little concerned regarding if taking his medications appropriately.  Currently admitted with gangernous toes and foot ischemia.  He smokes daily.  ROS negative except for the above.        Review of Systems     Personal History     Past Medical History:   Diagnosis Date    Arthritis     Back pain     Diabetes     SINCE 2017    Dyslipidemia     HTN (hypertension)     PVD (peripheral vascular disease)     Wears partial dentures        Past Surgical History:   Procedure Laterality Date    AORTAGRAM N/A 2017    Procedure: AORTAGRAM WITH OR WITHOUT RUNOFFS POSSIBLE STENT with left  femoral cutdown;  Surgeon: Brett Ryan MD;  Location:  [facility];  Service:     AORTAGRAM N/A 11/21/2023    Procedure: THROMBECTOMY OF LEFT GRAFT, AORTAGRAM, FEMORAL TO FEMORAL BYPASS;  Surgeon: Brett Ryan MD;  Location: [facility];  Service: Vascular;  Laterality: N/A;  FLUORO- .06 SECS  DOSE- 10 MGY  CONTRAST- 10 ML ISO    CHOLECYSTECTOMY      CYST REMOVAL      OFF OF BACK    CA IN-SITU VEIN BYPASS FEMORAL-POPLITEAL Left 04/25/2017    Procedure: FEMORAL POPLITEAL BYPASS;  Surgeon: Brett Ryan MD;  Location:  [facility];  Service: Vascular    CA IN-SITU VEIN BYPASS FEMORAL-POPLITEAL Left 06/27/2017    Procedure: LEFT FEMORAL ENDARTECTOMYN LEFT FEMORAL POPLITEAL BYPASS;  Surgeon: Brett Ryan MD;  Location: [facility];  Service: Vascular       Family History: family history includes Arthritis in his mother; Kidney disease in his father; Liver disease in his father. Otherwise pertinent FHx was reviewed and not pertinent to current issue.    Social History:  reports that he has been smoking cigarettes. He has a 30.00 pack-year smoking history. He has never used smokeless tobacco. He reports that he does not drink alcohol and does not use drugs.    Home Medications:   Empagliflozin-metFORMIN HCl, Fenofibrate, HYDROcodone-acetaminophen, Rivaroxaban, Semaglutide, aspirin, cyclobenzaprine, diphenhydrAMINE HCl, gabapentin, lisinopril, metoprolol tartrate, oxyCODONE, and rosuvastatin    Allergies:  No Known Allergies    Objective    Objective     Vitals:  Temp:  [97.1 °F (36.2 °C)-98.3 °F (36.8 °C)] 98.3 °F (36.8 °C)  Heart Rate:  [] 73  Resp:  [14-18] 18  BP: (134-157)/(70-79) 143/76  Flow (L/min):  [2.5] 2.5    Physical Exam  HENT:      Head: Normocephalic.   Eyes:      Extraocular Movements: Extraocular movements intact.   Cardiovascular:      Rate and Rhythm: Normal rate and regular rhythm.      Heart sounds: No murmur heard.     No gallop.   Pulmonary:       Breath sounds: Normal breath sounds.   Abdominal:      Palpations: Abdomen is soft.   Musculoskeletal:      Right lower leg: No edema.      Left lower leg: No edema.   Skin:     General: Skin is warm and dry.   Neurological:      General: No focal deficit present.      Mental Status: He is alert.   Psychiatric:         Mood and Affect: Mood normal.         Result Review    Result Review:  I have personally reviewed the results from the time of this admission to 1/16/2024 12:04 EST and agree with these findings:  []  Laboratory list / accordion  []  Microbiology  []  Radiology  []  EKG/Telemetry   []  Cardiology/Vascular   []  Pathology  []  Old records  []  Other:      Assessment & Plan   Assessment / Plan     Assessment  -femorofemoral bypass complicated by complete occlusion of left femoral artery bypass graft.  Ischemic left foot with plans for surgery on on 1/18/24  -poorly controlled BP  -DM  -HLD  -smoker  -EKG sinus tachycardia, echo preserved LV function    Plan:   -He is higher risk for surgery however has leg ischemia and need urgent surgery.  Not having active cardiac complaints.  Can proceed with surgery without further CV risk stratification.    -on metoprolol.  Resume lisinopril 40mg.  Add 10mg of amlodipine  -agree with antiplatelet therapy, current anticoagulation and statin  -will follow       Celso Hoyt MD      Electronically signed by Celso Hoyt MD at 01/16/24 1236       Vin Pena MD at 01/15/24 1617        Consult Orders    1. Inpatient Vascular Surgery Consult [[id number]] ordered by Christo Peres Jr., MD                 Inpatient Vascular Surgery Consult  Consult performed by: Vin Pena MD  Consult ordered by: Christo Peres Jr., MD  Reason for consult: LEFT 2nd-4th toe gangrene  Assessment/Recommendations:   - needs cardiac optimization  - tentatively plan for graft thrombectomy, redo bypass, tibial angioplasty 1/18/24  - heparin  [facility]          Patient Care Team:  Melo Whitaker MD as PCP - General (Family Medicine)  Niall Mcfarlane MD as Consulting Physician (Cardiology)    Chief complaint:  LEFT 2nd-4th toe gangrene    Subjective     History of Present Illness  48 year old  gentleman, a patient of Dr. Melo Whitaker, who previously underwent 6/27/17 LEFT CFA-AKP prosthetic bypass and 11/21/23 fem-fem bypass.  He developed LEFT 4th toe dry gangrenous changes in early January '24 with worsening discoloration of the 2nd-3rd toes.  He was seen previously and recommended for LEFT leg amputation.  He presented to [facility] ER 1/14/24 and a CTA was obtained demonstrating multi-level disease including LEFT CFA-AKP occlusion with reconstitution of the BKP with possible tibial reconstitution.  He was admitted on a heparin drip and vascular surgery consultation is requested to improve perfusion for possible amputation.      Review of Systems   Constitutional: Negative.    HENT: Negative.     Eyes: Negative.    Respiratory: Negative.     Cardiovascular: Negative.    Gastrointestinal: Negative.    Endocrine: Negative.    Genitourinary: Negative.    Musculoskeletal: Negative.    Skin:  Positive for color change (LEFT 2nd-4th toes).   Allergic/Immunologic: Negative.    Neurological: Negative.    Hematological: Negative.    Psychiatric/Behavioral: Negative.          Past Medical History:   Diagnosis Date    Arthritis     Back pain     Diabetes     SINCE MARCH 2017    Dyslipidemia     HTN (hypertension)     PVD (peripheral vascular disease)     Wears partial dentures    ,   Past Surgical History:   Procedure Laterality Date    AORTAGRAM N/A 04/25/2017    Procedure: AORTAGRAM WITH OR WITHOUT RUNOFFS POSSIBLE STENT with left femoral cutdown;  Surgeon: Brett Ryan MD;  Location: [place] [address];  Service:     AORTAGRAM N/A 11/21/2023    Procedure: THROMBECTOMY OF LEFT GRAFT, AORTAGRAM, FEMORAL TO FEMORAL BYPASS;  Surgeon: Connor  Brett DOBBS MD;  Location:  [facility];  Service: Vascular;  Laterality: N/A;  FLUORO- .06 SECS  DOSE- 10 MGY  CONTRAST- 10 ML ISO    CHOLECYSTECTOMY      CYST REMOVAL      OFF OF BACK    NE IN-SITU VEIN BYPASS FEMORAL-POPLITEAL Left 04/25/2017    Procedure: FEMORAL POPLITEAL BYPASS;  Surgeon: Brett Ryan MD;  Location:  [facility];  Service: Vascular    NE IN-SITU VEIN BYPASS FEMORAL-POPLITEAL Left 06/27/2017    Procedure: LEFT FEMORAL ENDARTECTOMYN LEFT FEMORAL POPLITEAL BYPASS;  Surgeon: Brett Ryan MD;  Location:  [facility];  Service: Vascular   ,   Family History   Problem Relation Age of Onset    Arthritis Mother     Liver disease Father     Kidney disease Father    ,   Medications Prior to Admission   Medication Sig Dispense Refill Last Dose    aspirin 81 MG EC tablet Take 1 tablet by mouth Every Night.   1/13/2024    cyclobenzaprine (FLEXERIL) 10 MG tablet Take 1 tablet by mouth 3 (Three) Times a Day As Needed for Muscle Spasms.   Past Week    diphenhydrAMINE HCl (NERVINE PO) Take 1 capsule by mouth Daily.   1/13/2024    Empagliflozin-metFORMIN HCl (Synjardy) 12.5-1000 MG tablet Take 1 tablet by mouth 2 (Two) Times a Day. Patient receives samples of this med from his PCP   1/13/2024    FENOFIBRATE PO Take 200 mg by mouth Every Night.   1/13/2024    gabapentin (NEURONTIN) 600 MG tablet Take 1 tablet by mouth 3 (Three) Times a Day.   1/13/2024    lisinopril (PRINIVIL,ZESTRIL) 40 MG tablet Take 1 tablet by mouth Every Night. 30 tablet 6 1/13/2024    metoprolol tartrate (LOPRESSOR) 25 MG tablet Take 1 tablet by mouth Every 12 (Twelve) Hours. 30 tablet 6 1/13/2024    oxyCODONE (ROXICODONE) 20 MG tablet Take 1 tablet by mouth Every 4 (Four) Hours As Needed for Moderate Pain.   Past Week    Rivaroxaban (XARELTO) 2.5 MG tablet Take 1 tablet by mouth 2 (Two) Times a Day. Start taking on Sunday, November 26. This will be your first dose. Take your last dose of Clopidogrel on Sunday, November  26 and stop the Clopidogrel. 60 tablet 6 1/13/2024    rosuvastatin (CRESTOR) 40 MG tablet Take 1 tablet by mouth Every Night.   1/13/2024    Semaglutide 3 MG tablet Take 1 tablet by mouth Daily. Patient receives samples of this med from his PCP   1/13/2024    HYDROcodone-acetaminophen (NORCO)  MG per tablet Take 1 tablet by mouth Every 8 (Eight) Hours As Needed for Moderate Pain. (Patient not taking: Reported on 1/14/2024)   Not Taking   , and Allergies:  Patient has no known allergies.    Objective      Vital Signs  Temp:  [96.6 °F (35.9 °C)-98.7 °F (37.1 °C)] 96.6 °F (35.9 °C)  Heart Rate:  [] 84  Resp:  [16] 16  BP: ()/(46-82) 140/66    Physical Exam  Vitals reviewed. Chaperone present: wife and daughters x2 at bedside.   Constitutional:       General: He is not in acute distress.     Appearance: Normal appearance. He is normal weight. He is not ill-appearing or toxic-appearing.   HENT:      Head: Normocephalic and atraumatic.      Nose: Nose normal.      Mouth/Throat:      Mouth: Mucous membranes are moist.      Pharynx: Oropharynx is clear.   Eyes:      Extraocular Movements: Extraocular movements intact.      Conjunctiva/sclera: Conjunctivae normal.      Pupils: Pupils are equal, round, and reactive to light.   Cardiovascular:      Rate and Rhythm: Normal rate and regular rhythm.   Pulmonary:      Effort: Pulmonary effort is normal. No respiratory distress.   Abdominal:      General: Abdomen is flat. There is no distension.      Palpations: Abdomen is soft.      Tenderness: There is no abdominal tenderness. There is no guarding or rebound.   Musculoskeletal:      Cervical back: Normal range of motion and neck supple.   Skin:     Comments: LEFT 2nd-4th toe dry gangrene without erythema   Neurological:      General: No focal deficit present.      Mental Status: He is alert and oriented to person, place, and time.      Motor: No weakness (LEFT foot motor preserved).   Psychiatric:         Mood  and Affect: Mood normal.         Behavior: Behavior normal.         Results Review:    I reviewed the patient's new clinical results.  I reviewed the patient's new imaging results and agree with the interpretation.  Discussed with Home Peres and Daniel      Discussion  I diagrammed and discussed the CTA findings with the patient and his family specifically highlighting his graft anatomy on the LEFT.  His fem-fem graft is patent with flow seen into the PFA, but the CFA-AKP bypass is occluded with faint reconstitution of the BKP with poor visualization of the tibial run-off.  I reviewed the possibility of graft thrombectomy with possible jump-graft to the BKP as well.  He will need cardiac optimization in anticipation of this procedure.  All questions answered to the family's and patient's satisfaction.      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  Time spent in counseling 20 min. Counseling included the following topics: diabetes, smoking cessation, arterial anatomy, operative considerations, possible post-surgical outcomes        Electronically signed  by Vin Pena MD at 01/15/24 1629       Christo Peres Jr., MD at 01/15/24 4677        Consult Orders    1. Inpatient Orthopedic Surgery Consult [[id number]] ordered by Ashley Lee MD at 01/14/24 2055                 [facility], [facility]  [address]  [phone]       Orthopedic Consult    Patient: Justo Oquendo    Date of Admission: 1/14/2024  2:52 PM    YOB: 1975    Medical Record Number: [id number]    Attending Physician: Rayna Sanchez MD    Consulting Physician: Christo Peres Jr, MD    Chief Complaint: Ischemic foot [I99.8]    History of Present Illness: 48 y.o. male admitted to [facility] [place], operated by [facility] with Ischemic foot [I99.8].  He is status post bypass in 2017, thrombectomy more recently, with critical limb ischemia.     No Known Allergies     Home Medications:  Medications Prior to Admission   Medication Sig Dispense Refill Last Dose    aspirin 81 MG EC tablet Take 1 tablet by mouth Every Night.   1/13/2024    cyclobenzaprine (FLEXERIL) 10 MG tablet Take 1 tablet by mouth 3 (Three) Times a Day As Needed for Muscle Spasms.   Past Week    diphenhydrAMINE HCl (NERVINE PO) Take 1 capsule by mouth Daily.   1/13/2024    Empagliflozin-metFORMIN HCl (Synjardy) 12.5-1000 MG tablet Take 1 tablet by mouth 2 (Two) Times a Day. Patient receives samples of this med from his PCP   1/13/2024    FENOFIBRATE PO Take 200 mg by mouth Every Night.   1/13/2024    gabapentin (NEURONTIN) 600 MG tablet Take 1 tablet by mouth 3 (Three) Times a Day.   1/13/2024    lisinopril (PRINIVIL,ZESTRIL) 40 MG tablet Take 1 tablet by mouth Every Night. 30 tablet 6 1/13/2024    metoprolol tartrate (LOPRESSOR) 25 MG tablet Take 1 tablet by mouth Every 12 (Twelve) Hours. 30 tablet 6 1/13/2024    oxyCODONE (ROXICODONE) 20 MG tablet Take 1 tablet by mouth Every 4 (Four) Hours As Needed for Moderate Pain.   Past Week    Rivaroxaban (XARELTO) 2.5 MG tablet Take 1 tablet by mouth 2 (Two)  Times a Day. Start taking on Sunday, November 26. This will be your first dose. Take your last dose of Clopidogrel on Sunday, November 26 and stop the Clopidogrel. 60 tablet 6 1/13/2024    rosuvastatin (CRESTOR) 40 MG tablet Take 1 tablet by mouth Every Night.   1/13/2024    Semaglutide 3 MG tablet Take 1 tablet by mouth Daily. Patient receives samples of this med from his PCP   1/13/2024    HYDROcodone-acetaminophen (NORCO)  MG per tablet Take 1 tablet by mouth Every 8 (Eight) Hours As Needed for Moderate Pain. (Patient not taking: Reported on 1/14/2024)   Not Taking       Current Medications:  Scheduled Meds:aspirin, 81 mg, Oral, Nightly  cefTRIAXone, 1,000 mg, Intravenous, Q24H  gabapentin, 600 mg, Oral, Q8H  hydrocortisone sodium succinate, 100 mg, Intravenous, Q8H  insulin lispro, 2-7 Units, Subcutaneous, 4x Daily AC & at Bedtime  metoprolol tartrate, 25 mg, Oral, Q12H  nicotine, 1 patch, Transdermal, Nightly  oxyCODONE, 10 mg, Oral, 4x Daily  rosuvastatin, 40 mg, Oral, Nightly  senna-docusate sodium, 2 tablet, Oral, BID  sodium chloride, 10 mL, Intravenous, Q12H  vancomycin, 1,000 mg, Intravenous, Q12H      Continuous Infusions:heparin, 15 Units/kg/hr, Last Rate: 15 Units/kg/hr (01/15/24 0540)  Pharmacy to Dose Heparin,   Pharmacy to dose vancomycin,   sodium chloride, 125 mL/hr, Last Rate: 125 mL/hr (01/14/24 2200)      PRN Meds:.  senna-docusate sodium **AND** polyethylene glycol **AND** bisacodyl **AND** bisacodyl    Calcium Replacement - Follow Nurse / BPA Driven Protocol    cyclobenzaprine    dextrose    dextrose    glucagon (human recombinant)    HYDROmorphone **AND** naloxone    LORazepam    Magnesium Standard Dose Replacement - Follow Nurse / BPA Driven Protocol    nicotine polacrilex    nitroglycerin    ondansetron ODT **OR** ondansetron    oxyCODONE    Pharmacy to Dose Heparin    Pharmacy to dose vancomycin    Phosphorus Replacement - Follow Nurse / BPA Driven Protocol    Potassium Replacement  - Follow Nurse / BPA Driven Protocol    sodium chloride    sodium chloride    temazepam    Past Medical History:   Diagnosis Date    Arthritis     Back pain     Diabetes     SINCE MARCH 2017    Dyslipidemia     HTN (hypertension)     PVD (peripheral vascular disease)     Wears partial dentures         Past Surgical History:   Procedure Laterality Date    AORTAGRAM N/A 04/25/2017    Procedure: AORTAGRAM WITH OR WITHOUT RUNOFFS POSSIBLE STENT with left femoral cutdown;  Surgeon: Brett Ryan MD;  Location:  [facility];  Service:     AORTAGRAM N/A 11/21/2023    Procedure: THROMBECTOMY OF LEFT GRAFT, AORTAGRAM, FEMORAL TO FEMORAL BYPASS;  Surgeon: Brett Ryan MD;  Location:  [facility];  Service: Vascular;  Laterality: N/A;  FLUORO- .06 SECS  DOSE- 10 MGY  CONTRAST- 10 ML ISO    CHOLECYSTECTOMY      CYST REMOVAL      OFF OF BACK    MN IN-SITU VEIN BYPASS FEMORAL-POPLITEAL Left 04/25/2017    Procedure: FEMORAL POPLITEAL BYPASS;  Surgeon: Brett Ryan MD;  Location:  [facility];  Service: Vascular    MN IN-SITU VEIN BYPASS FEMORAL-POPLITEAL Left 06/27/2017    Procedure: LEFT FEMORAL ENDARTECTOMYN LEFT FEMORAL POPLITEAL BYPASS;  Surgeon: Brett Ryan MD;  Location:  [facility];  Service: Vascular        Social History     Occupational History    Occupation:      Comment: [employer]   Tobacco Use    Smoking status: Every Day     Packs/day: 1.00     Years: 30.00     Additional pack years: 0.00     Total pack years: 30.00     Types: Cigarettes    Smokeless tobacco: Never    Tobacco comments:     Pt states that he is trying to quit, was once only smoking a few a day but has increased to 1 ppd, Pt states that he has smoked 30 plus years. Pt is smoking 7 or 8 a day as of 12/20/23.    Vaping Use    Vaping Use: Never used   Substance and Sexual Activity    Alcohol use: No    Drug use: No    Sexual activity: Yes     Partners: Female     Birth control/protection:  None      Social History     Social History Narrative    Lives in [place], [place]        Family History   Problem Relation Age of Onset    Arthritis Mother     Liver disease Father     Kidney disease Father          Review of Systems:   HEENT: Patient denies any headaches, vision changes, change in hearing, or tinnitus, Patient denies any rhinorrhea,epistaxis, sinus pain, mouth or dental problems, sore throat or hoarseness, or dysphagia  Pulmonary: Patient denies any cough, congestion, SOA, or wheezing  Cardiovascular: Patient denies any chest pain, dyspnea, palpitations, weakness, intolerance of exercise, varicosities, swelling of extremities, known murmur  Gastrointestinal:  Patient denies nausea, vomiting, diarrhea, constipation, loss  of appetite, change in appetite, dysphagia, gas, heartburn, melena, change in bowel habits, use of laxatives or other drugs to alter the function of the gastrointestinal tract.  Genital/Urinary: Patient denies dysuria, change in color of urine, change in frequency of urination, pain with urgency, incontinence, retention, or nocturia.  Musculoskeletal: Patient denies increased warmth; redness; or swelling of joints; limitation of function; deformity; crepitation: pain in a joint or an extremity, the neck, or the back, especially with movement.  Neurological: Patient denies dizziness, tremor, ataxia, difficulty in speaking, change in speech, paresthesia, loss of sensation, seizures, syncope, changes in memory.  Endocrine system: Patient denies tremors, palpitations, intolerance of heat or cold, polyuria, polydipsia, polyphagia, diaphoresis, exophthalmos, or goiter.  Psychological: Patient denies thoughts/plans or harming self or other; depression,  insomnia, night terrors, khoi, memory loss, disorientation.  Skin: Patient denies any bruising, rashes, discoloration, pruritus, wounds, ulcers, decubiti, changes in the hair or nails  Hematopoietic: Patient denies history of spontaneous or  excessive bleeding, epistaxis, hematuria, melena, fatigue, enlarged or tender lymph nodes, pallor, history of anemia.    Physical Exam: 48 y.o. male  General Appearance:    Alert, cooperative, in no acute distress                   Vitals:    01/15/24 0100 01/15/24 0300 01/15/24 0405 01/15/24 0500   BP:   141/82    BP Location:   Left arm    Patient Position:   Sitting    Pulse: 103 95 99 99   Resp:   16    Temp:   96.6 °F (35.9 °C)    TempSrc:   Oral    SpO2: 93% 93% 96% 95%   Weight:       Height:            Head:    Normocephalic, without obvious abnormality, atraumatic   Eyes:            Lids and lashes normal, conjunctivae and sclerae normal, no icterus, no pallor, corneas clear, PERRLA   Ears:    Ears appear intact with no abnormalities noted   Throat:   No oral lesions, no thrush, oral mucosa moist   Back:     No C-T-L spine tenderness   Lungs:     Clear to auscultation,respirations regular, even and                unlabored   Chest Wall:    No abnormalities observed   Abdomen:     Normal bowel sounds, no masses, no organomegaly, soft      non-tender, non-distended, no guarding, no rebound              tenderness   Extremities: Gangrenous changes of second through fourth toe.  Nonpalpable pulses.  Redness about the foot.   Pulses:   Pulses palpable and equal bilaterally   Skin:   No bleeding, bruising or rash   Lymph nodes:   No palpable adenopathy   Neurologic:  Cranial nerves 2 - 12 grossly intact, sensation intact, DTR     present and equal bilaterally           Diagnostic Tests:    Admission on 01/14/2024   Component Date Value Ref Range Status    QT Interval 01/14/2024 332  ms Preliminary    QTC Interval 01/14/2024 430  ms Preliminary    Glucose 01/14/2024 302 (H)  65 - 99 mg/dL Final    BUN 01/14/2024 30 (H)  6 - 20 mg/dL Final    Creatinine 01/14/2024 2.07 (H)  0.76 - 1.27 mg/dL Final    Sodium 01/14/2024 134 (L)  136 - 145 mmol/L Final    Potassium 01/14/2024 4.1  3.5 - 5.2 mmol/L Final    Chloride  01/14/2024 96 (L)  98 - 107 mmol/L Final    CO2 01/14/2024 25.0  22.0 - 29.0 mmol/L Final    Calcium 01/14/2024 10.0  8.6 - 10.5 mg/dL Final    Total Protein 01/14/2024 7.5  6.0 - 8.5 g/dL Final    Albumin 01/14/2024 3.8  3.5 - 5.2 g/dL Final    ALT (SGPT) 01/14/2024 13  1 - 41 U/L Final    AST (SGOT) 01/14/2024 14  1 - 40 U/L Final    Alkaline Phosphatase 01/14/2024 83  39 - 117 U/L Final    Total Bilirubin 01/14/2024 0.2  0.0 - 1.2 mg/dL Final    Globulin 01/14/2024 3.7  gm/dL Final    Calculated Result    A/G Ratio 01/14/2024 1.0  g/dL Final    BUN/Creatinine Ratio 01/14/2024 14.5  7.0 - 25.0 Final    Anion Gap 01/14/2024 13.0  5.0 - 15.0 mmol/L Final    eGFR 01/14/2024 38.3 (L)  >60.0 mL/min/1.73 Final    HS Troponin T 01/14/2024 18  <22 ng/L Final    Magnesium 01/14/2024 2.1  1.6 - 2.6 mg/dL Final    Extra Tube 01/14/2024 Hold for add-ons.   Final    Auto resulted.    Extra Tube 01/14/2024 hold for add-on   Final    Auto resulted    Extra Tube 01/14/2024 Hold for add-ons.   Final    Auto resulted.    Extra Tube 01/14/2024 Hold for add-ons.   Final    Auto resulted.    Extra Tube 01/14/2024 Hold for add-ons.   Final    Auto resulted    WBC 01/14/2024 13.82 (H)  3.40 - 10.80 10*3/mm3 Final    RBC 01/14/2024 3.97 (L)  4.14 - 5.80 10*6/mm3 Final    Hemoglobin 01/14/2024 11.8 (L)  13.0 - 17.7 g/dL Final    Hematocrit 01/14/2024 36.9 (L)  37.5 - 51.0 % Final    MCV 01/14/2024 92.9  79.0 - 97.0 fL Final    MCH 01/14/2024 29.7  26.6 - 33.0 pg Final    MCHC 01/14/2024 32.0  31.5 - 35.7 g/dL Final    RDW 01/14/2024 11.8 (L)  12.3 - 15.4 % Final    RDW-SD 01/14/2024 39.8  37.0 - 54.0 fl Final    MPV 01/14/2024 10.2  6.0 - 12.0 fL Final    Platelets 01/14/2024 333  140 - 450 10*3/mm3 Final    Neutrophil % 01/14/2024 68.6  42.7 - 76.0 % Final    Lymphocyte % 01/14/2024 18.6 (L)  19.6 - 45.3 % Final    Monocyte % 01/14/2024 10.7  5.0 - 12.0 % Final    Eosinophil % 01/14/2024 0.7  0.3 - 6.2 % Final    Basophil % 01/14/2024  0.2  0.0 - 1.5 % Final    Immature Grans % 01/14/2024 1.2 (H)  0.0 - 0.5 % Final    Neutrophils, Absolute 01/14/2024 9.47 (H)  1.70 - 7.00 10*3/mm3 Final    Lymphocytes, Absolute 01/14/2024 2.57  0.70 - 3.10 10*3/mm3 Final    Monocytes, Absolute 01/14/2024 1.48 (H)  0.10 - 0.90 10*3/mm3 Final    Eosinophils, Absolute 01/14/2024 0.10  0.00 - 0.40 10*3/mm3 Final    Basophils, Absolute 01/14/2024 0.03  0.00 - 0.20 10*3/mm3 Final    Immature Grans, Absolute 01/14/2024 0.17 (H)  0.00 - 0.05 10*3/mm3 Final    nRBC 01/14/2024 0.0  0.0 - 0.2 /100 WBC Final    Lactate 01/14/2024 1.2  0.5 - 2.0 mmol/L Final    Falsely depressed results may occur on samples drawn from patients receiving N-Acetylcysteine (NAC) or Metamizole.    C-Reactive Protein 01/14/2024 2.60 (H)  0.00 - 0.50 mg/dL Final    Sed Rate 01/14/2024 100 (H)  0 - 20 mm/hr Final    Procalcitonin 01/14/2024 0.19  0.00 - 0.25 ng/mL Final    proBNP 01/14/2024 63.8  0.0 - 900.0 pg/mL Final    MRSA PCR 01/14/2024 No MRSA Detected  No MRSA Detected Final    Creatine Kinase 01/14/2024 57  20 - 200 U/L Final    Glucose 01/14/2024 154 (H)  70 - 130 mg/dL Final    Heparin Anti-Xa (UFH) 01/14/2024 0.14 (L)  0.30 - 0.70 IU/ml Final    Protime 01/14/2024 15.0 (H)  12.2 - 14.5 Seconds Final    INR 01/14/2024 1.17 (H)  0.89 - 1.12 Final    PTT 01/14/2024 38.7 (L)  60.0 - 90.0 seconds Final    Glucose 01/15/2024 221 (H)  65 - 99 mg/dL Final    BUN 01/15/2024 22 (H)  6 - 20 mg/dL Final    Creatinine 01/15/2024 0.90  0.76 - 1.27 mg/dL Final    Sodium 01/15/2024 138  136 - 145 mmol/L Final    Potassium 01/15/2024 4.9  3.5 - 5.2 mmol/L Final    Chloride 01/15/2024 106  98 - 107 mmol/L Final    CO2 01/15/2024 23.0  22.0 - 29.0 mmol/L Final    Calcium 01/15/2024 9.5  8.6 - 10.5 mg/dL Final    BUN/Creatinine Ratio 01/15/2024 24.4  7.0 - 25.0 Final    Anion Gap 01/15/2024 9.0  5.0 - 15.0 mmol/L Final    eGFR 01/15/2024 104.0  >60.0 mL/min/1.73 Final    Vancomycin Random 01/15/2024 9.50   5.00 - 40.00 mcg/mL Final    WBC 01/15/2024 11.86 (H)  3.40 - 10.80 10*3/mm3 Final    RBC 01/15/2024 3.52 (L)  4.14 - 5.80 10*6/mm3 Final    Hemoglobin 01/15/2024 10.7 (L)  13.0 - 17.7 g/dL Final    Hematocrit 01/15/2024 32.5 (L)  37.5 - 51.0 % Final    MCV 01/15/2024 92.3  79.0 - 97.0 fL Final    MCH 01/15/2024 30.4  26.6 - 33.0 pg Final    MCHC 01/15/2024 32.9  31.5 - 35.7 g/dL Final    RDW 01/15/2024 11.9 (L)  12.3 - 15.4 % Final    RDW-SD 01/15/2024 39.9  37.0 - 54.0 fl Final    MPV 01/15/2024 9.8  6.0 - 12.0 fL Final    Platelets 01/15/2024 257  140 - 450 10*3/mm3 Final    Neutrophil % 01/15/2024 85.6 (H)  42.7 - 76.0 % Final    Lymphocyte % 01/15/2024 10.5 (L)  19.6 - 45.3 % Final    Monocyte % 01/15/2024 3.0 (L)  5.0 - 12.0 % Final    Eosinophil % 01/15/2024 0.0 (L)  0.3 - 6.2 % Final    Basophil % 01/15/2024 0.1  0.0 - 1.5 % Final    Immature Grans % 01/15/2024 0.8 (H)  0.0 - 0.5 % Final    Neutrophils, Absolute 01/15/2024 10.16 (H)  1.70 - 7.00 10*3/mm3 Final    Lymphocytes, Absolute 01/15/2024 1.24  0.70 - 3.10 10*3/mm3 Final    Monocytes, Absolute 01/15/2024 0.36  0.10 - 0.90 10*3/mm3 Final    Eosinophils, Absolute 01/15/2024 0.00  0.00 - 0.40 10*3/mm3 Final    Basophils, Absolute 01/15/2024 0.01  0.00 - 0.20 10*3/mm3 Final    Immature Grans, Absolute 01/15/2024 0.09 (H)  0.00 - 0.05 10*3/mm3 Final    nRBC 01/15/2024 0.0  0.0 - 0.2 /100 WBC Final    PTT 01/15/2024 38.3 (L)  60.0 - 90.0 seconds Final       CT Angio Abdominal Aorta Bilateral Iliofem Runoff    Result Date: 1/14/2024  Narrative: CT ANGIO ABDOMINAL AORTA BILAT ILIOFEM RUNOFF Date of Exam: 1/14/2024 5:34 PM EST Indication: Claudication or leg ischemia Previous fem pop graft occlusion, c/o increased LLE pain, color changes, third/fourth toe ischemia, leukocytosis, r/o gas. Comparison: None available. Technique: CTA of the abdomen, pelvis and both lower extremities was performed after the uneventful intravenous administration of 120 cc Isovue-370  . Reconstructed coronal and sagittal images were also obtained. In addition, a 3-D volume rendered image was created for interpretation. Automated exposure control and iterative reconstruction methods were used. The origins of the celiac axis and superior mesenteric arteries are normal. The origins of the renal arteries are normal. Mild atheromatous disease of the abdominal aorta with subtle ectasia. The origin of the inferior mesenteric artery is patent. Complete occlusion of the left common iliac artery with continued complete loss of flow within the left internal and external iliac arteries. Multi focal atheromatous disease of the right common iliac artery as well as the internal and external iliac arteries. Femorofemoral bypass. Complete occlusion of the left femoral artery bypass with no underlying flow. Reconstitution of flow distally at the level of the popliteal artery due to branches from the deep femoral artery. The superior segments of the left anterior and posterior tibial arteries are patent. Eventual loss of flow within the posterior tibial artery seen on axial image 383. Continued flow within the dorsalis pedis with eventual loss of flow within the arteries. On the right there is continued flow within the right deep femoral artery. Complete occlusion of the left femoral artery with distal reconstitution from branches of the deep femoral artery. The popliteal artery is patent. The anterior and posterior tibial vessels are patent proximally. Loss of flow within the anterior tibial artery on axial image #343. Loss of the contrast column within the posterior tibial artery on axial image 417. The liver appears normal. Calcified granulomata within the spleen. Normal pancreas. Prior cholecystectomy. The adrenal glands are normal. No renal masses. No hydroureteronephrosis. The ureters and urinary bladder are normal. The small bowel is nonobstructed. Normal appendix. Colonic diverticulosis without evidence of  diverticulitis. No ascites or pneumoperitoneum. No adenopathy. Degenerative changes of the hips and lumbar spine.     Impression: Complete occlusion of the left femoral artery bypass graft. Complete occlusion of the right femoral artery. Eventual distal reconstitution of both vessels due to branches of the deep femoral arteries. Loss of flow within the left posterior tibial artery. Loss of flow within the right anterior tibial artery. Eventual loss of flow within the contrast column on both sides with no residual flow to the digital arteries. Electronically Signed: Kee Cartagena MD  1/14/2024 6:08 PM EST  Workstation ID: [id number]    XR Chest 1 View    Result Date: 1/14/2024  Narrative: XR CHEST 1 VW Date of Exam: 1/14/2024 3:02 PM EST Indication: Weak/Dizzy/AMS triage protocol Comparison: None available. Findings: No focal consolidation. No pneumothorax or pleural effusion. Cardiac size is normal. The visualized clavicles appear intact. No displaced rib fractures. The visualized upper abdomen is normal.     Impression: Impression: No acute cardiopulmonary disease. Electronically Signed: Kee Cartagena MD  1/14/2024 3:18 PM EST  Workstation ID: [id number]    CT Angio Abdominal Aorta Bilateral Iliofem Runoff    Result Date: 12/20/2023  Narrative: CT ANGIO ABDOMINAL AORTA BILAT ILIOFEM RUNOFF Date of Exam: 12/20/2023 7:09 PM EST Indication: PAD. Comparison: 11/20/2023 CTA Technique: CTA of the abdomen, pelvis and both lower extremities was performed after the uneventful intravenous administration of 114 mL Isovue 370. Reconstructed coronal and sagittal images were also obtained. In addition, a 3-D volume rendered image was created for interpretation. Automated exposure control and iterative reconstruction methods were used. Findings: History indicates left foot swelling. Office notes from today note swelling and some discoloration of the left foot and toes for the past week. Recent thrombectomy and left femoral-popliteal  bypass graft, and femoral-femoral bypass 11/21/2023. Previous 11/20/2023 exam report noted occlusion of left common iliac artery external iliac artery common femoral artery, and femoral-popliteal bypass graft. Reconstitution of the popliteal artery via profunda collaterals. Very small caliber distal runoff vessels.  Also occlusion of right superficial femoral artery reconstitution of right popliteal artery below the abductor canal. Today's study shows the patient's new right to left femoral bypass graft to appear patent. It supplies branches that reconstitute the deep system. Left femoral-popliteal graft is again seen to be occluded. Deep femoral artery branches reconstitute a small popliteal artery, with what appears to be three-vessel runoff to the left ankle, although with multisegmental disease of the anterior and posterior tibial arteries. Elsewhere, abdominal aorta is normal in caliber. Right common and external iliac arteries appear normal in caliber. Right superficial femoral artery is again noted to be occluded. Deep femoral artery branches reconstitute the popliteal artery, and there is two-vessel runoff to the foot as before. Remainder of the scan shows fatty liver change. Granulomatous calcifications are seen in the spleen. Pancreas, included portions of the adrenal glands and kidneys appear unremarkable. Celiac axis, SMA and renal arteries appear normally patent. JONAH is patent. No ascites adenopathy or acute inflammatory changes seen. Bladder is distended to approximately 14 cm and appears grossly normal. No intrapelvic free fluid or inflammatory change is seen. :    Impression: Impression: 1. 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 Occluded left femoral-popliteal graft, as on prior study. 2. New femoral-femoral graft which appears normally patent. 3. The new femoral graft supplies deep femoral branch vessels, with small caliber branches eventually reconstituting the popliteal artery, and with  three-vessel runoff to the left foot. 4. Occluded right superficial femoral artery again noted. Deep femoral artery branch reconstitution of the right popliteal artery, with two-vessel runoff to the foot. Note: I discussed the patient's findings with him and his wife while he remained here in the CT scan suite. He was offered transport to the emergency room, but he says his left lower extremity symptoms are not currently worsening, and they prefer to drive back home, and consult with vascular surgery tomorrow. The indicate they will return here, to the ER if the patient has significant worsening symptoms overnight. 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    Plan: Based on the CTA I think he would have a difficult time healing anything other than an above-knee amputation.  I plan to obtain a second opinion regarding potential revascularization options with the goal of potentially salvaging a transmetatarsal amputation versus below-knee amputation.  Would plan to perform amputation after vascular surgery evaluation / potential intervention.            Date: 1/15/2024    Christo Peres Jr, MD       Electronically signed by Christo Peres Jr., MD at 01/15/24 0753

## 2024-01-18 NOTE — PROGRESS NOTES
Intensive Care Follow-up     Hospital:  LOS: 4 days   Mr. Justo Oquendo, 48 y.o. male is followed for:   Ischemic foot      Subjective   Interval History:  Chart reviewed. VSS. Patient alert and oriented, conversant, lying in the bed with wife at bedside. States he is in a lot of pain and squeezing wife's hand. She states his fourth toe got a cut on it approximately two weeks ago then started changing color but the 2nd and 3rd toes abruptly changed color. They state the surgeon told them he was on the OR schedule for tomorrow (1/19/24) and that they would likely only have to amputate a portion of the foot, with an understanding he may require a BKA. All questions answered. NPO at midnight.     The patient's past medical, surgical and social history were reviewed and updated in Epic as appropriate.     Objective     Infusions:  heparin, 27 Units/kg/hr, Last Rate: Stopped (01/18/24 1201)  lactated ringers, 9 mL/hr, Last Rate: 1,000 mL/hr (01/18/24 1510)  niCARdipine, 5-15 mg/hr  Pharmacy to Dose Heparin,   Pharmacy to dose vancomycin,       Medications:  [MAR Hold] aspirin, 81 mg, Oral, Nightly  cefTRIAXone, 1,000 mg, Intravenous, Q24H  gabapentin, 600 mg, Oral, Q8H  hydrALAZINE, 25 mg, Oral, Q8H  [MAR Hold] insulin lispro, 2-7 Units, Subcutaneous, 4x Daily AC & at Bedtime  lisinopril, 40 mg, Oral, Q24H  metoprolol tartrate, 25 mg, Oral, Q12H  [MAR Hold] nicotine, 1 patch, Transdermal, Nightly  [MAR Hold] oxyCODONE, 10 mg, Oral, 4x Daily  [MAR Hold] rosuvastatin, 40 mg, Oral, Nightly  [MAR Hold] senna-docusate sodium, 2 tablet, Oral, BID  [MAR Hold] sodium chloride, 10 mL, Intravenous, Q12H  vancomycin, 1,500 mg, Intravenous, Q12H    I reviewed the patient's medications.    Vital Sign Min/Max for last 24 hours  Temp  Min: 97.2 °F (36.2 °C)  Max: 98.6 °F (37 °C)   BP  Min: 126/67  Max: 171/79   Pulse  Min: 87  Max: 104   Resp  Min: 16  Max: 20   SpO2  Min: 93 %  Max: 100 %   Flow (L/min)  Min: 2  Max: 4        Input/Output for last 24 hour shift  01/17 0701 - 01/18 0700  In: 240 [P.O.:240]  Out: 400 [Urine:400]   S RR:  [2-12] 2    Physical Exam:  GENERAL: Patient lying in bed. Conversant. Distressed from pain.   HEENT: Normocephalic and atraumatic. Trachea midline. PER. EOM WNL.   LUNGS: Chest rise of normal depth and symmetric. Lungs clear to auscultation bilaterally. No wheezes, rhonchi, or rales.   HEART: S1,S2 detected. Regular rate and rhythm. No rub, murmur, or gallop.   ABDOMEN: Soft, round, nondistended, and nontender. Bowel sounds present.   EXTREMITIES: Necrotic left 2-4th toes. Small area of necrosis on left heel. DP pulses palpable. PT pulses via doppler. Left leg/groin incision C/D/I. No swelling.    NEURO/PSYCH: Alert and oriented. Follows commands. Moves all extremities. No focal deficits.      Results from last 7 days   Lab Units 01/18/24  0730 01/16/24  0648 01/15/24  0359   WBC 10*3/mm3 6.77 6.90 11.86*   HEMOGLOBIN g/dL 11.9* 11.0* 10.7*   PLATELETS 10*3/mm3 230 264 257     Results from last 7 days   Lab Units 01/18/24  0730 01/17/24  0801 01/17/24  0641 01/16/24  0648 01/15/24  0359 01/14/24  1512   SODIUM mmol/L 138  --  138 141   < > 134*   POTASSIUM mmol/L 3.6  --  3.8 4.1   < > 4.1   CO2 mmol/L 24.0  --  25.0 25.0   < > 25.0   BUN mg/dL 9  --  14 21*   < > 30*   CREATININE mg/dL 0.60* 0.60* 0.65* 0.80   < > 2.07*   MAGNESIUM mg/dL  --   --   --   --   --  2.1   GLUCOSE mg/dL 151*  --  145* 149*   < > 302*    < > = values in this interval not displayed.     Estimated Creatinine Clearance: 153.1 mL/min (A) (by C-G formula based on SCr of 0.6 mg/dL (L)).      I reviewed the patient's new clinical results.  I reviewed the patient's new imaging results/reports including actual images and agree with reports.     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 male with PMH T2DM, HTN, dyslipidemia, ongoing tobacco abuse, PVD, and PAD.    Patient underwent left femoral to above-the knee popliteal bypass by Dr. Ryan on 4/25/2017 and was seen in office on 11/20/2023 after two weeks of claudication and a cold left foot. At that time, pulses were unable to be found in the left foot and he was sent to [facility], where he underwent aortagram, thromboembolectomy of left popliteal bypass graft, and right femoral to femoral bypass. After his hospitalization at that time, an outpatient referral was made to Dr. Peres of orthopedic surgery for consideration of BKA for critical PAD. His pre-operative appointment was to be 1/15/2024; however, he was brought to [facility] ED on 1/14/24 with worsening claudication and two days of malaise, decreased urine output and encephalopathy.  CTA with runoff was performed demonstrating multi-level disease including left femoral artery occlusion and gangrene of the left second and fourth toes. He was started on a heparin drip and admitted to Hospital Medicine with Vascular Surgery consultation. Upon arrival, WBC was elevated and his systolic pressures were in the 80s. Creatinine was significantly elevated at 2.07 upon arrival, resolved to 0.6. Patient was empirically started on Rocephin and Vancomycin and given steroids and IVF with resolution of leukocytosis, hypotension, and ALEJANDRO.    Dr. Pena with Vascular Surgery and Orthopedics were consulted and, after cardiac clearance was obtained, the patient underwent graft revision 1/18/2024 and is seen in  the ICU post-operatively. Orthopedics tentatively plan for amputation (level to be determined) Friday, 1/19/2024.    Post-operative orders per Vascular Surgery  Resume heparin infusion at 1900 per Vascular Surgery  Ensure adequate pain control  Pain left 2-4th digits with betadine every 12 hours  Continue empiric Rocephin and Vancomycin  Cardiac, CC diet now; ACHS AccuChecks, SSI  NPO at midnight for amputation 1/19/24  Repeat CMP now for electrolyte replacements as needed  AM labs  NRT, tobacco cessation education  Encourage IS    DVT Prophylaxis: Heparin drip  GI Prophylaxis: PPI  Dispo: ICU    Critical Care time spent in direct patient care:38 minutes (excluding procedure time, if applicable) including high complexity decision making to assess, manipulate, and support vital organ system failure in this individual who has impairment of one or more vital organ systems such that there is a high probability of imminent or life threatening deterioration in the patient's condition.     Margaux Choi, MSN, APRN, ACNPC-AG  Pulmonary and Critical Care Medicine  Electronically signed by PRIMO Sampson, 01/18/24, 6:06 PM EST.        Breast CA    Dementia    Hypertension

## 2024-01-18 NOTE — PROGRESS NOTES
Pharmacy Consult-Vancomycin Dosing  Justo Oquendo is a  48 y.o. male receiving vancomycin therapy.     Indication: SSTI  Consulting Provider: Hospitalist  ID Consult: N    Goal Trough: 10-20    Current Antimicrobial Therapy  Anti-Infectives (From admission, onward)      Ordered     Dose/Rate Route Frequency Start Stop    01/18/24 0810  vancomycin IVPB 1500 mg in 0.9% NaCl (Premix) 500 mL        Ordering Provider: Nathanael Kay RPH    1,500 mg  333.3 mL/hr over 90 Minutes Intravenous Every 12 Hours Scheduled 01/18/24 0900 01/20/24 0859    01/14/24 2129  cefTRIAXone (ROCEPHIN) 1,000 mg in sodium chloride 0.9 % 100 mL IVPB        Ordering Provider: Ashley Lee MD    1,000 mg  200 mL/hr over 30 Minutes Intravenous Every 24 Hours 01/15/24 1200 01/22/24 1159    01/14/24 2129  Pharmacy to dose vancomycin        Ordering Provider: Ashley Lee MD     Does not apply Continuous PRN 01/14/24 2129 01/21/24 2128    01/14/24 1638  vancomycin IVPB 1500 mg in 0.9% NaCl (Premix) 500 mL        Ordering Provider: Ramakrishna Claudio IV, PharmD    20 mg/kg × 73.5 kg  333.3 mL/hr over 90 Minutes Intravenous Once 01/14/24 1730 01/14/24 1945    01/14/24 1624  Pharmacy to dose vancomycin        Ordering Provider: Samuel Dave APRN     Does not apply Once 01/14/24 1640 01/14/24 1747    01/14/24 1546  cefTRIAXone (ROCEPHIN) 1,000 mg in sodium chloride 0.9 % 100 mL IVPB        Ordering Provider: Samuel Dave APRN    1,000 mg  200 mL/hr over 30 Minutes Intravenous Once 01/14/24 1602 01/14/24 1701            Allergies  Allergies as of 01/14/2024    (No Known Allergies)       Labs    Results from last 7 days   Lab Units 01/18/24  0730 01/17/24  0801 01/17/24  0641 01/16/24  0648   BUN mg/dL 9  --  14 21*   CREATININE mg/dL 0.60* 0.60* 0.65* 0.80       Results from last 7 days   Lab Units 01/18/24  0730 01/16/24  0648 01/15/24  0359   WBC 10*3/mm3 6.77 6.90 11.86*       Evaluation of Dosing     Last Dose Received in the ED/Outside  "Facility: vanc 1500mg x1 1/14 @ 1815  Is Patient on Dialysis or Renal Replacement: N    Ht - 172.7 cm (68\")  Wt - 73.5 kg (162 lb)    Estimated Creatinine Clearance: 153.1 mL/min (A) (by C-G formula based on SCr of 0.6 mg/dL (L)).    Intake & Output (last 3 days)         01/12 0701 01/13 0700 01/13 0701 01/14 0700 01/14 0701  01/15 0700    IV Piggyback   100    Total Intake(mL/kg)   100 (1.4)    Net   +100                   Microbiology and Radiology  Microbiology Results (last 10 days)       Procedure Component Value - Date/Time    MRSA Screen, PCR (Inpatient) - Swab, Nares [[id number]]  (Normal) Collected: 01/14/24 1817    Lab Status: Final result Specimen: Swab from Nares Updated: 01/14/24 2152     MRSA PCR No MRSA Detected    Narrative:      The negative predictive value of this diagnostic test is high and should only be used to consider de-escalating anti-MRSA therapy. A positive result may indicate colonization with MRSA and must be correlated clinically.    Blood Culture - Blood, Arm, Left [[id number]]  (Normal) Collected: 01/14/24 1512    Lab Status: Preliminary result Specimen: Blood from Arm, Left Updated: 01/17/24 1900     Blood Culture No growth at 3 days    Blood Culture - Blood, Arm, Right [[id number]]  (Normal) Collected: 01/14/24 1510    Lab Status: Preliminary result Specimen: Blood from Arm, Right Updated: 01/17/24 1631     Blood Culture No growth at 3 days            Vancomycin Levels:    Results from last 7 days   Lab Units 01/18/24  0730 01/17/24  0641 01/16/24  0648 01/15/24  0359   VANCOMYCIN RM mcg/mL 12.10 15.60 13.30 9.50                   Insight[facility] AUC Calculation:    Current AUC:  420 mg/L*hr    Predicted Steady State AUC on Current Dose: 392 mg/L*hr  _________________________________    Predicted Steady State AUC on New Dose:  470 mg/L*hr    Assessment/Plan:  Pharmacy consulted to dose vancomycin for SSTI  Patient loaded with vancomycin 1500mg ( ~ 20 mg/kg) IV x1 on 1/14 @ 3485  Blood " cultures show no growth at 3 days. MRSA PCR is negative.  A maintenance dose of vancomycin 1,250 mg IV q12h (~13.6    mg/kg) was initiated on 1/16.  Patient remains afebrile with adequate urine output.   Serum creatinine has decreased and remained at 0.60 mg/dL, leading to an increased clearance of the current vancomycin dose.   A vancomycin level was drawn today @ 0730 (~10.5 hours post-dose) and resulted as 12.10 mcg/mL.  Given the vancomycin level and increased renal clearance, will increase dose to vancomycin 1500 mg IV q12h.  A vancomycin level will be assessed on 1/19 with AM labs, prior to the 3rd maintenance dose.  Will continue to follow and adjust vancomycin dose as needed based on renal function, cultures, and patient clinical status.    Thank you,    Nathanael Kay [facility]  1/18/2024  08:20 EST

## 2024-01-18 NOTE — PROGRESS NOTES
HEPARIN INFUSION  Justo Oquendo is a  48 y.o. male receiving heparin infusion.     Therapy for (VTE/Cardiac):   Cardiac  Patient Weight: 73.5 kg  Initial Bolus (Y/N):   No  Any Bolus (Y/N):   Yes      Signs or Symptoms of Bleeding: None per RN    Cardiac or Other (Not VTE)  Initial rate: 12 units/kg/hr (Max 1,000 units/hr)   aPTT  Rebolus Infusion Hold time Change infusion Dose (Units/kg/hr) Next Anti Xa or aPTT Level Due   <45 50 Units/kg  (4000 Units Max) None Increase by  3 Units/kg/hr 6 hours   45 - 52 25 Units/kg  (2000 Units Max) None Increase by  2 Units/kg/hr 6 hours   53 - 59 0 None Increase by  1 Units/kg/hr 6 hours   60 - 75 0 None No Change 6 hours (after 2 consecutive levels in range check qAM)   76 - 83 0 None Decrease by  1 Units/kg/hr 6 hours   84 - 98 0 30 Minutes Decrease by  2 Units/kg/hr 6 hours   99 - 113 0 60 Minutes Decrease by  3 Units/kg/hr 6 hours   >113 0 Hold  After aPTT less than 75 decrease previous rate by  4 Units/kg/hr  Every 2 hours until aPTT less than 75 then when infusion restarts in 6 hours       Results from last 7 days   Lab Units 01/18/24  0730 01/16/24  0648 01/15/24  0359 01/14/24 2111   INR   --   --   --  1.17*   HEMOGLOBIN g/dL 11.9* 11.0* 10.7*  --    HEMATOCRIT % 35.1* 33.9* 32.5*  --    PLATELETS 10*3/mm3 230 264 257  --           Date   Time   aPTT Current Rate (Unit/kg/hr) Bolus   (Units) Rate Change   (Unit/kg/hr) New Rate (Unit/kg/hr) Next   aPTT Comments  Pump Check Daily   1/14 2111 38.7 NEW START -- +12 12 0400 DW RN   1/14 0359 38.3 12 3600 +3 15 1200 Discussed w/ nurse   1/15 1431 36.1 15 3600 +3 18 2200 DW RN   1/15 2242 38.5 18 3600 +3 21 0600 Nurse confirmed pump   1/16 0648 52.4 21 1800 +2 23 1400 DW DAVID Strong; pump check   1/16 1419 55.4 23 -- +1 24 2300 D/w DAVID Marquez   1/16 2323 59 24 -- +1 25 0630 Discussed w/ nurse   1/17 0642 60.1 25 -- -- 25 1400 DW DAVID Strong; pump check   1/17 1441 44.9 25 1900 +2 27 2300 D/w DAVID strong, line switched from L to R side  but has not been off for any significant amount of time and has been running no issues   1/17 2359 79.4 27 -- -1 26 0800 Discussed w/ nurse   1/18 0730 55.3 26 -- +1 27 1500 Dw RN; pump check

## 2024-01-18 NOTE — ANESTHESIA POSTPROCEDURE EVALUATION
Patient: Justo Oquendo    Procedure Summary       Date: 01/18/24 Room / Location: [facility] OR 02 /  MARGUERITE HYBRID MARIE    Anesthesia Start: 1331 Anesthesia Stop: 1734    Procedure: FEMORAL POPLITEAL BYPASS WITH POPLITEAL EXPLORATION (Left: Thigh) Diagnosis:     Surgeons: Vin Pena MD Provider: Antonia Okeefe MD    Anesthesia Type: general ASA Status: 3            Anesthesia Type: general    Vitals  Vitals Value Taken Time   /70 01/18/24 1731   Temp     Pulse 96 01/18/24 1734   Resp     SpO2 100 % 01/18/24 1734   Vitals shown include unfiled device data.      T 98.1, RR 15  Post Anesthesia Care and Evaluation    Patient location during evaluation: PACU  Patient participation: complete - patient participated  Level of consciousness: awake and alert  Pain management: adequate    Airway patency: patent  Anesthetic complications: No anesthetic complications  PONV Status: none  Cardiovascular status: hemodynamically stable and acceptable  Respiratory status: nonlabored ventilation, acceptable and nasal cannula  Hydration status: acceptable

## 2024-01-19 ENCOUNTER — ANESTHESIA EVENT (OUTPATIENT)
Dept: PERIOP | Facility: HOSPITAL | Age: 49
End: 2024-01-19
Payer: COMMERCIAL

## 2024-01-19 ENCOUNTER — ANESTHESIA EVENT CONVERTED (OUTPATIENT)
Dept: ANESTHESIOLOGY | Facility: HOSPITAL | Age: 49
End: 2024-01-19
Payer: COMMERCIAL

## 2024-01-19 ENCOUNTER — APPOINTMENT (OUTPATIENT)
Dept: GENERAL RADIOLOGY | Facility: HOSPITAL | Age: 49
End: 2024-01-19
Payer: COMMERCIAL

## 2024-01-19 ENCOUNTER — ANESTHESIA (OUTPATIENT)
Dept: PERIOP | Facility: HOSPITAL | Age: 49
End: 2024-01-19
Payer: COMMERCIAL

## 2024-01-19 LAB
ANION GAP SERPL CALCULATED.3IONS-SCNC: 11 MMOL/L (ref 5–15)
BACTERIA SPEC AEROBE CULT: NORMAL
BACTERIA SPEC AEROBE CULT: NORMAL
BASOPHILS # BLD AUTO: 0.02 10*3/MM3 (ref 0–0.2)
BASOPHILS NFR BLD AUTO: 0.2 % (ref 0–1.5)
BUN SERPL-MCNC: 9 MG/DL (ref 6–20)
BUN/CREAT SERPL: 13.6 (ref 7–25)
CALCIUM SPEC-SCNC: 9 MG/DL (ref 8.6–10.5)
CHLORIDE SERPL-SCNC: 102 MMOL/L (ref 98–107)
CO2 SERPL-SCNC: 21 MMOL/L (ref 22–29)
CREAT SERPL-MCNC: 0.66 MG/DL (ref 0.76–1.27)
DEPRECATED RDW RBC AUTO: 37.9 FL (ref 37–54)
EGFRCR SERPLBLD CKD-EPI 2021: 114.3 ML/MIN/1.73
EOSINOPHIL # BLD AUTO: 0.01 10*3/MM3 (ref 0–0.4)
EOSINOPHIL NFR BLD AUTO: 0.1 % (ref 0.3–6.2)
ERYTHROCYTE [DISTWIDTH] IN BLOOD BY AUTOMATED COUNT: 11.7 % (ref 12.3–15.4)
GLUCOSE BLDC GLUCOMTR-MCNC: 133 MG/DL (ref 70–130)
GLUCOSE BLDC GLUCOMTR-MCNC: 168 MG/DL (ref 70–130)
GLUCOSE BLDC GLUCOMTR-MCNC: 262 MG/DL (ref 70–130)
GLUCOSE BLDC GLUCOMTR-MCNC: 335 MG/DL (ref 70–130)
GLUCOSE BLDC GLUCOMTR-MCNC: 363 MG/DL (ref 70–130)
GLUCOSE BLDC GLUCOMTR-MCNC: 370 MG/DL (ref 70–130)
GLUCOSE BLDC GLUCOMTR-MCNC: 391 MG/DL (ref 70–130)
GLUCOSE SERPL-MCNC: 157 MG/DL (ref 65–99)
HCT VFR BLD AUTO: 29.2 % (ref 37.5–51)
HGB BLD-MCNC: 10.1 G/DL (ref 13–17.7)
IMM GRANULOCYTES # BLD AUTO: 0.05 10*3/MM3 (ref 0–0.05)
IMM GRANULOCYTES NFR BLD AUTO: 0.6 % (ref 0–0.5)
LYMPHOCYTES # BLD AUTO: 1.29 10*3/MM3 (ref 0.7–3.1)
LYMPHOCYTES NFR BLD AUTO: 16.1 % (ref 19.6–45.3)
MAGNESIUM SERPL-MCNC: 1.6 MG/DL (ref 1.6–2.6)
MCH RBC QN AUTO: 30.5 PG (ref 26.6–33)
MCHC RBC AUTO-ENTMCNC: 34.6 G/DL (ref 31.5–35.7)
MCV RBC AUTO: 88.2 FL (ref 79–97)
MONOCYTES # BLD AUTO: 0.77 10*3/MM3 (ref 0.1–0.9)
MONOCYTES NFR BLD AUTO: 9.6 % (ref 5–12)
NEUTROPHILS NFR BLD AUTO: 5.89 10*3/MM3 (ref 1.7–7)
NEUTROPHILS NFR BLD AUTO: 73.4 % (ref 42.7–76)
NRBC BLD AUTO-RTO: 0 /100 WBC (ref 0–0.2)
PHOSPHATE SERPL-MCNC: 3.5 MG/DL (ref 2.5–4.5)
PLATELET # BLD AUTO: 200 10*3/MM3 (ref 140–450)
PMV BLD AUTO: 9.4 FL (ref 6–12)
POTASSIUM SERPL-SCNC: 4.1 MMOL/L (ref 3.5–5.2)
RBC # BLD AUTO: 3.31 10*6/MM3 (ref 4.14–5.8)
SODIUM SERPL-SCNC: 134 MMOL/L (ref 136–145)
VANCOMYCIN SERPL-MCNC: 28.8 MCG/ML (ref 5–40)
WBC NRBC COR # BLD AUTO: 8.03 10*3/MM3 (ref 3.4–10.8)

## 2024-01-19 PROCEDURE — 25010000002 CEFTRIAXONE PER 250 MG: Performed by: ORTHOPAEDIC SURGERY

## 2024-01-19 PROCEDURE — 25010000002 VANCOMYCIN 10 G RECONSTITUTED SOLUTION

## 2024-01-19 PROCEDURE — 63710000001 INSULIN DETEMIR PER 5 UNITS

## 2024-01-19 PROCEDURE — 0Y6N0ZB DETACHMENT AT LEFT FOOT, PARTIAL 2ND RAY, OPEN APPROACH: ICD-10-PCS | Performed by: ORTHOPAEDIC SURGERY

## 2024-01-19 PROCEDURE — 25010000002 BUPIVACAINE (PF) 0.25 % SOLUTION: Performed by: NURSE ANESTHETIST, CERTIFIED REGISTERED

## 2024-01-19 PROCEDURE — 25810000003 SODIUM CHLORIDE 0.9 % SOLUTION 250 ML FLEX CONT

## 2024-01-19 PROCEDURE — 83735 ASSAY OF MAGNESIUM: CPT

## 2024-01-19 PROCEDURE — 82948 REAGENT STRIP/BLOOD GLUCOSE: CPT

## 2024-01-19 PROCEDURE — 25010000002 HYDROMORPHONE 1 MG/ML SOLUTION: Performed by: SURGERY

## 2024-01-19 PROCEDURE — 25010000002 FENTANYL CITRATE (PF) 50 MCG/ML SOLUTION: Performed by: NURSE ANESTHETIST, CERTIFIED REGISTERED

## 2024-01-19 PROCEDURE — 63710000001 INSULIN LISPRO (HUMAN) PER 5 UNITS: Performed by: ORTHOPAEDIC SURGERY

## 2024-01-19 PROCEDURE — 76000 FLUOROSCOPY <1 HR PHYS/QHP: CPT

## 2024-01-19 PROCEDURE — 99232 SBSQ HOSP IP/OBS MODERATE 35: CPT

## 2024-01-19 PROCEDURE — 25010000002 HYDROMORPHONE 1 MG/ML SOLUTION: Performed by: ORTHOPAEDIC SURGERY

## 2024-01-19 PROCEDURE — 80048 BASIC METABOLIC PNL TOTAL CA: CPT | Performed by: SURGERY

## 2024-01-19 PROCEDURE — 85025 COMPLETE CBC W/AUTO DIFF WBC: CPT | Performed by: SURGERY

## 2024-01-19 PROCEDURE — 25810000003 SODIUM CHLORIDE 0.9 % SOLUTION: Performed by: SURGERY

## 2024-01-19 PROCEDURE — 0Y6N0ZC DETACHMENT AT LEFT FOOT, PARTIAL 3RD RAY, OPEN APPROACH: ICD-10-PCS | Performed by: ORTHOPAEDIC SURGERY

## 2024-01-19 PROCEDURE — 25010000002 PROPOFOL 10 MG/ML EMULSION: Performed by: ANESTHESIOLOGY

## 2024-01-19 PROCEDURE — 0Y6N0ZD DETACHMENT AT LEFT FOOT, PARTIAL 4TH RAY, OPEN APPROACH: ICD-10-PCS | Performed by: ORTHOPAEDIC SURGERY

## 2024-01-19 PROCEDURE — 25010000002 ALBUMIN HUMAN 5% PER 50 ML: Performed by: ANESTHESIOLOGY

## 2024-01-19 PROCEDURE — 87075 CULTR BACTERIA EXCEPT BLOOD: CPT | Performed by: ORTHOPAEDIC SURGERY

## 2024-01-19 PROCEDURE — 25010000002 ROPIVACAINE HCL-NACL 0.2-0.9 % SOLUTION: Performed by: NURSE ANESTHETIST, CERTIFIED REGISTERED

## 2024-01-19 PROCEDURE — 84100 ASSAY OF PHOSPHORUS: CPT

## 2024-01-19 PROCEDURE — 25810000003 SODIUM CHLORIDE 0.9 % SOLUTION

## 2024-01-19 PROCEDURE — 99232 SBSQ HOSP IP/OBS MODERATE 35: CPT | Performed by: INTERNAL MEDICINE

## 2024-01-19 PROCEDURE — 87205 SMEAR GRAM STAIN: CPT | Performed by: ORTHOPAEDIC SURGERY

## 2024-01-19 PROCEDURE — 25010000002 VANCOMYCIN 10 G RECONSTITUTED SOLUTION: Performed by: SURGERY

## 2024-01-19 PROCEDURE — 87015 SPECIMEN INFECT AGNT CONCNTJ: CPT | Performed by: ORTHOPAEDIC SURGERY

## 2024-01-19 PROCEDURE — P9041 ALBUMIN (HUMAN),5%, 50ML: HCPCS | Performed by: ANESTHESIOLOGY

## 2024-01-19 PROCEDURE — 0Y6N0Z9 DETACHMENT AT LEFT FOOT, PARTIAL 1ST RAY, OPEN APPROACH: ICD-10-PCS | Performed by: ORTHOPAEDIC SURGERY

## 2024-01-19 PROCEDURE — 0Y6N0ZF DETACHMENT AT LEFT FOOT, PARTIAL 5TH RAY, OPEN APPROACH: ICD-10-PCS | Performed by: ORTHOPAEDIC SURGERY

## 2024-01-19 PROCEDURE — 25010000002 NICARDIPINE 2.5 MG/ML SOLUTION 10 ML VIAL

## 2024-01-19 PROCEDURE — 87070 CULTURE OTHR SPECIMN AEROBIC: CPT | Performed by: ORTHOPAEDIC SURGERY

## 2024-01-19 PROCEDURE — 25010000002 CEFAZOLIN PER 500 MG: Performed by: ANESTHESIOLOGY

## 2024-01-19 PROCEDURE — 25010000002 ONDANSETRON PER 1 MG: Performed by: SURGERY

## 2024-01-19 PROCEDURE — 25010000002 DEXAMETHASONE SODIUM PHOSPHATE 10 MG/ML SOLUTION: Performed by: NURSE ANESTHETIST, CERTIFIED REGISTERED

## 2024-01-19 PROCEDURE — 88307 TISSUE EXAM BY PATHOLOGIST: CPT | Performed by: ORTHOPAEDIC SURGERY

## 2024-01-19 PROCEDURE — 25010000002 VANCOMYCIN 1 G RECONSTITUTED SOLUTION: Performed by: ORTHOPAEDIC SURGERY

## 2024-01-19 PROCEDURE — 80202 ASSAY OF VANCOMYCIN: CPT | Performed by: SURGERY

## 2024-01-19 DEVICE — KT SEAL HEMOS ABS FLOSEAL MATRX 1.5/FAST/PREP 5000/IU 5ML: Type: IMPLANTABLE DEVICE | Site: FOOT | Status: FUNCTIONAL

## 2024-01-19 RX ORDER — CALCIUM CHLORIDE 100 MG/ML
INJECTION INTRAVENOUS; INTRAVENTRICULAR AS NEEDED
Status: DISCONTINUED | OUTPATIENT
Start: 2024-01-19 | End: 2024-01-19 | Stop reason: SURG

## 2024-01-19 RX ORDER — ROPIVACAINE HYDROCHLORIDE 2 MG/ML
INJECTION, SOLUTION EPIDURAL; INFILTRATION; PERINEURAL CONTINUOUS
Status: DISCONTINUED | OUTPATIENT
Start: 2024-01-19 | End: 2024-01-22

## 2024-01-19 RX ORDER — DEXAMETHASONE SODIUM PHOSPHATE 10 MG/ML
INJECTION, SOLUTION INTRAMUSCULAR; INTRAVENOUS
Status: COMPLETED | OUTPATIENT
Start: 2024-01-19 | End: 2024-01-19

## 2024-01-19 RX ORDER — BUPIVACAINE HYDROCHLORIDE 2.5 MG/ML
INJECTION, SOLUTION EPIDURAL; INFILTRATION; INTRACAUDAL
Status: COMPLETED | OUTPATIENT
Start: 2024-01-19 | End: 2024-01-19

## 2024-01-19 RX ORDER — PROPOFOL 10 MG/ML
VIAL (ML) INTRAVENOUS AS NEEDED
Status: DISCONTINUED | OUTPATIENT
Start: 2024-01-19 | End: 2024-01-19 | Stop reason: SURG

## 2024-01-19 RX ORDER — VANCOMYCIN HYDROCHLORIDE 1 G/20ML
INJECTION, POWDER, LYOPHILIZED, FOR SOLUTION INTRAVENOUS AS NEEDED
Status: DISCONTINUED | OUTPATIENT
Start: 2024-01-19 | End: 2024-01-19 | Stop reason: HOSPADM

## 2024-01-19 RX ORDER — CEFAZOLIN SODIUM 1 G/3ML
INJECTION, POWDER, FOR SOLUTION INTRAMUSCULAR; INTRAVENOUS AS NEEDED
Status: DISCONTINUED | OUTPATIENT
Start: 2024-01-19 | End: 2024-01-19 | Stop reason: SURG

## 2024-01-19 RX ORDER — FENTANYL CITRATE 50 UG/ML
INJECTION, SOLUTION INTRAMUSCULAR; INTRAVENOUS
Status: COMPLETED | OUTPATIENT
Start: 2024-01-19 | End: 2024-01-19

## 2024-01-19 RX ORDER — EPHEDRINE SULFATE 50 MG/ML
INJECTION INTRAVENOUS AS NEEDED
Status: DISCONTINUED | OUTPATIENT
Start: 2024-01-19 | End: 2024-01-19 | Stop reason: SURG

## 2024-01-19 RX ORDER — FENTANYL CITRATE 50 UG/ML
50 INJECTION, SOLUTION INTRAMUSCULAR; INTRAVENOUS
Status: DISCONTINUED | OUTPATIENT
Start: 2024-01-19 | End: 2024-01-19 | Stop reason: HOSPADM

## 2024-01-19 RX ORDER — VANCOMYCIN/0.9 % SOD CHLORIDE 1.5G/250ML
1500 PLASTIC BAG, INJECTION (ML) INTRAVENOUS EVERY 12 HOURS
Status: COMPLETED | OUTPATIENT
Start: 2024-01-19 | End: 2024-01-20

## 2024-01-19 RX ORDER — ALBUMIN, HUMAN INJ 5% 5 %
SOLUTION INTRAVENOUS CONTINUOUS PRN
Status: DISCONTINUED | OUTPATIENT
Start: 2024-01-19 | End: 2024-01-19 | Stop reason: SURG

## 2024-01-19 RX ORDER — PHENYLEPHRINE HCL IN 0.9% NACL 1 MG/10 ML
SYRINGE (ML) INTRAVENOUS AS NEEDED
Status: DISCONTINUED | OUTPATIENT
Start: 2024-01-19 | End: 2024-01-19 | Stop reason: SURG

## 2024-01-19 RX ORDER — LIDOCAINE HYDROCHLORIDE 10 MG/ML
INJECTION, SOLUTION EPIDURAL; INFILTRATION; INTRACAUDAL; PERINEURAL AS NEEDED
Status: DISCONTINUED | OUTPATIENT
Start: 2024-01-19 | End: 2024-01-19 | Stop reason: SURG

## 2024-01-19 RX ORDER — MAGNESIUM HYDROXIDE 1200 MG/15ML
LIQUID ORAL AS NEEDED
Status: DISCONTINUED | OUTPATIENT
Start: 2024-01-19 | End: 2024-01-19 | Stop reason: HOSPADM

## 2024-01-19 RX ORDER — HYDROMORPHONE HYDROCHLORIDE 1 MG/ML
0.5 INJECTION, SOLUTION INTRAMUSCULAR; INTRAVENOUS; SUBCUTANEOUS
Status: DISCONTINUED | OUTPATIENT
Start: 2024-01-19 | End: 2024-01-19 | Stop reason: HOSPADM

## 2024-01-19 RX ADMIN — HYDROMORPHONE HYDROCHLORIDE 1 MG: 1 INJECTION, SOLUTION INTRAMUSCULAR; INTRAVENOUS; SUBCUTANEOUS at 04:08

## 2024-01-19 RX ADMIN — METOPROLOL TARTRATE 25 MG: 25 TABLET, FILM COATED ORAL at 20:44

## 2024-01-19 RX ADMIN — OXYCODONE HYDROCHLORIDE 10 MG: 10 TABLET ORAL at 05:22

## 2024-01-19 RX ADMIN — FENTANYL CITRATE 100 MCG: 50 INJECTION, SOLUTION INTRAMUSCULAR; INTRAVENOUS at 11:35

## 2024-01-19 RX ADMIN — HYDRALAZINE HYDROCHLORIDE 25 MG: 50 TABLET, FILM COATED ORAL at 14:07

## 2024-01-19 RX ADMIN — ASPIRIN 81 MG: 81 TABLET, COATED ORAL at 20:45

## 2024-01-19 RX ADMIN — VANCOMYCIN HYDROCHLORIDE 1500 MG: 10 INJECTION, POWDER, LYOPHILIZED, FOR SOLUTION INTRAVENOUS at 00:10

## 2024-01-19 RX ADMIN — FENTANYL CITRATE 100 MCG: 50 INJECTION, SOLUTION INTRAMUSCULAR; INTRAVENOUS at 10:36

## 2024-01-19 RX ADMIN — OXYCODONE HYDROCHLORIDE 10 MG: 10 TABLET ORAL at 08:52

## 2024-01-19 RX ADMIN — ROSUVASTATIN CALCIUM 40 MG: 20 TABLET, FILM COATED ORAL at 20:45

## 2024-01-19 RX ADMIN — ONDANSETRON 4 MG: 2 INJECTION INTRAMUSCULAR; INTRAVENOUS at 12:22

## 2024-01-19 RX ADMIN — DEXAMETHASONE SODIUM PHOSPHATE 2 MG: 10 INJECTION, SOLUTION INTRAMUSCULAR; INTRAVENOUS at 10:37

## 2024-01-19 RX ADMIN — Medication 10 ML: at 20:46

## 2024-01-19 RX ADMIN — PANTOPRAZOLE SODIUM 40 MG: 40 INJECTION, POWDER, FOR SOLUTION INTRAVENOUS at 06:02

## 2024-01-19 RX ADMIN — CEFAZOLIN 2 G: 1 INJECTION, POWDER, FOR SOLUTION INTRAMUSCULAR; INTRAVENOUS; PARENTERAL at 11:37

## 2024-01-19 RX ADMIN — SODIUM CHLORIDE 1000 MG: 900 INJECTION INTRAVENOUS at 20:44

## 2024-01-19 RX ADMIN — HYDROMORPHONE HYDROCHLORIDE 1 MG: 1 INJECTION, SOLUTION INTRAMUSCULAR; INTRAVENOUS; SUBCUTANEOUS at 00:10

## 2024-01-19 RX ADMIN — NICARDIPINE HYDROCHLORIDE 2.5 MG/HR: 25 INJECTION, SOLUTION INTRAVENOUS at 10:24

## 2024-01-19 RX ADMIN — Medication 200 MCG: at 11:45

## 2024-01-19 RX ADMIN — HYDRALAZINE HYDROCHLORIDE 25 MG: 50 TABLET, FILM COATED ORAL at 20:45

## 2024-01-19 RX ADMIN — NICARDIPINE HYDROCHLORIDE 7.5 MG/HR: 25 INJECTION, SOLUTION INTRAVENOUS at 07:27

## 2024-01-19 RX ADMIN — LIDOCAINE HYDROCHLORIDE 30 MG: 10 SOLUTION INTRAVENOUS at 11:35

## 2024-01-19 RX ADMIN — Medication 300 MCG: at 11:52

## 2024-01-19 RX ADMIN — EPHEDRINE SULFATE 15 MG: 50 INJECTION INTRAVENOUS at 12:10

## 2024-01-19 RX ADMIN — HYDROMORPHONE HYDROCHLORIDE 1 MG: 1 INJECTION, SOLUTION INTRAMUSCULAR; INTRAVENOUS; SUBCUTANEOUS at 02:11

## 2024-01-19 RX ADMIN — Medication 1000 MG: at 12:55

## 2024-01-19 RX ADMIN — GABAPENTIN 600 MG: 300 CAPSULE ORAL at 14:07

## 2024-01-19 RX ADMIN — GABAPENTIN 600 MG: 300 CAPSULE ORAL at 20:45

## 2024-01-19 RX ADMIN — BUPIVACAINE HYDROCHLORIDE 30 ML: 2.5 INJECTION, SOLUTION EPIDURAL; INFILTRATION; INTRACAUDAL at 10:49

## 2024-01-19 RX ADMIN — CALCIUM CHLORIDE 0.5 G: 100 INJECTION INTRAVENOUS; INTRAVENTRICULAR at 12:11

## 2024-01-19 RX ADMIN — EPHEDRINE SULFATE 15 MG: 50 INJECTION INTRAVENOUS at 11:52

## 2024-01-19 RX ADMIN — LISINOPRIL 40 MG: 20 TABLET ORAL at 08:23

## 2024-01-19 RX ADMIN — NICARDIPINE HYDROCHLORIDE 5 MG/HR: 25 INJECTION, SOLUTION INTRAVENOUS at 11:25

## 2024-01-19 RX ADMIN — PROPOFOL 150 MG: 10 INJECTION, EMULSION INTRAVENOUS at 11:35

## 2024-01-19 RX ADMIN — Medication 1 PATCH: at 20:45

## 2024-01-19 RX ADMIN — HYDROMORPHONE HYDROCHLORIDE 1 MG: 1 INJECTION, SOLUTION INTRAMUSCULAR; INTRAVENOUS; SUBCUTANEOUS at 08:20

## 2024-01-19 RX ADMIN — Medication 10 ML: at 08:29

## 2024-01-19 RX ADMIN — EPHEDRINE SULFATE 10 MG: 50 INJECTION INTRAVENOUS at 12:25

## 2024-01-19 RX ADMIN — ALBUMIN (HUMAN): 12.5 INJECTION, SOLUTION INTRAVENOUS at 11:45

## 2024-01-19 RX ADMIN — HYDROMORPHONE HYDROCHLORIDE 1 MG: 1 INJECTION, SOLUTION INTRAMUSCULAR; INTRAVENOUS; SUBCUTANEOUS at 06:01

## 2024-01-19 RX ADMIN — OXYCODONE HYDROCHLORIDE 10 MG: 10 TABLET ORAL at 20:01

## 2024-01-19 RX ADMIN — NICARDIPINE HYDROCHLORIDE 7.5 MG/HR: 25 INJECTION, SOLUTION INTRAVENOUS at 04:18

## 2024-01-19 RX ADMIN — VANCOMYCIN HYDROCHLORIDE 1500 MG: 10 INJECTION, POWDER, LYOPHILIZED, FOR SOLUTION INTRAVENOUS at 14:07

## 2024-01-19 RX ADMIN — INSULIN DETEMIR 5 UNITS: 100 INJECTION, SOLUTION SUBCUTANEOUS at 20:01

## 2024-01-19 RX ADMIN — HYDRALAZINE HYDROCHLORIDE 25 MG: 50 TABLET, FILM COATED ORAL at 06:02

## 2024-01-19 RX ADMIN — GABAPENTIN 600 MG: 300 CAPSULE ORAL at 06:02

## 2024-01-19 RX ADMIN — INSULIN LISPRO 5 UNITS: 100 INJECTION, SOLUTION INTRAVENOUS; SUBCUTANEOUS at 19:45

## 2024-01-19 RX ADMIN — HYDROMORPHONE HYDROCHLORIDE 1 MG: 1 INJECTION, SOLUTION INTRAMUSCULAR; INTRAVENOUS; SUBCUTANEOUS at 22:27

## 2024-01-19 RX ADMIN — METOPROLOL TARTRATE 25 MG: 25 TABLET, FILM COATED ORAL at 08:23

## 2024-01-19 RX ADMIN — ALBUMIN (HUMAN): 12.5 INJECTION, SOLUTION INTRAVENOUS at 11:48

## 2024-01-19 RX ADMIN — BUPIVACAINE HYDROCHLORIDE 20 ML: 2.5 INJECTION, SOLUTION EPIDURAL; INFILTRATION; INTRACAUDAL; PERINEURAL at 10:37

## 2024-01-19 RX ADMIN — INSULIN LISPRO 6 UNITS: 100 INJECTION, SOLUTION INTRAVENOUS; SUBCUTANEOUS at 17:24

## 2024-01-19 RX ADMIN — DEXAMETHASONE SODIUM PHOSPHATE 8 MG: 10 INJECTION, SOLUTION INTRAMUSCULAR; INTRAVENOUS at 11:45

## 2024-01-19 RX ADMIN — Medication 200 MCG: at 11:59

## 2024-01-19 RX ADMIN — Medication 200 MCG: at 12:34

## 2024-01-19 RX ADMIN — OXYCODONE HYDROCHLORIDE 10 MG: 10 TABLET ORAL at 01:06

## 2024-01-19 NOTE — PROGRESS NOTES
Justo Oquendo       LOS: 5 days   Patient Care Team:  Melo Whitaker MD as PCP - General (Family Medicine)  Niall Mcfarlane MD as Consulting Physician (Cardiology)    Chief Complaint:  left foot ischemia    Subjective     Interval History:     Resting comfortably in bed this morning.  Family present at bedside.  Pain tolerable, no acute events overnight.      Review of Systems:      Gen- No fevers, chills  CV- No chest pain, palpitations  Resp- No cough, dyspnea  GI- No N/V/D, abd pain    Objective     Vital Signs  Vital Signs (last 24 hours)         01/15 0700  01/16 0659 01/16 0700  01/16 0731   Most Recent      Temp (°F) 96.6 -  97.6       97.2 (36.2) 01/16 0652    Heart Rate 74 -  101       82 01/16 0652    Resp 14 -  16 16 01/16 0652    /70 -  157/76       134/70 01/16 0652    SpO2 (%) 93 -  99       97 01/16 0652    Flow (L/min)   2.5       2.5 01/16 0652              Physical Exam:     Alert, oriented.  No acute distress.  Nonlabored respirations.  Regular rate and rhythm.  Abdomen nondistended.  Left lower extremity: Less redness around the foot, ischemic changes of his second through fourth lesser toes, becoming better demarcated.  Foot warm and well perfused.     Results Review:     I reviewed the patient's new clinical results.    Medication Review:   Hospital Medications (active)         Dose Frequency Start End    aspirin EC tablet 81 mg 81 mg Nightly 1/14/2024 --    Admin Instructions: Do not crush or chew the capsules or tablets. The drug may not work as designed if the capsule or tablet is crushed or chewed. Swallow whole.  Do not exceed 4 grams of aspirin in a 24 hr period.    If given for pain, use the following pain scale:   Mild Pain = Pain Score of 1-3, CPOT 1-2  Moderate Pain = Pain Score of 4-6, CPOT 3-4  Severe Pain = Pain Score of 7-10, CPOT 5-8    Route: Oral    bisacodyl (DULCOLAX) EC tablet 5 mg 5 mg Daily PRN 1/14/2024 --    Admin Instructions: Use if no  "bowel movement after 12 hours.  Swallow whole. Do not crush, split, or chew tablet.    Route: Oral    Linked Group 1: See Hyperspace for full Linked Orders Report.        bisacodyl (DULCOLAX) suppository 10 mg 10 mg Daily PRN 1/14/2024 --    Admin Instructions: Use if no bowel movement after 12 hours.  Hold for diarrhea    Route: Rectal    Linked Group 1: See Hyperspace for full Linked Orders Report.        Calcium Replacement - Follow Nurse / BPA Driven Protocol  As Needed 1/14/2024 --    Admin Instructions: Open Order & Select \"BHS Electrolyte Replacement Protocol Algorithm\" to View Details    Route: Does not apply    cefTRIAXone (ROCEPHIN) 1,000 mg in sodium chloride 0.9 % 100 mL IVPB 1,000 mg Every 24 Hours 1/15/2024 1/22/2024    Admin Instructions: LR should be paused and flushing of the line with NS is recommended prior to and after completion of ceftriaxone infusion due to incompatibility. Do not co-adminster with calcium-containing solutions.  Caution: Look alike/sound alike drug alert    Route: Intravenous    cyclobenzaprine (FLEXERIL) tablet 10 mg 10 mg 3 Times Daily PRN 1/14/2024 --    Route: Oral    dextrose (D50W) (25 g/50 mL) IV injection 25 g 25 g Every 15 Minutes PRN 1/14/2024 --    Admin Instructions: Blood sugar less than 70; patient has IV access - Unresponsive, NPO or Unable To Safely Swallow    Route: Intravenous    dextrose (GLUTOSE) oral gel 15 g 15 g Every 15 Minutes PRN 1/14/2024 --    Admin Instructions: BS<70, Patient Alert, Is not NPO, Can safely swallow.    Route: Oral    gabapentin (NEURONTIN) capsule 600 mg 600 mg Every 8 Hours Scheduled 1/14/2024 --    Admin Instructions:     Route: Oral    glucagon (GLUCAGEN) injection 1 mg 1 mg Every 15 Minutes PRN 1/14/2024 --    Admin Instructions: Blood Glucose Less Than 70 - Patient Without IV Access - Unresponsive, NPO or Unable To Safely Swallow  Reconstitute powder for injection by adding 1 mL of -supplied sterile diluent or " "sterile water for injection to a vial containing 1 mg of the drug, to provide solutions containing 1 mg/mL. Shake vial gently to dissolve.    Route: Intramuscular    heparin 45209 units/250 mL (100 units/mL) in 0.45 % NaCl infusion 21 Units/kg/hr × 73.5 kg Titrated 1/14/2024 --    Admin Instructions: Pharmacy dosing - Cardiac or Other NOT VTE - Boluses (No initial bolus)    Route: Intravenous    HYDROmorphone (DILAUDID) injection 1 mg 1 mg Every 2 Hours PRN 1/14/2024 1/19/2024    Admin Instructions: Based on patient request - if ordered for moderate or severe pain, provider allows for administration of a medication prescribed for a lower pain scale.      Caution: Look alike/sound alike drug alert    If given for pain, use the following pain scale:  Mild Pain = Pain Score of 1-3, CPOT 1-2  Moderate Pain = Pain Score of 4-6, CPOT 3-4  Severe Pain = Pain Score of 7-10, CPOT 5-8    Route: Intravenous    Linked Group 2: See [facility] for full Linked Orders Report.        Insulin Lispro (humaLOG) injection 2-7 Units 2-7 Units 4 Times Daily Before Meals & Nightly 1/14/2024 --    Admin Instructions: Correction Insulin - Low Dose - Total Insulin Dose Less Than 40 units/day (Lean, Elderly or Renal Patients)    Blood Glucose 150-199 mg/dL - 2 units  Blood Glucose 200-249 mg/dL - 3 units  Blood Glucose 250-299 mg/dL - 4 units  Blood Glucose 300-349 mg/dL - 5 units  Blood Glucose 350-400 mg/dL - 6 units  Blood Glucose Greater Than 400 mg/dL - 7 units & Call Provider   Caution: Look alike/sound alike drug alert    Route: Subcutaneous    LORazepam (ATIVAN) tablet 0.5 mg 0.5 mg Every 8 Hours PRN 1/14/2024 1/19/2024    Admin Instructions:  Caution: Look alike/sound alike drug alert    Route: Oral    Magnesium Standard Dose Replacement - Follow Nurse / BPA Driven Protocol  As Needed 1/14/2024 --    Admin Instructions: Open Order & Select \"BHS Electrolyte Replacement Protocol Algorithm\" to View Details    Route: Does not apply    " "metoprolol tartrate (LOPRESSOR) tablet 25 mg 25 mg Every 12 Hours Scheduled 1/14/2024 --    Admin Instructions: Hold for SBP less than 100, DBP less than 60, or heart rate less than 50    Route: Oral    naloxone (NARCAN) injection 0.4 mg 0.4 mg Every 5 Minutes PRN 1/14/2024 --    Admin Instructions: If Respiratory Rate Less Than 8 or Patient is Difficult to Arouse, Stop ALL Narcotics & Contact Provider.  Administer Slow IV Push.  Repeat As Ordered Until Respiratory Rate is Greater Than 12.    Route: Intravenous    Linked Group 2: See Hyperspace for full Linked Orders Report.        nicotine (NICODERM CQ) 21 MG/24HR patch 1 patch 1 patch Nightly 1/14/2024 --    Admin Instructions: Apply Patch to Clean, Dry, Hairless Area Daily - Rotating Sites.  REMOVE Old Patch Prior to Applying New Patch.  May Remove Patch at Bedtime If Needed to Prevent Insomnia.  Do Not Use Other Nicotine Products.  At Discharge, Follow Package Instructions.  Dispose of nicotine replacement therapies and their wrappers in non-hazardous pharmaceutical waste or in regular trash.    Route: Transdermal    nicotine polacrilex (NICORETTE) gum 2 mg 2 mg Every 1 Hour PRN 1/14/2024 --    Admin Instructions: Maximum 24 pieces in 24 hours.    Gum should be chewed until it tingles, then \"park\" between the gum and cheek.   When the tingling is gone, chew again until the tingle returns and once again \"park\" between gum and cheek.   Repeat until tingling is gone and discard.  At discharge, follow instructions on package  Dispose of nicotine replacement therapies and their wrappers in non-hazardous pharmaceutical waste or in regular trash.    Route: Mouth/Throat    nitroglycerin (NITROSTAT) SL tablet 0.4 mg 0.4 mg Every 5 Minutes PRN 1/14/2024 --    Admin Instructions: If Pain Unrelieved After 3 Doses Notify MD  May administer up to 3 doses per episode.    Route: Sublingual    ondansetron (ZOFRAN) injection 4 mg 4 mg Every 6 Hours PRN 1/14/2024 --    Admin " "Instructions: If BOTH ondansetron (ZOFRAN) & promethazine (PHENERGAN) Ordered, Use ondansetron First & THEN promethazine IF ondansetron Ineffective.    Route: Intravenous    Linked Group 3: See Hyperspace for full Linked Orders Report.        ondansetron ODT (ZOFRAN-ODT) disintegrating tablet 4 mg 4 mg Every 6 Hours PRN 1/14/2024 --    Admin Instructions: If BOTH ondansetron (ZOFRAN) & promethazine (PHENERGAN) Ordered, Use ondansetron First & THEN promethazine IF ondansetron Ineffective.  Place on tongue and allow to dissolve.    Route: Oral    Linked Group 3: See Hyperspace for full Linked Orders Report.        oxyCODONE (ROXICODONE) immediate release tablet 10 mg 10 mg 4 Times Daily 1/14/2024 1/19/2024    Admin Instructions: HOLD for sedation  If given for pain, use the following pain scale:  Mild Pain = Pain Score of 1-3, CPOT 1-2  Moderate Pain = Pain Score of 4-6, CPOT 3-4  Severe Pain = Pain Score of 7-10, CPOT 5-8    Route: Oral    oxyCODONE (ROXICODONE) immediate release tablet 10 mg 10 mg Every 4 Hours PRN 1/14/2024 1/19/2024    Admin Instructions: Based on patient request - if ordered for moderate or severe pain, provider allows for administration of a medication prescribed for a lower pain scale.  If given for pain, use the following pain scale:  Mild Pain = Pain Score of 1-3, CPOT 1-2  Moderate Pain = Pain Score of 4-6, CPOT 3-4  Severe Pain = Pain Score of 7-10, CPOT 5-8    Route: Oral    Pharmacy to Dose Heparin  Continuous PRN 1/14/2024 --    Admin Instructions: Boluses (No initial bolus)    Route: Does not apply    Pharmacy to dose vancomycin  Continuous PRN 1/14/2024 1/21/2024    Route: Does not apply    Phosphorus Replacement - Follow Nurse / BPA Driven Protocol  As Needed 1/14/2024 --    Admin Instructions: Open Order & Select \"BHS Electrolyte Replacement Protocol Algorithm\" to View Details    Route: Does not apply    polyethylene glycol (MIRALAX) packet 17 g 17 g Daily PRN 1/14/2024 --    Admin " "Instructions: Use if no bowel movement after 12 hours. Mix in 6-8 ounces of water.  Use 4-8 ounces of water, tea, or juice for each 17 gram dose.    Route: Oral    Linked Group 1: See Hyperspace for full Linked Orders Report.        Potassium Replacement - Follow Nurse / BPA Driven Protocol  As Needed 1/14/2024 --    Admin Instructions: Open Order & Select \"BHS Electrolyte Replacement Protocol Algorithm\" to View Details    Route: Does not apply    rosuvastatin (CRESTOR) tablet 40 mg 40 mg Nightly 1/14/2024 --    Admin Instructions: Avoid grapefruit juice.    Route: Oral    sennosides-docusate (PERICOLACE) 8.6-50 MG per tablet 2 tablet 2 tablet 2 Times Daily 1/14/2024 --    Admin Instructions: HOLD MEDICATION IF PATIENT HAS HAD BOWEL MOVEMENT. Start bowel management regimen if patient has not had a bowel movement after 12 hours.    Route: Oral    Linked Group 1: See Hyperspace for full Linked Orders Report.        sodium chloride 0.9 % flush 10 mL 10 mL Every 12 Hours Scheduled 1/14/2024 --    Route: Intravenous    sodium chloride 0.9 % flush 10 mL 10 mL As Needed 1/14/2024 --    Route: Intravenous    sodium chloride 0.9 % infusion 40 mL 40 mL As Needed 1/14/2024 --    Admin Instructions: Following administration of an IV intermittent medication, flush line with 40mL NS at 100mL/hr.    Route: Intravenous    sodium chloride 0.9 % infusion 125 mL/hr Continuous 1/14/2024 --    Route: Intravenous    temazepam (RESTORIL) capsule 15 mg 15 mg Nightly PRN 1/14/2024 1/19/2024    Admin Instructions: Group 2 (Pink) Hazardous Drug - Reproductive Risk Only - See Handling Guide    Route: Oral    vancomycin (VANCOCIN) 1000 mg/200 mL dextrose 5% IVPB 1,000 mg Every 12 Hours Scheduled 1/15/2024 1/20/2024    Route: Intravenous              Assessment & Plan     48-year-old male with peripheral vascular disease, left second through fourth toe dry gangrene.      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  I discussed with Dr. Pena who feels he could heal a transmetatarsal amputation.  I discussed with Justo and his wife at bedside.  After discussion of risk, benefits, alternatives, he wished to proceed with left transmetatarsal amputation, possible wound vacuum-assisted closure.    Risks of surgery were discussed which included but were not limited to pain, bleeding, infection, damage to adjacent structures, need for further surgery, wound healing complications, loss of limb and death.  The patient expressed verbal consent and written consent was obtained for the above procedure.      Christo Peres Jr, MD  01/19/24  07:21 EST

## 2024-01-19 NOTE — CASE MANAGEMENT/SOCIAL WORK
Continued Stay Note   [facility]     Patient Name: Justo Oquendo  MRN: [id number]  Today's Date: 1/19/2024    Admit Date: 1/14/2024    Plan: TBD   Discharge Plan       Row Name 01/19/24 1204       Plan    Plan TBD    Patient/Family in Agreement with Plan yes    Plan Comments Discussed patient in MDR today.  Patient off floor for surgery today for left transmetatarsal amputation.  Therapy to re-evaluate post-op.  CM will continue to follow and assist with discharge plan.                   Discharge Codes    No documentation.                       Dulce Valle RN

## 2024-01-19 NOTE — PROGRESS NOTES
"   LOS: 5 days   Patient Care Team:  Melo Whitaker MD as PCP - General (Family Medicine)  Niall Mcfarlane MD as Consulting Physician (Cardiology)    Chief Complaint: LEFT 2nd-4th toe gangrene    Subjective     Altered Mental Status        Subjective:  Symptoms:  Improved.  (More sensation and motor of the LEFT foot).    Diet:  NPO.    Activity level: Returning to normal.    Pain:  He complains of pain that is severe (8/10).  He reports pain is improving.  Pain is poorly controlled.        History taken from: patient    Objective     Vital Signs  Temp:  [97.2 °F (36.2 °C)-99.4 °F (37.4 °C)] 97.4 °F (36.3 °C)  Heart Rate:  [] 101  Resp:  [16-20] 17  BP: (109-158)/(54-98) 149/72    Objective:  General Appearance:  Comfortable.    Vital signs: (most recent): Blood pressure 149/72, pulse 101, temperature 97.4 °F (36.3 °C), temperature source Temporal, resp. rate 17, height 172.7 cm (68\"), weight 73.5 kg (162 lb), SpO2 98%.  Vital signs are normal.    HEENT: Normal HEENT exam.    Lungs:  Normal effort and normal respiratory rate.  He is not in respiratory distress.  No wheezes.    Heart: Normal rate.  Regular rhythm.    Chest: Symmetric chest wall expansion. No chest wall tenderness.    Abdomen: Abdomen is soft.  Bowel sounds are normal.   There is no abdominal tenderness.     Pulses: (Strong LEFT PT/AT biphasic signals)    Neurological: Patient is alert and oriented to person, place and time.    Skin:  (LEFT thigh/leg incisions with Aquacel, LEFT 2nd-4th toes dry gangrene)              Results Review:     I reviewed the patient's new clinical results.    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 Peres for TMA  - pain control  - defer wound care to Dr. Peres  - OK to remove Aquacel dressings 1/23/24  - OK to shower after Aquacel dressings removed 1/23/24  - OK to discharge from vascular surgery standpoint when ambulating, pain controlled  - F/U Becky 2 weeks with ARLINE    Vin Pena MD  01/19/24  10:27 EST    Time: More than 50% of time spent in counseling and coordination of care:  Total face-to-face/floor time 5 min.  Time spent in counseling 5 min. Counseling included the following topics: recommendation for TMA

## 2024-01-19 NOTE — PROGRESS NOTES
[facility]  PROGRESS NOTE    Justo Oquendo   [id number]   1975    LOS: 5 days .  Date of Admission: 1/14/2024  Date of Service: 01/19/24    Primary Care Physician: Melo Whitaker MD    Chief Complaint: f/u AMS    Subjective      Patient lying in bed with family at bedside. He is about to go to surgery for left metatarsal amputation. Patient denies shortness of breath, chest pain, palpitations, lightheadedness, and syncope.     ROS  All systems have been reviewed and are negative with the exception of those mentioned in the HPI and problem list above.     Objective   Vital Sign Min/Max for last 24 hours  Temp  Min: 97.2 °F (36.2 °C)  Max: 99.2 °F (37.3 °C)   BP  Min: 109/78  Max: 158/77   Pulse  Min: 87  Max: 116   Resp  Min: 16  Max: 20   SpO2  Min: 94 %  Max: 100 %   No data recorded   No data recorded     Physical Exam:  GENERAL: Alert, cooperative, in no acute distress.   HEENT: Normocephalic, no jugular venous distention  HEART: Regular rhythm, normal rate, and no murmurs, gallops, or rubs.   LUNGS: Clear to auscultation bilaterally. No wheezing, rales or rhonchi. On 1 L NC  NEUROLOGIC: No focal abnormalities involving strength or sensation are noted.   EXTREMITIES: No clubbing, cyanosis, or edema noted. Left 2nd-4th toes are black. Left foot and leg warm to touch. PT and DP pulses 2+    Results:  Results from last 7 days   Lab Units 01/19/24  0247 01/18/24  0730 01/16/24  0648   WBC 10*3/mm3 8.03 6.77 6.90   HEMOGLOBIN g/dL 10.1* 11.9* 11.0*   HEMATOCRIT % 29.2* 35.1* 33.9*   PLATELETS 10*3/mm3 200 230 264     Results from last 7 days   Lab Units 01/19/24  0247 01/18/24  1856 01/18/24  0730   SODIUM mmol/L 134* 138 138   POTASSIUM mmol/L 4.1 3.9 3.6   CHLORIDE mmol/L 102 103 102   CO2 mmol/L 21.0* 21.0* 24.0   BUN mg/dL 9 9 9   CREATININE mg/dL 0.66* 0.68* 0.60*   GLUCOSE mg/dL 157* 128* 151*      Lab Results   Component Value Date    AST 44 (H)  01/18/2024    ALT 25 01/18/2024                     Results from last 7 days   Lab Units 01/18/24  0730 01/17/24  2359 01/17/24  1441 01/15/24  0359 01/14/24  2111   PROTIME Seconds  --   --   --   --  15.0*   INR   --   --   --   --  1.17*   APTT seconds 55.3* 79.4 44.9*   < > 38.7*    < > = values in this interval not displayed.     Results from last 7 days   Lab Units 01/14/24  1512   CK TOTAL U/L 57   HSTROP T ng/L 18     Results from last 7 days   Lab Units 01/14/24  1512   PROBNP pg/mL 63.8       Intake/Output Summary (Last 24 hours) at 1/19/2024 0949  Last data filed at 1/19/2024 0600  Gross per 24 hour   Intake 1789.5 ml   Output 1200 ml   Net 589.5 ml       EKG/TELE: NSR    Radiology Data:   No radiology results for the last day   Results for orders placed during the hospital encounter of 11/20/23    Adult Transthoracic Echo Complete W/ Cont if Necessary Per Protocol    Interpretation Summary    Left ventricular systolic function is hyperdynamic (EF > 70%). Calculated left ventricular EF = 70.4%    Left ventricular diastolic function was normal.    No significant structural or functional valvular disease.     Current Medications:  aspirin, 81 mg, Oral, Nightly  cefTRIAXone, 1,000 mg, Intravenous, Q24H  gabapentin, 600 mg, Oral, Q8H  hydrALAZINE, 25 mg, Oral, Q8H  insulin lispro, 2-7 Units, Subcutaneous, 4x Daily AC & at Bedtime  lisinopril, 40 mg, Oral, Q24H  metoprolol tartrate, 25 mg, Oral, Q12H  nicotine, 1 patch, Transdermal, Nightly  pantoprazole, 40 mg, Intravenous, Q AM  rosuvastatin, 40 mg, Oral, Nightly  senna-docusate sodium, 2 tablet, Oral, BID  sodium chloride, 10 mL, Intravenous, Q12H  vancomycin, 1,500 mg, Intravenous, Q12H      lactated ringers, 9 mL/hr, Last Rate: 1,000 mL/hr (01/18/24 1510)  niCARdipine, 5-15 mg/hr, Last Rate: 10 mg/hr (01/19/24 0946)  Pharmacy to Dose Heparin,   Pharmacy to dose vancomycin,       Assessment and Plan:   Femoral popliteal bypass complicated by complete  occlusion of left femoral artery bypass graft.  Ischemic left 4th toe with worsening discoloration of 2nd-3rd toes.   1/18/24 Dr. Pena left graft thrombectomy, AKP-BKP jump graft with tibial angioplasty  Orthopedics following with plans for transmetatarsal amputation today  PAD  HTN  Uncontrolled DMT2   HLD  Tobacco abuse    - Not having active cardiac complaints.  Can proceed with surgery without further CV risk stratification.    - Continue metoprolol, lisinopril, hydralazine and amlodipine as tolerated. Use cardene drip for systolic BP  >200  - Continue ASA and statin   - We will follow on an as needed basis. Please contact us for questions or concerns.   - Patient will need 4 week follow up with Dr. Hoyt at discharge.     Electronically signed by PRIMO Metz, 01/19/24, 9:06 AM EST.     Please note that portions of this note were dictated utilizing Dragon dictation.

## 2024-01-19 NOTE — ANESTHESIA PROCEDURE NOTES
Airway  Urgency: elective    Date/Time: 1/19/2024 11:38 AM  Airway not difficult    General Information and Staff    Patient location during procedure: OR  Anesthesiologist: Ketan Horner MD    Indications and Patient Condition  Indications for airway management: airway protection    Preoxygenated: yes  Mask difficulty assessment: 1 - vent by mask    Final Airway Details  Final airway type: supraglottic airway      Successful airway: I-gel  Size 5     Number of attempts at approach: 1  Assessment: lips, teeth, and gum same as pre-op    Additional Comments  LMA placed without difficulty, ventilation with assist, equal breath sounds and symmetric chest rise and fall

## 2024-01-19 NOTE — PROGRESS NOTES
Pharmacy Consult-Vancomycin Dosing  Justo Oquendo is a  48 y.o. male receiving vancomycin therapy.     Indication: SSTI  Consulting Provider: Hospitalist  ID Consult: N    Goal Trough: 10-20    Current Antimicrobial Therapy  Anti-Infectives (From admission, onward)      Ordered     Dose/Rate Route Frequency Start Stop    01/19/24 0709  vancomycin IVPB 1500 mg in 0.9% NaCl (Premix) 500 mL        Ordering Provider: Denton Saez PharmD    1,500 mg  333.3 mL/hr over 90 Minutes Intravenous Every 12 Hours 01/19/24 1300 01/20/24 1259    01/18/24 1947  cefTRIAXone (ROCEPHIN) 1,000 mg in sodium chloride 0.9 % 100 mL IVPB        Ordering Provider: Masha Adame MD    1,000 mg  200 mL/hr over 30 Minutes Intravenous Every 24 Hours 01/18/24 2100 01/21/24 2059 01/14/24 2129  Pharmacy to dose vancomycin        Ordering Provider: Vin Pena MD     Does not apply Continuous PRN 01/14/24 2129 01/21/24 2128    01/14/24 1638  vancomycin IVPB 1500 mg in 0.9% NaCl (Premix) 500 mL        Ordering Provider: Ramakrishna Claudio IV, PharmD    20 mg/kg × 73.5 kg  333.3 mL/hr over 90 Minutes Intravenous Once 01/14/24 1730 01/14/24 1945    01/14/24 1624  Pharmacy to dose vancomycin        Ordering Provider: Samuel Dave APRN     Does not apply Once 01/14/24 1640 01/14/24 1747    01/14/24 1546  cefTRIAXone (ROCEPHIN) 1,000 mg in sodium chloride 0.9 % 100 mL IVPB        Ordering Provider: Samuel Dave APRN    1,000 mg  200 mL/hr over 30 Minutes Intravenous Once 01/14/24 1602 01/14/24 1701            Allergies  Allergies as of 01/14/2024    (No Known Allergies)       Labs    Results from last 7 days   Lab Units 01/19/24  0247 01/18/24  1856 01/18/24  0730   BUN mg/dL 9 9 9   CREATININE mg/dL 0.66* 0.68* 0.60*       Results from last 7 days   Lab Units 01/19/24  0247 01/18/24  0730 01/16/24  0648   WBC 10*3/mm3 8.03 6.77 6.90       Evaluation of Dosing     Last Dose Received in the ED/Outside Facility: vanc 1500mg x1 1/14  "@ 1815  Is Patient on Dialysis or Renal Replacement: N    Ht - 172.7 cm (68\")  Wt - 73.5 kg (162 lb)    Estimated Creatinine Clearance: 139.2 mL/min (A) (by C-G formula based on SCr of 0.66 mg/dL (L)).    Intake & Output (last 3 days)         01/12 0701  01/13 0700 01/13 0701  01/14 0700 01/14 0701  01/15 0700    IV Piggyback   100    Total Intake(mL/kg)   100 (1.4)    Net   +100                   Microbiology and Radiology  Microbiology Results (last 10 days)       Procedure Component Value - Date/Time    MRSA Screen, PCR (Inpatient) - Swab, Nares [[id number]]  (Normal) Collected: 01/14/24 1817    Lab Status: Final result Specimen: Swab from Nares Updated: 01/14/24 2152     MRSA PCR No MRSA Detected    Narrative:      The negative predictive value of this diagnostic test is high and should only be used to consider de-escalating anti-MRSA therapy. A positive result may indicate colonization with MRSA and must be correlated clinically.    Blood Culture - Blood, Arm, Left [[id number]]  (Normal) Collected: 01/14/24 1512    Lab Status: Preliminary result Specimen: Blood from Arm, Left Updated: 01/18/24 1900     Blood Culture No growth at 4 days    Blood Culture - Blood, Arm, Right [[id number]]  (Normal) Collected: 01/14/24 1510    Lab Status: Preliminary result Specimen: Blood from Arm, Right Updated: 01/18/24 1631     Blood Culture No growth at 4 days            Vancomycin Levels:    Results from last 7 days   Lab Units 01/19/24  0247 01/18/24  0730 01/17/24  0641 01/16/24  0648 01/15/24  0359   VANCOMYCIN RM mcg/mL 28.80 12.10 15.60 13.30 9.50                   InsightRX AUC Calculation:    Current AUC: 647 mg/L*hr    Predicted Steady State AUC on Current Dose: 479 mg/L*hr  _________________________________    Predicted Steady State AUC on New Dose: 479 mg/L*hr    Assessment/Plan:  Pharmacy consulted to dose vancomycin for SSTI  Patient is currently on a maintenance dose of vancomycin 1500 mg IV q12h. Predicted AUC of " 479 mg/L*hr.  Pertinent labs: Afebrile, Scr 0.66/CrCl >120 ml/min, WBC 8.03.   A vancomycin level was drawn today @ 0247 (~1 hour post-dose) and resulted as 28.8 mcg/mL.  Level is supratherapeutic but drawn early at ~1 hr post dose. Will continue current regimen.   Will order additional levels as needed while on therapy when clinically appropriate.   Will continue to follow and adjust vancomycin dose as needed based on renal function, cultures, and patient clinical status.    Thank you,    Denton Saez, PharmD  1/19/2024  10:52 EST

## 2024-01-19 NOTE — ANESTHESIA POSTPROCEDURE EVALUATION
Patient: Justo Oquendo    Procedure Summary       Date: 01/19/24 Room / Location:  [facility]    Anesthesia Start: 1127 Anesthesia Stop: 1253    Procedure: AMPUTATION TRANS METATARSAL- LEFT (Left: Leg Lower) Diagnosis:     Surgeons: Christo Peres Jr., MD Provider: Ketan Horner MD    Anesthesia Type: general with block ASA Status: 3            Anesthesia Type: general with block    Vitals  Vitals Value Taken Time   BP     Temp     Pulse 96 01/19/24 1253   Resp     SpO2 97 % 01/19/24 1253   Vitals shown include unfiled device data.        Post Anesthesia Care and Evaluation    Patient location during evaluation: PACU  Patient participation: complete - patient participated  Level of consciousness: awake and alert  Pain management: adequate    Airway patency: patent  Anesthetic complications: No anesthetic complications  PONV Status: none  Cardiovascular status: hemodynamically stable and acceptable  Respiratory status: nonlabored ventilation, acceptable and nasal cannula  Hydration status: acceptable

## 2024-01-19 NOTE — ANESTHESIA PREPROCEDURE EVALUATION
Anesthesia Evaluation     Patient summary reviewed and Nursing notes reviewed   NPO Solid Status: > 8 hours  NPO Liquid Status: > 8 hours           Airway   Mallampati: II  TM distance: >3 FB  Neck ROM: full  No difficulty expected  Dental      Pulmonary     breath sounds clear to auscultation  Cardiovascular     ECG reviewed  Rhythm: regular  Rate: normal    (+) hypertension, PVD, hyperlipidemia      Neuro/Psych  GI/Hepatic/Renal/Endo    (+) renal disease- CRI, diabetes mellitus    Musculoskeletal     (+) back pain  Abdominal    Substance History      OB/GYN          Other   arthritis, blood dyscrasia anemia,                     Anesthesia Plan    ASA 3     general with block     intravenous induction     Anesthetic plan, risks, benefits, and alternatives have been provided, discussed and informed consent has been obtained with: patient.    Plan discussed with CRNA.    CODE STATUS:    Code Status (Patient has no pulse and is not breathing): CPR (Attempt to Resuscitate)  Medical Interventions (Patient has pulse or is breathing): Full Support

## 2024-01-19 NOTE — DISCHARGE INSTRUCTIONS
- Remove LEFT medial thigh and medial leg Aquacels 1/23/24  - OK to shower after Aquacels removed 1/23/24

## 2024-01-19 NOTE — PROGRESS NOTES
"Critical Care Note     LOS: 5 days   Patient Care Team:  Melo Whitaker MD as PCP - General (Family Medicine)  Niall Mcfarlane MD as Consulting Physician (Cardiology)    Chief Complaint/Reason for visit:    Chief Complaint   Patient presents with    Altered Mental Status   Ischemic right foot  Peripheral artery disease  History of left femoral-popliteal bypass, previous femoral-femoral bypass  Hypertension  Hyperlipidemia  Diabetes mellitus type 2  Tobacco abuse      Subjective     Interval History:     I saw this patient in the morning before he went to surgery.  He was having a lot of pain in his left foot and this did cause some hypertension.    Review of Systems:    All systems were reviewed and negative except as noted in subjective.    Medical history, surgical history, social history, family history reviewed    Objective     Intake/Output:    Intake/Output Summary (Last 24 hours) at 1/19/2024 1511  Last data filed at 1/19/2024 1205  Gross per 24 hour   Intake 1618.7 ml   Output 1200 ml   Net 418.7 ml       Nutrition:  Diet: Liquid Diets; Clear Liquid; Texture: Regular Texture (IDDSI 7); Fluid Consistency: Thin (IDDSI 0)    Infusions:  lactated ringers, 9 mL/hr, Last Rate: 1,000 mL/hr (01/18/24 1510)  niCARdipine, 5-15 mg/hr, Last Rate: 5 mg/hr (01/19/24 1125)  Pharmacy to dose vancomycin,   ropivacaine,         Mechanical Ventilator Settings:                                                Telemetry: Sinus rhythm, sinus tachycardia             Vital Signs  Blood pressure 121/56, pulse 96, temperature 99.1 °F (37.3 °C), resp. rate 16, height 172.7 cm (68\"), weight 73.5 kg (162 lb), SpO2 95%.    Physical Exam:  General Appearance:  Middle-aged gentleman in no respiratory distress   Head:  Atraumatic   Eyes:          Conjunctiva pink, no jaundice   Ears:     Throat: Oral mucosa moist   Neck: Trachea midline, no carotid bruits   Back:      Lungs:   Symmetric chest expansion without wheeze    Heart: " " Regular rhythm, S1, S2 auscultated   Abdomen:   Bowel sounds present, soft, nontender   Rectal:   Deferred   Extremities: Left foot and ankle swelling, black toes,   Pulses: No left foot pulse   Skin: No skin rash   Lymph nodes: No cervical adenopathy   Neurologic: Alert and oriented      Results Review:     I reviewed the patient's new clinical results.   Results from last 7 days   Lab Units 01/19/24  0247 01/18/24  1856 01/18/24  0730 01/15/24  0359 01/14/24  1512   SODIUM mmol/L 134* 138 138   < > 134*   POTASSIUM mmol/L 4.1 3.9 3.6   < > 4.1   CHLORIDE mmol/L 102 103 102   < > 96*   CO2 mmol/L 21.0* 21.0* 24.0   < > 25.0   BUN mg/dL 9 9 9   < > 30*   CREATININE mg/dL 0.66* 0.68* 0.60*   < > 2.07*   CALCIUM mg/dL 9.0 9.3 9.3   < > 10.0   BILIRUBIN mg/dL  --  0.3  --   --  0.2   ALK PHOS U/L  --  57  --   --  83   ALT (SGPT) U/L  --  25  --   --  13   AST (SGOT) U/L  --  44*  --   --  14   GLUCOSE mg/dL 157* 128* 151*   < > 302*    < > = values in this interval not displayed.     Results from last 7 days   Lab Units 01/19/24  0247 01/18/24  0730 01/16/24  0648   WBC 10*3/mm3 8.03 6.77 6.90   HEMOGLOBIN g/dL 10.1* 11.9* 11.0*   HEMATOCRIT % 29.2* 35.1* 33.9*   PLATELETS 10*3/mm3 200 230 264         Lab Results   Component Value Date    BLOODCX No growth at 4 days 01/14/2024     No results found for: \"URINECX\"    I reviewed the patient's new imaging including images and reports.    CT ANGIO ABDOMINAL AORTA BILAT ILIOFEM RUNOFF    Date of Exam: 1/14/2024 5:34 PM EST    Indication: Claudication or leg ischemia  Previous fem pop graft occlusion, c/o increased LLE pain, color changes, third/fourth toe ischemia, leukocytosis, r/o gas.    Comparison: None available.    Technique: CTA of the abdomen, pelvis and both lower extremities was performed after the uneventful intravenous administration of 120 cc Isovue-370 . Reconstructed coronal and sagittal images were also obtained. In addition, a 3-D volume rendered " image  was created for interpretation. Automated exposure control and iterative reconstruction methods were used.    The origins of the celiac axis and superior mesenteric arteries are normal. The origins of the renal arteries are normal. Mild atheromatous disease of the abdominal aorta with subtle ectasia. The origin of the inferior mesenteric artery is patent.    Complete occlusion of the left common iliac artery with continued complete loss of flow within the left internal and external iliac arteries. Multi focal atheromatous disease of the right common iliac artery as well as the internal and external iliac  arteries. Femorofemoral bypass.    Complete occlusion of the left femoral artery bypass with no underlying flow. Reconstitution of flow distally at the level of the popliteal artery due to branches from the deep femoral artery. The superior segments of the left anterior and posterior  tibial arteries are patent. Eventual loss of flow within the posterior tibial artery seen on axial image 383. Continued flow within the dorsalis pedis with eventual loss of flow within the arteries.    On the right there is continued flow within the right deep femoral artery. Complete occlusion of the left femoral artery with distal reconstitution from branches of the deep femoral artery. The popliteal artery is patent. The anterior and posterior  tibial vessels are patent proximally. Loss of flow within the anterior tibial artery on axial image #343. Loss of the contrast column within the posterior tibial artery on axial image  417.    The liver appears normal. Calcified granulomata within the spleen.    Normal pancreas. Prior cholecystectomy. The adrenal glands are normal.    No renal masses. No hydroureteronephrosis. The ureters and urinary bladder are normal.    The small bowel is nonobstructed. Normal appendix. Colonic diverticulosis without evidence of diverticulitis.    No ascites or pneumoperitoneum. No adenopathy.  Degenerative changes of the hips and lumbar spine.   Impression:     Complete occlusion of the left femoral artery bypass graft. Complete occlusion of the right femoral artery. Eventual distal reconstitution of both vessels due to branches of the deep femoral arteries. Loss of flow within the left posterior  tibial artery. Loss of flow within the right anterior tibial artery. Eventual loss of flow within the contrast column on both sides with no residual flow to the digital arteries.    Electronically Signed: Kee Cartagena MD   1/14/2024 6:08 PM EST       All medications reviewed.   aspirin, 81 mg, Oral, Nightly  cefTRIAXone, 1,000 mg, Intravenous, Q24H  gabapentin, 600 mg, Oral, Q8H  hydrALAZINE, 25 mg, Oral, Q8H  insulin lispro, 2-7 Units, Subcutaneous, 4x Daily AC & at Bedtime  lisinopril, 40 mg, Oral, Q24H  metoprolol tartrate, 25 mg, Oral, Q12H  nicotine, 1 patch, Transdermal, Nightly  pantoprazole, 40 mg, Intravenous, Q AM  rosuvastatin, 40 mg, Oral, Nightly  senna-docusate sodium, 2 tablet, Oral, BID  sodium chloride, 10 mL, Intravenous, Q12H  vancomycin, 1,500 mg, Intravenous, Q12H          Assessment & Plan       Ischemic pain of left foot    Claudication of lower extremity s/p femorofemoral bypass    Ischemic foot    Peripheral arterial disease    Hypertension    Hyperlipidemia    Tobacco abuse    Diabetes mellitus type II, non insulin dependent    Sepsis associated hypotension      48-year-old gentleman with diabetes, hypertension, dyslipidemia, tobacco abuse, known peripheral artery disease who underwent a left femoral-popliteal bypass April 25, 2017.  He developed worsening claudication in the left foot and was evaluated November 20 and was found to be pulseless in the left foot.  Aortogram revealed thromboembolism in the left popliteal graft and right femoral artery.  He has black toes, 2 3 and 4 on his left foot and no palpable pulse.  On admission creatinine 2.07.  Improved to 0.66 today.   Preoperative hemoglobin 10.1.    PLAN:    Left foot transmetatarsal amputation  Aspirin, Crestor  Hydralazine, metoprolol, lisinopril  Gabapentin 600 mg every 8 hours  Sliding scale insulin  Nerve block infusing ropivacaine  Ceftriaxone, vancomycin for osteomyelitis  Follow-up wound cultures  Start subcutaneous heparin tomorrow  Continue Protonix        Masha Adame MD  01/19/24  15:11 EST      Time: Critical care 25 min  I personally provided care to this critically ill patient as documented above.  Critical care time does not include time spent on separately billed procedures.  None of my critical care time was concurrent with other critical care providers.

## 2024-01-19 NOTE — OP NOTE
[facility]     OPERATIVE REPORT      PATIENT NAME:  Justo Oquendo                            YOB: 1975       PREOP DIAGNOSIS:   Left foot osteomyelitis    POSTOP DIAGNOSIS:   Same.    PROCEDURE:       [id number]:  Left foot transmetatarsal amputation    SURGEON:     Christo Peres MD    OPERATIVE TEAM:   Circulator: Dior Chaney, RN; Des Solares, RN; Samuel Sequeira, RN  Physician Assistant: Cynthia Cantor PA-C  Radiology Technologist: Alma Paige R.T.(SHANTE)  Scrub Person: Vanesa Vergara    ANESTHETIST:  Anesthesiologist: Ketan Horner MD; Balaji Parkinson Jr., MD    ANESTHESIA:   Choice    ESTIMATED BLOOD LOSS:   < 30 ml    TOURNIQUET TIME:   Not utilized    FINDINGS:     Remaining tissue appeared healthy.    IMPLANTS:     None    COMPLICATIONS:    None.    DISPOSITION:    Stable to recovery.     INDICATIONS:   48 y.o. male with Left foot peripheral vascular disease with dry gangrene of 2-4 toes status post revascularization.  I had a discussion with the patient regarding further management including nonoperative management with empiric antibiotics and wound care, debridement, amputations at various levels.  After discussion of risks, benefits, alternatives, the patient wished to proceed with Left foot transmetatarsal amputation.    Risks of surgery were discussed which included but were not limited to pain, bleeding, infection, damage to adjacent structures, need for further surgery, wound healing complications, loss of limb and death.  The patient expressed verbal consent and written consent was obtained for the above procedure.     NARRATIVE:    Patient was identified in preoperative holding.  Operative site was marked in indelible ink.  History, physical, consent were reviewed and updated.  Patient was surrendered to the anesthesia team and transported to the operative suite where anesthesia was induced.  Hip bump was placed on the ipsilateral side.  Operative extremity was prepped  and draped in the usual sterile fashion.  The operative team donned sterile gowns and gloves and a timeout was called.  All in attendance agreed regarding the patient's identity, proposed operative procedure, and operative site.    No tourniquet was utilized during this case.    I turned my attention to the Left foot.  I made a full-thickness incision along the fifth ray, carried this distally just proximal to the toes, and medially proximally along the first ray, sharply dissected down to bone.  I made a racquet shaped incision plantarly just proximal to the toes as well.  Utilizing Bovie electrocautery, I dissected in a subperiosteal plane, removed the soft tissue attachments from the base of the proximal phalanges, removed the toes.  Again, using Bovie electrocautery, I elevated the full-thickness soft tissue flap dorsally from the metatarsals.  Under fluoroscopic guidance, utilizing a sagittal saw, I transected the first through fifth metatarsals.  I removed the metatarsals, sent them for gross pathology.  I took swab specimens of the wound, copiously irrigated with Irrisept and saline.  I achieved hemostasis, irrigated the wound once more. The remaining tissue appeared healthy and viable without readily apparent evidence of infection.  I applied floseal impregnated with vancomycin into the wound.  I closed in anatomic layers with monofilament suture.    Sterile dressing applied.  Ace wrap was not utilized.    Anesthesia was concluded and the patient was transported to the PACU in stable condition.  Counts were correct x2.  There were no apparent complications.  I was present and scrubbed for the entire case.    Christo Peres Jr, MD  1/19/2024

## 2024-01-19 NOTE — ANESTHESIA PROCEDURE NOTES
Peripheral Block      Patient reassessed immediately prior to procedure    Reason for block: at surgeon's request and post-op pain management  Performed by  CRNA/CAA: Chris Hoff CRNA  Assisted by: Fallon Stroud RN  Preanesthetic Checklist  Completed: patient identified, IV checked, site marked, risks and benefits discussed, surgical consent, monitors and equipment checked, pre-op evaluation and timeout performed  Prep:  Pt Position: supine  Sterile barriers:cap, gloves, mask, sterile barriers and washed/disinfected hands  Prep: ChloraPrep  Patient monitoring: blood pressure monitoring, continuous pulse oximetry and EKG  Procedure  Performed under: spinal  Guidance:ultrasound guided    ULTRASOUND INTERPRETATION.  Using ultrasound guidance a 20 G gauge needle was placed in close proximity to the nerve, at which point, under ultrasound guidance anesthetic was injected in the area of the nerve and spread of the anesthesia was seen on ultrasound in close proximity thereto.  There were no abnormalities seen on ultrasound; a digital image was taken; and the patient tolerated the procedure with no complications. Images:still images obtained, printed/placed on chart    Laterality:left  Block Type:adductor canal block  Injection Technique:single-shot  Needle Type:Tuohy and echogenic  Needle Gauge:18 G  Resistance on Injection: none  Catheter size: 20g.    Medications Used: fentaNYL citrate (PF) (SUBLIMAZE) injection - Intravenous   100 mcg - 1/19/2024 10:36:00 AM  dexamethasone sodium phosphate injection - Injection   2 mg - 1/19/2024 10:37:00 AM  bupivacaine PF (MARCAINE) 0.25 % injection - Injection   20 mL - 1/19/2024 10:37:00 AM      Medications  Preservative Free Saline:5ml    Post Assessment  Injection Assessment: negative aspiration for heme, incremental injection and no paresthesia on injection  Patient Tolerance:comfortable throughout block  Complications:no  Additional Notes  SINGLE shot   A  "high-frequency linear transducer, with sterile cover, was placed on the anterior mid-thigh (between the anterior superior iliac spine and patella). The transducer was then moved medially to identify the Sartorius muscle (Kiarra), Vastus Medialis muscle (VMM), Superficial Femoral Artery (SFA) and Vein. The transducer was then moved cephalad or caudad to position the SFA in the middle of the Kiarra. The insertion site was prepped and draped in sterile fashion. Skin and cutaneous tissue was infiltrated with 2-5 ml of 1% Lidocaine. Using ultrasound-guidance, a 20-gauge B-Partida 4\" Ultraplex 360 non-stimulating echogenic needle was advanced in plane from lateral to medial. Preservative-free normal saline was utilized for hydro-dissection of tissue, advancement of needle, and to confirm needle placement below the fascial plane of the Kiarra where the Nerve to the VMM is located. Local anesthetic (LA) 5 ml deposited here. The needle continues its path lateral to the SFA at the level of the Saphenous Nerve. The remainder of the LA was deposited at the 10-11 o'clock position of the SFA. This injection created a space between the Kiarra and the SFA. Aspiration every 5 ml to prevent intravascular injection. Injection was completed with negative aspiration of blood and negative intravascular injection. Injection pressures were normal with minimal resistance.             "

## 2024-01-19 NOTE — ANESTHESIA PROCEDURE NOTES
Peripheral Block      Patient reassessed immediately prior to procedure    Patient location during procedure: pre-op  Reason for block: at surgeon's request and post-op pain management  Performed by  CRNA/CAA: Chris Hoff CRNA  Assisted by: Fallon Stroud RN  Preanesthetic Checklist  Completed: patient identified, IV checked, site marked, risks and benefits discussed, surgical consent, monitors and equipment checked, pre-op evaluation and timeout performed  Prep:  Pt Position: right lateral decubitus  Sterile barriers:cap, gloves, mask and washed/disinfected hands  Prep: ChloraPrep  Patient monitoring: blood pressure monitoring, continuous pulse oximetry and EKG  Procedure    Sedation: yes  Performed under: local infiltration  Guidance:ultrasound guided    ULTRASOUND INTERPRETATION.  Using ultrasound guidance a 20 G gauge needle was placed in close proximity to the nerve, at which point, under ultrasound guidance anesthetic was injected in the area of the nerve and spread of the anesthesia was seen on ultrasound in close proximity thereto.  There were no abnormalities seen on ultrasound; a digital image was taken; and the patient tolerated the procedure with no complications. Images:still images obtained, printed/placed on chart    Laterality:left  Block Type:popliteal  Injection Technique:catheter  Needle Type:echogenic and Tuohy  Needle Gauge:18 G  Resistance on Injection: none  Catheter Size:20 G  Cath Depth at skin: 7 cm    Medications Used: bupivacaine PF (MARCAINE) 0.25 % injection - Injection   30 mL - 1/19/2024 10:49:00 AM      Medications  Preservative Free Saline:5ml    Post Assessment  Injection Assessment: negative aspiration for heme, no paresthesia on injection and incremental injection  Patient Tolerance:comfortable throughout block  Complications:no  Additional Notes  CATHETER                               A high-frequency linear transducer, with sterile cover, was placed in the popliteal  "fossa to identify the popliteal artery and vein, Tibial nerve (TN) and Common Peroneal nerve (CP). The transducer was then moved in a cephalad fashion to observe the TN and CP nerve bifurcation to form the Sciatic Nerve. The insertion site was prepped and draped in sterile fashion. Skin and cutaneous tissue was infiltrated with 2-5 ml of 1% Lidocaine. Using ultrasound-guidance, an 18-gauge Contiplex Ultra 360 Touhy needle was advanced in plane from lateral to medial. Preservative-free normal saline was utilized for hydro-dissection of tissue, advancement of Touhy needle, and to confirm final needle placement posterior to the nerves. Local anesthetic injection spread, in incremental 3-5 ml injections, to surround both nerve structures. Aspiration every 5 ml to prevent intravascular injection. Injection was completed with negative aspiration of blood and negative intravascular injection. Injection pressures were normal with minimal resistance. A 20-gauge Contiplex Echo catheter was placed through the needle and advance out the tip of the Touhy 1-3 cm. The Touhy needle was then removed, and final catheter position verified (Below/above or Anterior/Posterior) the nerve structures. The catheter was secured in the usual fashion with skin glue, benzoin, steri-strips, CHG tegaderm and Label noting \"Nerve Block Catheter\". Jerk tape applied at yellow connector and catheter connection.              "

## 2024-01-20 LAB
ALBUMIN SERPL-MCNC: 3.6 G/DL (ref 3.5–5.2)
ALBUMIN/GLOB SERPL: 1.4 G/DL
ALP SERPL-CCNC: 53 U/L (ref 39–117)
ALT SERPL W P-5'-P-CCNC: 28 U/L (ref 1–41)
ANION GAP SERPL CALCULATED.3IONS-SCNC: 15 MMOL/L (ref 5–15)
AST SERPL-CCNC: 27 U/L (ref 1–40)
BILIRUB SERPL-MCNC: 0.2 MG/DL (ref 0–1.2)
BUN SERPL-MCNC: 14 MG/DL (ref 6–20)
BUN/CREAT SERPL: 23.7 (ref 7–25)
CALCIUM SPEC-SCNC: 9.7 MG/DL (ref 8.6–10.5)
CHLORIDE SERPL-SCNC: 103 MMOL/L (ref 98–107)
CO2 SERPL-SCNC: 21 MMOL/L (ref 22–29)
CREAT SERPL-MCNC: 0.59 MG/DL (ref 0.76–1.27)
DEPRECATED RDW RBC AUTO: 38.4 FL (ref 37–54)
EGFRCR SERPLBLD CKD-EPI 2021: 118.2 ML/MIN/1.73
ERYTHROCYTE [DISTWIDTH] IN BLOOD BY AUTOMATED COUNT: 11.9 % (ref 12.3–15.4)
GLOBULIN UR ELPH-MCNC: 2.5 GM/DL
GLUCOSE BLDC GLUCOMTR-MCNC: 151 MG/DL (ref 70–130)
GLUCOSE BLDC GLUCOMTR-MCNC: 196 MG/DL (ref 70–130)
GLUCOSE BLDC GLUCOMTR-MCNC: 200 MG/DL (ref 70–130)
GLUCOSE BLDC GLUCOMTR-MCNC: 227 MG/DL (ref 70–130)
GLUCOSE BLDC GLUCOMTR-MCNC: 255 MG/DL (ref 70–130)
GLUCOSE SERPL-MCNC: 158 MG/DL (ref 65–99)
HCT VFR BLD AUTO: 25.9 % (ref 37.5–51)
HGB BLD-MCNC: 8.6 G/DL (ref 13–17.7)
MAGNESIUM SERPL-MCNC: 1.9 MG/DL (ref 1.6–2.6)
MCH RBC QN AUTO: 29.9 PG (ref 26.6–33)
MCHC RBC AUTO-ENTMCNC: 33.2 G/DL (ref 31.5–35.7)
MCV RBC AUTO: 89.9 FL (ref 79–97)
PHOSPHATE SERPL-MCNC: 3.7 MG/DL (ref 2.5–4.5)
PLATELET # BLD AUTO: 225 10*3/MM3 (ref 140–450)
PMV BLD AUTO: 10.4 FL (ref 6–12)
POTASSIUM SERPL-SCNC: 4.6 MMOL/L (ref 3.5–5.2)
PROT SERPL-MCNC: 6.1 G/DL (ref 6–8.5)
RBC # BLD AUTO: 2.88 10*6/MM3 (ref 4.14–5.8)
SODIUM SERPL-SCNC: 139 MMOL/L (ref 136–145)
WBC NRBC COR # BLD AUTO: 10.15 10*3/MM3 (ref 3.4–10.8)

## 2024-01-20 PROCEDURE — 25010000002 VANCOMYCIN PER 500 MG: Performed by: INTERNAL MEDICINE

## 2024-01-20 PROCEDURE — 25010000002 HYDROMORPHONE PER 4 MG: Performed by: INTERNAL MEDICINE

## 2024-01-20 PROCEDURE — 63710000001 INSULIN LISPRO (HUMAN) PER 5 UNITS: Performed by: ORTHOPAEDIC SURGERY

## 2024-01-20 PROCEDURE — 97116 GAIT TRAINING THERAPY: CPT

## 2024-01-20 PROCEDURE — 80053 COMPREHEN METABOLIC PANEL: CPT

## 2024-01-20 PROCEDURE — 25010000002 MORPHINE PER 10 MG: Performed by: ORTHOPAEDIC SURGERY

## 2024-01-20 PROCEDURE — 25010000002 NICARDIPINE 2.5 MG/ML SOLUTION 10 ML VIAL: Performed by: ORTHOPAEDIC SURGERY

## 2024-01-20 PROCEDURE — 25810000003 SODIUM CHLORIDE 0.9 % SOLUTION: Performed by: ORTHOPAEDIC SURGERY

## 2024-01-20 PROCEDURE — 63710000001 INSULIN DETEMIR PER 5 UNITS

## 2024-01-20 PROCEDURE — 25010000002 HEPARIN (PORCINE) PER 1000 UNITS: Performed by: INTERNAL MEDICINE

## 2024-01-20 PROCEDURE — 83735 ASSAY OF MAGNESIUM: CPT

## 2024-01-20 PROCEDURE — 99232 SBSQ HOSP IP/OBS MODERATE 35: CPT | Performed by: INTERNAL MEDICINE

## 2024-01-20 PROCEDURE — 25810000003 SODIUM CHLORIDE 0.9 % SOLUTION 250 ML FLEX CONT: Performed by: ORTHOPAEDIC SURGERY

## 2024-01-20 PROCEDURE — 84100 ASSAY OF PHOSPHORUS: CPT

## 2024-01-20 PROCEDURE — 25010000002 CEFTRIAXONE PER 250 MG: Performed by: INTERNAL MEDICINE

## 2024-01-20 PROCEDURE — 82948 REAGENT STRIP/BLOOD GLUCOSE: CPT

## 2024-01-20 PROCEDURE — 85027 COMPLETE CBC AUTOMATED: CPT

## 2024-01-20 PROCEDURE — 97162 PT EVAL MOD COMPLEX 30 MIN: CPT

## 2024-01-20 PROCEDURE — 97530 THERAPEUTIC ACTIVITIES: CPT

## 2024-01-20 PROCEDURE — 25010000002 HYDROMORPHONE 1 MG/ML SOLUTION: Performed by: ORTHOPAEDIC SURGERY

## 2024-01-20 PROCEDURE — 25010000002 VANCOMYCIN 10 G RECONSTITUTED SOLUTION: Performed by: ORTHOPAEDIC SURGERY

## 2024-01-20 PROCEDURE — 97168 OT RE-EVAL EST PLAN CARE: CPT

## 2024-01-20 RX ORDER — HEPARIN SODIUM 5000 [USP'U]/ML
5000 INJECTION, SOLUTION INTRAVENOUS; SUBCUTANEOUS EVERY 8 HOURS SCHEDULED
Status: DISCONTINUED | OUTPATIENT
Start: 2024-01-20 | End: 2024-01-24 | Stop reason: HOSPADM

## 2024-01-20 RX ORDER — ALPRAZOLAM 0.25 MG/1
0.25 TABLET ORAL ONCE AS NEEDED
Status: COMPLETED | OUTPATIENT
Start: 2024-01-20 | End: 2024-01-20

## 2024-01-20 RX ORDER — PANTOPRAZOLE SODIUM 40 MG/1
40 TABLET, DELAYED RELEASE ORAL
Status: DISCONTINUED | OUTPATIENT
Start: 2024-01-21 | End: 2024-01-24 | Stop reason: HOSPADM

## 2024-01-20 RX ORDER — VANCOMYCIN HYDROCHLORIDE 1 G/200ML
1000 INJECTION, SOLUTION INTRAVENOUS EVERY 12 HOURS SCHEDULED
Status: DISCONTINUED | OUTPATIENT
Start: 2024-01-20 | End: 2024-01-21

## 2024-01-20 RX ORDER — HYDROMORPHONE HYDROCHLORIDE 1 MG/ML
0.5 INJECTION, SOLUTION INTRAMUSCULAR; INTRAVENOUS; SUBCUTANEOUS EVERY 4 HOURS PRN
Status: DISCONTINUED | OUTPATIENT
Start: 2024-01-20 | End: 2024-01-21

## 2024-01-20 RX ORDER — TEMAZEPAM 15 MG/1
15 CAPSULE ORAL ONCE
Status: COMPLETED | OUTPATIENT
Start: 2024-01-20 | End: 2024-01-20

## 2024-01-20 RX ADMIN — MORPHINE SULFATE 2 MG: 2 INJECTION, SOLUTION INTRAMUSCULAR; INTRAVENOUS at 20:14

## 2024-01-20 RX ADMIN — INSULIN LISPRO 2 UNITS: 100 INJECTION, SOLUTION INTRAVENOUS; SUBCUTANEOUS at 08:22

## 2024-01-20 RX ADMIN — HEPARIN SODIUM 5000 UNITS: 5000 INJECTION INTRAVENOUS; SUBCUTANEOUS at 22:04

## 2024-01-20 RX ADMIN — VANCOMYCIN HYDROCHLORIDE 1000 MG: 1 INJECTION, SOLUTION INTRAVENOUS at 10:18

## 2024-01-20 RX ADMIN — PANTOPRAZOLE SODIUM 40 MG: 40 INJECTION, POWDER, FOR SOLUTION INTRAVENOUS at 05:22

## 2024-01-20 RX ADMIN — INSULIN LISPRO 3 UNITS: 100 INJECTION, SOLUTION INTRAVENOUS; SUBCUTANEOUS at 12:25

## 2024-01-20 RX ADMIN — METOPROLOL TARTRATE 25 MG: 25 TABLET, FILM COATED ORAL at 20:14

## 2024-01-20 RX ADMIN — SENNOSIDES AND DOCUSATE SODIUM 2 TABLET: 8.6; 5 TABLET ORAL at 08:32

## 2024-01-20 RX ADMIN — ALPRAZOLAM 0.25 MG: 0.25 TABLET ORAL at 14:40

## 2024-01-20 RX ADMIN — OXYCODONE HYDROCHLORIDE 10 MG: 10 TABLET ORAL at 19:05

## 2024-01-20 RX ADMIN — Medication 10 ML: at 08:31

## 2024-01-20 RX ADMIN — HYDRALAZINE HYDROCHLORIDE 25 MG: 50 TABLET, FILM COATED ORAL at 05:22

## 2024-01-20 RX ADMIN — HEPARIN SODIUM 5000 UNITS: 5000 INJECTION INTRAVENOUS; SUBCUTANEOUS at 14:30

## 2024-01-20 RX ADMIN — GABAPENTIN 600 MG: 300 CAPSULE ORAL at 14:30

## 2024-01-20 RX ADMIN — OXYCODONE HYDROCHLORIDE 10 MG: 10 TABLET ORAL at 07:14

## 2024-01-20 RX ADMIN — SODIUM CHLORIDE 1000 MG: 900 INJECTION INTRAVENOUS at 20:11

## 2024-01-20 RX ADMIN — INSULIN DETEMIR 5 UNITS: 100 INJECTION, SOLUTION SUBCUTANEOUS at 20:11

## 2024-01-20 RX ADMIN — OXYCODONE HYDROCHLORIDE 10 MG: 10 TABLET ORAL at 12:31

## 2024-01-20 RX ADMIN — Medication 1 PATCH: at 20:12

## 2024-01-20 RX ADMIN — VANCOMYCIN HYDROCHLORIDE 1500 MG: 10 INJECTION, POWDER, LYOPHILIZED, FOR SOLUTION INTRAVENOUS at 00:39

## 2024-01-20 RX ADMIN — ROSUVASTATIN CALCIUM 40 MG: 20 TABLET, FILM COATED ORAL at 20:13

## 2024-01-20 RX ADMIN — INSULIN LISPRO 4 UNITS: 100 INJECTION, SOLUTION INTRAVENOUS; SUBCUTANEOUS at 20:49

## 2024-01-20 RX ADMIN — GABAPENTIN 600 MG: 300 CAPSULE ORAL at 22:04

## 2024-01-20 RX ADMIN — TEMAZEPAM 15 MG: 15 CAPSULE ORAL at 23:10

## 2024-01-20 RX ADMIN — VANCOMYCIN HYDROCHLORIDE 1000 MG: 1 INJECTION, SOLUTION INTRAVENOUS at 22:26

## 2024-01-20 RX ADMIN — NICARDIPINE HYDROCHLORIDE 5 MG/HR: 25 INJECTION, SOLUTION INTRAVENOUS at 06:36

## 2024-01-20 RX ADMIN — LISINOPRIL 40 MG: 20 TABLET ORAL at 08:31

## 2024-01-20 RX ADMIN — INSULIN LISPRO 3 UNITS: 100 INJECTION, SOLUTION INTRAVENOUS; SUBCUTANEOUS at 17:43

## 2024-01-20 RX ADMIN — HYDROMORPHONE HYDROCHLORIDE 1 MG: 1 INJECTION, SOLUTION INTRAMUSCULAR; INTRAVENOUS; SUBCUTANEOUS at 09:12

## 2024-01-20 RX ADMIN — GABAPENTIN 600 MG: 300 CAPSULE ORAL at 05:22

## 2024-01-20 RX ADMIN — METOPROLOL TARTRATE 25 MG: 25 TABLET, FILM COATED ORAL at 08:31

## 2024-01-20 RX ADMIN — MORPHINE SULFATE 2 MG: 2 INJECTION, SOLUTION INTRAMUSCULAR; INTRAVENOUS at 15:41

## 2024-01-20 RX ADMIN — OXYCODONE HYDROCHLORIDE 10 MG: 10 TABLET ORAL at 23:10

## 2024-01-20 RX ADMIN — Medication 10 ML: at 20:15

## 2024-01-20 RX ADMIN — ASPIRIN 81 MG: 81 TABLET, COATED ORAL at 20:13

## 2024-01-20 RX ADMIN — HYDRALAZINE HYDROCHLORIDE 25 MG: 50 TABLET, FILM COATED ORAL at 20:11

## 2024-01-20 RX ADMIN — HYDROMORPHONE HYDROCHLORIDE 0.5 MG: 1 INJECTION, SOLUTION INTRAMUSCULAR; INTRAVENOUS; SUBCUTANEOUS at 22:05

## 2024-01-20 NOTE — PLAN OF CARE
Goal Outcome Evaluation:      VSS on RA. Cardene off at 0822. Pt complains of incisional pain throughout day, but is controlled by PRNs. Nerve block in place. Blood glucose remains elevated, improved from yesterday. Pt become anxious mid day and received a one time dose of xanax, which relieved his symptoms. Wife at bedside.

## 2024-01-20 NOTE — THERAPY RE-EVALUATION
Patient Name: Justo Oquendo  : 1975    MRN: [id number]                              Today's Date: 2024       Admit Date: 2024    Visit Dx:     ICD-10-CM ICD-9-CM   1. Ischemic pain of left foot  M79.672 459.9    I99.8 729.5   2. Impaired mobility and ADLs  Z74.09 V49.89    Z78.9    3. 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    Ischemic pain of left foot     Past Medical History:   Diagnosis Date    Arthritis     Back pain     Diabetes     SINCE 2017    Dyslipidemia     HTN (hypertension)     PVD (peripheral vascular disease)     Wears partial dentures      Past Surgical History:   Procedure Laterality Date    AORTAGRAM N/A 2017    Procedure: AORTAGRAM WITH OR WITHOUT RUNOFFS POSSIBLE STENT with left femoral cutdown;  Surgeon: Brett Ryan MD;  [facility]:  [facility] OR 15;  Service:     AORTAGRAM N/A 2023    Procedure: THROMBECTOMY OF LEFT GRAFT, AORTAGRAM, FEMORAL TO FEMORAL BYPASS;  Surgeon: Brett Ryan MD;  Location:  [facility];  Service: Vascular;  Laterality: N/A;  FLUORO- .06 SECS  DOSE- 10 MGY  CONTRAST- 10 ML ISO    CHOLECYSTECTOMY      CYST REMOVAL      OFF OF BACK    FEMORAL POPLITEAL BYPASS Left 2024    Procedure: FEMORAL POPLITEAL BYPASS WITH POPLITEAL EXPLORATION;  Surgeon: Vin Pena MD;  Location:  [facility];  Service: Vascular;  Laterality: Left;  DOSE: 9MGY  FLURO: 14 MIN, 36 SEC  CONTRAST: 50ML VISIPAQUE 320    NH IN-SITU VEIN BYPASS FEMORAL-POPLITEAL Left 2017    Procedure: FEMORAL POPLITEAL BYPASS;  Surgeon: Brett DOBBS  MD Connor;  Location:  [facility];  Service: Vascular    WV IN-SITU VEIN BYPASS FEMORAL-POPLITEAL Left 06/27/2017    Procedure: LEFT FEMORAL ENDARTECTOMYN LEFT FEMORAL POPLITEAL BYPASS;  Surgeon: Brett Ryan MD;  Location: [facility] OR;  Service: Vascular      General Information       Row Name 01/20/24 1323          OT Time and Intention    Document Type re-evaluation  -CS     Mode of Treatment occupational therapy  -CS       Row Name 01/20/24 1323          General Information    Patient Profile Reviewed yes  -CS     Prior Level of Function --  see IE  -CS     Existing Precautions/Restrictions fall;non-weight bearing;other (see comments)  NWB L foot s/p transmet amputation; cleared for WB through heel for transfers. Offloading shoe L foot. Nerve catheter. L thigh soreness.  -CS     Barriers to Rehab medically complex  -CS       Row Name 01/20/24 1323          Living Environment    People in Home spouse;child(janis), adult  -CS       Row Name 01/20/24 1323          Home Main Entrance    Number of Stairs, Main Entrance one  -CS       Row Name 01/20/24 1323          Cognition    Orientation Status (Cognition) oriented x 4  -CS       Row Name 01/20/24 1323          Safety Issues, Functional Mobility    Impairments Affecting Function (Mobility) pain;strength;endurance/activity tolerance;sensation/sensory awareness  -CS               User Key  (r) = Recorded By, (t) = Taken By, (c) = Cosigned By      Initials Name Provider Type    CS Grecia Mendez OT Occupational Therapist                     Mobility/ADL's       Row Name 01/20/24 1323          Bed Mobility    Bed Mobility supine-sit  -CS     Supine-Sit [place] (Bed Mobility) contact guard;verbal cues  -CS     Assistive Device (Bed Mobility) bed rails;draw sheet;head of bed elevated  -CS       Row Name 01/20/24 1323          Transfers    Transfers sit-stand transfer;stand-sit transfer  -CS       Row Name 01/20/24 1323          Sit-Stand Transfer     Sit-Stand [place] (Transfers) contact guard;verbal cues  -CS     Assistive Device (Sit-Stand Transfers) walker, front-wheeled  -CS       Row Name 01/20/24 1323          Stand-Sit Transfer    Stand-Sit [place] (Transfers) contact guard;verbal cues  -CS     Assistive Device (Stand-Sit Transfers) walker, front-wheeled  -CS       Row Name 01/20/24 1323          Activities of Daily Living    BADL Assessment/Intervention lower body dressing;feeding  -CS       Row Name 01/20/24 1323          Mobility    Extremity Weight-bearing Status left lower extremity  -CS     Left Lower Extremity (Weight-bearing Status) non weight-bearing (NWB)  -CS       Row Name 01/20/24 1323          Lower Body Dressing Assessment/Training    [place] Level (Lower Body Dressing) don;maximum assist (25% patient effort);doff  -CS     Position (Lower Body Dressing) supported sitting  -CS     Comment, (Lower Body Dressing) don off-loading shoe, educated on haroon-technique and AE  -CS       Row Name 01/20/24 1323          Self-Feeding Assessment/Training    [place] Level (Feeding) finger foods;liquids to mouth;scoop food and bring to mouth;set up  -CS     Position (Self-Feeding) supported sitting  -CS               User Key  (r) = Recorded By, (t) = Taken By, (c) = Cosigned By      Initials Name Provider Type    Grecia Montague OT Occupational Therapist                   Obj/Interventions       Row Name 01/20/24 1341          Sensory Assessment (Somatosensory)    Sensory Assessment (Somatosensory) UE sensation intact  -       Row Name 01/20/24 1341          Range of Motion Comprehensive    General Range of Motion bilateral upper extremity ROM WFL  -       Row Name 01/20/24 1341          Strength Comprehensive (MMT)    Comment, General Manual Muscle Testing (MMT) Assessment BUE grossly WFL  -CS       Row Name 01/20/24 1341          Balance    Balance Assessment sitting static balance;sitting dynamic balance;standing static  balance;standing dynamic balance  -CS     Static Sitting Balance standby assist  -CS     Dynamic Sitting Balance standby assist  -CS     Position, Sitting Balance sitting in chair  -CS     Static Standing Balance contact guard  -CS     Dynamic Standing Balance contact guard  -CS     Position/Device Used, Standing Balance walker, rolling  -CS     Balance Interventions sitting;standing;static;dynamic;dynamic reaching;occupation based/functional task;weight shifting activity  -CS               User Key  (r) = Recorded By, (t) = Taken By, (c) = Cosigned By      Initials Name Provider Type    CS Grecia Mendez, CORINNA Occupational Therapist                   Goals/Plan       Row Name 01/20/24 1346          Bed Mobility Goal 1 (OT)    Progress/Outcomes (Bed Mobility Goal 1, OT) goal ongoing  -       Row Name 01/20/24 1346          Transfer Goal 1 (OT)    Activity/Assistive Device (Transfer Goal 1, OT) sit-to-stand/stand-to-sit;toilet  -CS     [place] Level/Cues Needed (Transfer Goal 1, OT) standby assist  -CS     Time Frame (Transfer Goal 1, OT) long term goal (LTG);10 days  -CS     Progress/Outcome (Transfer Goal 1, OT) goal revised this date  -       Row Name 01/20/24 1346          Dressing Goal 1 (OT)    Activity/Device (Dressing Goal 1, OT) lower body dressing  -CS     [place]/Cues Needed (Dressing Goal 1, OT) standby assist  -CS     Time Frame (Dressing Goal 1, OT) long term goal (LTG);10 days  -CS     Strategies/Barriers (Dressing Goal 1, OT) don sock/offload shoe/pants  -CS     Progress/Outcome (Dressing Goal 1, OT) goal revised this date  -       Row Name 01/20/24 1346          Problem Specific Goal 1 (OT)    Problem Specific Goal 1 (OT) Pt. will verbalize understanding of [employer] Safety techniques to implement into home and work routines to increase independence.  -CS     Time Frame (Problem Specific Goal 1, OT) long term goal (LTG);10 days  -CS     Progress/Outcome (Problem Specific  Goal 1, OT) goal ongoing  -CS       Row Name 01/20/24 1346          Therapy Assessment/Plan (OT)    Planned Therapy Interventions (OT) activity tolerance training;adaptive equipment training;BADL retraining;functional balance retraining;occupation/activity based interventions;ROM/therapeutic exercise;strengthening exercise;transfer/mobility retraining  -               User Key  (r) = Recorded By, (t) = Taken By, (c) = Cosigned By      Initials Name Provider Type    CS Grecia Mendez, CORINNA Occupational Therapist                   Clinical Impression       Row Name 01/20/24 1341          Pain Assessment    Pretreatment Pain Rating 6/10  -CS     Posttreatment Pain Rating 7/10  -CS     Pain Location - Side/Orientation Left  -CS     Pain Location lower  -CS     Pain Location - extremity  -CS     Pre/Posttreatment Pain Comment tolerated  -CS     Pain Intervention(s) Repositioned;Ambulation/increased activity  -CS       Row Name 01/20/24 1341          Plan of Care Review    Plan of Care Reviewed With patient  -CS     Progress improving  -CS     Outcome Evaluation OT re-eval complete. Pt presents w/ LLE pain, balance deficits, and LLE NWB status warranting cont skilled IPOT POC to promote return to PLOF. Pt educated on AE use and compensatory strategies to improve ADL performance. Recommend pt DC home w/ assist and HH OT/PT services.  -       Row Name 01/20/24 1341          Therapy Assessment/Plan (OT)    Patient/Family Therapy Goal Statement (OT) Return to PLOF  -CS     Rehab Potential (OT) good, to achieve stated therapy goals  -     Criteria for Skilled Therapeutic Interventions Met (OT) yes;meets criteria;skilled treatment is necessary  -CS     Therapy Frequency (OT) daily  -CS       Row Name 01/20/24 1341          Therapy Plan Review/Discharge Plan (OT)    Anticipated Discharge Disposition (OT) home with assist;home with home health  -       Row Name 01/20/24 1341          Vital Signs    Pre Systolic BP Rehab 144   -CS     Pre Treatment Diastolic BP 65  -CS     Post Systolic BP Rehab 157  -CS     Post Treatment Diastolic BP 78  -CS     Pretreatment Heart Rate (beats/min) 85  -CS     Posttreatment Heart Rate (beats/min) 85  -CS     Pre SpO2 (%) 98  -CS     O2 Delivery Pre Treatment room air  -CS     Post SpO2 (%) 99  -CS     O2 Delivery Post Treatment room air  -CS     Pre Patient Position Sitting  -CS     Intra Patient Position Standing  -CS     Post Patient Position Sitting  -CS       Row Name 01/20/24 1341          Positioning and Restraints    Pre-Treatment Position sitting in chair/recliner  -CS     Post Treatment Position chair  -CS     In Chair notified nsg;reclined;call light within reach;encouraged to call for assist;RUE elevated;LUE elevated;waffle cushion;legs elevated;L heel elevated  -CS               User Key  (r) = Recorded By, (t) = Taken By, (c) = Cosigned By      Initials Name Provider Type    CS Grecia Mendez, OT Occupational Therapist                   Outcome Measures       Row Name 01/20/24 1347          How much help from another is currently needed...    Putting on and taking off regular lower body clothing? 2  -CS     Bathing (including washing, rinsing, and drying) 2  -CS     Toileting (which includes using toilet bed pan or urinal) 3  -CS     Putting on and taking off regular upper body clothing 3  -CS     Taking care of personal grooming (such as brushing teeth) 4  -CS     Eating meals 4  -CS     AM-PAC 6 Clicks Score (OT) 18  -CS       Row Name 01/20/24 1050 01/20/24 0800       How much help from another person do you currently need...    Turning from your back to your side while in flat bed without using bedrails? 3  -LS 3  -AM    Moving from lying on back to sitting on the side of a flat bed without bedrails? 3  -LS 3  -AM    Moving to and from a bed to a chair (including a wheelchair)? 3  -LS 2  -AM    Standing up from a chair using your arms (e.g., wheelchair, bedside chair)? 3  -LS 2  -AM     Climbing 3-5 steps with a railing? 2  -LS 1  -AM    To walk in hospital room? 3  -LS 2  -AM    AM-PAC 6 Clicks Score (PT) 17  -LS 13  -AM    Highest Level of Mobility Goal 5 --> Static standing  -LS 4 --> Transfer to chair/commode  -AM      Row Name 01/20/24 1347          Functional Assessment    Outcome Measure Options AM-PAC 6 Clicks Daily Activity (OT)  -CS               User Key  (r) = Recorded By, (t) = Taken By, (c) = Cosigned By      Initials Name Provider Type    Paige Chan, PT Physical Therapist    CS Grecia Mendez, OT Occupational Therapist    AM Lisa Thornton, RN Registered Nurse                    Occupational Therapy Education       Title: PT OT SLP Therapies (In Progress)       Topic: Occupational Therapy (In Progress)       Point: ADL training (In Progress)       Description:   Instruct learner(s) on proper safety adaptation and remediation techniques during self care or transfers.   Instruct in proper use of assistive devices.                  Learning Progress Summary             Patient Acceptance, E, NR by CS at 1/20/2024 1347    Acceptance, E, VU,NR by SD at 1/16/2024 0843    Comment: Pt. educated on role of OT and is agreeable with OT POC.                         Point: Home exercise program (Done)       Description:   Instruct learner(s) on appropriate technique for monitoring, assisting and/or progressing therapeutic exercises/activities.                  Learning Progress Summary             Patient Acceptance, E, VU,NR by SD at 1/16/2024 0843    Comment: Pt. educated on role of OT and is agreeable with OT POC.                         Point: Precautions (In Progress)       Description:   Instruct learner(s) on prescribed precautions during self-care and functional transfers.                  Learning Progress Summary             Patient Acceptance, E, NR by CS at 1/20/2024 1347    Acceptance, E, VU,NR by SD at 1/16/2024 0843    Comment: Pt. educated on role of OT and is agreeable  with OT POC.                         Point: Body mechanics (In Progress)       Description:   Instruct learner(s) on proper positioning and spine alignment during self-care, functional mobility activities and/or exercises.                  Learning Progress Summary             Patient Acceptance, E, NR by  at 1/20/2024 1347    Acceptance, E, VU,NR by SD at 1/16/2024 0843    Comment: Pt. educated on role of OT and is agreeable with OT POC.                                         User Key       Initials Effective Dates Name Provider Type Discipline    SD 07/11/23 -  Denise Cotto, OT Occupational Therapist OT     09/02/21 -  Grecia Mendez OT Occupational Therapist OT                  OT Recommendation and Plan  Planned Therapy Interventions (OT): activity tolerance training, adaptive equipment training, BADL retraining, functional balance retraining, occupation/activity based interventions, ROM/therapeutic exercise, strengthening exercise, transfer/mobility retraining  Therapy Frequency (OT): daily  Plan of Care Review  Plan of Care Reviewed With: patient  Progress: improving  Outcome Evaluation: OT re-eval complete. Pt presents w/ LLE pain, balance deficits, and LLE NWB status warranting cont skilled IPOT POC to promote return to PLOF. Pt educated on AE use and compensatory strategies to improve ADL performance. Recommend pt DC home w/ assist and HH OT/PT services.     Time Calculation:         Time Calculation- OT       Row Name 01/20/24 1348 01/20/24 1051          Time Calculation- OT    OT Start Time 0850  -CS --     OT Received On 01/20/24  -CS --     OT Goal Re-Cert Due Date 01/30/24  -CS --        Timed Charges    58786 - Gait Training Minutes  -- 10  -LS     51852 - OT Therapeutic Activity Minutes 5  -CS --     22254 - OT Self Care/Mgmt Minutes 5  -CS --        Untimed Charges    OT Eval/Re-eval Minutes 20  -CS --        Total Minutes    Timed Charges Total Minutes 10  -CS 10  -LS     Untimed  Charges Total Minutes 20  -CS --      Total Minutes 30  -CS 10  -LS               User Key  (r) = Recorded By, (t) = Taken By, (c) = Cosigned By      Initials Name Provider Type    Paige Chan, PT Physical Therapist    CS Grecia Mendez OT Occupational Therapist                  Therapy Charges for Today       Code Description Service Date Service Provider Modifiers Qty    [id number]  OT THERAPEUTIC ACT EA 15 MIN 1/20/2024 Grecia Mendez OT GO 1    [id number]  OT RE-EVAL 2 1/20/2024 Grecia Mendez OT GO 1                 Grecia Mendez OT  1/20/2024

## 2024-01-20 NOTE — THERAPY EVALUATION
Patient Name: Justo Oquendo  : 1975    MRN: [id number]                              Today's Date: 2024       Admit Date: 2024    Visit Dx:     ICD-10-CM ICD-9-CM   1. Ischemic pain of left foot  M79.672 459.9    I99.8 729.5   2. Impaired mobility and ADLs  Z74.09 V49.89    Z78.9    3. 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    Ischemic pain of left foot     Past Medical History:   Diagnosis Date    Arthritis     Back pain     Diabetes     SINCE 2017    Dyslipidemia     HTN (hypertension)     PVD (peripheral vascular disease)     Wears partial dentures      Past Surgical History:   Procedure Laterality Date    AORTAGRAM N/A 2017    Procedure: AORTAGRAM WITH OR WITHOUT RUNOFFS POSSIBLE STENT with left femoral cutdown;  Surgeon: Brett Ryan MD;  Location:  [facility] OR 15;  Service:     AORTAGRAM N/A 2023    Procedure: THROMBECTOMY OF LEFT GRAFT, AORTAGRAM, FEMORAL TO FEMORAL BYPASS;  Surgeon: Brett Ryan MD;  Location:  [facility];  Service: Vascular;  Laterality: N/A;  FLUORO- .06 SECS  DOSE- 10 MGY  CONTRAST- 10 ML ISO    CHOLECYSTECTOMY      CYST REMOVAL      OFF OF BACK    FEMORAL POPLITEAL BYPASS Left 2024    Procedure: FEMORAL POPLITEAL BYPASS WITH POPLITEAL EXPLORATION;  Surgeon: Vin Pena MD;  Location:  [facility];  Service: Vascular;  Laterality: Left;  DOSE: 9MGY  FLURO: 14 MIN, 36 SEC  CONTRAST: 50ML VISIPAQUE 320    [place] IN-SITU VEIN BYPASS FEMORAL-POPLITEAL Left 2017    Procedure: FEMORAL POPLITEAL BYPASS;  Surgeon: Brett DOBBS  MD Connor;  Location:  [facility];  Service: Vascular    HI IN-SITU VEIN BYPASS FEMORAL-POPLITEAL Left 06/27/2017    Procedure: LEFT FEMORAL ENDARTECTOMYN LEFT FEMORAL POPLITEAL BYPASS;  Surgeon: Brett Ryan MD;  Location: [facility] OR;  Service: Vascular      General Information       Row Name 01/20/24 0937          Physical Therapy Time and Intention    Document Type evaluation  -     Mode of Treatment physical therapy  -       Row Name 01/20/24 0937          General Information    Patient Profile Reviewed yes  -LS     Prior Level of Function independent:;all household mobility;ADL's  -LS     Existing Precautions/Restrictions fall;non-weight bearing;other (see comments)  NWB L foot s/p transmet amputation; cleared for WB through heel for transfers. Offloading shoe L foot. Nerve catheter. L thigh soreness.  -     Barriers to Rehab medically complex  -       Row Name 01/20/24 0937          Living Environment    People in Home spouse;child(janis), adult  -       Row Name 01/20/24 0937          Home Main Entrance    Number of Stairs, Main Entrance one  -LS       Row Name 01/20/24 0937          Stairs Within Home, Primary    Number of Stairs, Within Home, Primary none  -LS       Row Name 01/20/24 0937          Cognition    Orientation Status (Cognition) oriented x 4  -LS       Row Name 01/20/24 0937          Safety Issues, Functional Mobility    Impairments Affecting Function (Mobility) pain;strength;endurance/activity tolerance;sensation/sensory awareness  -               User Key  (r) = Recorded By, (t) = Taken By, (c) = Cosigned By      Initials Name Provider Type    LS Paige Bradley, PT Physical Therapist                   Mobility       Row Name 01/20/24 1036          Bed Mobility    Scooting/Bridging [place] (Bed Mobility) --  -LS     Assistive Device (Bed Mobility) --  -LS     Comment, (Bed Mobility) performed with OT  -       Row Name 01/20/24 1036          Sit-Stand Transfer     Sit-Stand [place] (Transfers) contact guard;verbal cues  -     Assistive Device (Sit-Stand Transfers) walker, front-wheeled  -LS     Comment, (Sit-Stand Transfer) Cues for set up and maintaining NWB L.  -       Row Name 01/20/24 1036          Gait/Stairs (Locomotion)    [place] Level (Gait) minimum assist (75% patient effort);verbal cues  -LS     Assistive Device (Gait) walker, front-wheeled  -LS     Distance in Feet (Gait) 12  -LS     Deviations/Abnormal Patterns (Gait) stride length decreased  -LS     Bilateral Gait Deviations forward flexed posture  -LS     Left Sided Gait Deviations foot drop/toe drag  -     Comment, (Gait/Stairs) PT demonstrated appropriate technique with RW for maintaining NWB L. VC for appropriate sequencing, keeping steps within RW. Distance limited by LLE pain and difficulty maintaining NWB as he tired. Followed with recliner for safety.  -       Row Name 01/20/24 1036          Mobility    Extremity Weight-bearing Status left lower extremity  -LS     Left Lower Extremity (Weight-bearing Status) non weight-bearing (NWB)  -               User Key  (r) = Recorded By, (t) = Taken By, (c) = Cosigned By      Initials Name Provider Type     Paige Bradley, PT Physical Therapist                   Obj/Interventions       Row Name 01/20/24 1039          Range of Motion Comprehensive    General Range of Motion bilateral lower extremity ROM WFL  -       Row Name 01/20/24 1039          Strength Comprehensive (MMT)    General Manual Muscle Testing (MMT) Assessment lower extremity strength deficits identified  -     Comment, General Manual Muscle Testing (MMT) Assessment Deferred MMT LLE; RLE grossly 4/5  -       Row Name 01/20/24 1039          Balance    Static Sitting Balance standby assist  -LS     Position, Sitting Balance sitting edge of bed  -LS     Static Standing Balance contact guard  -LS     Position/Device Used, Standing Balance walker, front-wheeled  -       Row  Name 01/20/24 1039          Sensory Assessment (Somatosensory)    Sensory Assessment (Somatosensory) bilateral LE  -LS     Bilateral LE Sensory Assessment light touch awareness;other (see comments)  able to localize deep pressure LLE; able to localize LT on RLE. Reports N/L BLE at baseline.  -LS               User Key  (r) = Recorded By, (t) = Taken By, (c) = Cosigned By      Initials Name Provider Type    LS Paige Bradley, PT Physical Therapist                   Goals/Plan       Row Name 01/20/24 1048          Bed Mobility Goal 1 (PT)    Activity/Assistive Device (Bed Mobility Goal 1, PT) sit to supine/supine to sit  -LS     [place] Level/Cues Needed (Bed Mobility Goal 1, PT) independent  -LS     Time Frame (Bed Mobility Goal 1, PT) 2 weeks;long term goal (LTG)  -LS       Row Name 01/20/24 1048          Transfer Goal 1 (PT)    Activity/Assistive Device (Transfer Goal 1, PT) sit-to-stand/stand-to-sit  -LS     [place] Level/Cues Needed (Transfer Goal 1, PT) modified independence  -LS     Time Frame (Transfer Goal 1, PT) 2 weeks;long term goal (LTG)  -LS       Row Name 01/20/24 1048          Gait Training Goal 1 (PT)    Activity/Assistive Device (Gait Training Goal 1, PT) gait (walking locomotion);assistive device use  -LS     [place] Level (Gait Training Goal 1, PT) supervision required  -LS     Distance (Gait Training Goal 1, PT) 100  -LS     Time Frame (Gait Training Goal 1, PT) 2 weeks;long term goal (LTG)  -LS       Row Name 01/20/24 1048          Stairs Goal 1 (PT)    Activity/Assistive Device (Stairs Goal 1, PT) ascending stairs;descending stairs  -LS     [place] Level/Cues Needed (Stairs Goal 1, PT) supervision required  -LS     Number of Stairs (Stairs Goal 1, PT) 1  -LS     Time Frame (Stairs Goal 1, PT) 2 weeks;long term goal (LTG)  -LS       Row Name 01/20/24 1048          Therapy Assessment/Plan (PT)    Planned Therapy Interventions (PT) balance training;bed mobility training;gait  training;home exercise program;strengthening;patient/family education;stair training  -               User Key  (r) = Recorded By, (t) = Taken By, (c) = Cosigned By      Initials Name Provider Type    LS Paige Bradley, PT Physical Therapist                   Clinical Impression       Row Name 01/20/24 1043          Pain    Pretreatment Pain Rating 6/10  -LS     Posttreatment Pain Rating 7/10  -LS     Pain Location - Side/Orientation Left  -LS     Pain Location lower  -LS     Pain Location - extremity  -LS     Pre/Posttreatment Pain Comment tolerated; RN aware  -LS     Pain Intervention(s) Repositioned;Ambulation/increased activity  -       Row Name 01/20/24 1043          Plan of Care Review    Plan of Care Reviewed With patient  -LS     Progress no change  -     Outcome Evaluation PT initial evaluation completed. Pt demonstrates LLE post-op pain and decreased indep/ functional endurance re: mobility tasks, warranting further skilled PT services to promote PLOF. Limited today by pain and difficulty maintaining LLE NWB with fatigue, but able to ambulate 12 ft with RW, offloading shoe L, min A. Recommend d/c home with assist and HHPT.  -       Row Name 01/20/24 1043          Therapy Assessment/Plan (PT)    Patient/Family Therapy Goals Statement (PT) return to PLOF  -     Rehab Potential (PT) good, to achieve stated therapy goals  -     Criteria for Skilled Interventions Met (PT) yes;skilled treatment is necessary  -     Therapy Frequency (PT) daily  -       Row Name 01/20/24 1043          Vital Signs    Pre Systolic BP Rehab 144  -LS     Pre Treatment Diastolic BP 65  -LS     Pretreatment Heart Rate (beats/min) 86  -LS     Posttreatment Heart Rate (beats/min) 85  -LS     Pre SpO2 (%) 98  -LS     O2 Delivery Pre Treatment room air  -LS     O2 Delivery Intra Treatment room air  -LS     Post SpO2 (%) 100  -LS     O2 Delivery Post Treatment room air  -LS     Pre Patient Position Supine  -LS     Intra  Patient Position Standing  -LS     Post Patient Position Sitting  -LS       Row Name 01/20/24 1043          Positioning and Restraints    Pre-Treatment Position in bed  -LS     Post Treatment Position chair  -LS     In Chair notified nsg;reclined;call light within reach;encouraged to call for assist;exit alarm on;waffle cushion;legs elevated;L heel elevated  -LS               User Key  (r) = Recorded By, (t) = Taken By, (c) = Cosigned By      Initials Name Provider Type    Paige Chan, PT Physical Therapist                   Outcome Measures       Row Name 01/20/24 1050 01/20/24 0800       How much help from another person do you currently need...    Turning from your back to your side while in flat bed without using bedrails? 3  -LS 3  -AM    Moving from lying on back to sitting on the side of a flat bed without bedrails? 3  -LS 3  -AM    Moving to and from a bed to a chair (including a wheelchair)? 3  -LS 2  -AM    Standing up from a chair using your arms (e.g., wheelchair, bedside chair)? 3  -LS 2  -AM    Climbing 3-5 steps with a railing? 2  -LS 1  -AM    To walk in hospital room? 3  -LS 2  -AM    AM-PAC 6 Clicks Score (PT) 17  -LS 13  -AM    Highest Level of Mobility Goal 5 --> Static standing  -LS 4 --> Transfer to chair/commode  -AM              User Key  (r) = Recorded By, (t) = Taken By, (c) = Cosigned By      Initials Name Provider Type    Paige Chan, PT Physical Therapist    AM Lisa Thornton RN Registered Nurse                                 Physical Therapy Education       Title: PT OT SLP Therapies (In Progress)       Topic: Physical Therapy (In Progress)       Point: Mobility training (Done)       Learning Progress Summary             Patient Acceptance, E,D, NR,VU by  at 1/20/2024 1051                         Point: Home exercise program (Not Started)       Learner Progress:  Not documented in this visit.              Point: Body mechanics (Done)       Learning Progress Summary              Patient Acceptance, E,D, NR,VU by  at 1/20/2024 1051                         Point: Precautions (Done)       Learning Progress Summary             Patient Acceptance, E,D, NR,VU by  at 1/20/2024 1051                                         User Key       Initials Effective Dates Name Provider Type Discipline     02/03/23 -  Paige Bradley, PT Physical Therapist PT                  PT Recommendation and Plan  Planned Therapy Interventions (PT): balance training, bed mobility training, gait training, home exercise program, strengthening, patient/family education, stair training  Plan of Care Reviewed With: patient  Progress: no change  Outcome Evaluation: PT initial evaluation completed. Pt demonstrates LLE post-op pain and decreased indep/ functional endurance re: mobility tasks, warranting further skilled PT services to promote PLOF. Limited today by pain and difficulty maintaining LLE NWB with fatigue, but able to ambulate 12 ft with RW, offloading shoe L, min A. Recommend d/c home with assist and HHPT.     Time Calculation:   PT Evaluation Complexity  History, PT Evaluation Complexity: 3 or more personal factors and/or comorbidities  Examination of Body Systems (PT Eval Complexity): total of 4 or more elements  Clinical Presentation (PT Evaluation Complexity): evolving  Clinical Decision Making (PT Evaluation Complexity): moderate complexity  Overall Complexity (PT Evaluation Complexity): moderate complexity     PT Charges       Row Name 01/20/24 1052 01/20/24 1051          Time Calculation    Start Time -- 0815  -LS     PT Received On 01/20/24  - --     PT Goal Re-Cert Due Date 01/30/24  -LS --        Timed Charges    86750 - Gait Training Minutes  -- 10  -LS        Untimed Charges    PT Eval/Re-eval Minutes -- 36  -LS        Total Minutes    Timed Charges Total Minutes -- 10  -LS     Untimed Charges Total Minutes -- 36  -LS      Total Minutes -- 46  -LS               User Key  (r) = Recorded By,  (t) = Taken By, (c) = Cosigned By      Initials Name Provider Type     Paige Bradley, PT Physical Therapist                  Therapy Charges for Today       Code Description Service Date Service Provider Modifiers Qty    76720966548 HC PT EVAL MOD COMPLEXITY 3 1/20/2024 Paige Bradley, PT GP 1    [id number] HC GAIT TRAINING EA 15 MIN 1/20/2024 Paige Bradley, PT GP 1            PT G-Codes  Outcome Measure Options: AM-PAC 6 Clicks Daily Activity (OT)  AM-PAC 6 Clicks Score (PT): 17  AM-PAC 6 Clicks Score (OT): 20  PT Discharge Summary  Anticipated Discharge Disposition (PT): home with assist, home with home health    Paige Bradley, PT  1/20/2024

## 2024-01-20 NOTE — PLAN OF CARE
Goal Outcome Evaluation:  Plan of Care Reviewed With: patient        Progress: improving  Outcome Evaluation: OT re-eval complete. Pt presents w/ LLE pain, balance deficits, and LLE NWB status warranting cont skilled IPOT POC to promote return to PLOF. Pt educated on AE use and compensatory strategies to improve ADL performance. Recommend pt DC home w/ assist and HH OT/PT services.      Anticipated Discharge Disposition (OT): home with assist, home with home health

## 2024-01-20 NOTE — PROGRESS NOTES
[facility]    Acute pain service Inpatient Progress Note    Patient Name: Justo Oquendo  :  1975  MRN:  [id number]        Acute Pain  Service Inpatient Progress Note:    Analgesia:Excellent  Pain Score:1/10  LOC: alert and awake  Resp Status: room air  Cardiac: VS stable  Side Effects:None  Catheter Site:clean, dressing intact and dry  Cath type: peripheral nerve cath with ON Q  Volume: 1mL,5ml, 5ml InfuSystem Pump.  Catheter Plan:Catheter to remain Insitu and Continue catheter infusion rate unchanged  Comments:

## 2024-01-20 NOTE — PROGRESS NOTES
Justo Oquendo       LOS: 6 days   Patient Care Team:  Melo Whitaker MD as PCP - General (Family Medicine)  Niall Mcfarlane MD as Consulting Physician (Cardiology)    Chief Complaint:  left foot ischemia    Subjective     Interval History:     Resting comfortably in bed this morning.  Family present at bedside.  Pain tolerable, no acute events overnight.      Review of Systems:      Gen- No fevers, chills  CV- No chest pain, palpitations  Resp- No cough, dyspnea  GI- No N/V/D, abd pain    Objective     Vital Signs  Vital Signs (last 24 hours)         01/15 0700  01/16 0659 01/16 0700 01/16 0731   Most Recent      Temp (°F) 96.6 -  97.6       97.2 (36.2) 01/16 0652    Heart Rate 74 -  101       82 01/16 0652    Resp 14 -  16 16 01/16 0652    /70 -  157/76       134/70 01/16 0652    SpO2 (%) 93 -  99       97 01/16 0652    Flow (L/min)   2.5       2.5 01/16 0652              Physical Exam:     Alert, oriented.  No acute distress.  Nonlabored respirations.  Regular rate and rhythm.  Abdomen nondistended.  Left lower extremity: Operative dressing intact, clean, dry.     Results Review:     I reviewed the patient's new clinical results.    Medication Review:   Hospital Medications (active)         Dose Frequency Start End    aspirin EC tablet 81 mg 81 mg Nightly 1/14/2024 --    Admin Instructions: Do not crush or chew the capsules or tablets. The drug may not work as designed if the capsule or tablet is crushed or chewed. Swallow whole.  Do not exceed 4 grams of aspirin in a 24 hr period.    If given for pain, use the following pain scale:   Mild Pain = Pain Score of 1-3, CPOT 1-2  Moderate Pain = Pain Score of 4-6, CPOT 3-4  Severe Pain = Pain Score of 7-10, CPOT 5-8    Route: Oral    bisacodyl (DULCOLAX) EC tablet 5 mg 5 mg Daily PRN 1/14/2024 --    Admin Instructions: Use if no bowel movement after 12 hours.  Swallow whole. Do not crush, split, or chew tablet.    Route: Oral    Linked  "Group 1: See [facility]ers[facility] for full Linked Orders Report.        bisacodyl (DULCOLAX) suppository 10 mg 10 mg Daily PRN 1/14/2024 --    Admin Instructions: Use if no bowel movement after 12 hours.  Hold for diarrhea    Route: Rectal    Linked Group 1: See Hyperspace for full Linked Orders Report.        Calcium Replacement - Follow Nurse / BPA Driven Protocol  As Needed 1/14/2024 --    Admin Instructions: Open Order & Select \"BHS Electrolyte Replacement Protocol Algorithm\" to View Details    Route: Does not apply    cefTRIAXone (ROCEPHIN) 1,000 mg in sodium chloride 0.9 % 100 mL IVPB 1,000 mg Every 24 Hours 1/15/2024 1/22/2024    Admin Instructions: LR should be paused and flushing of the line with NS is recommended prior to and after completion of ceftriaxone infusion due to incompatibility. Do not co-adminster with calcium-containing solutions.  Caution: Look alike/sound alike drug alert    Route: Intravenous    cyclobenzaprine (FLEXERIL) tablet 10 mg 10 mg 3 Times Daily PRN 1/14/2024 --    Route: Oral    dextrose (D50W) (25 g/50 mL) IV injection 25 g 25 g Every 15 Minutes PRN 1/14/2024 --    Admin Instructions: Blood sugar less than 70; patient has IV access - Unresponsive, NPO or Unable To Safely Swallow    Route: Intravenous    dextrose (GLUTOSE) oral gel 15 g 15 g Every 15 Minutes PRN 1/14/2024 --    Admin Instructions: BS<70, Patient Alert, Is not NPO, Can safely swallow.    Route: Oral    gabapentin (NEURONTIN) capsule 600 mg 600 mg Every 8 Hours Scheduled 1/14/2024 --    Admin Instructions:     Route: Oral    glucagon (GLUCAGEN) injection 1 mg 1 mg Every 15 Minutes PRN 1/14/2024 --    Admin Instructions: Blood Glucose Less Than 70 - Patient Without IV Access - Unresponsive, NPO or Unable To Safely Swallow  Reconstitute powder for injection by adding 1 mL of -supplied sterile diluent or sterile water for injection to a vial containing 1 mg of the drug, to provide solutions containing 1 mg/mL. Shake " "vial gently to dissolve.    Route: Intramuscular    heparin 68314 units/250 mL (100 units/mL) in 0.45 % NaCl infusion 21 Units/kg/hr × 73.5 kg Titrated 1/14/2024 --    Admin Instructions: Pharmacy dosing - Cardiac or Other NOT VTE - Boluses (No initial bolus)    Route: Intravenous    HYDROmorphone (DILAUDID) injection 1 mg 1 mg Every 2 Hours PRN 1/14/2024 1/19/2024    Admin Instructions: Based on patient request - if ordered for moderate or severe pain, provider allows for administration of a medication prescribed for a lower pain scale.      Caution: Look alike/sound alike drug alert    If given for pain, use the following pain scale:  Mild Pain = Pain Score of 1-3, CPOT 1-2  Moderate Pain = Pain Score of 4-6, CPOT 3-4  Severe Pain = Pain Score of 7-10, CPOT 5-8    Route: Intravenous    Linked Group 2: See [facility] for full Linked Orders Report.        Insulin Lispro (humaLOG) injection 2-7 Units 2-7 Units 4 Times Daily Before Meals & Nightly 1/14/2024 --    Admin Instructions: Correction Insulin - Low Dose - Total Insulin Dose Less Than 40 units/day (Lean, Elderly or Renal Patients)    Blood Glucose 150-199 mg/dL - 2 units  Blood Glucose 200-249 mg/dL - 3 units  Blood Glucose 250-299 mg/dL - 4 units  Blood Glucose 300-349 mg/dL - 5 units  Blood Glucose 350-400 mg/dL - 6 units  Blood Glucose Greater Than 400 mg/dL - 7 units & Call Provider   Caution: Look alike/sound alike drug alert    Route: Subcutaneous    LORazepam (ATIVAN) tablet 0.5 mg 0.5 mg Every 8 Hours PRN 1/14/2024 1/19/2024    Admin Instructions:  Caution: Look alike/sound alike drug alert    Route: Oral    Magnesium Standard Dose Replacement - Follow Nurse / BPA Driven Protocol  As Needed 1/14/2024 --    Admin Instructions: Open Order & Select \"BHS Electrolyte Replacement Protocol Algorithm\" to View Details    Route: Does not apply    metoprolol tartrate (LOPRESSOR) tablet 25 mg 25 mg Every 12 Hours Scheduled 1/14/2024 --    Admin Instructions: Hold " "for SBP less than 100, DBP less than 60, or heart rate less than 50    Route: Oral    naloxone (NARCAN) injection 0.4 mg 0.4 mg Every 5 Minutes PRN 1/14/2024 --    Admin Instructions: If Respiratory Rate Less Than 8 or Patient is Difficult to Arouse, Stop ALL Narcotics & Contact Provider.  Administer Slow IV Push.  Repeat As Ordered Until Respiratory Rate is Greater Than 12.    Route: Intravenous    Linked Group 2: See [facility] for full Linked Orders Report.        nicotine (NICODERM CQ) 21 MG/24HR patch 1 patch 1 patch Nightly 1/14/2024 --    Admin Instructions: Apply Patch to Clean, Dry, Hairless Area Daily - Rotating Sites.  REMOVE Old Patch Prior to Applying New Patch.  May Remove Patch at Bedtime If Needed to Prevent Insomnia.  Do Not Use Other Nicotine Products.  At Discharge, Follow Package Instructions.  Dispose of nicotine replacement therapies and their wrappers in non-hazardous pharmaceutical waste or in regular trash.    Route: Transdermal    nicotine polacrilex (NICORETTE) gum 2 mg 2 mg Every 1 Hour PRN 1/14/2024 --    Admin Instructions: Maximum 24 pieces in 24 hours.    Gum should be chewed until it tingles, then \"park\" between the gum and cheek.   When the tingling is gone, chew again until the tingle returns and once again \"park\" between gum and cheek.   Repeat until tingling is gone and discard.  At discharge, follow instructions on package  Dispose of nicotine replacement therapies and their wrappers in non-hazardous pharmaceutical waste or in regular trash.    Route: Mouth/Throat    nitroglycerin (NITROSTAT) SL tablet 0.4 mg 0.4 mg Every 5 Minutes PRN 1/14/2024 --    Admin Instructions: If Pain Unrelieved After 3 Doses Notify MD  May administer up to 3 doses per episode.    Route: Sublingual    ondansetron (ZOFRAN) injection 4 mg 4 mg Every 6 Hours PRN 1/14/2024 --    Admin Instructions: If BOTH ondansetron (ZOFRAN) & promethazine (PHENERGAN) Ordered, Use ondansetron First & THEN promethazine " "IF ondansetron Ineffective.    Route: Intravenous    Linked Group 3: See [facility]ers[facility] for full Linked Orders Report.        ondansetron ODT (ZOFRAN-ODT) disintegrating tablet 4 mg 4 mg Every 6 Hours PRN 1/14/2024 --    Admin Instructions: If BOTH ondansetron (ZOFRAN) & promethazine (PHENERGAN) Ordered, Use ondansetron First & THEN promethazine IF ondansetron Ineffective.  Place on tongue and allow to dissolve.    Route: Oral    Linked Group 3: See Hypers[facility] for full Linked Orders Report.        oxyCODONE (ROXICODONE) immediate release tablet 10 mg 10 mg 4 Times Daily 1/14/2024 1/19/2024    Admin Instructions: HOLD for sedation  If given for pain, use the following pain scale:  Mild Pain = Pain Score of 1-3, CPOT 1-2  Moderate Pain = Pain Score of 4-6, CPOT 3-4  Severe Pain = Pain Score of 7-10, CPOT 5-8    Route: Oral    oxyCODONE (ROXICODONE) immediate release tablet 10 mg 10 mg Every 4 Hours PRN 1/14/2024 1/19/2024    Admin Instructions: Based on patient request - if ordered for moderate or severe pain, provider allows for administration of a medication prescribed for a lower pain scale.  If given for pain, use the following pain scale:  Mild Pain = Pain Score of 1-3, CPOT 1-2  Moderate Pain = Pain Score of 4-6, CPOT 3-4  Severe Pain = Pain Score of 7-10, CPOT 5-8    Route: Oral    Pharmacy to Dose Heparin  Continuous PRN 1/14/2024 --    Admin Instructions: Boluses (No initial bolus)    Route: Does not apply    Pharmacy to dose vancomycin  Continuous PRN 1/14/2024 1/21/2024    Route: Does not apply    Phosphorus Replacement - Follow Nurse / BPA Driven Protocol  As Needed 1/14/2024 --    Admin Instructions: Open Order & Select \"BHS Electrolyte Replacement Protocol Algorithm\" to View Details    Route: Does not apply    polyethylene glycol (MIRALAX) packet 17 g 17 g Daily PRN 1/14/2024 --    Admin Instructions: Use if no bowel movement after 12 hours. Mix in 6-8 ounces of water.  Use 4-8 ounces of water, tea, or juice " "for each 17 gram dose.    Route: Oral    Linked Group 1: See Hypers[facility] for full Linked Orders Report.        Potassium Replacement - Follow Nurse / BPA Driven Protocol  As Needed 1/14/2024 --    Admin Instructions: Open Order & Select \"BHS Electrolyte Replacement Protocol Algorithm\" to View Details    Route: Does not apply    rosuvastatin (CRESTOR) tablet 40 mg 40 mg Nightly 1/14/2024 --    Admin Instructions: Avoid grapefruit juice.    Route: Oral    sennosides-docusate (PERICOLACE) 8.6-50 MG per tablet 2 tablet 2 tablet 2 Times Daily 1/14/2024 --    Admin Instructions: HOLD MEDICATION IF PATIENT HAS HAD BOWEL MOVEMENT. Start bowel management regimen if patient has not had a bowel movement after 12 hours.    Route: Oral    Linked Group 1: See [facility]ers[facility] for full Linked Orders Report.        sodium chloride 0.9 % flush 10 mL 10 mL Every 12 Hours Scheduled 1/14/2024 --    Route: Intravenous    sodium chloride 0.9 % flush 10 mL 10 mL As Needed 1/14/2024 --    Route: Intravenous    sodium chloride 0.9 % infusion 40 mL 40 mL As Needed 1/14/2024 --    Admin Instructions: Following administration of an IV intermittent medication, flush line with 40mL NS at 100mL/hr.    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      Ischemic pain of left foot    Peripheral arterial disease    Hypertension    Hyperlipidemia    Tobacco abuse    Diabetes mellitus type II, non insulin " dependent    Claudication of lower extremity s/p femorofemoral bypass    Ischemic foot    Sepsis associated hypotension      Nonweightbearing left lower extremity.  Plan for dressing change tomorrow.  Okay to remove dressing for arterial Doppler as indicated.  Follow cultures.    Upon discharge, plan for follow-up in 2 weeks for wound check and x-rays.    Follow up with Winter Santana PA-C.    [facility]  [address] [place] [address], [address]  [facility], [address]  Please schedule at [phone]      Christo Peres Jr, MD  01/20/24  07:26 EST

## 2024-01-20 NOTE — PROGRESS NOTES
"Pharmacy Consult-Vancomycin Dosing  Justo Oquendo is a  48 y.o. male receiving vancomycin therapy.   Indication: SSTI  Consulting Provider: Intensivist    Goal Trough: 10-20    Current Antimicrobial Therapy  Ceftriaxone (1/14 - 1/27) 14  Vanc (1/14 - 1/27) 14    Allergies  Allergies as of 01/14/2024    (No Known Allergies)     Labs  Results from last 7 days   Lab Units 01/20/24  0710 01/19/24  0247 01/18/24  1856   BUN mg/dL 14 9 9   CREATININE mg/dL 0.59* 0.66* 0.68*     Results from last 7 days   Lab Units 01/20/24  0710 01/19/24  0247 01/18/24  0730   WBC 10*3/mm3 10.15 8.03 6.77     Evaluation of Dosing  Ht - 172.7 cm (68\")  Wt - 73.5 kg (162 lb)    Estimated Creatinine Clearance: 155.7 mL/min (A) (by C-G formula based on SCr of 0.59 mg/dL (L)).  Intake & Output (last 3 days)         01/12 0701  01/13 0700 01/13 0701  01/14 0700 01/14 0701  01/15 0700    IV Piggyback   100    Total Intake(mL/kg)   100 (1.4)    Net   +100                 Microbiology and Radiology  Microbiology Results (last 10 days)       Procedure Component Value - Date/Time    Wound Culture - Wound, Foot, Left [[id number]] Collected: 01/19/24 1219    Lab Status: Preliminary result Specimen: Wound from Foot, Left Updated: 01/20/24 0718     Wound Culture No growth     Gram Stain Rare (1+) WBCs seen      No organisms seen    MRSA Screen, PCR (Inpatient) - Swab, Nares [[id number]]  (Normal) Collected: 01/14/24 1817    Lab Status: Final result Specimen: Swab from Nares Updated: 01/14/24 2152     MRSA PCR No MRSA Detected    Narrative:      The negative predictive value of this diagnostic test is high and should only be used to consider de-escalating anti-MRSA therapy. A positive result may indicate colonization with MRSA and must be correlated clinically.    Blood Culture - Blood, Arm, Left [[id number]]  (Normal) Collected: 01/14/24 1512    Lab Status: Final result Specimen: Blood from Arm, Left Updated: 01/19/24 1900     Blood Culture No growth at " 5 days    Blood Culture - Blood, Arm, Right [[id number]]  (Normal) Collected: 01/14/24 1510    Lab Status: Final result Specimen: Blood from Arm, Right Updated: 01/19/24 1631     Blood Culture No growth at 5 days          Vancomycin Levels:  Results from last 7 days   Lab Units 01/19/24  0247 01/18/24  0730 01/17/24  0641 01/16/24  0648 01/15/24  0359   VANCOMYCIN RM mcg/mL 28.80 12.10 15.60 13.30 9.50     Insight[facility] AUC Calculation:  Predicted Steady State AUC on Current Dose: 479 mg/L*hr  _________________________________    Assessment/Plan:  Pharmacy consulted to dose vancomycin for SSTI  Patient is currently on a maintenance dose of vancomycin 1000 mg IV q12h. Predicted AUC of 450 mg/L*hr.  Will check a Vancomycin level tomorrow AM to verify therapeutic dose.  Will continue to follow and adjust vancomycin dose as needed based on renal function, cultures, and patient clinical status.    Thank you,    Lamar Zaldivar, PharmD  1/20/2024  09:28 EST

## 2024-01-20 NOTE — PROGRESS NOTES
"Critical Care Note     LOS: 6 days   Patient Care Team:  Melo Whitaker MD as PCP - General (Family Medicine)  Niall Mcfarlane MD as Consulting Physician (Cardiology)    Chief Complaint/Reason for visit:    Chief Complaint   Patient presents with    Altered Mental Status   Ischemic right foot  Peripheral artery disease  History of left femoral-popliteal bypass, previous femoral-femoral bypass  Hypertension  Hyperlipidemia  Diabetes mellitus type 2  Tobacco abuse      Subjective     Interval History:     Postoperative day #1 left foot transmetatarsal amputation.  He has a nerve block in place and is having no postoperative pain.  He remains afebrile.  Room air saturation 97%    Review of Systems:    All systems were reviewed and negative except as noted in subjective.    Medical history, surgical history, social history, family history reviewed    Objective     Intake/Output:    Intake/Output Summary (Last 24 hours) at 1/20/2024 1101  Last data filed at 1/20/2024 1018  Gross per 24 hour   Intake 1643.8 ml   Output 2500 ml   Net -856.2 ml       Nutrition:  Diet: Diabetic Diets, Cardiac Diets; Healthy Heart (2-3 Na+); Consistent Carbohydrate; Texture: Regular Texture (IDDSI 7); Fluid Consistency: Thin (IDDSI 0)    Infusions:  lactated ringers, 9 mL/hr, Last Rate: 1,000 mL/hr (01/18/24 1510)  niCARdipine, 5-15 mg/hr, Last Rate: Stopped (01/20/24 0822)  Pharmacy to dose vancomycin,   ropivacaine,         Mechanical Ventilator Settings:                                                Telemetry: Sinus rhythm, sinus tachycardia             Vital Signs  Blood pressure 108/58, pulse 76, temperature 98.1 °F (36.7 °C), temperature source Oral, resp. rate 18, height 172.7 cm (68\"), weight 73.5 kg (162 lb), SpO2 97%.    Physical Exam:  General Appearance:  Middle-aged gentleman in no respiratory distress   Head:  Atraumatic   Eyes:          Conjunctiva pink, no jaundice   Ears:     Throat: Oral mucosa moist   Neck: " "Trachea midline, no carotid bruits   Back:      Lungs:   Symmetric chest expansion without wheeze    Heart:  Regular rhythm, S1, S2 auscultated   Abdomen:   Bowel sounds present, soft, nontender   Rectal:   Deferred   Extremities: Left foot Kerlix dressing in place, dry.  Dressing on medial left calf, left thigh intact.  Left leg is warm to touch   Pulses:    Skin: No skin rash   Lymph nodes: No cervical adenopathy   Neurologic: Alert and oriented      Results Review:     I reviewed the patient's new clinical results.   Results from last 7 days   Lab Units 01/20/24  0710 01/19/24  0247 01/18/24  1856 01/15/24  0359 01/14/24  1512   SODIUM mmol/L 139 134* 138   < > 134*   POTASSIUM mmol/L 4.6 4.1 3.9   < > 4.1   CHLORIDE mmol/L 103 102 103   < > 96*   CO2 mmol/L 21.0* 21.0* 21.0*   < > 25.0   BUN mg/dL 14 9 9   < > 30*   CREATININE mg/dL 0.59* 0.66* 0.68*   < > 2.07*   CALCIUM mg/dL 9.7 9.0 9.3   < > 10.0   BILIRUBIN mg/dL 0.2  --  0.3  --  0.2   ALK PHOS U/L 53  --  57  --  83   ALT (SGPT) U/L 28  --  25  --  13   AST (SGOT) U/L 27  --  44*  --  14   GLUCOSE mg/dL 158* 157* 128*   < > 302*    < > = values in this interval not displayed.     Results from last 7 days   Lab Units 01/20/24  0710 01/19/24  0247 01/18/24  0730   WBC 10*3/mm3 10.15 8.03 6.77   HEMOGLOBIN g/dL 8.6* 10.1* 11.9*   HEMATOCRIT % 25.9* 29.2* 35.1*   PLATELETS 10*3/mm3 225 200 230         Lab Results   Component Value Date    BLOODCX No growth at 5 days 01/14/2024     No results found for: \"URINECX\"    I reviewed the patient's new imaging including images and reports.    CT ANGIO ABDOMINAL AORTA BILAT ILIOFEM RUNOFF    Date of Exam: 1/14/2024 5:34 PM EST    Indication: Claudication or leg ischemia  Previous fem pop graft occlusion, c/o increased LLE pain, color changes, third/fourth toe ischemia, leukocytosis, r/o gas.    Comparison: None available.    Technique: CTA of the abdomen, pelvis and both lower extremities was performed after the " uneventful intravenous administration of 120 cc Isovue-370 . Reconstructed coronal and sagittal images were also obtained. In addition, a 3-D volume rendered image  was created for interpretation. Automated exposure control and iterative reconstruction methods were used.    The origins of the celiac axis and superior mesenteric arteries are normal. The origins of the renal arteries are normal. Mild atheromatous disease of the abdominal aorta with subtle ectasia. The origin of the inferior mesenteric artery is patent.    Complete occlusion of the left common iliac artery with continued complete loss of flow within the left internal and external iliac arteries. Multi focal atheromatous disease of the right common iliac artery as well as the internal and external iliac  arteries. Femorofemoral bypass.    Complete occlusion of the left femoral artery bypass with no underlying flow. Reconstitution of flow distally at the level of the popliteal artery due to branches from the deep femoral artery. The superior segments of the left anterior and posterior  tibial arteries are patent. Eventual loss of flow within the posterior tibial artery seen on axial image 383. Continued flow within the dorsalis pedis with eventual loss of flow within the arteries.    On the right there is continued flow within the right deep femoral artery. Complete occlusion of the left femoral artery with distal reconstitution from branches of the deep femoral artery. The popliteal artery is patent. The anterior and posterior  tibial vessels are patent proximally. Loss of flow within the anterior tibial artery on axial image #343. Loss of the contrast column within the posterior tibial artery on axial image  417.    The liver appears normal. Calcified granulomata within the spleen.    Normal pancreas. Prior cholecystectomy. The adrenal glands are normal.    No renal masses. No hydroureteronephrosis. The ureters and urinary bladder are normal.    The  small bowel is nonobstructed. Normal appendix. Colonic diverticulosis without evidence of diverticulitis.    No ascites or pneumoperitoneum. No adenopathy. Degenerative changes of the hips and lumbar spine.   Impression:     Complete occlusion of the left femoral artery bypass graft. Complete occlusion of the right femoral artery. Eventual distal reconstitution of both vessels due to branches of the deep femoral arteries. Loss of flow within the left posterior  tibial artery. Loss of flow within the right anterior tibial artery. Eventual loss of flow within the contrast column on both sides with no residual flow to the digital arteries.    Electronically Signed: Kee Cartagena MD   1/14/2024 6:08 PM EST       All medications reviewed.   aspirin, 81 mg, Oral, Nightly  cefTRIAXone, 1,000 mg, Intravenous, Q24H  gabapentin, 600 mg, Oral, Q8H  hydrALAZINE, 25 mg, Oral, Q8H  insulin detemir, 5 Units, Subcutaneous, Nightly  insulin lispro, 2-7 Units, Subcutaneous, 4x Daily AC & at Bedtime  lisinopril, 40 mg, Oral, Q24H  metoprolol tartrate, 25 mg, Oral, Q12H  nicotine, 1 patch, Transdermal, Nightly  [START ON 1/21/2024] pantoprazole, 40 mg, Oral, Q AM  rosuvastatin, 40 mg, Oral, Nightly  senna-docusate sodium, 2 tablet, Oral, BID  sodium chloride, 10 mL, Intravenous, Q12H  vancomycin, 1,000 mg, Intravenous, Q12H          Assessment & Plan       Ischemic pain of left foot    Claudication of lower extremity s/p femorofemoral bypass    Ischemic foot    Peripheral arterial disease    Hypertension    Hyperlipidemia    Tobacco abuse    Diabetes mellitus type II, non insulin dependent    Sepsis associated hypotension      48-year-old gentleman with diabetes, hypertension, dyslipidemia, tobacco abuse, known peripheral artery disease who underwent a left femoral-popliteal bypass April 25, 2017.  He developed worsening claudication in the left foot and was evaluated November 20 and was found to be pulseless in the left foot.   Aortogram revealed thromboembolism in the left popliteal graft and right femoral artery.  November 21 he underwent left femoral artery cutdown with thromboembolectomy of the left femoral-popliteal bypass graft.  He has black toes, 2 3 and 4 on his left foot and no palpable pulse.  January 19 he underwent left forefoot amputation.  On admission creatinine 2.07.  Improved to 0.59 today.  Preoperative hemoglobin 10.1, today 8.6.  He was on some low-dose Cardene this morning before getting his usual oral medications.    PLAN:    Follow-up surgical cultures  Aspirin, Crestor  Hydralazine, metoprolol, lisinopril  Wean Cardene drip  Gabapentin 600 mg every 8 hours  Sliding scale insulin  Nerve block infusing ropivacaine  Ceftriaxone, vancomycin for osteomyelitis  Start subcutaneous heparin   Continue Protonix        Masha Adame MD  01/20/24  11:01 EST      Time: Critical care 25 min  I personally provided care to this critically ill patient as documented above.  Critical care time does not include time spent on separately billed procedures.  None of my critical care time was concurrent with other critical care providers.

## 2024-01-20 NOTE — PLAN OF CARE
Goal Outcome Evaluation:  Plan of Care Reviewed With: patient        Progress: no change  Outcome Evaluation: PT initial evaluation completed. Pt demonstrates LLE post-op pain and decreased indep/ functional endurance re: mobility tasks, warranting further skilled PT services to promote PLOF. Limited today by pain and difficulty maintaining LLE NWB with fatigue, but able to ambulate 12 ft with RW, offloading shoe L, min A. Recommend d/c home with assist and HHPT.      Anticipated Discharge Disposition (PT): home with assist, home with home health

## 2024-01-21 LAB
ANION GAP SERPL CALCULATED.3IONS-SCNC: 7 MMOL/L (ref 5–15)
BUN SERPL-MCNC: 20 MG/DL (ref 6–20)
BUN/CREAT SERPL: 31.3 (ref 7–25)
CALCIUM SPEC-SCNC: 9.2 MG/DL (ref 8.6–10.5)
CHLORIDE SERPL-SCNC: 107 MMOL/L (ref 98–107)
CO2 SERPL-SCNC: 27 MMOL/L (ref 22–29)
CREAT SERPL-MCNC: 0.64 MG/DL (ref 0.76–1.27)
EGFRCR SERPLBLD CKD-EPI 2021: 115.3 ML/MIN/1.73
GLUCOSE BLDC GLUCOMTR-MCNC: 133 MG/DL (ref 70–130)
GLUCOSE BLDC GLUCOMTR-MCNC: 169 MG/DL (ref 70–130)
GLUCOSE BLDC GLUCOMTR-MCNC: 173 MG/DL (ref 70–130)
GLUCOSE BLDC GLUCOMTR-MCNC: 209 MG/DL (ref 70–130)
GLUCOSE SERPL-MCNC: 150 MG/DL (ref 65–99)
MAGNESIUM SERPL-MCNC: 1.8 MG/DL (ref 1.6–2.6)
POTASSIUM SERPL-SCNC: 3.7 MMOL/L (ref 3.5–5.2)
SODIUM SERPL-SCNC: 141 MMOL/L (ref 136–145)
VANCOMYCIN SERPL-MCNC: 14.3 MCG/ML (ref 5–40)

## 2024-01-21 PROCEDURE — 63710000001 INSULIN LISPRO (HUMAN) PER 5 UNITS: Performed by: ORTHOPAEDIC SURGERY

## 2024-01-21 PROCEDURE — 82948 REAGENT STRIP/BLOOD GLUCOSE: CPT

## 2024-01-21 PROCEDURE — 80048 BASIC METABOLIC PNL TOTAL CA: CPT | Performed by: INTERNAL MEDICINE

## 2024-01-21 PROCEDURE — 25010000002 MORPHINE PER 10 MG: Performed by: ORTHOPAEDIC SURGERY

## 2024-01-21 PROCEDURE — 80202 ASSAY OF VANCOMYCIN: CPT

## 2024-01-21 PROCEDURE — 25010000002 VANCOMYCIN 10 G RECONSTITUTED SOLUTION

## 2024-01-21 PROCEDURE — 83735 ASSAY OF MAGNESIUM: CPT | Performed by: INTERNAL MEDICINE

## 2024-01-21 PROCEDURE — 25010000002 HYDROMORPHONE PER 4 MG: Performed by: INTERNAL MEDICINE

## 2024-01-21 PROCEDURE — 25010000002 CEFTRIAXONE PER 250 MG: Performed by: INTERNAL MEDICINE

## 2024-01-21 PROCEDURE — 63710000001 INSULIN DETEMIR PER 5 UNITS

## 2024-01-21 PROCEDURE — 99232 SBSQ HOSP IP/OBS MODERATE 35: CPT | Performed by: INTERNAL MEDICINE

## 2024-01-21 PROCEDURE — 25810000003 SODIUM CHLORIDE 0.9 % SOLUTION

## 2024-01-21 PROCEDURE — 25010000002 HEPARIN (PORCINE) PER 1000 UNITS: Performed by: INTERNAL MEDICINE

## 2024-01-21 RX ORDER — HYDROCODONE BITARTRATE AND ACETAMINOPHEN 10; 325 MG/1; MG/1
1 TABLET ORAL 3 TIMES DAILY
Status: DISCONTINUED | OUTPATIENT
Start: 2024-01-21 | End: 2024-01-24 | Stop reason: HOSPADM

## 2024-01-21 RX ORDER — HYDROMORPHONE HYDROCHLORIDE 1 MG/ML
0.5 INJECTION, SOLUTION INTRAMUSCULAR; INTRAVENOUS; SUBCUTANEOUS ONCE
Status: COMPLETED | OUTPATIENT
Start: 2024-01-21 | End: 2024-01-21

## 2024-01-21 RX ORDER — VANCOMYCIN/0.9 % SOD CHLORIDE 1.5G/250ML
1500 PLASTIC BAG, INJECTION (ML) INTRAVENOUS EVERY 12 HOURS SCHEDULED
Status: DISCONTINUED | OUTPATIENT
Start: 2024-01-21 | End: 2024-01-23

## 2024-01-21 RX ORDER — TEMAZEPAM 15 MG/1
15 CAPSULE ORAL NIGHTLY PRN
Status: DISCONTINUED | OUTPATIENT
Start: 2024-01-21 | End: 2024-01-24 | Stop reason: HOSPADM

## 2024-01-21 RX ORDER — OXYCODONE HYDROCHLORIDE 10 MG/1
20 TABLET ORAL EVERY 6 HOURS PRN
Status: DISCONTINUED | OUTPATIENT
Start: 2024-01-21 | End: 2024-01-24 | Stop reason: HOSPADM

## 2024-01-21 RX ADMIN — HYDROMORPHONE HYDROCHLORIDE 0.5 MG: 1 INJECTION, SOLUTION INTRAMUSCULAR; INTRAVENOUS; SUBCUTANEOUS at 12:02

## 2024-01-21 RX ADMIN — OXYCODONE HYDROCHLORIDE 10 MG: 10 TABLET ORAL at 04:30

## 2024-01-21 RX ADMIN — INSULIN LISPRO 2 UNITS: 100 INJECTION, SOLUTION INTRAVENOUS; SUBCUTANEOUS at 21:15

## 2024-01-21 RX ADMIN — HYDROCODONE BITARTRATE AND ACETAMINOPHEN 1 TABLET: 10; 325 TABLET ORAL at 15:58

## 2024-01-21 RX ADMIN — LISINOPRIL 40 MG: 20 TABLET ORAL at 08:52

## 2024-01-21 RX ADMIN — VANCOMYCIN HYDROCHLORIDE 1250 MG: 10 INJECTION, POWDER, LYOPHILIZED, FOR SOLUTION INTRAVENOUS at 08:51

## 2024-01-21 RX ADMIN — GABAPENTIN 600 MG: 300 CAPSULE ORAL at 05:29

## 2024-01-21 RX ADMIN — HYDRALAZINE HYDROCHLORIDE 25 MG: 50 TABLET, FILM COATED ORAL at 14:39

## 2024-01-21 RX ADMIN — HYDRALAZINE HYDROCHLORIDE 25 MG: 50 TABLET, FILM COATED ORAL at 05:29

## 2024-01-21 RX ADMIN — POLYETHYLENE GLYCOL 3350 17 G: 17 POWDER, FOR SOLUTION ORAL at 17:18

## 2024-01-21 RX ADMIN — HYDROMORPHONE HYDROCHLORIDE 0.5 MG: 1 INJECTION, SOLUTION INTRAMUSCULAR; INTRAVENOUS; SUBCUTANEOUS at 21:44

## 2024-01-21 RX ADMIN — HYDRALAZINE HYDROCHLORIDE 25 MG: 50 TABLET, FILM COATED ORAL at 21:15

## 2024-01-21 RX ADMIN — SENNOSIDES AND DOCUSATE SODIUM 2 TABLET: 8.6; 5 TABLET ORAL at 21:15

## 2024-01-21 RX ADMIN — Medication 10 ML: at 21:16

## 2024-01-21 RX ADMIN — OXYCODONE HYDROCHLORIDE 10 MG: 10 TABLET ORAL at 08:16

## 2024-01-21 RX ADMIN — MORPHINE SULFATE 2 MG: 2 INJECTION, SOLUTION INTRAMUSCULAR; INTRAVENOUS at 14:04

## 2024-01-21 RX ADMIN — INSULIN LISPRO 3 UNITS: 100 INJECTION, SOLUTION INTRAVENOUS; SUBCUTANEOUS at 17:51

## 2024-01-21 RX ADMIN — Medication 10 ML: at 08:19

## 2024-01-21 RX ADMIN — SENNOSIDES AND DOCUSATE SODIUM 2 TABLET: 8.6; 5 TABLET ORAL at 08:52

## 2024-01-21 RX ADMIN — HEPARIN SODIUM 5000 UNITS: 5000 INJECTION INTRAVENOUS; SUBCUTANEOUS at 14:38

## 2024-01-21 RX ADMIN — ASPIRIN 81 MG: 81 TABLET, COATED ORAL at 21:15

## 2024-01-21 RX ADMIN — METOPROLOL TARTRATE 25 MG: 25 TABLET, FILM COATED ORAL at 21:15

## 2024-01-21 RX ADMIN — INSULIN DETEMIR 5 UNITS: 100 INJECTION, SOLUTION SUBCUTANEOUS at 21:15

## 2024-01-21 RX ADMIN — GABAPENTIN 600 MG: 300 CAPSULE ORAL at 21:15

## 2024-01-21 RX ADMIN — METOPROLOL TARTRATE 25 MG: 25 TABLET, FILM COATED ORAL at 08:52

## 2024-01-21 RX ADMIN — HEPARIN SODIUM 5000 UNITS: 5000 INJECTION INTRAVENOUS; SUBCUTANEOUS at 21:15

## 2024-01-21 RX ADMIN — HYDROMORPHONE HYDROCHLORIDE 0.5 MG: 1 INJECTION, SOLUTION INTRAMUSCULAR; INTRAVENOUS; SUBCUTANEOUS at 08:16

## 2024-01-21 RX ADMIN — OXYCODONE HYDROCHLORIDE 20 MG: 10 TABLET ORAL at 23:51

## 2024-01-21 RX ADMIN — INSULIN LISPRO 2 UNITS: 100 INJECTION, SOLUTION INTRAVENOUS; SUBCUTANEOUS at 12:18

## 2024-01-21 RX ADMIN — HYDROMORPHONE HYDROCHLORIDE 0.5 MG: 1 INJECTION, SOLUTION INTRAMUSCULAR; INTRAVENOUS; SUBCUTANEOUS at 04:18

## 2024-01-21 RX ADMIN — HYDROCODONE BITARTRATE AND ACETAMINOPHEN 1 TABLET: 10; 325 TABLET ORAL at 20:49

## 2024-01-21 RX ADMIN — TEMAZEPAM 15 MG: 15 CAPSULE ORAL at 21:44

## 2024-01-21 RX ADMIN — SODIUM CHLORIDE 1000 MG: 900 INJECTION INTRAVENOUS at 21:16

## 2024-01-21 RX ADMIN — GABAPENTIN 600 MG: 300 CAPSULE ORAL at 14:39

## 2024-01-21 RX ADMIN — Medication 1 PATCH: at 21:15

## 2024-01-21 RX ADMIN — CYCLOBENZAPRINE 10 MG: 10 TABLET, FILM COATED ORAL at 21:25

## 2024-01-21 RX ADMIN — OXYCODONE HYDROCHLORIDE 20 MG: 10 TABLET ORAL at 17:59

## 2024-01-21 RX ADMIN — ROSUVASTATIN CALCIUM 40 MG: 20 TABLET, FILM COATED ORAL at 21:15

## 2024-01-21 RX ADMIN — PANTOPRAZOLE SODIUM 40 MG: 40 TABLET, DELAYED RELEASE ORAL at 05:29

## 2024-01-21 RX ADMIN — HEPARIN SODIUM 5000 UNITS: 5000 INJECTION INTRAVENOUS; SUBCUTANEOUS at 05:30

## 2024-01-21 RX ADMIN — MORPHINE SULFATE 2 MG: 2 INJECTION, SOLUTION INTRAMUSCULAR; INTRAVENOUS at 05:44

## 2024-01-21 RX ADMIN — OXYCODONE HYDROCHLORIDE 10 MG: 10 TABLET ORAL at 12:02

## 2024-01-21 RX ADMIN — Medication 10 ML: at 21:32

## 2024-01-21 RX ADMIN — VANCOMYCIN HYDROCHLORIDE 1500 MG: 10 INJECTION, POWDER, LYOPHILIZED, FOR SOLUTION INTRAVENOUS at 21:16

## 2024-01-21 NOTE — PLAN OF CARE
Problem: Adult Inpatient Plan of Care  Goal: Plan of Care Review  Outcome: Ongoing, Progressing  Flowsheets (Taken 1/21/2024 1707)  Progress: no change  Plan of Care Reviewed With:   patient   spouse   Goal Outcome Evaluation:  Plan of Care Reviewed With: patient, spouse        Progress: no change     Patient alert and oriented. GASPAR. Reports ongoing pain in left foot and leg. New orders received regarding pain medication today. NSR with -140. Patient not eating or drinking much. Oral intake encouraged. Patient also encouraged to utilize IS.

## 2024-01-21 NOTE — NURSING NOTE
MD aware of home pain medication regimen v orders while in ICU.  MD aware of pain level of patient. No new orders.

## 2024-01-21 NOTE — PROGRESS NOTES
"Critical Care Note     LOS: 7 days   Patient Care Team:  Melo Whitaker MD as PCP - General (Family Medicine)  Niall Mcfarlane MD as Consulting Physician (Cardiology)    Chief Complaint/Reason for visit:    Chief Complaint   Patient presents with    Altered Mental Status   Ischemic right foot  Peripheral artery disease  History of left femoral-popliteal bypass, previous femoral-femoral bypass  Hypertension  Hyperlipidemia  Diabetes mellitus type 2  Tobacco abuse      Subjective     Interval History:     Postoperative day #2 left foot transmetatarsal amputation.  He has a nerve block in place.  However he is complaining of 8 out of 10 pain.  At baseline he takes hydrocodone 10 3 times daily as needed, oxycodone 24 times daily, gabapentin 600 mg 3 times daily he remains afebrile.  Room air saturation 100%    Review of Systems:    All systems were reviewed and negative except as noted in subjective.    Medical history, surgical history, social history, family history reviewed    Objective     Intake/Output:    Intake/Output Summary (Last 24 hours) at 1/21/2024 1440  Last data filed at 1/21/2024 0900  Gross per 24 hour   Intake 509.5 ml   Output 1900 ml   Net -1390.5 ml       Nutrition:  Diet: Diabetic Diets, Cardiac Diets; Healthy Heart (2-3 Na+); Consistent Carbohydrate; Texture: Regular Texture (IDDSI 7); Fluid Consistency: Thin (IDDSI 0)    Infusions:  lactated ringers, 9 mL/hr, Last Rate: 1,000 mL/hr (01/18/24 1510)  niCARdipine, 5-15 mg/hr, Last Rate: Stopped (01/20/24 0822)  Pharmacy to dose vancomycin,   ropivacaine,         Mechanical Ventilator Settings:                                                Telemetry: Sinus rhythm             Vital Signs  Blood pressure 119/55, pulse 82, temperature 98.7 °F (37.1 °C), temperature source Oral, resp. rate 20, height 172.7 cm (68\"), weight 73.5 kg (162 lb), SpO2 100%.    Physical Exam:  General Appearance:  Middle-aged gentleman in no respiratory " "distress   Head:  Atraumatic   Eyes:          Conjunctiva pink, no jaundice   Ears:     Throat: Oral mucosa moist   Neck: Trachea midline, no carotid bruits   Back:      Lungs:   Symmetric chest expansion without wheeze    Heart:  Regular rhythm, S1, S2 auscultated   Abdomen:   Bowel sounds present, soft, nontender   Rectal:   Deferred   Extremities: Left foot Kerlix dressing in place, dry.  Dressing on medial left calf, left thigh intact.  Left leg is warm to touch, mild edema of the left ankle   Pulses:    Skin: No skin rash   Lymph nodes: No cervical adenopathy   Neurologic: Alert and oriented      Results Review:     I reviewed the patient's new clinical results.   Results from last 7 days   Lab Units 01/21/24  0410 01/20/24  0710 01/19/24  0247 01/18/24  1856 01/15/24  0359 01/14/24  1512   SODIUM mmol/L 141 139 134* 138   < > 134*   POTASSIUM mmol/L 3.7 4.6 4.1 3.9   < > 4.1   CHLORIDE mmol/L 107 103 102 103   < > 96*   CO2 mmol/L 27.0 21.0* 21.0* 21.0*   < > 25.0   BUN mg/dL 20 14 9 9   < > 30*   CREATININE mg/dL 0.64* 0.59* 0.66* 0.68*   < > 2.07*   CALCIUM mg/dL 9.2 9.7 9.0 9.3   < > 10.0   BILIRUBIN mg/dL  --  0.2  --  0.3  --  0.2   ALK PHOS U/L  --  53  --  57  --  83   ALT (SGPT) U/L  --  28  --  25  --  13   AST (SGOT) U/L  --  27  --  44*  --  14   GLUCOSE mg/dL 150* 158* 157* 128*   < > 302*    < > = values in this interval not displayed.     Results from last 7 days   Lab Units 01/20/24  0710 01/19/24  0247 01/18/24  0730   WBC 10*3/mm3 10.15 8.03 6.77   HEMOGLOBIN g/dL 8.6* 10.1* 11.9*   HEMATOCRIT % 25.9* 29.2* 35.1*   PLATELETS 10*3/mm3 225 200 230         Lab Results   Component Value Date    BLOODCX No growth at 5 days 01/14/2024     No results found for: \"URINECX\"    I reviewed the patient's new imaging including images and reports.    CT ANGIO ABDOMINAL AORTA BILAT ILIOFEM RUNOFF    Date of Exam: 1/14/2024 5:34 PM EST    Indication: Claudication or leg ischemia  Previous fem pop graft " occlusion, c/o increased LLE pain, color changes, third/fourth toe ischemia, leukocytosis, r/o gas.    Comparison: None available.    Technique: CTA of the abdomen, pelvis and both lower extremities was performed after the uneventful intravenous administration of 120 cc Isovue-370 . Reconstructed coronal and sagittal images were also obtained. In addition, a 3-D volume rendered image  was created for interpretation. Automated exposure control and iterative reconstruction methods were used.    The origins of the celiac axis and superior mesenteric arteries are normal. The origins of the renal arteries are normal. Mild atheromatous disease of the abdominal aorta with subtle ectasia. The origin of the inferior mesenteric artery is patent.    Complete occlusion of the left common iliac artery with continued complete loss of flow within the left internal and external iliac arteries. Multi focal atheromatous disease of the right common iliac artery as well as the internal and external iliac  arteries. Femorofemoral bypass.    Complete occlusion of the left femoral artery bypass with no underlying flow. Reconstitution of flow distally at the level of the popliteal artery due to branches from the deep femoral artery. The superior segments of the left anterior and posterior  tibial arteries are patent. Eventual loss of flow within the posterior tibial artery seen on axial image 383. Continued flow within the dorsalis pedis with eventual loss of flow within the arteries.    On the right there is continued flow within the right deep femoral artery. Complete occlusion of the left femoral artery with distal reconstitution from branches of the deep femoral artery. The popliteal artery is patent. The anterior and posterior  tibial vessels are patent proximally. Loss of flow within the anterior tibial artery on axial image #343. Loss of the contrast column within the posterior tibial artery on axial image  417.    The liver appears  normal. Calcified granulomata within the spleen.    Normal pancreas. Prior cholecystectomy. The adrenal glands are normal.    No renal masses. No hydroureteronephrosis. The ureters and urinary bladder are normal.    The small bowel is nonobstructed. Normal appendix. Colonic diverticulosis without evidence of diverticulitis.    No ascites or pneumoperitoneum. No adenopathy. Degenerative changes of the hips and lumbar spine.   Impression:     Complete occlusion of the left femoral artery bypass graft. Complete occlusion of the right femoral artery. Eventual distal reconstitution of both vessels due to branches of the deep femoral arteries. Loss of flow within the left posterior  tibial artery. Loss of flow within the right anterior tibial artery. Eventual loss of flow within the contrast column on both sides with no residual flow to the digital arteries.    Electronically Signed: Kee Cartagena MD   1/14/2024 6:08 PM EST       All medications reviewed.   aspirin, 81 mg, Oral, Nightly  cefTRIAXone, 1,000 mg, Intravenous, Q24H  gabapentin, 600 mg, Oral, Q8H  heparin (porcine), 5,000 Units, Subcutaneous, Q8H  hydrALAZINE, 25 mg, Oral, Q8H  insulin detemir, 5 Units, Subcutaneous, Nightly  insulin lispro, 2-7 Units, Subcutaneous, 4x Daily AC & at Bedtime  lisinopril, 40 mg, Oral, Q24H  metoprolol tartrate, 25 mg, Oral, Q12H  nicotine, 1 patch, Transdermal, Nightly  pantoprazole, 40 mg, Oral, Q AM  rosuvastatin, 40 mg, Oral, Nightly  senna-docusate sodium, 2 tablet, Oral, BID  sodium chloride, 10 mL, Intravenous, Q12H  vancomycin, 1,500 mg, Intravenous, Q12H          Assessment & Plan       Ischemic pain of left foot    Claudication of lower extremity s/p femorofemoral bypass    Ischemic foot    Peripheral arterial disease    Hypertension    Hyperlipidemia    Tobacco abuse    Diabetes mellitus type II, non insulin dependent    Sepsis associated hypotension      48-year-old gentleman with diabetes, hypertension, dyslipidemia,  tobacco abuse, known peripheral artery disease who underwent a left femoral-popliteal bypass April 25, 2017.  He developed worsening claudication in the left foot and was evaluated November 20 and was found to be pulseless in the left foot.  Aortogram revealed thromboembolism in the left popliteal graft and right femoral artery.  November 21 he underwent left femoral artery cutdown with thromboembolectomy of the left femoral-popliteal bypass graft.  He has black toes, 2 3 and 4 on his left foot and no palpable pulse.  January 19 he underwent left forefoot amputation.  Cultures are negative at 24 hours.     On admission creatinine 2.07.  Improved to 0.59 .      Preoperative hemoglobin 10.1, post-op 8.6.      Home blood pressure regimen restarted and he is off Cardene    Blood glucoses are running 150-250    PLAN:    Follow-up surgical cultures  Aspirin, Crestor  Hydralazine, metoprolol, lisinopril    Gabapentin 600 mg every 8 hours  Restart home narcotic regimen and stop IV narcotics  It appears he was taking hydrocodone 10 mg 3 times daily, new prescription for oxycodone 20 mg started in December, will use that for breakthrough    Nerve block infusing ropivacaine    Ceftriaxone, vancomycin for osteomyelitis    subcutaneous heparin   Continue Protonix        Masha Adame MD  01/21/24  14:40 EST      Time: Critical care 25 min  I personally provided care to this critically ill patient as documented above.  Critical care time does not include time spent on separately billed procedures.  None of my critical care time was concurrent with other critical care providers.

## 2024-01-21 NOTE — PROGRESS NOTES
"Pharmacy Consult-Vancomycin Dosing  Justo Oquendo is a  48 y.o. male receiving vancomycin therapy.   Indication: SSTI  Consulting Provider: Intensivist    Goal Trough: 10-20    Current Antimicrobial Therapy  Ceftriaxone (1/14 - 1/27) 14  Vanc (1/14 - 1/27) 14    Allergies  Allergies as of 01/14/2024    (No Known Allergies)     Labs  Results from last 7 days   Lab Units 01/21/24  0410 01/20/24  0710 01/19/24  0247   BUN mg/dL 20 14 9   CREATININE mg/dL 0.64* 0.59* 0.66*     Results from last 7 days   Lab Units 01/20/24  0710 01/19/24  0247 01/18/24  0730   WBC 10*3/mm3 10.15 8.03 6.77     Evaluation of Dosing  Ht - 172.7 cm (68\")  Wt - 73.5 kg (162 lb)    Estimated Creatinine Clearance: 143.6 mL/min (A) (by C-G formula based on SCr of 0.64 mg/dL (L)).  Intake & Output (last 3 days)         01/12 0701  01/13 0700 01/13 0701  01/14 0700 01/14 0701  01/15 0700    IV Piggyback   100    Total Intake(mL/kg)   100 (1.4)    Net   +100                 Microbiology and Radiology  Microbiology Results (last 10 days)       Procedure Component Value - Date/Time    Wound Culture - Wound, Foot, Left [[id number]] Collected: 01/19/24 1219    Lab Status: Preliminary result Specimen: Wound from Foot, Left Updated: 01/21/24 0720     Wound Culture No growth at 2 days     Gram Stain Rare (1+) WBCs seen      No organisms seen    MRSA Screen, PCR (Inpatient) - Swab, Nares [[id number]]  (Normal) Collected: 01/14/24 1817    Lab Status: Final result Specimen: Swab from Nares Updated: 01/14/24 2152     MRSA PCR No MRSA Detected    Narrative:      The negative predictive value of this diagnostic test is high and should only be used to consider de-escalating anti-MRSA therapy. A positive result may indicate colonization with MRSA and must be correlated clinically.    Blood Culture - Blood, Arm, Left [[id number]]  (Normal) Collected: 01/14/24 1512    Lab Status: Final result Specimen: Blood from Arm, Left Updated: 01/19/24 8552     Blood Culture No " growth at 5 days    Blood Culture - Blood, Arm, Right [[id number]]  (Normal) Collected: 01/14/24 1510    Lab Status: Final result Specimen: Blood from Arm, Right Updated: 01/19/24 1631     Blood Culture No growth at 5 days          Vancomycin Levels:  Results from last 7 days   Lab Units 01/21/24  0410 01/19/24  0247 01/18/24  0730 01/17/24  0641 01/16/24  0648 01/15/24  0359   VANCOMYCIN RM mcg/mL 14.30 28.80 12.10 15.60 13.30 9.50     InsightRX AUC Calculation:  Predicted Steady State AUC on Current Dose: 454 mg/L*hr  _________________________________    Assessment/Plan:  Pharmacy consulted to dose vancomycin for SSTI  Patient is currently on a maintenance dose of vancomycin 1500 mg IV q12h.   Will continue to follow and adjust vancomycin dose as needed based on renal function, cultures, and patient clinical status.    Thank you,    Lamar Zaldivar, PharmD  1/21/2024  10:05 EST

## 2024-01-22 LAB
ANION GAP SERPL CALCULATED.3IONS-SCNC: 12 MMOL/L (ref 5–15)
BACTERIA SPEC AEROBE CULT: NORMAL
BASOPHILS # BLD AUTO: 0.03 10*3/MM3 (ref 0–0.2)
BASOPHILS NFR BLD AUTO: 0.3 % (ref 0–1.5)
BUN SERPL-MCNC: 10 MG/DL (ref 6–20)
BUN/CREAT SERPL: 19.6 (ref 7–25)
CALCIUM SPEC-SCNC: 9 MG/DL (ref 8.6–10.5)
CHLORIDE SERPL-SCNC: 103 MMOL/L (ref 98–107)
CO2 SERPL-SCNC: 25 MMOL/L (ref 22–29)
CREAT SERPL-MCNC: 0.51 MG/DL (ref 0.76–1.27)
DEPRECATED RDW RBC AUTO: 38.2 FL (ref 37–54)
EGFRCR SERPLBLD CKD-EPI 2021: 123.5 ML/MIN/1.73
EOSINOPHIL # BLD AUTO: 0.11 10*3/MM3 (ref 0–0.4)
EOSINOPHIL NFR BLD AUTO: 1.3 % (ref 0.3–6.2)
ERYTHROCYTE [DISTWIDTH] IN BLOOD BY AUTOMATED COUNT: 11.9 % (ref 12.3–15.4)
GLUCOSE BLDC GLUCOMTR-MCNC: 129 MG/DL (ref 70–130)
GLUCOSE BLDC GLUCOMTR-MCNC: 143 MG/DL (ref 70–130)
GLUCOSE BLDC GLUCOMTR-MCNC: 201 MG/DL (ref 70–130)
GLUCOSE BLDC GLUCOMTR-MCNC: 218 MG/DL (ref 70–130)
GLUCOSE SERPL-MCNC: 137 MG/DL (ref 65–99)
GRAM STN SPEC: NORMAL
GRAM STN SPEC: NORMAL
HCT VFR BLD AUTO: 25.7 % (ref 37.5–51)
HGB BLD-MCNC: 8.5 G/DL (ref 13–17.7)
IMM GRANULOCYTES # BLD AUTO: 0.11 10*3/MM3 (ref 0–0.05)
IMM GRANULOCYTES NFR BLD AUTO: 1.3 % (ref 0–0.5)
LYMPHOCYTES # BLD AUTO: 1.54 10*3/MM3 (ref 0.7–3.1)
LYMPHOCYTES NFR BLD AUTO: 17.7 % (ref 19.6–45.3)
MCH RBC QN AUTO: 29.4 PG (ref 26.6–33)
MCHC RBC AUTO-ENTMCNC: 33.1 G/DL (ref 31.5–35.7)
MCV RBC AUTO: 88.9 FL (ref 79–97)
MONOCYTES # BLD AUTO: 0.86 10*3/MM3 (ref 0.1–0.9)
MONOCYTES NFR BLD AUTO: 9.9 % (ref 5–12)
NEUTROPHILS NFR BLD AUTO: 6.03 10*3/MM3 (ref 1.7–7)
NEUTROPHILS NFR BLD AUTO: 69.5 % (ref 42.7–76)
NRBC BLD AUTO-RTO: 0 /100 WBC (ref 0–0.2)
PLATELET # BLD AUTO: 241 10*3/MM3 (ref 140–450)
PMV BLD AUTO: 9.8 FL (ref 6–12)
POTASSIUM SERPL-SCNC: 3.8 MMOL/L (ref 3.5–5.2)
RBC # BLD AUTO: 2.89 10*6/MM3 (ref 4.14–5.8)
SODIUM SERPL-SCNC: 140 MMOL/L (ref 136–145)
WBC NRBC COR # BLD AUTO: 8.68 10*3/MM3 (ref 3.4–10.8)

## 2024-01-22 PROCEDURE — 85025 COMPLETE CBC W/AUTO DIFF WBC: CPT

## 2024-01-22 PROCEDURE — 25010000002 VANCOMYCIN 10 G RECONSTITUTED SOLUTION

## 2024-01-22 PROCEDURE — 25010000002 CEFTRIAXONE PER 250 MG: Performed by: INTERNAL MEDICINE

## 2024-01-22 PROCEDURE — 99232 SBSQ HOSP IP/OBS MODERATE 35: CPT | Performed by: INTERNAL MEDICINE

## 2024-01-22 PROCEDURE — 25810000003 SODIUM CHLORIDE 0.9 % SOLUTION: Performed by: INTERNAL MEDICINE

## 2024-01-22 PROCEDURE — 97116 GAIT TRAINING THERAPY: CPT

## 2024-01-22 PROCEDURE — 25010000002 HYDROMORPHONE 1 MG/ML SOLUTION

## 2024-01-22 PROCEDURE — 25810000003 SODIUM CHLORIDE 0.9 % SOLUTION 250 ML FLEX CONT: Performed by: ORTHOPAEDIC SURGERY

## 2024-01-22 PROCEDURE — 25010000002 HEPARIN (PORCINE) PER 1000 UNITS: Performed by: INTERNAL MEDICINE

## 2024-01-22 PROCEDURE — 25010000002 LABETALOL 5 MG/ML SOLUTION

## 2024-01-22 PROCEDURE — 97110 THERAPEUTIC EXERCISES: CPT

## 2024-01-22 PROCEDURE — 63710000001 INSULIN LISPRO (HUMAN) PER 5 UNITS: Performed by: ORTHOPAEDIC SURGERY

## 2024-01-22 PROCEDURE — 25010000002 NICARDIPINE 2.5 MG/ML SOLUTION 10 ML VIAL: Performed by: ORTHOPAEDIC SURGERY

## 2024-01-22 PROCEDURE — 63710000001 INSULIN LISPRO (HUMAN) PER 5 UNITS: Performed by: INTERNAL MEDICINE

## 2024-01-22 PROCEDURE — 25010000002 VANCOMYCIN 10 G RECONSTITUTED SOLUTION: Performed by: INTERNAL MEDICINE

## 2024-01-22 PROCEDURE — 82948 REAGENT STRIP/BLOOD GLUCOSE: CPT

## 2024-01-22 PROCEDURE — 25010000002 HYDRALAZINE PER 20 MG

## 2024-01-22 PROCEDURE — 63710000001 INSULIN DETEMIR PER 5 UNITS: Performed by: INTERNAL MEDICINE

## 2024-01-22 PROCEDURE — 25010000002 KETOROLAC TROMETHAMINE PER 15 MG

## 2024-01-22 PROCEDURE — 80048 BASIC METABOLIC PNL TOTAL CA: CPT

## 2024-01-22 PROCEDURE — 25810000003 SODIUM CHLORIDE 0.9 % SOLUTION

## 2024-01-22 RX ORDER — HYDRALAZINE HYDROCHLORIDE 20 MG/ML
10 INJECTION INTRAMUSCULAR; INTRAVENOUS ONCE
Status: COMPLETED | OUTPATIENT
Start: 2024-01-22 | End: 2024-01-22

## 2024-01-22 RX ORDER — LABETALOL HYDROCHLORIDE 5 MG/ML
10 INJECTION, SOLUTION INTRAVENOUS ONCE
Status: COMPLETED | OUTPATIENT
Start: 2024-01-22 | End: 2024-01-22

## 2024-01-22 RX ORDER — KETOROLAC TROMETHAMINE 15 MG/ML
15 INJECTION, SOLUTION INTRAMUSCULAR; INTRAVENOUS ONCE
Status: COMPLETED | OUTPATIENT
Start: 2024-01-22 | End: 2024-01-22

## 2024-01-22 RX ORDER — ROPIVACAINE HYDROCHLORIDE 2 MG/ML
INJECTION, SOLUTION EPIDURAL; INFILTRATION; PERINEURAL CONTINUOUS
Status: DISCONTINUED | OUTPATIENT
Start: 2024-01-22 | End: 2024-01-24 | Stop reason: HOSPADM

## 2024-01-22 RX ORDER — NIFEDIPINE 30 MG/1
60 TABLET, EXTENDED RELEASE ORAL
Status: DISCONTINUED | OUTPATIENT
Start: 2024-01-22 | End: 2024-01-24 | Stop reason: HOSPADM

## 2024-01-22 RX ADMIN — Medication 10 ML: at 21:20

## 2024-01-22 RX ADMIN — OXYCODONE HYDROCHLORIDE 20 MG: 10 TABLET ORAL at 05:09

## 2024-01-22 RX ADMIN — NICARDIPINE HYDROCHLORIDE 15 MG/HR: 25 INJECTION, SOLUTION INTRAVENOUS at 06:21

## 2024-01-22 RX ADMIN — HYDRALAZINE HYDROCHLORIDE 25 MG: 50 TABLET, FILM COATED ORAL at 21:18

## 2024-01-22 RX ADMIN — CYCLOBENZAPRINE 10 MG: 10 TABLET, FILM COATED ORAL at 05:10

## 2024-01-22 RX ADMIN — VANCOMYCIN HYDROCHLORIDE 1500 MG: 10 INJECTION, POWDER, LYOPHILIZED, FOR SOLUTION INTRAVENOUS at 09:45

## 2024-01-22 RX ADMIN — Medication 1 PATCH: at 21:19

## 2024-01-22 RX ADMIN — GABAPENTIN 600 MG: 300 CAPSULE ORAL at 14:09

## 2024-01-22 RX ADMIN — METOPROLOL TARTRATE 25 MG: 25 TABLET, FILM COATED ORAL at 21:19

## 2024-01-22 RX ADMIN — OXYCODONE HYDROCHLORIDE 20 MG: 10 TABLET ORAL at 11:07

## 2024-01-22 RX ADMIN — HYDROCODONE BITARTRATE AND ACETAMINOPHEN 1 TABLET: 10; 325 TABLET ORAL at 21:18

## 2024-01-22 RX ADMIN — SENNOSIDES AND DOCUSATE SODIUM 2 TABLET: 8.6; 5 TABLET ORAL at 08:21

## 2024-01-22 RX ADMIN — ROSUVASTATIN CALCIUM 40 MG: 20 TABLET, FILM COATED ORAL at 21:18

## 2024-01-22 RX ADMIN — NICARDIPINE HYDROCHLORIDE 15 MG/HR: 25 INJECTION, SOLUTION INTRAVENOUS at 02:42

## 2024-01-22 RX ADMIN — HEPARIN SODIUM 5000 UNITS: 5000 INJECTION INTRAVENOUS; SUBCUTANEOUS at 05:10

## 2024-01-22 RX ADMIN — LISINOPRIL 40 MG: 20 TABLET ORAL at 08:21

## 2024-01-22 RX ADMIN — NICARDIPINE HYDROCHLORIDE 15 MG/HR: 25 INJECTION, SOLUTION INTRAVENOUS at 04:42

## 2024-01-22 RX ADMIN — HYDROCODONE BITARTRATE AND ACETAMINOPHEN 1 TABLET: 10; 325 TABLET ORAL at 05:09

## 2024-01-22 RX ADMIN — HYDROCODONE BITARTRATE AND ACETAMINOPHEN 1 TABLET: 10; 325 TABLET ORAL at 15:27

## 2024-01-22 RX ADMIN — SODIUM CHLORIDE 1000 MG: 900 INJECTION INTRAVENOUS at 21:17

## 2024-01-22 RX ADMIN — Medication 10 ML: at 08:22

## 2024-01-22 RX ADMIN — SENNOSIDES AND DOCUSATE SODIUM 2 TABLET: 8.6; 5 TABLET ORAL at 21:18

## 2024-01-22 RX ADMIN — OXYCODONE HYDROCHLORIDE 20 MG: 10 TABLET ORAL at 17:20

## 2024-01-22 RX ADMIN — NICARDIPINE HYDROCHLORIDE 5 MG/HR: 25 INJECTION, SOLUTION INTRAVENOUS at 01:03

## 2024-01-22 RX ADMIN — INSULIN DETEMIR 5 UNITS: 100 INJECTION, SOLUTION SUBCUTANEOUS at 21:17

## 2024-01-22 RX ADMIN — PANTOPRAZOLE SODIUM 40 MG: 40 TABLET, DELAYED RELEASE ORAL at 05:09

## 2024-01-22 RX ADMIN — METOPROLOL TARTRATE 25 MG: 25 TABLET, FILM COATED ORAL at 08:21

## 2024-01-22 RX ADMIN — VANCOMYCIN HYDROCHLORIDE 1500 MG: 10 INJECTION, POWDER, LYOPHILIZED, FOR SOLUTION INTRAVENOUS at 22:17

## 2024-01-22 RX ADMIN — HYDRALAZINE HYDROCHLORIDE 10 MG: 20 INJECTION INTRAMUSCULAR; INTRAVENOUS at 00:16

## 2024-01-22 RX ADMIN — INSULIN LISPRO 3 UNITS: 100 INJECTION, SOLUTION INTRAVENOUS; SUBCUTANEOUS at 16:43

## 2024-01-22 RX ADMIN — HYDROMORPHONE HYDROCHLORIDE 1 MG: 1 INJECTION, SOLUTION INTRAMUSCULAR; INTRAVENOUS; SUBCUTANEOUS at 01:54

## 2024-01-22 RX ADMIN — NIFEDIPINE 60 MG: 30 TABLET, EXTENDED RELEASE ORAL at 08:36

## 2024-01-22 RX ADMIN — HEPARIN SODIUM 5000 UNITS: 5000 INJECTION INTRAVENOUS; SUBCUTANEOUS at 21:19

## 2024-01-22 RX ADMIN — LABETALOL HYDROCHLORIDE 10 MG: 5 INJECTION, SOLUTION INTRAVENOUS at 02:42

## 2024-01-22 RX ADMIN — KETOROLAC TROMETHAMINE 15 MG: 15 INJECTION, SOLUTION INTRAMUSCULAR; INTRAVENOUS at 04:42

## 2024-01-22 RX ADMIN — HYDRALAZINE HYDROCHLORIDE 25 MG: 50 TABLET, FILM COATED ORAL at 05:09

## 2024-01-22 RX ADMIN — OXYCODONE HYDROCHLORIDE 20 MG: 10 TABLET ORAL at 22:50

## 2024-01-22 RX ADMIN — NICARDIPINE HYDROCHLORIDE 10 MG/HR: 25 INJECTION, SOLUTION INTRAVENOUS at 08:21

## 2024-01-22 RX ADMIN — HYDRALAZINE HYDROCHLORIDE 25 MG: 50 TABLET, FILM COATED ORAL at 16:18

## 2024-01-22 RX ADMIN — GABAPENTIN 600 MG: 300 CAPSULE ORAL at 05:09

## 2024-01-22 RX ADMIN — HEPARIN SODIUM 5000 UNITS: 5000 INJECTION INTRAVENOUS; SUBCUTANEOUS at 14:09

## 2024-01-22 RX ADMIN — GABAPENTIN 600 MG: 300 CAPSULE ORAL at 21:19

## 2024-01-22 RX ADMIN — CYCLOBENZAPRINE 10 MG: 10 TABLET, FILM COATED ORAL at 13:01

## 2024-01-22 RX ADMIN — CYCLOBENZAPRINE 10 MG: 10 TABLET, FILM COATED ORAL at 22:16

## 2024-01-22 RX ADMIN — ASPIRIN 81 MG: 81 TABLET, COATED ORAL at 21:18

## 2024-01-22 RX ADMIN — INSULIN LISPRO 3 UNITS: 100 INJECTION, SOLUTION INTRAVENOUS; SUBCUTANEOUS at 21:17

## 2024-01-22 NOTE — PLAN OF CARE
Goal Outcome Evaluation:  Plan of Care Reviewed With: patient        Progress: improving  Outcome Evaluation: Patient demonstrates improving independence w/ mobility and ability to maintain NWB LLE w/ ambulation. Pt. completed multiple transfers w/ CGA and progressed forward ambulation to 30ft w/ RW and CGA. HEP completed and pt. encouraged to continue on his own for improved pain management and strengthening. PT continues to recommend home w/ assist and HHPT when medically appropriate.      Anticipated Discharge Disposition (PT): home with assist, home with home health

## 2024-01-22 NOTE — CASE MANAGEMENT/SOCIAL WORK
Continued Stay Note   [place]     Patient Name: Justo Oquendo  MRN: [id number]  Today's Date: 1/22/2024    Admit Date: 1/14/2024    Plan: Home with home health   Discharge Plan       Row Name 01/22/24 1142       Plan    Plan Home with home health    Patient/Family in Agreement with Plan yes    Plan Comments Spoke with patient and spouse at bedside to discuss disposition and therapy recommendations for home health.  Patient and spouse are agreeable to home health and live in [facility].  Referral called to Dulce with [facility].  If accepted by [facility], will need orders for SN, PT and OT placed in Epic  once wound care instructions are available.  CM will continue to follow.    Final Discharge Disposition Code 06 - home with home health care                   Discharge Codes    No documentation.                       Dulce Valle RN

## 2024-01-22 NOTE — PROGRESS NOTES
"                  Clinical Nutrition   Nutrition Assessment  Reason for Visit: [facility]    Patient Name: Justo Oquendo  YOB: 1975  MRN: [id number]  Date of Encounter: 01/22/24 12:25 EST  Admission date: 1/14/2024    No apparent nutritional concerns at this time. Will send Premier protein, edu provided for wound healing.     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    PSH:  He  has a past surgical history that includes Cyst Removal; pr in-situ vein bypass femoral-popliteal (Left, 04/25/2017); Aortagram (N/A, 04/25/2017); pr in-situ vein bypass femoral-popliteal (Left, 06/27/2017); Cholecystectomy; Aortagram (N/A, 11/21/2023); femoral popliteal bypass (Left, 1/18/2024); and Foot amputation through metatarsal (Left, 1/19/2024).    Applicable Nutrition Concerns:   Skin:  Oral:  GI:    Applicable Interval History:       Reported/Observed/Food/Nutrition Related History:     Pt is being monitored in ICU s/p L foot transmetatarsal amputation. To transfer to floor today. Visited with pt and spouse at bedside for length of stay. Pt tells me he is eating normally and has no nutritional concerns. Agreeable to premier protein and voices understanding importance protein for wound healing. Denies further dietary needs/preferences, NKFA.     Anthropometrics     Height: Height: 172.7 cm (68\")  Last Filed Weight: Weight: 73.5 kg (162 lb) (01/14/24 6375)  Method: Weight Method: Stated  BMI: BMI (Calculated): 24.6  BMI classification: " Normal: 18.5-24.9kg/m2  IBW:   150 lb    UBW:     Weight       Weight (kg) Weight (lbs) Weight Method Visit Report   11/20/2023 82.918 kg  182 lb 12.8 oz  Stated  --     82.555 kg  182 lb  Standing scale  --     80.922 kg  178 lb 6.4 oz   --    11/22/2023 94.5 kg  208 lb 5.4 oz      12/20/2023 75.297 kg  166 lb   --    1/14/2024 73.483 kg  162 lb  Stated       Weight change:  no verifiable significant changes, appears pt had significant loss from 11/23 to 12/23 but has remained stable since.    Nutrition Focused Physical Exam     Date:  1/22       Pt does not meet criteria for malnutrition diagnosis, at this time.      Current Nutrition Prescription   PO: Diet: Diabetic Diets, Cardiac Diets; Healthy Heart (2-3 Na+); Consistent Carbohydrate; Texture: Regular Texture (IDDSI 7); Fluid Consistency: Thin (IDDSI 0)  Oral Nutrition Supplement:   Intake: 64% X 7 meals documented    Nutrition Diagnosis   Date:  1/22            Updated:    Problem Increased nutrient needs    Etiology Wound healing   Signs/Symptoms s/p L foot transmetatarsal amputation   Status: New    Goal:   General: Nutrition to support treatment  PO: Maintain intake, Continue positive trend  EN/PN: N/A    Nutrition Intervention      Follow treatment progress, Care plan reviewed, Advise alternate selection, Advised available snacks, Interview for preferences, Menu provided, Encourage intake, Supplement provided, Education provided for wound healing    Premier Protein QD    Monitoring/Evaluation:   Per protocol, I&O, PO intake, Supplement intake, Pertinent labs, Skin status, Symptoms, POC/GOC      Perri West RD, [facility]  Time Spent: 30m

## 2024-01-22 NOTE — PLAN OF CARE
Problem: Adult Inpatient Plan of Care  Goal: Plan of Care Review  Outcome: Ongoing, Progressing  Flowsheets (Taken 1/22/2024 6518)  Progress: improving  Plan of Care Reviewed With: patient  Goal: Patient-Specific Goal (Individualized)  Outcome: Ongoing, Progressing  Goal: Absence of Hospital-Acquired Illness or Injury  Outcome: Ongoing, Progressing  Goal: Optimal Comfort and Wellbeing  Outcome: Ongoing, Progressing  Goal: Readiness for Transition of Care  Outcome: Ongoing, Progressing     Problem: Diabetes Comorbidity  Goal: Blood Glucose Level Within Targeted Range  Outcome: Ongoing, Progressing     Problem: Fall Injury Risk  Goal: Absence of Fall and Fall-Related Injury  Outcome: Ongoing, Progressing     Problem: Skin Injury Risk Increased  Goal: Skin Health and Integrity  Outcome: Ongoing, Progressing     Problem: Pain (Revascularization)  Goal: Acceptable Pain Control  Outcome: Ongoing, Progressing   Goal Outcome Evaluation:  Plan of Care Reviewed With: patient        Progress: improving

## 2024-01-22 NOTE — PROGRESS NOTES
"   LOS: 8 days   Patient Care Team:  Melo Whitaker MD as PCP - General (Family Medicine)  Niall Mcfarlane MD as Consulting Physician (Cardiology)      Subjective       Subjective    Mr. Oquendo is doing well today.  He rates his pain a 6 out of 10.  He is hopeful to go home soon.  States he has not yet had to change his Aquacel dressings.    History taken from: patient    Objective     Vital Signs  Temp:  [98.2 °F (36.8 °C)-98.4 °F (36.9 °C)] 98.3 °F (36.8 °C)  Heart Rate:  [] 98  Resp:  [16-26] 20  BP: (100-178)/() 130/61    Objective:  Vital signs: (most recent): Blood pressure 128/64, pulse 86, temperature 98.3 °F (36.8 °C), temperature source Oral, resp. rate 20, height 172.7 cm (68\"), weight 73.5 kg (162 lb), SpO2 98%.                Physical Exam    AAOx3, NAD, sitting in bedside chair, no family at bedside  Respiratory: Normal effort on room air  Left lower extremity: Aquacel dressings clean dry and intact, foot dressing clean dry and intact, leg is warm    Results Review:     I reviewed the patient's new clinical results.  CBC    Results from last 7 days   Lab Units 01/22/24  0725 01/20/24  0710 01/19/24  0247 01/18/24  0730 01/16/24  0648   WBC 10*3/mm3 8.68 10.15 8.03 6.77 6.90   HEMOGLOBIN g/dL 8.5* 8.6* 10.1* 11.9* 11.0*   PLATELETS 10*3/mm3 241 225 200 230 264     BMP   Results from last 7 days   Lab Units 01/22/24  0725 01/21/24  0410 01/20/24  0710 01/19/24  0247 01/18/24  1856 01/18/24  0730 01/17/24  0801 01/17/24  0641   SODIUM mmol/L 140 141 139 134* 138 138  --  138   POTASSIUM mmol/L 3.8 3.7 4.6 4.1 3.9 3.6  --  3.8   CHLORIDE mmol/L 103 107 103 102 103 102  --  104   CO2 mmol/L 25.0 27.0 21.0* 21.0* 21.0* 24.0  --  25.0   BUN mg/dL 10 20 14 9 9 9  --  14   CREATININE mg/dL 0.51* 0.64* 0.59* 0.66* 0.68* 0.60* 0.60* 0.65*   GLUCOSE mg/dL 137* 150* 158* 157* 128* 151*  --  145*   MAGNESIUM mg/dL  --  1.8 1.9 1.6  --   --   --   --    PHOSPHORUS mg/dL  --   --  3.7 3.5  " --   --   --   --           Current Facility-Administered Medications:     aspirin EC tablet 81 mg, 81 mg, Oral, Nightly, Christo Peres Jr., MD, 81 mg at 01/21/24 2115    sennosides-docusate (PERICOLACE) 8.6-50 MG per tablet 2 tablet, 2 tablet, Oral, BID, 2 tablet at 01/22/24 0821 **AND** polyethylene glycol (MIRALAX) packet 17 g, 17 g, Oral, Daily PRN, 17 g at 01/21/24 1718 **AND** bisacodyl (DULCOLAX) EC tablet 5 mg, 5 mg, Oral, Daily PRN **AND** bisacodyl (DULCOLAX) suppository 10 mg, 10 mg, Rectal, Daily PRN, Christo Peres Jr., MD    Calcium Replacement - Follow Nurse / BPA Driven Protocol, , Does not apply, PRN, Christo Peres Jr., MD    cefTRIAXone (ROCEPHIN) 1,000 mg in sodium chloride 0.9 % 100 mL IVPB, 1,000 mg, Intravenous, Q24H, Masha Adame MD, Last Rate: 200 mL/hr at 01/21/24 2116, 1,000 mg at 01/21/24 2116    cyclobenzaprine (FLEXERIL) tablet 10 mg, 10 mg, Oral, TID PRN, Christo Peres Jr., MD, 10 mg at 01/22/24 1301    dextrose (D50W) (25 g/50 mL) IV injection 25 g, 25 g, Intravenous, Q15 Min PRN, Christo Peres Jr., MD    dextrose (GLUTOSE) oral gel 15 g, 15 g, Oral, Q15 Min PRN, Christo Peres Jr., MD    gabapentin (NEURONTIN) capsule 600 mg, 600 mg, Oral, Q8H, Christo Peres Jr., MD, 600 mg at 01/22/24 1409    glucagon (GLUCAGEN) injection 1 mg, 1 mg, Intramuscular, Q15 Min PRN, Christo Peres Jr., MD    heparin (porcine) 5000 UNIT/ML injection 5,000 Units, 5,000 Units, Subcutaneous, Q8H, Masha Adame MD, 5,000 Units at 01/22/24 1409    hydrALAZINE (APRESOLINE) tablet 25 mg, 25 mg, Oral, Q8H, Christo Peres Jr., MD, 25 mg at 01/22/24 0509    HYDROcodone-acetaminophen (NORCO)  MG per tablet 1 tablet, 1 tablet, Oral, TID, Masha Adame MD, 1 tablet at 01/22/24 0509    insulin detemir (LEVEMIR) injection 5 Units, 5 Units, Subcutaneous, Nightly, Trell Lovelace APRN, 5 Units at 01/21/24 2115    Insulin Lispro (humaLOG) injection 2-7  Units, 2-7 Units, Subcutaneous, 4x Daily AC & at Bedtime, Christo Peres Jr., MD, 2 Units at 01/21/24 2115    lisinopril (PRINIVIL,ZESTRIL) tablet 40 mg, 40 mg, Oral, Q24H, Christo Peres Jr., MD, 40 mg at 01/22/24 0821    Magnesium Standard Dose Replacement - Follow Nurse / BPA Driven Protocol, , Does not apply, PRN, Christo Peres Jr., MD    metoprolol tartrate (LOPRESSOR) tablet 25 mg, 25 mg, Oral, Q12H, Christo Peres Jr., MD, 25 mg at 01/22/24 0821    niCARdipine (CARDENE) 25mg in 250mL NS infusion, 5-15 mg/hr, Intravenous, Titrated, Christo Peres Jr., MD, Stopped at 01/22/24 0945    nicotine (NICODERM CQ) 21 MG/24HR patch 1 patch, 1 patch, Transdermal, Nightly, Christo Peres Jr., MD, 1 patch at 01/21/24 2115    nicotine polacrilex (NICORETTE) gum 2 mg, 2 mg, Mouth/Throat, Q1H PRN, Christo Peres Jr., MD    NIFEdipine XL (PROCARDIA XL) 24 hr tablet 60 mg, 60 mg, Oral, Q24H, Taina Soto, DO, 60 mg at 01/22/24 0836    nitroglycerin (NITROSTAT) SL tablet 0.4 mg, 0.4 mg, Sublingual, Q5 Min PRN, Christo Peres Jr., MD    ondansetron ODT (ZOFRAN-ODT) disintegrating tablet 4 mg, 4 mg, Oral, Q6H PRN **OR** ondansetron (ZOFRAN) injection 4 mg, 4 mg, Intravenous, Q6H PRN, Christo Peres Jr., MD, 4 mg at 01/19/24 1222    oxyCODONE (ROXICODONE) immediate release tablet 20 mg, 20 mg, Oral, Q6H PRN, Masha Adame MD, 20 mg at 01/22/24 1107    pantoprazole (PROTONIX) EC tablet 40 mg, 40 mg, Oral, Q AM, Lamar Zaldivar, PharmD, 40 mg at 01/22/24 0508    Phosphorus Replacement - Follow Nurse / BPA Driven Protocol, , Does not apply, PRNJa David A Jr., MD    Potassium Replacement - Follow Nurse / BPA Driven Protocol, , Does not apply, Ja COTTON David A Jr., MD    ropivacaine (NAROPIN) 0.2 % infusion (INFUSYSTEM), , Peripheral Nerve, Continuous, Ketan Smith, CRNA, Last Rate: 8 mL/hr at 01/22/24 0949, Currently Infusing at 01/22/24 0949    rosuvastatin (CRESTOR) tablet 40 mg, 40  mg, Oral, Nightly, Christo Peres Jr., MD, 40 mg at 01/21/24 2115    sodium chloride 0.9 % flush 10 mL, 10 mL, Intravenous, Q12H, Christo Peres Jr., MD, 10 mL at 01/22/24 0822    sodium chloride 0.9 % flush 10 mL, 10 mL, Intravenous, PRN, Christo Peres Jr., MD, 10 mL at 01/21/24 2116    sodium chloride 0.9 % infusion 40 mL, 40 mL, Intravenous, PRN, Christo Peres Jr., MD    temazepam (RESTORIL) capsule 15 mg, 15 mg, Oral, Nightly PRN, Timoteo Salinas, , 15 mg at 01/21/24 2144    vancomycin IVPB 1500 mg in 0.9% NaCl (Premix) 500 mL, 1,500 mg, Intravenous, Q12H, Lamar Zaldivar, PharmD, Last Rate: 333.3 mL/hr at 01/22/24 0945, 1,500 mg at 01/22/24 0945     Assessment & Plan       Ischemic pain of left foot    Peripheral arterial disease    Hypertension    Hyperlipidemia    Tobacco abuse    Diabetes mellitus type II, non insulin dependent    Claudication of lower extremity s/p femorofemoral bypass    Ischemic foot    Sepsis associated hypotension      Assessment & Plan  - underwent left TMA with Dr. Peres 1/19/24  - pain control  - defer wound care to Dr. Peres  - OK to remove Aquacel dressings 1/23/24  - OK to shower after Aquacel dressings removed 1/23/24  - OK to discharge from vascular surgery standpoint when ambulating, pain controlled  - F/U [facility] 2 weeks with ARLINE    No further vascular surgery intervention required. Vascular surgery will sign off. Please contact on-call vascular surgeon with any questions or for re-consultation.     Kelsi Sarabia PA-C  01/22/24  14:14 EST

## 2024-01-22 NOTE — PROGRESS NOTES
[facility]    Acute pain service Inpatient Progress Note    Patient Name: Justo Oquendo  :  1975  MRN:  [id number]        Acute Pain  Service Inpatient Progress Note:    Pain Score:6/10  LOC: alert and awake  Side Effects:None  Catheter Site:clean, dry and dressing intact  Cath type: peripheral nerve cath(InfuSystem)  Infusion rate: Ext/Pop: Basal: 1ml/hr, PIB: 5ml q 2 h, PCA: 5 ml q 30 min (will increase rate to )  Catheter Plan:Infusion rate changed to and 8ml/hr

## 2024-01-22 NOTE — THERAPY TREATMENT NOTE
Patient Name: Justo Oquendo  : 1975    MRN: [id number]                              Today's Date: 2024       Admit Date: 2024    Visit Dx:     ICD-10-CM ICD-9-CM   1. Ischemic pain of left foot  M79.672 459.9    I99.8 729.5   2. Impaired mobility and ADLs  Z74.09 V49.89    Z78.9    3. 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    Ischemic pain of left foot     Past Medical History:   Diagnosis Date    Arthritis     Back pain     Diabetes     SINCE 2017    Dyslipidemia     HTN (hypertension)     PVD (peripheral vascular disease)     Wears partial dentures      Past Surgical History:   Procedure Laterality Date    AORTAGRAM N/A 2017    Procedure: AORTAGRAM WITH OR WITHOUT RUNOFFS POSSIBLE STENT with left femoral cutdown;  Surgeon: Brett Ryan MD;  Location:  [facility] OR 15;  Service:     AORTAGRAM N/A 2023    Procedure: THROMBECTOMY OF LEFT GRAFT, AORTAGRAM, FEMORAL TO FEMORAL BYPASS;  Surgeon: Brett Ryan MD;  Location:  [facility];  Service: Vascular;  Laterality: N/A;  FLUORO- .06 SECS  DOSE- 10 MGY  CONTRAST- 10 ML ISO    CHOLECYSTECTOMY      CYST REMOVAL      OFF OF BACK    FEMORAL POPLITEAL BYPASS Left 2024    Procedure: FEMORAL POPLITEAL BYPASS WITH POPLITEAL EXPLORATION;  Surgeon: Vin Pena MD;  Location:  [facility];  Service: Vascular;  Laterality: Left;  DOSE: 9MGY  FLURO: 14 MIN, 36 SEC  CONTRAST: 50ML VISIPAQUE 320    SC IN-SITU VEIN BYPASS FEMORAL-POPLITEAL Left 2017    Procedure: FEMORAL POPLITEAL BYPASS;  Surgeon: Brett DOBBS  MD Connor;  Location:  [facility];  Service: Vascular    NE IN-SITU VEIN BYPASS FEMORAL-POPLITEAL Left 06/27/2017    Procedure: LEFT FEMORAL ENDARTECTOMYN LEFT FEMORAL POPLITEAL BYPASS;  Surgeon: Brett Ryan MD;  Location:  [facility] OR;  Service: Vascular    TRANS METATARSAL AMPUTATION Left 1/19/2024    Procedure: AMPUTATION TRANS METATARSAL- LEFT;  Surgeon: Christo Peres Jr., MD;  Location:  [facility] OR;  Service: Orthopedics;  Laterality: Left;      General Information       Row Name 01/22/24 1052          Physical Therapy Time and Intention    Document Type therapy note (daily note)  -MB     Mode of Treatment physical therapy  -MB       Row Name 01/22/24 1052          General Information    Patient Profile Reviewed yes  -MB     Existing Precautions/Restrictions fall;non-weight bearing;other (see comments)  NWB L foot s/p transmet amputation; cleared for WB through heel for transfers, offloading shoe L foot, popliteal nerve catheter  -MB     Barriers to Rehab medically complex  -MB       Row Name 01/22/24 1052          Cognition    Orientation Status (Cognition) oriented x 4  -MB       Row Name 01/22/24 1052          Safety Issues, Functional Mobility    Safety Issues Affecting Function (Mobility) safety precaution awareness  -MB     Impairments Affecting Function (Mobility) pain;strength;endurance/activity tolerance;sensation/sensory awareness;balance  -MB               User Key  (r) = Recorded By, (t) = Taken By, (c) = Cosigned By      Initials Name Provider Type    MB Olena Thornton, PT Physical Therapist                   Mobility       Row Name 01/22/24 1522          Bed Mobility    Bed Mobility supine-sit  -MB     Supine-Sit [place] (Bed Mobility) contact guard  -MB     Assistive Device (Bed Mobility) bed rails;head of bed elevated  -MB     Comment, (Bed Mobility) Pt. required brief assist to support trunk.  -MB       Row Name 01/22/24 1522          Transfers    Comment, (Transfers)  Donned off-loading shoe LLE prior to OOB mobility. STS from EOB/recliner w/ VCs for heel WBing only, safe hand placement, and sequencing. Pt. compliant w/ weight-bearing restriction.  -MB       Row Name 01/22/24 1522          Bed-Chair Transfer    Bed-Chair [place] (Transfers) contact guard;verbal cues  -MB     Assistive Device (Bed-Chair Transfers) walker, front-wheeled  -MB       Row Name 01/22/24 1522          Sit-Stand Transfer    Sit-Stand [place] (Transfers) contact guard;verbal cues  -MB     Assistive Device (Sit-Stand Transfers) walker, front-wheeled  -MB       Row Name 01/22/24 1522          Gait/Stairs (Locomotion)    [place] Level (Gait) contact guard;verbal cues;1 person to manage equipment  -MB     Assistive Device (Gait) walker, front-wheeled  -MB     Distance in Feet (Gait) 30  -MB     Deviations/Abnormal Patterns (Gait) stride length decreased;osmany decreased;gait speed decreased  -MB     Bilateral Gait Deviations forward flexed posture  -MB     Comment, (Gait/Stairs) Pt. ambulated w/ swing to gait mechanics, maintaining NWB LLE throughout. VCs for upright posture, step sequence, and safe use of RW. Followed closely w/ chair.  -MB       Row Name 01/22/24 1522          Mobility    Extremity Weight-bearing Status left lower extremity  -MB     Left Lower Extremity (Weight-bearing Status) non weight-bearing (NWB)   cleared for WBing through heel for transfers only  -MB               User Key  (r) = Recorded By, (t) = Taken By, (c) = Cosigned By      Initials Name Provider Type    Olena Gilliam, PT Physical Therapist                   Obj/Interventions       Row Name 01/22/24 1526          Motor Skills    Therapeutic Exercise hip;ankle;knee  -MB       Row Name 01/22/24 1526          Hip (Therapeutic Exercise)    Hip (Therapeutic Exercise) strengthening exercise  -MB     Hip Strengthening (Therapeutic Exercise) bilateral;marching while seated;10 repetitions  -MB       Row Name  01/22/24 1526          Knee (Therapeutic Exercise)    Knee (Therapeutic Exercise) strengthening exercise;isometric exercises  -MB     Knee Isometrics (Therapeutic Exercise) bilateral;quad sets;10 repetitions  -MB     Knee Strengthening (Therapeutic Exercise) bilateral;LAQ (long arc quad);10 repetitions  min A LLE  -MB       Row Name 01/22/24 1526          Ankle (Therapeutic Exercise)    Ankle (Therapeutic Exercise) AROM (active range of motion)  -MB     Ankle AROM (Therapeutic Exercise) bilateral;dorsiflexion;plantarflexion;10 repetitions  -MB       Row Name 01/22/24 1526          Balance    Static Standing Balance contact guard  -MB     Dynamic Standing Balance contact guard  -MB     Position/Device Used, Standing Balance walker, front-wheeled  -MB     Balance Interventions standing;sit to stand;weight shifting activity;dynamic reaching  -MB               User Key  (r) = Recorded By, (t) = Taken By, (c) = Cosigned By      Initials Name Provider Type    MB Olena Thornton, PT Physical Therapist                   Goals/Plan    No documentation.                  Clinical Impression       Row Name 01/22/24 1527          Pain    Pretreatment Pain Rating 6/10  -MB     Posttreatment Pain Rating 6/10  -MB     Pain Location - Side/Orientation Left  -MB     Pain Location - foot  -MB     Pre/Posttreatment Pain Comment RN premedicated for pain.  -MB     Pain Intervention(s) Ambulation/increased activity;Repositioned  -MB       Row Name 01/22/24 1527          Plan of Care Review    Plan of Care Reviewed With patient  -MB     Progress improving  -MB     Outcome Evaluation Patient demonstrates improving independence w/ mobility and ability to maintain NWB LLE w/ ambulation. Pt. completed multiple transfers w/ CGA and progressed forward ambulation to 30ft w/ RW and CGA. HEP completed and pt. encouraged to continue on his own for improved pain management and strengthening. PT continues to recommend home w/ assist and HHPT when  medically appropriate.  -MB       Row Name 01/22/24 1527          Positioning and Restraints    Pre-Treatment Position in bed  -MB     Post Treatment Position chair  -MB     In Chair notified nsg;reclined;call light within reach;encouraged to call for assist;exit alarm on;with family/caregiver;on mechanical lift sling;waffle cushion;legs elevated;heels elevated;LLE elevated  -MB               User Key  (r) = Recorded By, (t) = Taken By, (c) = Cosigned By      Initials Name Provider Type    Olena Gilliam, PT Physical Therapist                   Outcome Measures       Row Name 01/22/24 1530          How much help from another person do you currently need...    Turning from your back to your side while in flat bed without using bedrails? 4  -MB     Moving from lying on back to sitting on the side of a flat bed without bedrails? 3  -MB     Moving to and from a bed to a chair (including a wheelchair)? 3  -MB     Standing up from a chair using your arms (e.g., wheelchair, bedside chair)? 3  -MB     Climbing 3-5 steps with a railing? 2  -MB     To walk in hospital room? 3  -MB     AM-PAC 6 Clicks Score (PT) 18  -MB     Highest Level of Mobility Goal 6 --> Walk 10 steps or more  -MB       Row Name 01/22/24 1530          Functional Assessment    Outcome Measure Options AM-PAC 6 Clicks Basic Mobility (PT)  -MB               User Key  (r) = Recorded By, (t) = Taken By, (c) = Cosigned By      Initials Name Provider Type    Olena Gilliam, PT Physical Therapist                                 Physical Therapy Education       Title: PT OT SLP Therapies (In Progress)       Topic: Physical Therapy (In Progress)       Point: Mobility training (Done)       Learning Progress Summary             Patient Acceptance, E,D, NR,VU by LS at 1/20/2024 1051                         Point: Home exercise program (Not Started)       Learner Progress:  Not documented in this visit.              Point: Body mechanics (Done)        Learning Progress Summary             Patient Acceptance, E,D, NR,VU by  at 1/20/2024 1051                         Point: Precautions (Done)       Learning Progress Summary             Patient Acceptance, E,D, NR,VU by  at 1/20/2024 1051                                         User Key       Initials Effective Dates Name Provider Type Melodie MOREAU 02/03/23 -  Paige Bradley, PT Physical Therapist PT                  PT Recommendation and Plan     Plan of Care Reviewed With: patient  Progress: improving  Outcome Evaluation: Patient demonstrates improving independence w/ mobility and ability to maintain NWB LLE w/ ambulation. Pt. completed multiple transfers w/ CGA and progressed forward ambulation to 30ft w/ RW and CGA. HEP completed and pt. encouraged to continue on his own for improved pain management and strengthening. PT continues to recommend home w/ assist and HHPT when medically appropriate.     Time Calculation:         PT Charges       Row Name 01/22/24 1530             Time Calculation    Start Time 1052  -MB      PT Received On 01/22/24  -MB      PT Goal Re-Cert Due Date 01/30/24  -MB         Timed Charges    09472 - PT Therapeutic Exercise Minutes 20  -MB      41577 - Gait Training Minutes  16  -MB         Total Minutes    Timed Charges Total Minutes 36  -MB       Total Minutes 36  -MB                User Key  (r) = Recorded By, (t) = Taken By, (c) = Cosigned By      Initials Name Provider Type    Olena Gilliam, PT Physical Therapist                  Therapy Charges for Today       Code Description Service Date Service Provider Modifiers Qty    27051650613 HC PT THER PROC EA 15 MIN 1/22/2024 Olena Thornton, PT GP 1    51796025509 HC GAIT TRAINING EA 15 MIN 1/22/2024 Olena Thornton, PT GP 1            PT G-Codes  Outcome Measure Options: AM-PAC 6 Clicks Basic Mobility (PT)  AM-PAC 6 Clicks Score (PT): 18  AM-PAC 6 Clicks Score (OT): 18  PT Discharge Summary  Anticipated Discharge  Disposition (PT): home with assist, home with home health    Olena Thornton, PT  1/22/2024

## 2024-01-22 NOTE — PROGRESS NOTES
INTENSIVIST   PROGRESS NOTE     Hospital:  LOS: 8 days     Mr. Justo Oquendo, 48 y.o. male is followed for a Chief Complaint of: Peripheral vascular disease      Subjective   S     Interval History:  Still with pain. On a cardene infusion for blood pressure control.        The patient's relevant past medical, surgical and social history were reviewed and updated in Epic as appropriate.      ROS:   Constitutional: Negative for fever.   Respiratory: Negative for dyspnea.   Cardiovascular: Negative for chest pain.   Gastrointestinal: Negative for  nausea, vomiting and diarrhea.     Objective   O     Vitals:  Temp  Min: 98.2 °F (36.8 °C)  Max: 98.7 °F (37.1 °C)  BP  Min: 100/57  Max: 178/74  Pulse  Min: 71  Max: 121  Resp  Min: 16  Max: 26  SpO2  Min: 92 %  Max: 100 % No data recorded    Intake/Ouptut 24 hrs (7:00AM - 6:59 AM)  Intake & Output (last 3 days)         01/19 0701 01/20 0700 01/20 0701 01/21 0700 01/21 0701 01/22 0700 01/22 0701  01/23 0700    P.O.  150 240     I.V. (mL/kg) 37.4 (0.5) 100.6 (1.4) 983.3 (13.4) 265 (3.6)    Blood        IV Piggyback 1083.4 500 600     Epidural 72.5 89.5      Total Intake(mL/kg) 1193.3 (16.2) 840.1 (11.4) 1823.3 (24.8) 265 (3.6)    Urine (mL/kg/hr) 2500 (1.4) 1100 (0.6) 3675 (2.1)     Blood        Total Output 2500 1100 3675     Net -1306.8 -260 -1851.7 +265                    Medications (drips):  niCARdipine, Last Rate: Stopped (01/22/24 0945)  ropivacaine, Last Rate: 8 mL/hr at 01/22/24 0949          Physical Examination  Telemetry:  Normal sinus rhythm.    Constitutional:  No acute distress.  Resting in bed comfortably.    Eyes: No scleral icterus.   PERRL, EOM intact.    Neck:  Supple, FROM   Cardiovascular: Normal rate, regular and rhythm. Normal heart sounds.  No murmurs, gallop or rub.   Respiratory: No respiratory distress. Normal respiratory effort.  Normal breath sounds  Clear to auscultation   Abdominal:  Soft. No masses. Nontender. No distension. No HSM.    Extremities: No digital cyanosis. No clubbing.  No peripheral edema.  Amputation of left toes.    Skin: No rashes, lesions or ulcers   Neurological:   Alert and Oriented to person, place, and time.             Results from last 7 days   Lab Units 01/22/24  0725 01/20/24  0710 01/19/24  0247   WBC 10*3/mm3 8.68 10.15 8.03   HEMOGLOBIN g/dL 8.5* 8.6* 10.1*   MCV fL 88.9 89.9 88.2   PLATELETS 10*3/mm3 241 225 200     Results from last 7 days   Lab Units 01/22/24  0725 01/21/24  0410 01/20/24  0710 01/19/24  0247   SODIUM mmol/L 140 141 139 134*   POTASSIUM mmol/L 3.8 3.7 4.6 4.1   CO2 mmol/L 25.0 27.0 21.0* 21.0*   CREATININE mg/dL 0.51* 0.64* 0.59* 0.66*   GLUCOSE mg/dL 137* 150* 158* 157*   MAGNESIUM mg/dL  --  1.8 1.9 1.6   PHOSPHORUS mg/dL  --   --  3.7 3.5     Estimated Creatinine Clearance: 180.1 mL/min (A) (by C-G formula based on SCr of 0.51 mg/dL (L)).  Results from last 7 days   Lab Units 01/20/24  0710 01/18/24  1856   ALK PHOS U/L 53 57   BILIRUBIN mg/dL 0.2 0.3   ALT (SGPT) U/L 28 25   AST (SGOT) U/L 27 44*             Images:  Imaging Results (Last 24 Hours)       ** No results found for the last 24 hours. **               Results: Reviewed.  I reviewed the patient's new laboratory and imaging results.  I independently reviewed the patient's new images.    Medications: Reviewed.    Assessment & Plan   A / P     Mr. Oquendo is a 49yo M with a history of Diabetes, HTN, HLD, tobacco abuse and peripheral artery disease s/p left femoral-popliteal bypass in April 2017 who presented with worsening left leg pain and cold, black toes on the left foot.     CTA with runoff was performed and showed complete occlusion of the previous left femoral artery bypass graft as well as complete occlusion of the right femoral artery.     He was taken to the OR on 1/19/24 by Dr. Peres and underwent a left foot transmetatarsal amputation.     He has been monitored in the ICU. Pain control has been difficult.     He has  remained on a Cardene infusion for blood pressure control.     Anesthesia is following for nerve block infusion.         Nutrition:   Diet: Diabetic Diets, Cardiac Diets; Healthy Heart (2-3 Na+); Consistent Carbohydrate; Texture: Regular Texture (IDDSI 7); Fluid Consistency: Thin (IDDSI 0)  Advance Directives:   Code Status and Medical Interventions:   Ordered at: 01/14/24 2055     Code Status (Patient has no pulse and is not breathing):    CPR (Attempt to Resuscitate)     Medical Interventions (Patient has pulse or is breathing):    Full Support       Active Hospital Problems    Diagnosis     **Ischemic pain of left foot     Ischemic foot     Sepsis associated hypotension     Claudication of lower extremity s/p femorofemoral bypass     Hypertension     Hyperlipidemia     Tobacco abuse     Diabetes mellitus type II, non insulin dependent     Peripheral arterial disease        Assessment / Plan:    Continue current pain regimen. Anesthesia adjusted the nerve block this AM.   Continue Rocephin for osteomyelitis.   Start Nifedipine 60mg daily and wean off Cardene  AM labs  Okay to transfer to telemetry.     High level of risk due to:  severe exacerbation of chronic illness and illness with threat to life or bodily function.    Plan of care and goals reviewed during interdisciplinary rounds.  I discussed the patient's findings and my recommendations with patient, family, and nursing staff      Taina Soto, DO    Intensive Care Medicine and Pulmonary Medicine

## 2024-01-23 LAB
ANION GAP SERPL CALCULATED.3IONS-SCNC: 7 MMOL/L (ref 5–15)
BILIRUB UR QL STRIP: NEGATIVE
BUN SERPL-MCNC: 10 MG/DL (ref 6–20)
BUN/CREAT SERPL: 17.2 (ref 7–25)
CALCIUM SPEC-SCNC: 8.7 MG/DL (ref 8.6–10.5)
CHLORIDE SERPL-SCNC: 102 MMOL/L (ref 98–107)
CK SERPL-CCNC: 69 U/L (ref 20–200)
CLARITY UR: CLEAR
CO2 SERPL-SCNC: 27 MMOL/L (ref 22–29)
COLOR UR: YELLOW
CREAT SERPL-MCNC: 0.58 MG/DL (ref 0.76–1.27)
CYTO UR: NORMAL
DEPRECATED RDW RBC AUTO: 38.3 FL (ref 37–54)
EGFRCR SERPLBLD CKD-EPI 2021: 118.8 ML/MIN/1.73
ERYTHROCYTE [DISTWIDTH] IN BLOOD BY AUTOMATED COUNT: 12 % (ref 12.3–15.4)
GLUCOSE BLDC GLUCOMTR-MCNC: 176 MG/DL (ref 70–130)
GLUCOSE BLDC GLUCOMTR-MCNC: 198 MG/DL (ref 70–130)
GLUCOSE BLDC GLUCOMTR-MCNC: 251 MG/DL (ref 70–130)
GLUCOSE BLDC GLUCOMTR-MCNC: 271 MG/DL (ref 70–130)
GLUCOSE SERPL-MCNC: 152 MG/DL (ref 65–99)
GLUCOSE UR STRIP-MCNC: ABNORMAL MG/DL
HBA1C MFR BLD: 8.8 % (ref 4.8–5.6)
HCT VFR BLD AUTO: 25.6 % (ref 37.5–51)
HGB BLD-MCNC: 8.5 G/DL (ref 13–17.7)
HGB UR QL STRIP.AUTO: NEGATIVE
KETONES UR QL STRIP: NEGATIVE
LAB AP CASE REPORT: NORMAL
LAB AP CLINICAL INFORMATION: NORMAL
LEUKOCYTE ESTERASE UR QL STRIP.AUTO: NEGATIVE
MAGNESIUM SERPL-MCNC: 1.7 MG/DL (ref 1.6–2.6)
MCH RBC QN AUTO: 29.6 PG (ref 26.6–33)
MCHC RBC AUTO-ENTMCNC: 33.2 G/DL (ref 31.5–35.7)
MCV RBC AUTO: 89.2 FL (ref 79–97)
NITRITE UR QL STRIP: NEGATIVE
PATH REPORT.FINAL DX SPEC: NORMAL
PATH REPORT.GROSS SPEC: NORMAL
PH UR STRIP.AUTO: 5.5 [PH] (ref 5–8)
PHOSPHATE SERPL-MCNC: 3.6 MG/DL (ref 2.5–4.5)
PLATELET # BLD AUTO: 231 10*3/MM3 (ref 140–450)
PMV BLD AUTO: 9.7 FL (ref 6–12)
POTASSIUM SERPL-SCNC: 3.7 MMOL/L (ref 3.5–5.2)
PROT UR QL STRIP: NEGATIVE
RBC # BLD AUTO: 2.87 10*6/MM3 (ref 4.14–5.8)
SODIUM SERPL-SCNC: 136 MMOL/L (ref 136–145)
SP GR UR STRIP: 1.02 (ref 1–1.03)
UROBILINOGEN UR QL STRIP: ABNORMAL
WBC NRBC COR # BLD AUTO: 6.95 10*3/MM3 (ref 3.4–10.8)

## 2024-01-23 PROCEDURE — 84100 ASSAY OF PHOSPHORUS: CPT | Performed by: INTERNAL MEDICINE

## 2024-01-23 PROCEDURE — 82550 ASSAY OF CK (CPK): CPT | Performed by: INTERNAL MEDICINE

## 2024-01-23 PROCEDURE — 87086 URINE CULTURE/COLONY COUNT: CPT | Performed by: INTERNAL MEDICINE

## 2024-01-23 PROCEDURE — 80048 BASIC METABOLIC PNL TOTAL CA: CPT | Performed by: INTERNAL MEDICINE

## 2024-01-23 PROCEDURE — 83036 HEMOGLOBIN GLYCOSYLATED A1C: CPT | Performed by: HOSPITALIST

## 2024-01-23 PROCEDURE — 63710000001 INSULIN LISPRO (HUMAN) PER 5 UNITS: Performed by: INTERNAL MEDICINE

## 2024-01-23 PROCEDURE — 82948 REAGENT STRIP/BLOOD GLUCOSE: CPT

## 2024-01-23 PROCEDURE — 25010000002 HEPARIN (PORCINE) PER 1000 UNITS: Performed by: INTERNAL MEDICINE

## 2024-01-23 PROCEDURE — 83735 ASSAY OF MAGNESIUM: CPT | Performed by: INTERNAL MEDICINE

## 2024-01-23 PROCEDURE — 63710000001 INSULIN DETEMIR PER 5 UNITS: Performed by: INTERNAL MEDICINE

## 2024-01-23 PROCEDURE — 99232 SBSQ HOSP IP/OBS MODERATE 35: CPT | Performed by: HOSPITALIST

## 2024-01-23 PROCEDURE — 25010000002 DAPTOMYCIN PER 1 MG: Performed by: INTERNAL MEDICINE

## 2024-01-23 PROCEDURE — 25010000002 PIPERACILLIN SOD-TAZOBACTAM PER 1 G: Performed by: INTERNAL MEDICINE

## 2024-01-23 PROCEDURE — 85027 COMPLETE CBC AUTOMATED: CPT | Performed by: INTERNAL MEDICINE

## 2024-01-23 PROCEDURE — 81003 URINALYSIS AUTO W/O SCOPE: CPT | Performed by: INTERNAL MEDICINE

## 2024-01-23 RX ADMIN — GABAPENTIN 600 MG: 300 CAPSULE ORAL at 20:52

## 2024-01-23 RX ADMIN — HYDROCODONE BITARTRATE AND ACETAMINOPHEN 1 TABLET: 10; 325 TABLET ORAL at 20:51

## 2024-01-23 RX ADMIN — HEPARIN SODIUM 5000 UNITS: 5000 INJECTION INTRAVENOUS; SUBCUTANEOUS at 04:50

## 2024-01-23 RX ADMIN — HYDROCODONE BITARTRATE AND ACETAMINOPHEN 1 TABLET: 10; 325 TABLET ORAL at 08:46

## 2024-01-23 RX ADMIN — OXYCODONE HYDROCHLORIDE 20 MG: 10 TABLET ORAL at 13:27

## 2024-01-23 RX ADMIN — INSULIN LISPRO 2 UNITS: 100 INJECTION, SOLUTION INTRAVENOUS; SUBCUTANEOUS at 11:49

## 2024-01-23 RX ADMIN — GABAPENTIN 600 MG: 300 CAPSULE ORAL at 04:50

## 2024-01-23 RX ADMIN — HYDRALAZINE HYDROCHLORIDE 25 MG: 50 TABLET, FILM COATED ORAL at 04:53

## 2024-01-23 RX ADMIN — SENNOSIDES AND DOCUSATE SODIUM 2 TABLET: 8.6; 5 TABLET ORAL at 08:46

## 2024-01-23 RX ADMIN — CYCLOBENZAPRINE 10 MG: 10 TABLET, FILM COATED ORAL at 07:32

## 2024-01-23 RX ADMIN — METOPROLOL TARTRATE 25 MG: 25 TABLET, FILM COATED ORAL at 08:47

## 2024-01-23 RX ADMIN — Medication 1 PATCH: at 20:50

## 2024-01-23 RX ADMIN — PIPERACILLIN SODIUM AND TAZOBACTAM SODIUM 3.38 G: 3; .375 INJECTION, SOLUTION INTRAVENOUS at 11:19

## 2024-01-23 RX ADMIN — HEPARIN SODIUM 5000 UNITS: 5000 INJECTION INTRAVENOUS; SUBCUTANEOUS at 13:28

## 2024-01-23 RX ADMIN — OXYCODONE HYDROCHLORIDE 20 MG: 10 TABLET ORAL at 07:32

## 2024-01-23 RX ADMIN — PANTOPRAZOLE SODIUM 40 MG: 40 TABLET, DELAYED RELEASE ORAL at 04:50

## 2024-01-23 RX ADMIN — HYDRALAZINE HYDROCHLORIDE 25 MG: 50 TABLET, FILM COATED ORAL at 20:52

## 2024-01-23 RX ADMIN — HYDRALAZINE HYDROCHLORIDE 25 MG: 50 TABLET, FILM COATED ORAL at 13:31

## 2024-01-23 RX ADMIN — HEPARIN SODIUM 5000 UNITS: 5000 INJECTION INTRAVENOUS; SUBCUTANEOUS at 20:52

## 2024-01-23 RX ADMIN — Medication 10 ML: at 08:49

## 2024-01-23 RX ADMIN — NIFEDIPINE 60 MG: 30 TABLET, EXTENDED RELEASE ORAL at 08:46

## 2024-01-23 RX ADMIN — PIPERACILLIN SODIUM AND TAZOBACTAM SODIUM 3.38 G: 3; .375 INJECTION, SOLUTION INTRAVENOUS at 17:04

## 2024-01-23 RX ADMIN — Medication 10 ML: at 20:52

## 2024-01-23 RX ADMIN — GABAPENTIN 600 MG: 300 CAPSULE ORAL at 13:27

## 2024-01-23 RX ADMIN — INSULIN DETEMIR 5 UNITS: 100 INJECTION, SOLUTION SUBCUTANEOUS at 20:50

## 2024-01-23 RX ADMIN — ASPIRIN 81 MG: 81 TABLET, COATED ORAL at 20:49

## 2024-01-23 RX ADMIN — HYDROCODONE BITARTRATE AND ACETAMINOPHEN 1 TABLET: 10; 325 TABLET ORAL at 14:28

## 2024-01-23 RX ADMIN — INSULIN LISPRO 2 UNITS: 100 INJECTION, SOLUTION INTRAVENOUS; SUBCUTANEOUS at 08:45

## 2024-01-23 RX ADMIN — CYCLOBENZAPRINE 10 MG: 10 TABLET, FILM COATED ORAL at 20:52

## 2024-01-23 RX ADMIN — LISINOPRIL 40 MG: 20 TABLET ORAL at 08:46

## 2024-01-23 RX ADMIN — ROSUVASTATIN CALCIUM 40 MG: 20 TABLET, FILM COATED ORAL at 20:51

## 2024-01-23 RX ADMIN — INSULIN LISPRO 4 UNITS: 100 INJECTION, SOLUTION INTRAVENOUS; SUBCUTANEOUS at 17:04

## 2024-01-23 RX ADMIN — OXYCODONE HYDROCHLORIDE 20 MG: 10 TABLET ORAL at 20:49

## 2024-01-23 RX ADMIN — INSULIN LISPRO 4 UNITS: 100 INJECTION, SOLUTION INTRAVENOUS; SUBCUTANEOUS at 20:50

## 2024-01-23 RX ADMIN — DAPTOMYCIN 300 MG: 500 INJECTION, POWDER, LYOPHILIZED, FOR SOLUTION INTRAVENOUS at 11:52

## 2024-01-23 RX ADMIN — METOPROLOL TARTRATE 25 MG: 25 TABLET, FILM COATED ORAL at 20:51

## 2024-01-23 NOTE — PROGRESS NOTES
[facility] Medicine Services  PROGRESS NOTE    Patient Name: Justo Oquendo  : 1975  MRN: [id number]    Date of Admission: 2024  Primary Care Physician: Melo Whitaker MD    Subjective   Subjective     CC:  F/U PVD    HPI:  Seen this morning, no major complaints. Nerve block has  as of this morning.       Objective   Objective     Vital Signs:   Temp:  [97 °F (36.1 °C)-100.4 °F (38 °C)] 98.6 °F (37 °C)  Heart Rate:  [] 83  Resp:  [16-22] 16  BP: (104-162)/(56-84) 124/65     Physical Exam:  Gen-no acute distress  HENT-NCAT, mucous membranes moist  CV-RRR, S1 S2 normal, no m/r/g  Resp-CTAB, no wheezes or rales  Abd-soft, NT, ND, +BS  Ext-no edema, left foot in dressing  Neuro-A&Ox3, no focal deficits  Skin-no rashes  Psych-appropriate mood      Results Reviewed:  LAB RESULTS:      Lab 24  0443 24  0725 24  0710 24  0247 24  0730 24  2359 24  1441 24  0642 24  2323   WBC 6.95 8.68 10.15 8.03 6.77  --   --   --   --    HEMOGLOBIN 8.5* 8.5* 8.6* 10.1* 11.9*  --   --   --   --    HEMATOCRIT 25.6* 25.7* 25.9* 29.2* 35.1*  --   --   --   --    PLATELETS 231 241 225 200 230  --   --   --   --    NEUTROS ABS  --  6.03  --  5.89 4.49  --   --   --   --    IMMATURE GRANS (ABS)  --  0.11*  --  0.05 0.05  --   --   --   --    LYMPHS ABS  --  1.54  --  1.29 1.49  --   --   --   --    MONOS ABS  --  0.86  --  0.77 0.65  --   --   --   --    EOS ABS  --  0.11  --  0.01 0.07  --   --   --   --    MCV 89.2 88.9 89.9 88.2 87.1  --   --   --   --    APTT  --   --   --   --  55.3* 79.4 44.9* 60.1 59.0*         Lab 24  0443 24  0725 24  0410 24  0710 24  0247   SODIUM 136 140 141 139 134*   POTASSIUM 3.7 3.8 3.7 4.6 4.1   CHLORIDE 102 103 107 103 102   CO2 27.0 25.0 27.0 21.0* 21.0*   ANION GAP 7.0 12.0 7.0 15.0 11.0   BUN 10 10 20 14 9   CREATININE 0.58* 0.51* 0.64* 0.59* 0.66*   EGFR 118.8  123.5 115.3 118.2 114.3   GLUCOSE 152* 137* 150* 158* 157*   CALCIUM 8.7 9.0 9.2 9.7 9.0   MAGNESIUM 1.7  --  1.8 1.9 1.6   PHOSPHORUS 3.6  --   --  3.7 3.5         Lab 01/20/24  0710 01/18/24  1856   TOTAL PROTEIN 6.1 6.2   ALBUMIN 3.6 3.4*   GLOBULIN 2.5 2.8   ALT (SGPT) 28 25   AST (SGOT) 27 44*   BILIRUBIN 0.2 0.3   ALK PHOS 53 57                     Brief Urine Lab Results  (Last result in the past 365 days)        Color   Clarity   Blood   Leuk Est   Nitrite   Protein   CREAT   Urine HCG        01/23/24 1124 Yellow   Clear   Negative   Negative   Negative   Negative                   Microbiology Results Abnormal       Procedure Component Value - Date/Time    Anaerobic Culture - Wound, Foot, Left [[id number]]  (Normal) Collected: 01/19/24 1219    Lab Status: Preliminary result Specimen: Wound from Foot, Left Updated: 01/22/24 0957     Anaerobic Culture No anaerobes isolated at 3 days    Wound Culture - Wound, Foot, Left [[id number]] Collected: 01/19/24 1219    Lab Status: Final result Specimen: Wound from Foot, Left Updated: 01/22/24 0647     Wound Culture No growth at 3 days     Gram Stain Rare (1+) WBCs seen      No organisms seen    Blood Culture - Blood, Arm, Left [[id number]]  (Normal) Collected: 01/14/24 1512    Lab Status: Final result Specimen: Blood from Arm, Left Updated: 01/19/24 1900     Blood Culture No growth at 5 days    Blood Culture - Blood, Arm, Right [[id number]]  (Normal) Collected: 01/14/24 1510    Lab Status: Final result Specimen: Blood from Arm, Right Updated: 01/19/24 1631     Blood Culture No growth at 5 days    MRSA Screen, PCR (Inpatient) - Swab, Nares [[id number]]  (Normal) Collected: 01/14/24 1817    Lab Status: Final result Specimen: Swab from Nares Updated: 01/14/24 2152     MRSA PCR No MRSA Detected    Narrative:      The negative predictive value of this diagnostic test is high and should only be used to consider de-escalating anti-MRSA therapy. A positive result may indicate  colonization with MRSA and must be correlated clinically.            No radiology results from the last 24 hrs    Results for orders placed during the hospital encounter of 11/20/23    Adult Transthoracic Echo Complete W/ Cont if Necessary Per Protocol    Interpretation Summary    Left ventricular systolic function is hyperdynamic (EF > 70%). Calculated left ventricular EF = 70.4%    Left ventricular diastolic function was normal.    No significant structural or functional valvular disease.      Current medications:  Scheduled Meds:aspirin, 81 mg, Oral, Nightly  DAPTOmycin, 4 mg/kg, Intravenous, Q24H  gabapentin, 600 mg, Oral, Q8H  heparin (porcine), 5,000 Units, Subcutaneous, Q8H  hydrALAZINE, 25 mg, Oral, Q8H  HYDROcodone-acetaminophen, 1 tablet, Oral, TID  insulin detemir, 5 Units, Subcutaneous, Nightly  insulin lispro, 2-7 Units, Subcutaneous, 4x Daily AC & at Bedtime  lisinopril, 40 mg, Oral, Q24H  metoprolol tartrate, 25 mg, Oral, Q12H  nicotine, 1 patch, Transdermal, Nightly  NIFEdipine XL, 60 mg, Oral, Q24H  pantoprazole, 40 mg, Oral, Q AM  piperacillin-tazobactam, 3.375 g, Intravenous, Q8H  rosuvastatin, 40 mg, Oral, Nightly  senna-docusate sodium, 2 tablet, Oral, BID  sodium chloride, 10 mL, Intravenous, Q12H      Continuous Infusions:ropivacaine, , Last Rate: 1 mL/hr at 01/22/24 1723      PRN Meds:.  senna-docusate sodium **AND** polyethylene glycol **AND** bisacodyl **AND** bisacodyl    Calcium Replacement - Follow Nurse / BPA Driven Protocol    cyclobenzaprine    dextrose    dextrose    glucagon (human recombinant)    Magnesium Standard Dose Replacement - Follow Nurse / BPA Driven Protocol    nicotine polacrilex    nitroglycerin    ondansetron ODT **OR** ondansetron    oxyCODONE    Phosphorus Replacement - Follow Nurse / BPA Driven Protocol    Potassium Replacement - Follow Nurse / BPA Driven Protocol    sodium chloride    sodium chloride    temazepam    Assessment & Plan   Assessment & Plan     Active  Hospital Problems    Diagnosis  POA    **Ischemic pain of left foot [M79.672, I99.8]  Yes    Ischemic foot [I99.8]  Yes    Sepsis associated hypotension [A41.9, I95.9]  Yes    Claudication of lower extremity s/p femorofemoral bypass [I73.9]  Yes    Hypertension [I10]  Yes    Hyperlipidemia [E78.5]  Yes    Tobacco abuse [Z72.0]  Yes    Diabetes mellitus type II, non insulin dependent [E11.9]  Yes    Peripheral arterial disease [I73.9]  Yes      Resolved Hospital Problems   No resolved problems to display.        Brief Hospital Course to date:  Justo Oquendo is a 48 y.o. male with hx of severe PAD s/p thrombectomy of left femoropopliteal bypass graft as well as placement of a femorofemoral bypass by Dr. Ryan on 11/21/223, ongoing tobacco abuse, HTN, HLD, and DM2 who presents due to worsening claudication symptoms as well as altered mental status. ED evaluation was significant for hypotension and tachycardia in an encephalopathic patient and a finding of new worsened ischemia in the left lower extremity. Third and fourth toes on the left foot with cool discoloration and early ischemic necrosis. CTA with runoff was performed and compared to recent imaging confirmed fem-fem occlusion. Dr. Pena with Vascular Surgery took patient for left graft thrombectomy on 1/18/24, and he was transferred to the ICU for closer monitoring post-operatively. Dr. Peres with Orthopedic Surgery subsequently took patient for left foot transmetatarsal amputation on 1/19/24. Patient transferred back to the floor with hospitalist service resuming care on 1/23/24.    This patient's problems and plans were partially entered by my partner and updated as appropriate by me 01/23/24. Copied text in this note has been reviewed and is accurate as of today's date.     Sepsis (AMS, WBC, hypotension, ischemic necrosis), POA  Metabolic encephalopathy  Ischemic ulcers and necrosis of LLE, 3rd-4th digits  Progressive PAD with occlusion of fem-fem  graft  Acute on chronic pain   --BP improved with IV fluids and hydrocortisone on admission  --CTA with runoff showed complete occlusion of the left femoral artery bypass graft, complete occlusion of the right femoral artery.  Eventual distal reconstitution of both vessels due to the branches of the deep femoral arteries, loss of flow within the left posterior tibial artery, loss of flow within the right anterior tibial artery.  No residual flow to the distal arteries.   --Dr. Peres/Orthopedic Surgery as well as Dr. Pena/Vascular Surgery consulted  --1/18/24 Dr. Pena performed left graft thrombectomy, AKP-BKP jump graft with tibial angioplasty; F/U in 2 weeks with ARLINE's  --1/19/24: Dr. Peres performed left foot transmetatarsal amputation; recommends NWB LLE; F/U in 2 weeks with Winter Santana PA-C  --He had been on Vanc and Rocephin initially, MRSA PCR returned negative so Vanc discontinued - ID consulted 1/23/24 and broadening to Daptomycin and Zosyn for now while awaiting finalization of cultures  --Low grade fever overnight, monitor closely  --Surgical cultures NGTD, blood cultures negative  --Continue ASA and statin  --Opioid tolerant: on Norco 10 mg at home which is continued here and scheduled TID, in addition he also has a new prescription for oxycodone 20 mg per [facility] report so have continued PRN oxycodone 20 mg Q6H PRN  -- off IV pain meds since 1/21/24  --Continue home gabapentin  --Home Xarelto 2.5 mg BID on hold, resume when okay with surgical services  --PT/OT     Acute kidney injury, resolved  ATN due to sepsis  --Pre-renal, likely hypotension/sepsis related  --Resolved with IV fluids     DM2, uncontrolled  --HbA1c 10.6% on 11/20/23, will repeat here  --Not on insulin at home  --Continue Levemir 5 units nightly   --Continue low dose SSI  --Adjust as needed, well controlled past 24 hours     HTN  HLD  --Held home Lisinopril due to ALEJANDRO and hypotension, now resumed  --Continue home Metoprolol 25  mg BID  --Required Cardene drip while in ICU, now weaned off  --Have added Nifedipine XL 60 mg daily, Hydralazine 25 mg TID this admission - wean off if needed based on BP trend  --Continue statin    Anemia  --Likely post-operative in nature, Hb normal at 14 in November 2023 prior to initial surgery, has trended down ever since  --Hb 11.8 on admission, has trended down to 8 range now  --Monitor closely     Ongoing heavy tobacco smoker  --Nicotine patch and gum as needed    Expected Discharge Location and Transportation: home  Expected Discharge   Expected Discharge Date: 1/26/2024; Expected Discharge Time:      DVT prophylaxis:  Medical DVT prophylaxis orders are present.         AM-PAC 6 Clicks Score (PT): 19 (01/23/24 0800)    CODE STATUS:   Code Status and Medical Interventions:   Ordered at: 01/14/24 2055     Code Status (Patient has no pulse and is not breathing):    CPR (Attempt to Resuscitate)     Medical Interventions (Patient has pulse or is breathing):    Full Support       Sophy Amaya MD  01/23/24

## 2024-01-23 NOTE — PAYOR COMM NOTE
"Ref# [id number]   3rd Request    Utilization Review  Phone [phone]  Fax [phone]    [id number] [place] [address]       Sebastian Reed \"Gal\" (48 y.o. Male)       Date of Birth   1975    Social Security Number       Address   [address] [place] [address]    Home Phone   [phone]    MRN   [id number]       Scientology   Latter-day    Marital Status                               Admission Date   1/14/24    Admission Type   Emergency    Admitting Provider   Sophy Amaya MD    Attending Provider   Sophy Amaya MD    Department, Room/Bed   [facility] 5H, S586/1       Discharge Date       Discharge Disposition       Discharge Destination                                 Attending Provider: Sophy Amaya MD    Allergies: No Known Allergies    Isolation: None   Infection: None   Code Status: CPR    Ht: 172.7 cm (68\")   Wt: 73.5 kg (162 lb)    Admission Cmt: None   Principal Problem: Ischemic pain of left foot [M79.672,I99.8]                   Active Insurance as of 1/14/2024       Primary Coverage       Payor Plan Insurance Group Employer/Plan Group    [employer]UE [employer] PPO [id number]       Payor Plan Address Payor Plan Phone Number Payor Plan Fax Number Effective Dates    [address] [phone]  1/1/2017 - None Entered    [place] [address]         Subscriber Name Subscriber Birth Date Member ID       SEBASTIAN REED 1975 [id number]                     Emergency Contacts        (Rel.) Home Phone Work Phone Mobile Phone    Christine Reed (Spouse) [phone] -- [phone]                 Physician Progress Notes (last 5 days)        Kelsi Sarabia PA-C at 01/22/24 1413       Summary:Vascular SOAP - [facility]                    LOS: 8 days   Patient Care Team:  Melo Whitaker MD as PCP - General (Family Medicine)  Niall Mcfarlane MD as Consulting " "Physician (Cardiology)      Subjective       Subjective    Mr. Oquendo is doing well today.  He rates his pain a 6 out of 10.  He is hopeful to go home soon.  States he has not yet had to change his Aquacel dressings.    History taken from: patient    Objective     Vital Signs  Temp:  [98.2 °F (36.8 °C)-98.4 °F (36.9 °C)] 98.3 °F (36.8 °C)  Heart Rate:  [] 98  Resp:  [16-26] 20  BP: (100-178)/() 130/61    Objective:  Vital signs: (most recent): Blood pressure 128/64, pulse 86, temperature 98.3 °F (36.8 °C), temperature source Oral, resp. rate 20, height 172.7 cm (68\"), weight 73.5 kg (162 lb), SpO2 98%.                Physical Exam    AAOx3, NAD, sitting in bedside chair, no family at bedside  Respiratory: Normal effort on room air  Left lower extremity: Aquacel dressings clean dry and intact, foot dressing clean dry and intact, leg is warm    Results Review:     I reviewed the patient's new clinical results.  CBC    Results from last 7 days   Lab Units 01/22/24  0725 01/20/24  0710 01/19/24  0247 01/18/24  0730 01/16/24  0648   WBC 10*3/mm3 8.68 10.15 8.03 6.77 6.90   HEMOGLOBIN g/dL 8.5* 8.6* 10.1* 11.9* 11.0*   PLATELETS 10*3/mm3 241 225 200 230 264     BMP   Results from last 7 days   Lab Units 01/22/24  0725 01/21/24  0410 01/20/24  0710 01/19/24  0247 01/18/24  1856 01/18/24  0730 01/17/24  0801 01/17/24  0641   SODIUM mmol/L 140 141 139 134* 138 138  --  138   POTASSIUM mmol/L 3.8 3.7 4.6 4.1 3.9 3.6  --  3.8   CHLORIDE mmol/L 103 107 103 102 103 102  --  104   CO2 mmol/L 25.0 27.0 21.0* 21.0* 21.0* 24.0  --  25.0   BUN mg/dL 10 20 14 9 9 9  --  14   CREATININE mg/dL 0.51* 0.64* 0.59* 0.66* 0.68* 0.60* 0.60* 0.65*   GLUCOSE mg/dL 137* 150* 158* 157* 128* 151*  --  145*   MAGNESIUM mg/dL  --  1.8 1.9 1.6  --   --   --   --    PHOSPHORUS mg/dL  --   --  3.7 3.5  --   --   --   --           Current Facility-Administered Medications:     aspirin EC tablet 81 mg, 81 mg, Oral, Nightly, Christo Peres " MD Rhona, 81 mg at 01/21/24 2115    sennosides-docusate (PERICOLACE) 8.6-50 MG per tablet 2 tablet, 2 tablet, Oral, BID, 2 tablet at 01/22/24 0821 **AND** polyethylene glycol (MIRALAX) packet 17 g, 17 g, Oral, Daily PRN, 17 g at 01/21/24 1718 **AND** bisacodyl (DULCOLAX) EC tablet 5 mg, 5 mg, Oral, Daily PRN **AND** bisacodyl (DULCOLAX) suppository 10 mg, 10 mg, Rectal, Daily PRN, Christo Peres Jr., MD    Calcium Replacement - Follow Nurse / BPA Driven Protocol, , Does not apply, PRN, Christo Peres Jr., MD    cefTRIAXone (ROCEPHIN) 1,000 mg in sodium chloride 0.9 % 100 mL IVPB, 1,000 mg, Intravenous, Q24H, Masha Adame MD, Last Rate: 200 mL/hr at 01/21/24 2116, 1,000 mg at 01/21/24 2116    cyclobenzaprine (FLEXERIL) tablet 10 mg, 10 mg, Oral, TID PRN, Christo Peres Jr., MD, 10 mg at 01/22/24 1301    dextrose (D50W) (25 g/50 mL) IV injection 25 g, 25 g, Intravenous, Q15 Min PRN, Christo Peres Jr., MD    dextrose (GLUTOSE) oral gel 15 g, 15 g, Oral, Q15 Min PRN, Christo Peres Jr., MD    gabapentin (NEURONTIN) capsule 600 mg, 600 mg, Oral, Q8H, Christo Peres Jr., MD, 600 mg at 01/22/24 1409    glucagon (GLUCAGEN) injection 1 mg, 1 mg, Intramuscular, Q15 Min PRN, Christo Peres Jr., MD    heparin (porcine) 5000 UNIT/ML injection 5,000 Units, 5,000 Units, Subcutaneous, Q8H, Masha Adame MD, 5,000 Units at 01/22/24 1409    hydrALAZINE (APRESOLINE) tablet 25 mg, 25 mg, Oral, Q8H, Christo Peres Jr., MD, 25 mg at 01/22/24 0509    HYDROcodone-acetaminophen (NORCO)  MG per tablet 1 tablet, 1 tablet, Oral, TID, Masha Adame MD, 1 tablet at 01/22/24 0509    insulin detemir (LEVEMIR) injection 5 Units, 5 Units, Subcutaneous, Nightly, Trell Lovelace, APRN, 5 Units at 01/21/24 2115    Insulin Lispro (humaLOG) injection 2-7 Units, 2-7 Units, Subcutaneous, 4x Daily AC & at Bedtime, Christo Peres Jr., MD, 2 Units at 01/21/24 2115    lisinopril  (PRINIVIL,ZESTRIL) tablet 40 mg, 40 mg, Oral, Q24H, Christo Peres Jr., MD, 40 mg at 01/22/24 0821    Magnesium Standard Dose Replacement - Follow Nurse / BPA Driven Protocol, , Does not apply, PRN, Christo Peres Jr., MD    metoprolol tartrate (LOPRESSOR) tablet 25 mg, 25 mg, Oral, Q12H, Christo Peres Jr., MD, 25 mg at 01/22/24 0821    niCARdipine (CARDENE) 25mg in 250mL NS infusion, 5-15 mg/hr, Intravenous, Titrated, Christo Peres Jr., MD, Stopped at 01/22/24 0945    nicotine (NICODERM CQ) 21 MG/24HR patch 1 patch, 1 patch, Transdermal, Nightly, Christo Peres Jr., MD, 1 patch at 01/21/24 2115    nicotine polacrilex (NICORETTE) gum 2 mg, 2 mg, Mouth/Throat, Q1H PRN, Christo Peres Jr., MD    NIFEdipine XL (PROCARDIA XL) 24 hr tablet 60 mg, 60 mg, Oral, Q24H, Case, Taina V., DO, 60 mg at 01/22/24 0836    nitroglycerin (NITROSTAT) SL tablet 0.4 mg, 0.4 mg, Sublingual, Q5 Min PRN, Christo Peres Jr., MD    ondansetron ODT (ZOFRAN-ODT) disintegrating tablet 4 mg, 4 mg, Oral, Q6H PRN **OR** ondansetron (ZOFRAN) injection 4 mg, 4 mg, Intravenous, Q6H PRN, Christo Peres Jr., MD, 4 mg at 01/19/24 1222    oxyCODONE (ROXICODONE) immediate release tablet 20 mg, 20 mg, Oral, Q6H PRN, Masha Adame MD, 20 mg at 01/22/24 1107    pantoprazole (PROTONIX) EC tablet 40 mg, 40 mg, Oral, Q AM, Lamar Zaldivar, PharmD, 40 mg at 01/22/24 0509    Phosphorus Replacement - Follow Nurse / BPA Driven Protocol, , Does not apply, PRN, Christo Peres Jr., MD    Potassium Replacement - Follow Nurse / BPA Driven Protocol, , Does not apply, PRN, Christo Peres Jr., MD    ropivacaine (NAROPIN) 0.2 % infusion (INFUSYSTEM), , Peripheral Nerve, Continuous, Ketan Smith, CRNA, Last Rate: 8 mL/hr at 01/22/24 0949, Currently Infusing at 01/22/24 0949    rosuvastatin (CRESTOR) tablet 40 mg, 40 mg, Oral, Nightly, Christo Perse Jr., MD, 40 mg at 01/21/24 8339    sodium chloride 0.9 % flush 10 mL, 10 mL,  Intravenous, Q12H, Christo Peres Jr., MD, 10 mL at 01/22/24 0822    sodium chloride 0.9 % flush 10 mL, 10 mL, Intravenous, PRN, Christo Peres Jr., MD, 10 mL at 01/21/24 2116    sodium chloride 0.9 % infusion 40 mL, 40 mL, Intravenous, PRN, Christo Peres Jr., MD    temazepam (RESTORIL) capsule 15 mg, 15 mg, Oral, Nightly PRN, Timoteo Salinas, DO, 15 mg at 01/21/24 2144    vancomycin IVPB 1500 mg in 0.9% NaCl (Premix) 500 mL, 1,500 mg, Intravenous, Q12H, Lamar Zaldivar, PharmD, Last Rate: 333.3 mL/hr at 01/22/24 0945, 1,500 mg at 01/22/24 0945     Assessment & Plan       Ischemic pain of left foot    Peripheral arterial disease    Hypertension    Hyperlipidemia    Tobacco abuse    Diabetes mellitus type II, non insulin dependent    Claudication of lower extremity s/p femorofemoral bypass    Ischemic foot    Sepsis associated hypotension      Assessment & Plan  - underwent left TMA with Dr. Peres 1/19/24  - pain control  - defer wound care to Dr. Peres  - OK to remove Aquacel dressings 1/23/24  - OK to shower after Aquacel dressings removed 1/23/24  - OK to discharge from vascular surgery standpoint when ambulating, pain controlled  - F/U [facility] 2 weeks with ARLINE    No further vascular surgery intervention required. Vascular surgery will sign off. Please contact on-call vascular surgeon with any questions or for re-consultation.     Kelsi Sarabia PA-C  01/22/24  14:14 EST      Electronically signed by Kelsi Sarabia PA-C at 01/22/24 1508       Taina Soto DO at 01/22/24 1136            INTENSIVIST   PROGRESS NOTE     Hospital:  LOS: 8 days     Mr. Justo Oquendo, 48 y.o. male is followed for a Chief Complaint of: Peripheral vascular disease      Subjective   S     Interval History:  Still with pain. On a cardene infusion for blood pressure control.        The patient's relevant past medical, surgical and social history were reviewed and updated in Epic as appropriate.      ROS:    Constitutional: Negative for fever.   Respiratory: Negative for dyspnea.   Cardiovascular: Negative for chest pain.   Gastrointestinal: Negative for  nausea, vomiting and diarrhea.     Objective   O     Vitals:  Temp  Min: 98.2 °F (36.8 °C)  Max: 98.7 °F (37.1 °C)  BP  Min: 100/57  Max: 178/74  Pulse  Min: 71  Max: 121  Resp  Min: 16  Max: 26  SpO2  Min: 92 %  Max: 100 % No data recorded    Intake/Ouptut 24 hrs (7:00AM - 6:59 AM)  Intake & Output (last 3 days)         01/19 0701 01/20 0700 01/20 0701 01/21 0700 01/21 0701 01/22 0700 01/22 0701 01/23 0700    P.O.  150 240     I.V. (mL/kg) 37.4 (0.5) 100.6 (1.4) 983.3 (13.4) 265 (3.6)    Blood        IV Piggyback 1083.4 500 600     Epidural 72.5 89.5      Total Intake(mL/kg) 1193.3 (16.2) 840.1 (11.4) 1823.3 (24.8) 265 (3.6)    Urine (mL/kg/hr) 2500 (1.4) 1100 (0.6) 3675 (2.1)     Blood        Total Output 2500 1100 3675     Net -1306.8 -260 -1851.7 +265                    Medications (drips):  niCARdipine, Last Rate: Stopped (01/22/24 0945)  ropivacaine, Last Rate: 8 mL/hr at 01/22/24 0949          Physical Examination  Telemetry:  Normal sinus rhythm.    Constitutional:  No acute distress.  Resting in bed comfortably.    Eyes: No scleral icterus.   PERRL, EOM intact.    Neck:  Supple, FROM   Cardiovascular: Normal rate, regular and rhythm. Normal heart sounds.  No murmurs, gallop or rub.   Respiratory: No respiratory distress. Normal respiratory effort.  Normal breath sounds  Clear to auscultation   Abdominal:  Soft. No masses. Nontender. No distension. No HSM.   Extremities: No digital cyanosis. No clubbing.  No peripheral edema.  Amputation of left toes.    Skin: No rashes, lesions or ulcers   Neurological:   Alert and Oriented to person, place, and time.             Results from last 7 days   Lab Units 01/22/24  0725 01/20/24  0710 01/19/24  0247   WBC 10*3/mm3 8.68 10.15 8.03   HEMOGLOBIN g/dL 8.5* 8.6* 10.1*   MCV fL 88.9 89.9 88.2   PLATELETS 10*3/mm3  241 225 200     Results from last 7 days   Lab Units 01/22/24  0725 01/21/24  0410 01/20/24  0710 01/19/24  0247   SODIUM mmol/L 140 141 139 134*   POTASSIUM mmol/L 3.8 3.7 4.6 4.1   CO2 mmol/L 25.0 27.0 21.0* 21.0*   CREATININE mg/dL 0.51* 0.64* 0.59* 0.66*   GLUCOSE mg/dL 137* 150* 158* 157*   MAGNESIUM mg/dL  --  1.8 1.9 1.6   PHOSPHORUS mg/dL  --   --  3.7 3.5     Estimated Creatinine Clearance: 180.1 mL/min (A) (by C-G formula based on SCr of 0.51 mg/dL (L)).  Results from last 7 days   Lab Units 01/20/24  0710 01/18/24  1856   ALK PHOS U/L 53 57   BILIRUBIN mg/dL 0.2 0.3   ALT (SGPT) U/L 28 25   AST (SGOT) U/L 27 44*             Images:  Imaging Results (Last 24 Hours)       ** No results found for the last 24 hours. **               Results: Reviewed.  I reviewed the patient's new laboratory and imaging results.  I independently reviewed the patient's new images.    Medications: Reviewed.    Assessment & Plan   A / P     Mr. Oquendo is a 49yo M with a history of Diabetes, HTN, HLD, tobacco abuse and peripheral artery disease s/p left femoral-popliteal bypass in April 2017 who presented with worsening left leg pain and cold, black toes on the left foot.     CTA with runoff was performed and showed complete occlusion of the previous left femoral artery bypass graft as well as complete occlusion of the right femoral artery.     He was taken to the OR on 1/19/24 by Dr. Peres and underwent a left foot transmetatarsal amputation.     He has been monitored in the ICU. Pain control has been difficult.     He has remained on a Cardene infusion for blood pressure control.     Anesthesia is following for nerve block infusion.         Nutrition:   Diet: Diabetic Diets, Cardiac Diets; Healthy Heart (2-3 Na+); Consistent Carbohydrate; Texture: Regular Texture (IDDSI 7); Fluid Consistency: Thin (IDDSI 0)  Advance Directives:   Code Status and Medical Interventions:   Ordered at: 01/14/24 2055     Code Status (Patient has  no pulse and is not breathing):    CPR (Attempt to Resuscitate)     Medical Interventions (Patient has pulse or is breathing):    Full Support       Active Hospital Problems    Diagnosis     **Ischemic pain of left foot     Ischemic foot     Sepsis associated hypotension     Claudication of lower extremity s/p femorofemoral bypass     Hypertension     Hyperlipidemia     Tobacco abuse     Diabetes mellitus type II, non insulin dependent     Peripheral arterial disease        Assessment / Plan:    Continue current pain regimen. Anesthesia adjusted the nerve block this AM.   Continue Rocephin for osteomyelitis.   Start Nifedipine 60mg daily and wean off Cardene  AM labs  Okay to transfer to telemetry.     High level of risk due to:  severe exacerbation of chronic illness and illness with threat to life or bodily function.    Plan of care and goals reviewed during interdisciplinary rounds.  I discussed the patient's findings and my recommendations with patient, family, and nursing staff      Taina Soto DO    Intensive Care Medicine and Pulmonary Medicine       Electronically signed by Taina Soto DO at 01/22/24 3545       Masha Adame MD at 01/21/24 1440          Critical Care Note     LOS: 7 days   Patient Care Team:  Melo Whitaker MD as PCP - General (Family Medicine)  Niall Mcfarlane MD as Consulting Physician (Cardiology)    Chief Complaint/Reason for visit:    Chief Complaint   Patient presents with    Altered Mental Status   Ischemic right foot  Peripheral artery disease  History of left femoral-popliteal bypass, previous femoral-femoral bypass  Hypertension  Hyperlipidemia  Diabetes mellitus type 2  Tobacco abuse      Subjective     Interval History:     Postoperative day #2 left foot transmetatarsal amputation.  He has a nerve block in place.  However he is complaining of 8 out of 10 pain.  At baseline he takes hydrocodone 10 3 times daily as needed, oxycodone 24 times daily,  "gabapentin 600 mg 3 times daily he remains afebrile.  Room air saturation 100%    Review of Systems:    All systems were reviewed and negative except as noted in subjective.    Medical history, surgical history, social history, family history reviewed    Objective     Intake/Output:    Intake/Output Summary (Last 24 hours) at 1/21/2024 1440  Last data filed at 1/21/2024 0900  Gross per 24 hour   Intake 509.5 ml   Output 1900 ml   Net -1390.5 ml       Nutrition:  Diet: Diabetic Diets, Cardiac Diets; Healthy Heart (2-3 Na+); Consistent Carbohydrate; Texture: Regular Texture (IDDSI 7); Fluid Consistency: Thin (IDDSI 0)    Infusions:  lactated ringers, 9 mL/hr, Last Rate: 1,000 mL/hr (01/18/24 1510)  niCARdipine, 5-15 mg/hr, Last Rate: Stopped (01/20/24 0822)  Pharmacy to dose vancomycin,   ropivacaine,         Mechanical Ventilator Settings:                                                Telemetry: Sinus rhythm             Vital Signs  Blood pressure 119/55, pulse 82, temperature 98.7 °F (37.1 °C), temperature source Oral, resp. rate 20, height 172.7 cm (68\"), weight 73.5 kg (162 lb), SpO2 100%.    Physical Exam:  General Appearance:  Middle-aged gentleman in no respiratory distress   Head:  Atraumatic   Eyes:          Conjunctiva pink, no jaundice   Ears:     Throat: Oral mucosa moist   Neck: Trachea midline, no carotid bruits   Back:      Lungs:   Symmetric chest expansion without wheeze    Heart:  Regular rhythm, S1, S2 auscultated   Abdomen:   Bowel sounds present, soft, nontender   Rectal:   Deferred   Extremities: Left foot Kerlix dressing in place, dry.  Dressing on medial left calf, left thigh intact.  Left leg is warm to touch, mild edema of the left ankle   Pulses:    Skin: No skin rash   Lymph nodes: No cervical adenopathy   Neurologic: Alert and oriented      Results Review:     I reviewed the patient's new clinical results.   Results from last 7 days   Lab Units 01/21/24  0410 01/20/24  0710 01/19/24  0247 " "01/18/24  1856 01/15/24  0359 01/14/24  1512   SODIUM mmol/L 141 139 134* 138   < > 134*   POTASSIUM mmol/L 3.7 4.6 4.1 3.9   < > 4.1   CHLORIDE mmol/L 107 103 102 103   < > 96*   CO2 mmol/L 27.0 21.0* 21.0* 21.0*   < > 25.0   BUN mg/dL 20 14 9 9   < > 30*   CREATININE mg/dL 0.64* 0.59* 0.66* 0.68*   < > 2.07*   CALCIUM mg/dL 9.2 9.7 9.0 9.3   < > 10.0   BILIRUBIN mg/dL  --  0.2  --  0.3  --  0.2   ALK PHOS U/L  --  53  --  57  --  83   ALT (SGPT) U/L  --  28  --  25  --  13   AST (SGOT) U/L  --  27  --  44*  --  14   GLUCOSE mg/dL 150* 158* 157* 128*   < > 302*    < > = values in this interval not displayed.     Results from last 7 days   Lab Units 01/20/24  0710 01/19/24  0247 01/18/24  0730   WBC 10*3/mm3 10.15 8.03 6.77   HEMOGLOBIN g/dL 8.6* 10.1* 11.9*   HEMATOCRIT % 25.9* 29.2* 35.1*   PLATELETS 10*3/mm3 225 200 230         Lab Results   Component Value Date    BLOODCX No growth at 5 days 01/14/2024     No results found for: \"URINECX\"    I reviewed the patient's new imaging including images and reports.    CT ANGIO ABDOMINAL AORTA BILAT ILIOFEM RUNOFF    Date of Exam: 1/14/2024 5:34 PM EST    Indication: Claudication or leg ischemia  Previous fem pop graft occlusion, c/o increased LLE pain, color changes, third/fourth toe ischemia, leukocytosis, r/o gas.    Comparison: None available.    Technique: CTA of the abdomen, pelvis and both lower extremities was performed after the uneventful intravenous administration of 120 cc Isovue-370 . Reconstructed coronal and sagittal images were also obtained. In addition, a 3-D volume rendered image  was created for interpretation. Automated exposure control and iterative reconstruction methods were used.    The origins of the celiac axis and superior mesenteric arteries are normal. The origins of the renal arteries are normal. Mild atheromatous disease of the abdominal aorta with subtle ectasia. The origin of the inferior mesenteric artery is patent.    Complete occlusion " of the left common iliac artery with continued complete loss of flow within the left internal and external iliac arteries. Multi focal atheromatous disease of the right common iliac artery as well as the internal and external iliac  arteries. Femorofemoral bypass.    Complete occlusion of the left femoral artery bypass with no underlying flow. Reconstitution of flow distally at the level of the popliteal artery due to branches from the deep femoral artery. The superior segments of the left anterior and posterior  tibial arteries are patent. Eventual loss of flow within the posterior tibial artery seen on axial image 383. Continued flow within the dorsalis pedis with eventual loss of flow within the arteries.    On the right there is continued flow within the right deep femoral artery. Complete occlusion of the left femoral artery with distal reconstitution from branches of the deep femoral artery. The popliteal artery is patent. The anterior and posterior  tibial vessels are patent proximally. Loss of flow within the anterior tibial artery on axial image #343. Loss of the contrast column within the posterior tibial artery on axial image  417.    The liver appears normal. Calcified granulomata within the spleen.    Normal pancreas. Prior cholecystectomy. The adrenal glands are normal.    No renal masses. No hydroureteronephrosis. The ureters and urinary bladder are normal.    The small bowel is nonobstructed. Normal appendix. Colonic diverticulosis without evidence of diverticulitis.    No ascites or pneumoperitoneum. No adenopathy. Degenerative changes of the hips and lumbar spine.   Impression:     Complete occlusion of the left femoral artery bypass graft. Complete occlusion of the right femoral artery. Eventual distal reconstitution of both vessels due to branches of the deep femoral arteries. Loss of flow within the left posterior  tibial artery. Loss of flow within the right anterior tibial artery. Eventual  loss of flow within the contrast column on both sides with no residual flow to the digital arteries.    Electronically Signed: Kee Cartagena MD   1/14/2024 6:08 PM EST       All medications reviewed.   aspirin, 81 mg, Oral, Nightly  cefTRIAXone, 1,000 mg, Intravenous, Q24H  gabapentin, 600 mg, Oral, Q8H  heparin (porcine), 5,000 Units, Subcutaneous, Q8H  hydrALAZINE, 25 mg, Oral, Q8H  insulin detemir, 5 Units, Subcutaneous, Nightly  insulin lispro, 2-7 Units, Subcutaneous, 4x Daily AC & at Bedtime  lisinopril, 40 mg, Oral, Q24H  metoprolol tartrate, 25 mg, Oral, Q12H  nicotine, 1 patch, Transdermal, Nightly  pantoprazole, 40 mg, Oral, Q AM  rosuvastatin, 40 mg, Oral, Nightly  senna-docusate sodium, 2 tablet, Oral, BID  sodium chloride, 10 mL, Intravenous, Q12H  vancomycin, 1,500 mg, Intravenous, Q12H          Assessment & Plan       Ischemic pain of left foot    Claudication of lower extremity s/p femorofemoral bypass    Ischemic foot    Peripheral arterial disease    Hypertension    Hyperlipidemia    Tobacco abuse    Diabetes mellitus type II, non insulin dependent    Sepsis associated hypotension      48-year-old gentleman with diabetes, hypertension, dyslipidemia, tobacco abuse, known peripheral artery disease who underwent a left femoral-popliteal bypass April 25, 2017.  He developed worsening claudication in the left foot and was evaluated November 20 and was found to be pulseless in the left foot.  Aortogram revealed thromboembolism in the left popliteal graft and right femoral artery.  November 21 he underwent left femoral artery cutdown with thromboembolectomy of the left femoral-popliteal bypass graft.  He has black toes, 2 3 and 4 on his left foot and no palpable pulse.  January 19 he underwent left forefoot amputation.  Cultures are negative at 24 hours.     On admission creatinine 2.07.  Improved to 0.59 .      Preoperative hemoglobin 10.1, post-op 8.6.      Home blood pressure regimen restarted and he is  off Cardene    Blood glucoses are running 150-250    PLAN:    Follow-up surgical cultures  Aspirin, Crestor  Hydralazine, metoprolol, lisinopril    Gabapentin 600 mg every 8 hours  Restart home narcotic regimen and stop IV narcotics  It appears he was taking hydrocodone 10 mg 3 times daily, new prescription for oxycodone 20 mg started in December, will use that for breakthrough    Nerve block infusing ropivacaine    Ceftriaxone, vancomycin for osteomyelitis    subcutaneous heparin   Continue Protonix        Masha Adame MD  01/21/24  14:40 EST      Time: Critical care 25 min  I personally provided care to this critically ill patient as documented above.  Critical care time does not include time spent on separately billed procedures.  None of my critical care time was concurrent with other critical care providers.     Electronically signed by Masha Adame MD at 01/21/24 4584       Masha Adame MD at 01/20/24 1101          Critical Care Note     LOS: 6 days   Patient Care Team:  Melo Whitaker MD as PCP - General (Family Medicine)  Niall Mcfarlane MD as Consulting Physician (Cardiology)    Chief Complaint/Reason for visit:    Chief Complaint   Patient presents with    Altered Mental Status   Ischemic right foot  Peripheral artery disease  History of left femoral-popliteal bypass, previous femoral-femoral bypass  Hypertension  Hyperlipidemia  Diabetes mellitus type 2  Tobacco abuse      Subjective     Interval History:     Postoperative day #1 left foot transmetatarsal amputation.  He has a nerve block in place and is having no postoperative pain.  He remains afebrile.  Room air saturation 97%    Review of Systems:    All systems were reviewed and negative except as noted in subjective.    Medical history, surgical history, social history, family history reviewed    Objective     Intake/Output:    Intake/Output Summary (Last 24 hours) at 1/20/2024 1101  Last data filed at  "1/20/2024 1018  Gross per 24 hour   Intake 1643.8 ml   Output 2500 ml   Net -856.2 ml       Nutrition:  Diet: Diabetic Diets, Cardiac Diets; Healthy Heart (2-3 Na+); Consistent Carbohydrate; Texture: Regular Texture (IDDSI 7); Fluid Consistency: Thin (IDDSI 0)    Infusions:  lactated ringers, 9 mL/hr, Last Rate: 1,000 mL/hr (01/18/24 1510)  niCARdipine, 5-15 mg/hr, Last Rate: Stopped (01/20/24 0822)  Pharmacy to dose vancomycin,   ropivacaine,         Mechanical Ventilator Settings:                                                Telemetry: Sinus rhythm, sinus tachycardia             Vital Signs  Blood pressure 108/58, pulse 76, temperature 98.1 °F (36.7 °C), temperature source Oral, resp. rate 18, height 172.7 cm (68\"), weight 73.5 kg (162 lb), SpO2 97%.    Physical Exam:  General Appearance:  Middle-aged gentleman in no respiratory distress   Head:  Atraumatic   Eyes:          Conjunctiva pink, no jaundice   Ears:     Throat: Oral mucosa moist   Neck: Trachea midline, no carotid bruits   Back:      Lungs:   Symmetric chest expansion without wheeze    Heart:  Regular rhythm, S1, S2 auscultated   Abdomen:   Bowel sounds present, soft, nontender   Rectal:   Deferred   Extremities: Left foot Kerlix dressing in place, dry.  Dressing on medial left calf, left thigh intact.  Left leg is warm to touch   Pulses:    Skin: No skin rash   Lymph nodes: No cervical adenopathy   Neurologic: Alert and oriented      Results Review:     I reviewed the patient's new clinical results.   Results from last 7 days   Lab Units 01/20/24  0710 01/19/24  0247 01/18/24  1856 01/15/24  0359 01/14/24  1512   SODIUM mmol/L 139 134* 138   < > 134*   POTASSIUM mmol/L 4.6 4.1 3.9   < > 4.1   CHLORIDE mmol/L 103 102 103   < > 96*   CO2 mmol/L 21.0* 21.0* 21.0*   < > 25.0   BUN mg/dL 14 9 9   < > 30*   CREATININE mg/dL 0.59* 0.66* 0.68*   < > 2.07*   CALCIUM mg/dL 9.7 9.0 9.3   < > 10.0   BILIRUBIN mg/dL 0.2  --  0.3  --  0.2   ALK PHOS U/L 53  --  " "57  --  83   ALT (SGPT) U/L 28  --  25  --  13   AST (SGOT) U/L 27  --  44*  --  14   GLUCOSE mg/dL 158* 157* 128*   < > 302*    < > = values in this interval not displayed.     Results from last 7 days   Lab Units 01/20/24  0710 01/19/24  0247 01/18/24  0730   WBC 10*3/mm3 10.15 8.03 6.77   HEMOGLOBIN g/dL 8.6* 10.1* 11.9*   HEMATOCRIT % 25.9* 29.2* 35.1*   PLATELETS 10*3/mm3 225 200 230         Lab Results   Component Value Date    BLOODCX No growth at 5 days 01/14/2024     No results found for: \"URINECX\"    I reviewed the patient's new imaging including images and reports.    CT ANGIO ABDOMINAL AORTA BILAT ILIOFEM RUNOFF    Date of Exam: 1/14/2024 5:34 PM EST    Indication: Claudication or leg ischemia  Previous fem pop graft occlusion, c/o increased LLE pain, color changes, third/fourth toe ischemia, leukocytosis, r/o gas.    Comparison: None available.    Technique: CTA of the abdomen, pelvis and both lower extremities was performed after the uneventful intravenous administration of 120 cc Isovue-370 . Reconstructed coronal and sagittal images were also obtained. In addition, a 3-D volume rendered image  was created for interpretation. Automated exposure control and iterative reconstruction methods were used.    The origins of the celiac axis and superior mesenteric arteries are normal. The origins of the renal arteries are normal. Mild atheromatous disease of the abdominal aorta with subtle ectasia. The origin of the inferior mesenteric artery is patent.    Complete occlusion of the left common iliac artery with continued complete loss of flow within the left internal and external iliac arteries. Multi focal atheromatous disease of the right common iliac artery as well as the internal and external iliac  arteries. Femorofemoral bypass.    Complete occlusion of the left femoral artery bypass with no underlying flow. Reconstitution of flow distally at the level of the popliteal artery due to branches from the " deep femoral artery. The superior segments of the left anterior and posterior  tibial arteries are patent. Eventual loss of flow within the posterior tibial artery seen on axial image 383. Continued flow within the dorsalis pedis with eventual loss of flow within the arteries.    On the right there is continued flow within the right deep femoral artery. Complete occlusion of the left femoral artery with distal reconstitution from branches of the deep femoral artery. The popliteal artery is patent. The anterior and posterior  tibial vessels are patent proximally. Loss of flow within the anterior tibial artery on axial image #343. Loss of the contrast column within the posterior tibial artery on axial image  417.    The liver appears normal. Calcified granulomata within the spleen.    Normal pancreas. Prior cholecystectomy. The adrenal glands are normal.    No renal masses. No hydroureteronephrosis. The ureters and urinary bladder are normal.    The small bowel is nonobstructed. Normal appendix. Colonic diverticulosis without evidence of diverticulitis.    No ascites or pneumoperitoneum. No adenopathy. Degenerative changes of the hips and lumbar spine.   Impression:     Complete occlusion of the left femoral artery bypass graft. Complete occlusion of the right femoral artery. Eventual distal reconstitution of both vessels due to branches of the deep femoral arteries. Loss of flow within the left posterior  tibial artery. Loss of flow within the right anterior tibial artery. Eventual loss of flow within the contrast column on both sides with no residual flow to the digital arteries.    Electronically Signed: Kee Cartagena MD   1/14/2024 6:08 PM EST       All medications reviewed.   aspirin, 81 mg, Oral, Nightly  cefTRIAXone, 1,000 mg, Intravenous, Q24H  gabapentin, 600 mg, Oral, Q8H  hydrALAZINE, 25 mg, Oral, Q8H  insulin detemir, 5 Units, Subcutaneous, Nightly  insulin lispro, 2-7 Units, Subcutaneous, 4x Daily AC & at  Bedtime  lisinopril, 40 mg, Oral, Q24H  metoprolol tartrate, 25 mg, Oral, Q12H  nicotine, 1 patch, Transdermal, Nightly  [START ON 1/21/2024] pantoprazole, 40 mg, Oral, Q AM  rosuvastatin, 40 mg, Oral, Nightly  senna-docusate sodium, 2 tablet, Oral, BID  sodium chloride, 10 mL, Intravenous, Q12H  vancomycin, 1,000 mg, Intravenous, Q12H          Assessment & Plan       Ischemic pain of left foot    Claudication of lower extremity s/p femorofemoral bypass    Ischemic foot    Peripheral arterial disease    Hypertension    Hyperlipidemia    Tobacco abuse    Diabetes mellitus type II, non insulin dependent    Sepsis associated hypotension      48-year-old gentleman with diabetes, hypertension, dyslipidemia, tobacco abuse, known peripheral artery disease who underwent a left femoral-popliteal bypass April 25, 2017.  He developed worsening claudication in the left foot and was evaluated November 20 and was found to be pulseless in the left foot.  Aortogram revealed thromboembolism in the left popliteal graft and right femoral artery.  November 21 he underwent left femoral artery cutdown with thromboembolectomy of the left femoral-popliteal bypass graft.  He has black toes, 2 3 and 4 on his left foot and no palpable pulse.  January 19 he underwent left forefoot amputation.  On admission creatinine 2.07.  Improved to 0.59 today.  Preoperative hemoglobin 10.1, today 8.6.  He was on some low-dose Cardene this morning before getting his usual oral medications.    PLAN:    Follow-up surgical cultures  Aspirin, Crestor  Hydralazine, metoprolol, lisinopril  Wean Cardene drip  Gabapentin 600 mg every 8 hours  Sliding scale insulin  Nerve block infusing ropivacaine  Ceftriaxone, vancomycin for osteomyelitis  Start subcutaneous heparin   Continue Protonix        Masha Adame MD  01/20/24  11:01 EST      Time: Critical care 25 min  I personally provided care to this critically ill patient as documented above.  Critical care  time does not include time spent on separately billed procedures.  None of my critical care time was concurrent with other critical care providers.     Electronically signed by Masha Adame MD at 01/20/24 1107       Christo Peres Jr., MD at 01/20/24 0726          Justo Oquendo       LOS: 6 days   Patient Care Team:  Melo Whitaker MD as PCP - General (Family Medicine)  Niall Mcfarlane MD as Consulting Physician (Cardiology)    Chief Complaint:  left foot ischemia    Subjective     Interval History:     Resting comfortably in bed this morning.  Family present at bedside.  Pain tolerable, no acute events overnight.      Review of Systems:      Gen- No fevers, chills  CV- No chest pain, palpitations  Resp- No cough, dyspnea  GI- No N/V/D, abd pain    Objective     Vital Signs  Vital Signs (last 24 hours)         01/15 0700  01/16 0659 01/16 0700 01/16 0731   Most Recent      Temp (°F) 96.6 -  97.6       97.2 (36.2) 01/16 0652    Heart Rate 74 -  101       82 01/16 0652    Resp 14 -  16       16 01/16 0652    /70 -  157/76       134/70 01/16 0652    SpO2 (%) 93 -  99       97 01/16 0652    Flow (L/min)   2.5       2.5 01/16 0652              Physical Exam:     Alert, oriented.  No acute distress.  Nonlabored respirations.  Regular rate and rhythm.  Abdomen nondistended.  Left lower extremity: Operative dressing intact, clean, dry.     Results Review:     I reviewed the patient's new clinical results.    Medication Review:   Hospital Medications (active)         Dose Frequency Start End    aspirin EC tablet 81 mg 81 mg Nightly 1/14/2024 --    Admin Instructions: Do not crush or chew the capsules or tablets. The drug may not work as designed if the capsule or tablet is crushed or chewed. Swallow whole.  Do not exceed 4 grams of aspirin in a 24 hr period.    If given for pain, use the following pain scale:   Mild Pain = Pain Score of 1-3, CPOT 1-2  Moderate Pain = Pain Score of 4-6,  "CPOT 3-4  Severe Pain = Pain Score of 7-10, CPOT 5-8    Route: Oral    bisacodyl (DULCOLAX) EC tablet 5 mg 5 mg Daily PRN 1/14/2024 --    Admin Instructions: Use if no bowel movement after 12 hours.  Swallow whole. Do not crush, split, or chew tablet.    Route: Oral    Linked Group 1: See Hyperspace for full Linked Orders Report.        bisacodyl (DULCOLAX) suppository 10 mg 10 mg Daily PRN 1/14/2024 --    Admin Instructions: Use if no bowel movement after 12 hours.  Hold for diarrhea    Route: Rectal    Linked Group 1: See Hyperspace for full Linked Orders Report.        Calcium Replacement - Follow Nurse / BPA Driven Protocol  As Needed 1/14/2024 --    Admin Instructions: Open Order & Select \"[facility]HS Electrolyte Replacement Protocol Algorithm\" to View Details    Route: Does not apply    cefTRIAXone (ROCEPHIN) 1,000 mg in sodium chloride 0.9 % 100 mL IVPB 1,000 mg Every 24 Hours 1/15/2024 1/22/2024    Admin Instructions: LR should be paused and flushing of the line with NS is recommended prior to and after completion of ceftriaxone infusion due to incompatibility. Do not co-adminster with calcium-containing solutions.  Caution: Look alike/sound alike drug alert    Route: Intravenous    cyclobenzaprine (FLEXERIL) tablet 10 mg 10 mg 3 Times Daily PRN 1/14/2024 --    Route: Oral    dextrose (D50W) (25 g/50 mL) IV injection 25 g 25 g Every 15 Minutes PRN 1/14/2024 --    Admin Instructions: Blood sugar less than 70; patient has IV access - Unresponsive, NPO or Unable To Safely Swallow    Route: Intravenous    dextrose (GLUTOSE) oral gel 15 g 15 g Every 15 Minutes PRN 1/14/2024 --    Admin Instructions: BS<70, Patient Alert, Is not NPO, Can safely swallow.    Route: Oral    gabapentin (NEURONTIN) capsule 600 mg 600 mg Every 8 Hours Scheduled 1/14/2024 --    Admin Instructions:     Route: Oral    glucagon (GLUCAGEN) injection 1 mg 1 mg Every 15 Minutes PRN 1/14/2024 --    Admin Instructions: Blood Glucose Less Than 70 - " Patient Without IV Access - Unresponsive, NPO or Unable To Safely Swallow  Reconstitute powder for injection by adding 1 mL of -supplied sterile diluent or sterile water for injection to a vial containing 1 mg of the drug, to provide solutions containing 1 mg/mL. Shake vial gently to dissolve.    Route: Intramuscular    heparin 08041 units/250 mL (100 units/mL) in 0.45 % NaCl infusion 21 Units/kg/hr × 73.5 kg Titrated 1/14/2024 --    Admin Instructions: Pharmacy dosing - Cardiac or Other NOT VTE - Boluses (No initial bolus)    Route: Intravenous    HYDROmorphone (DILAUDID) injection 1 mg 1 mg Every 2 Hours PRN 1/14/2024 1/19/2024    Admin Instructions: Based on patient request - if ordered for moderate or severe pain, provider allows for administration of a medication prescribed for a lower pain scale.      Caution: Look alike/sound alike drug alert    If given for pain, use the following pain scale:  Mild Pain = Pain Score of 1-3, CPOT 1-2  Moderate Pain = Pain Score of 4-6, CPOT 3-4  Severe Pain = Pain Score of 7-10, CPOT 5-8    Route: Intravenous    Linked Group 2: See Bianca for full Linked Orders Report.        Insulin Lispro (humaLOG) injection 2-7 Units 2-7 Units 4 Times Daily Before Meals & Nightly 1/14/2024 --    Admin Instructions: Correction Insulin - Low Dose - Total Insulin Dose Less Than 40 units/day (Lean, Elderly or Renal Patients)    Blood Glucose 150-199 mg/dL - 2 units  Blood Glucose 200-249 mg/dL - 3 units  Blood Glucose 250-299 mg/dL - 4 units  Blood Glucose 300-349 mg/dL - 5 units  Blood Glucose 350-400 mg/dL - 6 units  Blood Glucose Greater Than 400 mg/dL - 7 units & Call Provider   Caution: Look alike/sound alike drug alert    Route: Subcutaneous    LORazepam (ATIVAN) tablet 0.5 mg 0.5 mg Every 8 Hours PRN 1/14/2024 1/19/2024    Admin Instructions:  Caution: Look alike/sound alike drug alert    Route: Oral    Magnesium Standard Dose Replacement - Follow Nurse / BPA Driven  "Protocol  As Needed 1/14/2024 --    Admin Instructions: Open Order & Select \"BHS Electrolyte Replacement Protocol Algorithm\" to View Details    Route: Does not apply    metoprolol tartrate (LOPRESSOR) tablet 25 mg 25 mg Every 12 Hours Scheduled 1/14/2024 --    Admin Instructions: Hold for SBP less than 100, DBP less than 60, or heart rate less than 50    Route: Oral    naloxone (NARCAN) injection 0.4 mg 0.4 mg Every 5 Minutes PRN 1/14/2024 --    Admin Instructions: If Respiratory Rate Less Than 8 or Patient is Difficult to Arouse, Stop ALL Narcotics & Contact Provider.  Administer Slow IV Push.  Repeat As Ordered Until Respiratory Rate is Greater Than 12.    Route: Intravenous    Linked Group 2: See Hyperspace for full Linked Orders Report.        nicotine (NICODERM CQ) 21 MG/24HR patch 1 patch 1 patch Nightly 1/14/2024 --    Admin Instructions: Apply Patch to Clean, Dry, Hairless Area Daily - Rotating Sites.  REMOVE Old Patch Prior to Applying New Patch.  May Remove Patch at Bedtime If Needed to Prevent Insomnia.  Do Not Use Other Nicotine Products.  At Discharge, Follow Package Instructions.  Dispose of nicotine replacement therapies and their wrappers in non-hazardous pharmaceutical waste or in regular trash.    Route: Transdermal    nicotine polacrilex (NICORETTE) gum 2 mg 2 mg Every 1 Hour PRN 1/14/2024 --    Admin Instructions: Maximum 24 pieces in 24 hours.    Gum should be chewed until it tingles, then \"park\" between the gum and cheek.   When the tingling is gone, chew again until the tingle returns and once again \"park\" between gum and cheek.   Repeat until tingling is gone and discard.  At discharge, follow instructions on package  Dispose of nicotine replacement therapies and their wrappers in non-hazardous pharmaceutical waste or in regular trash.    Route: Mouth/Throat    nitroglycerin (NITROSTAT) SL tablet 0.4 mg 0.4 mg Every 5 Minutes PRN 1/14/2024 --    Admin Instructions: If Pain Unrelieved After 3 " "Doses Notify MD  May administer up to 3 doses per episode.    Route: Sublingual    ondansetron (ZOFRAN) injection 4 mg 4 mg Every 6 Hours PRN 1/14/2024 --    Admin Instructions: If BOTH ondansetron (ZOFRAN) & promethazine (PHENERGAN) Ordered, Use ondansetron First & THEN promethazine IF ondansetron Ineffective.    Route: Intravenous    Linked Group 3: See Hyperspace for full Linked Orders Report.        ondansetron ODT (ZOFRAN-ODT) disintegrating tablet 4 mg 4 mg Every 6 Hours PRN 1/14/2024 --    Admin Instructions: If BOTH ondansetron (ZOFRAN) & promethazine (PHENERGAN) Ordered, Use ondansetron First & THEN promethazine IF ondansetron Ineffective.  Place on tongue and allow to dissolve.    Route: Oral    Linked Group 3: See Hyperspace for full Linked Orders Report.        oxyCODONE (ROXICODONE) immediate release tablet 10 mg 10 mg 4 Times Daily 1/14/2024 1/19/2024    Admin Instructions: HOLD for sedation  If given for pain, use the following pain scale:  Mild Pain = Pain Score of 1-3, CPOT 1-2  Moderate Pain = Pain Score of 4-6, CPOT 3-4  Severe Pain = Pain Score of 7-10, CPOT 5-8    Route: Oral    oxyCODONE (ROXICODONE) immediate release tablet 10 mg 10 mg Every 4 Hours PRN 1/14/2024 1/19/2024    Admin Instructions: Based on patient request - if ordered for moderate or severe pain, provider allows for administration of a medication prescribed for a lower pain scale.  If given for pain, use the following pain scale:  Mild Pain = Pain Score of 1-3, CPOT 1-2  Moderate Pain = Pain Score of 4-6, CPOT 3-4  Severe Pain = Pain Score of 7-10, CPOT 5-8    Route: Oral    Pharmacy to Dose Heparin  Continuous PRN 1/14/2024 --    Admin Instructions: Boluses (No initial bolus)    Route: Does not apply    Pharmacy to dose vancomycin  Continuous PRN 1/14/2024 1/21/2024    Route: Does not apply    Phosphorus Replacement - Follow Nurse / BPA Driven Protocol  As Needed 1/14/2024 --    Admin Instructions: Open Order & Select \"BHS " "Electrolyte Replacement Protocol Algorithm\" to View Details    Route: Does not apply    polyethylene glycol (MIRALAX) packet 17 g 17 g Daily PRN 1/14/2024 --    Admin Instructions: Use if no bowel movement after 12 hours. Mix in 6-8 ounces of water.  Use 4-8 ounces of water, tea, or juice for each 17 gram dose.    Route: Oral    Linked Group 1: See Hyperspace for full Linked Orders Report.        Potassium Replacement - Follow Nurse / BPA Driven Protocol  As Needed 1/14/2024 --    Admin Instructions: Open Order & Select \"BHS Electrolyte Replacement Protocol Algorithm\" to View Details    Route: Does not apply    rosuvastatin (CRESTOR) tablet 40 mg 40 mg Nightly 1/14/2024 --    Admin Instructions: Avoid grapefruit juice.    Route: Oral    sennosides-docusate (PERICOLACE) 8.6-50 MG per tablet 2 tablet 2 tablet 2 Times Daily 1/14/2024 --    Admin Instructions: HOLD MEDICATION IF PATIENT HAS HAD BOWEL MOVEMENT. Start bowel management regimen if patient has not had a bowel movement after 12 hours.    Route: Oral    Linked Group 1: See Hyperspace for full Linked Orders Report.        sodium chloride 0.9 % flush 10 mL 10 mL Every 12 Hours Scheduled 1/14/2024 --    Route: Intravenous    sodium chloride 0.9 % flush 10 mL 10 mL As Needed 1/14/2024 --    Route: Intravenous    sodium chloride 0.9 % infusion 40 mL 40 mL As Needed 1/14/2024 --    Admin Instructions: Following administration of an IV intermittent medication, flush line with 40mL NS at 100mL/hr.    Route: Intravenous    sodium chloride 0.9 % infusion 125 mL/hr Continuous 1/14/2024 --    Route: Intravenous    temazepam (RESTORIL) capsule 15 mg 15 mg Nightly PRN 1/14/2024 1/19/2024    Admin Instructions: Group 2 (Pink) Hazardous Drug - Reproductive Risk Only - See Handling Guide    Route: Oral    vancomycin (VANCOCIN) 1000 mg/200 mL dextrose 5% IVPB 1,000 mg Every 12 Hours Scheduled 1/15/2024 1/20/2024    Route: Intravenous              Assessment & Plan "     48-year-old male with peripheral vascular disease, left second through fourth toe dry gangrene, status post transmetatarsal amputation 1/19/24.      Ischemic pain of left foot    Peripheral arterial disease    Hypertension    Hyperlipidemia    Tobacco abuse    Diabetes mellitus type II, non insulin dependent    Claudication of lower extremity s/p femorofemoral bypass    Ischemic foot    Sepsis associated hypotension      Nonweightbearing left lower extremity.  Plan for dressing change tomorrow.  Okay to remove dressing for arterial Doppler as indicated.  Follow cultures.    Upon discharge, plan for follow-up in 2 weeks for wound check and x-rays.    Follow up with Winter Santana PA-C.    [facility]  [address], [address]  [place], [address]  Please schedule at [phone]      Christo Peres Jr, MD  01/20/24  07:26 EST             Electronically signed by Christo Peres Jr., MD at 01/20/24 0730       Masha Adame MD at 01/19/24 1439          Critical Care Note     LOS: 5 days   Patient Care Team:  Melo Whitaker MD as PCP - General (Family Medicine)  Niall Mcfarlane MD as Consulting Physician (Cardiology)    Chief Complaint/Reason for visit:    Chief Complaint   Patient presents with    Altered Mental Status   Ischemic right foot  Peripheral artery disease  History of left femoral-popliteal bypass, previous femoral-femoral bypass  Hypertension  Hyperlipidemia  Diabetes mellitus type 2  Tobacco abuse      Subjective     Interval History:     I saw this patient in the morning before he went to surgery.  He was having a lot of pain in his left foot and this did cause some hypertension.    Review of Systems:    All systems were reviewed and negative except as noted in subjective.    Medical history, surgical history, social history, family history reviewed    Objective     Intake/Output:    Intake/Output Summary (Last 24 hours) at 1/19/2024 1511  Last  "data filed at 1/19/2024 1205  Gross per 24 hour   Intake 1618.7 ml   Output 1200 ml   Net 418.7 ml       Nutrition:  Diet: Liquid Diets; Clear Liquid; Texture: Regular Texture (IDDSI 7); Fluid Consistency: Thin (IDDSI 0)    Infusions:  lactated ringers, 9 mL/hr, Last Rate: 1,000 mL/hr (01/18/24 1510)  niCARdipine, 5-15 mg/hr, Last Rate: 5 mg/hr (01/19/24 1125)  Pharmacy to dose vancomycin,   ropivacaine,         Mechanical Ventilator Settings:                                                Telemetry: Sinus rhythm, sinus tachycardia             Vital Signs  Blood pressure 121/56, pulse 96, temperature 99.1 °F (37.3 °C), resp. rate 16, height 172.7 cm (68\"), weight 73.5 kg (162 lb), SpO2 95%.    Physical Exam:  General Appearance:  Middle-aged gentleman in no respiratory distress   Head:  Atraumatic   Eyes:          Conjunctiva pink, no jaundice   Ears:     Throat: Oral mucosa moist   Neck: Trachea midline, no carotid bruits   Back:      Lungs:   Symmetric chest expansion without wheeze    Heart:  Regular rhythm, S1, S2 auscultated   Abdomen:   Bowel sounds present, soft, nontender   Rectal:   Deferred   Extremities: Left foot and ankle swelling, black toes,   Pulses: No left foot pulse   Skin: No skin rash   Lymph nodes: No cervical adenopathy   Neurologic: Alert and oriented      Results Review:     I reviewed the patient's new clinical results.   Results from last 7 days   Lab Units 01/19/24  0247 01/18/24  1856 01/18/24  0730 01/15/24  0359 01/14/24  1512   SODIUM mmol/L 134* 138 138   < > 134*   POTASSIUM mmol/L 4.1 3.9 3.6   < > 4.1   CHLORIDE mmol/L 102 103 102   < > 96*   CO2 mmol/L 21.0* 21.0* 24.0   < > 25.0   BUN mg/dL 9 9 9   < > 30*   CREATININE mg/dL 0.66* 0.68* 0.60*   < > 2.07*   CALCIUM mg/dL 9.0 9.3 9.3   < > 10.0   BILIRUBIN mg/dL  --  0.3  --   --  0.2   ALK PHOS U/L  --  57  --   --  83   ALT (SGPT) U/L  --  25  --   --  13   AST (SGOT) U/L  --  44*  --   --  14   GLUCOSE mg/dL 157* 128* 151*   < " "> 302*    < > = values in this interval not displayed.     Results from last 7 days   Lab Units 01/19/24  0247 01/18/24  0730 01/16/24  0648   WBC 10*3/mm3 8.03 6.77 6.90   HEMOGLOBIN g/dL 10.1* 11.9* 11.0*   HEMATOCRIT % 29.2* 35.1* 33.9*   PLATELETS 10*3/mm3 200 230 264         Lab Results   Component Value Date    BLOODCX No growth at 4 days 01/14/2024     No results found for: \"URINECX\"    I reviewed the patient's new imaging including images and reports.    CT ANGIO ABDOMINAL AORTA BILAT ILIOFEM RUNOFF    Date of Exam: 1/14/2024 5:34 PM EST    Indication: Claudication or leg ischemia  Previous fem pop graft occlusion, c/o increased LLE pain, color changes, third/fourth toe ischemia, leukocytosis, r/o gas.    Comparison: None available.    Technique: CTA of the abdomen, pelvis and both lower extremities was performed after the uneventful intravenous administration of 120 cc Isovue-370 . Reconstructed coronal and sagittal images were also obtained. In addition, a 3-D volume rendered image  was created for interpretation. Automated exposure control and iterative reconstruction methods were used.    The origins of the celiac axis and superior mesenteric arteries are normal. The origins of the renal arteries are normal. Mild atheromatous disease of the abdominal aorta with subtle ectasia. The origin of the inferior mesenteric artery is patent.    Complete occlusion of the left common iliac artery with continued complete loss of flow within the left internal and external iliac arteries. Multi focal atheromatous disease of the right common iliac artery as well as the internal and external iliac  arteries. Femorofemoral bypass.    Complete occlusion of the left femoral artery bypass with no underlying flow. Reconstitution of flow distally at the level of the popliteal artery due to branches from the deep femoral artery. The superior segments of the left anterior and posterior  tibial arteries are patent. Eventual loss " of flow within the posterior tibial artery seen on axial image 383. Continued flow within the dorsalis pedis with eventual loss of flow within the arteries.    On the right there is continued flow within the right deep femoral artery. Complete occlusion of the left femoral artery with distal reconstitution from branches of the deep femoral artery. The popliteal artery is patent. The anterior and posterior  tibial vessels are patent proximally. Loss of flow within the anterior tibial artery on axial image #343. Loss of the contrast column within the posterior tibial artery on axial image  417.    The liver appears normal. Calcified granulomata within the spleen.    Normal pancreas. Prior cholecystectomy. The adrenal glands are normal.    No renal masses. No hydroureteronephrosis. The ureters and urinary bladder are normal.    The small bowel is nonobstructed. Normal appendix. Colonic diverticulosis without evidence of diverticulitis.    No ascites or pneumoperitoneum. No adenopathy. Degenerative changes of the hips and lumbar spine.   Impression:     Complete occlusion of the left femoral artery bypass graft. Complete occlusion of the right femoral artery. Eventual distal reconstitution of both vessels due to branches of the deep femoral arteries. Loss of flow within the left posterior  tibial artery. Loss of flow within the right anterior tibial artery. Eventual loss of flow within the contrast column on both sides with no residual flow to the digital arteries.    Electronically Signed: Kee Cartagena MD   1/14/2024 6:08 PM EST       All medications reviewed.   aspirin, 81 mg, Oral, Nightly  cefTRIAXone, 1,000 mg, Intravenous, Q24H  gabapentin, 600 mg, Oral, Q8H  hydrALAZINE, 25 mg, Oral, Q8H  insulin lispro, 2-7 Units, Subcutaneous, 4x Daily AC & at Bedtime  lisinopril, 40 mg, Oral, Q24H  metoprolol tartrate, 25 mg, Oral, Q12H  nicotine, 1 patch, Transdermal, Nightly  pantoprazole, 40 mg, Intravenous, Q  AM  rosuvastatin, 40 mg, Oral, Nightly  senna-docusate sodium, 2 tablet, Oral, BID  sodium chloride, 10 mL, Intravenous, Q12H  vancomycin, 1,500 mg, Intravenous, Q12H          Assessment & Plan       Ischemic pain of left foot    Claudication of lower extremity s/p femorofemoral bypass    Ischemic foot    Peripheral arterial disease    Hypertension    Hyperlipidemia    Tobacco abuse    Diabetes mellitus type II, non insulin dependent    Sepsis associated hypotension      48-year-old gentleman with diabetes, hypertension, dyslipidemia, tobacco abuse, known peripheral artery disease who underwent a left femoral-popliteal bypass April 25, 2017.  He developed worsening claudication in the left foot and was evaluated November 20 and was found to be pulseless in the left foot.  Aortogram revealed thromboembolism in the left popliteal graft and right femoral artery.  He has black toes, 2 3 and 4 on his left foot and no palpable pulse.  On admission creatinine 2.07.  Improved to 0.66 today.  Preoperative hemoglobin 10.1.    PLAN:    Left foot transmetatarsal amputation  Aspirin, Crestor  Hydralazine, metoprolol, lisinopril  Gabapentin 600 mg every 8 hours  Sliding scale insulin  Nerve block infusing ropivacaine  Ceftriaxone, vancomycin for osteomyelitis  Follow-up wound cultures  Start subcutaneous heparin tomorrow  Continue Protonix        Masha Adame MD  01/19/24  15:11 EST      Time: Critical care 25 min  I personally provided care to this critically ill patient as documented above.  Critical care time does not include time spent on separately billed procedures.  None of my critical care time was concurrent with other critical care providers.     Electronically signed by Masha Adame MD at 01/19/24 5012       Vin Pena MD at 01/19/24 1027             LOS: 5 days   Patient Care Team:  Melo Whitaker MD as PCP - General (Family Medicine)  Niall Mcfarlane MD as Consulting  "Physician (Cardiology)    Chief Complaint: LEFT 2nd-4th toe gangrene    Subjective     Altered Mental Status        Subjective:  Symptoms:  Improved.  (More sensation and motor of the LEFT foot).    Diet:  NPO.    Activity level: Returning to normal.    Pain:  He complains of pain that is severe (8/10).  He reports pain is improving.  Pain is poorly controlled.        History taken from: patient    Objective     Vital Signs  Temp:  [97.2 °F (36.2 °C)-99.4 °F (37.4 °C)] 97.4 °F (36.3 °C)  Heart Rate:  [] 101  Resp:  [16-20] 17  BP: (109-158)/(54-98) 149/72    Objective:  General Appearance:  Comfortable.    Vital signs: (most recent): Blood pressure 149/72, pulse 101, temperature 97.4 °F (36.3 °C), temperature source Temporal, resp. rate 17, height 172.7 cm (68\"), weight 73.5 kg (162 lb), SpO2 98%.  Vital signs are normal.    HEENT: Normal HEENT exam.    Lungs:  Normal effort and normal respiratory rate.  He is not in respiratory distress.  No wheezes.    Heart: Normal rate.  Regular rhythm.    Chest: Symmetric chest wall expansion. No chest wall tenderness.    Abdomen: Abdomen is soft.  Bowel sounds are normal.   There is no abdominal tenderness.     Pulses: (Strong LEFT PT/AT biphasic signals)    Neurological: Patient is alert and oriented to person, place and time.    Skin:  (LEFT thigh/leg incisions with Aquacel, LEFT 2nd-4th toes dry gangrene)              Results Review:     I reviewed the patient's new clinical results.    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 Peres for TMA  - pain " control  - defer wound care to Dr. Peres  - OK to remove Aquacel dressings 1/23/24  - OK to shower after Aquacel dressings removed 1/23/24  - OK to discharge from vascular surgery standpoint when ambulating, pain controlled  - F/U Becky 2 weeks with ARLINE    Vin Pena MD  01/19/24  10:27 EST    Time: More than 50% of time spent in counseling and coordination of care:  Total face-to-face/floor time 5 min.  Time spent in counseling 5 min. Counseling included the following topics: recommendation for TMA        Electronically signed by Vin Pena MD at 01/19/24 1034       Debo Orourke APRN at 01/19/24 0906            [facility]  PROGRESS NOTE    Justo Oquendo   [id number]   1975    LOS: 5 days .  Date of Admission: 1/14/2024  Date of Service: 01/19/24    Primary Care Physician: Melo Whitaker MD    Chief Complaint: f/u AMS    Subjective      Patient lying in bed with family at bedside. He is about to go to surgery for left metatarsal amputation. Patient denies shortness of breath, chest pain, palpitations, lightheadedness, and syncope.     ROS  All systems have been reviewed and are negative with the exception of those mentioned in the HPI and problem list above.     Objective   Vital Sign Min/Max for last 24 hours  Temp  Min: 97.2 °F (36.2 °C)  Max: 99.2 °F (37.3 °C)   BP  Min: 109/78  Max: 158/77   Pulse  Min: 87  Max: 116   Resp  Min: 16  Max: 20   SpO2  Min: 94 %  Max: 100 %   No data recorded   No data recorded     Physical Exam:  GENERAL: Alert, cooperative, in no acute distress.   HEENT: Normocephalic, no jugular venous distention  HEART: Regular rhythm, normal rate, and no murmurs, gallops, or rubs.   LUNGS: Clear to auscultation bilaterally. No wheezing, rales or rhonchi. On 1 L NC  NEUROLOGIC: No focal abnormalities involving strength or sensation are noted.   EXTREMITIES: No clubbing, cyanosis, or edema noted. Left 2nd-4th toes are  black. Left foot and leg warm to touch. PT and DP pulses 2+    Results:  Results from last 7 days   Lab Units 01/19/24  0247 01/18/24  0730 01/16/24  0648   WBC 10*3/mm3 8.03 6.77 6.90   HEMOGLOBIN g/dL 10.1* 11.9* 11.0*   HEMATOCRIT % 29.2* 35.1* 33.9*   PLATELETS 10*3/mm3 200 230 264     Results from last 7 days   Lab Units 01/19/24  0247 01/18/24  1856 01/18/24  0730   SODIUM mmol/L 134* 138 138   POTASSIUM mmol/L 4.1 3.9 3.6   CHLORIDE mmol/L 102 103 102   CO2 mmol/L 21.0* 21.0* 24.0   BUN mg/dL 9 9 9   CREATININE mg/dL 0.66* 0.68* 0.60*   GLUCOSE mg/dL 157* 128* 151*      Lab Results   Component Value Date    AST 44 (H) 01/18/2024    ALT 25 01/18/2024                     Results from last 7 days   Lab Units 01/18/24  0730 01/17/24  2359 01/17/24  1441 01/15/24  0359 01/14/24  2111   PROTIME Seconds  --   --   --   --  15.0*   INR   --   --   --   --  1.17*   APTT seconds 55.3* 79.4 44.9*   < > 38.7*    < > = values in this interval not displayed.     Results from last 7 days   Lab Units 01/14/24  1512   CK TOTAL U/L 57   HSTROP T ng/L 18     Results from last 7 days   Lab Units 01/14/24  1512   PROBNP pg/mL 63.8       Intake/Output Summary (Last 24 hours) at 1/19/2024 0949  Last data filed at 1/19/2024 0600  Gross per 24 hour   Intake 1789.5 ml   Output 1200 ml   Net 589.5 ml       EKG/TELE: NSR    Radiology Data:   No radiology results for the last day   Results for orders placed during the hospital encounter of 11/20/23    Adult Transthoracic Echo Complete W/ Cont if Necessary Per Protocol    Interpretation Summary    Left ventricular systolic function is hyperdynamic (EF > 70%). Calculated left ventricular EF = 70.4%    Left ventricular diastolic function was normal.    No significant structural or functional valvular disease.     Current Medications:  aspirin, 81 mg, Oral, Nightly  cefTRIAXone, 1,000 mg, Intravenous, Q24H  gabapentin, 600 mg, Oral, Q8H  hydrALAZINE, 25 mg, Oral, Q8H  insulin lispro, 2-7  Units, Subcutaneous, 4x Daily AC & at Bedtime  lisinopril, 40 mg, Oral, Q24H  metoprolol tartrate, 25 mg, Oral, Q12H  nicotine, 1 patch, Transdermal, Nightly  pantoprazole, 40 mg, Intravenous, Q AM  rosuvastatin, 40 mg, Oral, Nightly  senna-docusate sodium, 2 tablet, Oral, BID  sodium chloride, 10 mL, Intravenous, Q12H  vancomycin, 1,500 mg, Intravenous, Q12H      lactated ringers, 9 mL/hr, Last Rate: 1,000 mL/hr (01/18/24 1510)  niCARdipine, 5-15 mg/hr, Last Rate: 10 mg/hr (01/19/24 0946)  Pharmacy to Dose Heparin,   Pharmacy to dose vancomycin,       Assessment and Plan:   Femoral popliteal bypass complicated by complete occlusion of left femoral artery bypass graft.  Ischemic left 4th toe with worsening discoloration of 2nd-3rd toes.   1/18/24 Dr. Pena left graft thrombectomy, AKP-BKP jump graft with tibial angioplasty  Orthopedics following with plans for transmetatarsal amputation today  PAD  HTN  Uncontrolled DMT2   HLD  Tobacco abuse    - Not having active cardiac complaints.  Can proceed with surgery without further CV risk stratification.    - Continue metoprolol, lisinopril, hydralazine and amlodipine as tolerated. Use cardene drip for systolic BP  >200  - Continue ASA and statin   - We will follow on an as needed basis. Please contact us for questions or concerns.   - Patient will need 4 week follow up with Dr. Hoyt at discharge.     Electronically signed by PRIMO Metz, 01/19/24, 9:06 AM EST.     Please note that portions of this note were dictated utilizing Dragon dictation.     Electronically signed by Debo Orourke APRN at 01/19/24 0949       Christo Peres Jr., MD at 01/19/24 0721          Justo Oquendo       LOS: 5 days   Patient Care Team:  Melo Whitaker MD as PCP - General (Family Medicine)  Niall Mcfarlane MD as Consulting Physician (Cardiology)    Chief Complaint:  left foot ischemia    Subjective     Interval History:     Resting comfortably in bed this  morning.  Family present at bedside.  Pain tolerable, no acute events overnight.      Review of Systems:      Gen- No fevers, chills  CV- No chest pain, palpitations  Resp- No cough, dyspnea  GI- No N/V/D, abd pain    Objective     Vital Signs  Vital Signs (last 24 hours)         01/15 0700  01/16 0659 01/16 0700 01/16 0731   Most Recent      Temp (°F) 96.6 -  97.6       97.2 (36.2) 01/16 0652    Heart Rate 74 -  101       82 01/16 0652    Resp 14 - 16 16 01/16 0652    /70 -  157/76       134/70 01/16 0652    SpO2 (%) 93 -  99       97 01/16 0652    Flow (L/min)   2.5       2.5 01/16 0652              Physical Exam:     Alert, oriented.  No acute distress.  Nonlabored respirations.  Regular rate and rhythm.  Abdomen nondistended.  Left lower extremity: Less redness around the foot, ischemic changes of his second through fourth lesser toes, becoming better demarcated.  Foot warm and well perfused.     Results Review:     I reviewed the patient's new clinical results.    Medication Review:   Hospital Medications (active)         Dose Frequency Start End    aspirin EC tablet 81 mg 81 mg Nightly 1/14/2024 --    Admin Instructions: Do not crush or chew the capsules or tablets. The drug may not work as designed if the capsule or tablet is crushed or chewed. Swallow whole.  Do not exceed 4 grams of aspirin in a 24 hr period.    If given for pain, use the following pain scale:   Mild Pain = Pain Score of 1-3, CPOT 1-2  Moderate Pain = Pain Score of 4-6, CPOT 3-4  Severe Pain = Pain Score of 7-10, CPOT 5-8    Route: Oral    bisacodyl (DULCOLAX) EC tablet 5 mg 5 mg Daily PRN 1/14/2024 --    Admin Instructions: Use if no bowel movement after 12 hours.  Swallow whole. Do not crush, split, or chew tablet.    Route: Oral    Linked Group 1: See [facility] for full Linked Orders Report.        bisacodyl (DULCOLAX) suppository 10 mg 10 mg Daily PRN 1/14/2024 --    Admin Instructions: Use if no bowel movement after 12  "hours.  Hold for diarrhea    Route: Rectal    Linked Group 1: See [facility] for full Linked Orders Report.        Calcium Replacement - Follow Nurse / BPA Driven Protocol  As Needed 1/14/2024 --    Admin Instructions: Open Order & Select \"BHS Electrolyte Replacement Protocol Algorithm\" to View Details    Route: Does not apply    cefTRIAXone (ROCEPHIN) 1,000 mg in sodium chloride 0.9 % 100 mL IVPB 1,000 mg Every 24 Hours 1/15/2024 1/22/2024    Admin Instructions: LR should be paused and flushing of the line with NS is recommended prior to and after completion of ceftriaxone infusion due to incompatibility. Do not co-adminster with calcium-containing solutions.  Caution: Look alike/sound alike drug alert    Route: Intravenous    cyclobenzaprine (FLEXERIL) tablet 10 mg 10 mg 3 Times Daily PRN 1/14/2024 --    Route: Oral    dextrose (D50W) (25 g/50 mL) IV injection 25 g 25 g Every 15 Minutes PRN 1/14/2024 --    Admin Instructions: Blood sugar less than 70; patient has IV access - Unresponsive, NPO or Unable To Safely Swallow    Route: Intravenous    dextrose (GLUTOSE) oral gel 15 g 15 g Every 15 Minutes PRN 1/14/2024 --    Admin Instructions: BS<70, Patient Alert, Is not NPO, Can safely swallow.    Route: Oral    gabapentin (NEURONTIN) capsule 600 mg 600 mg Every 8 Hours Scheduled 1/14/2024 --    Admin Instructions:     Route: Oral    glucagon (GLUCAGEN) injection 1 mg 1 mg Every 15 Minutes PRN 1/14/2024 --    Admin Instructions: Blood Glucose Less Than 70 - Patient Without IV Access - Unresponsive, NPO or Unable To Safely Swallow  Reconstitute powder for injection by adding 1 mL of -supplied sterile diluent or sterile water for injection to a vial containing 1 mg of the drug, to provide solutions containing 1 mg/mL. Shake vial gently to dissolve.    Route: Intramuscular    heparin 18659 units/250 mL (100 units/mL) in 0.45 % NaCl infusion 21 Units/kg/hr × 73.5 kg Titrated 1/14/2024 --    Admin Instructions: " "Pharmacy dosing - Cardiac or Other NOT VTE - Boluses (No initial bolus)    Route: Intravenous    HYDROmorphone (DILAUDID) injection 1 mg 1 mg Every 2 Hours PRN 1/14/2024 1/19/2024    Admin Instructions: Based on patient request - if ordered for moderate or severe pain, provider allows for administration of a medication prescribed for a lower pain scale.      Caution: Look alike/sound alike drug alert    If given for pain, use the following pain scale:  Mild Pain = Pain Score of 1-3, CPOT 1-2  Moderate Pain = Pain Score of 4-6, CPOT 3-4  Severe Pain = Pain Score of 7-10, CPOT 5-8    Route: Intravenous    Linked Group 2: See [facility] for full Linked Orders Report.        Insulin Lispro (humaLOG) injection 2-7 Units 2-7 Units 4 Times Daily Before Meals & Nightly 1/14/2024 --    Admin Instructions: Correction Insulin - Low Dose - Total Insulin Dose Less Than 40 units/day (Lean, Elderly or Renal Patients)    Blood Glucose 150-199 mg/dL - 2 units  Blood Glucose 200-249 mg/dL - 3 units  Blood Glucose 250-299 mg/dL - 4 units  Blood Glucose 300-349 mg/dL - 5 units  Blood Glucose 350-400 mg/dL - 6 units  Blood Glucose Greater Than 400 mg/dL - 7 units & Call Provider   Caution: Look alike/sound alike drug alert    Route: Subcutaneous    LORazepam (ATIVAN) tablet 0.5 mg 0.5 mg Every 8 Hours PRN 1/14/2024 1/19/2024    Admin Instructions:  Caution: Look alike/sound alike drug alert    Route: Oral    Magnesium Standard Dose Replacement - Follow Nurse / BPA Driven Protocol  As Needed 1/14/2024 --    Admin Instructions: Open Order & Select \"BHS Electrolyte Replacement Protocol Algorithm\" to View Details    Route: Does not apply    metoprolol tartrate (LOPRESSOR) tablet 25 mg 25 mg Every 12 Hours Scheduled 1/14/2024 --    Admin Instructions: Hold for SBP less than 100, DBP less than 60, or heart rate less than 50    Route: Oral    naloxone (NARCAN) injection 0.4 mg 0.4 mg Every 5 Minutes PRN 1/14/2024 --    Admin Instructions: If " "Respiratory Rate Less Than 8 or Patient is Difficult to Arouse, Stop ALL Narcotics & Contact Provider.  Administer Slow IV Push.  Repeat As Ordered Until Respiratory Rate is Greater Than 12.    Route: Intravenous    Linked Group 2: See [facility] for full Linked Orders Report.        nicotine (NICODERM CQ) 21 MG/24HR patch 1 patch 1 patch Nightly 1/14/2024 --    Admin Instructions: Apply Patch to Clean, Dry, Hairless Area Daily - Rotating Sites.  REMOVE Old Patch Prior to Applying New Patch.  May Remove Patch at Bedtime If Needed to Prevent Insomnia.  Do Not Use Other Nicotine Products.  At Discharge, Follow Package Instructions.  Dispose of nicotine replacement therapies and their wrappers in non-hazardous pharmaceutical waste or in regular trash.    Route: Transdermal    nicotine polacrilex (NICORETTE) gum 2 mg 2 mg Every 1 Hour PRN 1/14/2024 --    Admin Instructions: Maximum 24 pieces in 24 hours.    Gum should be chewed until it tingles, then \"park\" between the gum and cheek.   When the tingling is gone, chew again until the tingle returns and once again \"park\" between gum and cheek.   Repeat until tingling is gone and discard.  At discharge, follow instructions on package  Dispose of nicotine replacement therapies and their wrappers in non-hazardous pharmaceutical waste or in regular trash.    Route: Mouth/Throat    nitroglycerin (NITROSTAT) SL tablet 0.4 mg 0.4 mg Every 5 Minutes PRN 1/14/2024 --    Admin Instructions: If Pain Unrelieved After 3 Doses Notify MD  May administer up to 3 doses per episode.    Route: Sublingual    ondansetron (ZOFRAN) injection 4 mg 4 mg Every 6 Hours PRN 1/14/2024 --    Admin Instructions: If BOTH ondansetron (ZOFRAN) & promethazine (PHENERGAN) Ordered, Use ondansetron First & THEN promethazine IF ondansetron Ineffective.    Route: Intravenous    Linked Group 3: See [facility] for full Linked Orders Report.        ondansetron ODT (ZOFRAN-ODT) disintegrating tablet 4 mg 4 mg Every " "6 Hours PRN 1/14/2024 --    Admin Instructions: If BOTH ondansetron (ZOFRAN) & promethazine (PHENERGAN) Ordered, Use ondansetron First & THEN promethazine IF ondansetron Ineffective.  Place on tongue and allow to dissolve.    Route: Oral    Linked Group 3: See [facility] for full Linked Orders Report.        oxyCODONE (ROXICODONE) immediate release tablet 10 mg 10 mg 4 Times Daily 1/14/2024 1/19/2024    Admin Instructions: HOLD for sedation  If given for pain, use the following pain scale:  Mild Pain = Pain Score of 1-3, CPOT 1-2  Moderate Pain = Pain Score of 4-6, CPOT 3-4  Severe Pain = Pain Score of 7-10, CPOT 5-8    Route: Oral    oxyCODONE (ROXICODONE) immediate release tablet 10 mg 10 mg Every 4 Hours PRN 1/14/2024 1/19/2024    Admin Instructions: Based on patient request - if ordered for moderate or severe pain, provider allows for administration of a medication prescribed for a lower pain scale.  If given for pain, use the following pain scale:  Mild Pain = Pain Score of 1-3, CPOT 1-2  Moderate Pain = Pain Score of 4-6, CPOT 3-4  Severe Pain = Pain Score of 7-10, CPOT 5-8    Route: Oral    Pharmacy to Dose Heparin  Continuous PRN 1/14/2024 --    Admin Instructions: Boluses (No initial bolus)    Route: Does not apply    Pharmacy to dose vancomycin  Continuous PRN 1/14/2024 1/21/2024    Route: Does not apply    Phosphorus Replacement - Follow Nurse / BPA Driven Protocol  As Needed 1/14/2024 --    Admin Instructions: Open Order & Select \"BHS Electrolyte Replacement Protocol Algorithm\" to View Details    Route: Does not apply    polyethylene glycol (MIRALAX) packet 17 g 17 g Daily PRN 1/14/2024 --    Admin Instructions: Use if no bowel movement after 12 hours. Mix in 6-8 ounces of water.  Use 4-8 ounces of water, tea, or juice for each 17 gram dose.    Route: Oral    Linked Group 1: See [facility] for full Linked Orders Report.        Potassium Replacement - Follow Nurse / BPA Driven Protocol  As Needed " "1/14/2024 --    Admin Instructions: Open Order & Select \"BHS Electrolyte Replacement Protocol Algorithm\" to View Details    Route: Does not apply    rosuvastatin (CRESTOR) tablet 40 mg 40 mg Nightly 1/14/2024 --    Admin Instructions: Avoid grapefruit juice.    Route: Oral    sennosides-docusate (PERICOLACE) 8.6-50 MG per tablet 2 tablet 2 tablet 2 Times Daily 1/14/2024 --    Admin Instructions: HOLD MEDICATION IF PATIENT HAS HAD BOWEL MOVEMENT. Start bowel management regimen if patient has not had a bowel movement after 12 hours.    Route: Oral    Linked Group 1: See [facility] for full Linked Orders Report.        sodium chloride 0.9 % flush 10 mL 10 mL Every 12 Hours Scheduled 1/14/2024 --    Route: Intravenous    sodium chloride 0.9 % flush 10 mL 10 mL As Needed 1/14/2024 --    Route: Intravenous    sodium chloride 0.9 % infusion 40 mL 40 mL As Needed 1/14/2024 --    Admin Instructions: Following administration of an IV intermittent medication, flush line with 40mL NS at 100mL/hr.    Route: Intravenous    sodium chloride 0.9 % infusion 125 mL/hr Continuous 1/14/2024 --    Route: Intravenous    temazepam (RESTORIL) capsule 15 mg 15 mg Nightly PRN 1/14/2024 1/19/2024    Admin Instructions: Group 2 (Pink) Hazardous Drug - Reproductive Risk Only - See Handling Guide    Route: Oral    vancomycin (VANCOCIN) 1000 mg/200 mL dextrose 5% IVPB 1,000 mg Every 12 Hours Scheduled 1/15/2024 1/20/2024    Route: Intravenous              Assessment & Plan     48-year-old male with peripheral vascular disease, left second through fourth toe dry gangrene.      Ischemic foot    Peripheral arterial disease    Hypertension    Hyperlipidemia    Tobacco abuse    Diabetes mellitus type II, non insulin dependent    Claudication of lower extremity s/p femorofemoral bypass    Sepsis associated hypotension    ATN (acute tubular necrosis)    Elevated serum creatinine    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    Ischemic pain of left foot    He " is status post revision bypass, has well perfused left lower extremity.  I discussed with Dr. Pena who feels he could heal a transmetatarsal amputation.  I discussed with Justo and his wife at bedside.  After discussion of risk, benefits, alternatives, he wished to proceed with left transmetatarsal amputation, possible wound vacuum-assisted closure.    Risks of surgery were discussed which included but were not limited to pain, bleeding, infection, damage to adjacent structures, need for further surgery, wound healing complications, loss of limb and death.  The patient expressed verbal consent and written consent was obtained for the above procedure.      Christo Peres Jr, MD  01/19/24  07:21 EST             Electronically signed by Christo Peres Jr., MD at 01/19/24 3498       Megan Choi APRN at 01/18/24 2037            Intensive Care Follow-up     Hospital:  LOS: 4 days   Mr. Justo Oquendo, 48 y.o. male is followed for:   Ischemic foot   Subjective   Subjective   Interval History:  Chart reviewed. VSS. Patient alert and oriented, conversant, lying in the bed with wife at bedside. States he is in a lot of pain and squeezing wife's hand. She states his fourth toe got a cut on it approximately two weeks ago then started changing color but the 2nd and 3rd toes abruptly changed color. They state the surgeon told them he was on the OR schedule for tomorrow (1/19/24) and that they would likely only have to amputate a portion of the foot, with an understanding he may require a BKA. All questions answered. NPO at midnight.     The patient's past medical, surgical and social history were reviewed and updated in Epic as appropriate.  Objective   Objective     Infusions:  heparin, 27 Units/kg/hr, Last Rate: Stopped (01/18/24 1201)  lactated ringers, 9 mL/hr, Last Rate: 1,000 mL/hr (01/18/24 1510)  niCARdipine, 5-15 mg/hr  Pharmacy to Dose Heparin,   Pharmacy to dose vancomycin,       Medications:  [MAR Hold]  aspirin, 81 mg, Oral, Nightly  cefTRIAXone, 1,000 mg, Intravenous, Q24H  gabapentin, 600 mg, Oral, Q8H  hydrALAZINE, 25 mg, Oral, Q8H  [MAR Hold] insulin lispro, 2-7 Units, Subcutaneous, 4x Daily AC & at Bedtime  lisinopril, 40 mg, Oral, Q24H  metoprolol tartrate, 25 mg, Oral, Q12H  [MAR Hold] nicotine, 1 patch, Transdermal, Nightly  [MAR Hold] oxyCODONE, 10 mg, Oral, 4x Daily  [MAR Hold] rosuvastatin, 40 mg, Oral, Nightly  [MAR Hold] senna-docusate sodium, 2 tablet, Oral, BID  [MAR Hold] sodium chloride, 10 mL, Intravenous, Q12H  vancomycin, 1,500 mg, Intravenous, Q12H    I reviewed the patient's medications.    Vital Sign Min/Max for last 24 hours  Temp  Min: 97.2 °F (36.2 °C)  Max: 98.6 °F (37 °C)   BP  Min: 126/67  Max: 171/79   Pulse  Min: 87  Max: 104   Resp  Min: 16  Max: 20   SpO2  Min: 93 %  Max: 100 %   Flow (L/min)  Min: 2  Max: 4       Input/Output for last 24 hour shift  01/17 0701 - 01/18 0700  In: 240 [P.O.:240]  Out: 400 [Urine:400]   S RR:  [2-12] 2    Physical Exam:  GENERAL: Patient lying in bed. Conversant. Distressed from pain.   HEENT: Normocephalic and atraumatic. Trachea midline. PER. EOM WNL.   LUNGS: Chest rise of normal depth and symmetric. Lungs clear to auscultation bilaterally. No wheezes, rhonchi, or rales.   HEART: S1,S2 detected. Regular rate and rhythm. No rub, murmur, or gallop.   ABDOMEN: Soft, round, nondistended, and nontender. Bowel sounds present.   EXTREMITIES: Necrotic left 2-4th toes. Small area of necrosis on left heel. DP pulses palpable. PT pulses via doppler. Left leg/groin incision C/D/I. No swelling.    NEURO/PSYCH: Alert and oriented. Follows commands. Moves all extremities. No focal deficits.      Results from last 7 days   Lab Units 01/18/24  0730 01/16/24  0648 01/15/24  0359   WBC 10*3/mm3 6.77 6.90 11.86*   HEMOGLOBIN g/dL 11.9* 11.0* 10.7*   PLATELETS 10*3/mm3 230 264 257     Results from last 7 days   Lab Units 01/18/24  0730 01/17/24  0801 01/17/24  0641  01/16/24  0648 01/15/24  0359 01/14/24  1512   SODIUM mmol/L 138  --  138 141   < > 134*   POTASSIUM mmol/L 3.6  --  3.8 4.1   < > 4.1   CO2 mmol/L 24.0  --  25.0 25.0   < > 25.0   BUN mg/dL 9  --  14 21*   < > 30*   CREATININE mg/dL 0.60* 0.60* 0.65* 0.80   < > 2.07*   MAGNESIUM mg/dL  --   --   --   --   --  2.1   GLUCOSE mg/dL 151*  --  145* 149*   < > 302*    < > = values in this interval not displayed.     Estimated Creatinine Clearance: 153.1 mL/min (A) (by C-G formula based on SCr of 0.6 mg/dL (L)).      I reviewed the patient's new clinical results.  I reviewed the patient's new imaging results/reports including actual images and agree with reports.     Imaging Results (Last 24 Hours)       Procedure Component Value Units Date/Time    [facility] OR Procedure [[id number]] Resulted: 01/18/24 1639     Updated: 01/18/24 1639            Assessment & Plan   Impression      Ischemic foot    Peripheral arterial disease    Hypertension    Hyperlipidemia    Tobacco abuse    Diabetes mellitus type II, non insulin dependent    Claudication of lower extremity s/p femorofemoral bypass    Sepsis associated hypotension    ATN (acute tubular necrosis)    Elevated serum creatinine    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       Plan        Justo Oquendo is a 48 y.o. male with PMH T2DM, HTN, dyslipidemia, ongoing tobacco abuse, PVD, and PAD.    Patient underwent left femoral to above-the knee popliteal bypass by Dr. Ryan on 4/25/2017 and was seen in office on 11/20/2023 after two weeks of claudication and a cold left foot. At that time, pulses were unable to be found in the left foot and he was sent to [facility], where he underwent aortagram, thromboembolectomy of left popliteal bypass graft, and right femoral to femoral bypass. After his hospitalization at that time, an outpatient referral was made to Dr. Peres of orthopedic surgery for consideration of BKA for critical PAD. His pre-operative appointment was to be 1/15/2024;  however, he was brought to [facility] ED on 1/14/24 with worsening claudication and two days of malaise, decreased urine output and encephalopathy.  CTA with runoff was performed demonstrating multi-level disease including left femoral artery occlusion and gangrene of the left second and fourth toes. He was started on a heparin drip and admitted to Hospital Medicine with Vascular Surgery consultation. Upon arrival, WBC was elevated and his systolic pressures were in the 80s. Creatinine was significantly elevated at 2.07 upon arrival, resolved to 0.6. Patient was empirically started on Rocephin and Vancomycin and given steroids and IVF with resolution of leukocytosis, hypotension, and ALEJANDRO.    Dr. Pena with Vascular Surgery and Orthopedics were consulted and, after cardiac clearance was obtained, the patient underwent graft revision 1/18/2024 and is seen in the ICU post-operatively. Orthopedics tentatively plan for amputation (level to be determined) Friday, 1/19/2024.    Post-operative orders per Vascular Surgery  Resume heparin infusion at 1900 per Vascular Surgery  Ensure adequate pain control  Pain left 2-4th digits with betadine every 12 hours  Continue empiric Rocephin and Vancomycin  Cardiac, CC diet now; ACHS AccuChecks, SSI  NPO at midnight for amputation 1/19/24  Repeat CMP now for electrolyte replacements as needed  AM labs  NRT, tobacco cessation education  Encourage IS    DVT Prophylaxis: Heparin drip  GI Prophylaxis: PPI  Dispo: ICU    Critical Care time spent in direct patient care:38 minutes (excluding procedure time, if applicable) including high complexity decision making to assess, manipulate, and support vital organ system failure in this individual who has impairment of one or more vital organ systems such that there is a high probability of imminent or life threatening deterioration in the patient's condition.     Margaux Choi, MSN, APRN, ACNPC-AG  Pulmonary and Critical Care Medicine  Electronically  "signed by PRIMO Sampson, 01/18/24, 6:06 PM EST.         Electronically signed by Megan Choi APRN at 01/18/24 1903          Consult Notes (last 5 days)        Hemant Adhikari MD at 01/23/24 0834        Consult Orders    1. Inpatient Infectious Diseases Consult [[id number]] ordered by Sophy Amaya MD at 01/23/24 0825                 Justo RODRIGUEZ Azra  1975  [id number]    Date of Consult: 1/23/2024    Admit Date:  1/14/2024      Requesting Provider: Christo Peres Jr., MD  Evaluating Physician: Hemant Adhikari MD    Chief Complaint: left foot pain    Reason for Consultation: left foot infection, postop fever    History of present illness:     Patient is a 48 y.o.  Yr old male with history of peripheral arterial disease, diabetes.  Prior history femorofemoral bypass by Dr. Ryan in November 2023 in addition to thrombectomy and left femoropopliteal bypass, worsening claudication symptoms and left second/third/fourth toes became dark/black; noted to have sepsis at admission per internal medicine note, elevated creatinine with ATN per medicine note,head surgery by Dr. Pena on 1/18:    \"Procedure(s):  LEFT AKP reoperative exploration  LEFT BPG thrombectomy  LEFT AKP-BKP prosthetic bypass (6mm Hunter Propaten)  LEFT AT angioplasty ([id number] Ultraverse Rx, 2-2.7s176fq Nanocross)  LEFT DP angioplasty (0b728bs Ultraverse Rx, 2-2.7w101ml Nanocross)  LEFT TPT angioplasty ([id number] Ultraverse Rx, 2-2.7r006eo Nanocross)  LEFT PT angioplasty (1o598lp Ultraverse Rx, 2-2.5g751do Nanocross)  LEFT plantar angioplasty ([id number] Ultraverse Rx, 2-2.3y935wg Nanocross)\"    Subsequent surgery by Dr. Peres on January 19 with left foot transmetatarsal amputation.had been on vancomycin/ceftriaxone according to intensivist note; vancomycin subsequently stopped.    He has pain of the left foot which is constant, sharp at times, nonradiating, worse with palpation, generally better with pain meds and 3-4 out of 10 " in severity.  No new drainage at the surgical site.  Mild redness of the left foot in general    Low-grade fever to 100.4 late on January 22, room air with no cough or new respiratory distress.  Has urinary frequency but denies any dysuria/hematuria/pyuria or flank pain.  No diarrhea/vomiting or abdominal pain.  No skin rash.  He reports new peripheral IV at the left upper extremity with no redness/swelling or drainage there.     Past Medical History:   Diagnosis Date    Arthritis     Back pain     Diabetes     SINCE MARCH 2017    Dyslipidemia     HTN (hypertension)     PVD (peripheral vascular disease)     Wears partial dentures        Past Surgical History:   Procedure Laterality Date    AORTAGRAM N/A 04/25/2017    Procedure: AORTAGRAM WITH OR WITHOUT RUNOFFS POSSIBLE STENT with left femoral cutdown;  Surgeon: Brett Ryan MD;  [facility]  [facility] OR 15;  Service:     AORTAGRAM N/A 11/21/2023    Procedure: THROMBECTOMY OF LEFT GRAFT, AORTAGRAM, FEMORAL TO FEMORAL BYPASS;  Surgeon: Brett Ryan MD;  Location:  [facility];  Service: Vascular;  Laterality: N/A;  FLUORO- .06 SECS  DOSE- 10 MGY  CONTRAST- 10 ML ISO    CHOLECYSTECTOMY      CYST REMOVAL      OFF OF BACK    FEMORAL POPLITEAL BYPASS Left 1/18/2024    Procedure: FEMORAL POPLITEAL BYPASS WITH POPLITEAL EXPLORATION;  Surgeon: Vin Pena MD;  Location:  [facility];  Service: Vascular;  Laterality: Left;  DOSE: 9MGY  FLURO: 14 MIN, 36 SEC  CONTRAST: 50ML VISIPAQUE 320    WI IN-SITU VEIN BYPASS FEMORAL-POPLITEAL Left 04/25/2017    Procedure: FEMORAL POPLITEAL BYPASS;  Surgeon: Brett Ryan MD;  Location:  [facility];  Service: Vascular    WI IN-SITU VEIN BYPASS FEMORAL-POPLITEAL Left 06/27/2017    Procedure: LEFT FEMORAL ENDARTECTOMYN LEFT FEMORAL POPLITEAL BYPASS;  Surgeon: Brett Ryan MD;  Location:  [facility];  Service: Vascular    TRANS METATARSAL AMPUTATION Left 1/19/2024    Procedure: AMPUTATION  TRANS METATARSAL- LEFT;  Surgeon: Christo Peres Jr., MD;  Location: [facility];  Service: Orthopedics;  Laterality: Left;       Pediatric History   Patient Parents    Not on file     Other Topics Concern    Not on file   Social History Narrative    Lives in [place], [place]       family history includes Arthritis in his mother; Kidney disease in his father; Liver disease in his father.    No Known Allergies    Medication:  Current Facility-Administered Medications   Medication Dose Route Frequency Provider Last Rate Last Admin    aspirin EC tablet 81 mg  81 mg Oral Nightly Case, Taina V., DO   81 mg at 01/22/24 2118    sennosides-docusate (PERICOLACE) 8.6-50 MG per tablet 2 tablet  2 tablet Oral BID Case, Taina V., DO   2 tablet at 01/22/24 2118    And    polyethylene glycol (MIRALAX) packet 17 g  17 g Oral Daily PRN Case, Taina V., DO   17 g at 01/21/24 1718    And    bisacodyl (DULCOLAX) EC tablet 5 mg  5 mg Oral Daily PRN Case, Taina V., DO        And    bisacodyl (DULCOLAX) suppository 10 mg  10 mg Rectal Daily PRN Case, Taina V., DO        Calcium Replacement - Follow Nurse / BPA Driven Protocol   Does not apply PRN Case, Taina V., DO        cefTRIAXone (ROCEPHIN) 1,000 mg in sodium chloride 0.9 % 100 mL IVPB  1,000 mg Intravenous Q24H Case, Taina V.,  mL/hr at 01/22/24 2117 1,000 mg at 01/22/24 2117    cyclobenzaprine (FLEXERIL) tablet 10 mg  10 mg Oral TID PRN Case, Taina V., DO   10 mg at 01/23/24 0732    dextrose (D50W) (25 g/50 mL) IV injection 25 g  25 g Intravenous Q15 Min PRN Case, Taina V., DO        dextrose (GLUTOSE) oral gel 15 g  15 g Oral Q15 Min PRN Case, Taina V., DO        gabapentin (NEURONTIN) capsule 600 mg  600 mg Oral Q8H Case, Taina V., DO   600 mg at 01/23/24 0450    glucagon (GLUCAGEN) injection 1 mg  1 mg Intramuscular Q15 Min PRN Case, Taina V., DO        heparin (porcine) 5000 UNIT/ML injection 5,000 Units  5,000 Units Subcutaneous Q8H Case, Taina V.,  DO   5,000 Units at 01/23/24 0450    hydrALAZINE (APRESOLINE) tablet 25 mg  25 mg Oral Q8H Case, Taina V., DO   25 mg at 01/23/24 0453    HYDROcodone-acetaminophen (NORCO)  MG per tablet 1 tablet  1 tablet Oral TID Case, Taina V., DO   1 tablet at 01/22/24 2118    insulin detemir (LEVEMIR) injection 5 Units  5 Units Subcutaneous Nightly Case, Taina V., DO   5 Units at 01/22/24 2117    Insulin Lispro (humaLOG) injection 2-7 Units  2-7 Units Subcutaneous 4x Daily AC & at Bedtime Case, Taina V., DO   3 Units at 01/22/24 2117    lisinopril (PRINIVIL,ZESTRIL) tablet 40 mg  40 mg Oral Q24H Case, Taina V., DO   40 mg at 01/22/24 0821    Magnesium Standard Dose Replacement - Follow Nurse / BPA Driven Protocol   Does not apply PRN Case, Taina V., DO        metoprolol tartrate (LOPRESSOR) tablet 25 mg  25 mg Oral Q12H Case, Taina V., DO   25 mg at 01/22/24 2119    niCARdipine (CARDENE) 25mg in 250mL NS infusion  5-15 mg/hr Intravenous Titrated Case, Taina V., DO   Stopped at 01/22/24 0945    nicotine (NICODERM CQ) 21 MG/24HR patch 1 patch  1 patch Transdermal Nightly Case, Taina V., DO   1 patch at 01/22/24 2119    nicotine polacrilex (NICORETTE) gum 2 mg  2 mg Mouth/Throat Q1H PRN Case, Taina V., DO        NIFEdipine XL (PROCARDIA XL) 24 hr tablet 60 mg  60 mg Oral Q24H Case, Taina V., DO   60 mg at 01/22/24 0836    nitroglycerin (NITROSTAT) SL tablet 0.4 mg  0.4 mg Sublingual Q5 Min PRN Case, Taina V., DO        ondansetron ODT (ZOFRAN-ODT) disintegrating tablet 4 mg  4 mg Oral Q6H PRN Case, Taina V., DO        Or    ondansetron (ZOFRAN) injection 4 mg  4 mg Intravenous Q6H PRN Case, Taina V., DO   4 mg at 01/19/24 1222    oxyCODONE (ROXICODONE) immediate release tablet 20 mg  20 mg Oral Q6H PRN Case, Taina V., DO   20 mg at 01/23/24 0732    pantoprazole (PROTONIX) EC tablet 40 mg  40 mg Oral Q AM Case, Taina V., DO   40 mg at 01/23/24 0450    Phosphorus Replacement - Follow Nurse / BPA  Driven Protocol   Does not apply PRN Case, Taina V., DO        Potassium Replacement - Follow Nurse / BPA Driven Protocol   Does not apply PRN Case, Taina V., DO        ropivacaine (NAROPIN) 0.2 % infusion (INFUSYSTEM)   Peripheral Nerve Continuous Case, Taina V., DO 1 mL/hr at 01/22/24 1723 Currently Infusing at 01/22/24 1723    rosuvastatin (CRESTOR) tablet 40 mg  40 mg Oral Nightly Case, Taina V., DO   40 mg at 01/22/24 2118    sodium chloride 0.9 % flush 10 mL  10 mL Intravenous Q12H Case, Taina V., DO   10 mL at 01/22/24 2120    sodium chloride 0.9 % flush 10 mL  10 mL Intravenous PRN Case, Taina V., DO   10 mL at 01/21/24 2116    sodium chloride 0.9 % infusion 40 mL  40 mL Intravenous PRN Case, Taina V., DO        temazepam (RESTORIL) capsule 15 mg  15 mg Oral Nightly PRN Case, Taina V., DO   15 mg at 01/21/24 2144    vancomycin IVPB 1500 mg in 0.9% NaCl (Premix) 500 mL  1,500 mg Intravenous Q12H Case, Taina V., .3 mL/hr at 01/22/24 2217 1,500 mg at 01/22/24 2217       Antibiotics:  Anti-Infectives (From admission, onward)      Ordered     Dose/Rate Route Frequency Start Stop    01/21/24 1012  vancomycin IVPB 1500 mg in 0.9% NaCl (Premix) 500 mL        Ordering Provider: Case, Taina V., DO    1,500 mg  333.3 mL/hr over 90 Minutes Intravenous Every 12 Hours Scheduled 01/21/24 2100 01/27/24 0859    01/19/24 0709  vancomycin IVPB 1500 mg in 0.9% NaCl (Premix) 500 mL        Ordering Provider: Christo Peres Jr., MD    1,500 mg  333.3 mL/hr over 90 Minutes Intravenous Every 12 Hours 01/19/24 1300 01/20/24 0209    01/18/24 1947  cefTRIAXone (ROCEPHIN) 1,000 mg in sodium chloride 0.9 % 100 mL IVPB        Ordering Provider: Taina Soto., DO    1,000 mg  200 mL/hr over 30 Minutes Intravenous Every 24 Hours 01/18/24 2100 01/27/24 2359    01/14/24 2129  Pharmacy to dose vancomycin        Ordering Provider: Christo Peres Jr., MD     Does not apply Continuous PRN 01/14/24 2129 01/21/24  "2128 01/14/24 1638  vancomycin IVPB 1500 mg in 0.9% NaCl (Premix) 500 mL        Ordering Provider: Ramakrishna Claudio IV, PharmD    20 mg/kg × 73.5 kg  333.3 mL/hr over 90 Minutes Intravenous Once 01/14/24 1730 01/14/24 1945    01/14/24 1624  Pharmacy to dose vancomycin        Ordering Provider: Samuel Dave APRN     Does not apply Once 01/14/24 1640 01/14/24 1747    01/14/24 1546  cefTRIAXone (ROCEPHIN) 1,000 mg in sodium chloride 0.9 % 100 mL IVPB        Ordering Provider: Samuel Dave APRN    1,000 mg  200 mL/hr over 30 Minutes Intravenous Once 01/14/24 1602 01/14/24 1701              Review of Systems    Constitutional-- No  chills or sweats.  Appetite good, and no malaise. No fatigue.  Heent-- No new vision, hearing or throat complaints.  No epistaxis or oral sores.  Denies odynophagia or dysphagia.  No flashers, floaters or eye pain. No odynophagia or dysphagia. No headache, photophobia or neck stiffness.  CV-- No chest pain, palpitation or syncope  Resp-- No SOB/cough/Hemoptysis  GI- No nausea, vomiting, or diarrhea.  No hematochezia, melena, or hematemesis. Denies jaundice or chronic liver disease.  --  see above.  Denies hesitancy or incontinence  Lymph- no swollen lymph nodes in neck/axilla or groin.   Heme- No active bruising or bleeding; no Hx of DVT or PE.  MS-- no swelling or pain in the bones or joints of arms/legs.  No new back pain.  Neuro-- No acute focal weakness or numbness in the arms or legs.  No seizures.    Full 12 point review of systems reviewed and negative otherwise for acute complaints, except for above    Physical Exam:   Vital Signs   /62 (BP Location: Right arm, Patient Position: Lying)   Pulse 86   Temp 100.2 °F (37.9 °C) (Oral)   Resp 16   Ht 172.7 cm (68\")   Wt 73.5 kg (162 lb)   SpO2 98%   BMI 24.63 kg/m²     GENERAL: Awake and alert, in no acute distress.   HEENT: Normocephalic, atraumatic.  PERRL. EOMI. No conjunctival injection. No icterus. Oropharynx " clear without evidence of thrush or exudate. No evidence of periodontal disease.    NECK: Supple without nuchal rigidity. No mass.  LYMPH: No cervical, axillary or inguinal lymphadenopathy.  HEART: RRR; No murmur, rubs, gallops.   LUNGS: diminished at bases but otherwise Clear to auscultation bilaterally without wheezing, rales, rhonchi. Normal respiratory effort. Nonlabored. No dullness.  ABDOMEN: Soft, nontender, nondistended. Positive bowel sounds. No rebound or guarding. NO mass or HSM.  EXT:  see below  : Genitalia generally unremarkable.  Without Tripp catheter.  MSK: FROM without joint effusions noted arms/legs.    SKIN: Warm and dry without cutaneous eruptions on Inspection/palpation.    NEURO: Oriented to PPT. No focal deficits on motor/sensory exam at arms/legs.  PSYCHIATRIC: Normal insight and judgement. Cooperative with PE    Left foot with vague erythema diffusely, some crust at the surgical site but no purulent drainage.  No discrete mass bulge or fluctuance.  No crepitus or bulla    Left lower leg surgical sites from vascular procedure with no new visible purulence, only mild visible skin discoloration and no discrete mass bulge or fluctuance.  No crepitus or bulla    No peripheral stigmata such phenomenon of endocarditis    Laboratory Data    Results from last 7 days   Lab Units 01/23/24  0443 01/22/24  0725 01/20/24  0710   WBC 10*3/mm3 6.95 8.68 10.15   HEMOGLOBIN g/dL 8.5* 8.5* 8.6*   HEMATOCRIT % 25.6* 25.7* 25.9*   PLATELETS 10*3/mm3 231 241 225     Results from last 7 days   Lab Units 01/23/24  0443   SODIUM mmol/L 136   POTASSIUM mmol/L 3.7   CHLORIDE mmol/L 102   CO2 mmol/L 27.0   BUN mg/dL 10   CREATININE mg/dL 0.58*   GLUCOSE mg/dL 152*   CALCIUM mg/dL 8.7     Results from last 7 days   Lab Units 01/20/24  0710   ALK PHOS U/L 53   BILIRUBIN mg/dL 0.2   ALT (SGPT) U/L 28   AST (SGOT) U/L 27               Estimated Creatinine Clearance: 158.4 mL/min (A) (by C-G formula based on SCr of 0.58  mg/dL (L)).      Microbiology:      Radiology:  Imaging Results (Last 72 Hours)       ** No results found for the last 72 hours. **              Impression:     --acute left foot gangrene at his second/third/fourth digits, transmetatarsal amputation as above January 19 and operative culture is negative albeit antibiotic modified. Antibiotics had been ongoing with concern for osteomyelitis according to intensivist note.  Antibiotic empirically broadened with postop low-grade fever.  Pathology pending.  He does have vague erythema at the left foot consistent with cellulitis at least, unclear if bone foci removed with his transmetatarsal amputation although pathology should help clarify.  Monitor for new suppurative sequela    --Postop fever, low-grade leet on January 22 and risk at multiple sites.  Chest exam nonfocal with no cough and on room air.  Encouraged incentive spirometry.  Has urinary frequency and urinalysis ordered.  Peripheral IV site without obvious suppurative change.  Abdomen benign with no diarrhea.  Left leg surgical sites currently without obvious new suppurative change but could evolve and I have asked Dr. Pena/vascular team to continue to follow.  On empiric antibiotics regarding left foot as above.  Monitor for other process    --Peripheral arterial disease.  Revascularization as above by Dr. Pena    --Diabetes.  Glucose control per medicine team    --abnormal creatinine earlier in stay.  Improved.  Monitor to help guide future adjustments antimicrobials    PLAN: Thank you for asking us to see Justo Oquendo, I recommend the following:    --IV daptomycin/Zosyn    --Check/review labs cultures and scans    --Partial history per nursing staff    --Discussed with microbiology    --Discussed with Dr. Amaya    --Discussed with wife, partial history per her    --Discussed with Dr. Pena    --Highly complex of issues with high risk for further serious morbidity and other serious sequela       Hemant  ASHLEY Adhikari MD  1/23/2024                Electronically signed by Hemant Adhikari MD at 01/23/24 8108

## 2024-01-23 NOTE — PLAN OF CARE
Goal Outcome Evaluation:                   Problem: Adult Inpatient Plan of Care  Goal: Plan of Care Review  Outcome: Ongoing, Progressing  Goal: Patient-Specific Goal (Individualized)  Outcome: Ongoing, Progressing  Goal: Absence of Hospital-Acquired Illness or Injury  Outcome: Ongoing, Progressing  Intervention: Identify and Manage Fall Risk  Recent Flowsheet Documentation  Taken 1/23/2024 1400 by Helena Lion, RN  Safety Promotion/Fall Prevention:   activity supervised   assistive device/personal items within reach   clutter free environment maintained   fall prevention program maintained   lighting adjusted   mobility aid in reach   muscle strengthening facilitated   nonskid shoes/slippers when out of bed   room organization consistent   safety round/check completed  Taken 1/23/2024 1200 by Helena Lion, RN  Safety Promotion/Fall Prevention:   activity supervised   assistive device/personal items within reach   clutter free environment maintained   fall prevention program maintained   lighting adjusted   mobility aid in reach   muscle strengthening facilitated   nonskid shoes/slippers when out of bed   room organization consistent   safety round/check completed  Taken 1/23/2024 1000 by Helena Lion, RN  Safety Promotion/Fall Prevention:   activity supervised   assistive device/personal items within reach   clutter free environment maintained   fall prevention program maintained   gait belt   lighting adjusted   mobility aid in reach   muscle strengthening facilitated   nonskid shoes/slippers when out of bed   room organization consistent   safety round/check completed  Taken 1/23/2024 0800 by Helena Lion, RN  Safety Promotion/Fall Prevention:   activity supervised   assistive device/personal items within reach   clutter free environment maintained   fall prevention program maintained   gait belt   lighting adjusted   mobility aid in reach   muscle strengthening facilitated   nonskid shoes/slippers  when out of bed   room organization consistent   safety round/check completed  Intervention: Prevent Skin Injury  Recent Flowsheet Documentation  Taken 1/23/2024 1400 by Helena Lion RN  Body Position: position changed independently  Skin Protection:   adhesive use limited   tubing/devices free from skin contact  Taken 1/23/2024 1200 by Helena Lion RN  Body Position: position changed independently  Skin Protection:   adhesive use limited   tubing/devices free from skin contact  Taken 1/23/2024 1000 by Helena Lion RN  Body Position: position changed independently  Skin Protection:   adhesive use limited   tubing/devices free from skin contact  Taken 1/23/2024 0800 by Helena Lion RN  Body Position: position changed independently  Skin Protection:   adhesive use limited   tubing/devices free from skin contact  Intervention: Prevent and Manage VTE (Venous Thromboembolism) Risk  Recent Flowsheet Documentation  Taken 1/23/2024 1400 by Helena Lion RN  Activity Management: activity encouraged  Taken 1/23/2024 1200 by Helena Lion RN  Activity Management: activity encouraged  Taken 1/23/2024 1000 by Helena Lion RN  Activity Management: activity encouraged  Taken 1/23/2024 0800 by Helena Lino RN  Activity Management: activity encouraged  Intervention: Prevent Infection  Recent Flowsheet Documentation  Taken 1/23/2024 1400 by Helena Lion RN  Infection Prevention:   hand hygiene promoted   personal protective equipment utilized   rest/sleep promoted   single patient room provided  Taken 1/23/2024 1200 by Helena Lion RN  Infection Prevention:   hand hygiene promoted   personal protective equipment utilized   rest/sleep promoted   single patient room provided  Taken 1/23/2024 1000 by Helena Lion RN  Infection Prevention:   hand hygiene promoted   personal protective equipment utilized   rest/sleep promoted   single patient room provided  Taken 1/23/2024 0800 by Helena Lion  RN  Infection Prevention:   hand hygiene promoted   personal protective equipment utilized   rest/sleep promoted   single patient room provided  Goal: Optimal Comfort and Wellbeing  Outcome: Ongoing, Progressing  Intervention: Monitor Pain and Promote Comfort  Recent Flowsheet Documentation  Taken 1/23/2024 1428 by Helena Lion RN  Pain Management Interventions: see MAR  Taken 1/23/2024 1325 by Helena Lion RN  Pain Management Interventions: see MAR  Taken 1/23/2024 0846 by Helena Lion RN  Pain Management Interventions: see MAR  Taken 1/23/2024 0732 by Helena Lino RN  Pain Management Interventions: see MAR  Intervention: Provide Person-Centered Care  Recent Flowsheet Documentation  Taken 1/23/2024 0800 by Helena Lion RN  Trust Relationship/Rapport:   care explained   emotional support provided   empathic listening provided   questions answered   questions encouraged   thoughts/feelings acknowledged  Goal: Readiness for Transition of Care  Outcome: Ongoing, Progressing     Problem: Diabetes Comorbidity  Goal: Blood Glucose Level Within Targeted Range  Outcome: Ongoing, Progressing  Intervention: Monitor and Manage Glycemia  Recent Flowsheet Documentation  Taken 1/23/2024 0800 by Helena Lion RN  Glycemic Management: blood glucose monitored     Problem: Fall Injury Risk  Goal: Absence of Fall and Fall-Related Injury  Outcome: Ongoing, Progressing  Intervention: Identify and Manage Contributors  Recent Flowsheet Documentation  Taken 1/23/2024 0800 by Helena Lion RN  Medication Review/Management: medications reviewed  Intervention: Promote Injury-Free Environment  Recent Flowsheet Documentation  Taken 1/23/2024 1400 by Helena Lion RN  Safety Promotion/Fall Prevention:   activity supervised   assistive device/personal items within reach   clutter free environment maintained   fall prevention program maintained   lighting adjusted   mobility aid in reach   muscle strengthening  facilitated   nonskid shoes/slippers when out of bed   room organization consistent   safety round/check completed  Taken 1/23/2024 1200 by Helena Lion RN  Safety Promotion/Fall Prevention:   activity supervised   assistive device/personal items within reach   clutter free environment maintained   fall prevention program maintained   lighting adjusted   mobility aid in reach   muscle strengthening facilitated   nonskid shoes/slippers when out of bed   room organization consistent   safety round/check completed  Taken 1/23/2024 1000 by Helena Lion RN  Safety Promotion/Fall Prevention:   activity supervised   assistive device/personal items within reach   clutter free environment maintained   fall prevention program maintained   gait belt   lighting adjusted   mobility aid in reach   muscle strengthening facilitated   nonskid shoes/slippers when out of bed   room organization consistent   safety round/check completed  Taken 1/23/2024 0800 by Helena Lion, RN  Safety Promotion/Fall Prevention:   activity supervised   assistive device/personal items within reach   clutter free environment maintained   fall prevention program maintained   gait belt   lighting adjusted   mobility aid in reach   muscle strengthening facilitated   nonskid shoes/slippers when out of bed   room organization consistent   safety round/check completed     Problem: Skin Injury Risk Increased  Goal: Skin Health and Integrity  Outcome: Ongoing, Progressing  Intervention: Optimize Skin Protection  Recent Flowsheet Documentation  Taken 1/23/2024 1400 by Helena Lion RN  Pressure Reduction Techniques: frequent weight shift encouraged  Head of Bed (HOB) Positioning: HOB elevated  Pressure Reduction Devices: positioning supports utilized  Skin Protection:   adhesive use limited   tubing/devices free from skin contact  Taken 1/23/2024 1200 by Helena Lion, RN  Pressure Reduction Techniques: frequent weight shift encouraged  Head of  Bed ([facility]) Positioning: [facility] elevated  Pressure Reduction Devices: positioning supports utilized  Skin Protection:   adhesive use limited   tubing/devices free from skin contact  Taken 1/23/2024 1000 by Helena Lion, RN  Head of Bed ([facility]) Positioning: [facility] elevated  Skin Protection:   adhesive use limited   tubing/devices free from skin contact  Taken 1/23/2024 0800 by Helena Lion, RN  Pressure Reduction Techniques: frequent weight shift encouraged  Head of Bed ([facility]) Positioning: [facility] elevated  Skin Protection:   adhesive use limited   tubing/devices free from skin contact     Problem: Pain (Revascularization)  Goal: Acceptable Pain Control  Outcome: Ongoing, Progressing  Intervention: Prevent or Manage Pain  Recent Flowsheet Documentation  Taken 1/23/2024 1428 by Helena Lion RN  Pain Management Interventions: see MAR  Taken 1/23/2024 1325 by Helena Lion, RN  Pain Management Interventions: see MAR  Taken 1/23/2024 0846 by Helena Lion, RN  Pain Management Interventions: see MAR  Taken 1/23/2024 0800 by Helena Lion, RN  Diversional Activities: television  Taken 1/23/2024 0732 by Helena Lion, RN  Pain Management Interventions: see MAR

## 2024-01-23 NOTE — PLAN OF CARE
Goal Outcome Evaluation:                     Problem: Adult Inpatient Plan of Care  Goal: Plan of Care Review  Outcome: Ongoing, Progressing  Goal: Patient-Specific Goal (Individualized)  Outcome: Ongoing, Progressing  Goal: Absence of Hospital-Acquired Illness or Injury  Outcome: Ongoing, Progressing  Intervention: Identify and Manage Fall Risk  Recent Flowsheet Documentation  Taken 1/23/2024 0000 by Nadia Jones RN  Safety Promotion/Fall Prevention:   activity supervised   assistive device/personal items within reach   clutter free environment maintained   lighting adjusted   nonskid shoes/slippers when out of bed   room organization consistent   safety round/check completed  Taken 1/22/2024 2200 by Nadia Jones RN  Safety Promotion/Fall Prevention:   activity supervised   assistive device/personal items within reach   clutter free environment maintained   lighting adjusted   nonskid shoes/slippers when out of bed   room organization consistent   safety round/check completed  Taken 1/22/2024 2000 by Nadia Jones RN  Safety Promotion/Fall Prevention:   activity supervised   assistive device/personal items within reach   clutter free environment maintained   lighting adjusted   nonskid shoes/slippers when out of bed   room organization consistent   safety round/check completed  Intervention: Prevent Skin Injury  Recent Flowsheet Documentation  Taken 1/23/2024 0000 by Nadia Jones RN  Body Position: position changed independently  Taken 1/22/2024 2200 by Nadia Jones RN  Body Position: position changed independently  Taken 1/22/2024 2000 by Nadia Jones RN  Body Position: position changed independently  Skin Protection:   transparent dressing maintained   tubing/devices free from skin contact  Intervention: Prevent and Manage VTE (Venous Thromboembolism) Risk  Recent Flowsheet Documentation  Taken 1/23/2024 0000 by Nadia Jones RN  Activity Management: activity encouraged  Range of Motion: active ROM (range of  motion) encouraged  Taken 1/22/2024 2200 by Nadia Jones RN  Activity Management: activity encouraged  Taken 1/22/2024 2000 by Nadia Jones RN  Activity Management: activity encouraged  Range of Motion: active ROM (range of motion) encouraged  Intervention: Prevent Infection  Recent Flowsheet Documentation  Taken 1/23/2024 0000 by Nadia Jones RN  Infection Prevention:   hand hygiene promoted   rest/sleep promoted   single patient room provided  Taken 1/22/2024 2200 by Nadia Jones RN  Infection Prevention:   hand hygiene promoted   rest/sleep promoted   single patient room provided  Taken 1/22/2024 2000 by Nadia Jones RN  Infection Prevention:   hand hygiene promoted   rest/sleep promoted   single patient room provided  Goal: Optimal Comfort and Wellbeing  Outcome: Ongoing, Progressing  Intervention: Provide Person-Centered Care  Recent Flowsheet Documentation  Taken 1/22/2024 2000 by Nadia Jones RN  Trust Relationship/Rapport:   care explained   choices provided   emotional support provided   empathic listening provided   questions answered   questions encouraged  Goal: Readiness for Transition of Care  Outcome: Ongoing, Progressing     Problem: Diabetes Comorbidity  Goal: Blood Glucose Level Within Targeted Range  Outcome: Ongoing, Progressing  Intervention: Monitor and Manage Glycemia  Recent Flowsheet Documentation  Taken 1/22/2024 2000 by Nadia Jones RN  Glycemic Management: blood glucose monitored     Problem: Fall Injury Risk  Goal: Absence of Fall and Fall-Related Injury  Outcome: Ongoing, Progressing  Intervention: Identify and Manage Contributors  Recent Flowsheet Documentation  Taken 1/22/2024 2000 by Nadia Jones RN  Medication Review/Management: medications reviewed  Self-Care Promotion:   independence encouraged   BADL personal objects within reach   BADL personal routines maintained  Intervention: Promote Injury-Free Environment  Recent Flowsheet Documentation  Taken 1/23/2024 0000 by  Karen, Ndaia, RN  Safety Promotion/Fall Prevention:   activity supervised   assistive device/personal items within reach   clutter free environment maintained   lighting adjusted   nonskid shoes/slippers when out of bed   room organization consistent   safety round/check completed  Taken 1/22/2024 2200 by Nadia Jones RN  Safety Promotion/Fall Prevention:   activity supervised   assistive device/personal items within reach   clutter free environment maintained   lighting adjusted   nonskid shoes/slippers when out of bed   room organization consistent   safety round/check completed  Taken 1/22/2024 2000 by Nadia Jones RN  Safety Promotion/Fall Prevention:   activity supervised   assistive device/personal items within reach   clutter free environment maintained   lighting adjusted   nonskid shoes/slippers when out of bed   room organization consistent   safety round/check completed     Problem: Skin Injury Risk Increased  Goal: Skin Health and Integrity  Outcome: Ongoing, Progressing  Intervention: Optimize Skin Protection  Recent Flowsheet Documentation  Taken 1/23/2024 0000 by Nadia Jones RN  Head of Bed (HOB) Positioning: HOB elevated  Taken 1/22/2024 2200 by Nadia Jones RN  Head of Bed (HOB) Positioning: HOB elevated  Taken 1/22/2024 2000 by Nadia Jones RN  Pressure Reduction Techniques: frequent weight shift encouraged  Head of Bed (HOB) Positioning: HOB elevated  Pressure Reduction Devices: positioning supports utilized  Skin Protection:   transparent dressing maintained   tubing/devices free from skin contact     Problem: Pain (Revascularization)  Goal: Acceptable Pain Control  Outcome: Ongoing, Progressing  Intervention: Prevent or Manage Pain  Recent Flowsheet Documentation  Taken 1/23/2024 0000 by Nadia Jones RN  Diversional Activities: television  Taken 1/22/2024 2000 by Nadia Jones RN  Diversional Activities: television

## 2024-01-23 NOTE — CONSULTS
"Justo Oquendo  1975  [id number]    Date of Consult: 1/23/2024    Admit Date:  1/14/2024      Requesting Provider: Christo Peres Jr., MD  Evaluating Physician: Hemant Adhikari MD    Chief Complaint: left foot pain    Reason for Consultation: left foot infection, postop fever    History of present illness:     Patient is a 48 y.o.  Yr old male with history of peripheral arterial disease, diabetes.  Prior history femorofemoral bypass by Dr. Ryan in November 2023 in addition to thrombectomy and left femoropopliteal bypass, worsening claudication symptoms and left second/third/fourth toes became dark/black; noted to have sepsis at admission per internal medicine note, elevated creatinine with ATN per medicine note,head surgery by Dr. Pena on 1/18:    \"Procedure(s):  LEFT AKP reoperative exploration  LEFT BPG thrombectomy  LEFT AKP-BKP prosthetic bypass (6mm [place] Propaten)  LEFT AT angioplasty (7h988ha Ultraverse Rx, 2-2.1a694pd Nanocross)  LEFT DP angioplasty (0g496mt Ultraverse Rx, 2-2.2y837xk Nanocross)  LEFT TPT angioplasty (9f541kx Ultraverse Rx, 2-2.3q065bj Nanocross)  LEFT PT angioplasty (3u775je Ultraverse Rx, 2-2.3c824qn Nanocross)  LEFT plantar angioplasty (1i221sb Ultraverse Rx, 2-2.1f643ju Nanocross)\"    Subsequent surgery by Dr. Peres on January 19 with left foot transmetatarsal amputation.had been on vancomycin/ceftriaxone according to intensivist note; vancomycin subsequently stopped.    He has pain of the left foot which is constant, sharp at times, nonradiating, worse with palpation, generally better with pain meds and 3-4 out of 10 in severity.  No new drainage at the surgical site.  Mild redness of the left foot in general    Low-grade fever to 100.4 late on January 22, room air with no cough or new respiratory distress.  Has urinary frequency but denies any dysuria/hematuria/pyuria or flank pain.  No diarrhea/vomiting or abdominal pain.  No skin rash.  He reports new peripheral " IV at the left upper extremity with no redness/swelling or drainage there.     Past Medical History:   Diagnosis Date    Arthritis     Back pain     Diabetes     SINCE MARCH 2017    Dyslipidemia     HTN (hypertension)     PVD (peripheral vascular disease)     Wears partial dentures        Past Surgical History:   Procedure Laterality Date    AORTAGRAM N/A 04/25/2017    Procedure: AORTAGRAM WITH OR WITHOUT RUNOFFS POSSIBLE STENT with left femoral cutdown;  Surgeon: Brett Ryan MD;  Location:  [facility] OR 15;  Service:     AORTAGRAM N/A 11/21/2023    Procedure: THROMBECTOMY OF LEFT GRAFT, AORTAGRAM, FEMORAL TO FEMORAL BYPASS;  Surgeon: Brett Ryan MD;  Location:  [facility];  Service: Vascular;  Laterality: N/A;  FLUORO- .06 SECS  DOSE- 10 MGY  CONTRAST- 10 ML ISO    CHOLECYSTECTOMY      CYST REMOVAL      OFF OF BACK    FEMORAL POPLITEAL BYPASS Left 1/18/2024    Procedure: FEMORAL POPLITEAL BYPASS WITH POPLITEAL EXPLORATION;  Surgeon: Vin Pena MD;  Location: [facility];  Service: Vascular;  Laterality: Left;  DOSE: 9MGY  FLURO: 14 MIN, 36 SEC  CONTRAST: 50ML VISIPAQUE 320    SC IN-SITU VEIN BYPASS FEMORAL-POPLITEAL Left 04/25/2017    Procedure: FEMORAL POPLITEAL BYPASS;  Surgeon: Brett Ryan MD;  Location:  [facility];  Service: Vascular    SC IN-SITU VEIN BYPASS FEMORAL-POPLITEAL Left 06/27/2017    Procedure: LEFT FEMORAL ENDARTECTOMYN LEFT FEMORAL POPLITEAL BYPASS;  Surgeon: Brett Ryan MD;  Location:  [facility] OR;  Service: Vascular    TRANS METATARSAL AMPUTATION Left 1/19/2024    Procedure: AMPUTATION TRANS METATARSAL- LEFT;  Surgeon: Christo Peres Jr., MD;  Location:  [facility] OR;  Service: Orthopedics;  Laterality: Left;       Pediatric History   Patient Parents    Not on file     Other Topics Concern    Not on file   Social History Narrative    Lives in [place], [place]       family history includes Arthritis in his mother; Kidney disease in his  father; Liver disease in his father.    No Known Allergies    Medication:  Current Facility-Administered Medications   Medication Dose Route Frequency Provider Last Rate Last Admin    aspirin EC tablet 81 mg  81 mg Oral Nightly Case, Taina V., DO   81 mg at 01/22/24 2118    sennosides-docusate (PERICOLACE) 8.6-50 MG per tablet 2 tablet  2 tablet Oral BID Case, Taina V., DO   2 tablet at 01/22/24 2118    And    polyethylene glycol (MIRALAX) packet 17 g  17 g Oral Daily PRN Case, Taina V., DO   17 g at 01/21/24 1718    And    bisacodyl (DULCOLAX) EC tablet 5 mg  5 mg Oral Daily PRN Case, Taina V., DO        And    bisacodyl (DULCOLAX) suppository 10 mg  10 mg Rectal Daily PRN Case, Taina V., DO        Calcium Replacement - Follow Nurse / BPA Driven Protocol   Does not apply PRN Case, Taina V., DO        cefTRIAXone (ROCEPHIN) 1,000 mg in sodium chloride 0.9 % 100 mL IVPB  1,000 mg Intravenous Q24H Case, Taina V.,  mL/hr at 01/22/24 2117 1,000 mg at 01/22/24 2117    cyclobenzaprine (FLEXERIL) tablet 10 mg  10 mg Oral TID PRN Case, Taina V., DO   10 mg at 01/23/24 0732    dextrose (D50W) (25 g/50 mL) IV injection 25 g  25 g Intravenous Q15 Min PRN Case, Taina V., DO        dextrose (GLUTOSE) oral gel 15 g  15 g Oral Q15 Min PRN Case, Taina V., DO        gabapentin (NEURONTIN) capsule 600 mg  600 mg Oral Q8H Case, Taina V., DO   600 mg at 01/23/24 0450    glucagon (GLUCAGEN) injection 1 mg  1 mg Intramuscular Q15 Min PRN Case, Taina V., DO        heparin (porcine) 5000 UNIT/ML injection 5,000 Units  5,000 Units Subcutaneous Q8H Case, Taina V., DO   5,000 Units at 01/23/24 0450    hydrALAZINE (APRESOLINE) tablet 25 mg  25 mg Oral Q8H Case, Taina V., DO   25 mg at 01/23/24 0453    HYDROcodone-acetaminophen (NORCO)  MG per tablet 1 tablet  1 tablet Oral TID Case, Taina LOPEZ, DO   1 tablet at 01/22/24 1327    insulin detemir (LEVEMIR) injection 5 Units  5 Units Subcutaneous Nightly  Case, Taina V., DO   5 Units at 01/22/24 2117    Insulin Lispro (humaLOG) injection 2-7 Units  2-7 Units Subcutaneous 4x Daily AC & at Bedtime Case, Taina V., DO   3 Units at 01/22/24 2117    lisinopril (PRINIVIL,ZESTRIL) tablet 40 mg  40 mg Oral Q24H Case, Taina V., DO   40 mg at 01/22/24 0821    Magnesium Standard Dose Replacement - Follow Nurse / BPA Driven Protocol   Does not apply PRN Case, Taina V., DO        metoprolol tartrate (LOPRESSOR) tablet 25 mg  25 mg Oral Q12H Case, Taina V., DO   25 mg at 01/22/24 2119    niCARdipine (CARDENE) 25mg in 250mL NS infusion  5-15 mg/hr Intravenous Titrated Case, Taina V., DO   Stopped at 01/22/24 0945    nicotine (NICODERM CQ) 21 MG/24HR patch 1 patch  1 patch Transdermal Nightly Case, Taina V., DO   1 patch at 01/22/24 2119    nicotine polacrilex (NICORETTE) gum 2 mg  2 mg Mouth/Throat Q1H PRN Case, Taina V., DO        NIFEdipine XL (PROCARDIA XL) 24 hr tablet 60 mg  60 mg Oral Q24H Case, Taina V., DO   60 mg at 01/22/24 0836    nitroglycerin (NITROSTAT) SL tablet 0.4 mg  0.4 mg Sublingual Q5 Min PRN Case, Taina V., DO        ondansetron ODT (ZOFRAN-ODT) disintegrating tablet 4 mg  4 mg Oral Q6H PRN Case, Taina V., DO        Or    ondansetron (ZOFRAN) injection 4 mg  4 mg Intravenous Q6H PRN Case, Taina V., DO   4 mg at 01/19/24 1222    oxyCODONE (ROXICODONE) immediate release tablet 20 mg  20 mg Oral Q6H PRN Case, Taina V., DO   20 mg at 01/23/24 0732    pantoprazole (PROTONIX) EC tablet 40 mg  40 mg Oral Q AM Case, Taina V., DO   40 mg at 01/23/24 0450    Phosphorus Replacement - Follow Nurse / BPA Driven Protocol   Does not apply PRN Case, Taina V., DO        Potassium Replacement - Follow Nurse / BPA Driven Protocol   Does not apply PRN Case, Taina V., DO        ropivacaine (NAROPIN) 0.2 % infusion (INFUSYSTEM)   Peripheral Nerve Continuous Case, Taina V., DO 1 mL/hr at 01/22/24 1723 Currently Infusing at 01/22/24 1723     rosuvastatin (CRESTOR) tablet 40 mg  40 mg Oral Nightly Case, Taina V., DO   40 mg at 01/22/24 2118    sodium chloride 0.9 % flush 10 mL  10 mL Intravenous Q12H Case, Taina V., DO   10 mL at 01/22/24 2120    sodium chloride 0.9 % flush 10 mL  10 mL Intravenous PRN Case, Taina V., DO   10 mL at 01/21/24 2116    sodium chloride 0.9 % infusion 40 mL  40 mL Intravenous PRN Case, Taina V., DO        temazepam (RESTORIL) capsule 15 mg  15 mg Oral Nightly PRN Case, Taina V., DO   15 mg at 01/21/24 2144    vancomycin IVPB 1500 mg in 0.9% NaCl (Premix) 500 mL  1,500 mg Intravenous Q12H Case, Taina V., .3 mL/hr at 01/22/24 2217 1,500 mg at 01/22/24 2217       Antibiotics:  Anti-Infectives (From admission, onward)      Ordered     Dose/Rate Route Frequency Start Stop    01/21/24 1012  vancomycin IVPB 1500 mg in 0.9% NaCl (Premix) 500 mL        Ordering Provider: Taina Soto V., DO    1,500 mg  333.3 mL/hr over 90 Minutes Intravenous Every 12 Hours Scheduled 01/21/24 2100 01/27/24 0859    01/19/24 0709  vancomycin IVPB 1500 mg in 0.9% NaCl (Premix) 500 mL        Ordering Provider: Christo Peres Jr., MD    1,500 mg  333.3 mL/hr over 90 Minutes Intravenous Every 12 Hours 01/19/24 1300 01/20/24 0209    01/18/24 1947  cefTRIAXone (ROCEPHIN) 1,000 mg in sodium chloride 0.9 % 100 mL IVPB        Ordering Provider: Taina Soto V., DO    1,000 mg  200 mL/hr over 30 Minutes Intravenous Every 24 Hours 01/18/24 2100 01/27/24 2359    01/14/24 2129  Pharmacy to dose vancomycin        Ordering Provider: Christo Peres Jr., MD     Does not apply Continuous PRN 01/14/24 2129 01/21/24 2128    01/14/24 1638  vancomycin IVPB 1500 mg in 0.9% NaCl (Premix) 500 mL        Ordering Provider: Claudio, Ramakrishna H IV, PharmD    20 mg/kg × 73.5 kg  333.3 mL/hr over 90 Minutes Intravenous Once 01/14/24 1730 01/14/24 1945    01/14/24 1624  Pharmacy to dose vancomycin        Ordering Provider: Samuel Dave APRN     Does not apply  "Once 01/14/24 1640 01/14/24 1747    01/14/24 1546  cefTRIAXone (ROCEPHIN) 1,000 mg in sodium chloride 0.9 % 100 mL IVPB        Ordering Provider: Samuel Dave APRN    1,000 mg  200 mL/hr over 30 Minutes Intravenous Once 01/14/24 1602 01/14/24 1701              Review of Systems    Constitutional-- No  chills or sweats.  Appetite good, and no malaise. No fatigue.  Heent-- No new vision, hearing or throat complaints.  No epistaxis or oral sores.  Denies odynophagia or dysphagia.  No flashers, floaters or eye pain. No odynophagia or dysphagia. No headache, photophobia or neck stiffness.  CV-- No chest pain, palpitation or syncope  Resp-- No SOB/cough/Hemoptysis  GI- No nausea, vomiting, or diarrhea.  No hematochezia, melena, or hematemesis. Denies jaundice or chronic liver disease.  --  see above.  Denies hesitancy or incontinence  Lymph- no swollen lymph nodes in neck/axilla or groin.   Heme- No active bruising or bleeding; no Hx of DVT or PE.  MS-- no swelling or pain in the bones or joints of arms/legs.  No new back pain.  Neuro-- No acute focal weakness or numbness in the arms or legs.  No seizures.    Full 12 point review of systems reviewed and negative otherwise for acute complaints, except for above    Physical Exam:   Vital Signs   /62 (BP Location: Right arm, Patient Position: Lying)   Pulse 86   Temp 100.2 °F (37.9 °C) (Oral)   Resp 16   Ht 172.7 cm (68\")   Wt 73.5 kg (162 lb)   SpO2 98%   BMI 24.63 kg/m²     GENERAL: Awake and alert, in no acute distress.   HEENT: Normocephalic, atraumatic.  PERRL. EOMI. No conjunctival injection. No icterus. Oropharynx clear without evidence of thrush or exudate. No evidence of periodontal disease.    NECK: Supple without nuchal rigidity. No mass.  LYMPH: No cervical, axillary or inguinal lymphadenopathy.  HEART: RRR; No murmur, rubs, gallops.   LUNGS: diminished at bases but otherwise Clear to auscultation bilaterally without wheezing, rales, rhonchi. " Normal respiratory effort. Nonlabored. No dullness.  ABDOMEN: Soft, nontender, nondistended. Positive bowel sounds. No rebound or guarding. NO mass or HSM.  EXT:  see below  : Genitalia generally unremarkable.  Without Tripp catheter.  MSK: FROM without joint effusions noted arms/legs.    SKIN: Warm and dry without cutaneous eruptions on Inspection/palpation.    NEURO: Oriented to PPT. No focal deficits on motor/sensory exam at arms/legs.  PSYCHIATRIC: Normal insight and judgement. Cooperative with PE    Left foot with vague erythema diffusely, some crust at the surgical site but no purulent drainage.  No discrete mass bulge or fluctuance.  No crepitus or bulla    Left lower leg surgical sites from vascular procedure with no new visible purulence, only mild visible skin discoloration and no discrete mass bulge or fluctuance.  No crepitus or bulla    No peripheral stigmata such phenomenon of endocarditis    Laboratory Data    Results from last 7 days   Lab Units 01/23/24  0443 01/22/24  0725 01/20/24  0710   WBC 10*3/mm3 6.95 8.68 10.15   HEMOGLOBIN g/dL 8.5* 8.5* 8.6*   HEMATOCRIT % 25.6* 25.7* 25.9*   PLATELETS 10*3/mm3 231 241 225     Results from last 7 days   Lab Units 01/23/24  0443   SODIUM mmol/L 136   POTASSIUM mmol/L 3.7   CHLORIDE mmol/L 102   CO2 mmol/L 27.0   BUN mg/dL 10   CREATININE mg/dL 0.58*   GLUCOSE mg/dL 152*   CALCIUM mg/dL 8.7     Results from last 7 days   Lab Units 01/20/24  0710   ALK PHOS U/L 53   BILIRUBIN mg/dL 0.2   ALT (SGPT) U/L 28   AST (SGOT) U/L 27               Estimated Creatinine Clearance: 158.4 mL/min (A) (by C-G formula based on SCr of 0.58 mg/dL (L)).      Microbiology:      Radiology:  Imaging Results (Last 72 Hours)       ** No results found for the last 72 hours. **              Impression:     --acute left foot gangrene at his second/third/fourth digits, transmetatarsal amputation as above January 19 and operative culture is negative albeit antibiotic modified.  Antibiotics had been ongoing with concern for osteomyelitis according to intensivist note.  Antibiotic empirically broadened with postop low-grade fever.  Pathology pending.  He does have vague erythema at the left foot consistent with cellulitis at least, unclear if bone foci removed with his transmetatarsal amputation although pathology should help clarify.  Monitor for new suppurative sequela    --Postop fever, low-grade leet on January 22 and risk at multiple sites.  Chest exam nonfocal with no cough and on room air.  Encouraged incentive spirometry.  Has urinary frequency and urinalysis ordered.  Peripheral IV site without obvious suppurative change.  Abdomen benign with no diarrhea.  Left leg surgical sites currently without obvious new suppurative change but could evolve and I have asked Dr. Pena/vascular team to continue to follow.  On empiric antibiotics regarding left foot as above.  Monitor for other process    --Peripheral arterial disease.  Revascularization as above by Dr. Pena    --Diabetes.  Glucose control per medicine team    --abnormal creatinine earlier in stay.  Improved.  Monitor to help guide future adjustments antimicrobials    PLAN: Thank you for asking us to see Justo Oquendo, I recommend the following:    --IV daptomycin/Zosyn    --Check/review labs cultures and scans    --Partial history per nursing staff    --Discussed with microbiology    --Discussed with Dr. Amaya    --Discussed with wife, partial history per her    --Discussed with Dr. Pena    --Highly complex of issues with high risk for further serious morbidity and other serious sequela       Hemant Adhikari MD  1/23/2024

## 2024-01-23 NOTE — PROGRESS NOTES
[facility]    Acute pain service Inpatient Progress Note    Patient Name: Justo Oquendo  :  1975  MRN:  [id number]        Acute Pain  Service Inpatient Progress Note:    Analgesia:Good  Pain Score:2/10  LOC: alert and awake  Resp Status: room air  Cardiac: VS stable  Side Effects:None  Catheter Site:clean, dressing intact and dry  Cath type: peripheral nerve cath with ON Q  Infusion rate: Fem/ Add: Basal: 1ml/hr, PIB: 8ml q 8h, PCA: 8ml q 30 min (1mL,5ml, 5ml InfuSystem Pump)  Dosing/Volume: ropivacaine 0.2%  Catheter Plan:Catheter to remain Insitu and Continue catheter infusion rate unchanged  Comments: Pump will run out today and pt will be discharged today. RN can remove nerve catheter after pump is complete. Call [phone] when complete. Thank you

## 2024-01-23 NOTE — CASE MANAGEMENT/SOCIAL WORK
Continued Stay Note  [facility]     Patient Name: Justo Oquendo  MRN: [id number]  Today's Date: 1/23/2024    Admit Date: 1/14/2024    Plan: home with home health   Discharge Plan       Row Name 01/23/24 1334       Plan    Plan home with home health    Patient/Family in Agreement with Plan yes    Plan Comments I met with this patient at the bedside. He has been approved for  services, but CM needs wound care orders prior to placing HH orders in [facility]. CM notified surgery. ID MD recommending IV antibiotics. His wife will transport. CM will follow.    Final Discharge Disposition Code 06 - home with home health care                   Discharge Codes    No documentation.                 Expected Discharge Date and Time       Expected Discharge Date Expected Discharge Time    Jan 22, 2024               Charmaine Hermosillo RN

## 2024-01-24 ENCOUNTER — HOME HEALTH ADMISSION (OUTPATIENT)
Dept: HOME HEALTH SERVICES | Facility: HOME HEALTHCARE | Age: 49
End: 2024-01-24
Payer: COMMERCIAL

## 2024-01-24 ENCOUNTER — READMISSION MANAGEMENT (OUTPATIENT)
Dept: CALL CENTER | Facility: HOSPITAL | Age: 49
End: 2024-01-24
Payer: COMMERCIAL

## 2024-01-24 VITALS
HEART RATE: 81 BPM | OXYGEN SATURATION: 98 % | BODY MASS INDEX: 24.55 KG/M2 | WEIGHT: 162 LBS | DIASTOLIC BLOOD PRESSURE: 85 MMHG | SYSTOLIC BLOOD PRESSURE: 106 MMHG | HEIGHT: 68 IN | TEMPERATURE: 97.6 F | RESPIRATION RATE: 18 BRPM

## 2024-01-24 PROBLEM — M79.672 ISCHEMIC PAIN OF LEFT FOOT: Status: RESOLVED | Noted: 2024-01-14 | Resolved: 2024-01-24

## 2024-01-24 PROBLEM — I99.8 ISCHEMIC PAIN OF LEFT FOOT: Status: RESOLVED | Noted: 2024-01-14 | Resolved: 2024-01-24

## 2024-01-24 PROBLEM — I95.9 SEPSIS ASSOCIATED HYPOTENSION: Status: RESOLVED | Noted: 2024-01-14 | Resolved: 2024-01-24

## 2024-01-24 PROBLEM — I99.8 ISCHEMIC FOOT: Status: RESOLVED | Noted: 2024-01-14 | Resolved: 2024-01-24

## 2024-01-24 PROBLEM — A41.9 SEPSIS ASSOCIATED HYPOTENSION: Status: RESOLVED | Noted: 2024-01-14 | Resolved: 2024-01-24

## 2024-01-24 LAB
ALBUMIN SERPL-MCNC: 3.2 G/DL (ref 3.5–5.2)
ALBUMIN/GLOB SERPL: 1.1 G/DL
ALP SERPL-CCNC: 51 U/L (ref 39–117)
ALT SERPL W P-5'-P-CCNC: 18 U/L (ref 1–41)
ANION GAP SERPL CALCULATED.3IONS-SCNC: 8 MMOL/L (ref 5–15)
AST SERPL-CCNC: 15 U/L (ref 1–40)
BACTERIA SPEC AEROBE CULT: NO GROWTH
BACTERIA SPEC ANAEROBE CULT: NORMAL
BILIRUB SERPL-MCNC: 0.2 MG/DL (ref 0–1.2)
BUN SERPL-MCNC: 10 MG/DL (ref 6–20)
BUN/CREAT SERPL: 18.2 (ref 7–25)
CALCIUM SPEC-SCNC: 9 MG/DL (ref 8.6–10.5)
CHLORIDE SERPL-SCNC: 104 MMOL/L (ref 98–107)
CO2 SERPL-SCNC: 27 MMOL/L (ref 22–29)
CREAT SERPL-MCNC: 0.55 MG/DL (ref 0.76–1.27)
DEPRECATED RDW RBC AUTO: 38.5 FL (ref 37–54)
EGFRCR SERPLBLD CKD-EPI 2021: 120.7 ML/MIN/1.73
ERYTHROCYTE [DISTWIDTH] IN BLOOD BY AUTOMATED COUNT: 12 % (ref 12.3–15.4)
GLOBULIN UR ELPH-MCNC: 3 GM/DL
GLUCOSE BLDC GLUCOMTR-MCNC: 213 MG/DL (ref 70–130)
GLUCOSE BLDC GLUCOMTR-MCNC: 223 MG/DL (ref 70–130)
GLUCOSE SERPL-MCNC: 154 MG/DL (ref 65–99)
HCT VFR BLD AUTO: 24.6 % (ref 37.5–51)
HGB BLD-MCNC: 8.1 G/DL (ref 13–17.7)
MCH RBC QN AUTO: 29 PG (ref 26.6–33)
MCHC RBC AUTO-ENTMCNC: 32.9 G/DL (ref 31.5–35.7)
MCV RBC AUTO: 88.2 FL (ref 79–97)
PLATELET # BLD AUTO: 232 10*3/MM3 (ref 140–450)
PMV BLD AUTO: 9.3 FL (ref 6–12)
POTASSIUM SERPL-SCNC: 3.5 MMOL/L (ref 3.5–5.2)
PROT SERPL-MCNC: 6.2 G/DL (ref 6–8.5)
RBC # BLD AUTO: 2.79 10*6/MM3 (ref 4.14–5.8)
SODIUM SERPL-SCNC: 139 MMOL/L (ref 136–145)
WBC NRBC COR # BLD AUTO: 6.71 10*3/MM3 (ref 3.4–10.8)

## 2024-01-24 PROCEDURE — 85027 COMPLETE CBC AUTOMATED: CPT | Performed by: INTERNAL MEDICINE

## 2024-01-24 PROCEDURE — 82948 REAGENT STRIP/BLOOD GLUCOSE: CPT

## 2024-01-24 PROCEDURE — 63710000001 INSULIN LISPRO (HUMAN) PER 5 UNITS: Performed by: INTERNAL MEDICINE

## 2024-01-24 PROCEDURE — 25010000002 DAPTOMYCIN PER 1 MG: Performed by: INTERNAL MEDICINE

## 2024-01-24 PROCEDURE — 25010000002 HEPARIN (PORCINE) PER 1000 UNITS: Performed by: INTERNAL MEDICINE

## 2024-01-24 PROCEDURE — 25010000002 ERTAPENEM PER 500 MG: Performed by: INTERNAL MEDICINE

## 2024-01-24 PROCEDURE — 25010000002 PIPERACILLIN SOD-TAZOBACTAM PER 1 G: Performed by: INTERNAL MEDICINE

## 2024-01-24 PROCEDURE — 99238 HOSP IP/OBS DSCHRG MGMT 30/<: CPT | Performed by: HOSPITALIST

## 2024-01-24 PROCEDURE — 97530 THERAPEUTIC ACTIVITIES: CPT

## 2024-01-24 PROCEDURE — 80053 COMPREHEN METABOLIC PANEL: CPT | Performed by: INTERNAL MEDICINE

## 2024-01-24 RX ORDER — OXYCODONE HYDROCHLORIDE 20 MG/1
20 TABLET ORAL EVERY 6 HOURS PRN
Qty: 12 EACH | Refills: 0 | Status: SHIPPED | OUTPATIENT
Start: 2024-01-24 | End: 2024-01-27

## 2024-01-24 RX ORDER — POTASSIUM CHLORIDE 20 MEQ/1
40 TABLET, EXTENDED RELEASE ORAL EVERY 4 HOURS
Status: COMPLETED | OUTPATIENT
Start: 2024-01-24 | End: 2024-01-24

## 2024-01-24 RX ORDER — ROSUVASTATIN CALCIUM 40 MG/1
40 TABLET, COATED ORAL NIGHTLY
Start: 2024-01-24

## 2024-01-24 RX ORDER — PANTOPRAZOLE SODIUM 40 MG/1
40 TABLET, DELAYED RELEASE ORAL
Qty: 30 TABLET | Refills: 0 | Status: SHIPPED | OUTPATIENT
Start: 2024-01-25

## 2024-01-24 RX ORDER — NIFEDIPINE 30 MG
30 TABLET, EXTENDED RELEASE ORAL
Qty: 30 TABLET | Refills: 0 | Status: SHIPPED | OUTPATIENT
Start: 2024-01-25

## 2024-01-24 RX ADMIN — CYCLOBENZAPRINE 10 MG: 10 TABLET, FILM COATED ORAL at 09:00

## 2024-01-24 RX ADMIN — METOPROLOL TARTRATE 25 MG: 25 TABLET, FILM COATED ORAL at 09:01

## 2024-01-24 RX ADMIN — PIPERACILLIN SODIUM AND TAZOBACTAM SODIUM 3.38 G: 3; .375 INJECTION, SOLUTION INTRAVENOUS at 01:30

## 2024-01-24 RX ADMIN — ERTAPENEM 1000 MG: 1 INJECTION INTRAMUSCULAR; INTRAVENOUS at 09:02

## 2024-01-24 RX ADMIN — GABAPENTIN 600 MG: 300 CAPSULE ORAL at 13:32

## 2024-01-24 RX ADMIN — OXYCODONE HYDROCHLORIDE 20 MG: 10 TABLET ORAL at 10:53

## 2024-01-24 RX ADMIN — HYDRALAZINE HYDROCHLORIDE 25 MG: 50 TABLET, FILM COATED ORAL at 04:41

## 2024-01-24 RX ADMIN — HEPARIN SODIUM 5000 UNITS: 5000 INJECTION INTRAVENOUS; SUBCUTANEOUS at 13:33

## 2024-01-24 RX ADMIN — SENNOSIDES AND DOCUSATE SODIUM 2 TABLET: 8.6; 5 TABLET ORAL at 09:00

## 2024-01-24 RX ADMIN — HEPARIN SODIUM 5000 UNITS: 5000 INJECTION INTRAVENOUS; SUBCUTANEOUS at 04:40

## 2024-01-24 RX ADMIN — PANTOPRAZOLE SODIUM 40 MG: 40 TABLET, DELAYED RELEASE ORAL at 04:41

## 2024-01-24 RX ADMIN — DAPTOMYCIN 300 MG: 500 INJECTION, POWDER, LYOPHILIZED, FOR SOLUTION INTRAVENOUS at 12:10

## 2024-01-24 RX ADMIN — POTASSIUM CHLORIDE 40 MEQ: 1500 TABLET, EXTENDED RELEASE ORAL at 12:09

## 2024-01-24 RX ADMIN — HYDROCODONE BITARTRATE AND ACETAMINOPHEN 1 TABLET: 10; 325 TABLET ORAL at 15:05

## 2024-01-24 RX ADMIN — POTASSIUM CHLORIDE 40 MEQ: 1500 TABLET, EXTENDED RELEASE ORAL at 09:01

## 2024-01-24 RX ADMIN — GABAPENTIN 600 MG: 300 CAPSULE ORAL at 04:40

## 2024-01-24 RX ADMIN — LISINOPRIL 40 MG: 20 TABLET ORAL at 09:00

## 2024-01-24 RX ADMIN — HYDROCODONE BITARTRATE AND ACETAMINOPHEN 1 TABLET: 10; 325 TABLET ORAL at 09:00

## 2024-01-24 RX ADMIN — INSULIN LISPRO 3 UNITS: 100 INJECTION, SOLUTION INTRAVENOUS; SUBCUTANEOUS at 09:01

## 2024-01-24 RX ADMIN — OXYCODONE HYDROCHLORIDE 20 MG: 10 TABLET ORAL at 04:40

## 2024-01-24 RX ADMIN — INSULIN LISPRO 3 UNITS: 100 INJECTION, SOLUTION INTRAVENOUS; SUBCUTANEOUS at 10:41

## 2024-01-24 RX ADMIN — Medication 10 ML: at 09:03

## 2024-01-24 RX ADMIN — NIFEDIPINE 60 MG: 30 TABLET, EXTENDED RELEASE ORAL at 09:00

## 2024-01-24 NOTE — PAYOR COMM NOTE
"[facility]# [id number]   Clinical Update    Utilization Review  Phone [phone]  Fax [phone]    [id number] [place] [address]       Sebastian Reed \"Gal\" (48 y.o. Male)       Date of Birth   1975    Social Security Number       Address   [address] [place] [address]    Home Phone   [phone]    MRN   [id number]       Confucianism   Latter-day    Marital Status                               Admission Date   1/14/24    Admission Type   Emergency    Admitting Provider   Sophy Amaya MD    Attending Provider   Sophy Amaya MD    Department, Room/Bed   [facility] 5H, S586/1       Discharge Date       Discharge Disposition   Home or Self Care    Discharge Destination                                 Attending Provider: Sophy Amaya MD    Allergies: No Known Allergies    Isolation: None   Infection: None   Code Status: CPR    Ht: 172.7 cm (68\")   Wt: 73.5 kg (162 lb)    Admission Cmt: None   Principal Problem: Ischemic pain of left foot [M79.672,I99.8]                   Active Insurance as of 1/14/2024       Primary Coverage       Payor Plan Insurance Group Employer/Plan Group    ANTHEM BLUE CROSS ANTHEM BLUE CROSS BLUE SHIELD PPO [id number]       Payor Plan Address Payor Plan Phone Number Payor Plan Fax Number Effective Dates    [address] [phone]  1/1/2017 - None Entered    [place] [address]         Subscriber Name Subscriber Birth Date Member ID       SEBASTIAN REED 1975 [id number]                     Emergency Contacts        (Rel.) Home Phone Work Phone Mobile Phone    Christine Reed (Spouse) [phone] -- [phone]                 Operative/Procedure Notes (all)        Vin Pena MD at 01/18/24 1352  Version 1 of 1         FEMORAL POPLITEAL BYPASS  Procedure Report    Patient Name:  Sebastian Reed  YOB: 1975    Date of Surgery:  1/18/2024     Indications:  48 " year old  gentleman, a patient of Dr. Melo Whitaker, who previously underwent 6/27/17 LEFT CFA-AKP prosthetic bypass and 11/21/23 fem-fem bypass.  He developed LEFT 4th toe dry gangrenous changes in early January '24 with worsening discoloration of the 2nd-3rd toes.  He was seen previously and recommended for LEFT leg amputation.  He presented to [facility] ER 1/14/24 and a CTA was obtained demonstrating multi-level disease including LEFT CFA-AKP occlusion with reconstitution of the BKP with possible tibial reconstitution.  He presents now for graft thrombectomy, AKP-BKP jump graft with tibial angioplasty.    Pre-op Diagnosis:   LEFT 2nd-4th toe gangrene  LEFT CFA-AKP bypass occlusion       Post-Op Diagnosis Codes:  LEFT 2nd-4th toe gangrene  LEFT CFA-AKP bypass occlusion  LEFT AT stenosis - 80% proximal, 50% diffuse  LEFT TPT stenosis - 60% proximal  LEFT PT occlusion - mid  LEFT DP stenosis - 80% proximal  LEFT plantar occlusion    Procedure/CPT® Codes:      Procedure(s):  LEFT AKP reoperative exploration  LEFT BPG thrombectomy  LEFT AKP-BKP prosthetic bypass (6mm [place] Propaten)  LEFT AT angioplasty (5p097hz Ultraverse Rx, 2-2.6t867nv Nanocross)  LEFT DP angioplasty (1t941ax Ultraverse Rx, 2-2.7l764va Nanocross)  LEFT TPT angioplasty (7t870xf Ultraverse Rx, 2-2.7z204ip Nanocross)  LEFT PT angioplasty (0r938gz Ultraverse Rx, 2-2.7o841gj Nanocross)  LEFT plantar angioplasty (6g929qf Ultraverse Rx, 2-2.0o347ol Nanocross)    Staff:  Surgeon(s):  Vin Pena MD    Assistants:  CHEYENNE Turner PA-S    Circulator: Bebe Brothers RN  Perfusionist: Aurelia Pompa  Radiology Technologist: Justo Fay  Scrub Person: Sissy Zapata  Nursing Assistant: Mary Douglas PCT  Perfusion Tech: Perri Keys                 Anesthesia: General    Estimated Blood Loss:  300cc , 130cc returned as cell saver    Implants:    Implant Name Type Inv. Item Serial No.  Lot No. LRB  No. Used Action   SOM VASC PROPAT SILVANANSTRCH [id number] - [id number] - [id number] Implant GRMANDA VASC PROPAT SILVANANSTRCH [id number] [employer] NA Left 1 Implanted   HEMOST ABS SURGICEL SNOW 1X2IN - [id number] Implant HEMOST ABS SURGICEL SNOW 1X2IN  [employer]  DIV OF J AND J [id number] Left 2 Implanted       Specimen:          None        Findings: Prior CFA-AKP bypass graft thrombectomized with restoration of pulsatile arterial inflow.  6mm [place] Propaten tunneled anatomically from the distal CFA-AKP bypass to the distal BKP.  By angiogram, the bypass graft terminates into the BKP with a 80% proximal AT and 60% TPT stenoses seen.  The AT is diffusely diseased throughout and continues to the foot as the DP which has a proximal 80% stenosis.  The TPT continues into the PT and Pr with Pr appearing normal in caliber while the PT occludes in the proximal leg.    Complications: None    Description of Procedure: Patient was lying supine on the hybrid OR table when he surrendered to general anesthesia.  The LEFT groin and circumferential thigh/leg were then prepped and draped in usual sterile fashion.  The patient was identified as Justo Oquendo per time-out protocol.    The prior distal medial thigh incision was reopened and dissection was carried down to the prior CFA-AKP bypass graft (BPG) which was encircled proximally with a vessel loop.  A medial proximal leg incision was made and dissection was carried down to the level of the BKP which was encircled with a vessel loop.  Next, the 11 blade was used to make an arteriotomy in the distal BPG and semi-solid clot was encountered.  The over-the-wire Elliott embolectomy catheter was then advanced 10cm then 20cm then 30 cm with retrieval of semi-solid clot without return of arterial inflow.  Next, the 035/260 glide advantage wire (GAW) was then advanced retrograde towards the CFA and the wire accessed the prior fem-fem graft.  The Elliott was then advanced  to 40cm with retrieval of a solid plug with return of arterial inflow.  The Elliott was again passed with restoration of widely patent flow.  The graft was then flushed with heparinized saline and then clamped with a Elliott hydragrip clamp.  Next, the tunneled was then used to deliver the 6mm ringed [place] Propaten graft from the leg to the thigh incision.  The patient was then systemically heparinized.  The graft was then prepared for proximal anastomosis which was completed using 6-0 Prolene in running-continuous side-to-end fashion.  After restoration of flow, there is a strong pulse within the graft.  Heparinized saline was then instilled into the graft then clamped.    Attention was then turned to the BKP which was controlled as were the AT and TPT.  An arteriotomy was made in the distal BKP extending onto the TPT and the graft was prepared for distal anastomosis which was completed using 6-0 Prolene in running-continuous end-to-side fashion.  After release of the clamps, there is a strong pulse within the graft with a strong biphasic signal in the AT and TPT which is dependant on the bypass.      The proximal jump graft was then accessed in antegrade fashion using micropuncture technique and an arteriogram was obtained.  The 6Fr sheath was installed and the 014/300 GAW was then advanced into the AT and the 2mm monorail balloon was used to angioplasty the AT and DP.  The wire and balloon were then directed into the TPT then into the PT.  The 2mm monorail balloon was then exchanged for the 2mm tapered balloon and 200mcg of nitroglycerin was instilled into the distal PT.  Interval arteriogram now delineates a track towards the plantar.  The plantar was then accessed using the 014 wire and advanced to the pedal arch, but could not successfully navigate the arch so as to emerge in the distal DP.  The plantar, PT and TPT were then angioplastied with the 2mm tapered balloon.  The wire and balloon were then advanced  into the AT and further to the DP.  200mcg of nitroglycerin was administered in the mid DP, but the pedal arch could not be visualized by interval arteriogram.  The DP and AT were then angioplastied with the 2mm tapered balloon.  Completion arteriogram demonstrates dominant flow to the foot via the Pr with flow seen in the PT which continues into the plantar which is diffusely diseased.  The AT and DP are not well visualized.    A 6-0 Prolene was placed at the site of antegrade access for hemostasis after the sheath was removed.  The wounds were then inspected for hemostasis.  Surgicel snow with Vancomycin powder were placed into the wounds which were then closed in layers using 2-0 and 3-0 Vicryl with 4-0 Monocryl to approximate the skin edges.  Skin glue and Aquacel were applied to the incisions.  At the end of the case, the sponge and needle counts were reported as correct.      Vin Pena MD     Date: 1/18/2024  Time: 16:30 EST      Electronically signed by Vin Pena MD at 01/18/24 5336       Christo Peres Jr., MD at 01/19/24 1149  Version 1 of 1         [facility]     OPERATIVE REPORT      PATIENT NAME:  Justo Oquendo                            YOB: 1975       PREOP DIAGNOSIS:   Left foot osteomyelitis    POSTOP DIAGNOSIS:   Same.    PROCEDURE:       58534:  Left foot transmetatarsal amputation    SURGEON:     Christo Peres MD    OPERATIVE TEAM:   Circulator: Dior Chaney, DAVID; Des Solares RN; Samuel Sequeira RN  Physician Assistant: Cynthia Cantor PA-C  Radiology Technologist: Alma Paige R.T.(SHANTE)  Scrub Person: Vanesa Vergara    ANESTHETIST:  Anesthesiologist: Ketan Horner MD; Balaji Parkinson Jr., MD    ANESTHESIA:   Choice    ESTIMATED BLOOD LOSS:   < 30 ml    TOURNIQUET TIME:   Not utilized    FINDINGS:     Remaining tissue appeared healthy.    IMPLANTS:     None    COMPLICATIONS:    None.    DISPOSITION:    Stable to recovery.     INDICATIONS:    48 y.o. male with Left foot peripheral vascular disease with dry gangrene of 2-4 toes status post revascularization.  I had a discussion with the patient regarding further management including nonoperative management with empiric antibiotics and wound care, debridement, amputations at various levels.  After discussion of risks, benefits, alternatives, the patient wished to proceed with Left foot transmetatarsal amputation.    Risks of surgery were discussed which included but were not limited to pain, bleeding, infection, damage to adjacent structures, need for further surgery, wound healing complications, loss of limb and death.  The patient expressed verbal consent and written consent was obtained for the above procedure.     NARRATIVE:    Patient was identified in preoperative holding.  Operative site was marked in indelible ink.  History, physical, consent were reviewed and updated.  Patient was surrendered to the anesthesia team and transported to the operative suite where anesthesia was induced.  Hip bump was placed on the ipsilateral side.  Operative extremity was prepped and draped in the usual sterile fashion.  The operative team donned sterile gowns and gloves and a timeout was called.  All in attendance agreed regarding the patient's identity, proposed operative procedure, and operative site.    No tourniquet was utilized during this case.    I turned my attention to the Left foot.  I made a full-thickness incision along the fifth ray, carried this distally just proximal to the toes, and medially proximally along the first ray, sharply dissected down to bone.  I made a racquet shaped incision plantarly just proximal to the toes as well.  Utilizing Bovie electrocautery, I dissected in a subperiosteal plane, removed the soft tissue attachments from the base of the proximal phalanges, removed the toes.  Again, using Bovie electrocautery, I elevated the full-thickness soft tissue flap dorsally from the  metatarsals.  Under fluoroscopic guidance, utilizing a sagittal saw, I transected the first through fifth metatarsals.  I removed the metatarsals, sent them for gross pathology.  I took swab specimens of the wound, copiously irrigated with Irrisept and saline.  I achieved hemostasis, irrigated the wound once more. The remaining tissue appeared healthy and viable without readily apparent evidence of infection.  I applied floseal impregnated with vancomycin into the wound.  I closed in anatomic layers with monofilament suture.    Sterile dressing applied.  Ace wrap was not utilized.    Anesthesia was concluded and the patient was transported to the PACU in stable condition.  Counts were correct x2.  There were no apparent complications.  I was present and scrubbed for the entire case.    Christo Peres Jr, MD  1/19/2024     Electronically signed by Christo Peres Jr., MD at 01/19/24 1250          Physician Progress Notes (last 72 hours)        Winter Santana PA at 01/24/24 0835          Justo Oquendo       LOS: 10 days   Patient Care Team:  Melo Whitaker MD as PCP - General (Family Medicine)  Niall Mcfarlane MD as Consulting Physician (Cardiology)    Chief Complaint:  left foot ischemia    Subjective     Interval History:     Resting comfortably in bed this morning.  Family present at bedside.  Pain tolerable, no acute events overnight.      Review of Systems:      Gen- No fevers, chills  CV- No chest pain, palpitations  Resp- No cough, dyspnea  GI- No N/V/D, abd pain    Objective     Vital Signs  Vital Signs (last 24 hours)         01/15 0700  01/16 0659 01/16 0700  01/16 0731   Most Recent      Temp (°F) 96.6 -  97.6       97.2 (36.2) 01/16 0652    Heart Rate 74 -  101       82 01/16 0652    Resp 14 -  16       16 01/16 0652    /70 -  157/76       134/70 01/16 0652    SpO2 (%) 93 -  99       97 01/16 0652    Flow (L/min)   2.5       2.5 01/16 0652              Physical  "Exam:     Alert, oriented.  No acute distress.  Nonlabored respirations.  Regular rate and rhythm.  Abdomen nondistended.  Left lower extremity: Operative dressing intact, clean, dry.     Results Review:     I reviewed the patient's new clinical results.    Medication Review:   Hospital Medications (active)         Dose Frequency Start End    aspirin EC tablet 81 mg 81 mg Nightly 1/14/2024 --    Admin Instructions: Do not crush or chew the capsules or tablets. The drug may not work as designed if the capsule or tablet is crushed or chewed. Swallow whole.  Do not exceed 4 grams of aspirin in a 24 hr period.    If given for pain, use the following pain scale:   Mild Pain = Pain Score of 1-3, CPOT 1-2  Moderate Pain = Pain Score of 4-6, CPOT 3-4  Severe Pain = Pain Score of 7-10, CPOT 5-8    Route: Oral    bisacodyl (DULCOLAX) EC tablet 5 mg 5 mg Daily PRN 1/14/2024 --    Admin Instructions: Use if no bowel movement after 12 hours.  Swallow whole. Do not crush, split, or chew tablet.    Route: Oral    Linked Group 1: See Hyperspace for full Linked Orders Report.        bisacodyl (DULCOLAX) suppository 10 mg 10 mg Daily PRN 1/14/2024 --    Admin Instructions: Use if no bowel movement after 12 hours.  Hold for diarrhea    Route: Rectal    Linked Group 1: See Hyperspace for full Linked Orders Report.        Calcium Replacement - Follow Nurse / BPA Driven Protocol  As Needed 1/14/2024 --    Admin Instructions: Open Order & Select \"BHS Electrolyte Replacement Protocol Algorithm\" to View Details    Route: Does not apply    cefTRIAXone (ROCEPHIN) 1,000 mg in sodium chloride 0.9 % 100 mL IVPB 1,000 mg Every 24 Hours 1/15/2024 1/22/2024    Admin Instructions: LR should be paused and flushing of the line with NS is recommended prior to and after completion of ceftriaxone infusion due to incompatibility. Do not co-adminster with calcium-containing solutions.  Caution: Look alike/sound alike drug alert    Route: Intravenous    " cyclobenzaprine (FLEXERIL) tablet 10 mg 10 mg 3 Times Daily PRN 1/14/2024 --    Route: Oral    dextrose (D50W) (25 g/50 mL) IV injection 25 g 25 g Every 15 Minutes PRN 1/14/2024 --    Admin Instructions: Blood sugar less than 70; patient has IV access - Unresponsive, NPO or Unable To Safely Swallow    Route: Intravenous    dextrose (GLUTOSE) oral gel 15 g 15 g Every 15 Minutes PRN 1/14/2024 --    Admin Instructions: BS<70, Patient Alert, Is not NPO, Can safely swallow.    Route: Oral    gabapentin (NEURONTIN) capsule 600 mg 600 mg Every 8 Hours Scheduled 1/14/2024 --    Admin Instructions:     Route: Oral    glucagon (GLUCAGEN) injection 1 mg 1 mg Every 15 Minutes PRN 1/14/2024 --    Admin Instructions: Blood Glucose Less Than 70 - Patient Without IV Access - Unresponsive, NPO or Unable To Safely Swallow  Reconstitute powder for injection by adding 1 mL of -supplied sterile diluent or sterile water for injection to a vial containing 1 mg of the drug, to provide solutions containing 1 mg/mL. Shake vial gently to dissolve.    Route: Intramuscular    heparin 22519 units/250 mL (100 units/mL) in 0.45 % NaCl infusion 21 Units/kg/hr × 73.5 kg Titrated 1/14/2024 --    Admin Instructions: Pharmacy dosing - Cardiac or Other NOT VTE - Boluses (No initial bolus)    Route: Intravenous    HYDROmorphone (DILAUDID) injection 1 mg 1 mg Every 2 Hours PRN 1/14/2024 1/19/2024    Admin Instructions: Based on patient request - if ordered for moderate or severe pain, provider allows for administration of a medication prescribed for a lower pain scale.      Caution: Look alike/sound alike drug alert    If given for pain, use the following pain scale:  Mild Pain = Pain Score of 1-3, CPOT 1-2  Moderate Pain = Pain Score of 4-6, CPOT 3-4  Severe Pain = Pain Score of 7-10, CPOT 5-8    Route: Intravenous    Linked Group 2: See Bianca for full Linked Orders Report.        Insulin Lispro (humaLOG) injection 2-7 Units 2-7 Units 4  "Times Daily Before Meals & Nightly 1/14/2024 --    Admin Instructions: Correction Insulin - Low Dose - Total Insulin Dose Less Than 40 units/day (Lean, Elderly or Renal Patients)    Blood Glucose 150-199 mg/dL - 2 units  Blood Glucose 200-249 mg/dL - 3 units  Blood Glucose 250-299 mg/dL - 4 units  Blood Glucose 300-349 mg/dL - 5 units  Blood Glucose 350-400 mg/dL - 6 units  Blood Glucose Greater Than 400 mg/dL - 7 units & Call Provider   Caution: Look alike/sound alike drug alert    Route: Subcutaneous    LORazepam (ATIVAN) tablet 0.5 mg 0.5 mg Every 8 Hours PRN 1/14/2024 1/19/2024    Admin Instructions:  Caution: Look alike/sound alike drug alert    Route: Oral    Magnesium Standard Dose Replacement - Follow Nurse / BPA Driven Protocol  As Needed 1/14/2024 --    Admin Instructions: Open Order & Select \"BHS Electrolyte Replacement Protocol Algorithm\" to View Details    Route: Does not apply    metoprolol tartrate (LOPRESSOR) tablet 25 mg 25 mg Every 12 Hours Scheduled 1/14/2024 --    Admin Instructions: Hold for SBP less than 100, DBP less than 60, or heart rate less than 50    Route: Oral    naloxone (NARCAN) injection 0.4 mg 0.4 mg Every 5 Minutes PRN 1/14/2024 --    Admin Instructions: If Respiratory Rate Less Than 8 or Patient is Difficult to Arouse, Stop ALL Narcotics & Contact Provider.  Administer Slow IV Push.  Repeat As Ordered Until Respiratory Rate is Greater Than 12.    Route: Intravenous    Linked Group 2: See Hyperspace for full Linked Orders Report.        nicotine (NICODERM CQ) 21 MG/24HR patch 1 patch 1 patch Nightly 1/14/2024 --    Admin Instructions: Apply Patch to Clean, Dry, Hairless Area Daily - Rotating Sites.  REMOVE Old Patch Prior to Applying New Patch.  May Remove Patch at Bedtime If Needed to Prevent Insomnia.  Do Not Use Other Nicotine Products.  At Discharge, Follow Package Instructions.  Dispose of nicotine replacement therapies and their wrappers in non-hazardous pharmaceutical waste " "or in regular trash.    Route: Transdermal    nicotine polacrilex (NICORETTE) gum 2 mg 2 mg Every 1 Hour PRN 1/14/2024 --    Admin Instructions: Maximum 24 pieces in 24 hours.    Gum should be chewed until it tingles, then \"park\" between the gum and cheek.   When the tingling is gone, chew again until the tingle returns and once again \"park\" between gum and cheek.   Repeat until tingling is gone and discard.  At discharge, follow instructions on package  Dispose of nicotine replacement therapies and their wrappers in non-hazardous pharmaceutical waste or in regular trash.    Route: Mouth/Throat    nitroglycerin (NITROSTAT) SL tablet 0.4 mg 0.4 mg Every 5 Minutes PRN 1/14/2024 --    Admin Instructions: If Pain Unrelieved After 3 Doses Notify MD  May administer up to 3 doses per episode.    Route: Sublingual    ondansetron (ZOFRAN) injection 4 mg 4 mg Every 6 Hours PRN 1/14/2024 --    Admin Instructions: If BOTH ondansetron (ZOFRAN) & promethazine (PHENERGAN) Ordered, Use ondansetron First & THEN promethazine IF ondansetron Ineffective.    Route: Intravenous    Linked Group 3: See Hyperspace for full Linked Orders Report.        ondansetron ODT (ZOFRAN-ODT) disintegrating tablet 4 mg 4 mg Every 6 Hours PRN 1/14/2024 --    Admin Instructions: If BOTH ondansetron (ZOFRAN) & promethazine (PHENERGAN) Ordered, Use ondansetron First & THEN promethazine IF ondansetron Ineffective.  Place on tongue and allow to dissolve.    Route: Oral    Linked Group 3: See Hyperspace for full Linked Orders Report.        oxyCODONE (ROXICODONE) immediate release tablet 10 mg 10 mg 4 Times Daily 1/14/2024 1/19/2024    Admin Instructions: HOLD for sedation  If given for pain, use the following pain scale:  Mild Pain = Pain Score of 1-3, CPOT 1-2  Moderate Pain = Pain Score of 4-6, CPOT 3-4  Severe Pain = Pain Score of 7-10, CPOT 5-8    Route: Oral    oxyCODONE (ROXICODONE) immediate release tablet 10 mg 10 mg Every 4 Hours PRN 1/14/2024 " "1/19/2024    Admin Instructions: Based on patient request - if ordered for moderate or severe pain, provider allows for administration of a medication prescribed for a lower pain scale.  If given for pain, use the following pain scale:  Mild Pain = Pain Score of 1-3, CPOT 1-2  Moderate Pain = Pain Score of 4-6, CPOT 3-4  Severe Pain = Pain Score of 7-10, CPOT 5-8    Route: Oral    Pharmacy to Dose Heparin  Continuous PRN 1/14/2024 --    Admin Instructions: Boluses (No initial bolus)    Route: Does not apply    Pharmacy to dose vancomycin  Continuous PRN 1/14/2024 1/21/2024    Route: Does not apply    Phosphorus Replacement - Follow Nurse / BPA Driven Protocol  As Needed 1/14/2024 --    Admin Instructions: Open Order & Select \"BHS Electrolyte Replacement Protocol Algorithm\" to View Details    Route: Does not apply    polyethylene glycol (MIRALAX) packet 17 g 17 g Daily PRN 1/14/2024 --    Admin Instructions: Use if no bowel movement after 12 hours. Mix in 6-8 ounces of water.  Use 4-8 ounces of water, tea, or juice for each 17 gram dose.    Route: Oral    Linked Group 1: See Hyperspace for full Linked Orders Report.        Potassium Replacement - Follow Nurse / BPA Driven Protocol  As Needed 1/14/2024 --    Admin Instructions: Open Order & Select \"BHS Electrolyte Replacement Protocol Algorithm\" to View Details    Route: Does not apply    rosuvastatin (CRESTOR) tablet 40 mg 40 mg Nightly 1/14/2024 --    Admin Instructions: Avoid grapefruit juice.    Route: Oral    sennosides-docusate (PERICOLACE) 8.6-50 MG per tablet 2 tablet 2 tablet 2 Times Daily 1/14/2024 --    Admin Instructions: HOLD MEDICATION IF PATIENT HAS HAD BOWEL MOVEMENT. Start bowel management regimen if patient has not had a bowel movement after 12 hours.    Route: Oral    Linked Group 1: See Hyperspace for full Linked Orders Report.        sodium chloride 0.9 % flush 10 mL 10 mL Every 12 Hours Scheduled 1/14/2024 --    Route: Intravenous    sodium " chloride 0.9 % flush 10 mL 10 mL As Needed 1/14/2024 --    Route: Intravenous    sodium chloride 0.9 % infusion 40 mL 40 mL As Needed 1/14/2024 --    Admin Instructions: Following administration of an IV intermittent medication, flush line with 40mL NS at 100mL/hr.    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      Ischemic pain of left foot    Peripheral arterial disease    Hypertension    Hyperlipidemia    Tobacco abuse    Diabetes mellitus type II, non insulin dependent    Claudication of lower extremity s/p femorofemoral bypass    Ischemic foot    Sepsis associated hypotension      Nonweightbearing left lower extremity.  Resting chains at bedside this morning 1/24/2024 and surgical incision inspected, healing well.  Okay to remove dressing for arterial Doppler as indicated.  Follow cultures.    Upon discharge, plan for follow-up in 2 weeks for wound check and x-rays.    Follow up with Winter Santana PA-C.    [facility]  [address], [address]  [facility], [address]  Please schedule at [phone]      LLEIA Tapia  01/24/24  08:35 EST             Electronically signed by Wniter Santana PA at 01/24/24 0835       Hemant Adhikari MD at 01/24/24 0833          Justo Oquendo  1975  [id number]      Chief Complaint: left foot pain    Reason for Consultation: left foot infection, postop fever    History of  "present illness:     Patient is a 48 y.o.  Yr old male with history of peripheral arterial disease, diabetes.  Prior history femorofemoral bypass by Dr. Ryan in November 2023 in addition to thrombectomy and left femoropopliteal bypass, worsening claudication symptoms and left second/third/fourth toes became dark/black; noted to have sepsis at admission per internal medicine note, elevated creatinine with ATN per medicine note,head surgery by Dr. Pena on 1/18:    \"Procedure(s):  LEFT AKP reoperative exploration  LEFT BPG thrombectomy  LEFT AKP-BKP prosthetic bypass (6mm [place] Propaten)  LEFT AT angioplasty (9g833us Ultraverse Rx, 2-2.8x209ah Nanocross)  LEFT DP angioplasty (3h575nc Ultraverse Rx, 2-2.9a167mx Nanocross)  LEFT TPT angioplasty (9g617yr Ultraverse Rx, 2-2.7g479wq Nanocross)  LEFT PT angioplasty (9q322tf Ultraverse Rx, 2-2.3k141np Nanocross)  LEFT plantar angioplasty (5p878ko Ultraverse Rx, 2-2.5g539gj Nanocross)\"    Subsequent surgery by Dr. Peres on January 19 with left foot transmetatarsal amputation.had been on vancomycin/ceftriaxone according to intensivist note; vancomycin subsequently stopped.    1/24/24  generally better;  no new or progressive redness and his LLE or elswhere;  tempas < 100; he prefers home with close followup ;  pain of the left foot which is constant, sharp at times, nonradiating, worse with palpation, generally better with pain meds and 3 out of 10 in severity.  No new drainage at the surgical site.  Mild redness of the left foot in general     no cough or new respiratory distress.  Less urinary frequency and denies any dysuria/hematuria/pyuria or flank pain.  No diarrhea/vomiting or abdominal pain.  No skin rash.  He reports new peripheral IV at the left upper extremity with no redness/swelling or drainage there.     Past Medical History:   Diagnosis Date    Arthritis     Back pain     Diabetes     SINCE MARCH 2017    Dyslipidemia     HTN (hypertension)     PVD " (peripheral vascular disease)     Wears partial dentures        Past Surgical History:   Procedure Laterality Date    AORTAGRAM N/A 04/25/2017    Procedure: AORTAGRAM WITH OR WITHOUT RUNOFFS POSSIBLE STENT with left femoral cutdown;  Surgeon: Brett Ryan MD;  Location: [facility] HYBRID OR 15;  Service:     AORTAGRAM N/A 11/21/2023    Procedure: THROMBECTOMY OF LEFT GRAFT, AORTAGRAM, FEMORAL TO FEMORAL BYPASS;  Surgeon: Brett Ryan MD;  Location: [facility];  Service: Vascular;  Laterality: N/A;  FLUORO- .06 SECS  DOSE- 10 MGY  CONTRAST- 10 ML ISO    CHOLECYSTECTOMY      CYST REMOVAL      OFF OF BACK    FEMORAL POPLITEAL BYPASS Left 1/18/2024    Procedure: FEMORAL POPLITEAL BYPASS WITH POPLITEAL EXPLORATION;  Surgeon: Vin Pena MD;  Location: [facility];  Service: Vascular;  Laterality: Left;  DOSE: 9MGY  FLURO: 14 MIN, 36 SEC  CONTRAST: 50ML VISIPAQUE 320    LA IN-SITU VEIN BYPASS FEMORAL-POPLITEAL Left 04/25/2017    Procedure: FEMORAL POPLITEAL BYPASS;  Surgeon: Brett Ryan MD;  Location:  [facility] HYBRID OR 15;  Service: Vascular    LA IN-SITU VEIN BYPASS FEMORAL-POPLITEAL Left 06/27/2017    Procedure: LEFT FEMORAL ENDARTECTOMYN LEFT FEMORAL POPLITEAL BYPASS;  Surgeon: Brett Ryan MD;  Location: [facility];  Service: Vascular    TRANS METATARSAL AMPUTATION Left 1/19/2024    Procedure: AMPUTATION TRANS METATARSAL- LEFT;  Surgeon: Christo Peres Jr., MD;  Location: [facility];  Service: Orthopedics;  Laterality: Left;       Pediatric History   Patient Parents    Not on file     Other Topics Concern    Not on file   Social History Narrative    Lives in [place], [place]       family history includes Arthritis in his mother; Kidney disease in his father; Liver disease in his father.    No Known Allergies       Review of Systems    1/24/24    Constitutional-- No  chills or sweats.  Appetite good, and no malaise. No fatigue.  Heent-- No new vision, hearing or throat  "complaints.  No epistaxis or oral sores.  Denies odynophagia or dysphagia.  No flashers, floaters or eye pain. No odynophagia or dysphagia. No headache, photophobia or neck stiffness.  CV-- No chest pain, palpitation or syncope  Resp-- No SOB/cough/Hemoptysis  GI- No nausea, vomiting, or diarrhea.  No hematochezia, melena, or hematemesis. Denies jaundice or chronic liver disease.  --  see above.  Denies hesitancy or incontinence  Lymph- no swollen lymph nodes in neck/axilla or groin.   Heme- No active bruising or bleeding; no Hx of DVT or PE.  MS-- no swelling or pain in the bones or joints of arms/legs.  No new back pain.  Neuro-- No acute focal weakness or numbness in the arms or legs.  No seizures.    Full 12 point review of systems reviewed and negative otherwise for acute complaints, except for above    Physical Exam:   Vital Signs   /52 (BP Location: Right arm, Patient Position: Lying)   Pulse 84   Temp 99.3 °F (37.4 °C) (Oral)   Resp 18   Ht 172.7 cm (68\")   Wt 73.5 kg (162 lb)   SpO2 97%   BMI 24.63 kg/m²     GENERAL: Awake and alert, in no acute distress.   HEENT: Normocephalic, atraumatic.   No conjunctival injection. No icterus. Oropharynx clear without evidence of thrush or exudate. No evidence of periodontal disease.    NECK: Supple without nuchal rigidity. No mass.   HEART: RRR; No murmur, rubs, gallops.   LUNGS: diminished at bases but otherwise Clear to auscultation bilaterally without wheezing, rales, rhonchi. Normal respiratory effort. Nonlabored. No dullness.  ABDOMEN: Soft, nontender, nondistended. Positive bowel sounds. No rebound or guarding. NO mass or HSM.  EXT:  see below   MSK: FROM without joint effusions noted arms/legs.    SKIN: Warm and dry without cutaneous eruptions on Inspection/palpation.    NEURO: Oriented to PPT. No focal deficits on motor/sensory exam at arms/legs.    Left foot with vague erythema diffusely, some crust at the surgical site but no purulent drainage. "  No discrete mass bulge or fluctuance.  No crepitus or bulla    Left lower leg surgical sites from vascular procedure with no new visible purulence, only mild visible skin discoloration and no discrete mass bulge or fluctuance.  No crepitus or bulla    No peripheral stigmata such phenomenon of endocarditis    Laboratory Data    Results from last 7 days   Lab Units 01/24/24  0424 01/23/24  0443 01/22/24  0725   WBC 10*3/mm3 6.71 6.95 8.68   HEMOGLOBIN g/dL 8.1* 8.5* 8.5*   HEMATOCRIT % 24.6* 25.6* 25.7*   PLATELETS 10*3/mm3 232 231 241     Results from last 7 days   Lab Units 01/24/24  0424   SODIUM mmol/L 139   POTASSIUM mmol/L 3.5   CHLORIDE mmol/L 104   CO2 mmol/L 27.0   BUN mg/dL 10   CREATININE mg/dL 0.55*   GLUCOSE mg/dL 154*   CALCIUM mg/dL 9.0     Results from last 7 days   Lab Units 01/24/24  0424   ALK PHOS U/L 51   BILIRUBIN mg/dL 0.2   ALT (SGPT) U/L 18   AST (SGOT) U/L 15               Estimated Creatinine Clearance: 167 mL/min (A) (by C-G formula based on SCr of 0.55 mg/dL (L)).      Microbiology:      Radiology:  Imaging Results (Last 72 Hours)       ** No results found for the last 72 hours. **              Impression:     --acute left foot gangrene at his second/third/fourth digits, transmetatarsal amputation as above January 19 and operative culture is negative albeit antibiotic modified. Antibiotics had been ongoing with concern for osteomyelitis according to intensivist note; left forefoot pathology with ischemic type necrosis but negative for osteomyelitis per pathologist.  Antibiotic empirically  ongoing for cellulitis/soft tissue infection but at risk for further sequela to ischemia/peripheral vascular disease and recent surgery.     Monitor for new suppurative sequela    --Postop fever, low-grade fever on January 22 and risk at multiple sites.  Chest exam nonfocal with no cough and on room air.  Encouraged incentive spirometry.  Has urinary frequency and urinalysis ordered.  Peripheral IV site  without obvious suppurative change.  Abdomen benign with no diarrhea.  Left leg surgical sites currently without obvious new suppurative change but could evolve and I have asked Dr. Pena/vascular team to continue to follow.  On empiric antibiotics regarding left foot as above.  Monitor for other process; primary concern left foot cellulitis as above    --Peripheral arterial disease.  Revascularization as above by Dr. Pena    --Diabetes.  Glucose control per medicine team    --abnormal creatinine earlier in stay.  Improved.  Monitor to help guide future adjustments antimicrobials    PLAN:      --IV daptomycin/Invanz    --Check/review labs cultures and scans    --Partial history per nursing staff    --Discussed with microbiology    --Discussed with Dr. Amaya; I anticipate transition to outpatient with close follow-up, IV daptomycin/Invanz in the office and follow up with me     --Discussed with wife, partial history per her    --Discussed with Dr. Pena    --Highly complex of issues with high risk for further serious morbidity and other serious sequela     Copied text in this note has been reviewed and is accurate as of 24.     Hmeant Adhikari MD  2024                Electronically signed by Hemant Adhikari MD at 24 0838       Sophy Amaya MD at 24 1516              [facility]  PROGRESS NOTE    Patient Name: Justo Oquendo  : 1975  MRN: [id number]    Date of Admission: 2024  Primary Care Physician: Melo Whitaker MD    Subjective   Subjective     CC:  F/U PVD    HPI:  Seen this morning, no major complaints. Nerve block has  as of this morning.       Objective   Objective     Vital Signs:   Temp:  [97 °F (36.1 °C)-100.4 °F (38 °C)] 98.6 °F (37 °C)  Heart Rate:  [] 83  Resp:  [16-22] 16  BP: (104-162)/(56-84) 124/65     Physical Exam:  Gen-no acute distress  HENT-NCAT, mucous membranes  moist  CV-RRR, S1 S2 normal, no m/r/g  Resp-CTAB, no wheezes or rales  Abd-soft, NT, ND, +BS  Ext-no edema, left foot in dressing  Neuro-A&Ox3, no focal deficits  Skin-no rashes  Psych-appropriate mood      Results Reviewed:  LAB RESULTS:      Lab 01/23/24 0443 01/22/24  0725 01/20/24  0710 01/19/24  0247 01/18/24  0730 01/17/24  2359 01/17/24  1441 01/17/24  0642 01/16/24  2323   WBC 6.95 8.68 10.15 8.03 6.77  --   --   --   --    HEMOGLOBIN 8.5* 8.5* 8.6* 10.1* 11.9*  --   --   --   --    HEMATOCRIT 25.6* 25.7* 25.9* 29.2* 35.1*  --   --   --   --    PLATELETS 231 241 225 200 230  --   --   --   --    NEUTROS ABS  --  6.03  --  5.89 4.49  --   --   --   --    IMMATURE GRANS (ABS)  --  0.11*  --  0.05 0.05  --   --   --   --    LYMPHS ABS  --  1.54  --  1.29 1.49  --   --   --   --    MONOS ABS  --  0.86  --  0.77 0.65  --   --   --   --    EOS ABS  --  0.11  --  0.01 0.07  --   --   --   --    MCV 89.2 88.9 89.9 88.2 87.1  --   --   --   --    APTT  --   --   --   --  55.3* 79.4 44.9* 60.1 59.0*         Lab 01/23/24 0443 01/22/24 0725 01/21/24  0410 01/20/24  0710 01/19/24  0247   SODIUM 136 140 141 139 134*   POTASSIUM 3.7 3.8 3.7 4.6 4.1   CHLORIDE 102 103 107 103 102   CO2 27.0 25.0 27.0 21.0* 21.0*   ANION GAP 7.0 12.0 7.0 15.0 11.0   BUN 10 10 20 14 9   CREATININE 0.58* 0.51* 0.64* 0.59* 0.66*   EGFR 118.8 123.5 115.3 118.2 114.3   GLUCOSE 152* 137* 150* 158* 157*   CALCIUM 8.7 9.0 9.2 9.7 9.0   MAGNESIUM 1.7  --  1.8 1.9 1.6   PHOSPHORUS 3.6  --   --  3.7 3.5         Lab 01/20/24  0710 01/18/24  1856   TOTAL PROTEIN 6.1 6.2   ALBUMIN 3.6 3.4*   GLOBULIN 2.5 2.8   ALT (SGPT) 28 25   AST (SGOT) 27 44*   BILIRUBIN 0.2 0.3   ALK PHOS 53 57                     Brief Urine Lab Results  (Last result in the past 365 days)        Color   Clarity   Blood   Leuk Est   Nitrite   Protein   CREAT   Urine HCG        01/23/24 1124 Yellow   Clear   Negative   Negative   Negative   Negative                   Microbiology  Results Abnormal       Procedure Component Value - Date/Time    Anaerobic Culture - Wound, Foot, Left [[id number]]  (Normal) Collected: 01/19/24 1219    Lab Status: Preliminary result Specimen: Wound from Foot, Left Updated: 01/22/24 0957     Anaerobic Culture No anaerobes isolated at 3 days    Wound Culture - Wound, Foot, Left [[id number]] Collected: 01/19/24 1219    Lab Status: Final result Specimen: Wound from Foot, Left Updated: 01/22/24 0647     Wound Culture No growth at 3 days     Gram Stain Rare (1+) WBCs seen      No organisms seen    Blood Culture - Blood, Arm, Left [[id number]]  (Normal) Collected: 01/14/24 1512    Lab Status: Final result Specimen: Blood from Arm, Left Updated: 01/19/24 1900     Blood Culture No growth at 5 days    Blood Culture - Blood, Arm, Right [[id number]]  (Normal) Collected: 01/14/24 1510    Lab Status: Final result Specimen: Blood from Arm, Right Updated: 01/19/24 1631     Blood Culture No growth at 5 days    MRSA Screen, PCR (Inpatient) - Swab, Nares [[id number]]  (Normal) Collected: 01/14/24 1817    Lab Status: Final result Specimen: Swab from Nares Updated: 01/14/24 2152     MRSA PCR No MRSA Detected    Narrative:      The negative predictive value of this diagnostic test is high and should only be used to consider de-escalating anti-MRSA therapy. A positive result may indicate colonization with MRSA and must be correlated clinically.            No radiology results from the last 24 hrs    Results for orders placed during the hospital encounter of 11/20/23    Adult Transthoracic Echo Complete W/ Cont if Necessary Per Protocol    Interpretation Summary    Left ventricular systolic function is hyperdynamic (EF > 70%). Calculated left ventricular EF = 70.4%    Left ventricular diastolic function was normal.    No significant structural or functional valvular disease.      Current medications:  Scheduled Meds:aspirin, 81 mg, Oral, Nightly  DAPTOmycin, 4 mg/kg, Intravenous,  Q24H  gabapentin, 600 mg, Oral, Q8H  heparin (porcine), 5,000 Units, Subcutaneous, Q8H  hydrALAZINE, 25 mg, Oral, Q8H  HYDROcodone-acetaminophen, 1 tablet, Oral, TID  insulin detemir, 5 Units, Subcutaneous, Nightly  insulin lispro, 2-7 Units, Subcutaneous, 4x Daily AC & at Bedtime  lisinopril, 40 mg, Oral, Q24H  metoprolol tartrate, 25 mg, Oral, Q12H  nicotine, 1 patch, Transdermal, Nightly  NIFEdipine XL, 60 mg, Oral, Q24H  pantoprazole, 40 mg, Oral, Q AM  piperacillin-tazobactam, 3.375 g, Intravenous, Q8H  rosuvastatin, 40 mg, Oral, Nightly  senna-docusate sodium, 2 tablet, Oral, BID  sodium chloride, 10 mL, Intravenous, Q12H      Continuous Infusions:ropivacaine, , Last Rate: 1 mL/hr at 01/22/24 1723      PRN Meds:.  senna-docusate sodium **AND** polyethylene glycol **AND** bisacodyl **AND** bisacodyl    Calcium Replacement - Follow Nurse / BPA Driven Protocol    cyclobenzaprine    dextrose    dextrose    glucagon (human recombinant)    Magnesium Standard Dose Replacement - Follow Nurse / BPA Driven Protocol    nicotine polacrilex    nitroglycerin    ondansetron ODT **OR** ondansetron    oxyCODONE    Phosphorus Replacement - Follow Nurse / BPA Driven Protocol    Potassium Replacement - Follow Nurse / BPA Driven Protocol    sodium chloride    sodium chloride    temazepam    Assessment & Plan   Assessment & Plan     Active Hospital Problems    Diagnosis  POA    **Ischemic pain of left foot [M79.672, I99.8]  Yes    Ischemic foot [I99.8]  Yes    Sepsis associated hypotension [A41.9, I95.9]  Yes    Claudication of lower extremity s/p femorofemoral bypass [I73.9]  Yes    Hypertension [I10]  Yes    Hyperlipidemia [E78.5]  Yes    Tobacco abuse [Z72.0]  Yes    Diabetes mellitus type II, non insulin dependent [E11.9]  Yes    Peripheral arterial disease [I73.9]  Yes      Resolved Hospital Problems   No resolved problems to display.        Brief Hospital Course to date:  Justo Oquendo is a 48 y.o. male with hx of severe  PAD s/p thrombectomy of left femoropopliteal bypass graft as well as placement of a femorofemoral bypass by Dr. Ryan on 11/21/223, ongoing tobacco abuse, HTN, HLD, and DM2 who presents due to worsening claudication symptoms as well as altered mental status. ED evaluation was significant for hypotension and tachycardia in an encephalopathic patient and a finding of new worsened ischemia in the left lower extremity. Third and fourth toes on the left foot with cool discoloration and early ischemic necrosis. CTA with runoff was performed and compared to recent imaging confirmed fem-fem occlusion. Dr. Pena with Vascular Surgery took patient for left graft thrombectomy on 1/18/24, and he was transferred to the ICU for closer monitoring post-operatively. Dr. Peres with Orthopedic Surgery subsequently took patient for left foot transmetatarsal amputation on 1/19/24. Patient transferred back to the floor with hospitalist service resuming care on 1/23/24.    This patient's problems and plans were partially entered by my partner and updated as appropriate by me 01/23/24. Copied text in this note has been reviewed and is accurate as of today's date.     Sepsis (AMS, WBC, hypotension, ischemic necrosis), POA  Metabolic encephalopathy  Ischemic ulcers and necrosis of LLE, 3rd-4th digits  Progressive PAD with occlusion of fem-fem graft  Acute on chronic pain   --BP improved with IV fluids and hydrocortisone on admission  --CTA with runoff showed complete occlusion of the left femoral artery bypass graft, complete occlusion of the right femoral artery.  Eventual distal reconstitution of both vessels due to the branches of the deep femoral arteries, loss of flow within the left posterior tibial artery, loss of flow within the right anterior tibial artery.  No residual flow to the distal arteries.   --Dr. Peres/Orthopedic Surgery as well as Dr. Pena/Vascular Surgery consulted  --1/18/24 Dr. Pena performed left graft  thrombectomy, AKP-BKP jump graft with tibial angioplasty; F/U in 2 weeks with ARLINE's  --1/19/24: Dr. Peres performed left foot transmetatarsal amputation; recommends NWB LLE; F/U in 2 weeks with Winter Santana PA-C  --He had been on Vanc and Rocephin initially, MRSA PCR returned negative so Vanc discontinued - ID consulted 1/23/24 and broadening to Daptomycin and Zosyn for now while awaiting finalization of cultures  --Low grade fever overnight, monitor closely  --Surgical cultures NGTD, blood cultures negative  --Continue ASA and statin  --Opioid tolerant: on Norco 10 mg at home which is continued here and scheduled TID, in addition he also has a new prescription for oxycodone 20 mg per [facility] report so have continued PRN oxycodone 20 mg Q6H PRN  -- off IV pain meds since 1/21/24  --Continue home gabapentin  --Home Xarelto 2.5 mg BID on hold, resume when okay with surgical services  --PT/OT     Acute kidney injury, resolved  ATN due to sepsis  --Pre-renal, likely hypotension/sepsis related  --Resolved with IV fluids     DM2, uncontrolled  --HbA1c 10.6% on 11/20/23, will repeat here  --Not on insulin at home  --Continue Levemir 5 units nightly   --Continue low dose SSI  --Adjust as needed, well controlled past 24 hours     HTN  HLD  --Held home Lisinopril due to ALEJANDRO and hypotension, now resumed  --Continue home Metoprolol 25 mg BID  --Required Cardene drip while in ICU, now weaned off  --Have added Nifedipine XL 60 mg daily, Hydralazine 25 mg TID this admission - wean off if needed based on BP trend  --Continue statin    Anemia  --Likely post-operative in nature, Hb normal at 14 in November 2023 prior to initial surgery, has trended down ever since  --Hb 11.8 on admission, has trended down to 8 range now  --Monitor closely     Ongoing heavy tobacco smoker  --Nicotine patch and gum as needed    Expected Discharge Location and Transportation: home  Expected Discharge   Expected Discharge Date: 1/26/2024; Expected  "Discharge Time:      DVT prophylaxis:  Medical DVT prophylaxis orders are present.         AM-PAC 6 Clicks Score (PT): 19 (01/23/24 0800)    CODE STATUS:   Code Status and Medical Interventions:   Ordered at: 01/14/24 2055     Code Status (Patient has no pulse and is not breathing):    CPR (Attempt to Resuscitate)     Medical Interventions (Patient has pulse or is breathing):    Full Support       Sophy Amaya MD  01/23/24        Electronically signed by Sophy Amaya MD at 01/23/24 8035       Kelsi Sarabia PA-C at 01/22/24 1413       Summary:Vascular SOAP - [facility]                    LOS: 8 days   Patient Care Team:  Melo Whitaker MD as PCP - General (Family Medicine)  Niall Mcfarlane MD as Consulting Physician (Cardiology)      Subjective       Subjective    Mr. Oquendo is doing well today.  He rates his pain a 6 out of 10.  He is hopeful to go home soon.  States he has not yet had to change his Aquacel dressings.    History taken from: patient    Objective     Vital Signs  Temp:  [98.2 °F (36.8 °C)-98.4 °F (36.9 °C)] 98.3 °F (36.8 °C)  Heart Rate:  [] 98  Resp:  [16-26] 20  BP: (100-178)/() 130/61    Objective:  Vital signs: (most recent): Blood pressure 128/64, pulse 86, temperature 98.3 °F (36.8 °C), temperature source Oral, resp. rate 20, height 172.7 cm (68\"), weight 73.5 kg (162 lb), SpO2 98%.                Physical Exam    AAOx3, NAD, sitting in bedside chair, no family at bedside  Respiratory: Normal effort on room air  Left lower extremity: Aquacel dressings clean dry and intact, foot dressing clean dry and intact, leg is warm    Results Review:     I reviewed the patient's new clinical results.  CBC    Results from last 7 days   Lab Units 01/22/24  0725 01/20/24  0710 01/19/24  0247 01/18/24  0730 01/16/24  0648   WBC 10*3/mm3 8.68 10.15 8.03 6.77 6.90   HEMOGLOBIN g/dL 8.5* 8.6* 10.1* 11.9* 11.0*   PLATELETS 10*3/mm3 241 225 200 230 264     BMP   Results from last 7 " days   Lab Units 01/22/24  0725 01/21/24  0410 01/20/24  0710 01/19/24  0247 01/18/24  1856 01/18/24  0730 01/17/24  0801 01/17/24  0641   SODIUM mmol/L 140 141 139 134* 138 138  --  138   POTASSIUM mmol/L 3.8 3.7 4.6 4.1 3.9 3.6  --  3.8   CHLORIDE mmol/L 103 107 103 102 103 102  --  104   CO2 mmol/L 25.0 27.0 21.0* 21.0* 21.0* 24.0  --  25.0   BUN mg/dL 10 20 14 9 9 9  --  14   CREATININE mg/dL 0.51* 0.64* 0.59* 0.66* 0.68* 0.60* 0.60* 0.65*   GLUCOSE mg/dL 137* 150* 158* 157* 128* 151*  --  145*   MAGNESIUM mg/dL  --  1.8 1.9 1.6  --   --   --   --    PHOSPHORUS mg/dL  --   --  3.7 3.5  --   --   --   --           Current Facility-Administered Medications:     aspirin EC tablet 81 mg, 81 mg, Oral, Nightly, Christo Peres Jr., MD, 81 mg at 01/21/24 2115    sennosides-docusate (PERICOLACE) 8.6-50 MG per tablet 2 tablet, 2 tablet, Oral, BID, 2 tablet at 01/22/24 0821 **AND** polyethylene glycol (MIRALAX) packet 17 g, 17 g, Oral, Daily PRN, 17 g at 01/21/24 1718 **AND** bisacodyl (DULCOLAX) EC tablet 5 mg, 5 mg, Oral, Daily PRN **AND** bisacodyl (DULCOLAX) suppository 10 mg, 10 mg, Rectal, Daily PRN, Christo Peres Jr., MD    Calcium Replacement - Follow Nurse / BPA Driven Protocol, , Does not apply, PRN, Christo Peres Jr., MD    cefTRIAXone (ROCEPHIN) 1,000 mg in sodium chloride 0.9 % 100 mL IVPB, 1,000 mg, Intravenous, Q24H, Masha Adame MD, Last Rate: 200 mL/hr at 01/21/24 2116, 1,000 mg at 01/21/24 2116    cyclobenzaprine (FLEXERIL) tablet 10 mg, 10 mg, Oral, TID PRN, Christo Peres Jr., MD, 10 mg at 01/22/24 1301    dextrose (D50W) (25 g/50 mL) IV injection 25 g, 25 g, Intravenous, Q15 Min PRN, Christo Peres Jr., MD    dextrose (GLUTOSE) oral gel 15 g, 15 g, Oral, Q15 Min PRN, Christo Peres Jr., MD    gabapentin (NEURONTIN) capsule 600 mg, 600 mg, Oral, Q8H, Christo Peres Jr., MD, 600 mg at 01/22/24 1409    glucagon (GLUCAGEN) injection 1 mg, 1 mg, Intramuscular, Q15 Min PRN,  Christo Peres Jr., MD    heparin (porcine) 5000 UNIT/ML injection 5,000 Units, 5,000 Units, Subcutaneous, Q8H, Masha Adame MD, 5,000 Units at 01/22/24 1409    hydrALAZINE (APRESOLINE) tablet 25 mg, 25 mg, Oral, Q8H, Christo Peres Jr., MD, 25 mg at 01/22/24 0509    HYDROcodone-acetaminophen (NORCO)  MG per tablet 1 tablet, 1 tablet, Oral, TID, Masha Adame MD, 1 tablet at 01/22/24 0509    insulin detemir (LEVEMIR) injection 5 Units, 5 Units, Subcutaneous, Nightly, Trell Lovelace APRN, 5 Units at 01/21/24 2115    Insulin Lispro (humaLOG) injection 2-7 Units, 2-7 Units, Subcutaneous, 4x Daily AC & at Bedtime, Christo Peres Jr., MD, 2 Units at 01/21/24 2115    lisinopril (PRINIVIL,ZESTRIL) tablet 40 mg, 40 mg, Oral, Q24H, Christo Peres Jr., MD, 40 mg at 01/22/24 0821    Magnesium Standard Dose Replacement - Follow Nurse / BPA Driven Protocol, , Does not apply, PRN, Christo Peres Jr., MD    metoprolol tartrate (LOPRESSOR) tablet 25 mg, 25 mg, Oral, Q12H, Christo Peres Jr., MD, 25 mg at 01/22/24 0821    niCARdipine (CARDENE) 25mg in 250mL NS infusion, 5-15 mg/hr, Intravenous, Titrated, Christo Peres Jr., MD, Stopped at 01/22/24 0945    nicotine (NICODERM CQ) 21 MG/24HR patch 1 patch, 1 patch, Transdermal, Nightly, Christo Peres Jr., MD, 1 patch at 01/21/24 2115    nicotine polacrilex (NICORETTE) gum 2 mg, 2 mg, Mouth/Throat, Q1H PRN, Christo Peres Jr., MD    NIFEdipine XL (PROCARDIA XL) 24 hr tablet 60 mg, 60 mg, Oral, Q24H, Case, Taina V., DO, 60 mg at 01/22/24 0836    nitroglycerin (NITROSTAT) SL tablet 0.4 mg, 0.4 mg, Sublingual, Q5 Min PRN, Christo Peres Jr., MD    ondansetron ODT (ZOFRAN-ODT) disintegrating tablet 4 mg, 4 mg, Oral, Q6H PRN **OR** ondansetron (ZOFRAN) injection 4 mg, 4 mg, Intravenous, Q6H PRN, Christo Peres Jr., MD, 4 mg at 01/19/24 1222    oxyCODONE (ROXICODONE) immediate release tablet 20 mg, 20 mg, Oral, Q6H PRN,  Masha Adame MD, 20 mg at 01/22/24 1107    pantoprazole (PROTONIX) EC tablet 40 mg, 40 mg, Oral, Q AM, Lamar Zaldivar, PharmD, 40 mg at 01/22/24 0509    Phosphorus Replacement - Follow Nurse / BPA Driven Protocol, , Does not apply, PRN, Christo Peres Jr., MD    Potassium Replacement - Follow Nurse / BPA Driven Protocol, , Does not apply, PRJa ACEVEDO David A Jr., MD    ropivacaine (NAROPIN) 0.2 % infusion (INFUSYSTEM), , Peripheral Nerve, Continuous, Ketan Smith, KATY, Last Rate: 8 mL/hr at 01/22/24 0949, Currently Infusing at 01/22/24 0949    rosuvastatin (CRESTOR) tablet 40 mg, 40 mg, Oral, Nightly, Christo Peres Jr., MD, 40 mg at 01/21/24 2115    sodium chloride 0.9 % flush 10 mL, 10 mL, Intravenous, Q12H, Christo Peres Jr., MD, 10 mL at 01/22/24 0822    sodium chloride 0.9 % flush 10 mL, 10 mL, Intravenous, PRN, Christo Peres Jr., MD, 10 mL at 01/21/24 2116    sodium chloride 0.9 % infusion 40 mL, 40 mL, Intravenous, PRN, Christo Peres Jr., MD    temazepam (RESTORIL) capsule 15 mg, 15 mg, Oral, Nightly PRN, Timoteo Salinas, , 15 mg at 01/21/24 2144    vancomycin IVPB 1500 mg in 0.9% NaCl (Premix) 500 mL, 1,500 mg, Intravenous, Q12H, Lamar Zaldivar, PharmD, Last Rate: 333.3 mL/hr at 01/22/24 0945, 1,500 mg at 01/22/24 0945     Assessment & Plan       Ischemic pain of left foot    Peripheral arterial disease    Hypertension    Hyperlipidemia    Tobacco abuse    Diabetes mellitus type II, non insulin dependent    Claudication of lower extremity s/p femorofemoral bypass    Ischemic foot    Sepsis associated hypotension      Assessment & Plan  - underwent left TMA with Dr. Peres 1/19/24  - pain control  - defer wound care to Dr. Peres  - OK to remove Aquacel dressings 1/23/24  - OK to shower after Aquacel dressings removed 1/23/24  - OK to discharge from vascular surgery standpoint when ambulating, pain controlled  - F/U Becky 2 weeks with ARLINE    No further vascular  surgery intervention required. Vascular surgery will sign off. Please contact on-call vascular surgeon with any questions or for re-consultation.     Kelsi Sarabia PA-C  01/22/24  14:14 EST      Electronically signed by Kelsi Sarabia PA-C at 01/22/24 1508       Case, Taina LOPEZ DO at 01/22/24 1136            INTENSIVIST   PROGRESS NOTE     Hospital:  LOS: 8 days     Mr. Justo Oquendo, 48 y.o. male is followed for a Chief Complaint of: Peripheral vascular disease      Subjective   S     Interval History:  Still with pain. On a cardene infusion for blood pressure control.        The patient's relevant past medical, surgical and social history were reviewed and updated in Epic as appropriate.      ROS:   Constitutional: Negative for fever.   Respiratory: Negative for dyspnea.   Cardiovascular: Negative for chest pain.   Gastrointestinal: Negative for  nausea, vomiting and diarrhea.     Objective   O     Vitals:  Temp  Min: 98.2 °F (36.8 °C)  Max: 98.7 °F (37.1 °C)  BP  Min: 100/57  Max: 178/74  Pulse  Min: 71  Max: 121  Resp  Min: 16  Max: 26  SpO2  Min: 92 %  Max: 100 % No data recorded    Intake/Ouptut 24 hrs (7:00AM - 6:59 AM)  Intake & Output (last 3 days)         01/19 0701  01/20 0700 01/20 0701 01/21 0700 01/21 0701  01/22 0700 01/22 0701 01/23 0700    P.O.  150 240     I.V. (mL/kg) 37.4 (0.5) 100.6 (1.4) 983.3 (13.4) 265 (3.6)    Blood        IV Piggyback 1083.4 500 600     Epidural 72.5 89.5      Total Intake(mL/kg) 1193.3 (16.2) 840.1 (11.4) 1823.3 (24.8) 265 (3.6)    Urine (mL/kg/hr) 2500 (1.4) 1100 (0.6) 3675 (2.1)     Blood        Total Output 2500 1100 3675     Net -1306.8 -260 -1851.7 +265                    Medications (drips):  niCARdipine, Last Rate: Stopped (01/22/24 0945)  ropivacaine, Last Rate: 8 mL/hr at 01/22/24 0936          Physical Examination  Telemetry:  Normal sinus rhythm.    Constitutional:  No acute distress.  Resting in bed comfortably.    Eyes: No scleral icterus.   PERRL,  EOM intact.    Neck:  Supple, FROM   Cardiovascular: Normal rate, regular and rhythm. Normal heart sounds.  No murmurs, gallop or rub.   Respiratory: No respiratory distress. Normal respiratory effort.  Normal breath sounds  Clear to auscultation   Abdominal:  Soft. No masses. Nontender. No distension. No HSM.   Extremities: No digital cyanosis. No clubbing.  No peripheral edema.  Amputation of left toes.    Skin: No rashes, lesions or ulcers   Neurological:   Alert and Oriented to person, place, and time.             Results from last 7 days   Lab Units 01/22/24  0725 01/20/24  0710 01/19/24  0247   WBC 10*3/mm3 8.68 10.15 8.03   HEMOGLOBIN g/dL 8.5* 8.6* 10.1*   MCV fL 88.9 89.9 88.2   PLATELETS 10*3/mm3 241 225 200     Results from last 7 days   Lab Units 01/22/24  0725 01/21/24  0410 01/20/24  0710 01/19/24  0247   SODIUM mmol/L 140 141 139 134*   POTASSIUM mmol/L 3.8 3.7 4.6 4.1   CO2 mmol/L 25.0 27.0 21.0* 21.0*   CREATININE mg/dL 0.51* 0.64* 0.59* 0.66*   GLUCOSE mg/dL 137* 150* 158* 157*   MAGNESIUM mg/dL  --  1.8 1.9 1.6   PHOSPHORUS mg/dL  --   --  3.7 3.5     Estimated Creatinine Clearance: 180.1 mL/min (A) (by C-G formula based on SCr of 0.51 mg/dL (L)).  Results from last 7 days   Lab Units 01/20/24  0710 01/18/24  1856   ALK PHOS U/L 53 57   BILIRUBIN mg/dL 0.2 0.3   ALT (SGPT) U/L 28 25   AST (SGOT) U/L 27 44*             Images:  Imaging Results (Last 24 Hours)       ** No results found for the last 24 hours. **               Results: Reviewed.  I reviewed the patient's new laboratory and imaging results.  I independently reviewed the patient's new images.    Medications: Reviewed.    Assessment & Plan   A / P     Mr. Oquendo is a 47yo M with a history of Diabetes, HTN, HLD, tobacco abuse and peripheral artery disease s/p left femoral-popliteal bypass in April 2017 who presented with worsening left leg pain and cold, black toes on the left foot.     CTA with runoff was performed and showed complete  occlusion of the previous left femoral artery bypass graft as well as complete occlusion of the right femoral artery.     He was taken to the OR on 1/19/24 by Dr. Peres and underwent a left foot transmetatarsal amputation.     He has been monitored in the ICU. Pain control has been difficult.     He has remained on a Cardene infusion for blood pressure control.     Anesthesia is following for nerve block infusion.         Nutrition:   Diet: Diabetic Diets, Cardiac Diets; Healthy Heart (2-3 Na+); Consistent Carbohydrate; Texture: Regular Texture (IDDSI 7); Fluid Consistency: Thin (IDDSI 0)  Advance Directives:   Code Status and Medical Interventions:   Ordered at: 01/14/24 2055     Code Status (Patient has no pulse and is not breathing):    CPR (Attempt to Resuscitate)     Medical Interventions (Patient has pulse or is breathing):    Full Support       Active Hospital Problems    Diagnosis     **Ischemic pain of left foot     Ischemic foot     Sepsis associated hypotension     Claudication of lower extremity s/p femorofemoral bypass     Hypertension     Hyperlipidemia     Tobacco abuse     Diabetes mellitus type II, non insulin dependent     Peripheral arterial disease        Assessment / Plan:    Continue current pain regimen. Anesthesia adjusted the nerve block this AM.   Continue Rocephin for osteomyelitis.   Start Nifedipine 60mg daily and wean off Cardene  AM labs  Okay to transfer to telemetry.     High level of risk due to:  severe exacerbation of chronic illness and illness with threat to life or bodily function.    Plan of care and goals reviewed during interdisciplinary rounds.  I discussed the patient's findings and my recommendations with patient, family, and nursing staff      Taina Soto DO    Intensive Care Medicine and Pulmonary Medicine       Electronically signed by Taina Soto DO at 01/22/24 8791       Masha Adame MD at 01/21/24 1444          Critical Care Note     LOS: 7 days  "  Patient Care Team:  Melo Whitaker MD as PCP - General (Family Medicine)  Niall Mcfarlane MD as Consulting Physician (Cardiology)    Chief Complaint/Reason for visit:    Chief Complaint   Patient presents with    Altered Mental Status   Ischemic right foot  Peripheral artery disease  History of left femoral-popliteal bypass, previous femoral-femoral bypass  Hypertension  Hyperlipidemia  Diabetes mellitus type 2  Tobacco abuse      Subjective     Interval History:     Postoperative day #2 left foot transmetatarsal amputation.  He has a nerve block in place.  However he is complaining of 8 out of 10 pain.  At baseline he takes hydrocodone 10 3 times daily as needed, oxycodone 24 times daily, gabapentin 600 mg 3 times daily he remains afebrile.  Room air saturation 100%    Review of Systems:    All systems were reviewed and negative except as noted in subjective.    Medical history, surgical history, social history, family history reviewed    Objective     Intake/Output:    Intake/Output Summary (Last 24 hours) at 1/21/2024 1440  Last data filed at 1/21/2024 0900  Gross per 24 hour   Intake 509.5 ml   Output 1900 ml   Net -1390.5 ml       Nutrition:  Diet: Diabetic Diets, Cardiac Diets; Healthy Heart (2-3 Na+); Consistent Carbohydrate; Texture: Regular Texture (IDDSI 7); Fluid Consistency: Thin (IDDSI 0)    Infusions:  lactated ringers, 9 mL/hr, Last Rate: 1,000 mL/hr (01/18/24 1510)  niCARdipine, 5-15 mg/hr, Last Rate: Stopped (01/20/24 0822)  Pharmacy to dose vancomycin,   ropivacaine,         Mechanical Ventilator Settings:                                                Telemetry: Sinus rhythm             Vital Signs  Blood pressure 119/55, pulse 82, temperature 98.7 °F (37.1 °C), temperature source Oral, resp. rate 20, height 172.7 cm (68\"), weight 73.5 kg (162 lb), SpO2 100%.    Physical Exam:  General Appearance:  Middle-aged gentleman in no respiratory distress   Head:  Atraumatic   Eyes:     " "     Conjunctiva pink, no jaundice   Ears:     Throat: Oral mucosa moist   Neck: Trachea midline, no carotid bruits   Back:      Lungs:   Symmetric chest expansion without wheeze    Heart:  Regular rhythm, S1, S2 auscultated   Abdomen:   Bowel sounds present, soft, nontender   Rectal:   Deferred   Extremities: Left foot Kerlix dressing in place, dry.  Dressing on medial left calf, left thigh intact.  Left leg is warm to touch, mild edema of the left ankle   Pulses:    Skin: No skin rash   Lymph nodes: No cervical adenopathy   Neurologic: Alert and oriented      Results Review:     I reviewed the patient's new clinical results.   Results from last 7 days   Lab Units 01/21/24  0410 01/20/24  0710 01/19/24  0247 01/18/24  1856 01/15/24  0359 01/14/24  1512   SODIUM mmol/L 141 139 134* 138   < > 134*   POTASSIUM mmol/L 3.7 4.6 4.1 3.9   < > 4.1   CHLORIDE mmol/L 107 103 102 103   < > 96*   CO2 mmol/L 27.0 21.0* 21.0* 21.0*   < > 25.0   BUN mg/dL 20 14 9 9   < > 30*   CREATININE mg/dL 0.64* 0.59* 0.66* 0.68*   < > 2.07*   CALCIUM mg/dL 9.2 9.7 9.0 9.3   < > 10.0   BILIRUBIN mg/dL  --  0.2  --  0.3  --  0.2   ALK PHOS U/L  --  53  --  57  --  83   ALT (SGPT) U/L  --  28  --  25  --  13   AST (SGOT) U/L  --  27  --  44*  --  14   GLUCOSE mg/dL 150* 158* 157* 128*   < > 302*    < > = values in this interval not displayed.     Results from last 7 days   Lab Units 01/20/24  0710 01/19/24  0247 01/18/24  0730   WBC 10*3/mm3 10.15 8.03 6.77   HEMOGLOBIN g/dL 8.6* 10.1* 11.9*   HEMATOCRIT % 25.9* 29.2* 35.1*   PLATELETS 10*3/mm3 225 200 230         Lab Results   Component Value Date    BLOODCX No growth at 5 days 01/14/2024     No results found for: \"URINECX\"    I reviewed the patient's new imaging including images and reports.    CT ANGIO ABDOMINAL AORTA BILAT ILIOFEM RUNOFF    Date of Exam: 1/14/2024 5:34 PM EST    Indication: Claudication or leg ischemia  Previous fem pop graft occlusion, c/o increased LLE pain, color " changes, third/fourth toe ischemia, leukocytosis, r/o gas.    Comparison: None available.    Technique: CTA of the abdomen, pelvis and both lower extremities was performed after the uneventful intravenous administration of 120 cc Isovue-370 . Reconstructed coronal and sagittal images were also obtained. In addition, a 3-D volume rendered image  was created for interpretation. Automated exposure control and iterative reconstruction methods were used.    The origins of the celiac axis and superior mesenteric arteries are normal. The origins of the renal arteries are normal. Mild atheromatous disease of the abdominal aorta with subtle ectasia. The origin of the inferior mesenteric artery is patent.    Complete occlusion of the left common iliac artery with continued complete loss of flow within the left internal and external iliac arteries. Multi focal atheromatous disease of the right common iliac artery as well as the internal and external iliac  arteries. Femorofemoral bypass.    Complete occlusion of the left femoral artery bypass with no underlying flow. Reconstitution of flow distally at the level of the popliteal artery due to branches from the deep femoral artery. The superior segments of the left anterior and posterior  tibial arteries are patent. Eventual loss of flow within the posterior tibial artery seen on axial image 383. Continued flow within the dorsalis pedis with eventual loss of flow within the arteries.    On the right there is continued flow within the right deep femoral artery. Complete occlusion of the left femoral artery with distal reconstitution from branches of the deep femoral artery. The popliteal artery is patent. The anterior and posterior  tibial vessels are patent proximally. Loss of flow within the anterior tibial artery on axial image #343. Loss of the contrast column within the posterior tibial artery on axial image  417.    The liver appears normal. Calcified granulomata within the  spleen.    Normal pancreas. Prior cholecystectomy. The adrenal glands are normal.    No renal masses. No hydroureteronephrosis. The ureters and urinary bladder are normal.    The small bowel is nonobstructed. Normal appendix. Colonic diverticulosis without evidence of diverticulitis.    No ascites or pneumoperitoneum. No adenopathy. Degenerative changes of the hips and lumbar spine.   Impression:     Complete occlusion of the left femoral artery bypass graft. Complete occlusion of the right femoral artery. Eventual distal reconstitution of both vessels due to branches of the deep femoral arteries. Loss of flow within the left posterior  tibial artery. Loss of flow within the right anterior tibial artery. Eventual loss of flow within the contrast column on both sides with no residual flow to the digital arteries.    Electronically Signed: Kee Cartagena MD   1/14/2024 6:08 PM EST       All medications reviewed.   aspirin, 81 mg, Oral, Nightly  cefTRIAXone, 1,000 mg, Intravenous, Q24H  gabapentin, 600 mg, Oral, Q8H  heparin (porcine), 5,000 Units, Subcutaneous, Q8H  hydrALAZINE, 25 mg, Oral, Q8H  insulin detemir, 5 Units, Subcutaneous, Nightly  insulin lispro, 2-7 Units, Subcutaneous, 4x Daily AC & at Bedtime  lisinopril, 40 mg, Oral, Q24H  metoprolol tartrate, 25 mg, Oral, Q12H  nicotine, 1 patch, Transdermal, Nightly  pantoprazole, 40 mg, Oral, Q AM  rosuvastatin, 40 mg, Oral, Nightly  senna-docusate sodium, 2 tablet, Oral, BID  sodium chloride, 10 mL, Intravenous, Q12H  vancomycin, 1,500 mg, Intravenous, Q12H          Assessment & Plan       Ischemic pain of left foot    Claudication of lower extremity s/p femorofemoral bypass    Ischemic foot    Peripheral arterial disease    Hypertension    Hyperlipidemia    Tobacco abuse    Diabetes mellitus type II, non insulin dependent    Sepsis associated hypotension      48-year-old gentleman with diabetes, hypertension, dyslipidemia, tobacco abuse, known peripheral artery  disease who underwent a left femoral-popliteal bypass April 25, 2017.  He developed worsening claudication in the left foot and was evaluated November 20 and was found to be pulseless in the left foot.  Aortogram revealed thromboembolism in the left popliteal graft and right femoral artery.  November 21 he underwent left femoral artery cutdown with thromboembolectomy of the left femoral-popliteal bypass graft.  He has black toes, 2 3 and 4 on his left foot and no palpable pulse.  January 19 he underwent left forefoot amputation.  Cultures are negative at 24 hours.     On admission creatinine 2.07.  Improved to 0.59 .      Preoperative hemoglobin 10.1, post-op 8.6.      Home blood pressure regimen restarted and he is off Cardene    Blood glucoses are running 150-250    PLAN:    Follow-up surgical cultures  Aspirin, Crestor  Hydralazine, metoprolol, lisinopril    Gabapentin 600 mg every 8 hours  Restart home narcotic regimen and stop IV narcotics  It appears he was taking hydrocodone 10 mg 3 times daily, new prescription for oxycodone 20 mg started in December, will use that for breakthrough    Nerve block infusing ropivacaine    Ceftriaxone, vancomycin for osteomyelitis    subcutaneous heparin   Continue Protonix        Masha Adame MD  01/21/24  14:40 EST      Time: Critical care 25 min  I personally provided care to this critically ill patient as documented above.  Critical care time does not include time spent on separately billed procedures.  None of my critical care time was concurrent with other critical care providers.     Electronically signed by Masha Adame MD at 01/21/24 1454          Consult Notes (last 72 hours)        Hemant Adhikari MD at 01/23/24 0834        Consult Orders    1. Inpatient Infectious Diseases Consult [[id number]] ordered by Sophy Amaya MD at 01/23/24 0825                 Justo Oquendo  1975  [id number]    Date of Consult: 1/23/2024    Admit  "Date:  1/14/2024      Requesting Provider: Christo Peres Jr., MD  Evaluating Physician: Hemant Adhikari MD    Chief Complaint: left foot pain    Reason for Consultation: left foot infection, postop fever    History of present illness:     Patient is a 48 y.o.  Yr old male with history of peripheral arterial disease, diabetes.  Prior history femorofemoral bypass by Dr. Ryan in November 2023 in addition to thrombectomy and left femoropopliteal bypass, worsening claudication symptoms and left second/third/fourth toes became dark/black; noted to have sepsis at admission per internal medicine note, elevated creatinine with ATN per medicine note,head surgery by Dr. Pena on 1/18:    \"Procedure(s):  LEFT AKP reoperative exploration  LEFT BPG thrombectomy  LEFT AKP-BKP prosthetic bypass (6mm Shedd Propaten)  LEFT AT angioplasty (6m342wh Ultraverse Rx, 2-2.3a093ia Nanocross)  LEFT DP angioplasty (1m390qx Ultraverse Rx, 2-2.5c400jh Nanocross)  LEFT TPT angioplasty (3b901rq Ultraverse Rx, 2-2.6q439cb Nanocross)  LEFT PT angioplasty (0x591ex Ultraverse Rx, 2-2.9g495wa Nanocross)  LEFT plantar angioplasty (1y867py Ultraverse Rx, 2-2.9u132vx Nanocross)\"    Subsequent surgery by Dr. Peres on January 19 with left foot transmetatarsal amputation.had been on vancomycin/ceftriaxone according to intensivist note; vancomycin subsequently stopped.    He has pain of the left foot which is constant, sharp at times, nonradiating, worse with palpation, generally better with pain meds and 3-4 out of 10 in severity.  No new drainage at the surgical site.  Mild redness of the left foot in general    Low-grade fever to 100.4 late on January 22, room air with no cough or new respiratory distress.  Has urinary frequency but denies any dysuria/hematuria/pyuria or flank pain.  No diarrhea/vomiting or abdominal pain.  No skin rash.  He reports new peripheral IV at the left upper extremity with no redness/swelling or drainage there. "     Past Medical History:   Diagnosis Date    Arthritis     Back pain     Diabetes     SINCE MARCH 2017    Dyslipidemia     HTN (hypertension)     PVD (peripheral vascular disease)     Wears partial dentures        Past Surgical History:   Procedure Laterality Date    AORTAGRAM N/A 04/25/2017    Procedure: AORTAGRAM WITH OR WITHOUT RUNOFFS POSSIBLE STENT with left femoral cutdown;  Surgeon: Brett Ryan MD;  Location: [facility] OR 15;  Service:     AORTAGRAM N/A 11/21/2023    Procedure: THROMBECTOMY OF LEFT GRAFT, AORTAGRAM, FEMORAL TO FEMORAL BYPASS;  Surgeon: Brett Ryan MD;  Location: [facility];  Service: Vascular;  Laterality: N/A;  FLUORO- .06 SECS  DOSE- 10 MGY  CONTRAST- 10 ML ISO    CHOLECYSTECTOMY      CYST REMOVAL      OFF OF BACK    FEMORAL POPLITEAL BYPASS Left 1/18/2024    Procedure: FEMORAL POPLITEAL BYPASS WITH POPLITEAL EXPLORATION;  Surgeon: Vin Pena MD;  Location: [facility];  Service: Vascular;  Laterality: Left;  DOSE: 9MGY  FLURO: 14 MIN, 36 SEC  CONTRAST: 50ML VISIPAQUE 320    NY IN-SITU VEIN BYPASS FEMORAL-POPLITEAL Left 04/25/2017    Procedure: FEMORAL POPLITEAL BYPASS;  Surgeon: Brett Ryan MD;  Location: [facility] OR 15;  Service: Vascular    NY IN-SITU VEIN BYPASS FEMORAL-POPLITEAL Left 06/27/2017    Procedure: LEFT FEMORAL ENDARTECTOMYN LEFT FEMORAL POPLITEAL BYPASS;  Surgeon: Brett Ryan MD;  Location: [facility];  Service: Vascular    TRANS METATARSAL AMPUTATION Left 1/19/2024    Procedure: AMPUTATION TRANS METATARSAL- LEFT;  Surgeon: Christo Peres Jr., MD;  Location: [facility];  Service: Orthopedics;  Laterality: Left;       Pediatric History   Patient Parents    Not on file     Other Topics Concern    Not on file   Social History Narrative    Lives in [place], [place]       family history includes Arthritis in his mother; Kidney disease in his father; Liver disease in his father.    No Known  Allergies    Medication:  Current Facility-Administered Medications   Medication Dose Route Frequency Provider Last Rate Last Admin    aspirin EC tablet 81 mg  81 mg Oral Nightly Case, Taina V., DO   81 mg at 01/22/24 2118    sennosides-docusate (PERICOLACE) 8.6-50 MG per tablet 2 tablet  2 tablet Oral BID Case, Taina V., DO   2 tablet at 01/22/24 2118    And    polyethylene glycol (MIRALAX) packet 17 g  17 g Oral Daily PRN Case, Taina V., DO   17 g at 01/21/24 1718    And    bisacodyl (DULCOLAX) EC tablet 5 mg  5 mg Oral Daily PRN Case, Taina V., DO        And    bisacodyl (DULCOLAX) suppository 10 mg  10 mg Rectal Daily PRN Case, Taina V., DO        Calcium Replacement - Follow Nurse / BPA Driven Protocol   Does not apply PRN Case, Taina V., DO        cefTRIAXone (ROCEPHIN) 1,000 mg in sodium chloride 0.9 % 100 mL IVPB  1,000 mg Intravenous Q24H Case, Taina V.,  mL/hr at 01/22/24 2117 1,000 mg at 01/22/24 2117    cyclobenzaprine (FLEXERIL) tablet 10 mg  10 mg Oral TID PRN Case, Taina V., DO   10 mg at 01/23/24 0732    dextrose (D50W) (25 g/50 mL) IV injection 25 g  25 g Intravenous Q15 Min PRN Case, Tania V., DO        dextrose (GLUTOSE) oral gel 15 g  15 g Oral Q15 Min PRN Case, Taina V., DO        gabapentin (NEURONTIN) capsule 600 mg  600 mg Oral Q8H Case, Taina V., DO   600 mg at 01/23/24 0450    glucagon (GLUCAGEN) injection 1 mg  1 mg Intramuscular Q15 Min PRN Case, Taina V., DO        heparin (porcine) 5000 UNIT/ML injection 5,000 Units  5,000 Units Subcutaneous Q8H Case, Taina V., DO   5,000 Units at 01/23/24 0450    hydrALAZINE (APRESOLINE) tablet 25 mg  25 mg Oral Q8H Case, Taina V., DO   25 mg at 01/23/24 0453    HYDROcodone-acetaminophen (NORCO)  MG per tablet 1 tablet  1 tablet Oral TID Case, Taina LOPEZ DO   1 tablet at 01/22/24 2118    insulin detemir (LEVEMIR) injection 5 Units  5 Units Subcutaneous Nightly Case, Taina LOPEZ DO   5 Units at 01/22/24 2117     Insulin Lispro (humaLOG) injection 2-7 Units  2-7 Units Subcutaneous 4x Daily AC & at Bedtime Case, Taina V., DO   3 Units at 01/22/24 2117    lisinopril (PRINIVIL,ZESTRIL) tablet 40 mg  40 mg Oral Q24H Case, Taina V., DO   40 mg at 01/22/24 0821    Magnesium Standard Dose Replacement - Follow Nurse / BPA Driven Protocol   Does not apply PRN Case, Taina V., DO        metoprolol tartrate (LOPRESSOR) tablet 25 mg  25 mg Oral Q12H Case, Taina V., DO   25 mg at 01/22/24 2119    niCARdipine (CARDENE) 25mg in 250mL NS infusion  5-15 mg/hr Intravenous Titrated Case, Taina V., DO   Stopped at 01/22/24 0945    nicotine (NICODERM CQ) 21 MG/24HR patch 1 patch  1 patch Transdermal Nightly Case, Taina V., DO   1 patch at 01/22/24 2119    nicotine polacrilex (NICORETTE) gum 2 mg  2 mg Mouth/Throat Q1H PRN Case, Taina V., DO        NIFEdipine XL (PROCARDIA XL) 24 hr tablet 60 mg  60 mg Oral Q24H Case, Taina V., DO   60 mg at 01/22/24 0836    nitroglycerin (NITROSTAT) SL tablet 0.4 mg  0.4 mg Sublingual Q5 Min PRN Case, Taina V., DO        ondansetron ODT (ZOFRAN-ODT) disintegrating tablet 4 mg  4 mg Oral Q6H PRN Case, Taina V., DO        Or    ondansetron (ZOFRAN) injection 4 mg  4 mg Intravenous Q6H PRN Case, Taina V., DO   4 mg at 01/19/24 1222    oxyCODONE (ROXICODONE) immediate release tablet 20 mg  20 mg Oral Q6H PRN Case, Taina V., DO   20 mg at 01/23/24 0732    pantoprazole (PROTONIX) EC tablet 40 mg  40 mg Oral Q AM Case, Taina V., DO   40 mg at 01/23/24 0450    Phosphorus Replacement - Follow Nurse / BPA Driven Protocol   Does not apply PRN Case, Taina V., DO        Potassium Replacement - Follow Nurse / BPA Driven Protocol   Does not apply PRN Case, Taina V., DO        ropivacaine (NAROPIN) 0.2 % infusion (INFUSYSTEM)   Peripheral Nerve Continuous Case, Taina V., DO 1 mL/hr at 01/22/24 1723 Currently Infusing at 01/22/24 1723    rosuvastatin (CRESTOR) tablet 40 mg  40 mg Oral Nightly Case,  Taina V., DO   40 mg at 01/22/24 2118    sodium chloride 0.9 % flush 10 mL  10 mL Intravenous Q12H Case, Taina V., DO   10 mL at 01/22/24 2120    sodium chloride 0.9 % flush 10 mL  10 mL Intravenous PRN Case, Taina V., DO   10 mL at 01/21/24 2116    sodium chloride 0.9 % infusion 40 mL  40 mL Intravenous PRN Case, Taina V., DO        temazepam (RESTORIL) capsule 15 mg  15 mg Oral Nightly PRN Case, Taina V., DO   15 mg at 01/21/24 2144    vancomycin IVPB 1500 mg in 0.9% NaCl (Premix) 500 mL  1,500 mg Intravenous Q12H CaseJosefinasa V., .3 mL/hr at 01/22/24 2217 1,500 mg at 01/22/24 2217       Antibiotics:  Anti-Infectives (From admission, onward)      Ordered     Dose/Rate Route Frequency Start Stop    01/21/24 1012  vancomycin IVPB 1500 mg in 0.9% NaCl (Premix) 500 mL        Ordering Provider: Taina oSto., DO    1,500 mg  333.3 mL/hr over 90 Minutes Intravenous Every 12 Hours Scheduled 01/21/24 2100 01/27/24 0859    01/19/24 0709  vancomycin IVPB 1500 mg in 0.9% NaCl (Premix) 500 mL        Ordering Provider: Christo Peres Jr., MD    1,500 mg  333.3 mL/hr over 90 Minutes Intravenous Every 12 Hours 01/19/24 1300 01/20/24 0209    01/18/24 1947  cefTRIAXone (ROCEPHIN) 1,000 mg in sodium chloride 0.9 % 100 mL IVPB        Ordering Provider: Charles Taina V., DO    1,000 mg  200 mL/hr over 30 Minutes Intravenous Every 24 Hours 01/18/24 2100 01/27/24 2359    01/14/24 2129  Pharmacy to dose vancomycin        Ordering Provider: Christo Peres Jr., MD     Does not apply Continuous PRN 01/14/24 2129 01/21/24 2128    01/14/24 1638  vancomycin IVPB 1500 mg in 0.9% NaCl (Premix) 500 mL        Ordering Provider: Ramakrishna Claudio IV, PharmD    20 mg/kg × 73.5 kg  333.3 mL/hr over 90 Minutes Intravenous Once 01/14/24 1730 01/14/24 1945    01/14/24 1624  Pharmacy to dose vancomycin        Ordering Provider: Samuel Dave APRN     Does not apply Once 01/14/24 1640 01/14/24 1747    01/14/24 1545   "cefTRIAXone (ROCEPHIN) 1,000 mg in sodium chloride 0.9 % 100 mL IVPB        Ordering Provider: Samuel Dave APRN    1,000 mg  200 mL/hr over 30 Minutes Intravenous Once 01/14/24 1602 01/14/24 1701              Review of Systems    Constitutional-- No  chills or sweats.  Appetite good, and no malaise. No fatigue.  Heent-- No new vision, hearing or throat complaints.  No epistaxis or oral sores.  Denies odynophagia or dysphagia.  No flashers, floaters or eye pain. No odynophagia or dysphagia. No headache, photophobia or neck stiffness.  CV-- No chest pain, palpitation or syncope  Resp-- No SOB/cough/Hemoptysis  GI- No nausea, vomiting, or diarrhea.  No hematochezia, melena, or hematemesis. Denies jaundice or chronic liver disease.  --  see above.  Denies hesitancy or incontinence  Lymph- no swollen lymph nodes in neck/axilla or groin.   Heme- No active bruising or bleeding; no Hx of DVT or PE.  MS-- no swelling or pain in the bones or joints of arms/legs.  No new back pain.  Neuro-- No acute focal weakness or numbness in the arms or legs.  No seizures.    Full 12 point review of systems reviewed and negative otherwise for acute complaints, except for above    Physical Exam:   Vital Signs   /62 (BP Location: Right arm, Patient Position: Lying)   Pulse 86   Temp 100.2 °F (37.9 °C) (Oral)   Resp 16   Ht 172.7 cm (68\")   Wt 73.5 kg (162 lb)   SpO2 98%   BMI 24.63 kg/m²     GENERAL: Awake and alert, in no acute distress.   HEENT: Normocephalic, atraumatic.  PERRL. EOMI. No conjunctival injection. No icterus. Oropharynx clear without evidence of thrush or exudate. No evidence of periodontal disease.    NECK: Supple without nuchal rigidity. No mass.  LYMPH: No cervical, axillary or inguinal lymphadenopathy.  HEART: RRR; No murmur, rubs, gallops.   LUNGS: diminished at bases but otherwise Clear to auscultation bilaterally without wheezing, rales, rhonchi. Normal respiratory effort. Nonlabored. No " dullness.  ABDOMEN: Soft, nontender, nondistended. Positive bowel sounds. No rebound or guarding. NO mass or HSM.  EXT:  see below  : Genitalia generally unremarkable.  Without Tripp catheter.  MSK: FROM without joint effusions noted arms/legs.    SKIN: Warm and dry without cutaneous eruptions on Inspection/palpation.    NEURO: Oriented to PPT. No focal deficits on motor/sensory exam at arms/legs.  PSYCHIATRIC: Normal insight and judgement. Cooperative with PE    Left foot with vague erythema diffusely, some crust at the surgical site but no purulent drainage.  No discrete mass bulge or fluctuance.  No crepitus or bulla    Left lower leg surgical sites from vascular procedure with no new visible purulence, only mild visible skin discoloration and no discrete mass bulge or fluctuance.  No crepitus or bulla    No peripheral stigmata such phenomenon of endocarditis    Laboratory Data    Results from last 7 days   Lab Units 01/23/24  0443 01/22/24  0725 01/20/24  0710   WBC 10*3/mm3 6.95 8.68 10.15   HEMOGLOBIN g/dL 8.5* 8.5* 8.6*   HEMATOCRIT % 25.6* 25.7* 25.9*   PLATELETS 10*3/mm3 231 241 225     Results from last 7 days   Lab Units 01/23/24  0443   SODIUM mmol/L 136   POTASSIUM mmol/L 3.7   CHLORIDE mmol/L 102   CO2 mmol/L 27.0   BUN mg/dL 10   CREATININE mg/dL 0.58*   GLUCOSE mg/dL 152*   CALCIUM mg/dL 8.7     Results from last 7 days   Lab Units 01/20/24  0710   ALK PHOS U/L 53   BILIRUBIN mg/dL 0.2   ALT (SGPT) U/L 28   AST (SGOT) U/L 27               Estimated Creatinine Clearance: 158.4 mL/min (A) (by C-G formula based on SCr of 0.58 mg/dL (L)).      Microbiology:      Radiology:  Imaging Results (Last 72 Hours)       ** No results found for the last 72 hours. **              Impression:     --acute left foot gangrene at his second/third/fourth digits, transmetatarsal amputation as above January 19 and operative culture is negative albeit antibiotic modified. Antibiotics had been ongoing with concern for  osteomyelitis according to intensivist note.  Antibiotic empirically broadened with postop low-grade fever.  Pathology pending.  He does have vague erythema at the left foot consistent with cellulitis at least, unclear if bone foci removed with his transmetatarsal amputation although pathology should help clarify.  Monitor for new suppurative sequela    --Postop fever, low-grade leet on January 22 and risk at multiple sites.  Chest exam nonfocal with no cough and on room air.  Encouraged incentive spirometry.  Has urinary frequency and urinalysis ordered.  Peripheral IV site without obvious suppurative change.  Abdomen benign with no diarrhea.  Left leg surgical sites currently without obvious new suppurative change but could evolve and I have asked Dr. Pena/vascular team to continue to follow.  On empiric antibiotics regarding left foot as above.  Monitor for other process    --Peripheral arterial disease.  Revascularization as above by Dr. Pena    --Diabetes.  Glucose control per medicine team    --abnormal creatinine earlier in stay.  Improved.  Monitor to help guide future adjustments antimicrobials    PLAN: Thank you for asking us to see Justo Oquendo, I recommend the following:    --IV daptomycin/Zosyn    --Check/review labs cultures and scans    --Partial history per nursing staff    --Discussed with microbiology    --Discussed with Dr. Amaya    --Discussed with wife, partial history per her    --Discussed with Dr. Pena    --Highly complex of issues with high risk for further serious morbidity and other serious sequela       Hemant Adhikari MD  1/23/2024                Electronically signed by Hemant Adhikari MD at 01/23/24 2906

## 2024-01-24 NOTE — PROGRESS NOTES
"Justo Oquendo  1975  [id number]      Chief Complaint: left foot pain    Reason for Consultation: left foot infection, postop fever    History of present illness:     Patient is a 48 y.o.  Yr old male with history of peripheral arterial disease, diabetes.  Prior history femorofemoral bypass by Dr. Ryan in November 2023 in addition to thrombectomy and left femoropopliteal bypass, worsening claudication symptoms and left second/third/fourth toes became dark/black; noted to have sepsis at admission per internal medicine note, elevated creatinine with ATN per medicine note,head surgery by Dr. Pena on 1/18:    \"Procedure(s):  LEFT AKP reoperative exploration  LEFT BPG thrombectomy  LEFT AKP-BKP prosthetic bypass (6mm [place] Propaten)  LEFT AT angioplasty (3l990ps Ultraverse Rx, 2-2.7a333mq Nanocross)  LEFT DP angioplasty (7q863nf Ultraverse Rx, 2-2.9u057mx Nanocross)  LEFT TPT angioplasty (7o744pc Ultraverse Rx, 2-2.2e447sp Nanocross)  LEFT PT angioplasty (3m088ly Ultraverse Rx, 2-2.6z240op Nanocross)  LEFT plantar angioplasty (7b086rn Ultraverse Rx, 2-2.7t882we Nanocross)\"    Subsequent surgery by Dr. Peres on January 19 with left foot transmetatarsal amputation.had been on vancomycin/ceftriaxone according to intensivist note; vancomycin subsequently stopped.    1/24/24  generally better;  no new or progressive redness and his LLE or elswhere;  tempas < 100; he prefers home with close followup ;  pain of the left foot which is constant, sharp at times, nonradiating, worse with palpation, generally better with pain meds and 3 out of 10 in severity.  No new drainage at the surgical site.  Mild redness of the left foot in general     no cough or new respiratory distress.  Less urinary frequency and denies any dysuria/hematuria/pyuria or flank pain.  No diarrhea/vomiting or abdominal pain.  No skin rash.  He reports new peripheral IV at the left upper extremity with no redness/swelling or drainage there. "     Past Medical History:   Diagnosis Date    Arthritis     Back pain     Diabetes     SINCE MARCH 2017    Dyslipidemia     HTN (hypertension)     PVD (peripheral vascular disease)     Wears partial dentures        Past Surgical History:   Procedure Laterality Date    AORTAGRAM N/A 04/25/2017    Procedure: AORTAGRAM WITH OR WITHOUT RUNOFFS POSSIBLE STENT with left femoral cutdown;  Surgeon: Brett Ryan MD;  Location: [facility] OR 15;  Service:     AORTAGRAM N/A 11/21/2023    Procedure: THROMBECTOMY OF LEFT GRAFT, AORTAGRAM, FEMORAL TO FEMORAL BYPASS;  Surgeon: Brett Ryan MD;  Location: [facility];  Service: Vascular;  Laterality: N/A;  FLUORO- .06 SECS  DOSE- 10 MGY  CONTRAST- 10 ML ISO    CHOLECYSTECTOMY      CYST REMOVAL      OFF OF BACK    FEMORAL POPLITEAL BYPASS Left 1/18/2024    Procedure: FEMORAL POPLITEAL BYPASS WITH POPLITEAL EXPLORATION;  Surgeon: Vin Pena MD;  Location: [facility];  Service: Vascular;  Laterality: Left;  DOSE: 9MGY  FLURO: 14 MIN, 36 SEC  CONTRAST: 50ML VISIPAQUE 320    WI IN-SITU VEIN BYPASS FEMORAL-POPLITEAL Left 04/25/2017    Procedure: FEMORAL POPLITEAL BYPASS;  Surgeon: Brett Ryan MD;  Location: [facility] OR 15;  Service: Vascular    WI IN-SITU VEIN BYPASS FEMORAL-POPLITEAL Left 06/27/2017    Procedure: LEFT FEMORAL ENDARTECTOMYN LEFT FEMORAL POPLITEAL BYPASS;  Surgeon: Brett Ryan MD;  Location: [facility];  Service: Vascular    TRANS METATARSAL AMPUTATION Left 1/19/2024    Procedure: AMPUTATION TRANS METATARSAL- LEFT;  Surgeon: Christo Peres Jr., MD;  Location: [facility];  Service: Orthopedics;  Laterality: Left;       Pediatric History   Patient Parents    Not on file     Other Topics Concern    Not on file   Social History Narrative    Lives in [place], [place]       family history includes Arthritis in his mother; Kidney disease in his father; Liver disease in his father.    No Known Allergies       Review of  "Systems    1/24/24    Constitutional-- No  chills or sweats.  Appetite good, and no malaise. No fatigue.  Heent-- No new vision, hearing or throat complaints.  No epistaxis or oral sores.  Denies odynophagia or dysphagia.  No flashers, floaters or eye pain. No odynophagia or dysphagia. No headache, photophobia or neck stiffness.  CV-- No chest pain, palpitation or syncope  Resp-- No SOB/cough/Hemoptysis  GI- No nausea, vomiting, or diarrhea.  No hematochezia, melena, or hematemesis. Denies jaundice or chronic liver disease.  --  see above.  Denies hesitancy or incontinence  Lymph- no swollen lymph nodes in neck/axilla or groin.   Heme- No active bruising or bleeding; no Hx of DVT or PE.  MS-- no swelling or pain in the bones or joints of arms/legs.  No new back pain.  Neuro-- No acute focal weakness or numbness in the arms or legs.  No seizures.    Full 12 point review of systems reviewed and negative otherwise for acute complaints, except for above    Physical Exam:   Vital Signs   /52 (BP Location: Right arm, Patient Position: Lying)   Pulse 84   Temp 99.3 °F (37.4 °C) (Oral)   Resp 18   Ht 172.7 cm (68\")   Wt 73.5 kg (162 lb)   SpO2 97%   BMI 24.63 kg/m²     GENERAL: Awake and alert, in no acute distress.   HEENT: Normocephalic, atraumatic.   No conjunctival injection. No icterus. Oropharynx clear without evidence of thrush or exudate. No evidence of periodontal disease.    NECK: Supple without nuchal rigidity. No mass.   HEART: RRR; No murmur, rubs, gallops.   LUNGS: diminished at bases but otherwise Clear to auscultation bilaterally without wheezing, rales, rhonchi. Normal respiratory effort. Nonlabored. No dullness.  ABDOMEN: Soft, nontender, nondistended. Positive bowel sounds. No rebound or guarding. NO mass or HSM.  EXT:  see below   MSK: FROM without joint effusions noted arms/legs.    SKIN: Warm and dry without cutaneous eruptions on Inspection/palpation.    NEURO: Oriented to PPT. No focal " deficits on motor/sensory exam at arms/legs.    Left foot with vague erythema diffusely, some crust at the surgical site but no purulent drainage.  No discrete mass bulge or fluctuance.  No crepitus or bulla    Left lower leg surgical sites from vascular procedure with no new visible purulence, only mild visible skin discoloration and no discrete mass bulge or fluctuance.  No crepitus or bulla    No peripheral stigmata such phenomenon of endocarditis    Laboratory Data    Results from last 7 days   Lab Units 01/24/24  0424 01/23/24  0443 01/22/24  0725   WBC 10*3/mm3 6.71 6.95 8.68   HEMOGLOBIN g/dL 8.1* 8.5* 8.5*   HEMATOCRIT % 24.6* 25.6* 25.7*   PLATELETS 10*3/mm3 232 231 241     Results from last 7 days   Lab Units 01/24/24  0424   SODIUM mmol/L 139   POTASSIUM mmol/L 3.5   CHLORIDE mmol/L 104   CO2 mmol/L 27.0   BUN mg/dL 10   CREATININE mg/dL 0.55*   GLUCOSE mg/dL 154*   CALCIUM mg/dL 9.0     Results from last 7 days   Lab Units 01/24/24  0424   ALK PHOS U/L 51   BILIRUBIN mg/dL 0.2   ALT (SGPT) U/L 18   AST (SGOT) U/L 15               Estimated Creatinine Clearance: 167 mL/min (A) (by C-G formula based on SCr of 0.55 mg/dL (L)).      Microbiology:      Radiology:  Imaging Results (Last 72 Hours)       ** No results found for the last 72 hours. **              Impression:     --acute left foot gangrene at his second/third/fourth digits, transmetatarsal amputation as above January 19 and operative culture is negative albeit antibiotic modified. Antibiotics had been ongoing with concern for osteomyelitis according to intensivist note; left forefoot pathology with ischemic type necrosis but negative for osteomyelitis per pathologist.  Antibiotic empirically  ongoing for cellulitis/soft tissue infection but at risk for further sequela to ischemia/peripheral vascular disease and recent surgery.     Monitor for new suppurative sequela    --Postop fever, low-grade fever on January 22 and risk at multiple sites.   Chest exam nonfocal with no cough and on room air.  Encouraged incentive spirometry.  Has urinary frequency and urinalysis ordered.  Peripheral IV site without obvious suppurative change.  Abdomen benign with no diarrhea.  Left leg surgical sites currently without obvious new suppurative change but could evolve and I have asked Dr. Pena/vascular team to continue to follow.  On empiric antibiotics regarding left foot as above.  Monitor for other process; primary concern left foot cellulitis as above    --Peripheral arterial disease.  Revascularization as above by Dr. Pena    --Diabetes.  Glucose control per medicine team    --abnormal creatinine earlier in stay.  Improved.  Monitor to help guide future adjustments antimicrobials    PLAN:      --IV daptomycin/Invanz    --Check/review labs cultures and scans    --Partial history per nursing staff    --Discussed with microbiology    --Discussed with Dr. Amaya; I anticipate transition to outpatient with close follow-up, IV daptomycin/Invanz in the office and follow up with me January 26    --Discussed with wife, partial history per her    --Discussed with Dr. Pena    --Highly complex of issues with high risk for further serious morbidity and other serious sequela     Copied text in this note has been reviewed and is accurate as of 01/24/24.     Hemant Adhikari MD  1/24/2024

## 2024-01-24 NOTE — PLAN OF CARE
Goal Outcome Evaluation:  Plan of Care Reviewed With: patient, spouse        Progress: improving  Outcome Evaluation: Pt continues to demo increased independence with all mobility. Demonstrated good understand of NWB restrictions for L LE throughout session. PT fit pt w/ offloading shoe for L LE, provided education on donning/doffing. Pt ascended/descended 1 step w/ FWW, CGA w/ 1 LOB req MinAx1 for correction. Plan to continue progressing current PT POC as tolerated.      Anticipated Discharge Disposition (PT): home with assist, home with home health

## 2024-01-24 NOTE — PROGRESS NOTES
Justo Oquendo       LOS: 10 days   Patient Care Team:  Melo Whitaker MD as PCP - General (Family Medicine)  Niall Mcfarlane MD as Consulting Physician (Cardiology)    Chief Complaint:  left foot ischemia    Subjective     Interval History:     Resting comfortably in bed this morning.  Family present at bedside.  Pain tolerable, no acute events overnight.      Review of Systems:      Gen- No fevers, chills  CV- No chest pain, palpitations  Resp- No cough, dyspnea  GI- No N/V/D, abd pain    Objective     Vital Signs  Vital Signs (last 24 hours)         01/15 0700  01/16 0659 01/16 0700 01/16 0731   Most Recent      Temp (°F) 96.6 -  97.6       97.2 (36.2) 01/16 0652    Heart Rate 74 -  101       82 01/16 0652    Resp 14 -  16 16 01/16 0652    /70 -  157/76       134/70 01/16 0652    SpO2 (%) 93 -  99       97 01/16 0652    Flow (L/min)   2.5       2.5 01/16 0652              Physical Exam:     Alert, oriented.  No acute distress.  Nonlabored respirations.  Regular rate and rhythm.  Abdomen nondistended.  Left lower extremity: Operative dressing intact, clean, dry.     Results Review:     I reviewed the patient's new clinical results.    Medication Review:   Hospital Medications (active)         Dose Frequency Start End    aspirin EC tablet 81 mg 81 mg Nightly 1/14/2024 --    Admin Instructions: Do not crush or chew the capsules or tablets. The drug may not work as designed if the capsule or tablet is crushed or chewed. Swallow whole.  Do not exceed 4 grams of aspirin in a 24 hr period.    If given for pain, use the following pain scale:   Mild Pain = Pain Score of 1-3, CPOT 1-2  Moderate Pain = Pain Score of 4-6, CPOT 3-4  Severe Pain = Pain Score of 7-10, CPOT 5-8    Route: Oral    bisacodyl (DULCOLAX) EC tablet 5 mg 5 mg Daily PRN 1/14/2024 --    Admin Instructions: Use if no bowel movement after 12 hours.  Swallow whole. Do not crush, split, or chew tablet.    Route: Oral    Linked  "Group 1: See [facility]ers[facility] for full Linked Orders Report.        bisacodyl (DULCOLAX) suppository 10 mg 10 mg Daily PRN 1/14/2024 --    Admin Instructions: Use if no bowel movement after 12 hours.  Hold for diarrhea    Route: Rectal    Linked Group 1: See Hypers[facility] for full Linked Orders Report.        Calcium Replacement - Follow Nurse / BPA Driven Protocol  As Needed 1/14/2024 --    Admin Instructions: Open Order & Select \"BHS Electrolyte Replacement Protocol Algorithm\" to View Details    Route: Does not apply    cefTRIAXone (ROCEPHIN) 1,000 mg in sodium chloride 0.9 % 100 mL IVPB 1,000 mg Every 24 Hours 1/15/2024 1/22/2024    Admin Instructions: LR should be paused and flushing of the line with NS is recommended prior to and after completion of ceftriaxone infusion due to incompatibility. Do not co-adminster with calcium-containing solutions.  Caution: Look alike/sound alike drug alert    Route: Intravenous    cyclobenzaprine (FLEXERIL) tablet 10 mg 10 mg 3 Times Daily PRN 1/14/2024 --    Route: Oral    dextrose (D50W) (25 g/50 mL) IV injection 25 g 25 g Every 15 Minutes PRN 1/14/2024 --    Admin Instructions: Blood sugar less than 70; patient has IV access - Unresponsive, NPO or Unable To Safely Swallow    Route: Intravenous    dextrose (GLUTOSE) oral gel 15 g 15 g Every 15 Minutes PRN 1/14/2024 --    Admin Instructions: BS<70, Patient Alert, Is not NPO, Can safely swallow.    Route: Oral    gabapentin (NEURONTIN) capsule 600 mg 600 mg Every 8 Hours Scheduled 1/14/2024 --    Admin Instructions:     Route: Oral    glucagon (GLUCAGEN) injection 1 mg 1 mg Every 15 Minutes PRN 1/14/2024 --    Admin Instructions: Blood Glucose Less Than 70 - Patient Without IV Access - Unresponsive, NPO or Unable To Safely Swallow  Reconstitute powder for injection by adding 1 mL of -supplied sterile diluent or sterile water for injection to a vial containing 1 mg of the drug, to provide solutions containing 1 mg/mL. Shake " "vial gently to dissolve.    Route: Intramuscular    heparin 28263 units/250 mL (100 units/mL) in 0.45 % NaCl infusion 21 Units/kg/hr × 73.5 kg Titrated 1/14/2024 --    Admin Instructions: Pharmacy dosing - Cardiac or Other NOT VTE - Boluses (No initial bolus)    Route: Intravenous    HYDROmorphone (DILAUDID) injection 1 mg 1 mg Every 2 Hours PRN 1/14/2024 1/19/2024    Admin Instructions: Based on patient request - if ordered for moderate or severe pain, provider allows for administration of a medication prescribed for a lower pain scale.      Caution: Look alike/sound alike drug alert    If given for pain, use the following pain scale:  Mild Pain = Pain Score of 1-3, CPOT 1-2  Moderate Pain = Pain Score of 4-6, CPOT 3-4  Severe Pain = Pain Score of 7-10, CPOT 5-8    Route: Intravenous    Linked Group 2: See [facility] for full Linked Orders Report.        Insulin Lispro (humaLOG) injection 2-7 Units 2-7 Units 4 Times Daily Before Meals & Nightly 1/14/2024 --    Admin Instructions: Correction Insulin - Low Dose - Total Insulin Dose Less Than 40 units/day (Lean, Elderly or Renal Patients)    Blood Glucose 150-199 mg/dL - 2 units  Blood Glucose 200-249 mg/dL - 3 units  Blood Glucose 250-299 mg/dL - 4 units  Blood Glucose 300-349 mg/dL - 5 units  Blood Glucose 350-400 mg/dL - 6 units  Blood Glucose Greater Than 400 mg/dL - 7 units & Call Provider   Caution: Look alike/sound alike drug alert    Route: Subcutaneous    LORazepam (ATIVAN) tablet 0.5 mg 0.5 mg Every 8 Hours PRN 1/14/2024 1/19/2024    Admin Instructions:  Caution: Look alike/sound alike drug alert    Route: Oral    Magnesium Standard Dose Replacement - Follow Nurse / BPA Driven Protocol  As Needed 1/14/2024 --    Admin Instructions: Open Order & Select \"BHS Electrolyte Replacement Protocol Algorithm\" to View Details    Route: Does not apply    metoprolol tartrate (LOPRESSOR) tablet 25 mg 25 mg Every 12 Hours Scheduled 1/14/2024 --    Admin Instructions: Hold " "for SBP less than 100, DBP less than 60, or heart rate less than 50    Route: Oral    naloxone (NARCAN) injection 0.4 mg 0.4 mg Every 5 Minutes PRN 1/14/2024 --    Admin Instructions: If Respiratory Rate Less Than 8 or Patient is Difficult to Arouse, Stop ALL Narcotics & Contact Provider.  Administer Slow IV Push.  Repeat As Ordered Until Respiratory Rate is Greater Than 12.    Route: Intravenous    Linked Group 2: See [facility] for full Linked Orders Report.        nicotine (NICODERM CQ) 21 MG/24HR patch 1 patch 1 patch Nightly 1/14/2024 --    Admin Instructions: Apply Patch to Clean, Dry, Hairless Area Daily - Rotating Sites.  REMOVE Old Patch Prior to Applying New Patch.  May Remove Patch at Bedtime If Needed to Prevent Insomnia.  Do Not Use Other Nicotine Products.  At Discharge, Follow Package Instructions.  Dispose of nicotine replacement therapies and their wrappers in non-hazardous pharmaceutical waste or in regular trash.    Route: Transdermal    nicotine polacrilex (NICORETTE) gum 2 mg 2 mg Every 1 Hour PRN 1/14/2024 --    Admin Instructions: Maximum 24 pieces in 24 hours.    Gum should be chewed until it tingles, then \"park\" between the gum and cheek.   When the tingling is gone, chew again until the tingle returns and once again \"park\" between gum and cheek.   Repeat until tingling is gone and discard.  At discharge, follow instructions on package  Dispose of nicotine replacement therapies and their wrappers in non-hazardous pharmaceutical waste or in regular trash.    Route: Mouth/Throat    nitroglycerin (NITROSTAT) SL tablet 0.4 mg 0.4 mg Every 5 Minutes PRN 1/14/2024 --    Admin Instructions: If Pain Unrelieved After 3 Doses Notify MD  May administer up to 3 doses per episode.    Route: Sublingual    ondansetron (ZOFRAN) injection 4 mg 4 mg Every 6 Hours PRN 1/14/2024 --    Admin Instructions: If BOTH ondansetron (ZOFRAN) & promethazine (PHENERGAN) Ordered, Use ondansetron First & THEN promethazine " "IF ondansetron Ineffective.    Route: Intravenous    Linked Group 3: See [facility]ers[facility] for full Linked Orders Report.        ondansetron ODT (ZOFRAN-ODT) disintegrating tablet 4 mg 4 mg Every 6 Hours PRN 1/14/2024 --    Admin Instructions: If BOTH ondansetron (ZOFRAN) & promethazine (PHENERGAN) Ordered, Use ondansetron First & THEN promethazine IF ondansetron Ineffective.  Place on tongue and allow to dissolve.    Route: Oral    Linked Group 3: See [facility]ers[facility] for full Linked Orders Report.        oxyCODONE (ROXICODONE) immediate release tablet 10 mg 10 mg 4 Times Daily 1/14/2024 1/19/2024    Admin Instructions: HOLD for sedation  If given for pain, use the following pain scale:  Mild Pain = Pain Score of 1-3, CPOT 1-2  Moderate Pain = Pain Score of 4-6, CPOT 3-4  Severe Pain = Pain Score of 7-10, CPOT 5-8    Route: Oral    oxyCODONE (ROXICODONE) immediate release tablet 10 mg 10 mg Every 4 Hours PRN 1/14/2024 1/19/2024    Admin Instructions: Based on patient request - if ordered for moderate or severe pain, provider allows for administration of a medication prescribed for a lower pain scale.  If given for pain, use the following pain scale:  Mild Pain = Pain Score of 1-3, CPOT 1-2  Moderate Pain = Pain Score of 4-6, CPOT 3-4  Severe Pain = Pain Score of 7-10, CPOT 5-8    Route: Oral    Pharmacy to Dose Heparin  Continuous PRN 1/14/2024 --    Admin Instructions: Boluses (No initial bolus)    Route: Does not apply    Pharmacy to dose vancomycin  Continuous PRN 1/14/2024 1/21/2024    Route: Does not apply    Phosphorus Replacement - Follow Nurse / BPA Driven Protocol  As Needed 1/14/2024 --    Admin Instructions: Open Order & Select \"BHS Electrolyte Replacement Protocol Algorithm\" to View Details    Route: Does not apply    polyethylene glycol (MIRALAX) packet 17 g 17 g Daily PRN 1/14/2024 --    Admin Instructions: Use if no bowel movement after 12 hours. Mix in 6-8 ounces of water.  Use 4-8 ounces of water, tea, or juice " "for each 17 gram dose.    Route: Oral    Linked Group 1: See [facility]ers[facility] for full Linked Orders Report.        Potassium Replacement - Follow Nurse / BPA Driven Protocol  As Needed 1/14/2024 --    Admin Instructions: Open Order & Select \"BHS Electrolyte Replacement Protocol Algorithm\" to View Details    Route: Does not apply    rosuvastatin (CRESTOR) tablet 40 mg 40 mg Nightly 1/14/2024 --    Admin Instructions: Avoid grapefruit juice.    Route: Oral    sennosides-docusate (PERICOLACE) 8.6-50 MG per tablet 2 tablet 2 tablet 2 Times Daily 1/14/2024 --    Admin Instructions: HOLD MEDICATION IF PATIENT HAS HAD BOWEL MOVEMENT. Start bowel management regimen if patient has not had a bowel movement after 12 hours.    Route: Oral    Linked Group 1: See [facility]ers[facility] for full Linked Orders Report.        sodium chloride 0.9 % flush 10 mL 10 mL Every 12 Hours Scheduled 1/14/2024 --    Route: Intravenous    sodium chloride 0.9 % flush 10 mL 10 mL As Needed 1/14/2024 --    Route: Intravenous    sodium chloride 0.9 % infusion 40 mL 40 mL As Needed 1/14/2024 --    Admin Instructions: Following administration of an IV intermittent medication, flush line with 40mL NS at 100mL/hr.    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      Ischemic pain of left foot    Peripheral arterial disease    Hypertension    Hyperlipidemia    Tobacco abuse    Diabetes mellitus type II, non insulin " dependent    Claudication of lower extremity s/p femorofemoral bypass    Ischemic foot    Sepsis associated hypotension      Nonweightbearing left lower extremity.  Resting chains at bedside this morning 1/24/2024 and surgical incision inspected, healing well.  Okay to remove dressing for arterial Doppler as indicated.  Follow cultures.    Upon discharge, plan for follow-up in 2 weeks for wound check and x-rays.    Follow up with Winter Santana PA-C.    [facility]  [address], [address]  formerly [facility], [address]  Please schedule at [phone]      LELIA Tapia  01/24/24  08:35 EST

## 2024-01-24 NOTE — THERAPY TREATMENT NOTE
Patient Name: Jusot Oquendo  : 1975    MRN: [id number]                              Today's Date: 2024       Admit Date: 2024    Visit Dx:     ICD-10-CM ICD-9-CM   1. Ischemic pain of left foot  M79.672 459.9    I99.8 729.5   2. Impaired mobility and ADLs  Z74.09 V49.89    Z78.9    3. PVD (peripheral vascular disease)  I73.9 443.9   4. Peripheral arterial disease  I73.9 443.9   5. Tobacco abuse  Z72.0 305.1   6. Diabetes mellitus type II, non insulin dependent  E11.9 250.00   7. Claudication of lower extremity s/p femorofemoral bypass  I73.9 443.9   8. Ischemic foot  I99.8 459.9   9. Sepsis associated hypotension  A41.9 038.9    I95.9 995.91     458.8   10. ATN (acute tubular necrosis)  N17.0 584.5   11. Elevated serum creatinine  R79.89 790.99   12. 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     Past Medical History:   Diagnosis Date    Arthritis     Back pain     Diabetes     SINCE 2017    Dyslipidemia     HTN (hypertension)     PVD (peripheral vascular disease)     Wears partial dentures      Past Surgical History:   Procedure Laterality Date    AORTAGRAM N/A 2017    Procedure: AORTAGRAM WITH OR WITHOUT RUNOFFS POSSIBLE STENT with left femoral cutdown;  Surgeon: Brett Ryan MD;  Location: [facility] HYBRID OR 15;  Service:     AORTAGRAM N/A 2023    Procedure: THROMBECTOMY OF LEFT GRAFT, AORTAGRAM, FEMORAL TO FEMORAL BYPASS;  Surgeon: Brett Ryan MD;  Location: [facility];  Service: Vascular;  Laterality: N/A;  FLUORO- .06 SECS  DOSE- 10 MGY  CONTRAST- 10 ML ISO    CHOLECYSTECTOMY      CYST REMOVAL      OFF OF BACK     FEMORAL POPLITEAL BYPASS Left 1/18/2024    Procedure: FEMORAL POPLITEAL BYPASS WITH POPLITEAL EXPLORATION;  Surgeon: Vin Pena MD;  Location: [facility] HYBRID [facility];  Service: Vascular;  Laterality: Left;  DOSE: 9MGY  FLURO: 14 MIN, 36 SEC  CONTRAST: 50ML VISIPAQUE 320    SC IN-SITU VEIN BYPASS FEMORAL-POPLITEAL Left 04/25/2017    Procedure: FEMORAL POPLITEAL BYPASS;  Surgeon: Brett Ryan MD;  Location:  [facility] HYBRID OR 15;  Service: Vascular    SC IN-SITU VEIN BYPASS FEMORAL-POPLITEAL Left 06/27/2017    Procedure: LEFT FEMORAL ENDARTECTOMYN LEFT FEMORAL POPLITEAL BYPASS;  Surgeon: Brett Ryan MD;  Location:  [facility] OR;  Service: Vascular    TRANS METATARSAL AMPUTATION Left 1/19/2024    Procedure: AMPUTATION TRANS METATARSAL- LEFT;  Surgeon: Christo Peres Jr., MD;  Location:  [facility] OR;  Service: Orthopedics;  Laterality: Left;      General Information       Row Name 01/24/24 1154          Physical Therapy Time and Intention    Document Type therapy note (daily note)  -KE     Mode of Treatment physical therapy  -       Row Name 01/24/24 1154          General Information    Patient Profile Reviewed yes  -KE     Existing Precautions/Restrictions fall;non-weight bearing;other (see comments)  NWB L foot s/p transmet amputation; cleared for WB through heel for transfers, offloading shoe L foot  -KE     Barriers to Rehab medically complex  -KE       Row Name 01/24/24 1154          Cognition    Orientation Status (Cognition) oriented x 4  -KE       Row Name 01/24/24 1154          Safety Issues, Functional Mobility    Safety Issues Affecting Function (Mobility) awareness of need for assistance;safety precaution awareness;safety precautions follow-through/compliance  -KE     Impairments Affecting Function (Mobility) pain;strength;endurance/activity tolerance;sensation/sensory awareness;balance  -KE               User Key  (r) = Recorded By, (t) = Taken By, (c) = Cosigned By      Initials Name Provider  Type    KE Jerri Johnson, PT Physical Therapist                   Mobility       Row Name 01/24/24 1154          Bed Mobility    Bed Mobility supine-sit-supine  -KE     Supine-Sit-Supine [place] (Bed Mobility) standby assist  -LOYD     Assistive Device (Bed Mobility) bed rails;head of bed elevated  -LOYD     Comment, (Bed Mobility) HOB elevated, pt req increased time and effort  -       Row Name 01/24/24 1154          Transfers    Comment, (Transfers) L LE offloading shoe donned prior to mobility. x2 STS from EOB, x2 from chair. VCs for maintaining WBing restrictions throughout, pt w/ good compliance and carryover of cueing.  -       Row Name 01/24/24 1154          Sit-Stand Transfer    Sit-Stand [place] (Transfers) supervision  -LOYD     Assistive Device (Sit-Stand Transfers) walker, front-wheeled  -KE     Comment, (Sit-Stand Transfer) VCs for UE placement  -       Row Name 01/24/24 1154          Gait/Stairs (Locomotion)    [place] Level (Gait) contact guard;1 person assist;verbal cues  -LOYD     Assistive Device (Gait) walker, front-wheeled  -KE     Distance in Feet (Gait) 20'  -KE     Deviations/Abnormal Patterns (Gait) gait speed decreased;osmany decreased;stride length decreased  -KE     Bilateral Gait Deviations forward flexed posture  -KE     [place] Level (Stairs) minimum assist (75% patient effort);1 person assist;verbal cues  -KE     Assistive Device (Stairs) walker, front-wheeled  -KE     Number of Steps (Stairs) 1  -KE     Ascending Technique (Stairs) step-to-step  -KE     Descending Technique (Stairs) step-to-step  -KE     Stairs, Safety Issues loses balance backward  -KE     Stairs, Impairments impaired balance;strength decreased  -LOYD     Comment, (Gait/Stairs) Amb 20' w/ FWW, CGA. Demo swing to gait pattern, good compliance to NWB LLE with ambulation. Navigated 1 step, educated on ascending backwards with FWW. Pt w/ one sig LOB during stair training req MinAx1 for correction.  Further distances limited by increased pain and fatigue w/ mobility.  -KE       Row Name 01/24/24 1154          Mobility    Extremity Weight-bearing Status left lower extremity  -KE     Left Lower Extremity (Weight-bearing Status) non weight-bearing (NWB)   cleared for WBing through heel for transfers only  -LOYD               User Key  (r) = Recorded By, (t) = Taken By, (c) = Cosigned By      Initials Name Provider Type    Jerri Collado PT Physical Therapist                   Obj/Interventions       Row Name 01/24/24 1316          Balance    Balance Assessment sitting static balance;sitting dynamic balance;sit to stand dynamic balance;standing static balance;standing dynamic balance  -LOYD     Static Sitting Balance standby assist  -LOYD     Dynamic Sitting Balance standby assist  -LOYD     Position, Sitting Balance unsupported;sitting edge of bed  -LOYD     Sit to Stand Dynamic Balance supervision  -LOYD     Static Standing Balance contact guard;1-person assist;verbal cues  -LOYD     Dynamic Standing Balance contact guard;1-person assist;verbal cues  -LOYD     Position/Device Used, Standing Balance walker, front-wheeled  -LOYD     Balance Interventions sitting;standing;sit to stand;supported;static;dynamic  -LOYD     Comment, Balance 1 posterior LOB req MinAx1 for correction with stair navigation  -LOYD               User Key  (r) = Recorded By, (t) = Taken By, (c) = Cosigned By      Initials Name Provider Type    Jerri Collado PT Physical Therapist                   Goals/Plan    No documentation.                  Clinical Impression       Row Name 01/24/24 1318          Pain    Pretreatment Pain Rating 7/10  -KE     Posttreatment Pain Rating 9/10  -KE     Pain Location - Side/Orientation Left  -KE     Pain Location lower  -KE     Pain Location - foot  -KE     Pre/Posttreatment Pain Comment RN administered meds prior to session  -LOYD     Pain Intervention(s) Ambulation/increased activity;Repositioned  -       Row Name  01/24/24 1318          Plan of Care Review    Plan of Care Reviewed With patient;spouse  -KE     Progress improving  -KE     Outcome Evaluation Pt continues to demo increased independence with all mobility. Demonstrated good understand of NWB restrictions for L LE throughout session. PT fit pt w/ offloading shoe for L LE, provided education on donning/doffing. Pt ascended/descended 1 step w/ FWW, CGA w/ 1 LOB req MinAx1 for correction. Plan to continue progressing current PT POC as tolerated.  -       Row Name 01/24/24 1318          Therapy Assessment/Plan (PT)    Rehab Potential (PT) good, to achieve stated therapy goals  -     Criteria for Skilled Interventions Met (PT) yes;meets criteria;skilled treatment is necessary  -KE     Therapy Frequency (PT) daily  -KE       Row Name 01/24/24 1318          Vital Signs    Pre Systolic BP Rehab 106  -KE     Pre Treatment Diastolic BP 85  -KE     Pretreatment Heart Rate (beats/min) 79  -KE     Pre SpO2 (%) 97  -KE     O2 Delivery Pre Treatment room air  -KE     O2 Delivery Intra Treatment room air  -KE     Post SpO2 (%) 97  -KE     O2 Delivery Post Treatment room air  -KE     Pre Patient Position Supine  -KE     Intra Patient Position Standing  -KE     Post Patient Position Supine  -KE       Row Name 01/24/24 1318          Positioning and Restraints    Pre-Treatment Position in bed  -KE     Post Treatment Position bed  -KE     In Bed notified nsg;supine;with family/caregiver;side rails up x2;encouraged to call for assist;call light within reach  -KE               User Key  (r) = Recorded By, (t) = Taken By, (c) = Cosigned By      Initials Name Provider Type    Jerri Collado, PT Physical Therapist                   Outcome Measures       Row Name 01/24/24 1322          How much help from another person do you currently need...    Turning from your back to your side while in flat bed without using bedrails? 4  -KE     Moving from lying on back to sitting on the side  of a flat bed without bedrails? 4  -KE     Moving to and from a bed to a chair (including a wheelchair)? 3  -KE     Standing up from a chair using your arms (e.g., wheelchair, bedside chair)? 3  -KE     Climbing 3-5 steps with a railing? 3  -KE     To walk in hospital room? 3  -KE     AM-PAC 6 Clicks Score (PT) 20  -KE     Highest Level of Mobility Goal 6 --> Walk 10 steps or more  -       Row Name 01/24/24 1322          Functional Assessment    Outcome Measure Options AM-PAC 6 Clicks Basic Mobility (PT)  -               User Key  (r) = Recorded By, (t) = Taken By, (c) = Cosigned By      Initials Name Provider Type    Jerri Collado, LOBITO Physical Therapist                                 Physical Therapy Education       Title: PT OT SLP Therapies (In Progress)       Topic: Physical Therapy (In Progress)       Point: Mobility training (Done)       Learning Progress Summary             Patient Eager, E, VU by LOYD at 1/24/2024 1123    Acceptance, E,D, NR,VU by LIZZETH at 1/20/2024 1051   Family Eager, E, VU by LOYD at 1/24/2024 1123                         Point: Home exercise program (Not Started)       Learner Progress:  Not documented in this visit.              Point: Body mechanics (Done)       Learning Progress Summary             Patient Eager, E, VU by LOYD at 1/24/2024 1123    Acceptance, E,D, NR,VU by LIZZETH at 1/20/2024 1051   Family Eager, E, VU by LOYD at 1/24/2024 1123                         Point: Precautions (Done)       Learning Progress Summary             Patient Eager, E, VU by LOYD at 1/24/2024 1123    Acceptance, E,D, NR,VU by LIZZETH at 1/20/2024 1051   Family Eager, E, VU by LOYD at 1/24/2024 1123                                         User Key       Initials Effective Dates Name Provider Type Discipline    LIZZETH 02/03/23 -  Paige Bradley, PT Physical Therapist PT    LOYD 11/16/23 -  Jerri Johnson PT Physical Therapist PT                  PT Recommendation and Plan     Plan of Care Reviewed With: patient,  spouse  Progress: improving  Outcome Evaluation: Pt continues to demo increased independence with all mobility. Demonstrated good understand of NWB restrictions for L LE throughout session. PT fit pt w/ offloading shoe for L LE, provided education on donning/doffing. Pt ascended/descended 1 step w/ FWW, CGA w/ 1 LOB req MinAx1 for correction. Plan to continue progressing current PT POC as tolerated.     Time Calculation:         PT Charges       Row Name 01/24/24 1323             Time Calculation    Start Time 1123  -KE      PT Received On 01/24/24  -KE      PT Goal Re-Cert Due Date 01/30/24  -KE         Timed Charges    96489 - PT Therapeutic Activity Minutes 30  -KE         Total Minutes    Timed Charges Total Minutes 30  -KE       Total Minutes 30  -KE                User Key  (r) = Recorded By, (t) = Taken By, (c) = Cosigned By      Initials Name Provider Type    Jerri Collado PT Physical Therapist                  Therapy Charges for Today       Code Description Service Date Service Provider Modifiers Qty    10364864390 HC PT THERAPEUTIC ACT EA 15 MIN 1/24/2024 Jerri Johnson PT GP 2            PT G-Codes  Outcome Measure Options: AM-PAC 6 Clicks Basic Mobility (PT)  AM-PAC 6 Clicks Score (PT): 20  AM-PAC 6 Clicks Score (OT): 18  PT Discharge Summary  Anticipated Discharge Disposition (PT): home with assist, home with home health    Jerri Johnson PT  1/24/2024

## 2024-01-24 NOTE — DISCHARGE SUMMARY
[facility] Medicine Services  DISCHARGE SUMMARY    Patient Name: Justo Oquendo  : 1975  MRN: [id number]    Date of Admission: 2024  2:52 PM  Date of Discharge:  24  Primary Care Physician: Melo Whitaker MD    Consults       Date and Time Order Name Status Description    2024  8:25 AM Inpatient Infectious Diseases Consult Completed     1/15/2024  5:18 PM Inpatient Cardiology Consult Completed     1/15/2024  7:50 AM Inpatient Vascular Surgery Consult Completed     2024  8:55 PM Inpatient Orthopedic Surgery Consult Completed             Hospital Course     Presenting Problem: left foot pain, AMS    Active Hospital Problems    Diagnosis  POA    Claudication of lower extremity s/p femorofemoral bypass [I73.9]  Yes    Hypertension [I10]  Yes    Hyperlipidemia [E78.5]  Yes    Tobacco abuse [Z72.0]  Yes    Diabetes mellitus type II, non insulin dependent [E11.9]  Yes    Peripheral arterial disease [I73.9]  Yes      Resolved Hospital Problems    Diagnosis Date Resolved POA    **Ischemic pain of left foot [M79.672, I99.8] 2024 Yes    Ischemic foot [I99.8] 2024 Yes    Sepsis associated hypotension [A41.9, I95.9] 2024 Yes          Hospital Course:  Justo Oquendo is a 48 y.o. male with hx of severe PAD s/p thrombectomy of left femoropopliteal bypass graft as well as placement of a femorofemoral bypass by Dr. Ryan on , ongoing tobacco abuse, HTN, HLD, and DM2 who presents due to worsening claudication symptoms as well as altered mental status. ED evaluation was significant for hypotension and tachycardia in an encephalopathic patient and a finding of new worsened ischemia in the left lower extremity. Third and fourth toes on the left foot with cool discoloration and early ischemic necrosis. CTA with runoff was performed and compared to recent imaging confirmed fem-fem occlusion. Dr. Pena with Vascular Surgery took patient for  left graft thrombectomy on 1/18/24, and he was transferred to the ICU for closer monitoring post-operatively. Dr. Peres with Orthopedic Surgery subsequently took patient for left foot transmetatarsal amputation on 1/19/24. Patient transferred back to the floor with hospitalist service resuming care on 1/23/24.     Sepsis (AMS, WBC, hypotension, ischemic necrosis), POA  Metabolic encephalopathy  Ischemic ulcers and necrosis of LLE, 3rd-4th digits  Progressive PAD with occlusion of fem-fem graft  Acute on chronic pain   --BP improved with IV fluids and hydrocortisone on admission  --CTA with runoff showed complete occlusion of the left femoral artery bypass graft, complete occlusion of the right femoral artery.  Eventual distal reconstitution of both vessels due to the branches of the deep femoral arteries, loss of flow within the left posterior tibial artery, loss of flow within the right anterior tibial artery.  No residual flow to the distal arteries.   --Dr. Peres/Orthopedic Surgery as well as Dr. Pena/Vascular Surgery consulted  --1/18/24 Dr. Pena performed left graft thrombectomy, AKP-BKP jump graft with tibial angioplasty; F/U in 2 weeks with ARLINE's  --1/19/24: Dr. Peres performed left foot transmetatarsal amputation; recommends NWB LLE; F/U in 2 weeks with Winter Santana PA-C  --He had been on Vanc and Rocephin initially, MRSA PCR returned negative so Vanc discontinued - ID consulted 1/23/24 and broadened to Daptomycin and Zosyn - plan to discharge on Daptomycin and Invanz daily as outpatient via [facility] office, no PICC per Dr. Adhikari  --Surgical cultures NGTD, blood cultures negative  --Continue ASA and statin  --Opioid tolerant: on Norco 10 mg at home which is continued here and scheduled TID, in addition he also has a new prescription for 30 day supply oxycodone 20 mg per [facility] report so have continued PRN oxycodone 20 mg Q6H PRN  -- off IV pain meds since 1/21/24 - he should still have his 30 day  supply of oxycodone that was prescribed on 12/27/23 (has been here since 1/14/24 so should certainly have enough supply left), and just received his [place] prescription for 30 day supply on 1/15/24 - ADDENDUM 12:46 EST Patient states he only has 5 oxycodone pills left at home, which likely means he was using more than prescribed. I will write for 3 days only and he needs to see his pain management doctor and PCP for further management.   --Continue home gabapentin  --Resume Xarelto 2.5 mg BID at discharge    Acute kidney injury, resolved  ATN due to sepsis  --Pre-renal, likely hypotension/sepsis related  --Resolved with IV fluids     DM2, uncontrolled  --HbA1c 10.6% on 11/20/23, repeat here is 8.8%  --Not on insulin at home  --Resume home regimen     HTN  HLD  --Held home Lisinopril due to ALEJANDRO and hypotension, now resumed  --Continue home Metoprolol 25 mg BID  --Required Cardene drip while in ICU, now weaned off  --Have added Nifedipine XL 60 mg daily, Hydralazine 25 mg TID this admission - wean off if needed based on BP trend - will stop Hydralazine and send home on Nifedipine XL 30 mg daily, F/U closely with PCP to see if he can wean off this as well  --HOLD statin while on Daptomycin     Anemia  --Likely post-operative in nature, Hb normal at 14 in November 2023 prior to initial surgery, has trended down ever since  --Hb 11.8 on admission, has trended down to 8 range now  --Overall stable     Ongoing heavy tobacco smoker  --Nicotine patch and gum as needed      Discharge Follow Up Recommendations for outpatient labs/diagnostics:  F/U with PCP in 1 week  F/U with Dr. Peres in 2 weeks  F/U with Dr. Pena in 2 weeks  F/U with ID as instructed    Day of Discharge     HPI:   Patient seen this morning. Wife at bedside. Eager for discharge.       Vital Signs:   Temp:  [97.4 °F (36.3 °C)-99.9 °F (37.7 °C)] 97.8 °F (36.6 °C)  Heart Rate:  [] 101  Resp:  [16-18] 16  BP: (104-143)/(52-76) 121/65      Physical  Exam:  Gen-no acute distress  HENT-NCAT, mucous membranes moist  CV-RRR, S1 S2 normal, no m/r/g  Resp-CTAB, no wheezes or rales  Abd-soft, NT, ND, +BS  Ext-no edema, left foot in dressing  Neuro-A&Ox3, no focal deficits  Skin-no rashes  Psych-appropriate mood      Pertinent  and/or Most Recent Results     LAB RESULTS:      Lab 01/24/24 0424 01/23/24 0443 01/22/24 0725 01/20/24  0710 01/19/24  0247 01/18/24  0730 01/18/24  0730 01/17/24  2359 01/17/24  1441   WBC 6.71 6.95 8.68 10.15 8.03   < > 6.77  --   --    HEMOGLOBIN 8.1* 8.5* 8.5* 8.6* 10.1*   < > 11.9*  --   --    HEMATOCRIT 24.6* 25.6* 25.7* 25.9* 29.2*   < > 35.1*  --   --    PLATELETS 232 231 241 225 200   < > 230  --   --    NEUTROS ABS  --   --  6.03  --  5.89  --  4.49  --   --    IMMATURE GRANS (ABS)  --   --  0.11*  --  0.05  --  0.05  --   --    LYMPHS ABS  --   --  1.54  --  1.29  --  1.49  --   --    MONOS ABS  --   --  0.86  --  0.77  --  0.65  --   --    EOS ABS  --   --  0.11  --  0.01  --  0.07  --   --    MCV 88.2 89.2 88.9 89.9 88.2   < > 87.1  --   --    APTT  --   --   --   --   --   --  55.3* 79.4 44.9*    < > = values in this interval not displayed.         Lab 01/24/24 0424 01/23/24 0443 01/22/24 0725 01/21/24  0410 01/20/24  0710 01/19/24  0247   SODIUM 139 136 140 141 139 134*   POTASSIUM 3.5 3.7 3.8 3.7 4.6 4.1   CHLORIDE 104 102 103 107 103 102   CO2 27.0 27.0 25.0 27.0 21.0* 21.0*   ANION GAP 8.0 7.0 12.0 7.0 15.0 11.0   BUN 10 10 10 20 14 9   CREATININE 0.55* 0.58* 0.51* 0.64* 0.59* 0.66*   EGFR 120.7 118.8 123.5 115.3 118.2 114.3   GLUCOSE 154* 152* 137* 150* 158* 157*   CALCIUM 9.0 8.7 9.0 9.2 9.7 9.0   MAGNESIUM  --  1.7  --  1.8 1.9 1.6   PHOSPHORUS  --  3.6  --   --  3.7 3.5   HEMOGLOBIN A1C  --  8.80*  --   --   --   --          Lab 01/24/24  0424 01/20/24  0710 01/18/24  1856   TOTAL PROTEIN 6.2 6.1 6.2   ALBUMIN 3.2* 3.6 3.4*   GLOBULIN 3.0 2.5 2.8   ALT (SGPT) 18 28 25   AST (SGOT) 15 27 44*   BILIRUBIN 0.2 0.2 0.3    ALK PHOS 51 53 57                     Brief Urine Lab Results  (Last result in the past 365 days)        Color   Clarity   Blood   Leuk Est   Nitrite   Protein   CREAT   Urine HCG        01/23/24 1124 Yellow   Clear   Negative   Negative   Negative   Negative                 Microbiology Results (last 10 days)       Procedure Component Value - Date/Time    Anaerobic Culture - Wound, Foot, Left [[id number]]  (Normal) Collected: 01/19/24 1219    Lab Status: Final result Specimen: Wound from Foot, Left Updated: 01/24/24 0932     Anaerobic Culture No anaerobes isolated at 5 days    Wound Culture - Wound, Foot, Left [[id number]] Collected: 01/19/24 1219    Lab Status: Final result Specimen: Wound from Foot, Left Updated: 01/22/24 0647     Wound Culture No growth at 3 days     Gram Stain Rare (1+) WBCs seen      No organisms seen    MRSA Screen, PCR (Inpatient) - Swab, Nares [[id number]]  (Normal) Collected: 01/14/24 1817    Lab Status: Final result Specimen: Swab from Nares Updated: 01/14/24 2152     MRSA PCR No MRSA Detected    Narrative:      The negative predictive value of this diagnostic test is high and should only be used to consider de-escalating anti-MRSA therapy. A positive result may indicate colonization with MRSA and must be correlated clinically.    Blood Culture - Blood, Arm, Left [[id number]]  (Normal) Collected: 01/14/24 1512    Lab Status: Final result Specimen: Blood from Arm, Left Updated: 01/19/24 1900     Blood Culture No growth at 5 days    Blood Culture - Blood, Arm, Right [[id number]]  (Normal) Collected: 01/14/24 1510    Lab Status: Final result Specimen: Blood from Arm, Right Updated: 01/19/24 1631     Blood Culture No growth at 5 days            FL C Arm During Surgery    Result Date: 1/19/2024  This procedure was auto-finalized with no dictation required.    Peripheral Block    Result Date: 1/19/2024  Chris Hoff, KATY     1/19/2024 11:30 AM Peripheral Block Patient reassessed immediately  prior to procedure Patient location during procedure: pre-op Reason for block: at surgeon's request and post-op pain management Performed by CRNA/CAA: Chris Hoff CRNA Assisted by: Fallon Stroud RN Preanesthetic Checklist Completed: patient identified, IV checked, site marked, risks and benefits discussed, surgical consent, monitors and equipment checked, pre-op evaluation and timeout performed Prep: Pt Position: right lateral decubitus Sterile barriers:cap, gloves, mask and washed/disinfected hands Prep: ChloraPrep Patient monitoring: blood pressure monitoring, continuous pulse oximetry and EKG Procedure Sedation: yes Performed under: local infiltration Guidance:ultrasound guided ULTRASOUND INTERPRETATION.  Using ultrasound guidance a 20 G gauge needle was placed in close proximity to the nerve, at which point, under ultrasound guidance anesthetic was injected in the area of the nerve and spread of the anesthesia was seen on ultrasound in close proximity thereto.  There were no abnormalities seen on ultrasound; a digital image was taken; and the patient tolerated the procedure with no complications. Images:still images obtained, printed/placed on chart Laterality:left Block Type:popliteal Injection Technique:catheter Needle Type:echogenic and Tuohy Needle Gauge:18 G Resistance on Injection: none Catheter Size:20 G Cath Depth at skin: 7 cm Medications Used: bupivacaine PF (MARCAINE) 0.25 % injection - Injection  30 mL - 1/19/2024 10:49:00 AM Medications Preservative Free Saline:5ml Post Assessment Injection Assessment: negative aspiration for heme, no paresthesia on injection and incremental injection Patient Tolerance:comfortable throughout block Complications:no Additional Notes CATHETER                             A high-frequency linear transducer, with sterile cover, was placed in the popliteal fossa to identify the popliteal artery and vein, Tibial nerve (TN) and Common Peroneal nerve (CP). The  "transducer was then moved in a cephalad fashion to observe the TN and CP nerve bifurcation to form the Sciatic Nerve. The insertion site was prepped and draped in sterile fashion. Skin and cutaneous tissue was infiltrated with 2-5 ml of 1% Lidocaine. Using ultrasound-guidance, an 18-gauge Contiplex Ultra 360 Touhy needle was advanced in plane from lateral to medial. Preservative-free normal saline was utilized for hydro-dissection of tissue, advancement of Touhy needle, and to confirm final needle placement posterior to the nerves. Local anesthetic injection spread, in incremental 3-5 ml injections, to surround both nerve structures. Aspiration every 5 ml to prevent intravascular injection. Injection was completed with negative aspiration of blood and negative intravascular injection. Injection pressures were normal with minimal resistance. A 20-gauge HubPagesiplex Echo catheter was placed through the needle and advance out the tip of the Touhy 1-3 cm. The Touhy needle was then removed, and final catheter position verified (Below/above or Anterior/Posterior) the nerve structures. The catheter was secured in the usual fashion with skin glue, benzoin, steri-strips, CHG tegaderm and Label noting \"Nerve Block Catheter\". Jerk tape applied at yellow connector and catheter connection.     Peripheral Block    Result Date: 1/19/2024  Chris Hoff CRNA     1/19/2024 11:30 AM Peripheral Block Patient reassessed immediately prior to procedure Reason for block: at surgeon's request and post-op pain management Performed by CRNA/CAA: Chris Hoff CRNA Assisted by: Fallon Stroud RN Preanesthetic Checklist Completed: patient identified, IV checked, site marked, risks and benefits discussed, surgical consent, monitors and equipment checked, pre-op evaluation and timeout performed Prep: Pt Position: supine Sterile barriers:cap, gloves, mask, sterile barriers and washed/disinfected hands Prep: ChloraPrep Patient monitoring: " "blood pressure monitoring, continuous pulse oximetry and EKG Procedure Performed under: spinal Guidance:ultrasound guided ULTRASOUND INTERPRETATION.  Using ultrasound guidance a 20 G gauge needle was placed in close proximity to the nerve, at which point, under ultrasound guidance anesthetic was injected in the area of the nerve and spread of the anesthesia was seen on ultrasound in close proximity thereto.  There were no abnormalities seen on ultrasound; a digital image was taken; and the patient tolerated the procedure with no complications. Images:still images obtained, printed/placed on chart Laterality:left Block Type:adductor canal block Injection Technique:single-shot Needle Type:Tuohy and echogenic Needle Gauge:18 G Resistance on Injection: none Catheter size: 20g. Medications Used: fentaNYL citrate (PF) (SUBLIMAZE) injection - Intravenous  100 mcg - 1/19/2024 10:36:00 AM dexamethasone sodium phosphate injection - Injection  2 mg - 1/19/2024 10:37:00 AM bupivacaine PF (MARCAINE) 0.25 % injection - Injection  20 mL - 1/19/2024 10:37:00 AM Medications Preservative Free Saline:5ml Post Assessment Injection Assessment: negative aspiration for heme, incremental injection and no paresthesia on injection Patient Tolerance:comfortable throughout block Complications:no Additional Notes SINGLE shot A high-frequency linear transducer, with sterile cover, was placed on the anterior mid-thigh (between the anterior superior iliac spine and patella). The transducer was then moved medially to identify the Sartorius muscle (Kiarra), Vastus Medialis muscle (VMM), Superficial Femoral Artery (SFA) and Vein. The transducer was then moved cephalad or caudad to position the SFA in the middle of the Kiarra. The insertion site was prepped and draped in sterile fashion. Skin and cutaneous tissue was infiltrated with 2-5 ml of 1% Lidocaine. Using ultrasound-guidance, a 20-gauge B-Partida 4\" Ultraplex 360 non-stimulating echogenic needle was " advanced in plane from lateral to medial. Preservative-free normal saline was utilized for hydro-dissection of tissue, advancement of needle, and to confirm needle placement below the fascial plane of the Kiarra where the Nerve to the VMM is located. Local anesthetic (LA) 5 ml deposited here. The needle continues its path lateral to the SFA at the level of the Saphenous Nerve. The remainder of the LA was deposited at the 10-11 o'clock position of the SFA. This injection created a space between the Kiarra and the SFA. Aspiration every 5 ml to prevent intravascular injection. Injection was completed with negative aspiration of blood and negative intravascular injection. Injection pressures were normal with minimal resistance.     Peripheral Block    Result Date: 1/18/2024  Fallon Gr CRNA     1/18/2024  1:46 PM Peripheral Block     CT Angio Abdominal Aorta Bilateral Iliofem Runoff    Result Date: 1/14/2024  CT ANGIO ABDOMINAL AORTA BILAT ILIOFEM RUNOFF Date of Exam: 1/14/2024 5:34 PM EST Indication: Claudication or leg ischemia Previous fem pop graft occlusion, c/o increased LLE pain, color changes, third/fourth toe ischemia, leukocytosis, r/o gas. Comparison: None available. Technique: CTA of the abdomen, pelvis and both lower extremities was performed after the uneventful intravenous administration of 120 cc Isovue-370 . Reconstructed coronal and sagittal images were also obtained. In addition, a 3-D volume rendered image was created for interpretation. Automated exposure control and iterative reconstruction methods were used. The origins of the celiac axis and superior mesenteric arteries are normal. The origins of the renal arteries are normal. Mild atheromatous disease of the abdominal aorta with subtle ectasia. The origin of the inferior mesenteric artery is patent. Complete occlusion of the left common iliac artery with continued complete loss of flow within the left internal and external iliac arteries.  Multi focal atheromatous disease of the right common iliac artery as well as the internal and external iliac arteries. Femorofemoral bypass. Complete occlusion of the left femoral artery bypass with no underlying flow. Reconstitution of flow distally at the level of the popliteal artery due to branches from the deep femoral artery. The superior segments of the left anterior and posterior tibial arteries are patent. Eventual loss of flow within the posterior tibial artery seen on axial image 383. Continued flow within the dorsalis pedis with eventual loss of flow within the arteries. On the right there is continued flow within the right deep femoral artery. Complete occlusion of the left femoral artery with distal reconstitution from branches of the deep femoral artery. The popliteal artery is patent. The anterior and posterior tibial vessels are patent proximally. Loss of flow within the anterior tibial artery on axial image #343. Loss of the contrast column within the posterior tibial artery on axial image 417. The liver appears normal. Calcified granulomata within the spleen. Normal pancreas. Prior cholecystectomy. The adrenal glands are normal. No renal masses. No hydroureteronephrosis. The ureters and urinary bladder are normal. The small bowel is nonobstructed. Normal appendix. Colonic diverticulosis without evidence of diverticulitis. No ascites or pneumoperitoneum. No adenopathy. Degenerative changes of the hips and lumbar spine.     Complete occlusion of the left femoral artery bypass graft. Complete occlusion of the right femoral artery. Eventual distal reconstitution of both vessels due to branches of the deep femoral arteries. Loss of flow within the left posterior tibial artery. Loss of flow within the right anterior tibial artery. Eventual loss of flow within the contrast column on both sides with no residual flow to the digital arteries. Electronically Signed: Kee Cartagena MD  1/14/2024 6:08 PM EST   Workstation ID: [id number]    XR Chest 1 View    Result Date: 1/14/2024  XR CHEST 1 VW Date of Exam: 1/14/2024 3:02 PM EST Indication: Weak/Dizzy/AMS triage protocol Comparison: None available. Findings: No focal consolidation. No pneumothorax or pleural effusion. Cardiac size is normal. The visualized clavicles appear intact. No displaced rib fractures. The visualized upper abdomen is normal.     Impression: No acute cardiopulmonary disease. Electronically Signed: Kee Cartagena MD  1/14/2024 3:18 PM EST  Workstation ID: [id number]             Results for orders placed during the hospital encounter of 11/20/23    Adult Transthoracic Echo Complete W/ Cont if Necessary Per Protocol    Interpretation Summary    Left ventricular systolic function is hyperdynamic (EF > 70%). Calculated left ventricular EF = 70.4%    Left ventricular diastolic function was normal.    No significant structural or functional valvular disease.      Pending Labs       Order Current Status    Urine Culture - Urine, Urine, Clean Catch In process          Discharge Details        Discharge Medications        New Medications        Instructions Start Date   DAPTOmycin 300 mg in sodium chloride 0.9 % 50 mL   4 mg/kg (300 mg), Intravenous, Every 24 Hours      ertapenem 1,000 mg in sodium chloride 0.9 % 100 mL IVPB   1,000 mg, Intravenous, Every 24 Hours   Start Date: January 25, 2024     NIFEdipine CC 30 MG 24 hr tablet  Commonly known as: ADALAT CC   30 mg, Oral, Every 24 Hours Scheduled   Start Date: January 25, 2024     pantoprazole 40 MG EC tablet  Commonly known as: PROTONIX   40 mg, Oral, Every Early Morning   Start Date: January 25, 2024            Changes to Medications        Instructions Start Date   oxyCODONE 20 MG tablet  Commonly known as: ROXICODONE  What changed: when to take this   20 mg, Oral, Every 6 Hours PRN      rosuvastatin 40 MG tablet  Commonly known as: CRESTOR  What changed: additional instructions   40 mg, Oral, Nightly, HOLD  this medication while you are on Daptomycin (antibiotic) -- resume when okay with Dr. Adhikari!             Continue These Medications        Instructions Start Date   aspirin 81 MG EC tablet   81 mg, Oral, Nightly      cyclobenzaprine 10 MG tablet  Commonly known as: FLEXERIL   10 mg, Oral, 3 Times Daily PRN      FENOFIBRATE PO   200 mg, Oral, Nightly      gabapentin 600 MG tablet  Commonly known as: NEURONTIN   Take 1 tablet by mouth 3 (Three) Times a Day.      lisinopril 40 MG tablet  Commonly known as: PRINIVIL,ZESTRIL   40 mg, Oral, Nightly      metoprolol tartrate 25 MG tablet  Commonly known as: LOPRESSOR   25 mg, Oral, Every 12 Hours Scheduled      NERVINE PO   1 capsule, Oral, Daily      Semaglutide 3 MG tablet   1 tablet, Oral, Daily, Patient receives samples of this med from his PCP      Synjardy 12.5-1000 MG tablet  Generic drug: Empagliflozin-metFORMIN HCl   1 tablet, Oral, 2 Times Daily, Patient receives samples of this med from his PCP      Xarelto 2.5 MG tablet  Generic drug: Rivaroxaban   Take 1 tablet by mouth 2 (Two) Times a Day. Start taking on Sunday, November 26. This will be your first dose. Take your last dose of Clopidogrel on Sunday, November 26 and stop the Clopidogrel.             ASK your doctor about these medications        Instructions Start Date   HYDROcodone-acetaminophen  MG per tablet  Commonly known as: NORCO   1 tablet, Every 8 Hours PRN               No Known Allergies      Discharge Disposition:  Home or Self Care    Diet:  Hospital:  Diet Order   Procedures    Diet: Diabetic Diets, Cardiac Diets; Healthy Heart (2-3 Na+); Consistent Carbohydrate; Texture: Regular Texture (IDDSI 7); Fluid Consistency: Thin (IDDSI 0)       Diet Instructions       Diet: Cardiac Diets, Diabetic Diets; Healthy Heart (2-3 Na+); Regular Texture (IDDSI 7); Thin (IDDSI 0); Consistent Carbohydrate      Discharge Diet:  Cardiac Diets  Diabetic Diets       Cardiac Diet: Healthy Heart (2-3 Na+)     Texture: Regular Texture (IDDSI 7)    Fluid Consistency: Thin (IDDSI 0)    Diabetic Diet: Consistent Carbohydrate             Activity:  Activity Instructions       Other Activity Instructions      Activity Instructions: non weight bearing to left lower extremity                 CODE STATUS:    Code Status and Medical Interventions:   Ordered at: 01/14/24 2055     Code Status (Patient has no pulse and is not breathing):    CPR (Attempt to Resuscitate)     Medical Interventions (Patient has pulse or is breathing):    Full Support       Future Appointments   Date Time Provider Department Center   2/26/2024  1:45 PM Celso Hoyt MD [facility]       Additional Instructions for the Follow-ups that You Need to Schedule       Ambulatory Referral to Home Health   As directed      Face to Face Visit Date: 1/24/2024   Follow-up provider for Plan of Care?: I treated the patient in an acute care facility and will not continue treatment after discharge.   Follow-up provider: MELO WHITAKER [[id number]]   Reason/Clinical Findings: status post hospital stay   Describe mobility limitations that make leaving home difficult: impaired physical mobility and gait endurance; weakness   Nursing/Therapeutic Services Requested: Skilled Nursing Physical Therapy Occupational Therapy   Skilled nursing orders: Medication education Cardiopulmonary assessments Wound care dressing/changes   Instructions: every 48 hr left foot dressing changes with: xeroform, 4x4, kerlix, ACE bandage. It is ok for LLE heel weightbearing in postop shoe. This is per Ms Max ROWELL   PT orders: Therapeutic exercise Gait Training Transfer training Strengthening Home safety assessment   Weight Bearing Status: As Tolerated   Occupational orders: Activities of daily living Energy conservation Strengthening Home safety assessment   Frequency: 1 Week 1        Discharge Follow-up with PCP   As directed       Currently Documented PCP:    Melo Whitaker  MD Elsi    PCP Phone Number:    [phone]     Follow Up Details: 1 week        Discharge Follow-up with Specified Provider: Dr. Peres/Winter Santana PA-C in 2 weeks   As directed      To: Dr. Peres/Winter Santana PA-C in 2 weeks        Discharge Follow-up with Specified Provider: Dr. Pena in 2 weeks   As directed      To: Dr. Pena in 2 weeks                      Sophy Amaya MD  01/24/24      Time Spent on Discharge:  I spent  25  minutes on this discharge activity which included: face-to-face encounter with the patient, reviewing the data in the system, coordination of the care with the nursing staff as well as consultants, documentation, and entering orders.

## 2024-01-24 NOTE — PLAN OF CARE
Goal Outcome Evaluation:                   PT d/c home with wife. Pt to follow tommore with [facility] to receive IV antibiotics daily and wound care every other day. All d/c instructions provided to pt and wife. All verbalized understanding.

## 2024-01-24 NOTE — PLAN OF CARE
Goal Outcome Evaluation:                   Problem: Adult Inpatient Plan of Care  Goal: Plan of Care Review  Outcome: Ongoing, Progressing  Goal: Patient-Specific Goal (Individualized)  Outcome: Ongoing, Progressing  Goal: Absence of Hospital-Acquired Illness or Injury  Outcome: Ongoing, Progressing  Intervention: Identify and Manage Fall Risk  Recent Flowsheet Documentation  Taken 1/23/2024 2200 by Nadia Jones RN  Safety Promotion/Fall Prevention:   activity supervised   assistive device/personal items within reach   clutter free environment maintained   lighting adjusted   nonskid shoes/slippers when out of bed   room organization consistent   safety round/check completed  Taken 1/23/2024 2000 by Nadia Jones RN  Safety Promotion/Fall Prevention:   activity supervised   assistive device/personal items within reach   clutter free environment maintained   lighting adjusted   nonskid shoes/slippers when out of bed   room organization consistent   safety round/check completed  Intervention: Prevent Skin Injury  Recent Flowsheet Documentation  Taken 1/23/2024 2200 by Nadia Jones RN  Body Position: position changed independently  Taken 1/23/2024 2000 by Nadia Jones RN  Body Position: position changed independently  Skin Protection:   transparent dressing maintained   tubing/devices free from skin contact  Intervention: Prevent and Manage VTE (Venous Thromboembolism) Risk  Recent Flowsheet Documentation  Taken 1/23/2024 2200 by Nadia Jones RN  Activity Management: activity encouraged  Taken 1/23/2024 2000 by Nadia Jones RN  Activity Management: activity encouraged  Range of Motion: active ROM (range of motion) encouraged  Intervention: Prevent Infection  Recent Flowsheet Documentation  Taken 1/23/2024 2200 by Nadia Jones RN  Infection Prevention:   hand hygiene promoted   rest/sleep promoted   single patient room provided  Taken 1/23/2024 2000 by Nadia Jones RN  Infection Prevention:   hand hygiene  promoted   rest/sleep promoted   single patient room provided  Goal: Optimal Comfort and Wellbeing  Outcome: Ongoing, Progressing  Intervention: Provide Person-Centered Care  Recent Flowsheet Documentation  Taken 1/23/2024 2000 by Nadia Jones RN  Trust Relationship/Rapport:   care explained   choices provided   emotional support provided   empathic listening provided   questions answered   questions encouraged  Goal: Readiness for Transition of Care  Outcome: Ongoing, Progressing     Problem: Diabetes Comorbidity  Goal: Blood Glucose Level Within Targeted Range  Outcome: Ongoing, Progressing  Intervention: Monitor and Manage Glycemia  Recent Flowsheet Documentation  Taken 1/23/2024 2000 by Nadia Jones RN  Glycemic Management: blood glucose monitored     Problem: Fall Injury Risk  Goal: Absence of Fall and Fall-Related Injury  Outcome: Ongoing, Progressing  Intervention: Identify and Manage Contributors  Recent Flowsheet Documentation  Taken 1/23/2024 2000 by Nadia Jones RN  Medication Review/Management: medications reviewed  Self-Care Promotion:   independence encouraged   BADL personal objects within reach   BADL personal routines maintained  Intervention: Promote Injury-Free Environment  Recent Flowsheet Documentation  Taken 1/23/2024 2200 by Nadia Jones RN  Safety Promotion/Fall Prevention:   activity supervised   assistive device/personal items within reach   clutter free environment maintained   lighting adjusted   nonskid shoes/slippers when out of bed   room organization consistent   safety round/check completed  Taken 1/23/2024 2000 by Nadia Jones RN  Safety Promotion/Fall Prevention:   activity supervised   assistive device/personal items within reach   clutter free environment maintained   lighting adjusted   nonskid shoes/slippers when out of bed   room organization consistent   safety round/check completed     Problem: Skin Injury Risk Increased  Goal: Skin Health and Integrity  Outcome:  Ongoing, Progressing  Intervention: Optimize Skin Protection  Recent Flowsheet Documentation  Taken 1/23/2024 2200 by Nadia Jones, RN  Head of Bed (HOB) Positioning: HOB elevated  Taken 1/23/2024 2000 by Nadia Jones RN  Pressure Reduction Techniques: frequent weight shift encouraged  Head of Bed (HOB) Positioning: HOB elevated  Pressure Reduction Devices: positioning supports utilized  Skin Protection:   transparent dressing maintained   tubing/devices free from skin contact     Problem: Pain (Revascularization)  Goal: Acceptable Pain Control  Outcome: Ongoing, Progressing  Intervention: Prevent or Manage Pain  Recent Flowsheet Documentation  Taken 1/23/2024 2000 by Nadia Jones, RN  Diversional Activities: television

## 2024-01-24 NOTE — DISCHARGE INSTR - APPOINTMENTS
Follow up appointment at [facility] office       Friday, Jan 26 at 0930      APPT wound care appointment      [address] at [facility] on 1st floor  Thursday, Jan 25 at 10:15

## 2024-01-24 NOTE — CASE MANAGEMENT/SOCIAL WORK
Case Management Discharge Note      Final Note: I spoke with the patient and his wife regarding his discharge home today. He is scheduled for Thursday, Jan 25 at 11:30 for [facility] IV antibiotic infusions and will be done daily at their office. CM also scheduled OPPT wound care at [facility]. His first appointment is Thursday, Jan 25 at 10:15 at [address] 1[address] floor wound care center. The information was given to the wife and placed in AVS in [facility]. His follow up appointment with [facility] MD is scheduled for Friday, Jan 26 at 0930, and this was given to the wife as well. He will ask his PCP to set up HH once the IV infusions are completed. His wife can transport. They have no other discharge planning needs at this time.         Selected Continued Care - Admitted Since 1/14/2024       Destination    No services have been selected for the patient.                Durable Medical Equipment    No services have been selected for the patient.                Dialysis/Infusion Coordination complete.      Service Provider Selected Services Address Phone Fax Patient Preferred    [facility] [id number] [address] [phone] --              Home Medical Care    No services have been selected for the patient.                Therapy    No services have been selected for the patient.                Community Resources    No services have been selected for the patient.                Community & DME    No services have been selected for the patient.                         Final Discharge Disposition Code: 01 - home or self-care

## 2024-01-24 NOTE — CASE MANAGEMENT/SOCIAL WORK
Continued Stay Note  [facility]     Patient Name: Justo Oquendo  MRN: [id number]  Today's Date: 1/24/2024    Admit Date: 1/14/2024    Plan: home with home health   Discharge Plan       Row Name 01/24/24 0914       Plan    Plan home with home health    Patient/Family in Agreement with Plan yes    Plan Comments I met with this patient at the bedside. He states he might be discharged home today with [facility]. CM has messaged the ortho PA and Dr Peres to have them place wound care orders in EPIC so CM can place the  orders. Still awaiting a response. The patient may need IV antibiotc infusions, and he stated that he would prefer to go into the [facility] office daily to receive them. Awaiting [facility] orders. The wife can transport. CM will follow.    Final Discharge Disposition Code 06 - home with home health care                   Discharge Codes    No documentation.                 Expected Discharge Date and Time       Expected Discharge Date Expected Discharge Time    Jan 26, 2024               Charmaine Hermosillo RN

## 2024-01-25 ENCOUNTER — HOSPITAL ENCOUNTER (OUTPATIENT)
Dept: PHYSICAL THERAPY | Facility: HOSPITAL | Age: 49
Setting detail: THERAPIES SERIES
Discharge: HOME | End: 2024-01-25
Payer: COMMERCIAL

## 2024-01-25 DIAGNOSIS — S91.302A OPEN WOUND OF LEFT FOOT, INITIAL ENCOUNTER: ICD-10-CM

## 2024-01-25 DIAGNOSIS — Z89.432 S/P TRANSMETATARSAL AMPUTATION OF FOOT, LEFT: Primary | ICD-10-CM

## 2024-01-25 PROCEDURE — 97162 PT EVAL MOD COMPLEX 30 MIN: CPT

## 2024-01-25 PROCEDURE — 97597 DBRDMT OPN WND 1ST 20 CM/<: CPT

## 2024-01-25 NOTE — THERAPY EVALUATION
Outpatient Rehabilitation - Wound/Debridement Initial Eval   [facility]     Patient Name: Justo Oquendo  : 1975  MRN: [id number]  Today's Date: 2024                  Admit Date: 2024    Visit Dx:    ICD-10-CM ICD-9-CM   1. S/P transmetatarsal amputation of foot, left  Z89.432 V49.73   2. 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        Past Medical History:   Diagnosis Date    Arthritis     Back pain     Diabetes     SINCE 2017    Dyslipidemia     HTN (hypertension)     PVD (peripheral vascular disease)     Wears partial dentures         Past Surgical History:   Procedure Laterality Date    AORTAGRAM N/A 2017    Procedure: AORTAGRAM WITH OR WITHOUT RUNOFFS POSSIBLE STENT with left femoral cutdown;  Surgeon: Brett Ryan MD;  Location: [facility] OR ;  Service:     AORTAGRAM N/A 2023    Procedure: THROMBECTOMY OF LEFT GRAFT, AORTAGRAM, FEMORAL TO FEMORAL BYPASS;  Surgeon: Brett Ryan MD;  Location: [facility];  Service: Vascular;  Laterality: N/A;  FLUORO- .06 SECS  DOSE- 10 MGY  CONTRAST- 10 ML ISO    CHOLECYSTECTOMY      CYST REMOVAL      OFF OF BACK    FEMORAL POPLITEAL BYPASS Left 2024    Procedure: FEMORAL POPLITEAL BYPASS WITH POPLITEAL EXPLORATION;  Surgeon: Vin Pena MD;  Location: [facility];  Service: Vascular;  Laterality: Left;  DOSE: 9MGY  FLURO: 14 MIN, 36 SEC  CONTRAST: 50ML VISIPAQUE 320    IL IN-SITU VEIN BYPASS FEMORAL-POPLITEAL Left 2017    Procedure: FEMORAL POPLITEAL BYPASS;  Surgeon: Brett Ryan MD;   Location:  [facility];  Service: Vascular    NM IN-SITU VEIN BYPASS FEMORAL-POPLITEAL Left 06/27/2017    Procedure: LEFT FEMORAL ENDARTECTOMYN LEFT FEMORAL POPLITEAL BYPASS;  Surgeon: Brett Ryan MD;  Location:  [facility];  Service: Vascular    TRANS METATARSAL AMPUTATION Left 1/19/2024    Procedure: AMPUTATION TRANS METATARSAL- LEFT;  Surgeon: Christo Peres Jr., MD;  Location:  [facility];  Service: Orthopedics;  Laterality: Left;        Patient History       Row Name 01/25/24 1500             History    Chief Complaint Pain;Ulcer, wound or other skin conditions  -      Type of Pain Foot pain  -      Date Current Problem(s) Began 01/19/24  -      Brief Description of Current Complaint Pt with signifcant history of mutiple LLE revascularization procedures with most recent on 1/18/24 undergoing L fem-pop bypass. Pt with dry gangrene of 2-4 toes status post L foot transmetatarsal amputation.  -      Previous treatment for THIS PROBLEM Surgery;Medication  IV abx  -      Surgery Date: 01/19/24  -      Patient/Caregiver Goals Relieve pain;Heal wound  -      Patient/Caregiver Goals Comment Wound healing  -      Patient's Rating of General Health Fair  -      Patient seeing anyone else for problem(s)? Dr. Peres, Dr. Pena, Dr. Koch  -      Related/Recent Hospitalizations Yes  -      Date of Hospitalization 01/14/24  -         Pain     Pain Location Foot  -      Pain at Present 6  -      Pain at Best 4  -      Pain at Worst 10  -         Services    Are you currently receiving Home Health services No  -      Do you plan to receive Home Health services in the near future Yes  -         Daily Activities    Primary Language English  -      How does patient learn best? Listening;Reading;Demonstration  -      Teaching needs identified Management of Condition;Other (comment)  wound management  -      Barriers to learning None  -      Pt Participated in POC and  "Goals Yes  -LH                User Key  (r) = Recorded By, (t) = Taken By, (c) = Cosigned By      Initials Name Provider Type     Luis Eduardo Isaac, PT Physical Therapist                    EVALUATION   PT Ortho       Row Name 01/25/24 1500       Subjective    Subjective Comments Reports Dr. Peres wants them to change the dressing every 48hrs. Reports he is going to [facility] daily for IV abx, however once IV abx are finished he is going to transfer care to .  -       Subjective Pain    Able to rate subjective pain? yes  -LH    Pre-Treatment Pain Level 6  -LH    Post-Treatment Pain Level 6  -LH       Transfers    Comment, (Transfers) Remained seated for tx  -LH              User Key  (r) = Recorded By, (t) = Taken By, (c) = Cosigned By      Initials Name Provider Type     Luis Eduardo Isaac, PT Physical Therapist                   LDA Wound       Row Name 01/25/24 1500             Wound 01/19/24 1149 Left distal foot Amputation stump    Wound - Properties Group Placement Date: 01/19/24  -NH Placement Time: 1149  -NH Side: Left  -NH Orientation: distal  -NH Location: foot  -NH Primary Wound Type: Amputation s  -NH    Wound Image Images linked: 4  -LH      Dressing Appearance intact;moist drainage  xeroform, 4x4 gauze pads, kerlix, ACE  -LH      Closure Sutures  -      Base moist;pink;red;yellow;slough;scab  -      Periwound intact;dry;redness;swelling;warm  -      Periwound Temperature warm  -      Periwound Skin Turgor soft  -      Edges open  -      Wound Length (cm) 15.5 cm  -      Wound Width (cm) 0.1 cm  -      Wound Depth (cm) 0.1 cm  -      Wound Surface Area (cm^2) 1.55 cm^2  -      Wound Volume (cm^3) 0.155 cm^3  -LH      Drainage Characteristics/Odor sanguineous;serosanguineous  -      Drainage Amount scant  -      Care, Wound cleansed with;soap and water;debrided  -      Dressing Care dressing applied;petroleum-based;gauze  Xeroform, 4x4 gauze x3, kerlix, 4\" ACE  -LH      " Periwound Care cleansed with pH balanced cleanser;dry periwound area maintained  -      Retired Wound - Properties Group Placement Date: 01/19/24  -NH Placement Time: 1149  -NH Side: Left  -NH Orientation: distal  -NH Location: foot  -NH Primary Wound Type: Amputation s  -NH    Retired Wound - Properties Group Date first assessed: 01/19/24  -NH Time first assessed: 1149  -NH Side: Left  -NH Location: foot  -NH Primary Wound Type: Amputation s  -NH              User Key  (r) = Recorded By, (t) = Taken By, (c) = Cosigned By      Initials Name Provider Type    NH Dior Chaney, RN Registered Nurse    Luis Eduardo Becerra, PT Physical Therapist                      WOUND DEBRIDEMENT  Total area of Debridement: 4cm2  Debridement Site 1  Location- Site 1: L foot amputation stump  Selective Debridement- Site 1: Wound Surface <20cmsq  Instruments- Site 1: tweezers, other (comment) (4x4 gauze, green wipes)  Excised Tissue Description- Site 1: minimum, slough, moderate, other (comment) (dried blood)  Bleeding- Site 1: none              Therapy Education       Row Name 01/25/24 1500             Therapy Education    Education Details Keep wound dressing dry and intact for 2-3 days. Cleanse area with therawork and green wipes. Apply xeroform doubled to incision line with 4x4 gauze pads to cover and kerlix and ACE wrap to secure. Will plan to continue  OP wound care until care transitioned to . Reviewed s/sx of infection and when to seek medical attention.  -      Given Symptoms/condition management;Pain management;Bandaging/dressing change  -      Program New  -      How Provided Verbal;Demonstration  -      Provided to Patient;Other (comment)  SO  -      Level of Understanding Teach back education performed;Verbalized  -                User Key  (r) = Recorded By, (t) = Taken By, (c) = Cosigned By      Initials Name Provider Type    Luis Eduardo Becerra, PT Physical Therapist                    Recommendation  and Plan   PT Assessment/Plan       Row Name 01/25/24 1500          PT Assessment    Functional Limitations Impaired locomotion;Performance in self-care ADL;Other (comment)  wound management  -     Impairments Pain;Integumentary integrity  -     Assessment Comments PT wound care initial evaluation complete. Pt presenting with complex history of multiple LLE revascularization procedured with resent ischemic L toes requiring L metatarsal amputation on 1/19/24. Pt presenting to  OP wound care for wound dressing management until pt is able to transition to  wound care services. Pt's L foot amputation site with intact sutures and only very minimal necrotic tissue noted. Pt with mild/moderate amount of dried blood around incision and very minimal areas of black discoloration of wound edges likely related to ischemia. Pt would continue to benefit from skilled wound care for further debridement and advanced wound dressing management to help promote wound healing while awaiting transfer of care to .  -     Rehab Potential Good  -     Patient/caregiver participated in establishment of treatment plan and goals Yes  -     Patient would benefit from skilled therapy intervention Yes  -        PT Plan    PT Frequency 1x/week  -     Predicted Duration of Therapy Intervention (PT) 8 visits  -     Planned CPT's? PT EVAL MOD COMPLELITY: 56390;PT SELF CARE/MGMT/TRAIN 15 MIN: 35912;PT NONSELECT DEBRIDE 15 MIN: 64255;PT KEANU DEBRIDE OPEN WOUND UP TO 20 CM: 56781  -     Physical Therapy Interventions (Optional Details) wound care;patient/family education  -     PT Plan Comments Debridement PRN, dressings  -               User Key  (r) = Recorded By, (t) = Taken By, (c) = Cosigned By      Initials Name Provider Type     Luis Eduardo Isaac, PT Physical Therapist                      Goals   PT OP Goals       Row Name 01/25/24 1518 01/25/24 1500       PT Short Term Goals    STG 1 -- Verbalize s/sx of infection and  when to seek medical attention.  -    STG 1 Progress -- New  -    STG 2 -- Demonstrate scant remaining necrotic tissue to improve wound healing potential.  -    STG 2 Progress -- New  -       Long Term Goals    LTG 1 -- Demonstrate independence with home dressing management to improve wound healing potential.  -    LTG 1 Progress -- New  -    LTG 2 -- Demonstrate no remaining open wound to allow for return to PLOF>  -    LTG 2 Progress -- New  -       Time Calculation    PT Goal Re-Cert Due Date 04/24/24  -LH 04/24/24  -              User Key  (r) = Recorded By, (t) = Taken By, (c) = Cosigned By      Initials Name Provider Type     Luis Eduardo Isaac, PT Physical Therapist                    Time Calculation: Start Time: 1015  Untimed Charges  PT Eval/Re-eval Minutes: 38  Wound Care: 48299 Selective debridement  74017-Vdcdguizk debridement: 25  Total Minutes  Untimed Charges Total Minutes: 63   Total Minutes: 63  Therapy Charges for Today       Code Description Service Date Service Provider Modifiers Qty    95336116965 HC PT EVAL MOD COMPLEXITY 3 1/25/2024 Luis Eduardo Isaac, PT GP 1    52919432022 HC KEANU DEBRIDE OPEN WOUND UP TO 20CM 1/25/2024 Luis Eduardo Isaac, PT GP 1                  Luis Eduardo Isaac PT  1/25/2024

## 2024-01-25 NOTE — OUTREACH NOTE
Prep Survey      Flowsheet Row Responses   [facility] facility patient discharged from? [facility]   Is LACE score < 7 ? No   Eligibility Readm Mgmt   Discharge diagnosis FEMORAL POPLITEAL BYPASS WITH POPLITEAL EXPLORATION,   AMPUTATION TRANS METATARSAL- LEFT   Does the patient have one of the following disease processes/diagnoses(primary or secondary)? General Surgery   Does the patient have Home health ordered? Yes   What is the Home health agency?  PCP will set up HH once daily IV abx at [facility] are finished   Is there a DME ordered? No   Prep survey completed? Yes            Delfina BARRERA - Registered Nurse

## 2024-01-26 ENCOUNTER — READMISSION MANAGEMENT (OUTPATIENT)
Dept: CALL CENTER | Facility: HOSPITAL | Age: 49
End: 2024-01-26
Payer: COMMERCIAL

## 2024-01-26 NOTE — OUTREACH NOTE
General Surgery Week 1 Survey      Flowsheet Row Responses   [facility] patient discharged from? [place]   Does the patient have one of the following disease processes/diagnoses(primary or secondary)? General Surgery   Week 1 attempt successful? Yes   Call start time 1030   Call end time 1033   Discharge diagnosis FEMORAL POPLITEAL BYPASS WITH POPLITEAL EXPLORATION,   AMPUTATION TRANS METATARSAL- LEFT   Is patient permission given to speak with other caregiver? Yes   List who call center can speak with Christine spouse   Person spoke with today (if not patient) and relationship Christine spouse   Meds reviewed with patient/caregiver? Yes   Is the patient having any side effects they believe may be caused by any medication additions or changes? No   Does the patient have all medications related to this admission filled (includes all antibiotics, pain medications, etc.) Yes   Is the patient taking all medications as directed (includes completed medication regime)? Yes   Medication comments Pt still receiving IV antibx   Does the patient have a follow up appointment scheduled with their surgeon? Yes  [2/2/24 surgeon's office]   Has the patient kept scheduled appointments due by today? N/A   Comments PCP apt on 1/29/24   What is the Home health agency?  PCP will set up  once daily IV abx at [facility] are finished   Did the patient receive a copy of their discharge instructions? Yes   Nursing interventions Reviewed instructions with patient   What is the patient's perception of their health status since discharge? Improving  [pt was receiving IV antibx infusion during call]   Nursing interventions Nurse provided patient education   Is the patient /caregiver able to teach back basic post-op care? Drive as instructed by MD in discharge instructions, Take showers only when approved by MD-sponge bathe until then, No tub bath, swimming, or hot tub until instructed by MD, Keep incision areas clean,dry and protected, Lifting as  instructed by MD in discharge instructions, Continue use of incentive spirometry at least 1 week post discharge   Is the patient/caregiver able to teach back signs and symptoms of incisional infection? Increased redness, swelling or pain at the incisonal site, Increased drainage or bleeding, Incisional warmth, Pus or odor from incision, Fever   Is the patient/caregiver able to teach back steps to recovery at home? Set small, achievable goals for return to baseline health, Rest and rebuild strength, gradually increase activity, Weigh daily, Practice good oral hygiene, Eat a well-balance diet, Make a list of questions for surgeon's appointment   If the patient is a current smoker, are they able to teach back resources for cessation? [id number], Smoking cessation medications   Is the patient/caregiver able to teach back the hierarchy of who to call/visit for symptoms/problems? PCP, Specialist, Home health nurse, Urgent Care, ED, 911 Yes   Week 1 call completed? Yes   Call end time 1033            Betty MELENDREZ - Registered Nurse

## 2024-01-29 ENCOUNTER — TRANSCRIBE ORDERS (OUTPATIENT)
Dept: LAB | Facility: HOSPITAL | Age: 49
End: 2024-01-29
Payer: COMMERCIAL

## 2024-01-29 ENCOUNTER — LAB (OUTPATIENT)
Dept: LAB | Facility: HOSPITAL | Age: 49
End: 2024-01-29
Payer: COMMERCIAL

## 2024-01-29 DIAGNOSIS — L03.116 CELLULITIS OF LEFT FOOT: Primary | ICD-10-CM

## 2024-01-29 DIAGNOSIS — L08.89 PITTED KERATOLYSIS: ICD-10-CM

## 2024-01-29 DIAGNOSIS — L03.116 CELLULITIS OF LEFT FOOT: ICD-10-CM

## 2024-01-29 LAB
ALBUMIN SERPL-MCNC: 3.9 G/DL (ref 3.5–5.2)
ALBUMIN/GLOB SERPL: 1.1 G/DL
ALP SERPL-CCNC: 56 U/L (ref 39–117)
ALT SERPL W P-5'-P-CCNC: 20 U/L (ref 1–41)
ANION GAP SERPL CALCULATED.3IONS-SCNC: 9 MMOL/L (ref 5–15)
AST SERPL-CCNC: 23 U/L (ref 1–40)
BASOPHILS # BLD AUTO: 0.04 10*3/MM3 (ref 0–0.2)
BASOPHILS NFR BLD AUTO: 0.5 % (ref 0–1.5)
BILIRUB SERPL-MCNC: 0.3 MG/DL (ref 0–1.2)
BUN SERPL-MCNC: 20 MG/DL (ref 6–20)
BUN/CREAT SERPL: 28.2 (ref 7–25)
CALCIUM SPEC-SCNC: 10.2 MG/DL (ref 8.6–10.5)
CHLORIDE SERPL-SCNC: 101 MMOL/L (ref 98–107)
CK SERPL-CCNC: 51 U/L (ref 20–200)
CO2 SERPL-SCNC: 28 MMOL/L (ref 22–29)
CREAT SERPL-MCNC: 0.71 MG/DL (ref 0.76–1.27)
CRP SERPL-MCNC: 0.8 MG/DL (ref 0–0.5)
DEPRECATED RDW RBC AUTO: 41.9 FL (ref 37–54)
EGFRCR SERPLBLD CKD-EPI 2021: 111.8 ML/MIN/1.73
EOSINOPHIL # BLD AUTO: 0.34 10*3/MM3 (ref 0–0.4)
EOSINOPHIL NFR BLD AUTO: 4.4 % (ref 0.3–6.2)
ERYTHROCYTE [DISTWIDTH] IN BLOOD BY AUTOMATED COUNT: 12.8 % (ref 12.3–15.4)
ERYTHROCYTE [SEDIMENTATION RATE] IN BLOOD: 95 MM/HR (ref 0–20)
GLOBULIN UR ELPH-MCNC: 3.6 GM/DL
GLUCOSE SERPL-MCNC: 107 MG/DL (ref 65–99)
HCT VFR BLD AUTO: 32.7 % (ref 37.5–51)
HGB BLD-MCNC: 10.5 G/DL (ref 13–17.7)
IMM GRANULOCYTES # BLD AUTO: 0.12 10*3/MM3 (ref 0–0.05)
IMM GRANULOCYTES NFR BLD AUTO: 1.6 % (ref 0–0.5)
LYMPHOCYTES # BLD AUTO: 2.12 10*3/MM3 (ref 0.7–3.1)
LYMPHOCYTES NFR BLD AUTO: 27.6 % (ref 19.6–45.3)
MCH RBC QN AUTO: 29.8 PG (ref 26.6–33)
MCHC RBC AUTO-ENTMCNC: 32.1 G/DL (ref 31.5–35.7)
MCV RBC AUTO: 92.9 FL (ref 79–97)
MONOCYTES # BLD AUTO: 0.8 10*3/MM3 (ref 0.1–0.9)
MONOCYTES NFR BLD AUTO: 10.4 % (ref 5–12)
NEUTROPHILS NFR BLD AUTO: 4.27 10*3/MM3 (ref 1.7–7)
NEUTROPHILS NFR BLD AUTO: 55.5 % (ref 42.7–76)
NRBC BLD AUTO-RTO: 0 /100 WBC (ref 0–0.2)
PLATELET # BLD AUTO: 370 10*3/MM3 (ref 140–450)
PMV BLD AUTO: 9.4 FL (ref 6–12)
POTASSIUM SERPL-SCNC: 5.1 MMOL/L (ref 3.5–5.2)
PROT SERPL-MCNC: 7.5 G/DL (ref 6–8.5)
RBC # BLD AUTO: 3.52 10*6/MM3 (ref 4.14–5.8)
SODIUM SERPL-SCNC: 138 MMOL/L (ref 136–145)
WBC NRBC COR # BLD AUTO: 7.69 10*3/MM3 (ref 3.4–10.8)

## 2024-01-29 PROCEDURE — 85025 COMPLETE CBC W/AUTO DIFF WBC: CPT

## 2024-01-29 PROCEDURE — 86140 C-REACTIVE PROTEIN: CPT

## 2024-01-29 PROCEDURE — 85652 RBC SED RATE AUTOMATED: CPT

## 2024-01-29 PROCEDURE — 82550 ASSAY OF CK (CPK): CPT

## 2024-01-29 PROCEDURE — 36415 COLL VENOUS BLD VENIPUNCTURE: CPT

## 2024-01-29 PROCEDURE — 80053 COMPREHEN METABOLIC PANEL: CPT

## 2024-01-30 ENCOUNTER — HOSPITAL ENCOUNTER (OUTPATIENT)
Dept: PHYSICAL THERAPY | Facility: HOSPITAL | Age: 49
Setting detail: THERAPIES SERIES
Discharge: HOME | End: 2024-01-30
Payer: COMMERCIAL

## 2024-01-30 DIAGNOSIS — S91.302A OPEN WOUND OF LEFT FOOT, INITIAL ENCOUNTER: ICD-10-CM

## 2024-01-30 DIAGNOSIS — Z89.432 S/P TRANSMETATARSAL AMPUTATION OF FOOT, LEFT: Primary | ICD-10-CM

## 2024-01-30 PROCEDURE — 97597 DBRDMT OPN WND 1ST 20 CM/<: CPT

## 2024-01-30 NOTE — THERAPY WOUND CARE TREATMENT
Outpatient Rehabilitation - Wound/Debridement Treatment Note   [place]     Patient Name: Justo Oquendo  : 1975  MRN: [id number]  Today's Date: 2024                   Admit Date: 2024    Visit Dx:    ICD-10-CM ICD-9-CM   1. S/P transmetatarsal amputation of foot, left  Z89.432 V49.73   2. 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        Past Medical History:   Diagnosis Date    Arthritis     Back pain     Diabetes     SINCE 2017    Dyslipidemia     HTN (hypertension)     PVD (peripheral vascular disease)     Wears partial dentures         Past Surgical History:   Procedure Laterality Date    AORTAGRAM N/A 2017    Procedure: AORTAGRAM WITH OR WITHOUT RUNOFFS POSSIBLE STENT with left femoral cutdown;  Surgeon: Brett Ryan MD;  Location:  [facility] [place] [address];  Service:     AORTAGRAM N/A 2023    Procedure: THROMBECTOMY OF LEFT GRAFT, AORTAGRAM, FEMORAL TO FEMORAL BYPASS;  Surgeon: Brett Ryan MD;  Location: [facility];  Service: Vascular;  Laterality: N/A;  FLUORO- .06 SECS  DOSE- 10 MGY  CONTRAST- 10 ML ISO    CHOLECYSTECTOMY      CYST REMOVAL      OFF OF BACK    FEMORAL POPLITEAL BYPASS Left 2024    Procedure: FEMORAL POPLITEAL BYPASS WITH POPLITEAL EXPLORATION;  Surgeon: Vin Pena MD;  Location:  [facility];  Service: Vascular;  Laterality: Left;  DOSE: 9MGY  FLURO: 14 MIN, 36 SEC  CONTRAST: 50ML VISIPAQUE 320    GA IN-SITU VEIN BYPASS FEMORAL-POPLITEAL Left 2017    Procedure: FEMORAL POPLITEAL BYPASS;  Surgeon: Brett Ryan MD;  Location:  [facility] [place] [address];  Service:  Vascular    TX IN-SITU VEIN BYPASS FEMORAL-POPLITEAL Left 06/27/2017    Procedure: LEFT FEMORAL ENDARTECTOMYN LEFT FEMORAL POPLITEAL BYPASS;  Surgeon: Brett Ryan MD;  Location:  [facility];  Service: Vascular    TRANS METATARSAL AMPUTATION Left 1/19/2024    Procedure: AMPUTATION TRANS METATARSAL- LEFT;  Surgeon: Christo Peres Jr., MD;  Location:  [facility];  Service: Orthopedics;  Laterality: Left;         EVALUATION   PT Ortho       Row Name 01/30/24 1300       Subjective    Subjective Comments No complaints or changes. They see Dr. Peres on Friday.  -       Subjective Pain    Able to rate subjective pain? yes  -MC    Pre-Treatment Pain Level 6  -MC    Post-Treatment Pain Level 6  -MC    Subjective Pain Comment fACES  -       Transfers    Comment, (Transfers) remained seated for tx  -              User Key  (r) = Recorded By, (t) = Taken By, (c) = Cosigned By      Initials Name Provider Type     Taylor Bui, PT Physical Therapist                     [facility] Wound       Row Name 01/30/24 1300             Wound 01/19/24 1149 Left distal foot Amputation stump    Wound - Properties Group Placement Date: 01/19/24  -NH Placement Time: 1149  -NH Side: Left  -NH Orientation: distal  -NH Location: foot  -NH Primary Wound Type: Amputation s  -NH    Wound Image Images linked: 2  -      Dressing Appearance moist drainage;intact  -      Closure Sutures  -      Base moist;pink;red;yellow;slough;scab  some necrotic tissue to the lateral half of the incision  -      Periwound intact;dry;redness;swelling;warm  less redness today  -      Periwound Temperature warm  -      Periwound Skin Turgor soft  -      Edges open  -      Drainage Characteristics/Odor sanguineous;serosanguineous  -      Drainage Amount scant  -      Care, Wound cleansed with;wound cleanser;debrided  -      Dressing Care dressing applied;petroleum-based;gauze  xeroform, 4x4 gauze, kerlix, ace wrap  -      Periwound  Care cleansed with pH balanced cleanser;dry periwound area maintained  -      Retired Wound - Properties Group Placement Date: 01/19/24  -NH Placement Time: 1149  -NH Side: Left  -NH Orientation: distal  -NH Location: foot  -NH Primary Wound Type: Amputation s  -NH    Retired Wound - Properties Group Date first assessed: 01/19/24  -NH Time first assessed: 1149  -NH Side: Left  -NH Location: foot  -NH Primary Wound Type: Amputation s  -NH              User Key  (r) = Recorded By, (t) = Taken By, (c) = Cosigned By      Initials Name Provider Type    Taylor Meredith, PT Physical Therapist    NH Dior Chaney, RN Registered Nurse                      WOUND DEBRIDEMENT  Total area of Debridement: 10 cm2  Debridement Site 1  Location- Site 1: L foot amputation stump  Selective Debridement- Site 1: Wound Surface <20cmsq  Instruments- Site 1: tweezers, scissors  Excised Tissue Description- Site 1: moderate, maximum, other (comment) (periwound crust, callus to L heel)  Bleeding- Site 1: scant, held pressure, 1 minute              Therapy Education       Row Name 01/30/24 1300             Therapy Education    Education Details You may apply lotion (AmLac, aquaphor, etc.) to the L heel as often as desired, at least with every dressing change. Continue POC to incision line.  -MC      Given Symptoms/condition management;Pain management;Bandaging/dressing change  -      Program Reinforced;Progressed  -MC      How Provided Verbal;Demonstration  -MC      Provided to Patient;Caregiver  -MC      Level of Understanding Verbalized  -                User Key  (r) = Recorded By, (t) = Taken By, (c) = Cosigned By      Initials Name Provider Type    Taylor Meredith PT Physical Therapist                    Recommendation and Plan   PT Assessment/Plan       Row Name 01/30/24 1300          PT Assessment    Functional Limitations Impaired locomotion;Performance in self-care ADL;Other (comment)  wound management  -BOBBY      Impairments Pain;Integumentary integrity  -     Assessment Comments PT able to debride additional slough and periwound crust/callus today, revealing some appropriately adherent incision line as well as some sloughy areas to the lateral aspect. Pt will continue to benefit from skilled PT wound care to continue progress.  -     Rehab Potential Good  -     Patient/caregiver participated in establishment of treatment plan and goals Yes  -     Patient would benefit from skilled therapy intervention Yes  -        PT Plan    PT Frequency 1x/week  -     Physical Therapy Interventions (Optional Details) wound care;patient/family education  -     PT Plan Comments debridement, dressings, sees Dr. Peres on Friday, would like to switch to  when IV abx are done  -               User Key  (r) = Recorded By, (t) = Taken By, (c) = Cosigned By      Initials Name Provider Type    Taylor Meredith, PT Physical Therapist                    Goals   PT OP Goals       Row Name 01/30/24 1341          Time Calculation    PT Goal Re-Cert Due Date 04/24/24  -               User Key  (r) = Recorded By, (t) = Taken By, (c) = Cosigned By      Initials Name Provider Type    Taylor Meredith, PT Physical Therapist                    PT Goal Re-Cert Due Date: 04/24/24            Time Calculation: Start Time: 1030  Untimed Charges  24122-Fysirwefe debridement: 25  Total Minutes  Untimed Charges Total Minutes: 25   Total Minutes: 25              Taylor Bui, PT  1/30/2024

## 2024-03-07 ENCOUNTER — TELEPHONE (OUTPATIENT)
Dept: CARDIOLOGY | Facility: CLINIC | Age: 49
End: 2024-03-07

## 2024-03-07 NOTE — TELEPHONE ENCOUNTER
Name: Christine Oquendo    Relationship: Emergency Contact    Best Callback Number: [phone]    Incoming call to the Hub, requesting to  Reschedule their HFU appointment on 03/08/24.     Per Hub workflow, further review is needed before the task can be completed.    Result of Call: Transferred to [facility] at the practice

## 2024-03-25 ENCOUNTER — TELEPHONE (OUTPATIENT)
Dept: CARDIOLOGY | Facility: CLINIC | Age: 49
End: 2024-03-25

## 2024-03-25 NOTE — TELEPHONE ENCOUNTER
Name: Christine Oquendo    Relationship: Emergency Contact    Best Callback Number: [phone]    Incoming call to the Hub, requesting to  Cancel their HFU appointment on 03/25/24.     Per Hub workflow, further review is needed before the task can be completed.    Result of Call: Transferred to [facility] at the practice

## 2024-03-27 ENCOUNTER — DOCUMENTATION (OUTPATIENT)
Dept: PHYSICAL THERAPY | Facility: HOSPITAL | Age: 49
End: 2024-03-27
Payer: COMMERCIAL

## 2024-03-27 DIAGNOSIS — S91.302A OPEN WOUND OF LEFT FOOT, INITIAL ENCOUNTER: ICD-10-CM

## 2024-03-27 DIAGNOSIS — Z89.432 S/P TRANSMETATARSAL AMPUTATION OF FOOT, LEFT: Primary | ICD-10-CM

## 2024-03-27 NOTE — THERAPY DISCHARGE NOTE
Outpatient Rehabilitation - Wound/Debridement Discharge Summary       Patient Name: uJsto Oquendo  : 1975  MRN: [id number]  Today's Date: 3/27/2024                  Admit Date: (Not on file)    Visit Dx:    ICD-10-CM ICD-9-CM   1. S/P transmetatarsal amputation of foot, left  Z89.432 V49.73   2. 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 OR ;  Service:     AORTAGRAM N/A 2023    Procedure: THROMBECTOMY OF LEFT GRAFT, AORTAGRAM, FEMORAL TO FEMORAL BYPASS;  Surgeon: Brett Ryan MD;  Location:  [facility];  Service: Vascular;  Laterality: N/A;  FLUORO- .06 SECS  DOSE- 10 MGY  CONTRAST- 10 ML ISO    CHOLECYSTECTOMY      CYST REMOVAL      OFF OF BACK    FEMORAL POPLITEAL BYPASS Left 2024    Procedure: FEMORAL POPLITEAL BYPASS WITH POPLITEAL EXPLORATION;  Surgeon: Vin Pena MD;  Location:  [facility];  Service: Vascular;  Laterality: Left;  DOSE: 9MGY  FLURO: 14 MIN, 36 SEC  CONTRAST: 50ML VISIPAQUE 320    [place] IN-SITU VEIN BYPASS FEMORAL-POPLITEAL Left 2017    Procedure: FEMORAL POPLITEAL BYPASS;  Surgeon: Brett Ryan MD;  Location:  [facility] OR ;  Service:  Vascular    MO IN-SITU VEIN BYPASS FEMORAL-POPLITEAL Left 06/27/2017    Procedure: LEFT FEMORAL ENDARTECTOMYN LEFT FEMORAL POPLITEAL BYPASS;  Surgeon: Brett Ryan MD;  Location: [facility] OR;  Service: Vascular    TRANS METATARSAL AMPUTATION Left 1/19/2024    Procedure: AMPUTATION TRANS METATARSAL- LEFT;  Surgeon: Christo Peres Jr., MD;  Location:  [facility];  Service: Orthopedics;  Laterality: Left;       Goals   PT OP Goals       Row Name 03/27/24 1600          PT Short Term Goals    STG 1 Verbalize s/sx of infection and when to seek medical attention.  -     STG 1 Progress Not Met  -     STG 2 Demonstrate scant remaining necrotic tissue to improve wound healing potential.  -     STG 2 Progress Not Met  -        Long Term Goals    LTG 1 Demonstrate independence with home dressing management to improve wound healing potential.  -     LTG 1 Progress Not Met  -     LTG 2 Demonstrate no remaining open wound to allow for return to OF>  -     LTG 2 Progress Not Met  -               User Key  (r) = Recorded By, (t) = Taken By, (c) = Cosigned By      Initials Name Provider Type    Beth Loredo, PT Physical Therapist                       OP Discharge Summary       Row Name 03/27/24 1615             OP PT Discharge Summary    Date of Discharge 01/30/24  -      Reason for Discharge other (comment)  Patient did not return for treatment after follow-up with surgeon, mentioned plan to transfer to [facility].  -      Outcomes Achieved Unable to make functional progress toward goals at this time  -      Discharge Destination Unknown  -                User Key  (r) = Recorded By, (t) = Taken By, (c) = Cosigned By      Initials Name Provider Type    Beth Loredo, PT Physical Therapist                    Beth Pacheco, PT  3/27/2024

## 2024-03-28 ENCOUNTER — ANESTHESIA EVENT (OUTPATIENT)
Dept: PERIOP | Facility: HOSPITAL | Age: 49
End: 2024-03-28
Payer: COMMERCIAL

## 2024-03-28 RX ORDER — NICOTINE 21 MG/24HR
1 PATCH, TRANSDERMAL 24 HOURS TRANSDERMAL EVERY 24 HOURS
COMMUNITY

## 2024-03-28 RX ORDER — FAMOTIDINE 10 MG/ML
20 INJECTION, SOLUTION INTRAVENOUS ONCE
Status: CANCELLED | OUTPATIENT
Start: 2024-03-28 | End: 2024-03-28

## 2024-03-28 RX ORDER — SODIUM CHLORIDE 0.9 % (FLUSH) 0.9 %
10 SYRINGE (ML) INJECTION EVERY 12 HOURS SCHEDULED
Status: CANCELLED | OUTPATIENT
Start: 2024-03-28

## 2024-03-29 ENCOUNTER — ANESTHESIA (OUTPATIENT)
Dept: PERIOP | Facility: HOSPITAL | Age: 49
End: 2024-03-29
Payer: COMMERCIAL

## 2024-03-29 ENCOUNTER — HOSPITAL ENCOUNTER (OUTPATIENT)
Facility: HOSPITAL | Age: 49
Discharge: HOME HEALTH | End: 2024-04-02
Attending: ORTHOPAEDIC SURGERY | Admitting: ANESTHESIOLOGY
Payer: COMMERCIAL

## 2024-03-29 DIAGNOSIS — E11.628 DIABETIC FOOT INFECTION: ICD-10-CM

## 2024-03-29 DIAGNOSIS — I73.9 PERIPHERAL ARTERIAL DISEASE: ICD-10-CM

## 2024-03-29 DIAGNOSIS — L08.9 DIABETIC FOOT INFECTION: ICD-10-CM

## 2024-03-29 DIAGNOSIS — I96 ISCHEMIC NECROSIS OF TOE: Primary | ICD-10-CM

## 2024-03-29 DIAGNOSIS — L02.612 FOOT ABSCESS, LEFT: ICD-10-CM

## 2024-03-29 LAB
ANION GAP SERPL CALCULATED.3IONS-SCNC: 12 MMOL/L (ref 5–15)
BUN SERPL-MCNC: 21 MG/DL (ref 6–20)
BUN/CREAT SERPL: 26.6 (ref 7–25)
CALCIUM SPEC-SCNC: 10.5 MG/DL (ref 8.6–10.5)
CHLORIDE SERPL-SCNC: 99 MMOL/L (ref 98–107)
CO2 SERPL-SCNC: 25 MMOL/L (ref 22–29)
CREAT SERPL-MCNC: 0.79 MG/DL (ref 0.76–1.27)
DEPRECATED RDW RBC AUTO: 48.1 FL (ref 37–54)
EGFRCR SERPLBLD CKD-EPI 2021: 108.2 ML/MIN/1.73
ERYTHROCYTE [DISTWIDTH] IN BLOOD BY AUTOMATED COUNT: 14.6 % (ref 12.3–15.4)
GLUCOSE BLDC GLUCOMTR-MCNC: 263 MG/DL (ref 70–130)
GLUCOSE BLDC GLUCOMTR-MCNC: 277 MG/DL (ref 70–130)
GLUCOSE BLDC GLUCOMTR-MCNC: 282 MG/DL (ref 70–130)
GLUCOSE BLDC GLUCOMTR-MCNC: 290 MG/DL (ref 70–130)
GLUCOSE BLDC GLUCOMTR-MCNC: 294 MG/DL (ref 70–130)
GLUCOSE BLDC GLUCOMTR-MCNC: 350 MG/DL (ref 70–130)
GLUCOSE BLDC GLUCOMTR-MCNC: 369 MG/DL (ref 70–130)
GLUCOSE BLDC GLUCOMTR-MCNC: 411 MG/DL (ref 70–130)
GLUCOSE SERPL-MCNC: 370 MG/DL (ref 65–99)
HBA1C MFR BLD: 7.8 % (ref 4.8–5.6)
HCT VFR BLD AUTO: 39.2 % (ref 37.5–51)
HGB BLD-MCNC: 12.2 G/DL (ref 13–17.7)
MCH RBC QN AUTO: 27.9 PG (ref 26.6–33)
MCHC RBC AUTO-ENTMCNC: 31.1 G/DL (ref 31.5–35.7)
MCV RBC AUTO: 89.7 FL (ref 79–97)
PLATELET # BLD AUTO: 258 10*3/MM3 (ref 140–450)
PMV BLD AUTO: 10.7 FL (ref 6–12)
POTASSIUM SERPL-SCNC: 5.2 MMOL/L (ref 3.5–5.2)
RBC # BLD AUTO: 4.37 10*6/MM3 (ref 4.14–5.8)
SODIUM SERPL-SCNC: 136 MMOL/L (ref 136–145)
WBC NRBC COR # BLD AUTO: 8.85 10*3/MM3 (ref 3.4–10.8)

## 2024-03-29 PROCEDURE — 82948 REAGENT STRIP/BLOOD GLUCOSE: CPT

## 2024-03-29 PROCEDURE — 87205 SMEAR GRAM STAIN: CPT | Performed by: ORTHOPAEDIC SURGERY

## 2024-03-29 PROCEDURE — 83036 HEMOGLOBIN GLYCOSYLATED A1C: CPT | Performed by: NURSE PRACTITIONER

## 2024-03-29 PROCEDURE — 99222 1ST HOSP IP/OBS MODERATE 55: CPT | Performed by: NURSE PRACTITIONER

## 2024-03-29 PROCEDURE — 25010000002 ONDANSETRON PER 1 MG: Performed by: NURSE ANESTHETIST, CERTIFIED REGISTERED

## 2024-03-29 PROCEDURE — 63710000001 INSULIN DETEMIR PER 5 UNITS: Performed by: INTERNAL MEDICINE

## 2024-03-29 PROCEDURE — 25010000002 HYDROMORPHONE 1 MG/ML SOLUTION: Performed by: INTERNAL MEDICINE

## 2024-03-29 PROCEDURE — 25010000002 CEFAZOLIN PER 500 MG: Performed by: ORTHOPAEDIC SURGERY

## 2024-03-29 PROCEDURE — 80048 BASIC METABOLIC PNL TOTAL CA: CPT | Performed by: ANESTHESIOLOGY

## 2024-03-29 PROCEDURE — 87206 SMEAR FLUORESCENT/ACID STAI: CPT | Performed by: ORTHOPAEDIC SURGERY

## 2024-03-29 PROCEDURE — 25810000003 LACTATED RINGERS PER 1000 ML: Performed by: ANESTHESIOLOGY

## 2024-03-29 PROCEDURE — 87075 CULTR BACTERIA EXCEPT BLOOD: CPT | Performed by: ORTHOPAEDIC SURGERY

## 2024-03-29 PROCEDURE — 87077 CULTURE AEROBIC IDENTIFY: CPT | Performed by: ORTHOPAEDIC SURGERY

## 2024-03-29 PROCEDURE — 25010000002 DEXAMETHASONE PER 1 MG: Performed by: NURSE ANESTHETIST, CERTIFIED REGISTERED

## 2024-03-29 PROCEDURE — 25010000002 PROPOFOL 10 MG/ML EMULSION: Performed by: NURSE ANESTHETIST, CERTIFIED REGISTERED

## 2024-03-29 PROCEDURE — 25010000002 MORPHINE PER 10 MG: Performed by: ORTHOPAEDIC SURGERY

## 2024-03-29 PROCEDURE — 25010000002 PIPERACILLIN SOD-TAZOBACTAM PER 1 G: Performed by: INTERNAL MEDICINE

## 2024-03-29 PROCEDURE — 85027 COMPLETE CBC AUTOMATED: CPT | Performed by: ANESTHESIOLOGY

## 2024-03-29 PROCEDURE — 87186 SC STD MICRODIL/AGAR DIL: CPT | Performed by: ORTHOPAEDIC SURGERY

## 2024-03-29 PROCEDURE — 63710000001 INSULIN REGULAR HUMAN PER 5 UNITS: Performed by: ANESTHESIOLOGY

## 2024-03-29 PROCEDURE — 87116 MYCOBACTERIA CULTURE: CPT | Performed by: ORTHOPAEDIC SURGERY

## 2024-03-29 PROCEDURE — 25010000002 HYDROMORPHONE 1 MG/ML SOLUTION

## 2024-03-29 PROCEDURE — 25010000002 FENTANYL CITRATE (PF) 50 MCG/ML SOLUTION

## 2024-03-29 PROCEDURE — 25010000002 HYDROMORPHONE PER 4 MG: Performed by: NURSE ANESTHETIST, CERTIFIED REGISTERED

## 2024-03-29 PROCEDURE — 25010000002 VANCOMYCIN HCL IN NACL 2-0.9 GM/500ML-% SOLUTION

## 2024-03-29 PROCEDURE — 87070 CULTURE OTHR SPECIMN AEROBIC: CPT | Performed by: ORTHOPAEDIC SURGERY

## 2024-03-29 PROCEDURE — 63710000001 INSULIN LISPRO (HUMAN) PER 5 UNITS: Performed by: INTERNAL MEDICINE

## 2024-03-29 PROCEDURE — 25010000002 FENTANYL CITRATE (PF) 100 MCG/2ML SOLUTION: Performed by: NURSE ANESTHETIST, CERTIFIED REGISTERED

## 2024-03-29 PROCEDURE — 87102 FUNGUS ISOLATION CULTURE: CPT | Performed by: ORTHOPAEDIC SURGERY

## 2024-03-29 RX ORDER — AMOXICILLIN 250 MG
2 CAPSULE ORAL 2 TIMES DAILY
Status: DISCONTINUED | OUTPATIENT
Start: 2024-03-29 | End: 2024-04-02 | Stop reason: HOSPADM

## 2024-03-29 RX ORDER — VANCOMYCIN 2 GRAM/500 ML IN 0.9 % SODIUM CHLORIDE INTRAVENOUS
2000 ONCE
Status: COMPLETED | OUTPATIENT
Start: 2024-03-29 | End: 2024-03-29

## 2024-03-29 RX ORDER — NIFEDIPINE 30 MG/1
30 TABLET, EXTENDED RELEASE ORAL
Status: DISCONTINUED | OUTPATIENT
Start: 2024-03-29 | End: 2024-04-02 | Stop reason: HOSPADM

## 2024-03-29 RX ORDER — SODIUM CHLORIDE 9 MG/ML
40 INJECTION, SOLUTION INTRAVENOUS AS NEEDED
Status: DISCONTINUED | OUTPATIENT
Start: 2024-03-29 | End: 2024-04-02 | Stop reason: HOSPADM

## 2024-03-29 RX ORDER — ENOXAPARIN SODIUM 100 MG/ML
40 INJECTION SUBCUTANEOUS DAILY
Status: DISCONTINUED | OUTPATIENT
Start: 2024-03-30 | End: 2024-03-31

## 2024-03-29 RX ORDER — ONDANSETRON 2 MG/ML
4 INJECTION INTRAMUSCULAR; INTRAVENOUS ONCE AS NEEDED
Status: DISCONTINUED | OUTPATIENT
Start: 2024-03-29 | End: 2024-03-29 | Stop reason: HOSPADM

## 2024-03-29 RX ORDER — ROSUVASTATIN CALCIUM 20 MG/1
40 TABLET, COATED ORAL NIGHTLY
Status: DISCONTINUED | OUTPATIENT
Start: 2024-03-29 | End: 2024-04-02 | Stop reason: HOSPADM

## 2024-03-29 RX ORDER — HYDROMORPHONE HYDROCHLORIDE 1 MG/ML
0.5 INJECTION, SOLUTION INTRAMUSCULAR; INTRAVENOUS; SUBCUTANEOUS
Status: DISCONTINUED | OUTPATIENT
Start: 2024-03-29 | End: 2024-03-29 | Stop reason: HOSPADM

## 2024-03-29 RX ORDER — FENTANYL CITRATE 50 UG/ML
INJECTION, SOLUTION INTRAMUSCULAR; INTRAVENOUS
Status: COMPLETED
Start: 2024-03-29 | End: 2024-03-29

## 2024-03-29 RX ORDER — SODIUM CHLORIDE 9 MG/ML
40 INJECTION, SOLUTION INTRAVENOUS AS NEEDED
Status: DISCONTINUED | OUTPATIENT
Start: 2024-03-29 | End: 2024-03-29 | Stop reason: HOSPADM

## 2024-03-29 RX ORDER — MAGNESIUM HYDROXIDE 1200 MG/15ML
LIQUID ORAL AS NEEDED
Status: DISCONTINUED | OUTPATIENT
Start: 2024-03-29 | End: 2024-03-29 | Stop reason: HOSPADM

## 2024-03-29 RX ORDER — POLYETHYLENE GLYCOL 3350 17 G/17G
17 POWDER, FOR SOLUTION ORAL DAILY PRN
Status: DISCONTINUED | OUTPATIENT
Start: 2024-03-29 | End: 2024-04-02 | Stop reason: HOSPADM

## 2024-03-29 RX ORDER — HYDROCODONE BITARTRATE AND ACETAMINOPHEN 10; 325 MG/1; MG/1
1 TABLET ORAL EVERY 8 HOURS PRN
Status: DISCONTINUED | OUTPATIENT
Start: 2024-03-29 | End: 2024-04-01

## 2024-03-29 RX ORDER — INSULIN LISPRO 100 [IU]/ML
1-200 INJECTION, SOLUTION INTRAVENOUS; SUBCUTANEOUS AS NEEDED
Status: DISCONTINUED | OUTPATIENT
Start: 2024-03-29 | End: 2024-04-01 | Stop reason: ALTCHOICE

## 2024-03-29 RX ORDER — DROPERIDOL 2.5 MG/ML
0.62 INJECTION, SOLUTION INTRAMUSCULAR; INTRAVENOUS ONCE AS NEEDED
Status: DISCONTINUED | OUTPATIENT
Start: 2024-03-29 | End: 2024-03-29 | Stop reason: HOSPADM

## 2024-03-29 RX ORDER — MORPHINE SULFATE 2 MG/ML
2 INJECTION, SOLUTION INTRAMUSCULAR; INTRAVENOUS EVERY 4 HOURS PRN
Status: DISCONTINUED | OUTPATIENT
Start: 2024-03-29 | End: 2024-03-29

## 2024-03-29 RX ORDER — BISACODYL 10 MG
10 SUPPOSITORY, RECTAL RECTAL DAILY PRN
Status: DISCONTINUED | OUTPATIENT
Start: 2024-03-29 | End: 2024-04-02 | Stop reason: HOSPADM

## 2024-03-29 RX ORDER — DEXAMETHASONE SODIUM PHOSPHATE 4 MG/ML
INJECTION, SOLUTION INTRA-ARTICULAR; INTRALESIONAL; INTRAMUSCULAR; INTRAVENOUS; SOFT TISSUE AS NEEDED
Status: DISCONTINUED | OUTPATIENT
Start: 2024-03-29 | End: 2024-03-29 | Stop reason: SURG

## 2024-03-29 RX ORDER — ONDANSETRON 2 MG/ML
INJECTION INTRAMUSCULAR; INTRAVENOUS AS NEEDED
Status: DISCONTINUED | OUTPATIENT
Start: 2024-03-29 | End: 2024-03-29 | Stop reason: SURG

## 2024-03-29 RX ORDER — FENTANYL CITRATE 50 UG/ML
50 INJECTION, SOLUTION INTRAMUSCULAR; INTRAVENOUS
Status: DISCONTINUED | OUTPATIENT
Start: 2024-03-29 | End: 2024-03-29 | Stop reason: HOSPADM

## 2024-03-29 RX ORDER — LISINOPRIL 40 MG/1
40 TABLET ORAL NIGHTLY
Status: DISCONTINUED | OUTPATIENT
Start: 2024-03-29 | End: 2024-04-02 | Stop reason: HOSPADM

## 2024-03-29 RX ORDER — HYDROMORPHONE HYDROCHLORIDE 1 MG/ML
0.5 INJECTION, SOLUTION INTRAMUSCULAR; INTRAVENOUS; SUBCUTANEOUS
Status: DISCONTINUED | OUTPATIENT
Start: 2024-03-29 | End: 2024-03-29 | Stop reason: SDUPTHER

## 2024-03-29 RX ORDER — SODIUM CHLORIDE 0.9 % (FLUSH) 0.9 %
10 SYRINGE (ML) INJECTION AS NEEDED
Status: DISCONTINUED | OUTPATIENT
Start: 2024-03-29 | End: 2024-04-02 | Stop reason: HOSPADM

## 2024-03-29 RX ORDER — FAMOTIDINE 20 MG/1
20 TABLET, FILM COATED ORAL ONCE
Status: COMPLETED | OUTPATIENT
Start: 2024-03-29 | End: 2024-03-29

## 2024-03-29 RX ORDER — ONDANSETRON 2 MG/ML
4 INJECTION INTRAMUSCULAR; INTRAVENOUS EVERY 6 HOURS PRN
Status: DISCONTINUED | OUTPATIENT
Start: 2024-03-29 | End: 2024-04-02 | Stop reason: HOSPADM

## 2024-03-29 RX ORDER — LIDOCAINE HYDROCHLORIDE 10 MG/ML
INJECTION, SOLUTION EPIDURAL; INFILTRATION; INTRACAUDAL; PERINEURAL AS NEEDED
Status: DISCONTINUED | OUTPATIENT
Start: 2024-03-29 | End: 2024-03-29 | Stop reason: SURG

## 2024-03-29 RX ORDER — FENTANYL CITRATE 50 UG/ML
50 INJECTION, SOLUTION INTRAMUSCULAR; INTRAVENOUS
Status: DISCONTINUED | OUTPATIENT
Start: 2024-03-29 | End: 2024-03-29 | Stop reason: SDUPTHER

## 2024-03-29 RX ORDER — PANTOPRAZOLE SODIUM 40 MG/1
40 TABLET, DELAYED RELEASE ORAL
Status: DISCONTINUED | OUTPATIENT
Start: 2024-03-30 | End: 2024-04-02 | Stop reason: HOSPADM

## 2024-03-29 RX ORDER — HYDROMORPHONE HYDROCHLORIDE 1 MG/ML
0.5 INJECTION, SOLUTION INTRAMUSCULAR; INTRAVENOUS; SUBCUTANEOUS
Status: DISCONTINUED | OUTPATIENT
Start: 2024-03-29 | End: 2024-03-29

## 2024-03-29 RX ORDER — SODIUM CHLORIDE 0.9 % (FLUSH) 0.9 %
10 SYRINGE (ML) INJECTION EVERY 12 HOURS SCHEDULED
Status: DISCONTINUED | OUTPATIENT
Start: 2024-03-29 | End: 2024-04-02 | Stop reason: HOSPADM

## 2024-03-29 RX ORDER — DAPAGLIFLOZIN 10 MG/1
10 TABLET, FILM COATED ORAL DAILY
COMMUNITY

## 2024-03-29 RX ORDER — SODIUM CHLORIDE, SODIUM LACTATE, POTASSIUM CHLORIDE, CALCIUM CHLORIDE 600; 310; 30; 20 MG/100ML; MG/100ML; MG/100ML; MG/100ML
9 INJECTION, SOLUTION INTRAVENOUS CONTINUOUS
Status: DISCONTINUED | OUTPATIENT
Start: 2024-03-29 | End: 2024-04-02 | Stop reason: HOSPADM

## 2024-03-29 RX ORDER — HYDROMORPHONE HYDROCHLORIDE 2 MG/ML
INJECTION, SOLUTION INTRAMUSCULAR; INTRAVENOUS; SUBCUTANEOUS AS NEEDED
Status: DISCONTINUED | OUTPATIENT
Start: 2024-03-29 | End: 2024-03-29 | Stop reason: SURG

## 2024-03-29 RX ORDER — MIDAZOLAM HYDROCHLORIDE 1 MG/ML
1 INJECTION INTRAMUSCULAR; INTRAVENOUS
Status: DISCONTINUED | OUTPATIENT
Start: 2024-03-29 | End: 2024-03-29 | Stop reason: HOSPADM

## 2024-03-29 RX ORDER — LIDOCAINE HYDROCHLORIDE 10 MG/ML
0.5 INJECTION, SOLUTION EPIDURAL; INFILTRATION; INTRACAUDAL; PERINEURAL ONCE AS NEEDED
Status: COMPLETED | OUTPATIENT
Start: 2024-03-29 | End: 2024-03-29

## 2024-03-29 RX ORDER — IBUPROFEN 600 MG/1
1 TABLET ORAL
Status: DISCONTINUED | OUTPATIENT
Start: 2024-03-29 | End: 2024-04-02 | Stop reason: HOSPADM

## 2024-03-29 RX ORDER — BISACODYL 5 MG/1
5 TABLET, DELAYED RELEASE ORAL DAILY PRN
Status: DISCONTINUED | OUTPATIENT
Start: 2024-03-29 | End: 2024-04-02 | Stop reason: HOSPADM

## 2024-03-29 RX ORDER — ASPIRIN 81 MG/1
81 TABLET ORAL NIGHTLY
Status: DISCONTINUED | OUTPATIENT
Start: 2024-03-29 | End: 2024-04-02 | Stop reason: HOSPADM

## 2024-03-29 RX ORDER — ACETAMINOPHEN 325 MG/1
650 TABLET ORAL EVERY 4 HOURS PRN
Status: DISCONTINUED | OUTPATIENT
Start: 2024-03-29 | End: 2024-04-02 | Stop reason: HOSPADM

## 2024-03-29 RX ORDER — LORAZEPAM 1 MG/1
1 TABLET ORAL NIGHTLY
COMMUNITY

## 2024-03-29 RX ORDER — FENTANYL CITRATE 50 UG/ML
INJECTION, SOLUTION INTRAMUSCULAR; INTRAVENOUS AS NEEDED
Status: DISCONTINUED | OUTPATIENT
Start: 2024-03-29 | End: 2024-03-29 | Stop reason: SURG

## 2024-03-29 RX ORDER — PHENYLEPHRINE HCL IN 0.9% NACL 1 MG/10 ML
SYRINGE (ML) INTRAVENOUS AS NEEDED
Status: DISCONTINUED | OUTPATIENT
Start: 2024-03-29 | End: 2024-03-29 | Stop reason: SURG

## 2024-03-29 RX ORDER — INSULIN LISPRO 100 [IU]/ML
1-200 INJECTION, SOLUTION INTRAVENOUS; SUBCUTANEOUS
Status: DISCONTINUED | OUTPATIENT
Start: 2024-03-29 | End: 2024-04-01

## 2024-03-29 RX ORDER — DEXTROSE MONOHYDRATE 25 G/50ML
10-50 INJECTION, SOLUTION INTRAVENOUS
Status: DISCONTINUED | OUTPATIENT
Start: 2024-03-29 | End: 2024-04-02 | Stop reason: HOSPADM

## 2024-03-29 RX ORDER — NICOTINE POLACRILEX 4 MG
15 LOZENGE BUCCAL
Status: DISCONTINUED | OUTPATIENT
Start: 2024-03-29 | End: 2024-04-02 | Stop reason: HOSPADM

## 2024-03-29 RX ORDER — OXYCODONE HYDROCHLORIDE AND ACETAMINOPHEN 5; 325 MG/1; MG/1
1 TABLET ORAL EVERY 4 HOURS PRN
Status: DISCONTINUED | OUTPATIENT
Start: 2024-03-29 | End: 2024-04-01

## 2024-03-29 RX ORDER — NALOXONE HCL 0.4 MG/ML
0.4 VIAL (ML) INJECTION
Status: DISCONTINUED | OUTPATIENT
Start: 2024-03-29 | End: 2024-03-29

## 2024-03-29 RX ORDER — PROPOFOL 10 MG/ML
VIAL (ML) INTRAVENOUS AS NEEDED
Status: DISCONTINUED | OUTPATIENT
Start: 2024-03-29 | End: 2024-03-29 | Stop reason: SURG

## 2024-03-29 RX ORDER — GABAPENTIN 300 MG/1
600 CAPSULE ORAL EVERY 8 HOURS SCHEDULED
Status: DISCONTINUED | OUTPATIENT
Start: 2024-03-29 | End: 2024-04-02 | Stop reason: HOSPADM

## 2024-03-29 RX ORDER — SODIUM CHLORIDE 0.9 % (FLUSH) 0.9 %
10 SYRINGE (ML) INJECTION AS NEEDED
Status: DISCONTINUED | OUTPATIENT
Start: 2024-03-29 | End: 2024-03-29 | Stop reason: HOSPADM

## 2024-03-29 RX ORDER — LORAZEPAM 1 MG/1
1 TABLET ORAL NIGHTLY
Status: DISCONTINUED | OUTPATIENT
Start: 2024-03-29 | End: 2024-04-02 | Stop reason: HOSPADM

## 2024-03-29 RX ORDER — NITROGLYCERIN 0.4 MG/1
0.4 TABLET SUBLINGUAL
Status: DISCONTINUED | OUTPATIENT
Start: 2024-03-29 | End: 2024-04-02 | Stop reason: HOSPADM

## 2024-03-29 RX ORDER — NICOTINE 21 MG/24HR
1 PATCH, TRANSDERMAL 24 HOURS TRANSDERMAL EVERY 24 HOURS
Status: DISCONTINUED | OUTPATIENT
Start: 2024-03-29 | End: 2024-04-02 | Stop reason: HOSPADM

## 2024-03-29 RX ADMIN — METOPROLOL TARTRATE 25 MG: 25 TABLET, FILM COATED ORAL at 21:19

## 2024-03-29 RX ADMIN — NIFEDIPINE 30 MG: 30 TABLET, EXTENDED RELEASE ORAL at 13:16

## 2024-03-29 RX ADMIN — Medication 10 ML: at 21:12

## 2024-03-29 RX ADMIN — FENTANYL CITRATE 50 MCG: 50 INJECTION, SOLUTION INTRAMUSCULAR; INTRAVENOUS at 09:23

## 2024-03-29 RX ADMIN — INSULIN LISPRO 3 UNITS: 100 INJECTION, SOLUTION INTRAVENOUS; SUBCUTANEOUS at 13:16

## 2024-03-29 RX ADMIN — HYDROMORPHONE HYDROCHLORIDE 1 MG: 1 INJECTION, SOLUTION INTRAMUSCULAR; INTRAVENOUS; SUBCUTANEOUS at 21:10

## 2024-03-29 RX ADMIN — FENTANYL CITRATE 50 MCG: 50 INJECTION, SOLUTION INTRAMUSCULAR; INTRAVENOUS at 09:11

## 2024-03-29 RX ADMIN — HYDROMORPHONE HYDROCHLORIDE 0.5 MG: 1 INJECTION, SOLUTION INTRAMUSCULAR; INTRAVENOUS; SUBCUTANEOUS at 09:14

## 2024-03-29 RX ADMIN — SODIUM CHLORIDE, POTASSIUM CHLORIDE, SODIUM LACTATE AND CALCIUM CHLORIDE 9 ML/HR: 600; 310; 30; 20 INJECTION, SOLUTION INTRAVENOUS at 07:45

## 2024-03-29 RX ADMIN — INSULIN LISPRO 10 UNITS: 100 INJECTION, SOLUTION INTRAVENOUS; SUBCUTANEOUS at 17:41

## 2024-03-29 RX ADMIN — Medication 10 ML: at 12:33

## 2024-03-29 RX ADMIN — SODIUM CHLORIDE 2000 MG: 900 INJECTION INTRAVENOUS at 08:14

## 2024-03-29 RX ADMIN — ONDANSETRON 4 MG: 2 INJECTION INTRAMUSCULAR; INTRAVENOUS at 08:14

## 2024-03-29 RX ADMIN — MORPHINE SULFATE 2 MG: 2 INJECTION, SOLUTION INTRAMUSCULAR; INTRAVENOUS at 11:02

## 2024-03-29 RX ADMIN — LIDOCAINE HYDROCHLORIDE 0.5 ML: 10 INJECTION, SOLUTION EPIDURAL; INFILTRATION; INTRACAUDAL; PERINEURAL at 07:45

## 2024-03-29 RX ADMIN — INSULIN HUMAN 8 UNITS: 100 INJECTION, SOLUTION PARENTERAL at 07:46

## 2024-03-29 RX ADMIN — ROSUVASTATIN 40 MG: 20 TABLET, FILM COATED ORAL at 21:10

## 2024-03-29 RX ADMIN — Medication 2000 MG: at 17:41

## 2024-03-29 RX ADMIN — FENTANYL CITRATE 50 MCG: 50 INJECTION, SOLUTION INTRAMUSCULAR; INTRAVENOUS at 09:32

## 2024-03-29 RX ADMIN — HYDROMORPHONE HYDROCHLORIDE 0.5 MG: 1 INJECTION, SOLUTION INTRAMUSCULAR; INTRAVENOUS; SUBCUTANEOUS at 10:03

## 2024-03-29 RX ADMIN — Medication 200 MCG: at 08:24

## 2024-03-29 RX ADMIN — PIPERACILLIN AND TAZOBACTAM 3.38 G: 3; .375 INJECTION, POWDER, LYOPHILIZED, FOR SOLUTION INTRAVENOUS at 15:43

## 2024-03-29 RX ADMIN — OXYCODONE HYDROCHLORIDE AND ACETAMINOPHEN 1 TABLET: 5; 325 TABLET ORAL at 23:13

## 2024-03-29 RX ADMIN — HYDROMORPHONE HYDROCHLORIDE 1 MG: 1 INJECTION, SOLUTION INTRAMUSCULAR; INTRAVENOUS; SUBCUTANEOUS at 15:43

## 2024-03-29 RX ADMIN — Medication 200 MCG: at 08:19

## 2024-03-29 RX ADMIN — ASPIRIN 81 MG: 81 TABLET, COATED ORAL at 21:10

## 2024-03-29 RX ADMIN — FENTANYL CITRATE 50 MCG: 50 INJECTION, SOLUTION INTRAMUSCULAR; INTRAVENOUS at 09:40

## 2024-03-29 RX ADMIN — LORAZEPAM 1 MG: 1 TABLET ORAL at 21:10

## 2024-03-29 RX ADMIN — DEXAMETHASONE SODIUM PHOSPHATE 4 MG: 4 INJECTION, SOLUTION INTRA-ARTICULAR; INTRALESIONAL; INTRAMUSCULAR; INTRAVENOUS; SOFT TISSUE at 08:14

## 2024-03-29 RX ADMIN — LISINOPRIL 40 MG: 40 TABLET ORAL at 21:09

## 2024-03-29 RX ADMIN — HYDROMORPHONE HYDROCHLORIDE 1 MG: 1 INJECTION, SOLUTION INTRAMUSCULAR; INTRAVENOUS; SUBCUTANEOUS at 12:31

## 2024-03-29 RX ADMIN — FENTANYL CITRATE 100 MCG: 50 INJECTION, SOLUTION INTRAMUSCULAR; INTRAVENOUS at 08:29

## 2024-03-29 RX ADMIN — GABAPENTIN 600 MG: 300 CAPSULE ORAL at 13:16

## 2024-03-29 RX ADMIN — HYDROMORPHONE HYDROCHLORIDE 2 MG: 2 INJECTION, SOLUTION INTRAMUSCULAR; INTRAVENOUS; SUBCUTANEOUS at 08:33

## 2024-03-29 RX ADMIN — FENTANYL CITRATE 100 MCG: 50 INJECTION, SOLUTION INTRAMUSCULAR; INTRAVENOUS at 08:14

## 2024-03-29 RX ADMIN — OXYCODONE HYDROCHLORIDE AND ACETAMINOPHEN 1 TABLET: 5; 325 TABLET ORAL at 14:17

## 2024-03-29 RX ADMIN — HYDROMORPHONE HYDROCHLORIDE 1 MG: 1 INJECTION, SOLUTION INTRAMUSCULAR; INTRAVENOUS; SUBCUTANEOUS at 18:50

## 2024-03-29 RX ADMIN — HYDROMORPHONE HYDROCHLORIDE 0.5 MG: 1 INJECTION, SOLUTION INTRAMUSCULAR; INTRAVENOUS; SUBCUTANEOUS at 09:48

## 2024-03-29 RX ADMIN — Medication 100 MCG: at 08:21

## 2024-03-29 RX ADMIN — INSULIN DETEMIR 21 UNITS: 100 INJECTION, SOLUTION SUBCUTANEOUS at 20:31

## 2024-03-29 RX ADMIN — GABAPENTIN 600 MG: 300 CAPSULE ORAL at 21:09

## 2024-03-29 RX ADMIN — Medication 1 PATCH: at 13:16

## 2024-03-29 RX ADMIN — PIPERACILLIN AND TAZOBACTAM 3.38 G: 3; .375 INJECTION, POWDER, LYOPHILIZED, FOR SOLUTION INTRAVENOUS at 21:09

## 2024-03-29 RX ADMIN — PROPOFOL 200 MG: 10 INJECTION, EMULSION INTRAVENOUS at 08:14

## 2024-03-29 RX ADMIN — FAMOTIDINE 20 MG: 20 TABLET, FILM COATED ORAL at 07:46

## 2024-03-29 RX ADMIN — HYDROCODONE BITARTRATE AND ACETAMINOPHEN 1 TABLET: 10; 325 TABLET ORAL at 17:40

## 2024-03-29 RX ADMIN — LIDOCAINE HYDROCHLORIDE 50 MG: 10 INJECTION, SOLUTION EPIDURAL; INFILTRATION; INTRACAUDAL; PERINEURAL at 08:14

## 2024-03-29 NOTE — INTERVAL H&P NOTE
"[facility] Pre-op    Full history and physical note from office is attached.    /74 (BP Location: Right arm, Patient Position: Lying)   Pulse 104   Temp 97 °F (36.1 °C) (Temporal)   Resp 18   Ht 175.3 cm (69.02\")   Wt 81.6 kg (179 lb 14.3 oz)   SpO2 98%   BMI 26.55 kg/m²     LAB Results:  Lab Results   Component Value Date    WBC 7.69 01/29/2024    HGB 10.5 (L) 01/29/2024    HCT 32.7 (L) 01/29/2024    MCV 92.9 01/29/2024     01/29/2024    NEUTROABS 4.27 01/29/2024    GLUCOSE 107 (H) 01/29/2024    BUN 20 01/29/2024    CREATININE 0.71 (L) 01/29/2024    EGFRIFNONA 92 06/28/2017     01/29/2024    K 5.1 01/29/2024     01/29/2024    CO2 28.0 01/29/2024    MG 1.7 01/23/2024    PHOS 3.6 01/23/2024    CALCIUM 10.2 01/29/2024    ALBUMIN 3.9 01/29/2024    AST 23 01/29/2024    ALT 20 01/29/2024    BILITOT 0.3 01/29/2024    PTT 55.3 (L) 01/18/2024    INR 1.17 (H) 01/14/2024       Cancer Staging (if applicable)  Cancer Patient: __ yes __no __unknown__N/A; If yes, clinical stage T:__ N:__M:__, stage group or __N/A      Impression: Peripheral vascular disease      Plan: INCISION AND DRAINAGE FOOT ABSCESS; PLACEMENT OF WOUND VAC     Plan for left foot debridement, irrigation, wound vacuum assisted closure.    Risks of surgery were discussed which included but were not limited to pain, bleeding, infection, damage to adjacent structures, need for further surgery, wound healing complications, loss of limb and death.  The patient expressed verbal consent and written consent was obtained for the above procedure.      Eda Peres, PRIMO   3/29/2024   07:53 EDT  "

## 2024-03-29 NOTE — PROGRESS NOTES
"Pharmacy Consult-Vancomycin Dosing  Justo Oquendo is a  48 y.o. male receiving vancomycin therapy.     Indication: Skin/Soft Tissue Infection   Consulting Provider: Hospitalist   ID Consult: Yes     Goal AUC: 400 - 600 mg/L*hr    Current Antimicrobial Therapy  Anti-Infectives (From admission, onward)      Ordered     Dose/Rate Route Frequency Start Stop    03/29/24 1505  piperacillin-tazobactam (ZOSYN) 3.375 g IVPB in 100 mL NS MBP (CD)        Ordering Provider: Leatha Garcia MD    3.375 g  over 4 Hours Intravenous Every 8 Hours 03/29/24 2200 04/03/24 2159    03/29/24 1505  piperacillin-tazobactam (ZOSYN) 3.375 g IVPB in 100 mL NS MBP (CD)        Ordering Provider: Leatha Garcia MD    3.375 g  over 30 Minutes Intravenous Once 03/29/24 1600      03/29/24 1505  Pharmacy to dose vancomycin        Ordering Provider: Leatha Garcia MD     Does not apply Continuous PRN 03/29/24 1505 04/03/24 1504    03/29/24 0742  ceFAZolin 2000 mg IVPB in 100 mL NS (MBP)        Ordering Provider: Christo Peres Jr., MD    2,000 mg  over 30 Minutes Intravenous Once 03/29/24 0744 03/29/24 0814            Allergies  Allergies as of 03/26/2024    (No Known Allergies)       Labs    Results from last 7 days   Lab Units 03/29/24  0741   BUN mg/dL 21*   CREATININE mg/dL 0.79       Results from last 7 days   Lab Units 03/29/24  0741   WBC 10*3/mm3 8.85       Evaluation of Dosing     Last Dose Received in the ED/Outside Facility: No   Is Patient on Dialysis or Renal Replacement: No     Ht - 175.3 cm (69.02\")  Wt - 81.6 kg (179 lb 14.3 oz)    Estimated Creatinine Clearance: 129.1 mL/min (by C-G formula based on SCr of 0.79 mg/dL).    No intake/output data recorded.    Microbiology and Radiology  Microbiology Results (last 10 days)       Procedure Component Value - Date/Time    Wound Culture - Wound, Foot, Left [[id number]] Collected: 03/29/24 0830    Lab Status: Preliminary result Specimen: Wound from Foot, Left Updated: 03/29/24 1114     " Gram Stain Few (2+) WBCs seen      Moderate (3+) Red blood cells      No organisms seen            Reported Vancomycin Levels                         Ins[employer] AUC Calculation:    Current AUC:   --- mg/L*hr    Predicted Steady State AUC on Current Dose: --- mg/L*hr  _________________________________    Predicted Steady State AUC on New Dose:   516 mg/L*hr    Assessment/Plan:    1. Pharmacy to dose vancomycin for skin/soft tissue infection. Goal  to 600 mg/L*hr    2. Patient to receive a loading dose of vancomycin 2000 mg IV (~24 mg/kg) this evening.  3. Will start on a maintenance dose of vancomycin 1250 mg IV q12h starting tomorrow morning (~ 15 mg/kg).  4. Vancomycin level scheduled for 4/1 at 0500 prior to the 5th maintenance dose.   5. Monitor renal function, cultures and sensitivities, and clinical status, and adjust regimen as necessary.  Pharmacy will continue to follow.    Jonnie Arguelles PharmD  03/29/24  15:37 EDT

## 2024-03-29 NOTE — ANESTHESIA PROCEDURE NOTES
Airway  Urgency: elective    Date/Time: 3/29/2024 8:15 AM  Airway not difficult    General Information and Staff    Patient location during procedure: OR  CRNA/CAA: Chris Hoff, KATY    Indications and Patient Condition  Indications for airway management: airway protection    Preoxygenated: yes  Mask difficulty assessment: 0 - not attempted    Final Airway Details  Final airway type: supraglottic airway      Successful airway: I-gel  Size 4     Number of attempts at approach: 1  Assessment: lips, teeth, and gum same as pre-op    Additional Comments  LMA placed without difficulty, ventilation with assist, equal breath sounds and symmetric chest rise and fall

## 2024-03-29 NOTE — OP NOTE
[facility], [facility]  [address] [address]  [phone]    OPERATIVE REPORT      PATIENT NAME:  Justo Oquendo                            YOB: 1975       PREOP DIAGNOSIS:   Left  Left forefoot wound dehiscence.    POSTOP DIAGNOSIS:   Same.    PROCEDURE:   Left  Left     87156:  Debridement of skin, subcutaneous tissue, muscle  94451:  Wound vacuum-assisted closure    SURGEON:     Christo Peres MD    OPERATIVE TEAM:   Circulator: Juan Vanessa RN; Dorothy Martinez RN  Scrub Person: Corbin Boyce  Nursing Assistant: Mary Shelley PCT    ANESTHETIST:  Anesthesiologist: Balaji Parkinson Jr., MD  CRNA: Chris Hoff CRNA    ANESTHESIA:   Choice    ESTIM BLOOD LOSS:   < 30 mL    TOURNIQUET TIME:   Not utilized    FINDINGS:     Remaining tissue appeared healthy.    IMPLANTS:     None    DRAINS:   Wound vacuum-assisted closure.    COMPLICATIONS:    None.    DISPOSITION:    Stable to recovery.     INDICATIONS:    48-year-old male status post left transmetatarsal amputation with persistent dry gangrenous changes at the distal incision.  I had a discussion with him and his wife regarding further management.  After discussion of risk, benefits,, he wished to proceed with left foot debridement, irrigation, wound closure.    NARRATIVE:     Patient was identified in preoperative holding.  Operative site was marked in indelible ink.  History, physical, consent were reviewed and updated.  Patient was surrendered to the anesthesia team and transported to the operative suite where anesthesia was induced.  Ipsilateral hip bump was placed.  Operative extremity was prepped and draped in the usual sterile fashion.  The operative team donned sterile gowns and gloves and a timeout was called.  All in attendance agreed regarding the patient's identity, proposed operative procedure, and operative site.    Utilizing a rongeur and 15 blade scalpel I debrided devitalized skin,  subcutaneous tissue, and tendons at the distal aspect of the wound.  The resulting wound measured 12 cm by 3 cm by 1 cm.  This is an excisional debridement.  Deepest layer was muscle/tendon.    I took swab specimens of the wound bed which I sent for culture.    I copiously irrigated the wound with Irrisept and saline.    Wound vacuum-assisted closure device was applied which was noted to have good seal.      Sterile dressing was applied.  Counts were correct x2.  There were no apparent complications.  I was present and scrubbed for the entire case.    Christo Peres Jr, MD  3/29/2024

## 2024-03-29 NOTE — H&P
[facility]  HISTORY AND PHYSICAL    Patient Name: Justo Oquendo  : 1975  MRN: [id number]  Primary Care Physician: Melo Whitaker MD  Date of admission: 3/29/2024    Subjective   Subjective     Chief Complaint:  Non healing left foot amputation site     HPI:  Justo Oquendo is a 48 y.o. male   with hx of severe PAD s/p thrombectomy of left femoropopliteal bypass graft as well as placement of a femorofemoral bypass by Dr. Ryan on , ongoing tobacco abuse, HTN, HLD, and DM2 with recent hospitalization from - 24 for sepsis related to ischemic ulcers and necrosis of left foot requiring left graft thrombectomy and left foot transmetatarsal amputation by Dr Peres. Treated with IV Dapto and Invanz per ID with daily office infusions after discharge. Additionally has had aortogram with runoff with intervention for restenosis by Dr Pena in  at [facility]. Presents to [facility] for excisional debridement of left foot abscess and wound vac closure by Dr Peres. [facility] medicine asked to admit patient.   Denies any f/c, no drainage or bleeding from amputation site. States has had severe pain. Has chronic pain, patient at pain clinic also.       Review of Systems   Constitutional:  Negative for activity change, appetite change, chills and fever.   HENT:  Negative for sore throat, trouble swallowing and voice change.    Eyes:  Negative for photophobia and visual disturbance.   Respiratory:  Negative for cough, shortness of breath, wheezing and stridor.    Cardiovascular:  Negative for chest pain, palpitations and leg swelling.   Gastrointestinal:  Negative for abdominal distention, abdominal pain, constipation, diarrhea, nausea and vomiting.   Endocrine: Negative for cold intolerance and heat intolerance.   Genitourinary:  Negative for difficulty urinating, dysuria and flank pain.   Musculoskeletal:  Negative for myalgias, neck pain and  neck stiffness.   Skin:  Positive for color change and wound.   Neurological:  Negative for dizziness, facial asymmetry and speech difficulty.   Hematological:  Negative for adenopathy. Does not bruise/bleed easily.   Psychiatric/Behavioral:  Negative for agitation, behavioral problems and confusion.           Personal History     Past Medical History:   Diagnosis Date    Arthritis     Back pain     Diabetes     SINCE MARCH 2017    Dyslipidemia     HTN (hypertension)     PVD (peripheral vascular disease)     Wears partial dentures     upper and lower             Past Surgical History:   Procedure Laterality Date    AORTAGRAM N/A 04/25/2017    Procedure: AORTAGRAM WITH OR WITHOUT RUNOFFS POSSIBLE STENT with left femoral cutdown;  Surgeon: Brett Ryan MD;  Location: [facility] OR 15;  Service:     AORTAGRAM N/A 11/21/2023    Procedure: THROMBECTOMY OF LEFT GRAFT, AORTAGRAM, FEMORAL TO FEMORAL BYPASS;  Surgeon: Brett Ryan MD;  Location: [facility];  Service: Vascular;  Laterality: N/A;  FLUORO- .06 SECS  DOSE- 10 MGY  CONTRAST- 10 ML ISO    CHOLECYSTECTOMY      CYST REMOVAL      OFF OF BACK    FEMORAL POPLITEAL BYPASS Left 01/18/2024    Procedure: FEMORAL POPLITEAL BYPASS WITH POPLITEAL EXPLORATION;  Surgeon: Vin Pena MD;  Location: [facility];  Service: Vascular;  Laterality: Left;  DOSE: 9MGY  FLURO: 14 MIN, 36 SEC  CONTRAST: 50ML VISIPAQUE 320    VT IN-SITU VEIN BYPASS FEMORAL-POPLITEAL Left 04/25/2017    Procedure: FEMORAL POPLITEAL BYPASS;  Surgeon: Brett Ryan MD;  Location: [facility] OR 15;  Service: Vascular    VT IN-SITU VEIN BYPASS FEMORAL-POPLITEAL Left 06/27/2017    Procedure: LEFT FEMORAL ENDARTECTOMYN LEFT FEMORAL POPLITEAL BYPASS;  Surgeon: Brett Ryan MD;  Location: [facility];  Service: Vascular    TRANS METATARSAL AMPUTATION Left 01/19/2024    Procedure: AMPUTATION TRANS METATARSAL- LEFT;  Surgeon: Christo Peres Jr., MD;  Location:   [facility];  Service: Orthopedics;  Laterality: Left;       Family History:  family history includes Arthritis in his mother; Kidney disease in his father; Liver disease in his father.     Social History:  reports that he has been smoking cigarettes. He has a 30 pack-year smoking history. He has never used smokeless tobacco. He reports that he does not drink alcohol and does not use drugs.  Social History     Social History Narrative    Lives in [place], [place]       Medications:  Empagliflozin-metFORMIN HCl, Fenofibrate, HYDROcodone-acetaminophen, LORazepam, NIFEdipine CC, Rivaroxaban, Semaglutide, apixaban, aspirin, cyclobenzaprine, dapagliflozin Propanediol, diphenhydrAMINE HCl, gabapentin, insulin degludec, lisinopril, metoprolol tartrate, nicotine, pantoprazole, rosuvastatin, and silver sulfadiazine    No Known Allergies    Objective   Objective     Vital Signs:   Temp:  [97 °F (36.1 °C)-97.7 °F (36.5 °C)] 97.7 °F (36.5 °C)  Heart Rate:  [] 97  Resp:  [14-18] 16  BP: (136-161)/(73-95) 161/73  Flow (L/min):  [2] 2    Physical Exam   Constitutional: Awake, alert, in mild distress from pain   Eyes: PERRLA, sclerae anicteric, no conjunctival injection  HENT: NCAT, mucous membranes moist  Neck: Supple, no thyromegaly, no lymphadenopathy, trachea midline  Respiratory: Clear to auscultation bilaterally, nonlabored respirations   Cardiovascular: RRR, no murmurs, rubs, or gallops, palpable pedal pulses bilaterally  Gastrointestinal: Positive bowel sounds, soft, nontender, nondistended  Musculoskeletal: No bilateral ankle edema, no clubbing or cyanosis to extremities  Psychiatric: Appropriate affect, cooperative  Neurologic: Oriented x 3, strength symmetric in all extremities, Cranial Nerves grossly intact to confrontation, speech clear  Skin: No rashes, right foot with wound vac and ace wrap     Result Review:  I have personally reviewed the results from the time of this admission to 3/29/2024 15:00 EDT and agree with  these findings:  [x]  Laboratory list / accordion  []  Microbiology  []  Radiology  [x]  EKG/Telemetry   [x]  Cardiology/Vascular   []  Pathology  [x]  Old records  []  Other:  Most notable findings include:     LAB RESULTS:      Lab 03/29/24  0741   WBC 8.85   HEMOGLOBIN 12.2*   HEMATOCRIT 39.2   PLATELETS 258   MCV 89.7         Lab 03/29/24  0741   SODIUM 136   POTASSIUM 5.2   CHLORIDE 99   CO2 25.0   ANION GAP 12.0   BUN 21*   CREATININE 0.79   EGFR 108.2   GLUCOSE 370*   CALCIUM 10.5                         Brief Urine Lab Results  (Last result in the past 365 days)        Color   Clarity   Blood   Leuk Est   Nitrite   Protein   CREAT   Urine HCG        01/23/24 1124 Yellow   Clear   Negative   Negative   Negative   Negative                 Microbiology Results (last 10 days)       Procedure Component Value - Date/Time    Wound Culture - Wound, Foot, Left [[id number]] Collected: 03/29/24 0830    Lab Status: Preliminary result Specimen: Wound from Foot, Left Updated: 03/29/24 1114     Gram Stain Few (2+) WBCs seen      Moderate (3+) Red blood cells      No organisms seen            No radiology results from the last 24 hrs    Results for orders placed during the hospital encounter of 11/20/23    Adult Transthoracic Echo Complete W/ Cont if Necessary Per Protocol    Interpretation Summary    Left ventricular systolic function is hyperdynamic (EF > 70%). Calculated left ventricular EF = 70.4%    Left ventricular diastolic function was normal.    No significant structural or functional valvular disease.      Assessment & Plan   Assessment & Plan       Diabetic foot infection  with hx of severe PAD s/p thrombectomy of left femoropopliteal bypass graft as well as placement of a femorofemoral bypass by Dr. Ryan on 11/21/223, ongoing tobacco abuse, HTN, HLD, and DM2 with recent hospitalization from 1/14- 1/24/24 for sepsis related to ischemic ulcers and necrosis of left foot requiring left graft thrombectomy and left  foot transmetatarsal amputation by Dr Peres. Here today for I & D of left foot abscess.     PAD  History of ischemic limb s/p amputation of 3rd/ 4th digits at [facility]   History of FemFem bypass  Left foot abscess  --s/p I & D left foot abscess with placement of wound vac closure by Dr Peres on 3/29/24  --surgical cultures pending   --pain control   --Xarelto on hold, restart per surgery     HTN  HLD  --cont home meds     DM   --A1c pending   --states sugars at home have been running high due to not being able to obtain medications.   --glucommander   --Endocrine referral at [place]     Tobacco use    Chronic pain   --Home Norco   --PRN Percocet and Dilaudid for breakthrough pain       DVT prophylaxis:  Mechanical     CODE STATUS:    Level Of Support Discussed With: Patient  Code Status (Patient has no pulse and is not breathing): CPR (Attempt to Resuscitate)  Medical Interventions (Patient has pulse or is breathing): Full Support      Expected Discharge  Expected Discharge Date: 4/5/2024; Expected Discharge Time:       This note has been completed as part of a split-shared workflow.     Signature: Electronically signed by PRIMO Parrish, 03/29/24, 3:09 PM EDT  Electronically signed by PRIMO Parrish, 03/29/24, 3:08 PM EDT.

## 2024-03-29 NOTE — ANESTHESIA PREPROCEDURE EVALUATION
Anesthesia Evaluation     Patient summary reviewed and Nursing notes reviewed   no history of anesthetic complications:   NPO Solid Status: > 8 hours  NPO Liquid Status: > 2 hours           Airway   Mallampati: I  TM distance: >3 FB  Neck ROM: full  Dental    (+) edentulous    Pulmonary    (-) COPD, asthma  Cardiovascular     ECG reviewed    (+) hypertension, PVD, hyperlipidemia      Neuro/Psych  GI/Hepatic/Renal/Endo    (+) renal disease-, diabetes mellitus type 2 poorly controlled    Musculoskeletal     Abdominal    Substance History      OB/GYN          Other   arthritis,                       Anesthesia Plan    ASA 3     general     intravenous induction     Anesthetic plan, risks, benefits, and alternatives have been provided, discussed and informed consent has been obtained with: patient.    Use of blood products discussed with  Consented to blood products.    Plan discussed with CRNA.        CODE STATUS:

## 2024-03-29 NOTE — ANESTHESIA POSTPROCEDURE EVALUATION
Patient: Justo Oquendo    Procedure Summary       Date: 03/29/24 [facility] / [facility]  [facility]    Anesthesia Start: 0811 Anesthesia Stop: 0900    Procedures:       INCISION AND DRAINAGE FOOT ABSCESS (Left: Foot)      PLACEMENT OF WOUND VAC (Left: Foot) Diagnosis:     Surgeons: Christo Peres Jr., MD Provider: Balaji Parkinson Jr., MD    Anesthesia Type: general ASA Status: 3            Anesthesia Type: general    Vitals  Vitals Value Taken Time   BP     Temp     Pulse     Resp     SpO2 99 % 03/29/24 0900           Post Anesthesia Care and Evaluation    Patient location during evaluation: PACU  Patient participation: complete - patient participated  Level of consciousness: awake and alert  Pain management: adequate    Airway patency: patent  Anesthetic complications: No anesthetic complications  PONV Status: none  Cardiovascular status: hemodynamically stable and acceptable  Respiratory status: nonlabored ventilation, acceptable and nasal cannula  Hydration status: acceptable

## 2024-03-30 LAB
ANION GAP SERPL CALCULATED.3IONS-SCNC: 9 MMOL/L (ref 5–15)
BASOPHILS # BLD AUTO: 0.04 10*3/MM3 (ref 0–0.2)
BASOPHILS NFR BLD AUTO: 0.4 % (ref 0–1.5)
BUN SERPL-MCNC: 19 MG/DL (ref 6–20)
BUN/CREAT SERPL: 25.7 (ref 7–25)
CALCIUM SPEC-SCNC: 10.7 MG/DL (ref 8.6–10.5)
CHLORIDE SERPL-SCNC: 95 MMOL/L (ref 98–107)
CK SERPL-CCNC: 34 U/L (ref 20–200)
CO2 SERPL-SCNC: 29 MMOL/L (ref 22–29)
CREAT SERPL-MCNC: 0.74 MG/DL (ref 0.76–1.27)
CRP SERPL-MCNC: 0.51 MG/DL (ref 0–0.5)
DEPRECATED RDW RBC AUTO: 47.1 FL (ref 37–54)
EGFRCR SERPLBLD CKD-EPI 2021: 110.4 ML/MIN/1.73
EOSINOPHIL # BLD AUTO: 0.07 10*3/MM3 (ref 0–0.4)
EOSINOPHIL NFR BLD AUTO: 0.6 % (ref 0.3–6.2)
ERYTHROCYTE [DISTWIDTH] IN BLOOD BY AUTOMATED COUNT: 14.6 % (ref 12.3–15.4)
ERYTHROCYTE [SEDIMENTATION RATE] IN BLOOD: 34 MM/HR (ref 0–20)
GLUCOSE BLDC GLUCOMTR-MCNC: 187 MG/DL (ref 70–130)
GLUCOSE BLDC GLUCOMTR-MCNC: 217 MG/DL (ref 70–130)
GLUCOSE BLDC GLUCOMTR-MCNC: 252 MG/DL (ref 70–130)
GLUCOSE BLDC GLUCOMTR-MCNC: 272 MG/DL (ref 70–130)
GLUCOSE BLDC GLUCOMTR-MCNC: 389 MG/DL (ref 70–130)
GLUCOSE SERPL-MCNC: 173 MG/DL (ref 65–99)
HCT VFR BLD AUTO: 38.2 % (ref 37.5–51)
HGB BLD-MCNC: 12.1 G/DL (ref 13–17.7)
IMM GRANULOCYTES # BLD AUTO: 0.07 10*3/MM3 (ref 0–0.05)
IMM GRANULOCYTES NFR BLD AUTO: 0.6 % (ref 0–0.5)
LYMPHOCYTES # BLD AUTO: 2.85 10*3/MM3 (ref 0.7–3.1)
LYMPHOCYTES NFR BLD AUTO: 26.4 % (ref 19.6–45.3)
MCH RBC QN AUTO: 28 PG (ref 26.6–33)
MCHC RBC AUTO-ENTMCNC: 31.7 G/DL (ref 31.5–35.7)
MCV RBC AUTO: 88.4 FL (ref 79–97)
MONOCYTES # BLD AUTO: 0.93 10*3/MM3 (ref 0.1–0.9)
MONOCYTES NFR BLD AUTO: 8.6 % (ref 5–12)
NEUTROPHILS NFR BLD AUTO: 6.84 10*3/MM3 (ref 1.7–7)
NEUTROPHILS NFR BLD AUTO: 63.4 % (ref 42.7–76)
NRBC BLD AUTO-RTO: 0 /100 WBC (ref 0–0.2)
PLATELET # BLD AUTO: 248 10*3/MM3 (ref 140–450)
PMV BLD AUTO: 10.8 FL (ref 6–12)
POTASSIUM SERPL-SCNC: 4.2 MMOL/L (ref 3.5–5.2)
RBC # BLD AUTO: 4.32 10*6/MM3 (ref 4.14–5.8)
SODIUM SERPL-SCNC: 133 MMOL/L (ref 136–145)
WBC NRBC COR # BLD AUTO: 10.8 10*3/MM3 (ref 3.4–10.8)

## 2024-03-30 PROCEDURE — 25010000002 PIPERACILLIN SOD-TAZOBACTAM PER 1 G: Performed by: INTERNAL MEDICINE

## 2024-03-30 PROCEDURE — 82550 ASSAY OF CK (CPK): CPT | Performed by: NURSE PRACTITIONER

## 2024-03-30 PROCEDURE — 80048 BASIC METABOLIC PNL TOTAL CA: CPT | Performed by: INTERNAL MEDICINE

## 2024-03-30 PROCEDURE — 97161 PT EVAL LOW COMPLEX 20 MIN: CPT

## 2024-03-30 PROCEDURE — 97605 NEG PRS WND THER DME<=50SQCM: CPT

## 2024-03-30 PROCEDURE — 85652 RBC SED RATE AUTOMATED: CPT | Performed by: INTERNAL MEDICINE

## 2024-03-30 PROCEDURE — 86140 C-REACTIVE PROTEIN: CPT | Performed by: INTERNAL MEDICINE

## 2024-03-30 PROCEDURE — 25010000002 HYDROMORPHONE 1 MG/ML SOLUTION: Performed by: INTERNAL MEDICINE

## 2024-03-30 PROCEDURE — 99232 SBSQ HOSP IP/OBS MODERATE 35: CPT | Performed by: INTERNAL MEDICINE

## 2024-03-30 PROCEDURE — 63710000001 INSULIN DETEMIR PER 5 UNITS: Performed by: INTERNAL MEDICINE

## 2024-03-30 PROCEDURE — 25010000002 ENOXAPARIN PER 10 MG: Performed by: ORTHOPAEDIC SURGERY

## 2024-03-30 PROCEDURE — 25010000002 DAPTOMYCIN PER 1 MG: Performed by: INTERNAL MEDICINE

## 2024-03-30 PROCEDURE — 82948 REAGENT STRIP/BLOOD GLUCOSE: CPT

## 2024-03-30 PROCEDURE — 25810000003 SODIUM CHLORIDE 0.9 % SOLUTION 250 ML FLEX CONT

## 2024-03-30 PROCEDURE — 25010000002 VANCOMYCIN HCL 1.25 G RECONSTITUTED SOLUTION 1 EACH VIAL

## 2024-03-30 PROCEDURE — 85025 COMPLETE CBC W/AUTO DIFF WBC: CPT | Performed by: INTERNAL MEDICINE

## 2024-03-30 PROCEDURE — 63710000001 INSULIN LISPRO (HUMAN) PER 5 UNITS: Performed by: INTERNAL MEDICINE

## 2024-03-30 RX ORDER — FENOFIBRATE 200 MG/1
200 CAPSULE ORAL
COMMUNITY

## 2024-03-30 RX ORDER — CHOLECALCIFEROL (VITAMIN D3) 125 MCG
5 CAPSULE ORAL NIGHTLY PRN
Status: DISCONTINUED | OUTPATIENT
Start: 2024-03-30 | End: 2024-04-02 | Stop reason: HOSPADM

## 2024-03-30 RX ADMIN — Medication 1 PATCH: at 12:45

## 2024-03-30 RX ADMIN — NIFEDIPINE 30 MG: 30 TABLET, EXTENDED RELEASE ORAL at 09:17

## 2024-03-30 RX ADMIN — Medication 5 MG: at 21:15

## 2024-03-30 RX ADMIN — ROSUVASTATIN 40 MG: 20 TABLET, FILM COATED ORAL at 21:15

## 2024-03-30 RX ADMIN — OXYCODONE HYDROCHLORIDE AND ACETAMINOPHEN 1 TABLET: 5; 325 TABLET ORAL at 21:15

## 2024-03-30 RX ADMIN — VANCOMYCIN HYDROCHLORIDE 1250 MG: 1.25 INJECTION, POWDER, LYOPHILIZED, FOR SOLUTION INTRAVENOUS at 05:52

## 2024-03-30 RX ADMIN — INSULIN LISPRO 9 UNITS: 100 INJECTION, SOLUTION INTRAVENOUS; SUBCUTANEOUS at 09:18

## 2024-03-30 RX ADMIN — INSULIN LISPRO 10 UNITS: 100 INJECTION, SOLUTION INTRAVENOUS; SUBCUTANEOUS at 12:51

## 2024-03-30 RX ADMIN — METOPROLOL TARTRATE 25 MG: 25 TABLET, FILM COATED ORAL at 09:17

## 2024-03-30 RX ADMIN — HYDROCODONE BITARTRATE AND ACETAMINOPHEN 1 TABLET: 10; 325 TABLET ORAL at 05:14

## 2024-03-30 RX ADMIN — HYDROMORPHONE HYDROCHLORIDE 1 MG: 1 INJECTION, SOLUTION INTRAMUSCULAR; INTRAVENOUS; SUBCUTANEOUS at 15:39

## 2024-03-30 RX ADMIN — METOPROLOL TARTRATE 25 MG: 25 TABLET, FILM COATED ORAL at 21:16

## 2024-03-30 RX ADMIN — HYDROMORPHONE HYDROCHLORIDE 1 MG: 1 INJECTION, SOLUTION INTRAMUSCULAR; INTRAVENOUS; SUBCUTANEOUS at 18:02

## 2024-03-30 RX ADMIN — LISINOPRIL 40 MG: 40 TABLET ORAL at 21:15

## 2024-03-30 RX ADMIN — ASPIRIN 81 MG: 81 TABLET, COATED ORAL at 21:15

## 2024-03-30 RX ADMIN — ENOXAPARIN SODIUM 40 MG: 100 INJECTION SUBCUTANEOUS at 09:17

## 2024-03-30 RX ADMIN — INSULIN LISPRO 16 UNITS: 100 INJECTION, SOLUTION INTRAVENOUS; SUBCUTANEOUS at 17:32

## 2024-03-30 RX ADMIN — PIPERACILLIN AND TAZOBACTAM 3.38 G: 3; .375 INJECTION, POWDER, LYOPHILIZED, FOR SOLUTION INTRAVENOUS at 21:17

## 2024-03-30 RX ADMIN — PIPERACILLIN AND TAZOBACTAM 3.38 G: 3; .375 INJECTION, POWDER, LYOPHILIZED, FOR SOLUTION INTRAVENOUS at 05:08

## 2024-03-30 RX ADMIN — Medication 10 ML: at 09:19

## 2024-03-30 RX ADMIN — HYDROCODONE BITARTRATE AND ACETAMINOPHEN 1 TABLET: 10; 325 TABLET ORAL at 12:42

## 2024-03-30 RX ADMIN — OXYCODONE HYDROCHLORIDE AND ACETAMINOPHEN 1 TABLET: 5; 325 TABLET ORAL at 14:30

## 2024-03-30 RX ADMIN — INSULIN LISPRO 3 UNITS: 100 INJECTION, SOLUTION INTRAVENOUS; SUBCUTANEOUS at 21:16

## 2024-03-30 RX ADMIN — Medication 10 ML: at 21:16

## 2024-03-30 RX ADMIN — DAPTOMYCIN 500 MG: 500 INJECTION, POWDER, LYOPHILIZED, FOR SOLUTION INTRAVENOUS at 17:33

## 2024-03-30 RX ADMIN — SENNOSIDES AND DOCUSATE SODIUM 2 TABLET: 8.6; 5 TABLET ORAL at 21:15

## 2024-03-30 RX ADMIN — PANTOPRAZOLE SODIUM 40 MG: 40 TABLET, DELAYED RELEASE ORAL at 05:09

## 2024-03-30 RX ADMIN — PIPERACILLIN AND TAZOBACTAM 3.38 G: 3; .375 INJECTION, POWDER, LYOPHILIZED, FOR SOLUTION INTRAVENOUS at 13:29

## 2024-03-30 RX ADMIN — HYDROMORPHONE HYDROCHLORIDE 1 MG: 1 INJECTION, SOLUTION INTRAMUSCULAR; INTRAVENOUS; SUBCUTANEOUS at 01:49

## 2024-03-30 RX ADMIN — GABAPENTIN 600 MG: 300 CAPSULE ORAL at 05:09

## 2024-03-30 RX ADMIN — HYDROMORPHONE HYDROCHLORIDE 1 MG: 1 INJECTION, SOLUTION INTRAMUSCULAR; INTRAVENOUS; SUBCUTANEOUS at 09:18

## 2024-03-30 RX ADMIN — HYDROMORPHONE HYDROCHLORIDE 1 MG: 1 INJECTION, SOLUTION INTRAMUSCULAR; INTRAVENOUS; SUBCUTANEOUS at 04:11

## 2024-03-30 RX ADMIN — HYDROMORPHONE HYDROCHLORIDE 1 MG: 1 INJECTION, SOLUTION INTRAMUSCULAR; INTRAVENOUS; SUBCUTANEOUS at 13:29

## 2024-03-30 RX ADMIN — LORAZEPAM 1 MG: 1 TABLET ORAL at 21:28

## 2024-03-30 RX ADMIN — GABAPENTIN 600 MG: 300 CAPSULE ORAL at 13:29

## 2024-03-30 RX ADMIN — INSULIN DETEMIR 21 UNITS: 100 INJECTION, SOLUTION SUBCUTANEOUS at 21:16

## 2024-03-30 RX ADMIN — GABAPENTIN 600 MG: 300 CAPSULE ORAL at 21:15

## 2024-03-30 NOTE — PLAN OF CARE
"  Pt A/O, on RA, Ax1 when OOB. Using urinal for elimination, pt has large amount of output. Request pain medication frequently for L foot pain and is never fully relieved of pain. Wound vac in place and functioning. Scant amount of output.  Left pt in bed with bed alarm on and call light within reach.    /97 (BP Location: Right arm, Patient Position: Lying)   Pulse 94   Temp 97.9 °F (36.6 °C) (Oral)   Resp 16   Ht 175.3 cm (69.02\")   Wt 81.6 kg (179 lb 14.3 oz)   SpO2 93%   BMI 26.55 kg/m²         Problem: Adult Inpatient Plan of Care  Goal: Plan of Care Review  Outcome: Ongoing, Progressing  Flowsheets (Taken 3/30/2024 0249)  Progress: no change  Plan of Care Reviewed With: patient  Outcome Evaluation: Pt A/O, on RA, Ax1 when OOB.  Using urinal for elimination, pt has large amount of output. Request pain medication frequently for L foot pain and is never fully relieved of pain.  Wound vac in place and functioning.  Goal: Patient-Specific Goal (Individualized)  Outcome: Ongoing, Progressing  Goal: Absence of Hospital-Acquired Illness or Injury  Outcome: Ongoing, Progressing  Intervention: Identify and Manage Fall Risk  Recent Flowsheet Documentation  Taken 3/30/2024 0219 by Esperanza Garcias RN  Safety Promotion/Fall Prevention:   activity supervised   assistive device/personal items within reach   safety round/check completed  Taken 3/30/2024 0000 by Esperanza Garcias RN  Safety Promotion/Fall Prevention:   activity supervised   assistive device/personal items within reach   safety round/check completed  Taken 3/29/2024 2200 by Esperanza Garcias RN  Safety Promotion/Fall Prevention:   activity supervised   assistive device/personal items within reach   safety round/check completed  Taken 3/29/2024 2110 by Esperanza Garcias RN  Safety Promotion/Fall Prevention:   activity supervised   assistive device/personal items within reach   safety round/check completed  Intervention: Prevent Skin " Injury  Recent Flowsheet Documentation  Taken 3/30/2024 0219 by Esperanza Garcias RN  Body Position: position changed independently  Taken 3/30/2024 0000 by Esperanza Garcias RN  Body Position:   position changed independently   weight shifting  Skin Protection:   adhesive use limited   incontinence pads utilized   tubing/devices free from skin contact  Taken 3/29/2024 2200 by Esperanza Garcias RN  Body Position:   position changed independently   sitting up in bed  Taken 3/29/2024 2110 by Esperanza Garcias RN  Body Position:   sitting up in bed   position changed independently  Skin Protection:   adhesive use limited   incontinence pads utilized   tubing/devices free from skin contact  Intervention: Prevent and Manage VTE (Venous Thromboembolism) Risk  Recent Flowsheet Documentation  Taken 3/30/2024 0219 by Esperanza Garcias RN  Activity Management: activity minimized  VTE Prevention/Management:   bilateral   sequential compression devices on  Taken 3/30/2024 0000 by Esperanza Garcias RN  Activity Management: activity encouraged  VTE Prevention/Management:   bilateral   sequential compression devices on  Range of Motion: active ROM (range of motion) encouraged  Taken 3/29/2024 2200 by Esperanza Garcias RN  Activity Management: activity encouraged  Taken 3/29/2024 2110 by Esperanza Garcias RN  Activity Management: activity encouraged  VTE Prevention/Management:   bilateral   sequential compression devices on  Range of Motion: active ROM (range of motion) encouraged  Intervention: Prevent Infection  Recent Flowsheet Documentation  Taken 3/30/2024 0219 by Esperanza Garcias RN  Infection Prevention:   environmental surveillance performed   rest/sleep promoted  Taken 3/30/2024 0000 by Esperanza Garcias RN  Infection Prevention:   environmental surveillance performed   rest/sleep promoted  Taken 3/29/2024 2200 by Esperanza Garcias RN  Infection Prevention:   environmental surveillance performed   rest/sleep  promoted  Taken 3/29/2024 2110 by Esperanza Garcias RN  Infection Prevention:   environmental surveillance performed   rest/sleep promoted  Goal: Optimal Comfort and Wellbeing  Outcome: Ongoing, Progressing  Intervention: Monitor Pain and Promote Comfort  Recent Flowsheet Documentation  Taken 3/30/2024 0149 by Esperanza Garcias RN  Pain Management Interventions: see MAR  Taken 3/29/2024 2313 by Esperanza Garcias RN  Pain Management Interventions: see MAR  Taken 3/29/2024 2110 by Esperanza Garcias RN  Pain Management Interventions: see MAR  Intervention: Provide Person-Centered Care  Recent Flowsheet Documentation  Taken 3/29/2024 2110 by Esperanza Garcias RN  Trust Relationship/Rapport:   care explained   choices provided   empathic listening provided   questions answered   thoughts/feelings acknowledged  Goal: Readiness for Transition of Care  Outcome: Ongoing, Progressing     Problem: Diabetes Comorbidity  Goal: Blood Glucose Level Within Targeted Range  Outcome: Ongoing, Progressing     Problem: Hypertension Comorbidity  Goal: Blood Pressure in Desired Range  Outcome: Ongoing, Progressing  Intervention: Maintain Blood Pressure Management  Recent Flowsheet Documentation  Taken 3/30/2024 0000 by Esperanza Garcias RN  Medication Review/Management: medications reviewed  Taken 3/29/2024 2110 by Esperanza Garcias RN  Medication Review/Management:   medications reviewed   provider consulted     Problem: Skin Injury Risk Increased  Goal: Skin Health and Integrity  Outcome: Ongoing, Progressing  Intervention: Optimize Skin Protection  Recent Flowsheet Documentation  Taken 3/30/2024 0219 by Esperanza Garcias RN  Head of Bed (HOB) Positioning: HOB elevated  Taken 3/30/2024 0000 by Esperanza Garcias RN  Pressure Reduction Techniques: frequent weight shift encouraged  Head of Bed (HOB) Positioning: HOB elevated  Pressure Reduction Devices:   positioning supports utilized   pressure-redistributing mattress  utilized  Skin Protection:   adhesive use limited   incontinence pads utilized   tubing/devices free from skin contact  Taken 3/29/2024 2200 by Esperanza Garcias RN  Head of Bed ([facility]) Positioning: HOB elevated  Taken 3/29/2024 2110 by Esperanza Garcias RN  Pressure Reduction Techniques: frequent weight shift encouraged  Head of Bed (HOB) Positioning: [facility] elevated  Pressure Reduction Devices:   positioning supports utilized   pressure-redistributing mattress utilized  Skin Protection:   adhesive use limited   incontinence pads utilized   tubing/devices free from skin contact   Goal Outcome Evaluation:  Plan of Care Reviewed With: patient        Progress: no change  Outcome Evaluation: Pt A/O, on RA, Ax1 when OOB.  Using urinal for elimination, pt has large amount of output. Request pain medication frequently for L foot pain and is never fully relieved of pain.  Wound vac in place and functioning.

## 2024-03-30 NOTE — CONSULTS
Justo Oquendo  1975  [id number]    Date of Consult: 3/30/2024    Date of Admission: 3/29/2024      Requesting Provider: Christo Peres Jr., MD  Evaluating Physician: Dieter Alejo MD    CC: left foot wound     Reason for Consultation: foot infectionh    History of present illness:    Patient is a 48 y.o.  Yr old male with history of Pwith PMH PVD s/p left fem-fem graft (11/21/23), subsequent left graft thrombectomy/tibial angioplasty,  DM HTN seen today for a left foot wound infection/osteomyelitis. Underwent a left foot TMA 1/19/24 discharged on Daptomycin, Invanz infusions in our office (culture negative). Aortogram with runoff with intervention for restenosis in February 2024.   He has been having hyperbaric treatments through wound care in [place].  Noted persistent gangrenous changes at distal incision, and he was admitted yesterday for I & D of wound. Admitting labs with WBC 8.985, plt 258, Scr 0.79, hgbA1c 7.8, and he has been afebrile. Surgical gram stain with no organisms, few WBCs, culture pending. Started on Daptomycin and Zosyn and we were consulted for evaluation and treatment.     Past Medical History:   Diagnosis Date    Arthritis     Back pain     Diabetes     SINCE MARCH 2017    Dyslipidemia     HTN (hypertension)     PVD (peripheral vascular disease)     Wears partial dentures     upper and lower       Past Surgical History:   Procedure Laterality Date    AORTAGRAM N/A 04/25/2017    Procedure: AORTAGRAM WITH OR WITHOUT RUNOFFS POSSIBLE STENT with left femoral cutdown;  Surgeon: Brett Ryan MD;  Location: [facility] HYBRID OR 15;  Service:     AORTAGRAM N/A 11/21/2023    Procedure: THROMBECTOMY OF LEFT GRAFT, AORTAGRAM, FEMORAL TO FEMORAL BYPASS;  Surgeon: Brett Ryan MD;  Location: [facility] MARIE;  Service: Vascular;  Laterality: N/A;  FLUORO- .06 SECS  DOSE- 10 MGY  CONTRAST- 10 ML ISO    CHOLECYSTECTOMY      CYST REMOVAL      OFF OF BACK    FEMORAL POPLITEAL BYPASS Left  01/18/2024    Procedure: FEMORAL POPLITEAL BYPASS WITH POPLITEAL EXPLORATION;  Surgeon: Vin Pena MD;  Location:  [facility]BRID [facility];  Service: Vascular;  Laterality: Left;  DOSE: 9MGY  FLURO: 14 MIN, 36 SEC  CONTRAST: 50ML VISIPAQUE 320    AK IN-SITU VEIN BYPASS FEMORAL-POPLITEAL Left 04/25/2017    Procedure: FEMORAL POPLITEAL BYPASS;  Surgeon: Brett yRan MD;  Location:  [facility] HYBRID OR 15;  Service: Vascular    AK IN-SITU VEIN BYPASS FEMORAL-POPLITEAL Left 06/27/2017    Procedure: LEFT FEMORAL ENDARTECTOMYN LEFT FEMORAL POPLITEAL BYPASS;  Surgeon: Brett Ryan MD;  Location:  [facility] OR;  Service: Vascular    TRANS METATARSAL AMPUTATION Left 01/19/2024    Procedure: AMPUTATION TRANS METATARSAL- LEFT;  Surgeon: Christo Peres Jr., MD;  Location:  [facility] OR;  Service: Orthopedics;  Laterality: Left;       Pediatric History   Patient Parents    Not on file     Other Topics Concern    Not on file   Social History Narrative    Lives in [place], [place]       family history includes Arthritis in his mother; Kidney disease in his father; Liver disease in his father.    No Known Allergies    Medication:  Current Facility-Administered Medications   Medication Dose Route Frequency Provider Last Rate Last Admin    [START ON 4/1/2024] !4/1 Vancomycin random level at 0500. Please hold 0600 dose until level evaluated by pharmacy   Does not apply Daily Jonnie Arguelles, [facility]        acetaminophen (TYLENOL) tablet 650 mg  650 mg Oral Q4H PRN Leatha Garcia MD        aspirin EC tablet 81 mg  81 mg Oral Nightly Leatha Garcia MD   81 mg at 03/29/24 2110    sennosides-docusate (PERICOLACE) 8.6-50 MG per tablet 2 tablet  2 tablet Oral BID Leatha Garcia MD        And    polyethylene glycol (MIRALAX) packet 17 g  17 g Oral Daily PRN Leatha Garcia MD        And    bisacodyl (DULCOLAX) EC tablet 5 mg  5 mg Oral Daily PRN Leatha Garcia MD        And    bisacodyl (DULCOLAX) suppository 10 mg  10 mg Rectal Daily PRN  Leatha Garcia MD        dextrose (D50W) (25 g/50 mL) IV injection 10-50 mL  10-50 mL Intravenous Q15 Min PRN Leatha Garcia MD        dextrose (GLUTOSE) oral gel 15 g  15 g Oral Q15 Min PRN Leatha Garcia MD        Enoxaparin Sodium (LOVENOX) syringe 40 mg  40 mg Subcutaneous Daily Christo Peres Jr., MD        gabapentin (NEURONTIN) capsule 600 mg  600 mg Oral Q8H Leatha Garcia MD   600 mg at 03/30/24 0509    glucagon (GLUCAGEN) injection 1 mg  1 mg Intramuscular Q15 Min PRN Leatha Garcia MD        HYDROcodone-acetaminophen (NORCO)  MG per tablet 1 tablet  1 tablet Oral Q8H PRN Leatha Garcia MD   1 tablet at 03/30/24 0514    HYDROmorphone (DILAUDID) injection 1 mg  1 mg Intravenous Q2H PRN Leatha Garcia MD   1 mg at 03/30/24 0411    insulin detemir (LEVEMIR) injection 1-200 Units  1-200 Units Subcutaneous Nightly - Glucommander Leatha Garcia MD   21 Units at 03/29/24 2031    insulin lispro (humaLOG) injection 1-200 Units  1-200 Units Subcutaneous 4x Daily With Meals & Nightly Leatha Garcia MD   10 Units at 03/29/24 1741    insulin lispro (humaLOG) injection 1-200 Units  1-200 Units Subcutaneous PRN Leatha Garcia MD        lactated ringers infusion  9 mL/hr Intravenous Continuous Leatha Garcia MD 9 mL/hr at 03/29/24 0811 Restarted at 03/29/24 0814    lisinopril (PRINIVIL,ZESTRIL) tablet 40 mg  40 mg Oral Nightly Leatha Garcia MD   40 mg at 03/29/24 2109    LORazepam (ATIVAN) tablet 1 mg  1 mg Oral Nightly Leatha Garcia MD   1 mg at 03/29/24 2110    metoprolol tartrate (LOPRESSOR) tablet 25 mg  25 mg Oral Q12H Leatha Garcia MD   25 mg at 03/29/24 2119    nicotine (NICODERM CQ) 21 MG/24HR patch 1 patch  1 patch Transdermal Q24H Leatha Garcia MD   1 patch at 03/29/24 1316    NIFEdipine XL (PROCARDIA XL) 24 hr tablet 30 mg  30 mg Oral Q24H Leatha Garcia MD   30 mg at 03/29/24 1316    nitroglycerin (NITROSTAT) SL tablet 0.4 mg  0.4 mg Sublingual Q5 Min PRN Leatha Garcia MD        ondansetron  "(ZOFRAN) injection 4 mg  4 mg Intravenous Q6H PRN Christo Peres Jr., MD        oxyCODONE-acetaminophen (PERCOCET) 5-325 MG per tablet 1 tablet  1 tablet Oral Q4H PRN Christo Peres Jr., MD   1 tablet at 24 2313    pantoprazole (PROTONIX) EC tablet 40 mg  40 mg Oral Q AM Leatha Garcia MD   40 mg at 24 0509    Pharmacy to dose vancomycin   Does not apply Continuous PRN Leatha Garcia MD        piperacillin-tazobactam (ZOSYN) 3.375 g IVPB in 100 mL NS MBP (CD)  3.375 g Intravenous Q8H Leatha Garcia MD   3.375 g at 24 0508    rosuvastatin (CRESTOR) tablet 40 mg  40 mg Oral Nightly Leatha Garcia MD   40 mg at 24 2110    sodium chloride 0.9 % flush 10 mL  10 mL Intravenous Q12H Leatha Garcia MD   10 mL at 24 211    sodium chloride 0.9 % flush 10 mL  10 mL Intravenous PRN Leatha Garcia MD        sodium chloride 0.9 % infusion 40 mL  40 mL Intravenous PRN Leatha Garcia MD        Vancomycin HCl 1,250 mg in sodium chloride 0.9 % 250 mL VTB  1,250 mg Intravenous Q12H Jonnie Arguelles  mL/hr at 24 0552 1,250 mg at 24 0552       Antibiotics:    Vanco/zosyn    Review of Systems  Full 12 point review of systems reviewed and negative except for left foot wound with d/c    Physical Exam:   Vital Signs   /97 (BP Location: Right arm, Patient Position: Lying)   Pulse 94   Temp 97.9 °F (36.6 °C) (Oral)   Resp 16   Ht 175.3 cm (69.02\")   Wt 81.6 kg (179 lb 14.3 oz)   SpO2 93%   BMI 26.55 kg/m²   Temp (24hrs), Av.6 °F (36.4 °C), Min:97.1 °F (36.2 °C), Max:98.1 °F (36.7 °C)      GENERAL: Awake and alert, in no acute distress.   HEENT: Normocephalic, atraumatic.  PERRL. EOMI. No conjunctival injection. No icterus. Oropharynx clear without evidence of thrush or exudate. No evidence of peridontal disease.    NECK: Supple without nuchal rigidity  LYMPH: No cervical, axillary or inguinal lymphadenopathy. No neck masses  HEART: RRR; No murmur, rubs, gallops. "   LUNGS: Clear to auscultation bilaterally without wheezing, rales, rhonchi. Normal respiratory effort.  ABDOMEN: Soft, nontender, nondistended. Positive bowel sounds. No rebound or guarding.   EXT:  No cyanosis, clubbing or edema  : Normal appearing genitalia without Tripp catheter.  MSK: FROM without joint effusions noted    SKIN: Warm and dry without cutaneous eruptions.  Left foot open wound with wound vac in place  NEURO: Oriented to PPT. No focal deficits.   PSYCHIATRIC: Normal insight and judgement. Cooperative with PE    Laboratory Data    Results from last 7 days   Lab Units 03/29/24  0741   WBC 10*3/mm3 8.85   HEMOGLOBIN g/dL 12.2*   HEMATOCRIT % 39.2   PLATELETS 10*3/mm3 258     Results from last 7 days   Lab Units 03/29/24  0741   SODIUM mmol/L 136   POTASSIUM mmol/L 5.2   CHLORIDE mmol/L 99   CO2 mmol/L 25.0   BUN mg/dL 21*   CREATININE mg/dL 0.79   GLUCOSE mg/dL 370*   CALCIUM mg/dL 10.5                   Estimated Creatinine Clearance: 129.1 mL/min (by C-G formula based on SCr of 0.79 mg/dL).      Microbiology:  pending    Radiology:  Imaging Results (Last 24 Hours)       ** No results found for the last 24 hours. **              PROBLEM LIST:  - Left foot wound chronic wound with ischemia and chronic infection after TMA  - s/p Left TMA 1/2024  - Diabetes mellitus, type 2   - Critical limb ischemia, s/p fem-pop bypass,with subsequent fem-fem bypass/thrombectomy   - HTN    ASSESSMENT:  48 year old with PMH PVD s/p fem-fem bypass, DM, HTN, seen for a left foot wound infection.  Underwent a TMA in January 2024, and has completed a course of parenteral therapy, hyperbaric therapy, and has developed open wound with necrosis and concern for chronic infection admitted for I & D and wound vac.  We were asked to see for antibiotic management.   Will d/c vanco change to dapto and continue zosyn and f/u wound cultures      PLAN:  D/c Vancomycin change to Daptomycin  Continue zosyn  F/u old records and  cultures     Dieter Alejo MD  3/30/2024

## 2024-03-30 NOTE — THERAPY WOUND CARE TREATMENT
Acute Care - Wound/Debridement Initial Evaluation   [facility]     Patient Name: Justo Oquendo  : 1975  MRN: [id number]  Today's Date: 3/30/2024                Admit Date: 3/29/2024    Visit Dx:    ICD-10-CM ICD-9-CM   1. 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    Diabetic foot infection        Past Medical History:   Diagnosis Date    Arthritis     Back pain     Diabetes     SINCE 2017    Dyslipidemia     HTN (hypertension)     PVD (peripheral vascular disease)     Wears partial dentures     upper and lower        Past Surgical History:   Procedure Laterality Date    AORTAGRAM N/A 2017    Procedure: AORTAGRAM WITH OR WITHOUT RUNOFFS POSSIBLE STENT with left femoral cutdown;  Surgeon: Brett Ryan MD;  Location:  [facility] OR ;  Service:     AORTAGRAM N/A 2023    Procedure: THROMBECTOMY OF LEFT GRAFT, AORTAGRAM, FEMORAL TO FEMORAL BYPASS;  Surgeon: Brett Ryan MD;  Location: [facility];  Service: Vascular;  Laterality: N/A;  FLUORO- .06 SECS  DOSE- 10 MGY  CONTRAST- 10 ML ISO    CHOLECYSTECTOMY      CYST REMOVAL      OFF OF BACK    FEMORAL POPLITEAL BYPASS Left 2024    Procedure: FEMORAL POPLITEAL BYPASS WITH POPLITEAL EXPLORATION;  Surgeon: Vin Pena MD;  Location:  [facility];  Service: Vascular;  Laterality: Left;  DOSE: 9MGY  FLURO: 14 MIN, 36 SEC  CONTRAST: 50ML VISIPAQUE 320    ME IN-SITU VEIN BYPASS FEMORAL-POPLITEAL Left 2017    Procedure: FEMORAL POPLITEAL BYPASS;  Surgeon: Brett Ryan MD;  Location:  [facility] OR ;  Service: Vascular    ME IN-SITU VEIN BYPASS FEMORAL-POPLITEAL Left  06/27/2017    Procedure: LEFT FEMORAL ENDARTECTOMYN LEFT FEMORAL POPLITEAL BYPASS;  Surgeon: Brett Ryan MD;  Location:  [facility];  Service: Vascular    TRANS METATARSAL AMPUTATION Left 01/19/2024    Procedure: AMPUTATION TRANS METATARSAL- LEFT;  Surgeon: Christo Peres Jr., MD;  Location:  [facility];  Service: Orthopedics;  Laterality: Left;           Wound 01/19/24 1149 Left distal foot Amputation stump (Active)   Dressing Appearance dry;intact 03/30/24 1118   Closure Unable to assess 03/30/24 0917   Base dressing in place, unable to visualize 03/30/24 1118   Drainage Amount none 03/30/24 0917   Care, Wound negative pressure wound therapy 03/30/24 0917   Dressing Care elastic bandage;multi-layer wrap 03/29/24 1231   Wound Output (mL) 10 03/30/24 1118       NPWT (Negative Pressure Wound Therapy) 03/29/24 0957 (Active)   Therapy Setting continuous therapy 03/30/24 1118   Dressing unable to visualize 03/29/24 2110   Pressure Setting 125 mmHg 03/30/24 1118       NPWT (Negative Pressure Wound Therapy) 03/29/24 0957 LLE (Active)   Therapy Setting continuous therapy 03/30/24 0917   Dressing unable to visualize 03/30/24 0917   Pressure Setting 125 mmHg 03/30/24 0917         WOUND DEBRIDEMENT                     PT Assessment (Last 12 Hours)       PT Evaluation and Treatment       Row Name 03/30/24 1118          Physical Therapy Time and Intention    Subjective Information no complaints  -MC     Document Type wound care;evaluation  -     Mode of Treatment physical therapy;individual therapy  -       Row Name 03/30/24 1118          General Information    Patient Profile Reviewed yes  -MC     Patient Observations alert;cooperative;agree to therapy  -     Risks Reviewed patient:;increased discomfort  -MC     Benefits Reviewed patient:;improve skin integrity  -MC     Barriers to Rehab none identified  -MC       Row Name 03/30/24 1118          Pain    Pretreatment Pain Rating 0/10 - no pain  -MC     Posttreatment  Pain Rating 0/10 - no pain  -MC       Row Name 03/30/24 1118          Cognition    Orientation Status (Cognition) oriented x 3  -MC       Row Name 03/30/24 1118          Wound 01/19/24 1149 Left distal foot Amputation stump    Wound - Properties Group Placement Date: 01/19/24  -NH Placement Time: 1149  -NH Side: Left  -NH Orientation: distal  -NH Location: foot  -NH Primary Wound Type: Amputation s  -NH    Dressing Appearance dry;intact  -MC     Base dressing in place, unable to visualize  -     Wound Output (mL) 10  -MC     Retired Wound - Properties Group Placement Date: 01/19/24  -NH Placement Time: 1149  -NH Side: Left  -NH Orientation: distal  -NH Location: foot  -NH Primary Wound Type: Amputation s  -NH    Retired Wound - Properties Group Date first assessed: 01/19/24  -NH Time first assessed: 1149  -NH Side: Left  -NH Location: foot  -NH Primary Wound Type: Amputation s  -NH      Row Name 03/30/24 1118          NPWT (Negative Pressure Wound Therapy) 03/29/24 0957    NPWT (Negative Pressure Wound Therapy) - Properties Group Placement Date: 03/29/24  -LG Placement Time: 0957  -LG    Therapy Setting continuous therapy  -     Pressure Setting 125 mmHg  -     Retired NPWT (Negative Pressure Wound Therapy) - Properties Group Placement Date: 03/29/24  -LG Placement Time: 0957  -LG    Retired NPWT (Negative Pressure Wound Therapy) - Properties Group Placement Date: 03/29/24  -LG Placement Time: 0957  -LG      Row Name             NPWT (Negative Pressure Wound Therapy) 03/29/24 0957 LLE    NPWT (Negative Pressure Wound Therapy) - Properties Group Placement Date: 03/29/24  -LG Placement Time: 0957  -LG Location: LLE  -LG    Retired NPWT (Negative Pressure Wound Therapy) - Properties Group Placement Date: 03/29/24  -LG Placement Time: 0957  -LG Location: LLE  -LG    Retired NPWT (Negative Pressure Wound Therapy) - Properties Group Placement Date: 03/29/24  -LG Placement Time: 0957  -LG Location: LLE  -LG      Row  Name 03/30/24 1118          Coping    Observed Emotional State calm;cooperative  -     Verbalized Emotional State acceptance  -       Row Name 03/30/24 1118          Plan of Care Review    Plan of Care Reviewed With patient;spouse  -     Outcome Evaluation PT wound care eval complete. Wound vac placed in OR yesterday. Wound dressings appear intact and the vac is functioning properly. Please call [phone] with any vac-related issues. If after hours and vac therapy is disrupted more than 2 hours, please removal all wound vac material and place advanced dressings until PT able to assess. Anticipate change Monday.  -       Row Name 03/30/24 1118          Positioning and Restraints    Pre-Treatment Position in bed  -     Post Treatment Position bed  -     In Bed fowlers;call light within reach;encouraged to call for assist;with family/caregiver  -       Row Name 03/30/24 1118          Therapy Assessment/Plan (PT)    Patient/Family Therapy Goals Statement (PT) D/C home with OP [facility]  -     Rehab Potential (PT) fair, will monitor progress closely  -     Criteria for Skilled Interventions Met (PT) yes;meets criteria;skilled treatment is necessary  -       Row Name 03/30/24 1118          PT Evaluation Complexity    History, PT Evaluation Complexity 1-2 personal factors and/or comorbidities  -     Examination of Body Systems (PT Eval Complexity) 1-2 elements  -     Clinical Presentation (PT Evaluation Complexity) stable  -     Clinical Decision Making (PT Evaluation Complexity) low complexity  -     Overall Complexity (PT Evaluation Complexity) low complexity  -       Row Name 03/30/24 1118          Physical Therapy Goals    Wound Care Goal Selection (PT) wound care, PT goal 1  -       Row Name 03/30/24 1118          Wound Care Goal 1 (PT)    Wound Care Goal 1 (PT) Pt will demonstrate 10% reduction in wound area to indicate healing progress.  -     Time Frame (Wound  Care Goal 1, PT) 10 days;long term goal (LTG)  -MC               User Key  (r) = Recorded By, (t) = Taken By, (c) = Cosigned By      Initials Name Provider Type    Taylor Meredith, PT Physical Therapist    Svetlana Meeks RN Registered Nurse    Dior Edwards RN Registered Nurse                      Recommendation and Plan  Anticipated Discharge Disposition (PT): home with outpatient therapy services  Planned Therapy Interventions (PT): wound care, patient/family education  Plan of Care Reviewed With: patient, spouse           Outcome Evaluation: PT wound care eval complete. Wound vac placed in OR yesterday. Wound dressings appear intact and the vac is functioning properly. Please call [phone] with any vac-related issues. If after hours and vac therapy is disrupted more than 2 hours, please removal all wound vac material and place advanced dressings until PT able to assess. Anticipate change Monday.  Plan of Care Reviewed With: patient, spouse            Time Calculation   PT Charges       Row Name 03/30/24 1123             Time Calculation    Start Time 1022  -MC      PT Goal Re-Cert Due Date 04/09/24  -MC         Untimed Charges    PT Eval/Re-eval Minutes 25  -MC      Wound Care 65368 Neg Press (DME) wound TO 50 sqcm  -MC      18129-Grv Pressure wound to 50 sqcm 5  -MC         Total Minutes    Untimed Charges Total Minutes 30  -MC       Total Minutes 30  -MC                User Key  (r) = Recorded By, (t) = Taken By, (c) = Cosigned By      Initials Name Provider Type    Taylor Meredith, PT Physical Therapist                            PT G-Codes  AM-PAC 6 Clicks Score (PT): 17       Taylor Bui, PT  3/30/2024

## 2024-03-30 NOTE — PROGRESS NOTES
Justo Oquendo       LOS: 1 day   Patient Care Team:  Melo Whitaker MD as PCP - General (Family Medicine)  Niall Mcfarlane MD as Consulting Physician (Cardiology)    Chief Complaint: Left forefoot wound ischemia    Subjective     Interval History:     Pain tolerable overnight    Review of Systems:     Gen- No fevers, chills  CV- No chest pain, palpitations  Resp- No cough, dyspnea  GI- No N/V/D, abd pain    Objective     Vital Signs  Vital Signs (last 24 hours)         03/29 0700  03/30 0659 03/30 0700  03/30 0748   Most Recent      Temp (°F) 97 -  98.1       97.9 (36.6) 03/30 0407    Heart Rate 94 -  115       94 03/30 0407    Resp 14 -  18       16 03/30 0407    /74 -  161/82       137/97 03/30 0407    SpO2 (%) 93 -  100       93 03/30 0407    Flow (L/min)   2       2 03/29 0900              Physical Exam:     Alert, oriented.  No acute distress.  Nonlabored respirations.  Regular rate and rhythm.  Abdomen nondistended.  Left lower extremity: Operative dressing, wound VAC in place with appropriate seal, minimal serosanguineous output.     Results Review:     I reviewed the patient's new clinical results.    Medication Review:   Hospital Medications (active)         Dose Frequency Start End    !4/1 Vancomycin random level at 0500. Please hold 0600 dose until level evaluated by pharmacy  Daily 4/1/2024 4/2/2024    Route: Does not apply    acetaminophen (TYLENOL) tablet 650 mg 650 mg Every 4 Hours PRN 3/29/2024 --    Admin Instructions: If given for fever, use fever parameter: fever greater than 100.4 °F  Based on patient request - if ordered for moderate or severe pain, provider allows for administration of a medication prescribed for a lower pain scale.    Do not exceed 4 grams of acetaminophen in a 24 hr period. Max dose of 2gm for AST/ALT greater than 120 units/L.    If given for pain, use the following pain scale:   Mild Pain = Pain Score of 1-3, CPOT 1-2  Moderate Pain = Pain Score of  "4-6, CPOT 3-4  Severe Pain = Pain Score of 7-10, CPOT 5-8    Route: Oral    aspirin EC tablet 81 mg 81 mg Nightly 3/29/2024 --    Admin Instructions: Do not crush or chew the capsules or tablets. The drug may not work as designed if the capsule or tablet is crushed or chewed. Swallow whole.  Do not exceed 4 grams of aspirin in a 24 hr period.    If given for pain, use the following pain scale:   Mild Pain = Pain Score of 1-3, CPOT 1-2  Moderate Pain = Pain Score of 4-6, CPOT 3-4  Severe Pain = Pain Score of 7-10, CPOT 5-8    Route: Oral    bisacodyl (DULCOLAX) EC tablet 5 mg 5 mg Daily PRN 3/29/2024 --    Admin Instructions: Use if no bowel movement after 12 hours.  Swallow whole. Do not crush, split, or chew tablet.    Route: Oral    Linked Group 1: Placed in \"And\" Linked Group        bisacodyl (DULCOLAX) suppository 10 mg 10 mg Daily PRN 3/29/2024 --    Admin Instructions: Use if no bowel movement after 12 hours.  Hold for diarrhea    Route: Rectal    Linked Group 1: Placed in \"And\" Linked Group        dextrose (D50W) (25 g/50 mL) IV injection 10-50 mL 10-50 mL Every 15 Minutes PRN 3/29/2024 --    Admin Instructions: Blood Glucose <70  Patient Has IV Access, Is Unresponsive, NPO or Unable to Safely Swallow  Recheck Blood Glucose Per Glucommander™    Route: Intravenous    dextrose (GLUTOSE) oral gel 15 g 15 g Every 15 Minutes PRN 3/29/2024 --    Admin Instructions: Blood Glucose <70  Patient Alert, NOT NPO, Can Safely Swallow  Recheck Blood Glucose Per Glucommander™    Route: Oral    Enoxaparin Sodium (LOVENOX) syringe 40 mg 40 mg Daily 3/30/2024 --    Admin Instructions: Give subcutaneous in abdomen only. Do not massage site after injection.    Route: Subcutaneous    gabapentin (NEURONTIN) capsule 600 mg 600 mg Every 8 Hours Scheduled 3/29/2024 --    Admin Instructions:     Route: Oral    glucagon (GLUCAGEN) injection 1 mg 1 mg Every 15 Minutes PRN 3/29/2024 --    Admin Instructions: Blood Glucose <70  Patient " Without IV Access, Unresponsive, NPO or Unable to Safely Swallow  Recheck Blood Glucose Per Glucommander™  Reconstitute powder for injection by adding 1 mL of -supplied sterile diluent or sterile water for injection to a vial containing 1 mg of the drug, to provide solutions containing 1 mg/mL. Shake vial gently to dissolve.    Route: Intramuscular    HYDROcodone-acetaminophen (NORCO)  MG per tablet 1 tablet 1 tablet Every 8 Hours PRN 3/29/2024 --    Admin Instructions: Based on patient request - if ordered for moderate or severe pain, provider allows for administration of a medication prescribed for a lower pain scale.  [SPEEDY]    Do not exceed 4 grams of acetaminophen in a 24 hr period. Max dose of 2gm for AST/ALT greater than 120 units/L        If given for pain, use the following pain scale:   Mild Pain = Pain Score of 1-3, CPOT 1-2  Moderate Pain = Pain Score of 4-6, CPOT 3-4  Severe Pain = Pain Score of 7-10, CPOT 5-8    Route: Oral    HYDROmorphone (DILAUDID) injection 1 mg 1 mg Every 2 Hours PRN 3/29/2024 4/1/2024    Admin Instructions: Based on patient request - if ordered for moderate or severe pain, provider allows for administration of a medication prescribed for a lower pain scale.      Caution: Look alike/sound alike drug alert    If given for pain, use the following pain scale:  Mild Pain = Pain Score of 1-3, CPOT 1-2  Moderate Pain = Pain Score of 4-6, CPOT 3-4  Severe Pain = Pain Score of 7-10, CPOT 5-8    Route: Intravenous    insulin detemir (LEVEMIR) injection 1-200 Units 1-200 Units Nightly - Glucommander 3/29/2024 --    Admin Instructions: Do not hold basal insulin without an order. Consider requesting a dose edit, if needed.  per Glucommander    Route: Subcutaneous    insulin lispro (humaLOG) injection 1-200 Units 1-200 Units 4 Times Daily With Meals & Nightly 3/29/2024 --    Admin Instructions: Document Total Bolus Dose Using This MAR Entry   Total Bolus Dose Includes  Scheduled Meal & Associated Correction Dose  If Patient NPO or Unable to Eat Administer Correction Insulin Only  per Glucommander    Route: Subcutaneous    insulin lispro (humaLOG) injection 1-200 Units 1-200 Units As Needed 3/29/2024 --    Admin Instructions: Correction Doses Outside of Scheduled Meal & Bedtime Per Glucommander™   Use This MAR Entry For Insulin Doses When There is NO SCHEDULED MAR Entry Available  Administer Correction Insulin Even if Patient NPO or Unable to Eat  per Glucommander    Route: Subcutaneous    lactated ringers infusion 9 mL/hr Continuous 3/29/2024 --    Admin Instructions: May switch to NS IV at KVO if renal / if indicated    Route: Intravenous    lisinopril (PRINIVIL,ZESTRIL) tablet 40 mg 40 mg Nightly 3/29/2024 --    Admin Instructions: Hold for SBP less than 100, DBP less than 60.    Route: Oral    LORazepam (ATIVAN) tablet 1 mg 1 mg Nightly 3/29/2024 --    Admin Instructions:  Caution: Look alike/sound alike drug alert    Route: Oral    metoprolol tartrate (LOPRESSOR) tablet 25 mg 25 mg Every 12 Hours Scheduled 3/29/2024 --    Admin Instructions: Hold for SBP less than 100, DBP less than 60, or heart rate less than 50. If a dose is held, please contact the provider.    Route: Oral    nicotine (NICODERM CQ) 21 MG/24HR patch 1 patch 1 patch Every 24 Hours 3/29/2024 --    Admin Instructions: Apply to clean, dry, nonhairy area of skin (typically upper arm or shoulder)  Dispose of nicotine replacement therapies and their wrappers in non-hazardous pharmaceutical waste or in regular trash.    Route: Transdermal    NIFEdipine XL (PROCARDIA XL) 24 hr tablet 30 mg 30 mg Every 24 Hours Scheduled 3/29/2024 --    Admin Instructions: Caution: Look alike/sound alike drug alert. Avoid grapefruit juice. Swallow whole. Do not crush, split or chew.    Route: Oral    nitroglycerin (NITROSTAT) SL tablet 0.4 mg 0.4 mg Every 5 Minutes PRN 3/29/2024 --    Admin Instructions: If Pain Unrelieved After 3  "Doses Notify MD  May administer up to 3 doses per episode.    Route: Sublingual    ondansetron (ZOFRAN) injection 4 mg 4 mg Every 6 Hours PRN 3/29/2024 --    Admin Instructions: If BOTH ondansetron (ZOFRAN) and promethazine (PHENERGAN) are ordered use ondansetron first and THEN promethazine IF ondansetron is ineffective.    Route: Intravenous    oxyCODONE-acetaminophen (PERCOCET) 5-325 MG per tablet 1 tablet 1 tablet Every 4 Hours PRN 3/29/2024 4/3/2024    Admin Instructions: Based on patient request - if ordered for moderate or severe pain, provider allows for administration of a medication prescribed for a lower pain scale.  [SPEEDY]    Do not exceed 4 grams of acetaminophen in a 24 hr period. Max dose of 2gm for AST/ALT greater than 120 units/L        If given for pain, use the following pain scale:   Mild Pain = Pain Score of 1-3, CPOT 1-2  Moderate Pain = Pain Score of 4-6, CPOT 3-4  Severe Pain = Pain Score of 7-10, CPOT 5-8    Route: Oral    pantoprazole (PROTONIX) EC tablet 40 mg 40 mg Every Early Morning 3/30/2024 --    Admin Instructions: Do not crush or chew the capsules or tablets. The drug may not work as designed if the capsule or tablet is crushed or chewed. Swallow whole.  Swallow whole; do not crush, split, or chew.    Route: Oral    Pharmacy to dose vancomycin  Continuous PRN 3/29/2024 4/3/2024    Route: Does not apply    piperacillin-tazobactam (ZOSYN) 3.375 g IVPB in 100 mL NS MBP (CD) 3.375 g Every 8 Hours 3/29/2024 4/3/2024    Route: Intravenous    polyethylene glycol (MIRALAX) packet 17 g 17 g Daily PRN 3/29/2024 --    Admin Instructions: Use if no bowel movement after 12 hours. Mix in 6-8 ounces of water.  Use 4-8 ounces of water, tea, or juice for each 17 gram dose.    Route: Oral    Linked Group 1: Placed in \"And\" Linked Group        rosuvastatin (CRESTOR) tablet 40 mg 40 mg Nightly 3/29/2024 --    Admin Instructions: Avoid grapefruit juice.    Route: Oral    sennosides-docusate (PERICOLACE) " "8.6-50 MG per tablet 2 tablet 2 tablet 2 Times Daily 3/29/2024 --    Admin Instructions: HOLD MEDICATION IF PATIENT HAS HAD BOWEL MOVEMENT. Start bowel management regimen if patient has not had a bowel movement after 12 hours.    Route: Oral    Linked Group 1: Placed in \"And\" Linked Group        sodium chloride 0.9 % flush 10 mL 10 mL Every 12 Hours Scheduled 3/29/2024 --    Route: Intravenous    sodium chloride 0.9 % flush 10 mL 10 mL As Needed 3/29/2024 --    Route: Intravenous    sodium chloride 0.9 % infusion 40 mL 40 mL As Needed 3/29/2024 --    Admin Instructions: Following administration of an IV intermittent medication, flush line with 40mL NS at 100mL/hr.    Route: Intravenous    Vancomycin HCl 1,250 mg in sodium chloride 0.9 % 250 mL VTB 1,250 mg Every 12 Hours 3/30/2024 4/4/2024    Route: Intravenous              Assessment & Plan     48-year-old male with peripheral vascular disease status post transmetatarsal amputation with wound dehiscence/dry gangrene status post debridement, irrigation, wound vacuum-assisted closure.      Diabetic foot infection    Protected heel weightbearing in walker in postoperative shoe.  Follow cultures.  He has inquired about returning to work part-time.  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  I think returning strictly nonweightbearing if he is able to sit and elevate would be appropriate.  Every 72 hour wound vacuum-assisted closure changes beginning April 1.    He will need outpatient wound vacuum-assisted closure therapy/home health versus PT wound care follow-up for wound VAC changes.    Follow up April 5 with Winter Santana PA-C.    [facility]  [address], [address]  [place], [address]  Please schedule at [phone]    Christo Peres Jr, MD  03/30/24  07:48 EDT           "

## 2024-03-30 NOTE — PLAN OF CARE
Goal Outcome Evaluation:  Plan of Care Reviewed With: patient, spouse           Outcome Evaluation: PT wound care eval complete. Wound vac placed in OR yesterday. Wound dressings appear intact and the vac is functioning properly. Please call [phone] with any vac-related issues. If after hours and vac therapy is disrupted more than 2 hours, please removal all wound vac material and place advanced dressings until PT able to assess. Anticipate change Monday.      Anticipated Discharge Disposition (PT): home with outpatient therapy services

## 2024-03-30 NOTE — PROGRESS NOTES
[facility] Medicine Services  PROGRESS NOTE    Patient Name: Justo Oquendo  : 1975  MRN: [id number]    Date of Admission: 3/29/2024  Primary Care Physician: Melo Whitaker MD    Subjective   Subjective     CC: dehisced foot wound    HPI:  feels fine. Wound vac on.  Pain is controlled       Objective   Objective     Vital Signs:   Temp:  [97.5 °F (36.4 °C)-98.3 °F (36.8 °C)] 98.3 °F (36.8 °C)  Heart Rate:  [] 98  Resp:  [16] 16  BP: (136-161)/(73-97) 136/75     Physical Exam:  Constitutional: Awake, alert  Eyes: PER  HENT: NCAT, mucous membranes moist  Cardiovascular: RRR,   Lungs not labored  Foot w ACE dressing and wound vac       Results Reviewed:  LAB RESULTS:      Lab 24  0904 24  0741   WBC 10.80 8.85   HEMOGLOBIN 12.1* 12.2*   HEMATOCRIT 38.2 39.2   PLATELETS 248 258   NEUTROS ABS 6.84  --    IMMATURE GRANS (ABS) 0.07*  --    LYMPHS ABS 2.85  --    MONOS ABS 0.93*  --    EOS ABS 0.07  --    MCV 88.4 89.7   SED RATE 34*  --    CRP 0.51*  --          Lab 24  0904 24  0741   SODIUM 133* 136   POTASSIUM 4.2 5.2   CHLORIDE 95* 99   CO2 29.0 25.0   ANION GAP 9.0 12.0   BUN 19 21*   CREATININE 0.74* 0.79   EGFR 110.4 108.2   GLUCOSE 173* 370*   CALCIUM 10.7* 10.5   HEMOGLOBIN A1C  --  7.80*                         Brief Urine Lab Results  (Last result in the past 365 days)        Color   Clarity   Blood   Leuk Est   Nitrite   Protein   CREAT   Urine HCG        24 1124 Yellow   Clear   Negative   Negative   Negative   Negative                   Microbiology Results Abnormal       Procedure Component Value - Date/Time    AFB Culture - Wound, Foot, Left [[id number]] Collected: 24 0830    Lab Status: Preliminary result Specimen: Wound from Foot, Left Updated: 24 1141     AFB Stain No acid fast bacilli seen on concentrated smear            No radiology results from the last 24 hrs    Results for orders placed during the hospital  encounter of 11/20/23    Adult Transthoracic Echo Complete W/ Cont if Necessary Per Protocol    Interpretation Summary    Left ventricular systolic function is hyperdynamic (EF > 70%). Calculated left ventricular EF = 70.4%    Left ventricular diastolic function was normal.    No significant structural or functional valvular disease.      Current medications:  Scheduled Meds:aspirin, 81 mg, Oral, Nightly  DAPTOmycin, 6 mg/kg, Intravenous, Q24H  enoxaparin, 40 mg, Subcutaneous, Daily  gabapentin, 600 mg, Oral, Q8H  insulin detemir, 1-200 Units, Subcutaneous, Nightly - Glucommander  insulin lispro, 1-200 Units, Subcutaneous, 4x Daily With Meals & Nightly  lisinopril, 40 mg, Oral, Nightly  LORazepam, 1 mg, Oral, Nightly  metoprolol tartrate, 25 mg, Oral, Q12H  nicotine, 1 patch, Transdermal, Q24H  NIFEdipine XL, 30 mg, Oral, Q24H  pantoprazole, 40 mg, Oral, Q AM  piperacillin-tazobactam, 3.375 g, Intravenous, Q8H  rosuvastatin, 40 mg, Oral, Nightly  senna-docusate sodium, 2 tablet, Oral, BID  sodium chloride, 10 mL, Intravenous, Q12H      Continuous Infusions:lactated ringers, 9 mL/hr, Last Rate: 9 mL/hr (03/29/24 0811)      PRN Meds:.  acetaminophen    senna-docusate sodium **AND** polyethylene glycol **AND** bisacodyl **AND** bisacodyl    dextrose    dextrose    glucagon (human recombinant)    HYDROcodone-acetaminophen    HYDROmorphone    insulin lispro    nitroglycerin    ondansetron    oxyCODONE-acetaminophen    sodium chloride    sodium chloride    Assessment & Plan   Assessment & Plan     Active Hospital Problems    Diagnosis  POA    **Diabetic foot infection [E11.628, L08.9]  Yes      Resolved Hospital Problems   No resolved problems to display.        Brief Hospital Course to date:  Justo Oquendo is a 48 y.o. male w PAD, diabetic foot infection.  H had TMA and antibiotics per ID.  Now readmitted with wound dehiscence, gangrene.        PAD  History of ischemic limb s/p amputation of 3rd/ 4th digits at TMJ    History of FemFem bypass  Left forefoot wound dehiscence   --s/p I & D left foot abscess with placement of wound vac closure by Dr Peres on 3/29/24  --surgical cultures pending   --pain control   --Xarelto on hold, restart per surgery   - anticipate DC home with Wound Vac, and nonweightbearing, and abx per ID  (Dr Alejo following )   - per PT/Wound, authorization for wound vac will not happen over the weekend.        HTN  HLD  --cont home meds      DM  --A1c 7.8    --states sugars at home have been running high due to not being able to obtain medications.   --glucommander   --Endocrine referral at DC      Tobacco use     Chronic pain   --Home Norco   --PRN Percocet and Dilaudid for breakthrough pain      Expected Discharge Location and Transportation: home by car  Expected Discharge   Expected Discharge Date: 4/1/2024; Expected Discharge Time:      DVT prophylaxis:  Medical and mechanical DVT prophylaxis orders are present.         AM-PAC 6 Clicks Score (PT): 17 (03/29/24 9140)    CODE STATUS:   Code Status and Medical Interventions:   Ordered at: 03/29/24 1225     Level Of Support Discussed With:    Patient     Code Status (Patient has no pulse and is not breathing):    CPR (Attempt to Resuscitate)     Medical Interventions (Patient has pulse or is breathing):    Full Support       Leatha Garcia MD  03/30/24

## 2024-03-31 LAB
ALBUMIN SERPL-MCNC: 4.2 G/DL (ref 3.5–5.2)
ALBUMIN/GLOB SERPL: 1.4 G/DL
ALP SERPL-CCNC: 95 U/L (ref 39–117)
ALT SERPL W P-5'-P-CCNC: 77 U/L (ref 1–41)
ANION GAP SERPL CALCULATED.3IONS-SCNC: 11 MMOL/L (ref 5–15)
AST SERPL-CCNC: 29 U/L (ref 1–40)
BASOPHILS # BLD AUTO: 0.05 10*3/MM3 (ref 0–0.2)
BASOPHILS NFR BLD AUTO: 0.6 % (ref 0–1.5)
BILIRUB SERPL-MCNC: <0.2 MG/DL (ref 0–1.2)
BUN SERPL-MCNC: 24 MG/DL (ref 6–20)
BUN/CREAT SERPL: 29.6 (ref 7–25)
CALCIUM SPEC-SCNC: 10.1 MG/DL (ref 8.6–10.5)
CHLORIDE SERPL-SCNC: 97 MMOL/L (ref 98–107)
CO2 SERPL-SCNC: 27 MMOL/L (ref 22–29)
CREAT SERPL-MCNC: 0.81 MG/DL (ref 0.76–1.27)
CRP SERPL-MCNC: 0.3 MG/DL (ref 0–0.5)
DEPRECATED RDW RBC AUTO: 46.8 FL (ref 37–54)
EGFRCR SERPLBLD CKD-EPI 2021: 107.4 ML/MIN/1.73
EOSINOPHIL # BLD AUTO: 0.2 10*3/MM3 (ref 0–0.4)
EOSINOPHIL NFR BLD AUTO: 2.5 % (ref 0.3–6.2)
ERYTHROCYTE [DISTWIDTH] IN BLOOD BY AUTOMATED COUNT: 14.5 % (ref 12.3–15.4)
ERYTHROCYTE [SEDIMENTATION RATE] IN BLOOD: 27 MM/HR (ref 0–20)
GLOBULIN UR ELPH-MCNC: 3.1 GM/DL
GLUCOSE BLDC GLUCOMTR-MCNC: 247 MG/DL (ref 70–130)
GLUCOSE BLDC GLUCOMTR-MCNC: 263 MG/DL (ref 70–130)
GLUCOSE BLDC GLUCOMTR-MCNC: 358 MG/DL (ref 70–130)
GLUCOSE BLDC GLUCOMTR-MCNC: 398 MG/DL (ref 70–130)
GLUCOSE SERPL-MCNC: 221 MG/DL (ref 65–99)
HCT VFR BLD AUTO: 39.9 % (ref 37.5–51)
HGB BLD-MCNC: 12.4 G/DL (ref 13–17.7)
IMM GRANULOCYTES # BLD AUTO: 0.15 10*3/MM3 (ref 0–0.05)
IMM GRANULOCYTES NFR BLD AUTO: 1.8 % (ref 0–0.5)
LYMPHOCYTES # BLD AUTO: 2.26 10*3/MM3 (ref 0.7–3.1)
LYMPHOCYTES NFR BLD AUTO: 27.8 % (ref 19.6–45.3)
MCH RBC QN AUTO: 27.4 PG (ref 26.6–33)
MCHC RBC AUTO-ENTMCNC: 31.1 G/DL (ref 31.5–35.7)
MCV RBC AUTO: 88.3 FL (ref 79–97)
MONOCYTES # BLD AUTO: 0.92 10*3/MM3 (ref 0.1–0.9)
MONOCYTES NFR BLD AUTO: 11.3 % (ref 5–12)
NEUTROPHILS NFR BLD AUTO: 4.56 10*3/MM3 (ref 1.7–7)
NEUTROPHILS NFR BLD AUTO: 56 % (ref 42.7–76)
NRBC BLD AUTO-RTO: 0 /100 WBC (ref 0–0.2)
PLATELET # BLD AUTO: 221 10*3/MM3 (ref 140–450)
PMV BLD AUTO: 10.5 FL (ref 6–12)
POTASSIUM SERPL-SCNC: 4.4 MMOL/L (ref 3.5–5.2)
PROT SERPL-MCNC: 7.3 G/DL (ref 6–8.5)
RBC # BLD AUTO: 4.52 10*6/MM3 (ref 4.14–5.8)
SODIUM SERPL-SCNC: 135 MMOL/L (ref 136–145)
WBC NRBC COR # BLD AUTO: 8.14 10*3/MM3 (ref 3.4–10.8)

## 2024-03-31 PROCEDURE — 85025 COMPLETE CBC W/AUTO DIFF WBC: CPT | Performed by: INTERNAL MEDICINE

## 2024-03-31 PROCEDURE — 80053 COMPREHEN METABOLIC PANEL: CPT | Performed by: INTERNAL MEDICINE

## 2024-03-31 PROCEDURE — 25010000002 LINEZOLID 600 MG/300ML SOLUTION: Performed by: INTERNAL MEDICINE

## 2024-03-31 PROCEDURE — 97116 GAIT TRAINING THERAPY: CPT

## 2024-03-31 PROCEDURE — 99232 SBSQ HOSP IP/OBS MODERATE 35: CPT | Performed by: STUDENT IN AN ORGANIZED HEALTH CARE EDUCATION/TRAINING PROGRAM

## 2024-03-31 PROCEDURE — 86140 C-REACTIVE PROTEIN: CPT | Performed by: INTERNAL MEDICINE

## 2024-03-31 PROCEDURE — 63710000001 INSULIN DETEMIR PER 5 UNITS: Performed by: INTERNAL MEDICINE

## 2024-03-31 PROCEDURE — 82948 REAGENT STRIP/BLOOD GLUCOSE: CPT

## 2024-03-31 PROCEDURE — 63710000001 INSULIN LISPRO (HUMAN) PER 5 UNITS: Performed by: INTERNAL MEDICINE

## 2024-03-31 PROCEDURE — 85652 RBC SED RATE AUTOMATED: CPT | Performed by: INTERNAL MEDICINE

## 2024-03-31 PROCEDURE — 97605 NEG PRS WND THER DME<=50SQCM: CPT

## 2024-03-31 PROCEDURE — 25010000002 PIPERACILLIN SOD-TAZOBACTAM PER 1 G: Performed by: INTERNAL MEDICINE

## 2024-03-31 PROCEDURE — 25010000002 ENOXAPARIN PER 10 MG: Performed by: ORTHOPAEDIC SURGERY

## 2024-03-31 PROCEDURE — 97164 PT RE-EVAL EST PLAN CARE: CPT

## 2024-03-31 PROCEDURE — 25010000002 HYDROMORPHONE 1 MG/ML SOLUTION: Performed by: INTERNAL MEDICINE

## 2024-03-31 RX ORDER — LINEZOLID 2 MG/ML
600 INJECTION, SOLUTION INTRAVENOUS EVERY 12 HOURS
Status: DISCONTINUED | OUTPATIENT
Start: 2024-03-31 | End: 2024-04-01

## 2024-03-31 RX ADMIN — PANTOPRAZOLE SODIUM 40 MG: 40 TABLET, DELAYED RELEASE ORAL at 05:10

## 2024-03-31 RX ADMIN — ROSUVASTATIN 40 MG: 20 TABLET, FILM COATED ORAL at 21:23

## 2024-03-31 RX ADMIN — PIPERACILLIN AND TAZOBACTAM 3.38 G: 3; .375 INJECTION, POWDER, LYOPHILIZED, FOR SOLUTION INTRAVENOUS at 05:10

## 2024-03-31 RX ADMIN — OXYCODONE HYDROCHLORIDE AND ACETAMINOPHEN 1 TABLET: 5; 325 TABLET ORAL at 11:20

## 2024-03-31 RX ADMIN — LISINOPRIL 40 MG: 40 TABLET ORAL at 21:23

## 2024-03-31 RX ADMIN — HYDROMORPHONE HYDROCHLORIDE 1 MG: 1 INJECTION, SOLUTION INTRAMUSCULAR; INTRAVENOUS; SUBCUTANEOUS at 09:09

## 2024-03-31 RX ADMIN — INSULIN DETEMIR 27 UNITS: 100 INJECTION, SOLUTION SUBCUTANEOUS at 21:24

## 2024-03-31 RX ADMIN — HYDROMORPHONE HYDROCHLORIDE 1 MG: 1 INJECTION, SOLUTION INTRAMUSCULAR; INTRAVENOUS; SUBCUTANEOUS at 17:17

## 2024-03-31 RX ADMIN — INSULIN LISPRO 3 UNITS: 100 INJECTION, SOLUTION INTRAVENOUS; SUBCUTANEOUS at 21:23

## 2024-03-31 RX ADMIN — PIPERACILLIN AND TAZOBACTAM 3.38 G: 3; .375 INJECTION, POWDER, LYOPHILIZED, FOR SOLUTION INTRAVENOUS at 21:31

## 2024-03-31 RX ADMIN — LINEZOLID 600 MG: 600 INJECTION, SOLUTION INTRAVENOUS at 12:08

## 2024-03-31 RX ADMIN — NIFEDIPINE 30 MG: 30 TABLET, EXTENDED RELEASE ORAL at 08:22

## 2024-03-31 RX ADMIN — GABAPENTIN 600 MG: 300 CAPSULE ORAL at 14:26

## 2024-03-31 RX ADMIN — ASPIRIN 81 MG: 81 TABLET, COATED ORAL at 21:22

## 2024-03-31 RX ADMIN — LORAZEPAM 1 MG: 1 TABLET ORAL at 21:22

## 2024-03-31 RX ADMIN — METOPROLOL TARTRATE 25 MG: 25 TABLET, FILM COATED ORAL at 21:23

## 2024-03-31 RX ADMIN — METOPROLOL TARTRATE 25 MG: 25 TABLET, FILM COATED ORAL at 08:21

## 2024-03-31 RX ADMIN — ENOXAPARIN SODIUM 40 MG: 100 INJECTION SUBCUTANEOUS at 08:22

## 2024-03-31 RX ADMIN — Medication 10 ML: at 08:23

## 2024-03-31 RX ADMIN — INSULIN LISPRO 12 UNITS: 100 INJECTION, SOLUTION INTRAVENOUS; SUBCUTANEOUS at 08:21

## 2024-03-31 RX ADMIN — INSULIN LISPRO 17 UNITS: 100 INJECTION, SOLUTION INTRAVENOUS; SUBCUTANEOUS at 12:08

## 2024-03-31 RX ADMIN — HYDROMORPHONE HYDROCHLORIDE 1 MG: 1 INJECTION, SOLUTION INTRAMUSCULAR; INTRAVENOUS; SUBCUTANEOUS at 06:40

## 2024-03-31 RX ADMIN — OXYCODONE HYDROCHLORIDE AND ACETAMINOPHEN 1 TABLET: 5; 325 TABLET ORAL at 15:34

## 2024-03-31 RX ADMIN — LINEZOLID 600 MG: 600 INJECTION, SOLUTION INTRAVENOUS at 22:24

## 2024-03-31 RX ADMIN — SENNOSIDES AND DOCUSATE SODIUM 2 TABLET: 8.6; 5 TABLET ORAL at 21:22

## 2024-03-31 RX ADMIN — Medication 10 ML: at 21:29

## 2024-03-31 RX ADMIN — PIPERACILLIN AND TAZOBACTAM 3.38 G: 3; .375 INJECTION, POWDER, LYOPHILIZED, FOR SOLUTION INTRAVENOUS at 14:28

## 2024-03-31 RX ADMIN — Medication 1 PATCH: at 14:26

## 2024-03-31 RX ADMIN — OXYCODONE HYDROCHLORIDE AND ACETAMINOPHEN 1 TABLET: 5; 325 TABLET ORAL at 05:12

## 2024-03-31 RX ADMIN — INSULIN LISPRO 19 UNITS: 100 INJECTION, SOLUTION INTRAVENOUS; SUBCUTANEOUS at 17:16

## 2024-03-31 RX ADMIN — GABAPENTIN 600 MG: 300 CAPSULE ORAL at 21:22

## 2024-03-31 RX ADMIN — GABAPENTIN 600 MG: 300 CAPSULE ORAL at 05:10

## 2024-03-31 RX ADMIN — OXYCODONE HYDROCHLORIDE AND ACETAMINOPHEN 1 TABLET: 5; 325 TABLET ORAL at 21:22

## 2024-03-31 RX ADMIN — HYDROMORPHONE HYDROCHLORIDE 1 MG: 1 INJECTION, SOLUTION INTRAMUSCULAR; INTRAVENOUS; SUBCUTANEOUS at 14:26

## 2024-03-31 RX ADMIN — HYDROMORPHONE HYDROCHLORIDE 1 MG: 1 INJECTION, SOLUTION INTRAMUSCULAR; INTRAVENOUS; SUBCUTANEOUS at 12:08

## 2024-03-31 RX ADMIN — HYDROCODONE BITARTRATE AND ACETAMINOPHEN 1 TABLET: 10; 325 TABLET ORAL at 08:22

## 2024-03-31 RX ADMIN — Medication 5 MG: at 21:22

## 2024-03-31 RX ADMIN — APIXABAN 5 MG: 5 TABLET, FILM COATED ORAL at 21:22

## 2024-03-31 RX ADMIN — HYDROMORPHONE HYDROCHLORIDE 1 MG: 1 INJECTION, SOLUTION INTRAMUSCULAR; INTRAVENOUS; SUBCUTANEOUS at 22:24

## 2024-03-31 NOTE — THERAPY WOUND CARE TREATMENT
Acute Care - Wound/Debridement Treatment Note  [facility]     Patient Name: Justo Oquendo  : 1975  MRN: [id number]  Today's Date: 3/31/2024                Admit Date: 3/29/2024    Visit Dx:    ICD-10-CM ICD-9-CM   1. 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    Diabetic foot infection        Past Medical History:   Diagnosis Date    Arthritis     Back pain     Diabetes     SINCE 2017    Dyslipidemia     HTN (hypertension)     PVD (peripheral vascular disease)     Wears partial dentures     upper and lower        Past Surgical History:   Procedure Laterality Date    AORTAGRAM N/A 2017    Procedure: AORTAGRAM WITH OR WITHOUT RUNOFFS POSSIBLE STENT with left femoral cutdown;  Surgeon: Brett Ryan MD;  Location:  [facility] OR ;  Service:     AORTAGRAM N/A 2023    Procedure: THROMBECTOMY OF LEFT GRAFT, AORTAGRAM, FEMORAL TO FEMORAL BYPASS;  Surgeon: Brett Ryan MD;  Location: [facility];  Service: Vascular;  Laterality: N/A;  FLUORO- .06 SECS  DOSE- 10 MGY  CONTRAST- 10 ML ISO    CHOLECYSTECTOMY      CYST REMOVAL      OFF OF BACK    FEMORAL POPLITEAL BYPASS Left 2024    Procedure: FEMORAL POPLITEAL BYPASS WITH POPLITEAL EXPLORATION;  Surgeon: Vin Pena MD;  Location:  [facility];  Service: Vascular;  Laterality: Left;  DOSE: 9MGY  FLURO: 14 MIN, 36 SEC  CONTRAST: 50ML VISIPAQUE 320    OH IN-SITU VEIN BYPASS FEMORAL-POPLITEAL Left 2017    Procedure: FEMORAL POPLITEAL BYPASS;  Surgeon: Brett Ryan MD;  Location:  [facility] OR ;  Service: Vascular    OH IN-SITU VEIN BYPASS FEMORAL-POPLITEAL Left  06/27/2017    Procedure: LEFT FEMORAL ENDARTECTOMYN LEFT FEMORAL POPLITEAL BYPASS;  Surgeon: Brett Ryan MD;  Location:  [facility] OR;  Service: Vascular    TRANS METATARSAL AMPUTATION Left 01/19/2024    Procedure: AMPUTATION TRANS METATARSAL- LEFT;  Surgeon: Christo Peres Jr., MD;  Location:  [facility] OR;  Service: Orthopedics;  Laterality: Left;           Wound 01/19/24 1149 Left distal foot Amputation stump (Active)   Dressing Appearance dry;intact 03/31/24 1007   Closure Unable to assess 03/31/24 0821   Base dressing in place, unable to visualize 03/31/24 1007   Periwound other (see comments) 03/31/24 0400   Drainage Amount none 03/31/24 0821   Care, Wound negative pressure wound therapy 03/31/24 0821   Dressing Care elastic bandage 03/31/24 0821   Wound Output (mL) 10 03/31/24 1007       NPWT (Negative Pressure Wound Therapy) 03/29/24 0957 (Active)   Therapy Setting continuous therapy 03/31/24 1007   Pressure Setting 125 mmHg 03/31/24 1007       NPWT (Negative Pressure Wound Therapy) 03/29/24 0957 LLE (Active)   Therapy Setting continuous therapy 03/31/24 0821   Dressing other (see comments);unable to visualize 03/31/24 0821   Pressure Setting 125 mmHg 03/31/24 0821         WOUND DEBRIDEMENT                     PT Assessment (Last 12 Hours)       PT Evaluation and Treatment       Row Name 03/31/24 1007          Physical Therapy Time and Intention    Subjective Information no complaints  -MC     Document Type wound care;therapy note (daily note)  -     Mode of Treatment physical therapy;individual therapy  -       Row Name 03/31/24 1007          General Information    Patient Observations alert;cooperative;agree to therapy  -       Row Name 03/31/24 1007          Pain    Pretreatment Pain Rating 0/10 - no pain  -     Posttreatment Pain Rating 0/10 - no pain  -       Row Name 03/31/24 1007          Cognition    Orientation Status (Cognition) oriented x 3  -       Row Name 03/31/24 1007           Wound 01/19/24 1149 Left distal foot Amputation stump    Wound - Properties Group Placement Date: 01/19/24  -NH Placement Time: 1149  -NH Side: Left  -NH Orientation: distal  -NH Location: foot  -NH Primary Wound Type: Amputation s  -NH    Dressing Appearance dry;intact  -MC     Base dressing in place, unable to visualize  -     Wound Output (mL) 10  -MC     Retired Wound - Properties Group Placement Date: 01/19/24  -NH Placement Time: 1149  -NH Side: Left  -NH Orientation: distal  -NH Location: foot  -NH Primary Wound Type: Amputation s  -NH    Retired Wound - Properties Group Date first assessed: 01/19/24  -NH Time first assessed: 1149  -NH Side: Left  -NH Location: foot  -NH Primary Wound Type: Amputation s  -NH      Row Name 03/31/24 1007          NPWT (Negative Pressure Wound Therapy) 03/29/24 0957    NPWT (Negative Pressure Wound Therapy) - Properties Group Placement Date: 03/29/24  -LG Placement Time: 0957  -LG    Therapy Setting continuous therapy  -     Pressure Setting 125 mmHg  -     Retired NPWT (Negative Pressure Wound Therapy) - Properties Group Placement Date: 03/29/24  -LG Placement Time: 0957  -LG    Retired NPWT (Negative Pressure Wound Therapy) - Properties Group Placement Date: 03/29/24  -LG Placement Time: 0957  -LG      Row Name             NPWT (Negative Pressure Wound Therapy) 03/29/24 0957 LLE    NPWT (Negative Pressure Wound Therapy) - Properties Group Placement Date: 03/29/24  -LG Placement Time: 0957  -LG Location: LLE  -LG    Retired NPWT (Negative Pressure Wound Therapy) - Properties Group Placement Date: 03/29/24  -LG Placement Time: 0957  -LG Location: LLE  -LG    Retired NPWT (Negative Pressure Wound Therapy) - Properties Group Placement Date: 03/29/24  -LG Placement Time: 0957  -LG Location: LLE  -LG      Row Name 03/31/24 1007          Coping    Observed Emotional State calm;cooperative  -     Verbalized Emotional State acceptance  -       Row Name 03/31/24 1007           Plan of Care Review    Plan of Care Reviewed With patient;spouse  -     Progress no change  -     Outcome Evaluation Wound vac intact with no apparent issues or complaints from the patient. Anticipate change tomorrow.  -       Row Name 03/31/24 1007          Positioning and Restraints    Pre-Treatment Position in bed  -     Post Treatment Position bed  -MC     In Bed supine;call light within reach;encouraged to call for assist  -MC               User Key  (r) = Recorded By, (t) = Taken By, (c) = Cosigned By      Initials Name Provider Type    Taylor Meredith PT Physical Therapist    Svetlana Meeks RN Registered Nurse    Dior Edwards RN Registered Nurse                      Recommendation and Plan  Anticipated Discharge Disposition (PT): home with outpatient therapy services  Planned Therapy Interventions (PT): wound care, patient/family education  Plan of Care Reviewed With: patient, spouse   Progress: no change       Progress: no change  Outcome Evaluation: Wound vac intact with no apparent issues or complaints from the patient. Anticipate change tomorrow.  Plan of Care Reviewed With: patient, spouse            Time Calculation   PT Charges       Row Name 03/31/24 1159             Time Calculation    Start Time 1007  -MC      PT Goal Re-Cert Due Date 04/09/24  -         Untimed Charges    75109-Ljb Pressure wound to 50 sqcm 5  -MC         Total Minutes    Untimed Charges Total Minutes 5  -MC       Total Minutes 5  -MC                User Key  (r) = Recorded By, (t) = Taken By, (c) = Cosigned By      Initials Name Provider Type    Taylor Meredith PT Physical Therapist                      Therapy Charges for Today       Code Description Service Date Service Provider Modifiers Qty    34594209508 HC PT EVAL LOW COMPLEXITY 2 3/30/2024 Taylor Bui, PT GP 1    20433080539 HC PT NEG PRESS WOUND TO 50SQCM DME1 3/30/2024 Taylor Bui, PT  1              PT G-Codes  AM-PAC 6  Clicks Score (PT): 17       Taylor Bui, PT  3/31/2024

## 2024-03-31 NOTE — PROGRESS NOTES
[facility] Medicine Services  PROGRESS NOTE    Patient Name: Justo Oquendo  : 1975  MRN: [id number]    Date of Admission: 3/29/2024  Primary Care Physician: Melo Whitaker MD    Subjective   Subjective     CC: dehisced foot wound    HPI:   No BM. Afebrile. Wound vac in place. The patient's wife is at bedside and patient okay with her being updated.  Reports left foot pain.  No respiratory symptoms.  No GI symptoms.  Had a bowel movement yesterday.    Objective   Objective     Vital Signs:   Temp:  [98.2 °F (36.8 °C)-98.4 °F (36.9 °C)] 98.3 °F (36.8 °C)  Heart Rate:  [80-98] 89  Resp:  [16] 16  BP: (136-141)/(75-89) 137/89     Physical Exam:  Constitutional: No acute distress, awake, alert  HENT: NCAT, mucous membranes moist  Respiratory: Clear to auscultation bilaterally, respiratory effort normal   Cardiovascular: RRR, no murmurs, rubs, or gallops  Gastrointestinal: Positive bowel sounds, soft, nontender, nondistended  Musculoskeletal: left foot with wound vac in place  Psychiatric: Appropriate affect, cooperative  Neurologic: PERRL, symmetric facies, moves all extremities well, speech clear  Skin: No rashes      Results Reviewed:  LAB RESULTS:      Lab 24  05024  0904 24  0741   WBC 8.14 10.80 8.85   HEMOGLOBIN 12.4* 12.1* 12.2*   HEMATOCRIT 39.9 38.2 39.2   PLATELETS 221 248 258   NEUTROS ABS 4.56 6.84  --    IMMATURE GRANS (ABS) 0.15* 0.07*  --    LYMPHS ABS 2.26 2.85  --    MONOS ABS 0.92* 0.93*  --    EOS ABS 0.20 0.07  --    MCV 88.3 88.4 89.7   SED RATE 27* 34*  --    CRP 0.30 0.51*  --          Lab 24  05024  0904 24  0741   SODIUM 135* 133* 136   POTASSIUM 4.4 4.2 5.2   CHLORIDE 97* 95* 99   CO2 27.0 29.0 25.0   ANION GAP 11.0 9.0 12.0   BUN 24* 19 21*   CREATININE 0.81 0.74* 0.79   EGFR 107.4 110.4 108.2   GLUCOSE 221* 173* 370*   CALCIUM 10.1 10.7* 10.5   HEMOGLOBIN A1C  --   --  7.80*         Lab 24  0502    TOTAL PROTEIN 7.3   ALBUMIN 4.2   GLOBULIN 3.1   ALT (SGPT) 77*   AST (SGOT) 29   BILIRUBIN <0.2   ALK PHOS 95                     Brief Urine Lab Results  (Last result in the past 365 days)        Color   Clarity   Blood   Leuk Est   Nitrite   Protein   CREAT   Urine HCG        01/23/24 1124 Yellow   Clear   Negative   Negative   Negative   Negative                   Microbiology Results Abnormal       Procedure Component Value - Date/Time    AFB Culture - Wound, Foot, Left [[id number]] Collected: 03/29/24 0830    Lab Status: Preliminary result Specimen: Wound from Foot, Left Updated: 03/30/24 1141     AFB Stain No acid fast bacilli seen on concentrated smear            No radiology results from the last 24 hrs    Results for orders placed during the hospital encounter of 11/20/23    Adult Transthoracic Echo Complete W/ Cont if Necessary Per Protocol    Interpretation Summary    Left ventricular systolic function is hyperdynamic (EF > 70%). Calculated left ventricular EF = 70.4%    Left ventricular diastolic function was normal.    No significant structural or functional valvular disease.      Current medications:  Scheduled Meds:aspirin, 81 mg, Oral, Nightly  DAPTOmycin, 6 mg/kg, Intravenous, Q24H  enoxaparin, 40 mg, Subcutaneous, Daily  gabapentin, 600 mg, Oral, Q8H  insulin detemir, 1-200 Units, Subcutaneous, Nightly - Glucommander  insulin lispro, 1-200 Units, Subcutaneous, 4x Daily With Meals & Nightly  lisinopril, 40 mg, Oral, Nightly  LORazepam, 1 mg, Oral, Nightly  metoprolol tartrate, 25 mg, Oral, Q12H  nicotine, 1 patch, Transdermal, Q24H  NIFEdipine XL, 30 mg, Oral, Q24H  pantoprazole, 40 mg, Oral, Q AM  piperacillin-tazobactam, 3.375 g, Intravenous, Q8H  rosuvastatin, 40 mg, Oral, Nightly  senna-docusate sodium, 2 tablet, Oral, BID  sodium chloride, 10 mL, Intravenous, Q12H      Continuous Infusions:lactated ringers, 9 mL/hr, Last Rate: 9 mL/hr (03/29/24 0811)      PRN Meds:.  acetaminophen     senna-docusate sodium **AND** polyethylene glycol **AND** bisacodyl **AND** bisacodyl    dextrose    dextrose    glucagon (human recombinant)    HYDROcodone-acetaminophen    HYDROmorphone    insulin lispro    melatonin    nitroglycerin    ondansetron    oxyCODONE-acetaminophen    sodium chloride    sodium chloride    Assessment & Plan   Assessment & Plan     Active Hospital Problems    Diagnosis  POA    **Diabetic foot infection [E11.628, L08.9]  Yes      Resolved Hospital Problems   No resolved problems to display.        Brief Hospital Course to date:  Justo Oquendo is a 48 y.o. male w PAD, diabetic foot infection, HTN, HLD, DM2, tobacco use disorder, chronic pain, history of ischemic limb status post transmetatarsal amputation with wound dehiscence/dry gangrene status postdebridement, irrigation, vacuum assisted wound closure. Ortho and ID follow.    This patient's problems and plans were partially entered by my partner and updated as appropriate by me 03/31/24.    History of ischemic limb status post transmetatarsal amputation with wound dehiscence/dry gangrene status postdebridement, irrigation, vacuum assisted wound closure  PAD s/p prior FemFem bypass  --s/p I & D left foot abscess with placement of wound vac closure by Dr Peres on 3/29/24  --surgical cultures with Enterococcus faecium prelim  --pain control   --Eliquis on hold, restart per surgery; awaiting response from Dr. Peres  - anticipate DC home with Wound Vac, and nonweightbearing, and abx per ID  (Dr Alejo following)   - per PT/Wound, authorization for wound vac will not happen over the weekend  -Follow-up orthopedics on April 5, 2024 with Winter Santana PA-C  [facility]  [address], [address]  [place], [address]  Please schedule at [phone]  -Follow-up with Dr. Alejo (Infectious disease) outpatient     HTN  HLD  --cont home meds      DM  --A1c 7.8    --stated sugars at home have been running high due to  not being able to obtain medications.   --glucommander   --Endocrine referral at [place]      Tobacco use  Chronic pain -Home Norco     Addendum: eliquis 5mg BID resumed after receiving the OK from Dr. Peres. Lovenox ppx stopped.      Expected Discharge Location and Transportation: home by car  Expected Discharge   Expected Discharge Date: 4/1/2024; Expected Discharge Time:      DVT prophylaxis:  Medical and mechanical DVT prophylaxis orders are present.         AM-PAC 6 Clicks Score (PT): 17 (03/30/24 1911)    CODE STATUS:   Code Status and Medical Interventions:   Ordered at: 03/29/24 1225     Level Of Support Discussed With:    Patient     Code Status (Patient has no pulse and is not breathing):    CPR (Attempt to Resuscitate)     Medical Interventions (Patient has pulse or is breathing):    Full Support       Alena Cat MD  03/31/24

## 2024-03-31 NOTE — PLAN OF CARE
Goal Outcome Evaluation:  Plan of Care Reviewed With: patient, spouse        Progress: no change  Outcome Evaluation: Wound vac intact with no apparent issues or complaints from the patient. Anticipate change tomorrow.      Anticipated Discharge Disposition (PT): home with outpatient therapy services

## 2024-03-31 NOTE — PLAN OF CARE
Goal Outcome Evaluation:  Plan of Care Reviewed With: patient, spouse        Progress: improving  Outcome Evaluation: REeval completed gati to 5 ft with rolling walker.  MOD IND with transfers.  Pain increaed by upright position.  Nsg gave addl scheduled pain meds.      Anticipated Discharge Disposition (PT): home with home health, home with assist

## 2024-03-31 NOTE — THERAPY RE-EVALUATION
Patient Name: Justo Oquendo  : 1975    MRN: [id number]                              Today's Date: 3/31/2024       Admit Date: 3/29/2024    Visit Dx:     ICD-10-CM ICD-9-CM   1. 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    Diabetic foot infection     Past Medical History:   Diagnosis Date    Arthritis     Back pain     Diabetes     SINCE 2017    Dyslipidemia     HTN (hypertension)     PVD (peripheral vascular disease)     Wears partial dentures     upper and lower     Past Surgical History:   Procedure Laterality Date    AORTAGRAM N/A 2017    Procedure: AORTAGRAM WITH OR WITHOUT RUNOFFS POSSIBLE STENT with left femoral cutdown;  Surgeon: Brett Ryan MD;  Location:  [facility] [place] [address];  Service:     AORTAGRAM N/A 2023    Procedure: THROMBECTOMY OF LEFT GRAFT, AORTAGRAM, FEMORAL TO FEMORAL BYPASS;  Surgeon: Brett Ryan MD;  Location:  [facility];  Service: Vascular;  Laterality: N/A;  FLUORO- .06 SECS  DOSE- 10 MGY  CONTRAST- 10 ML ISO    CHOLECYSTECTOMY      CYST REMOVAL      OFF OF BACK    FEMORAL POPLITEAL BYPASS Left 2024    Procedure: FEMORAL POPLITEAL BYPASS WITH POPLITEAL EXPLORATION;  Surgeon: Vin Pena MD;  Location:  [facility];  Service: Vascular;  Laterality: Left;  DOSE: 9MGY  FLURO: 14 MIN, 36 SEC  CONTRAST: 50ML VISIPAQUE 320    SD IN-SITU VEIN BYPASS FEMORAL-POPLITEAL Left 2017    Procedure: FEMORAL POPLITEAL BYPASS;  Surgeon: Brett Ryan MD;  Location:  [facility];  Service: Vascular    SD IN-SITU VEIN BYPASS FEMORAL-POPLITEAL Left 2017    Procedure: LEFT FEMORAL ENDARTECTOMYN  LEFT FEMORAL POPLITEAL BYPASS;  Surgeon: Brett Ryan MD;  Location:  [facility];  Service: Vascular    TRANS METATARSAL AMPUTATION Left 01/19/2024    Procedure: AMPUTATION TRANS METATARSAL- LEFT;  Surgeon: Christo Peres Jr., MD;  Location:  [facility];  Service: Orthopedics;  Laterality: Left;      General Information       Row Name 03/31/24 1533          Physical Therapy Time and Intention    Document Type re-evaluation  -CT     Mode of Treatment physical therapy  -CT       Row Name 03/31/24 1533          General Information    Patient Profile Reviewed yes  -CT     Prior Level of Function independent:  -CT     Existing Precautions/Restrictions brace worn when out of bed;weight bearing;other (see comments)  protecdted weigthbearing in shoe  -CT     Barriers to Rehab medically complex  -CT       Row Name 03/31/24 1533          Living Environment    People in Home spouse  -CT       Row Name 03/31/24 1533          Home Main Entrance    Number of Stairs, Main Entrance one  -CT       Row Name 03/31/24 1533          Stairs Within Home, Primary    Number of Stairs, Within Home, Primary none  -CT       Row Name 03/31/24 1533          Cognition    Orientation Status (Cognition) oriented x 4  -CT       Row Name 03/31/24 1533          Safety Issues, Functional Mobility    Impairments Affecting Function (Mobility) balance;endurance/activity tolerance  -CT               User Key  (r) = Recorded By, (t) = Taken By, (c) = Cosigned By      Initials Name Provider Type    CT Enzo Beckford, PT Physical Therapist                   Mobility       Row Name 03/31/24 1536          Bed-Chair Transfer    Bed-Chair [place] (Transfers) modified independence;1 person to manage equipment  -CT     Assistive Device (Bed-Chair Transfers) walker, front-wheeled  -CT       Row Name 03/31/24 1536          Sit-Stand Transfer    Sit-Stand [place] (Transfers) 1 person to manage equipment;modified independence  -CT     Assistive  Device (Sit-Stand Transfers) walker, front-wheeled  -CT       Row Name 03/31/24 1535          Gait/Stairs (Locomotion)    [place] Level (Gait) modified independence;1 person to manage equipment  -CT     Assistive Device (Gait) walker, front-wheeled  -CT     Distance in Feet (Gait) 75  -CT     Deviations/Abnormal Patterns (Gait) gait speed decreased  -CT       Row Name 03/31/24 1535          Mobility    Extremity Weight-bearing Status left lower extremity  -CT     Left Lower Extremity (Weight-bearing Status) touch down weight-bearing (TDWB);other (see comments)  in off loading  boot protected WB ing  -CT               User Key  (r) = Recorded By, (t) = Taken By, (c) = Cosigned By      Initials Name Provider Type    CT Enzo Beckford, PT Physical Therapist                   Obj/Interventions       Row Name 03/31/24 1536          Range of Motion Comprehensive    General Range of Motion lower extremity range of motion deficits identified  -CT       Row Name 03/31/24 1536          Strength Comprehensive (MMT)    General Manual Muscle Testing (MMT) Assessment lower extremity strength deficits identified  -CT       Row Name 03/31/24 1536          Motor Skills    Motor Skills functional endurance  -CT       Row Name 03/31/24 1536          Balance    Balance Assessment sitting static balance;sitting dynamic balance;standing static balance;standing dynamic balance  -CT     Static Sitting Balance independent  -CT     Dynamic Sitting Balance modified independence;1 person to manage equipment  -CT     Position, Sitting Balance sitting edge of bed;unsupported  -CT     Static Standing Balance modified independence  -CT     Dynamic Standing Balance 1 person to manage equipment;contact guard  -CT     Position/Device Used, Standing Balance supported;walker, front-wheeled  -CT     Balance Interventions sitting;standing;sit to stand;supported;weight shifting activity  -CT               User Key  (r) = Recorded By, (t) =  Taken By, (c) = Cosigned By      Initials Name Provider Type    CT Enzo Beckford, PT Physical Therapist                   Goals/Plan       Row Name 03/31/24 1543          Bed Mobility Goal 1 (PT)    Activity/Assistive Device (Bed Mobility Goal 1, PT) bed mobility activities, all  -CT     [place] Level/Cues Needed (Bed Mobility Goal 1, PT) independent  -CT     Time Frame (Bed Mobility Goal 1, PT) short term goal (STG);5 days  -CT       Row Name 03/31/24 1543          Transfer Goal 1 (PT)    Activity/Assistive Device (Transfer Goal 1, PT) transfers, all  -CT     [place] Level/Cues Needed (Transfer Goal 1, PT) modified independence  -CT     Time Frame (Transfer Goal 1, PT) short term goal (STG);5 days  -CT       Row Name 03/31/24 1543          Gait Training Goal 1 (PT)    Activity/Assistive Device (Gait Training Goal 1, PT) gait (walking locomotion)  -CT     [place] Level (Gait Training Goal 1, PT) modified independence  -CT     Distance (Gait Training Goal 1, PT) 300  -CT     Time Frame (Gait Training Goal 1, PT) short term goal (STG);5 days  -CT               User Key  (r) = Recorded By, (t) = Taken By, (c) = Cosigned By      Initials Name Provider Type    CT Enzo Beckford, PT Physical Therapist                   Clinical Impression       Row Name 03/31/24 1537          Pain    Pretreatment Pain Rating 6/10  -CT     Posttreatment Pain Rating 10/10  -CT     Pain Location - Side/Orientation Left  -CT     Pain Location - foot  -CT       Row Name 03/31/24 1538          Plan of Care Review    Plan of Care Reviewed With patient;spouse  -CT     Progress improving  -CT     Outcome Evaluation REeval completed gati to 5 ft with rolling walker.  MOD IND with transfers.  Pain increaed by upright position.  Nsg gave addl scheduled pain meds.  -CT       Row Name 03/31/24 1538          Therapy Assessment/Plan (PT)    Patient/Family Therapy Goals Statement (PT) Hopes to go home tomorrow.  -CT      Rehab Potential (PT) good, to achieve stated therapy goals  -CT     Criteria for Skilled Interventions Met (PT) yes;meets criteria;skilled treatment is necessary  -CT     Therapy Frequency (PT) daily  -CT       Row Name 03/31/24 1538          Positioning and Restraints    Pre-Treatment Position in bed  -CT     Post Treatment Position bed  -CT     In Bed notified nsg;sitting;call light within reach;encouraged to call for assist;with family/caregiver  -CT               User Key  (r) = Recorded By, (t) = Taken By, (c) = Cosigned By      Initials Name Provider Type    CT Enzo Beckford, PT Physical Therapist                   Outcome Measures       Row Name 03/31/24 1544 03/31/24 0821       How much help from another person do you currently need...    Turning from your back to your side while in flat bed without using bedrails? 4  -CT 4  -AP    Moving from lying on back to sitting on the side of a flat bed without bedrails? 4  -CT 3  -AP    Moving to and from a bed to a chair (including a wheelchair)? 4  -CT 3  -AP    Standing up from a chair using your arms (e.g., wheelchair, bedside chair)? 4  -CT 3  -AP    Climbing 3-5 steps with a railing? 2  -CT 2  -AP    To walk in hospital room? 4  -CT 2  -AP    AM-PAC 6 Clicks Score (PT) 22  -CT 17  -AP    Highest Level of Mobility Goal 7 --> Walk 25 feet or more  -CT 5 --> Static standing  -AP      Row Name 03/31/24 1544          Functional Assessment    Outcome Measure Options AM-PAC 6 Clicks Basic Mobility (PT)  -CT               User Key  (r) = Recorded By, (t) = Taken By, (c) = Cosigned By      Initials Name Provider Type    CT Enzo Beckford, PT Physical Therapist    AP Fallon Mendez, RN Registered Nurse                                 Physical Therapy Education       Title: PT OT SLP Therapies (Done)       Topic: Physical Therapy (Done)       Point: Mobility training (Done)       Learning Progress Summary             Patient Acceptance, E,D, DU by CT  at 3/31/2024 1546   Significant Other Acceptance, E,D, DU by CT at 3/31/2024 1546                         Point: Home exercise program (Done)       Learning Progress Summary             Patient Acceptance, E,D, DU by CT at 3/31/2024 1546   Significant Other Acceptance, E,D, DU by CT at 3/31/2024 1546                         Point: Body mechanics (Done)       Learning Progress Summary             Patient Acceptance, E,D, DU by CT at 3/31/2024 1546   Significant Other Acceptance, E,D, DU by CT at 3/31/2024 1546                         Point: Precautions (Done)       Learning Progress Summary             Patient Acceptance, E,D, DU by CT at 3/31/2024 1546   Significant Other Acceptance, E,D, DU by CT at 3/31/2024 1546                                         User Key       Initials Effective Dates Name Provider Type Discipline    CT 07/07/23 -  Enzo Beckford, PT Physical Therapist PT                  PT Recommendation and Plan     Plan of Care Reviewed With: patient, spouse  Progress: improving  Outcome Evaluation: REeval completed gati to 5 ft with rolling walker.  MOD IND with transfers.  Pain increaed by upright position.  Nsg gave addl scheduled pain meds.     Time Calculation:         PT Charges       Row Name 03/31/24 1546 03/31/24 1159          Time Calculation    Start Time 1450  -CT 1007  -MC     PT Received On 03/31/24  -CT --     PT Goal Re-Cert Due Date 04/10/24  -CT 04/09/24  -MC        Time Calculation- PT    Total Timed Code Minutes- PT 35 minute(s)  -CT --        Timed Charges    11662 - Gait Training Minutes  25 -CT --        Untimed Charges    PT Eval/Re-eval Minutes 25 -CT --     84650-Qqm Pressure wound to 50 sqcm -- 5  -MC        Total Minutes    Timed Charges Total Minutes 25  -CT --     Untimed Charges Total Minutes 25  -CT 5  -MC      Total Minutes 50  -CT 5  -MC               User Key  (r) = Recorded By, (t) = Taken By, (c) = Cosigned By      Initials Name Provider Type    CT  Enzo Beckford, PT Physical Therapist    Taylor Meredith PT Physical Therapist                  Therapy Charges for Today       Code Description Service Date Service Provider Modifiers Qty    71771499418 HC GAIT TRAINING EA 15 MIN 3/31/2024 Enzo Beckford, PT GP 2    78776912703 HC PT RE-EVAL ESTABLISHED PLAN 2 3/31/2024 Enzo Beckford, PT GP 1            PT G-Codes  Outcome Measure Options: AM-PAC 6 Clicks Basic Mobility (PT)  AM-PAC 6 Clicks Score (PT): 22  PT Discharge Summary  Anticipated Discharge Disposition (PT): home with home health, home with assist    Enzo Beckford, PT  3/31/2024

## 2024-03-31 NOTE — PROGRESS NOTES
Justo Oquendo       LOS: 1 day   Patient Care Team:  Melo Whitaker MD as PCP - General (Family Medicine)  Niall Mcfarlane MD as Consulting Physician (Cardiology)    Chief Complaint: Left forefoot wound ischemia    Subjective     Interval History:     Pain tolerable overnight    Review of Systems:     Gen- No fevers, chills  CV- No chest pain, palpitations  Resp- No cough, dyspnea  GI- No N/V/D, abd pain    Objective     Vital Signs  Vital Signs (last 24 hours)         03/29 0700  03/30 0659 03/30 0700  03/30 0748   Most Recent      Temp (°F) 97 -  98.1       97.9 (36.6) 03/30 0407    Heart Rate 94 -  115       94 03/30 0407    Resp 14 -  18       16 03/30 0407    /74 -  161/82       137/97 03/30 0407    SpO2 (%) 93 -  100       93 03/30 0407    Flow (L/min)   2       2 03/29 0900              Physical Exam:     Alert, oriented.  No acute distress.  Nonlabored respirations.  Regular rate and rhythm.  Abdomen nondistended.  Left lower extremity: Operative dressing, wound VAC in place with appropriate seal, minimal serosanguineous output.     Results Review:     I reviewed the patient's new clinical results.    Medication Review:   Hospital Medications (active)         Dose Frequency Start End    !4/1 Vancomycin random level at 0500. Please hold 0600 dose until level evaluated by pharmacy  Daily 4/1/2024 4/2/2024    Route: Does not apply    acetaminophen (TYLENOL) tablet 650 mg 650 mg Every 4 Hours PRN 3/29/2024 --    Admin Instructions: If given for fever, use fever parameter: fever greater than 100.4 °F  Based on patient request - if ordered for moderate or severe pain, provider allows for administration of a medication prescribed for a lower pain scale.    Do not exceed 4 grams of acetaminophen in a 24 hr period. Max dose of 2gm for AST/ALT greater than 120 units/L.    If given for pain, use the following pain scale:   Mild Pain = Pain Score of 1-3, CPOT 1-2  Moderate Pain = Pain Score of  "4-6, CPOT 3-4  Severe Pain = Pain Score of 7-10, CPOT 5-8    Route: Oral    aspirin EC tablet 81 mg 81 mg Nightly 3/29/2024 --    Admin Instructions: Do not crush or chew the capsules or tablets. The drug may not work as designed if the capsule or tablet is crushed or chewed. Swallow whole.  Do not exceed 4 grams of aspirin in a 24 hr period.    If given for pain, use the following pain scale:   Mild Pain = Pain Score of 1-3, CPOT 1-2  Moderate Pain = Pain Score of 4-6, CPOT 3-4  Severe Pain = Pain Score of 7-10, CPOT 5-8    Route: Oral    bisacodyl (DULCOLAX) EC tablet 5 mg 5 mg Daily PRN 3/29/2024 --    Admin Instructions: Use if no bowel movement after 12 hours.  Swallow whole. Do not crush, split, or chew tablet.    Route: Oral    Linked Group 1: Placed in \"And\" Linked Group        bisacodyl (DULCOLAX) suppository 10 mg 10 mg Daily PRN 3/29/2024 --    Admin Instructions: Use if no bowel movement after 12 hours.  Hold for diarrhea    Route: Rectal    Linked Group 1: Placed in \"And\" Linked Group        dextrose (D50W) (25 g/50 mL) IV injection 10-50 mL 10-50 mL Every 15 Minutes PRN 3/29/2024 --    Admin Instructions: Blood Glucose <70  Patient Has IV Access, Is Unresponsive, NPO or Unable to Safely Swallow  Recheck Blood Glucose Per Glucommander™    Route: Intravenous    dextrose (GLUTOSE) oral gel 15 g 15 g Every 15 Minutes PRN 3/29/2024 --    Admin Instructions: Blood Glucose <70  Patient Alert, NOT NPO, Can Safely Swallow  Recheck Blood Glucose Per Glucommander™    Route: Oral    Enoxaparin Sodium (LOVENOX) syringe 40 mg 40 mg Daily 3/30/2024 --    Admin Instructions: Give subcutaneous in abdomen only. Do not massage site after injection.    Route: Subcutaneous    gabapentin (NEURONTIN) capsule 600 mg 600 mg Every 8 Hours Scheduled 3/29/2024 --    Admin Instructions:     Route: Oral    glucagon (GLUCAGEN) injection 1 mg 1 mg Every 15 Minutes PRN 3/29/2024 --    Admin Instructions: Blood Glucose <70  Patient " Without IV Access, Unresponsive, NPO or Unable to Safely Swallow  Recheck Blood Glucose Per Glucommander™  Reconstitute powder for injection by adding 1 mL of -supplied sterile diluent or sterile water for injection to a vial containing 1 mg of the drug, to provide solutions containing 1 mg/mL. Shake vial gently to dissolve.    Route: Intramuscular    HYDROcodone-acetaminophen (NORCO)  MG per tablet 1 tablet 1 tablet Every 8 Hours PRN 3/29/2024 --    Admin Instructions: Based on patient request - if ordered for moderate or severe pain, provider allows for administration of a medication prescribed for a lower pain scale.  [SPEEDY]    Do not exceed 4 grams of acetaminophen in a 24 hr period. Max dose of 2gm for AST/ALT greater than 120 units/L        If given for pain, use the following pain scale:   Mild Pain = Pain Score of 1-3, CPOT 1-2  Moderate Pain = Pain Score of 4-6, CPOT 3-4  Severe Pain = Pain Score of 7-10, CPOT 5-8    Route: Oral    HYDROmorphone (DILAUDID) injection 1 mg 1 mg Every 2 Hours PRN 3/29/2024 4/1/2024    Admin Instructions: Based on patient request - if ordered for moderate or severe pain, provider allows for administration of a medication prescribed for a lower pain scale.      Caution: Look alike/sound alike drug alert    If given for pain, use the following pain scale:  Mild Pain = Pain Score of 1-3, CPOT 1-2  Moderate Pain = Pain Score of 4-6, CPOT 3-4  Severe Pain = Pain Score of 7-10, CPOT 5-8    Route: Intravenous    insulin detemir (LEVEMIR) injection 1-200 Units 1-200 Units Nightly - Glucommander 3/29/2024 --    Admin Instructions: Do not hold basal insulin without an order. Consider requesting a dose edit, if needed.  per Glucommander    Route: Subcutaneous    insulin lispro (humaLOG) injection 1-200 Units 1-200 Units 4 Times Daily With Meals & Nightly 3/29/2024 --    Admin Instructions: Document Total Bolus Dose Using This MAR Entry   Total Bolus Dose Includes  Scheduled Meal & Associated Correction Dose  If Patient NPO or Unable to Eat Administer Correction Insulin Only  per Glucommander    Route: Subcutaneous    insulin lispro (humaLOG) injection 1-200 Units 1-200 Units As Needed 3/29/2024 --    Admin Instructions: Correction Doses Outside of Scheduled Meal & Bedtime Per Glucommander™   Use This MAR Entry For Insulin Doses When There is NO SCHEDULED MAR Entry Available  Administer Correction Insulin Even if Patient NPO or Unable to Eat  per Glucommander    Route: Subcutaneous    lactated ringers infusion 9 mL/hr Continuous 3/29/2024 --    Admin Instructions: May switch to NS IV at KVO if renal / if indicated    Route: Intravenous    lisinopril (PRINIVIL,ZESTRIL) tablet 40 mg 40 mg Nightly 3/29/2024 --    Admin Instructions: Hold for SBP less than 100, DBP less than 60.    Route: Oral    LORazepam (ATIVAN) tablet 1 mg 1 mg Nightly 3/29/2024 --    Admin Instructions:  Caution: Look alike/sound alike drug alert    Route: Oral    metoprolol tartrate (LOPRESSOR) tablet 25 mg 25 mg Every 12 Hours Scheduled 3/29/2024 --    Admin Instructions: Hold for SBP less than 100, DBP less than 60, or heart rate less than 50. If a dose is held, please contact the provider.    Route: Oral    nicotine (NICODERM CQ) 21 MG/24HR patch 1 patch 1 patch Every 24 Hours 3/29/2024 --    Admin Instructions: Apply to clean, dry, nonhairy area of skin (typically upper arm or shoulder)  Dispose of nicotine replacement therapies and their wrappers in non-hazardous pharmaceutical waste or in regular trash.    Route: Transdermal    NIFEdipine XL (PROCARDIA XL) 24 hr tablet 30 mg 30 mg Every 24 Hours Scheduled 3/29/2024 --    Admin Instructions: Caution: Look alike/sound alike drug alert. Avoid grapefruit juice. Swallow whole. Do not crush, split or chew.    Route: Oral    nitroglycerin (NITROSTAT) SL tablet 0.4 mg 0.4 mg Every 5 Minutes PRN 3/29/2024 --    Admin Instructions: If Pain Unrelieved After 3  "Doses Notify MD  May administer up to 3 doses per episode.    Route: Sublingual    ondansetron (ZOFRAN) injection 4 mg 4 mg Every 6 Hours PRN 3/29/2024 --    Admin Instructions: If BOTH ondansetron (ZOFRAN) and promethazine (PHENERGAN) are ordered use ondansetron first and THEN promethazine IF ondansetron is ineffective.    Route: Intravenous    oxyCODONE-acetaminophen (PERCOCET) 5-325 MG per tablet 1 tablet 1 tablet Every 4 Hours PRN 3/29/2024 4/3/2024    Admin Instructions: Based on patient request - if ordered for moderate or severe pain, provider allows for administration of a medication prescribed for a lower pain scale.  [SPEEDY]    Do not exceed 4 grams of acetaminophen in a 24 hr period. Max dose of 2gm for AST/ALT greater than 120 units/L        If given for pain, use the following pain scale:   Mild Pain = Pain Score of 1-3, CPOT 1-2  Moderate Pain = Pain Score of 4-6, CPOT 3-4  Severe Pain = Pain Score of 7-10, CPOT 5-8    Route: Oral    pantoprazole (PROTONIX) EC tablet 40 mg 40 mg Every Early Morning 3/30/2024 --    Admin Instructions: Do not crush or chew the capsules or tablets. The drug may not work as designed if the capsule or tablet is crushed or chewed. Swallow whole.  Swallow whole; do not crush, split, or chew.    Route: Oral    Pharmacy to dose vancomycin  Continuous PRN 3/29/2024 4/3/2024    Route: Does not apply    piperacillin-tazobactam (ZOSYN) 3.375 g IVPB in 100 mL NS MBP (CD) 3.375 g Every 8 Hours 3/29/2024 4/3/2024    Route: Intravenous    polyethylene glycol (MIRALAX) packet 17 g 17 g Daily PRN 3/29/2024 --    Admin Instructions: Use if no bowel movement after 12 hours. Mix in 6-8 ounces of water.  Use 4-8 ounces of water, tea, or juice for each 17 gram dose.    Route: Oral    Linked Group 1: Placed in \"And\" Linked Group        rosuvastatin (CRESTOR) tablet 40 mg 40 mg Nightly 3/29/2024 --    Admin Instructions: Avoid grapefruit juice.    Route: Oral    sennosides-docusate (PERICOLACE) " "8.6-50 MG per tablet 2 tablet 2 tablet 2 Times Daily 3/29/2024 --    Admin Instructions: HOLD MEDICATION IF PATIENT HAS HAD BOWEL MOVEMENT. Start bowel management regimen if patient has not had a bowel movement after 12 hours.    Route: Oral    Linked Group 1: Placed in \"And\" Linked Group        sodium chloride 0.9 % flush 10 mL 10 mL Every 12 Hours Scheduled 3/29/2024 --    Route: Intravenous    sodium chloride 0.9 % flush 10 mL 10 mL As Needed 3/29/2024 --    Route: Intravenous    sodium chloride 0.9 % infusion 40 mL 40 mL As Needed 3/29/2024 --    Admin Instructions: Following administration of an IV intermittent medication, flush line with 40mL NS at 100mL/hr.    Route: Intravenous    Vancomycin HCl 1,250 mg in sodium chloride 0.9 % 250 mL VTB 1,250 mg Every 12 Hours 3/30/2024 4/4/2024    Route: Intravenous              Assessment & Plan     48-year-old male with peripheral vascular disease status post transmetatarsal amputation with wound dehiscence/dry gangrene status post debridement, irrigation, wound vacuum-assisted closure.      Diabetic foot infection    Protected heel weightbearing in walker in postoperative shoe.  Follow cultures -- prelim culture gram positive cocci.    He has inquired about returning to work part-time.  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  I think returning strictly nonweightbearing if he is able to sit and elevate would be appropriate.  Every 72 hour wound vacuum-assisted closure changes beginning April 1.    He will need outpatient wound vacuum-assisted closure therapy/home health versus PT wound care follow-up for wound VAC changes.    Follow up April 5 with Winter Santana PA-C.    [facility]  [address], [address]  [place], [address]  Please schedule at [phone]    Christo Peres Jr, MD  03/31/24  00:03 EDT           "

## 2024-03-31 NOTE — PROGRESS NOTES
"[facility] Progress Note    Date of Admission: 3/29/2024      Antibiotics: dapto/zosyn      CC: left foot wound    S: No f/c/s. No n/v/d. Hemodynamically stable.  Wound VAC in place    O:  /70   Pulse 100   Temp 98.5 °F (36.9 °C) (Oral)   Resp 16   Ht 175.3 cm (69.02\")   Wt 81.6 kg (179 lb 14.3 oz)   SpO2 96%   BMI 26.55 kg/m²   Temp (24hrs), Av.4 °F (36.9 °C), Min:98.2 °F (36.8 °C), Max:98.5 °F (36.9 °C)      PE:     GENERAL: Awake and alert, in no acute distress.   HEENT: Normocephalic, atraumatic.  PERRL. EOMI. No conjunctival injection. No icterus. Oropharynx clear without evidence of thrush or exudate. No evidence of peridontal disease.    NECK: Supple without nuchal rigidity  LYMPH: No cervical, axillary or inguinal lymphadenopathy. No neck masses  HEART: RRR; No murmur, rubs, gallops.   LUNGS: Clear to auscultation bilaterally without wheezing, rales, rhonchi. Normal respiratory effort.  ABDOMEN: Soft, nontender, nondistended. Positive bowel sounds. No rebound or guarding.   EXT:  No cyanosis, clubbing or edema  : Normal appearing genitalia without Tripp catheter.  MSK: FROM without joint effusions noted    SKIN: Warm and dry without cutaneous eruptions.  Left foot open wound with wound vac in place  NEURO: Oriented to PPT. No focal deficits.   PSYCHIATRIC: Normal insight and judgement. Cooperative with PE     Laboratory Data    Results from last 7 days   Lab Units 24  0509 24  0904 24  0741   WBC 10*3/mm3 8.14 10.80 8.85   HEMOGLOBIN g/dL 12.4* 12.1* 12.2*   HEMATOCRIT % 39.9 38.2 39.2   PLATELETS 10*3/mm3 221 248 258     Results from last 7 days   Lab Units 24  0509   SODIUM mmol/L 135*   POTASSIUM mmol/L 4.4   CHLORIDE mmol/L 97*   CO2 mmol/L 27.0   BUN mg/dL 24*   CREATININE mg/dL 0.81   GLUCOSE mg/dL 221*   CALCIUM mg/dL 10.1     Results from last 7 days   Lab Units 24  0509   ALK PHOS U/L 95   BILIRUBIN mg/dL <0.2   ALT (SGPT) U/L 77*   AST (SGOT) U/L 29 "     Results from last 7 days   Lab Units 03/31/24  0509   SED RATE mm/hr 27*     Results from last 7 days   Lab Units 03/31/24  0509   CRP mg/dL 0.30       Estimated Creatinine Clearance: 125.9 mL/min (by C-G formula based on SCr of 0.81 mg/dL).      Microbiology:  Enterococcus faecium    Radiology:  Imaging Results (Last 24 Hours)       ** No results found for the last 24 hours. 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  Underwent a TMA in January 2024, and has completed a course of parenteral therapy, hyperbaric therapy, and has developed open wound with necrosis and concern for chronic infection admitted for I & D and wound vac.  We were asked to see for antibiotic management.   With E. Faecium and previous dapto will change to zyvox in case this is VRE and developed resistance to Dapto ESR only 27 and crp normal at 0.3       PLAN:  D/c  Daptomycin and change to linezolid  Continue zosyn  F/u old records and cultures   With ESR only 27 and CRP normal d/w pt possible oral abx with good oral bioavailability like zyvox and moxifloxacin can be considered with close f/u    Dieter Alejo MD  3/31/2024

## 2024-03-31 NOTE — PLAN OF CARE
Goal Outcome Evaluation:  Plan of Care Reviewed With: patient, spouse        Progress: improving          Pt aox4, vss, room air, ambulated in hallway with PT tolerated well-nwb maintained to LLE, c/o pain increased upon ambulation, wound vac in place-dressing cdi ,pain is severe at times-prn meds given frequently, voiding well, iso bed in place, shifting weight frequently and independently, spouse at bedside, possible dc home tomorrow..    Problem: Adult Inpatient Plan of Care  Goal: Absence of Hospital-Acquired Illness or Injury  Intervention: Identify and Manage Fall Risk  Recent Flowsheet Documentation  Taken 3/31/2024 1800 by Fallon Mendez, RN  Safety Promotion/Fall Prevention:   activity supervised   safety round/check completed   gait belt  Taken 3/31/2024 1630 by Fallon Mendez, RN  Safety Promotion/Fall Prevention:   activity supervised   safety round/check completed   gait belt  Taken 3/31/2024 1426 by Fallon Mendez, RN  Safety Promotion/Fall Prevention:   activity supervised   safety round/check completed   gait belt   lighting adjusted  Taken 3/31/2024 1208 by Fallon Mendez, RN  Safety Promotion/Fall Prevention:   activity supervised   safety round/check completed   gait belt   lighting adjusted   mobility aid in reach   muscle strengthening facilitated   nonskid shoes/slippers when out of bed   elopement precautions   clutter free environment maintained   assistive device/personal items within reach   fall prevention program maintained  Taken 3/31/2024 1020 by Fallon Mendez, RN  Safety Promotion/Fall Prevention:   activity supervised   safety round/check completed   gait belt  Taken 3/31/2024 0821 by Fallon Mendez, RN  Safety Promotion/Fall Prevention:   clutter free environment maintained   elopement precautions   assistive device/personal items within reach   activity supervised   fall prevention program maintained   gait belt   lighting adjusted   nonskid shoes/slippers when out of  bed   muscle strengthening facilitated   mobility aid in reach   room organization consistent   safety round/check completed   toileting scheduled     Problem: Adult Inpatient Plan of Care  Goal: Absence of Hospital-Acquired Illness or Injury  Intervention: Prevent Skin Injury  Recent Flowsheet Documentation  Taken 3/31/2024 1800 by Fallon Mendez RN  Body Position: position changed independently  Skin Protection:   adhesive use limited   incontinence pads utilized   tubing/devices free from skin contact  Taken 3/31/2024 1630 by Fallon Mendez RN  Body Position: position changed independently  Skin Protection:   adhesive use limited   tubing/devices free from skin contact  Taken 3/31/2024 1426 by Fallon Mendez RN  Body Position: position changed independently  Skin Protection:   adhesive use limited   tubing/devices free from skin contact  Taken 3/31/2024 1208 by Fallon Mendez RN  Body Position: position changed independently  Skin Protection:   adhesive use limited   tubing/devices free from skin contact  Taken 3/31/2024 1020 by Fallon Mendez RN  Body Position:   side-lying   turned  Skin Protection:   adhesive use limited   incontinence pads utilized   tubing/devices free from skin contact   skin-to-device areas padded   skin-to-skin areas padded  Taken 3/31/2024 0821 by Fallon Mendez RN  Body Position: position changed independently  Skin Protection:   adhesive use limited   incontinence pads utilized   pulse oximeter probe site changed   silicone foam dressing in place   skin sealant/moisture barrier applied   skin-to-device areas padded   skin-to-skin areas padded   tubing/devices free from skin contact     Problem: Adult Inpatient Plan of Care  Goal: Absence of Hospital-Acquired Illness or Injury  Intervention: Prevent and Manage VTE (Venous Thromboembolism) Risk  Recent Flowsheet Documentation  Taken 3/31/2024 1800 by Fallon Mendez, RN  Activity Management: activity encouraged  VTE  Prevention/Management:   sequential compression devices on   bilateral  Taken 3/31/2024 1630 by Fallon Mendez, RN  Activity Management: activity encouraged  VTE Prevention/Management:   bilateral   sequential compression devices on  Taken 3/31/2024 1530 by Fallon Mendez, RN  Activity Management: ambulated outside room  Taken 3/31/2024 1426 by Fallon Mendez, RN  Activity Management: activity encouraged  VTE Prevention/Management:   bilateral   sequential compression devices on  Taken 3/31/2024 1208 by Fallon Mendez, RN  Activity Management: activity encouraged  VTE Prevention/Management:   bilateral   sequential compression devices on  Taken 3/31/2024 1020 by Fallon Mendez, RN  Activity Management: activity encouraged  VTE Prevention/Management:   bilateral   sequential compression devices on  Taken 3/31/2024 0821 by Fallon Mendez, RN  Activity Management:   dorsiflexion/plantar flexion performed   activity encouraged  VTE Prevention/Management:   bilateral   sequential compression devices on  Range of Motion: active ROM (range of motion) encouraged

## 2024-03-31 NOTE — PLAN OF CARE
"  Assumed care at 19:00. Pt awake in bed, A/O, on RA, Ax1 OOB, pt did not ambulate this shift. Urinal for elimination. Pt did not sleep at all Friday night, ordered a sleeping pill this evening and pt slept most of the shift. Only took pain meds once during the night.  Pain returned early morning.  Scant amount of fluid in wound vac.  Left in bed at lowest position with bed alarm on and call light within reach.    /89 (BP Location: Right arm, Patient Position: Lying)   Pulse 89   Temp 98.3 °F (36.8 °C) (Oral)   Resp 16   Ht 175.3 cm (69.02\")   Wt 81.6 kg (179 lb 14.3 oz)   SpO2 95%   BMI 26.55 kg/m²         Problem: Adult Inpatient Plan of Care  Goal: Plan of Care Review  Outcome: Ongoing, Progressing  Flowsheets (Taken 3/31/2024 0346)  Plan of Care Reviewed With: patient  Outcome Evaluation: Pt A/O, on RA, Ax1 OOB, pt did not ambulate this shift. Urinal for elimination. Pt did not sleep at all Friday night, ordered a sleeping pill this evening and pt slept most of the shift.  Only took pain meds once.  Goal: Patient-Specific Goal (Individualized)  Outcome: Ongoing, Progressing  Goal: Absence of Hospital-Acquired Illness or Injury  Outcome: Ongoing, Progressing  Intervention: Identify and Manage Fall Risk  Recent Flowsheet Documentation  Taken 3/31/2024 0200 by Esperanza Garcias RN  Safety Promotion/Fall Prevention:   activity supervised   assistive device/personal items within reach   safety round/check completed  Taken 3/31/2024 0000 by Esperanza Garcias RN  Safety Promotion/Fall Prevention:   activity supervised   assistive device/personal items within reach   safety round/check completed  Taken 3/30/2024 2115 by Esperanza Garcias RN  Safety Promotion/Fall Prevention:   activity supervised   assistive device/personal items within reach  Intervention: Prevent Skin Injury  Recent Flowsheet Documentation  Taken 3/31/2024 0200 by Esperanza Garcias RN  Body Position: position changed " independently  Taken 3/31/2024 0000 by Esperanza Garcias RN  Body Position: position changed independently  Skin Protection:   adhesive use limited   incontinence pads utilized   tubing/devices free from skin contact  Taken 3/30/2024 2115 by Esperanza Garcias RN  Body Position: position changed independently  Skin Protection:   adhesive use limited   incontinence pads utilized   tubing/devices free from skin contact  Intervention: Prevent and Manage VTE (Venous Thromboembolism) Risk  Recent Flowsheet Documentation  Taken 3/31/2024 0200 by Esperanza Garcias RN  Activity Management: activity minimized  VTE Prevention/Management:   bilateral   sequential compression devices on  Taken 3/31/2024 0000 by Esperanza Garcias RN  Activity Management: activity minimized  VTE Prevention/Management:   bilateral   sequential compression devices on  Range of Motion: active ROM (range of motion) encouraged  Taken 3/30/2024 2115 by Esperanza Garcias RN  Activity Management: activity encouraged  VTE Prevention/Management:   bilateral   sequential compression devices on  Range of Motion: active ROM (range of motion) encouraged  Intervention: Prevent Infection  Recent Flowsheet Documentation  Taken 3/31/2024 0200 by Esperanza Garcias RN  Infection Prevention:   environmental surveillance performed   rest/sleep promoted  Taken 3/31/2024 0000 by Esperanza Garcias RN  Infection Prevention:   environmental surveillance performed   rest/sleep promoted  Taken 3/30/2024 2115 by Esperanza Garcias RN  Infection Prevention:   environmental surveillance performed   hand hygiene promoted   rest/sleep promoted  Goal: Optimal Comfort and Wellbeing  Outcome: Ongoing, Progressing  Intervention: Monitor Pain and Promote Comfort  Recent Flowsheet Documentation  Taken 3/30/2024 2115 by Esperanza Garcias RN  Pain Management Interventions: see MAR  Intervention: Provide Person-Centered Care  Recent Flowsheet Documentation  Taken 3/31/2024 0000 by  Esperanza Garcias RN  Trust Relationship/Rapport:   care explained   choices provided   empathic listening provided   thoughts/feelings acknowledged  Taken 3/30/2024 2115 by Esperanza Garcias RN  Trust Relationship/Rapport:   care explained   choices provided   empathic listening provided   questions answered   thoughts/feelings acknowledged  Goal: Readiness for Transition of Care  Outcome: Ongoing, Progressing     Problem: Diabetes Comorbidity  Goal: Blood Glucose Level Within Targeted Range  Outcome: Ongoing, Progressing     Problem: Hypertension Comorbidity  Goal: Blood Pressure in Desired Range  Outcome: Ongoing, Progressing     Problem: Skin Injury Risk Increased  Goal: Skin Health and Integrity  Outcome: Ongoing, Progressing  Intervention: Optimize Skin Protection  Recent Flowsheet Documentation  Taken 3/31/2024 0200 by Esperanza Garcias RN  Head of Bed (HOB) Positioning: HOB elevated  Taken 3/31/2024 0000 by Esperanza Garcias RN  Pressure Reduction Techniques: frequent weight shift encouraged  Head of Bed (HOB) Positioning: HOB elevated  Pressure Reduction Devices: pressure-redistributing mattress utilized  Skin Protection:   adhesive use limited   incontinence pads utilized   tubing/devices free from skin contact  Taken 3/30/2024 2115 by Esperanza Garcias RN  Pressure Reduction Techniques: frequent weight shift encouraged  Head of Bed (HOB) Positioning: HOB elevated  Pressure Reduction Devices:   pressure-redistributing mattress utilized   heel offloading device utilized  Skin Protection:   adhesive use limited   incontinence pads utilized   tubing/devices free from skin contact     Problem: Fall Injury Risk  Goal: Absence of Fall and Fall-Related Injury  Outcome: Ongoing, Progressing  Intervention: Identify and Manage Contributors  Recent Flowsheet Documentation  Taken 3/31/2024 0200 by Esperanza Garcias RN  Self-Care Promotion:   independence encouraged   BADL personal objects within reach  Taken  3/31/2024 0000 by Esperanza Garcias RN  Self-Care Promotion:   independence encouraged   BADL personal objects within reach  Taken 3/30/2024 2115 by Esperanza Garcias RN  Self-Care Promotion:   independence encouraged   BADL personal objects within reach  Intervention: Promote Injury-Free Environment  Recent Flowsheet Documentation  Taken 3/31/2024 0200 by Esperanza Garcias RN  Safety Promotion/Fall Prevention:   activity supervised   assistive device/personal items within reach   safety round/check completed  Taken 3/31/2024 0000 by Esperanza Garcias RN  Safety Promotion/Fall Prevention:   activity supervised   assistive device/personal items within reach   safety round/check completed  Taken 3/30/2024 2115 by Esperanza Garcias RN  Safety Promotion/Fall Prevention:   activity supervised   assistive device/personal items within reach   Goal Outcome Evaluation:  Plan of Care Reviewed With: patient        Progress: no change  Outcome Evaluation: Pt A/O, on RA, Ax1 OOB, pt did not ambulate this shift. Urinal for elimination. Pt did not sleep at all Friday night, ordered a sleeping pill this evening and pt slept most of the shift.  Only took pain meds once.

## 2024-04-01 LAB
ANION GAP SERPL CALCULATED.3IONS-SCNC: 7 MMOL/L (ref 5–15)
BACTERIA SPEC AEROBE CULT: ABNORMAL
BUN SERPL-MCNC: 17 MG/DL (ref 6–20)
BUN/CREAT SERPL: 23 (ref 7–25)
CALCIUM SPEC-SCNC: 10.2 MG/DL (ref 8.6–10.5)
CHLORIDE SERPL-SCNC: 100 MMOL/L (ref 98–107)
CO2 SERPL-SCNC: 30 MMOL/L (ref 22–29)
CREAT SERPL-MCNC: 0.74 MG/DL (ref 0.76–1.27)
DEPRECATED RDW RBC AUTO: 46.5 FL (ref 37–54)
EGFRCR SERPLBLD CKD-EPI 2021: 110.4 ML/MIN/1.73
ERYTHROCYTE [DISTWIDTH] IN BLOOD BY AUTOMATED COUNT: 14.5 % (ref 12.3–15.4)
GLUCOSE BLDC GLUCOMTR-MCNC: 197 MG/DL (ref 70–130)
GLUCOSE BLDC GLUCOMTR-MCNC: 222 MG/DL (ref 70–130)
GLUCOSE BLDC GLUCOMTR-MCNC: 243 MG/DL (ref 70–130)
GLUCOSE BLDC GLUCOMTR-MCNC: 271 MG/DL (ref 70–130)
GLUCOSE BLDC GLUCOMTR-MCNC: 321 MG/DL (ref 70–130)
GLUCOSE BLDC GLUCOMTR-MCNC: 407 MG/DL (ref 70–130)
GLUCOSE SERPL-MCNC: 172 MG/DL (ref 65–99)
GRAM STN SPEC: ABNORMAL
HCT VFR BLD AUTO: 37.7 % (ref 37.5–51)
HGB BLD-MCNC: 12 G/DL (ref 13–17.7)
MCH RBC QN AUTO: 27.8 PG (ref 26.6–33)
MCHC RBC AUTO-ENTMCNC: 31.8 G/DL (ref 31.5–35.7)
MCV RBC AUTO: 87.3 FL (ref 79–97)
PLATELET # BLD AUTO: 212 10*3/MM3 (ref 140–450)
PMV BLD AUTO: 10.5 FL (ref 6–12)
POTASSIUM SERPL-SCNC: 4.7 MMOL/L (ref 3.5–5.2)
RBC # BLD AUTO: 4.32 10*6/MM3 (ref 4.14–5.8)
SODIUM SERPL-SCNC: 137 MMOL/L (ref 136–145)
WBC NRBC COR # BLD AUTO: 7.4 10*3/MM3 (ref 3.4–10.8)

## 2024-04-01 PROCEDURE — 85027 COMPLETE CBC AUTOMATED: CPT | Performed by: STUDENT IN AN ORGANIZED HEALTH CARE EDUCATION/TRAINING PROGRAM

## 2024-04-01 PROCEDURE — 82948 REAGENT STRIP/BLOOD GLUCOSE: CPT

## 2024-04-01 PROCEDURE — 25010000002 HYDROMORPHONE 1 MG/ML SOLUTION: Performed by: INTERNAL MEDICINE

## 2024-04-01 PROCEDURE — 97605 NEG PRS WND THER DME<=50SQCM: CPT

## 2024-04-01 PROCEDURE — 80048 BASIC METABOLIC PNL TOTAL CA: CPT | Performed by: STUDENT IN AN ORGANIZED HEALTH CARE EDUCATION/TRAINING PROGRAM

## 2024-04-01 PROCEDURE — 99232 SBSQ HOSP IP/OBS MODERATE 35: CPT | Performed by: NURSE PRACTITIONER

## 2024-04-01 PROCEDURE — 63710000001 INSULIN LISPRO (HUMAN) PER 5 UNITS: Performed by: NURSE PRACTITIONER

## 2024-04-01 PROCEDURE — 63710000001 INSULIN DETEMIR PER 5 UNITS: Performed by: NURSE PRACTITIONER

## 2024-04-01 PROCEDURE — 63710000001 INSULIN LISPRO (HUMAN) PER 5 UNITS: Performed by: INTERNAL MEDICINE

## 2024-04-01 PROCEDURE — 25010000002 PIPERACILLIN SOD-TAZOBACTAM PER 1 G: Performed by: INTERNAL MEDICINE

## 2024-04-01 RX ORDER — INSULIN LISPRO 100 [IU]/ML
2-9 INJECTION, SOLUTION INTRAVENOUS; SUBCUTANEOUS
Status: DISCONTINUED | OUTPATIENT
Start: 2024-04-01 | End: 2024-04-02 | Stop reason: HOSPADM

## 2024-04-01 RX ORDER — OXYCODONE HYDROCHLORIDE 5 MG/1
5 TABLET ORAL ONCE
Status: COMPLETED | OUTPATIENT
Start: 2024-04-01 | End: 2024-04-01

## 2024-04-01 RX ORDER — OXYCODONE HYDROCHLORIDE 15 MG/1
15 TABLET ORAL EVERY 4 HOURS PRN
Status: DISCONTINUED | OUTPATIENT
Start: 2024-04-01 | End: 2024-04-01

## 2024-04-01 RX ORDER — INSULIN LISPRO 100 [IU]/ML
5 INJECTION, SOLUTION INTRAVENOUS; SUBCUTANEOUS
Status: DISCONTINUED | OUTPATIENT
Start: 2024-04-01 | End: 2024-04-02 | Stop reason: HOSPADM

## 2024-04-01 RX ORDER — OXYCODONE HYDROCHLORIDE 10 MG/1
20 TABLET ORAL EVERY 4 HOURS PRN
Status: DISCONTINUED | OUTPATIENT
Start: 2024-04-01 | End: 2024-04-02 | Stop reason: HOSPADM

## 2024-04-01 RX ORDER — LINEZOLID 600 MG/1
600 TABLET, FILM COATED ORAL EVERY 12 HOURS SCHEDULED
Status: DISCONTINUED | OUTPATIENT
Start: 2024-04-01 | End: 2024-04-02 | Stop reason: HOSPADM

## 2024-04-01 RX ORDER — ACETAMINOPHEN 325 MG/1
325 TABLET ORAL ONCE
Status: COMPLETED | OUTPATIENT
Start: 2024-04-01 | End: 2024-04-01

## 2024-04-01 RX ORDER — ACETAMINOPHEN 500 MG
1000 TABLET ORAL EVERY 8 HOURS
Status: DISCONTINUED | OUTPATIENT
Start: 2024-04-01 | End: 2024-04-01

## 2024-04-01 RX ORDER — ACETAMINOPHEN 500 MG
1000 TABLET ORAL EVERY 8 HOURS
Status: DISCONTINUED | OUTPATIENT
Start: 2024-04-01 | End: 2024-04-02 | Stop reason: HOSPADM

## 2024-04-01 RX ADMIN — ASPIRIN 81 MG: 81 TABLET, COATED ORAL at 21:13

## 2024-04-01 RX ADMIN — APIXABAN 5 MG: 5 TABLET, FILM COATED ORAL at 08:36

## 2024-04-01 RX ADMIN — OXYCODONE HYDROCHLORIDE 20 MG: 10 TABLET ORAL at 17:07

## 2024-04-01 RX ADMIN — SENNOSIDES AND DOCUSATE SODIUM 2 TABLET: 8.6; 5 TABLET ORAL at 08:36

## 2024-04-01 RX ADMIN — INSULIN LISPRO 5 UNITS: 100 INJECTION, SOLUTION INTRAVENOUS; SUBCUTANEOUS at 17:11

## 2024-04-01 RX ADMIN — LISINOPRIL 40 MG: 40 TABLET ORAL at 21:13

## 2024-04-01 RX ADMIN — LORAZEPAM 1 MG: 1 TABLET ORAL at 21:14

## 2024-04-01 RX ADMIN — Medication 1 PATCH: at 14:17

## 2024-04-01 RX ADMIN — HYDROMORPHONE HYDROCHLORIDE 1 MG: 1 INJECTION, SOLUTION INTRAMUSCULAR; INTRAVENOUS; SUBCUTANEOUS at 05:40

## 2024-04-01 RX ADMIN — INSULIN LISPRO 4 UNITS: 100 INJECTION, SOLUTION INTRAVENOUS; SUBCUTANEOUS at 21:28

## 2024-04-01 RX ADMIN — GABAPENTIN 600 MG: 300 CAPSULE ORAL at 05:41

## 2024-04-01 RX ADMIN — NIFEDIPINE 30 MG: 30 TABLET, EXTENDED RELEASE ORAL at 08:36

## 2024-04-01 RX ADMIN — Medication 5 MG: at 21:13

## 2024-04-01 RX ADMIN — INSULIN DETEMIR 15 UNITS: 100 INJECTION, SOLUTION SUBCUTANEOUS at 11:55

## 2024-04-01 RX ADMIN — ROSUVASTATIN 40 MG: 20 TABLET, FILM COATED ORAL at 21:13

## 2024-04-01 RX ADMIN — GABAPENTIN 600 MG: 300 CAPSULE ORAL at 21:13

## 2024-04-01 RX ADMIN — OXYCODONE HYDROCHLORIDE 15 MG: 15 TABLET ORAL at 08:44

## 2024-04-01 RX ADMIN — INSULIN DETEMIR 15 UNITS: 100 INJECTION, SOLUTION SUBCUTANEOUS at 21:28

## 2024-04-01 RX ADMIN — LINEZOLID 600 MG: 600 TABLET, FILM COATED ORAL at 21:29

## 2024-04-01 RX ADMIN — OXYCODONE 5 MG: 5 TABLET ORAL at 11:58

## 2024-04-01 RX ADMIN — ACETAMINOPHEN 650 MG: 325 TABLET ORAL at 11:24

## 2024-04-01 RX ADMIN — METOPROLOL TARTRATE 25 MG: 25 TABLET, FILM COATED ORAL at 08:36

## 2024-04-01 RX ADMIN — INSULIN LISPRO 5 UNITS: 100 INJECTION, SOLUTION INTRAVENOUS; SUBCUTANEOUS at 11:55

## 2024-04-01 RX ADMIN — APIXABAN 5 MG: 5 TABLET, FILM COATED ORAL at 21:13

## 2024-04-01 RX ADMIN — METOPROLOL TARTRATE 25 MG: 25 TABLET, FILM COATED ORAL at 21:13

## 2024-04-01 RX ADMIN — INSULIN LISPRO 7 UNITS: 100 INJECTION, SOLUTION INTRAVENOUS; SUBCUTANEOUS at 17:12

## 2024-04-01 RX ADMIN — PANTOPRAZOLE SODIUM 40 MG: 40 TABLET, DELAYED RELEASE ORAL at 05:41

## 2024-04-01 RX ADMIN — INSULIN LISPRO 20 UNITS: 100 INJECTION, SOLUTION INTRAVENOUS; SUBCUTANEOUS at 09:12

## 2024-04-01 RX ADMIN — SENNOSIDES AND DOCUSATE SODIUM 2 TABLET: 8.6; 5 TABLET ORAL at 21:14

## 2024-04-01 RX ADMIN — PIPERACILLIN AND TAZOBACTAM 3.38 G: 3; .375 INJECTION, POWDER, LYOPHILIZED, FOR SOLUTION INTRAVENOUS at 05:41

## 2024-04-01 RX ADMIN — ACETAMINOPHEN 325 MG: 325 TABLET ORAL at 12:04

## 2024-04-01 RX ADMIN — Medication 10 ML: at 21:31

## 2024-04-01 RX ADMIN — LINEZOLID 600 MG: 600 TABLET, FILM COATED ORAL at 11:24

## 2024-04-01 RX ADMIN — INSULIN LISPRO 4 UNITS: 100 INJECTION, SOLUTION INTRAVENOUS; SUBCUTANEOUS at 11:55

## 2024-04-01 RX ADMIN — GABAPENTIN 600 MG: 300 CAPSULE ORAL at 14:17

## 2024-04-01 RX ADMIN — ACETAMINOPHEN 1000 MG: 500 TABLET ORAL at 21:14

## 2024-04-01 RX ADMIN — OXYCODONE HYDROCHLORIDE 20 MG: 10 TABLET ORAL at 21:14

## 2024-04-01 NOTE — PROGRESS NOTES
Justo Oquendo       LOS: 1 day   Patient Care Team:  Melo Whitaker MD as PCP - General (Family Medicine)  Niall Mcfarlane MD as Consulting Physician (Cardiology)    Chief Complaint: Left forefoot wound ischemia    Subjective     Interval History:     Resting comfortably in bed this morning.  Pain tolerable, no acute events overnight.    Review of Systems:     Gen- No fevers, chills  CV- No chest pain, palpitations  Resp- No cough, dyspnea  GI- No N/V/D, abd pain    Objective     Vital Signs  Vital Signs (last 24 hours)         03/29 0700  03/30 0659 03/30 0700  03/30 0748   Most Recent      Temp (°F) 97 -  98.1       97.9 (36.6) 03/30 0407    Heart Rate 94 -  115       94 03/30 0407    Resp 14 -  18       16 03/30 0407    /74 -  161/82       137/97 03/30 0407    SpO2 (%) 93 -  100       93 03/30 0407    Flow (L/min)   2       2 03/29 0900              Physical Exam:     Alert, oriented.  No acute distress.  Nonlabored respirations.  Regular rate and rhythm.  Abdomen nondistended.  Left lower extremity: Operative dressing, wound VAC in place with appropriate seal, minimal serosanguineous output.     Results Review:     I reviewed the patient's new clinical results.    Medication Review:   Hospital Medications (active)         Dose Frequency Start End    !4/1 Vancomycin random level at 0500. Please hold 0600 dose until level evaluated by pharmacy  Daily 4/1/2024 4/2/2024    Route: Does not apply    acetaminophen (TYLENOL) tablet 650 mg 650 mg Every 4 Hours PRN 3/29/2024 --    Admin Instructions: If given for fever, use fever parameter: fever greater than 100.4 °F  Based on patient request - if ordered for moderate or severe pain, provider allows for administration of a medication prescribed for a lower pain scale.    Do not exceed 4 grams of acetaminophen in a 24 hr period. Max dose of 2gm for AST/ALT greater than 120 units/L.    If given for pain, use the following pain scale:   Mild Pain =  "Pain Score of 1-3, CPOT 1-2  Moderate Pain = Pain Score of 4-6, CPOT 3-4  Severe Pain = Pain Score of 7-10, CPOT 5-8    Route: Oral    aspirin EC tablet 81 mg 81 mg Nightly 3/29/2024 --    Admin Instructions: Do not crush or chew the capsules or tablets. The drug may not work as designed if the capsule or tablet is crushed or chewed. Swallow whole.  Do not exceed 4 grams of aspirin in a 24 hr period.    If given for pain, use the following pain scale:   Mild Pain = Pain Score of 1-3, CPOT 1-2  Moderate Pain = Pain Score of 4-6, CPOT 3-4  Severe Pain = Pain Score of 7-10, CPOT 5-8    Route: Oral    bisacodyl (DULCOLAX) EC tablet 5 mg 5 mg Daily PRN 3/29/2024 --    Admin Instructions: Use if no bowel movement after 12 hours.  Swallow whole. Do not crush, split, or chew tablet.    Route: Oral    Linked Group 1: Placed in \"And\" Linked Group        bisacodyl (DULCOLAX) suppository 10 mg 10 mg Daily PRN 3/29/2024 --    Admin Instructions: Use if no bowel movement after 12 hours.  Hold for diarrhea    Route: Rectal    Linked Group 1: Placed in \"And\" Linked Group        dextrose (D50W) (25 g/50 mL) IV injection 10-50 mL 10-50 mL Every 15 Minutes PRN 3/29/2024 --    Admin Instructions: Blood Glucose <70  Patient Has IV Access, Is Unresponsive, NPO or Unable to Safely Swallow  Recheck Blood Glucose Per Glucommander™    Route: Intravenous    dextrose (GLUTOSE) oral gel 15 g 15 g Every 15 Minutes PRN 3/29/2024 --    Admin Instructions: Blood Glucose <70  Patient Alert, NOT NPO, Can Safely Swallow  Recheck Blood Glucose Per Glucommander™    Route: Oral    Enoxaparin Sodium (LOVENOX) syringe 40 mg 40 mg Daily 3/30/2024 --    Admin Instructions: Give subcutaneous in abdomen only. Do not massage site after injection.    Route: Subcutaneous    gabapentin (NEURONTIN) capsule 600 mg 600 mg Every 8 Hours Scheduled 3/29/2024 --    Admin Instructions:     Route: Oral    glucagon (GLUCAGEN) injection 1 mg 1 mg Every 15 Minutes PRN " 3/29/2024 --    Admin Instructions: Blood Glucose <70  Patient Without IV Access, Unresponsive, NPO or Unable to Safely Swallow  Recheck Blood Glucose Per Glucommander™  Reconstitute powder for injection by adding 1 mL of -supplied sterile diluent or sterile water for injection to a vial containing 1 mg of the drug, to provide solutions containing 1 mg/mL. Shake vial gently to dissolve.    Route: Intramuscular    HYDROcodone-acetaminophen (NORCO)  MG per tablet 1 tablet 1 tablet Every 8 Hours PRN 3/29/2024 --    Admin Instructions: Based on patient request - if ordered for moderate or severe pain, provider allows for administration of a medication prescribed for a lower pain scale.  [SPEEDY]    Do not exceed 4 grams of acetaminophen in a 24 hr period. Max dose of 2gm for AST/ALT greater than 120 units/L        If given for pain, use the following pain scale:   Mild Pain = Pain Score of 1-3, CPOT 1-2  Moderate Pain = Pain Score of 4-6, CPOT 3-4  Severe Pain = Pain Score of 7-10, CPOT 5-8    Route: Oral    HYDROmorphone (DILAUDID) injection 1 mg 1 mg Every 2 Hours PRN 3/29/2024 4/1/2024    Admin Instructions: Based on patient request - if ordered for moderate or severe pain, provider allows for administration of a medication prescribed for a lower pain scale.      Caution: Look alike/sound alike drug alert    If given for pain, use the following pain scale:  Mild Pain = Pain Score of 1-3, CPOT 1-2  Moderate Pain = Pain Score of 4-6, CPOT 3-4  Severe Pain = Pain Score of 7-10, CPOT 5-8    Route: Intravenous    insulin detemir (LEVEMIR) injection 1-200 Units 1-200 Units Nightly - Glucommander 3/29/2024 --    Admin Instructions: Do not hold basal insulin without an order. Consider requesting a dose edit, if needed.  per Glucommander    Route: Subcutaneous    insulin lispro (humaLOG) injection 1-200 Units 1-200 Units 4 Times Daily With Meals & Nightly 3/29/2024 --    Admin Instructions: Document Total Bolus  Dose Using This MAR Entry   Total Bolus Dose Includes Scheduled Meal & Associated Correction Dose  If Patient NPO or Unable to Eat Administer Correction Insulin Only  per Glucommander    Route: Subcutaneous    insulin lispro (humaLOG) injection 1-200 Units 1-200 Units As Needed 3/29/2024 --    Admin Instructions: Correction Doses Outside of Scheduled Meal & Bedtime Per Glucommander™   Use This MAR Entry For Insulin Doses When There is NO SCHEDULED MAR Entry Available  Administer Correction Insulin Even if Patient NPO or Unable to Eat  per Glucommander    Route: Subcutaneous    lactated ringers infusion 9 mL/hr Continuous 3/29/2024 --    Admin Instructions: May switch to NS IV at KVO if renal / if indicated    Route: Intravenous    lisinopril (PRINIVIL,ZESTRIL) tablet 40 mg 40 mg Nightly 3/29/2024 --    Admin Instructions: Hold for SBP less than 100, DBP less than 60.    Route: Oral    LORazepam (ATIVAN) tablet 1 mg 1 mg Nightly 3/29/2024 --    Admin Instructions:  Caution: Look alike/sound alike drug alert    Route: Oral    metoprolol tartrate (LOPRESSOR) tablet 25 mg 25 mg Every 12 Hours Scheduled 3/29/2024 --    Admin Instructions: Hold for SBP less than 100, DBP less than 60, or heart rate less than 50. If a dose is held, please contact the provider.    Route: Oral    nicotine (NICODERM CQ) 21 MG/24HR patch 1 patch 1 patch Every 24 Hours 3/29/2024 --    Admin Instructions: Apply to clean, dry, nonhairy area of skin (typically upper arm or shoulder)  Dispose of nicotine replacement therapies and their wrappers in non-hazardous pharmaceutical waste or in regular trash.    Route: Transdermal    NIFEdipine XL (PROCARDIA XL) 24 hr tablet 30 mg 30 mg Every 24 Hours Scheduled 3/29/2024 --    Admin Instructions: Caution: Look alike/sound alike drug alert. Avoid grapefruit juice. Swallow whole. Do not crush, split or chew.    Route: Oral    nitroglycerin (NITROSTAT) SL tablet 0.4 mg 0.4 mg Every 5 Minutes PRN 3/29/2024 --  "   Admin Instructions: If Pain Unrelieved After 3 Doses Notify MD  May administer up to 3 doses per episode.    Route: Sublingual    ondansetron (ZOFRAN) injection 4 mg 4 mg Every 6 Hours PRN 3/29/2024 --    Admin Instructions: If BOTH ondansetron (ZOFRAN) and promethazine (PHENERGAN) are ordered use ondansetron first and THEN promethazine IF ondansetron is ineffective.    Route: Intravenous    oxyCODONE-acetaminophen (PERCOCET) 5-325 MG per tablet 1 tablet 1 tablet Every 4 Hours PRN 3/29/2024 4/3/2024    Admin Instructions: Based on patient request - if ordered for moderate or severe pain, provider allows for administration of a medication prescribed for a lower pain scale.  [SPEEDY]    Do not exceed 4 grams of acetaminophen in a 24 hr period. Max dose of 2gm for AST/ALT greater than 120 units/L        If given for pain, use the following pain scale:   Mild Pain = Pain Score of 1-3, CPOT 1-2  Moderate Pain = Pain Score of 4-6, CPOT 3-4  Severe Pain = Pain Score of 7-10, CPOT 5-8    Route: Oral    pantoprazole (PROTONIX) EC tablet 40 mg 40 mg Every Early Morning 3/30/2024 --    Admin Instructions: Do not crush or chew the capsules or tablets. The drug may not work as designed if the capsule or tablet is crushed or chewed. Swallow whole.  Swallow whole; do not crush, split, or chew.    Route: Oral    Pharmacy to dose vancomycin  Continuous PRN 3/29/2024 4/3/2024    Route: Does not apply    piperacillin-tazobactam (ZOSYN) 3.375 g IVPB in 100 mL NS MBP (CD) 3.375 g Every 8 Hours 3/29/2024 4/3/2024    Route: Intravenous    polyethylene glycol (MIRALAX) packet 17 g 17 g Daily PRN 3/29/2024 --    Admin Instructions: Use if no bowel movement after 12 hours. Mix in 6-8 ounces of water.  Use 4-8 ounces of water, tea, or juice for each 17 gram dose.    Route: Oral    Linked Group 1: Placed in \"And\" Linked Group        rosuvastatin (CRESTOR) tablet 40 mg 40 mg Nightly 3/29/2024 --    Admin Instructions: Avoid grapefruit juice.    " "Route: Oral    sennosides-docusate (PERICOLACE) 8.6-50 MG per tablet 2 tablet 2 tablet 2 Times Daily 3/29/2024 --    Admin Instructions: HOLD MEDICATION IF PATIENT HAS HAD BOWEL MOVEMENT. Start bowel management regimen if patient has not had a bowel movement after 12 hours.    Route: Oral    Linked Group 1: Placed in \"And\" Linked Group        sodium chloride 0.9 % flush 10 mL 10 mL Every 12 Hours Scheduled 3/29/2024 --    Route: Intravenous    sodium chloride 0.9 % flush 10 mL 10 mL As Needed 3/29/2024 --    Route: Intravenous    sodium chloride 0.9 % infusion 40 mL 40 mL As Needed 3/29/2024 --    Admin Instructions: Following administration of an IV intermittent medication, flush line with 40mL NS at 100mL/hr.    Route: Intravenous    Vancomycin HCl 1,250 mg in sodium chloride 0.9 % 250 mL VTB 1,250 mg Every 12 Hours 3/30/2024 4/4/2024    Route: Intravenous              Assessment & Plan     48-year-old male with peripheral vascular disease status post transmetatarsal amputation with wound dehiscence/dry gangrene status post debridement, irrigation, wound vacuum-assisted closure.      Diabetic foot infection    Protected heel weightbearing in walker in postoperative shoe.  Follow cultures --Enterococcus    He has inquired about returning to work part-time.  He has a desk job at a [employer]  I think returning strictly nonweightbearing if he is able to sit and elevate would be appropriate.  Every 72 hour wound vacuum-assisted closure changes beginning April 1.    He will need outpatient wound vacuum-assisted closure therapy/home health versus PT wound care follow-up for wound VAC changes.    Follow up April 5 with Winter Santana PA-C.    [facility]  [address], [address]  [place], [address]  Please schedule at [phone]    LELIA Tapia  04/01/24  07:34 EDT           "

## 2024-04-01 NOTE — THERAPY WOUND CARE TREATMENT
Acute Care - Wound/Debridement Treatment Note  [facility]     Patient Name: Justo Oquendo  : 1975  MRN: [id number]  Today's Date: 2024                          Admit Date: 3/29/2024    Visit Dx:    ICD-10-CM ICD-9-CM   1. 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    Diabetic foot infection        Past Medical History:   Diagnosis Date    Arthritis     Back pain     Diabetes     SINCE 2017    Dyslipidemia     HTN (hypertension)     PVD (peripheral vascular disease)     Wears partial dentures     upper and lower        Past Surgical History:   Procedure Laterality Date    AORTAGRAM N/A 2017    Procedure: AORTAGRAM WITH OR WITHOUT RUNOFFS POSSIBLE STENT with left femoral cutdown;  Surgeon: Brett Ryan MD;  Location:  [facility] OR 15;  Service:     AORTAGRAM N/A 2023    Procedure: THROMBECTOMY OF LEFT GRAFT, AORTAGRAM, FEMORAL TO FEMORAL BYPASS;  Surgeon: Brett Ryan MD;  Location:  [email or web address];  Service: Vascular;  Laterality: N/A;  FLUORO- .06 SECS  DOSE- 10 MGY  CONTRAST- 10 ML ISO    CHOLECYSTECTOMY      CYST REMOVAL      OFF OF BACK    FEMORAL POPLITEAL BYPASS Left 2024    Procedure: FEMORAL POPLITEAL BYPASS WITH POPLITEAL EXPLORATION;  Surgeon: Vin Pena MD;  Location:  [facility];  Service: Vascular;  Laterality: Left;  DOSE: 9MGY  FLURO: 14 MIN, 36 SEC  CONTRAST: 50ML VISIPAQUE 320    INCISION AND DRAINAGE FOOT Left 3/29/2024    Procedure: INCISION AND DRAINAGE FOOT ABSCESS LEFT;  Surgeon: Christo Peres Jr., MD;  Location:  [facility];  Service: Orthopedics;  Laterality: Left;    PLACEMENT OF WOUND VAC  Left 3/29/2024    Procedure: PLACEMENT OF WOUND VAC;  Surgeon: Christo Peres Jr., MD;  Location:  [facility];  Service: Orthopedics;  Laterality: Left;    DC IN-SITU VEIN BYPASS FEMORAL-POPLITEAL Left 04/25/2017    Procedure: FEMORAL POPLITEAL BYPASS;  Surgeon: Brett Ryan MD;  Location:  [facility];  Service: Vascular    DC IN-SITU VEIN BYPASS FEMORAL-POPLITEAL Left 06/27/2017    Procedure: LEFT FEMORAL ENDARTECTOMYN LEFT FEMORAL POPLITEAL BYPASS;  Surgeon: Brett Ryan MD;  Location:  [facility];  Service: Vascular    TRANS METATARSAL AMPUTATION Left 01/19/2024    Procedure: AMPUTATION TRANS METATARSAL- LEFT;  Surgeon: Christo Peres Jr., MD;  Location:  [facility];  Service: Orthopedics;  Laterality: Left;           Wound 01/19/24 1149 Left distal foot Amputation stump (Active)   Dressing Appearance dry;intact 04/01/24 1420   Closure Adhesive bandage;Unable to assess 04/01/24 0836   Base bleeding;granulating;moist;red 04/01/24 1110   Wound Length (cm) 3 cm 04/01/24 1110   Wound Width (cm) 11.5 cm 04/01/24 1110   Wound Surface Area (cm^2) 34.5 cm^2 04/01/24 1110   Drainage Characteristics/Odor serosanguineous 04/01/24 1110   Drainage Amount small 04/01/24 1110   Care, Wound cleansed with;wound cleanser;negative pressure wound therapy 04/01/24 1110   Dressing Care dressing changed 04/01/24 1110       NPWT (Negative Pressure Wound Therapy) 03/29/24 0957 (Active)   Therapy Setting continuous therapy 04/01/24 1110   Dressing foam, black 04/01/24 1110   Pressure Setting 100 mmHg 04/01/24 1110   Sponges Inserted 1 04/01/24 1110   Sponges Removed 1 04/01/24 1110   Finger sweep complete Yes 04/01/24 1110       NPWT (Negative Pressure Wound Therapy) 03/29/24 0957 LLE (Active)   Therapy Setting continuous therapy 04/01/24 1420   Dressing unable to visualize 04/01/24 0836   Pressure Setting 125 mmHg 04/01/24 1420         WOUND DEBRIDEMENT                     PT Assessment (Last 12 Hours)       PT  Evaluation and Treatment       Row Name 04/01/24 1110          Physical Therapy Time and Intention    Subjective Information no complaints  -KW     Document Type wound care;therapy note (daily note)  -KW     Mode of Treatment physical therapy  -KW       Row Name 04/01/24 1110          General Information    Patient Profile Reviewed yes  -KW       Row Name 04/01/24 1110          Wound 01/19/24 1149 Left distal foot Amputation stump    Wound - Properties Group Placement Date: 01/19/24  -NH Placement Time: 1149  -NH Side: Left  -NH Orientation: distal  -NH Location: foot  -NH Primary Wound Type: Amputation s  -NH    Base bleeding;granulating;moist;red  -KW     Wound Length (cm) 3 cm  -KW     Wound Width (cm) 11.5 cm  -KW     Wound Surface Area (cm^2) 34.5 cm^2  -KW     Drainage Characteristics/Odor serosanguineous  -KW     Drainage Amount small  -KW     Care, Wound cleansed with;wound cleanser;negative pressure wound therapy  -KW     Dressing Care dressing changed  -KW     Retired Wound - Properties Group Placement Date: 01/19/24  -NH Placement Time: 1149  -NH Side: Left  -NH Orientation: distal  -NH Location: foot  -NH Primary Wound Type: Amputation s  -NH    Retired Wound - Properties Group Date first assessed: 01/19/24  -NH Time first assessed: 1149  -NH Side: Left  -NH Location: foot  -NH Primary Wound Type: Amputation s  -NH      Row Name 04/01/24 1110          NPWT (Negative Pressure Wound Therapy) 03/29/24 0957    NPWT (Negative Pressure Wound Therapy) - Properties Group Placement Date: 03/29/24  - Placement Time: 0957  -LG    Therapy Setting continuous therapy  -KW     Dressing foam, black  -KW     Pressure Setting 100 mmHg  -KW     Sponges Inserted 1  -KW     Sponges Removed 1  -KW     Finger sweep complete Yes  -KW     Retired NPWT (Negative Pressure Wound Therapy) - Properties Group Placement Date: 03/29/24  -LG Placement Time: 0957  -LG    Retired NPWT (Negative Pressure Wound Therapy) - Properties Group  Placement Date: 03/29/24 -LG Placement Time: 0957 -LG      Row Name             NPWT (Negative Pressure Wound Therapy) 03/29/24 0957 LLE    NPWT (Negative Pressure Wound Therapy) - Properties Group Placement Date: 03/29/24 -LG Placement Time: 0957 -LG Location: LLE  -STORM    Retired NPWT (Negative Pressure Wound Therapy) - Properties Group Placement Date: 03/29/24  -LG Placement Time: 0957 -LG Location: LLE  -LG    Retired NPWT (Negative Pressure Wound Therapy) - Properties Group Placement Date: 03/29/24  -LG Placement Time: 0957 -LG Location: E  -LG      Row Name 04/01/24 1110          Plan of Care Review    Plan of Care Reviewed With patient  -KW     Progress improving  -KW     Outcome Evaluation Patient demonstrates improvement with granulation tissue and wound closure. Patient would continue to benefit from skilled PT services and NPWT to improve granulation tissue, decrease bioburden, manage exudate, and improve wound healing. PT transferred patient to home vac unit. PT provided extended education about home unit, supplies and reviewed the  checkoff list. Patient without complaints, new unit on without errors.  -KW       Row Name 04/01/24 1110          Positioning and Restraints    Pre-Treatment Position in bed  -KW     Post Treatment Position bed  -KW     In Bed supine;call light within reach;encouraged to call for assist  -KW               User Key  (r) = Recorded By, (t) = Taken By, (c) = Cosigned By      Initials Name Provider Type    Svetlana Meeks, RN Registered Nurse    Dior Edwards, RN Registered Nurse    Sonali Elias, LOBITO Physical Therapist                  Physical Therapy Education       Title: PT OT SLP Therapies (Done)       Topic: Physical Therapy (Done)       Point: Mobility training (Done)       Learning Progress Summary             Patient Acceptance, E,D, DU by CT at 3/31/2024 1546   Significant Other Acceptance, E,D, DU by CT at 3/31/2024 1546                          Point: Home exercise program (Done)       Learning Progress Summary             Patient Acceptance, E,D, DU by CT at 3/31/2024 1546   Significant Other Acceptance, E,D, DU by CT at 3/31/2024 1546                         Point: Body mechanics (Done)       Learning Progress Summary             Patient Acceptance, E,D, DU by CT at 3/31/2024 1546   Significant Other Acceptance, E,D, DU by CT at 3/31/2024 1546                         Point: Precautions (Done)       Learning Progress Summary             Patient Acceptance, E,D, DU by CT at 3/31/2024 1546   Significant Other Acceptance, E,D, DU by CT at 3/31/2024 1546                                         User Key       Initials Effective Dates Name Provider Type Discipline    CT 07/07/23 -  Enzo Beckford, PT Physical Therapist PT                    Recommendation and Plan  Anticipated Discharge Disposition (PT): home with home health, home with assist  Planned Therapy Interventions (PT): wound care, patient/family education  Therapy Frequency (PT): daily  Plan of Care Reviewed With: patient   Progress: improving       Progress: improving  Outcome Evaluation: Patient demonstrates improvement with granulation tissue and wound closure. Patient would continue to benefit from skilled PT services and NPWT to improve granulation tissue, decrease bioburden, manage exudate, and improve wound healing. PT transferred patient to home vac unit. PT provided extended education about home unit, supplies and reviewed the  checkoff list. Patient without complaints, new unit on without errors.  Plan of Care Reviewed With: patient            Time Calculation   PT Charges       Row Name 04/01/24 1110             Time Calculation    Start Time 1110  -KW         Untimed Charges    [id number] Pressure wound to 50 sqcm 59  -KW         Total Minutes    Untimed Charges Total Minutes 59  -KW       Total Minutes 59  -KW                User Key  (r) = Recorded By, (t) = Taken By, (c) =  Cosigned By      Initials Name Provider Type    KW Sonali Bush, PT Physical Therapist                      Therapy Charges for Today       Code Description Service Date Service Provider Modifiers Qty    [id number] HC PT NEG PRESS WOUND TO 50SQCM DME4 4/1/2024 Sonali Bush, PT  1              PT G-Codes  Outcome Measure Options: AM-PAC 6 Clicks Basic Mobility (PT)  AM-PAC 6 Clicks Score (PT): 19       Sonali Bush PT  4/1/2024

## 2024-04-01 NOTE — PROGRESS NOTES
"[facility] Progress Note    Date of Admission: 3/29/2024      Antibiotics: dapto/zosyn      CC: left foot wound    S: No f/c/s. No n/v/d. Hemodynamically stable.  Wound VAC in place plans for d/c soon    O:  /95   Pulse 104   Temp 98.4 °F (36.9 °C) (Oral)   Resp 18   Ht 175.3 cm (69.02\")   Wt 81.6 kg (179 lb 14.3 oz)   SpO2 96%   BMI 26.55 kg/m²   Temp (24hrs), Av.4 °F (36.9 °C), Min:98.3 °F (36.8 °C), Max:98.5 °F (36.9 °C)      PE:     GENERAL: Awake and alert, in no acute distress.   HEENT: Normocephalic, atraumatic.  PERRL. EOMI. No conjunctival injection. No icterus. Oropharynx clear without evidence of thrush or exudate. No evidence of peridontal disease.    NECK: Supple without nuchal rigidity  LYMPH: No cervical, axillary or inguinal lymphadenopathy. No neck masses  HEART: RRR; No murmur, rubs, gallops.   LUNGS: Clear to auscultation bilaterally without wheezing, rales, rhonchi. Normal respiratory effort.  ABDOMEN: Soft, nontender, nondistended. Positive bowel sounds. No rebound or guarding.   EXT:  No cyanosis, clubbing or edema  : Normal appearing genitalia without Tripp catheter.  MSK: FROM without joint effusions noted    SKIN: Warm and dry without cutaneous eruptions.  Left foot open wound with wound vac in place  NEURO: Oriented to PPT. No focal deficits.   PSYCHIATRIC: Normal insight and judgement. Cooperative with PE     Laboratory Data    Results from last 7 days   Lab Units 24  0538 24  0509 24  0904   WBC 10*3/mm3 7.40 8.14 10.80   HEMOGLOBIN g/dL 12.0* 12.4* 12.1*   HEMATOCRIT % 37.7 39.9 38.2   PLATELETS 10*3/mm3 212 221 248     Results from last 7 days   Lab Units 24  0538   SODIUM mmol/L 137   POTASSIUM mmol/L 4.7   CHLORIDE mmol/L 100   CO2 mmol/L 30.0*   BUN mg/dL 17   CREATININE mg/dL 0.74*   GLUCOSE mg/dL 172*   CALCIUM mg/dL 10.2     Results from last 7 days   Lab Units 24  0509   ALK PHOS U/L 95   BILIRUBIN mg/dL <0.2   ALT (SGPT) U/L 77*   AST " (SGOT) U/L 29     Results from last 7 days   Lab Units 03/31/24  0509   SED RATE mm/hr 27*     Results from last 7 days   Lab Units 03/31/24  0509   CRP mg/dL 0.30       Estimated Creatinine Clearance: 137.8 mL/min (A) (by C-G formula based on SCr of 0.74 mg/dL (L)).      Microbiology:  Enterococcus faecium    Radiology:  Imaging Results (Last 24 Hours)       ** No results found for the last 24 hours. 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  Underwent a TMA in January 2024, and has completed a course of parenteral therapy, hyperbaric therapy, and has developed open wound with necrosis and concern for chronic infection admitted for I & D and wound vac.  We were asked to see for antibiotic management.     Now with VRE may have developed resistance to Dapto ESR only 27 and crp normal at 0.3 plan on wound vac and oral zyvox upon d/c and f/u in my office.      PLAN:    Zyvox 600 mg po bid and f/u with me around 10 days in office  With ESR only 27 and CRP normal d/w pt possible oral abx with good oral bioavailability with zyvox 600 mg po bid and d/w Dr. Cat d/c planning    Dieter Alejo MD  4/1/2024

## 2024-04-01 NOTE — PLAN OF CARE
Goal Outcome Evaluation:               VSS. Pt alert and oriented. Pt has frequent pain managed with oral medication, See MAR. Wound culture positive for VRE. Pain increased significantly with wound dressing change. Family at bedside most of the day.

## 2024-04-01 NOTE — PLAN OF CARE
Goal Outcome Evaluation:  Plan of Care Reviewed With: patient        Progress: improving  Outcome Evaluation: Patient demonstrates improvement with granulation tissue and wound closure. Patient would continue to benefit from skilled PT services and NPWT to improve granulation tissue, decrease bioburden, manage exudate, and improve wound healing. PT transferred patient to home vac unit. PT provided extended education about home unit, supplies and reviewed the 3M checkoff list. Patient without complaints, new unit on without errors.

## 2024-04-01 NOTE — PLAN OF CARE
"   Assumed care at 19:00. Pt awake in bed. A/O on R/A, Urinal for elimination. Did not ambulate this shift, Also voiced a disinterest in ambulating tomorrow due to the pain. Wound vac is in place and functioning, still an unmeasurable amount of fluid in the collection chamber. Requested pain meds 3 times. Was able to fall asleep around 23:30.  In bed at lowest position with bed alarm on and call light within reach.        /80 (BP Location: Right arm, Patient Position: Lying)   Pulse 109   Temp 98.5 °F (36.9 °C) (Oral)   Resp 16   Ht 175.3 cm (69.02\")   Wt 81.6 kg (179 lb 14.3 oz)   SpO2 96%   BMI 26.55 kg/m²         Problem: Adult Inpatient Plan of Care  Goal: Plan of Care Review  Outcome: Ongoing, Progressing  Flowsheets (Taken 4/1/2024 0333)  Progress: no change  Plan of Care Reviewed With: patient  Outcome Evaluation: Pt A/O on R/A, Urinal for elimination. Did not ambulate this shift, Also voiced a disinterest in ambulating tomorrow due to the pain. Wound vac is in place and functioning, still an unmeasurable amount of fluid in the collection chamber.  Requested pain meds  times. Was able to fall asleep around 23:30.  Goal: Patient-Specific Goal (Individualized)  Outcome: Ongoing, Progressing  Goal: Absence of Hospital-Acquired Illness or Injury  Outcome: Ongoing, Progressing  Intervention: Identify and Manage Fall Risk  Recent Flowsheet Documentation  Taken 4/1/2024 0000 by Esperanza Garcias RN  Safety Promotion/Fall Prevention:   activity supervised   assistive device/personal items within reach   safety round/check completed  Taken 3/31/2024 2200 by Esperanza Garcias RN  Safety Promotion/Fall Prevention:   activity supervised   assistive device/personal items within reach   safety round/check completed  Taken 3/31/2024 2120 by Esperanza Garcias, RN  Safety Promotion/Fall Prevention:   activity supervised   assistive device/personal items within reach   safety round/check completed  Intervention: " Prevent Skin Injury  Recent Flowsheet Documentation  Taken 4/1/2024 0000 by Esperanza Garcias RN  Body Position: position changed independently  Skin Protection:   adhesive use limited   incontinence pads utilized   tubing/devices free from skin contact  Taken 3/31/2024 2200 by Esperanza Garcias RN  Body Position: position changed independently  Taken 3/31/2024 2120 by Esperanza Garcias RN  Body Position: position changed independently  Intervention: Prevent and Manage VTE (Venous Thromboembolism) Risk  Recent Flowsheet Documentation  Taken 4/1/2024 0000 by Esperanza Garcias RN  Activity Management: activity encouraged  VTE Prevention/Management:   bilateral   sequential compression devices on  Range of Motion: active ROM (range of motion) encouraged  Taken 3/31/2024 2200 by Esperanza Garcias RN  Activity Management: activity encouraged  VTE Prevention/Management:   bilateral   sequential compression devices on  Taken 3/31/2024 2120 by Esperanza Garcias RN  Activity Management: activity encouraged  VTE Prevention/Management:   bilateral   sequential compression devices on  Range of Motion: active ROM (range of motion) encouraged  Intervention: Prevent Infection  Recent Flowsheet Documentation  Taken 4/1/2024 0000 by Esperanza Garcias RN  Infection Prevention:   environmental surveillance performed   rest/sleep promoted  Taken 3/31/2024 2120 by Esperanza Garcias RN  Infection Prevention:   environmental surveillance performed   rest/sleep promoted  Goal: Optimal Comfort and Wellbeing  Outcome: Ongoing, Progressing  Intervention: Monitor Pain and Promote Comfort  Recent Flowsheet Documentation  Taken 3/31/2024 2122 by Esperanza Garcias RN  Pain Management Interventions: see MAR  Intervention: Provide Person-Centered Care  Recent Flowsheet Documentation  Taken 4/1/2024 0000 by Esperanza Garcias RN  Trust Relationship/Rapport:   care explained   choices provided   empathic listening provided   questions  answered   thoughts/feelings acknowledged  Taken 3/31/2024 2120 by Esperanza Garcais RN  Trust Relationship/Rapport:   care explained   choices provided   empathic listening provided   questions answered   thoughts/feelings acknowledged  Goal: Readiness for Transition of Care  Outcome: Ongoing, Progressing     Problem: Diabetes Comorbidity  Goal: Blood Glucose Level Within Targeted Range  Outcome: Ongoing, Progressing     Problem: Hypertension Comorbidity  Goal: Blood Pressure in Desired Range  Outcome: Ongoing, Progressing  Intervention: Maintain Blood Pressure Management  Recent Flowsheet Documentation  Taken 3/31/2024 2120 by Esperanza Garcias RN  Medication Review/Management: medications reviewed     Problem: Skin Injury Risk Increased  Goal: Skin Health and Integrity  Outcome: Ongoing, Progressing  Intervention: Optimize Skin Protection  Recent Flowsheet Documentation  Taken 4/1/2024 0000 by Esperanza Garcias RN  Pressure Reduction Techniques: frequent weight shift encouraged  Head of Bed (HOB) Positioning: HOB elevated  Pressure Reduction Devices: positioning supports utilized  Skin Protection:   adhesive use limited   incontinence pads utilized   tubing/devices free from skin contact  Taken 3/31/2024 2200 by Esperanza Garcias RN  Head of Bed (HOB) Positioning: HOB elevated  Taken 3/31/2024 2120 by Esperanza Garcias RN  Pressure Reduction Techniques: pressure points protected  Head of Bed (HOB) Positioning: HOB elevated  Pressure Reduction Devices: positioning supports utilized     Problem: Fall Injury Risk  Goal: Absence of Fall and Fall-Related Injury  Outcome: Ongoing, Progressing  Intervention: Identify and Manage Contributors  Recent Flowsheet Documentation  Taken 4/1/2024 0000 by Esperanza Garcias RN  Self-Care Promotion:   independence encouraged   BADL personal objects within reach  Taken 3/31/2024 2200 by Esperanza Garcias RN  Self-Care Promotion:   independence encouraged   BADL personal  objects within reach  Taken 3/31/2024 2120 by Esperanza Garcias RN  Medication Review/Management: medications reviewed  Self-Care Promotion:   independence encouraged   BADL personal objects within reach  Intervention: Promote Injury-Free Environment  Recent Flowsheet Documentation  Taken 4/1/2024 0000 by Esperanza Garcias RN  Safety Promotion/Fall Prevention:   activity supervised   assistive device/personal items within reach   safety round/check completed  Taken 3/31/2024 2200 by Esperanza Garcias RN  Safety Promotion/Fall Prevention:   activity supervised   assistive device/personal items within reach   safety round/check completed  Taken 3/31/2024 2120 by Esperanza Garcias RN  Safety Promotion/Fall Prevention:   activity supervised   assistive device/personal items within reach   safety round/check completed     Problem: Adult Inpatient Plan of Care  Goal: Plan of Care Review  Outcome: Ongoing, Progressing  Flowsheets (Taken 4/1/2024 0333)  Progress: no change  Plan of Care Reviewed With: patient  Outcome Evaluation: Pt A/O on R/A, Urinal for elimination. Did not ambulate this shift, Also voiced a disinterest in ambulating tomorrow due to the pain. Wound vac is in place and functioning, still an unmeasurable amount of fluid in the collection chamber.  Requested pain meds  times. Was able to fall asleep around 23:30.  Goal: Patient-Specific Goal (Individualized)  Outcome: Ongoing, Progressing  Goal: Absence of Hospital-Acquired Illness or Injury  Outcome: Ongoing, Progressing  Intervention: Identify and Manage Fall Risk  Recent Flowsheet Documentation  Taken 4/1/2024 0000 by Esperanza Garcias RN  Safety Promotion/Fall Prevention:   activity supervised   assistive device/personal items within reach   safety round/check completed  Taken 3/31/2024 2200 by Esperanza Garcias RN  Safety Promotion/Fall Prevention:   activity supervised   assistive device/personal items within reach   safety round/check  completed  Taken 3/31/2024 2120 by Esperanza Garcias RN  Safety Promotion/Fall Prevention:   activity supervised   assistive device/personal items within reach   safety round/check completed  Intervention: Prevent Skin Injury  Recent Flowsheet Documentation  Taken 4/1/2024 0000 by Esperanza Garcias RN  Body Position: position changed independently  Skin Protection:   adhesive use limited   incontinence pads utilized   tubing/devices free from skin contact  Taken 3/31/2024 2200 by Esperanza Gracias RN  Body Position: position changed independently  Taken 3/31/2024 2120 by Esperanza Garcias RN  Body Position: position changed independently  Intervention: Prevent and Manage VTE (Venous Thromboembolism) Risk  Recent Flowsheet Documentation  Taken 4/1/2024 0000 by Esperanza Garcias RN  Activity Management: activity encouraged  VTE Prevention/Management:   bilateral   sequential compression devices on  Range of Motion: active ROM (range of motion) encouraged  Taken 3/31/2024 2200 by Esperanza Garcias RN  Activity Management: activity encouraged  VTE Prevention/Management:   bilateral   sequential compression devices on  Taken 3/31/2024 2120 by Esperanza Garcias RN  Activity Management: activity encouraged  VTE Prevention/Management:   bilateral   sequential compression devices on  Range of Motion: active ROM (range of motion) encouraged  Intervention: Prevent Infection  Recent Flowsheet Documentation  Taken 4/1/2024 0000 by Esperanza Garcias RN  Infection Prevention:   environmental surveillance performed   rest/sleep promoted  Taken 3/31/2024 2120 by Esperanza Garcias RN  Infection Prevention:   environmental surveillance performed   rest/sleep promoted  Goal: Optimal Comfort and Wellbeing  Outcome: Ongoing, Progressing  Intervention: Monitor Pain and Promote Comfort  Recent Flowsheet Documentation  Taken 3/31/2024 2122 by Esperanza Garcias RN  Pain Management Interventions: see MAR  Intervention: Provide  Person-Centered Care  Recent Flowsheet Documentation  Taken 4/1/2024 0000 by Esperanza Garcias RN  Trust Relationship/Rapport:   care explained   choices provided   empathic listening provided   questions answered   thoughts/feelings acknowledged  Taken 3/31/2024 2120 by Esperanza Garcias RN  Trust Relationship/Rapport:   care explained   choices provided   empathic listening provided   questions answered   thoughts/feelings acknowledged  Goal: Readiness for Transition of Care  Outcome: Ongoing, Progressing     Problem: Diabetes Comorbidity  Goal: Blood Glucose Level Within Targeted Range  Outcome: Ongoing, Progressing     Problem: Hypertension Comorbidity  Goal: Blood Pressure in Desired Range  Outcome: Ongoing, Progressing  Intervention: Maintain Blood Pressure Management  Recent Flowsheet Documentation  Taken 3/31/2024 2120 by Esperanza Garcias RN  Medication Review/Management: medications reviewed     Problem: Skin Injury Risk Increased  Goal: Skin Health and Integrity  Outcome: Ongoing, Progressing  Intervention: Optimize Skin Protection  Recent Flowsheet Documentation  Taken 4/1/2024 0000 by Esperanza Garcias RN  Pressure Reduction Techniques: frequent weight shift encouraged  Head of Bed (HOB) Positioning: [facility] elevated  Pressure Reduction Devices: positioning supports utilized  Skin Protection:   adhesive use limited   incontinence pads utilized   tubing/devices free from skin contact  Taken 3/31/2024 2200 by Esperanza Garcias RN  Head of Bed ([facility]) Positioning: HOB elevated  Taken 3/31/2024 2120 by Esperanza Garcias RN  Pressure Reduction Techniques: pressure points protected  Head of Bed ([facility]) Positioning: [facility] elevated  Pressure Reduction Devices: positioning supports utilized     Problem: Fall Injury Risk  Goal: Absence of Fall and Fall-Related Injury  Outcome: Ongoing, Progressing  Intervention: Identify and Manage Contributors  Recent Flowsheet Documentation  Taken 4/1/2024 0000 by Katerine  DAVID Mata  Self-Care Promotion:   independence encouraged   BADL personal objects within reach  Taken 3/31/2024 2200 by Esperanza Garcias RN  Self-Care Promotion:   independence encouraged   BADL personal objects within reach  Taken 3/31/2024 2120 by Esperanza Garcias RN  Medication Review/Management: medications reviewed  Self-Care Promotion:   independence encouraged   BADL personal objects within reach  Intervention: Promote Injury-Free Environment  Recent Flowsheet Documentation  Taken 4/1/2024 0000 by Esperanza Garcias RN  Safety Promotion/Fall Prevention:   activity supervised   assistive device/personal items within reach   safety round/check completed  Taken 3/31/2024 2200 by Esperanza Garcias RN  Safety Promotion/Fall Prevention:   activity supervised   assistive device/personal items within reach   safety round/check completed  Taken 3/31/2024 2120 by Esperanza Garcias RN  Safety Promotion/Fall Prevention:   activity supervised   assistive device/personal items within reach   safety round/check completed   Goal Outcome Evaluation:  Plan of Care Reviewed With: patient        Progress: no change  Outcome Evaluation: Pt A/O on R/A, Urinal for elimination. Did not ambulate this shift, Also voiced a disinterest in ambulating tomorrow due to the pain. Wound vac is in place and functioning, still an unmeasurable amount of fluid in the collection chamber.  Requested pain meds  times. Was able to fall asleep around 23:30.

## 2024-04-01 NOTE — PROGRESS NOTES
[facility] Medicine Services  PROGRESS NOTE    Patient Name: Justo Oquendo  : 1975  MRN: [id number]    Date of Admission: 3/29/2024  Primary Care Physician: Melo Whitaker MD    Subjective   Subjective     CC:  Dehisced foot wound     HPI:  Patient is resting in bed in NAD with wife at bedside. Talked with patient about pan  meds. He states he no longer takes lortab. Will stop IV meds and increase oxycodone       Objective   Objective     Vital Signs:   Temp:  [98.3 °F (36.8 °C)-98.5 °F (36.9 °C)] 98.4 °F (36.9 °C)  Heart Rate:  [] 104  Resp:  [16-18] 18  BP: (126-157)/(68-95) 157/95     Physical Exam:  Constitutional: No acute distress, awake, alert  HENT: NCAT, mucous membranes moist  Respiratory: Clear to auscultation bilaterally, respiratory effort normal room air 93%  Cardiovascular: RRR, no murmurs, rubs, or gallops  Gastrointestinal: Positive bowel sounds, soft, nontender, nondistended  Musculoskeletal: No bilateral ankle edema  Psychiatric: Appropriate affect, cooperative  Neurologic: Oriented x 3, strength symmetric in all extremities, Cranial Nerves grossly intact to confrontation, speech clear  Skin: No rashes, left foot dressing with wound vac      Results Reviewed:  LAB RESULTS:      Lab 24  0538 24  0509 24  0904 24  0741   WBC 7.40 8.14 10.80 8.85   HEMOGLOBIN 12.0* 12.4* 12.1* 12.2*   HEMATOCRIT 37.7 39.9 38.2 39.2   PLATELETS 212 221 248 258   NEUTROS ABS  --  4.56 6.84  --    IMMATURE GRANS (ABS)  --  0.15* 0.07*  --    LYMPHS ABS  --  2.26 2.85  --    MONOS ABS  --  0.92* 0.93*  --    EOS ABS  --  0.20 0.07  --    MCV 87.3 88.3 88.4 89.7   SED RATE  --  27* 34*  --    CRP  --  0.30 0.51*  --          Lab 24  0538 24  0509 24  0904 24  0741   SODIUM 137 135* 133* 136   POTASSIUM 4.7 4.4 4.2 5.2   CHLORIDE 100 97* 95* 99   CO2 30.0* 27.0 29.0 25.0   ANION GAP 7.0 11.0 9.0 12.0   BUN 17 24* 19 21*    CREATININE 0.74* 0.81 0.74* 0.79   EGFR 110.4 107.4 110.4 108.2   GLUCOSE 172* 221* 173* 370*   CALCIUM 10.2 10.1 10.7* 10.5   HEMOGLOBIN A1C  --   --   --  7.80*         Lab 03/31/24  0509   TOTAL PROTEIN 7.3   ALBUMIN 4.2   GLOBULIN 3.1   ALT (SGPT) 77*   AST (SGOT) 29   BILIRUBIN <0.2   ALK PHOS 95                     Brief Urine Lab Results  (Last result in the past 365 days)        Color   Clarity   Blood   Leuk Est   Nitrite   Protein   CREAT   Urine HCG        01/23/24 1124 Yellow   Clear   Negative   Negative   Negative   Negative                   Microbiology Results Abnormal       Procedure Component Value - Date/Time    Anaerobic Culture 10 Day Incubation - Wound, Foot, Left [[id number]]  (Normal) Collected: 03/29/24 0830    Lab Status: Preliminary result Specimen: Wound from Foot, Left Updated: 04/01/24 1025     Anaerobic Culture No anaerobes isolated at 3 days    AFB Culture - Wound, Foot, Left [[id number]] Collected: 03/29/24 0830    Lab Status: Preliminary result Specimen: Wound from Foot, Left Updated: 03/30/24 1141     AFB Stain No acid fast bacilli seen on concentrated smear            No radiology results from the last 24 hrs    Results for orders placed during the hospital encounter of 11/20/23    Adult Transthoracic Echo Complete W/ Cont if Necessary Per Protocol    Interpretation Summary    Left ventricular systolic function is hyperdynamic (EF > 70%). Calculated left ventricular EF = 70.4%    Left ventricular diastolic function was normal.    No significant structural or functional valvular disease.      Current medications:  Scheduled Meds:acetaminophen, 1,000 mg, Oral, Q8H  apixaban, 5 mg, Oral, Q12H  aspirin, 81 mg, Oral, Nightly  gabapentin, 600 mg, Oral, Q8H  insulin detemir, 15 Units, Subcutaneous, Q12H  insulin lispro, 2-9 Units, Subcutaneous, 4x Daily AC & at Bedtime  Insulin Lispro, 5 Units, Subcutaneous, TID With Meals  linezolid, 600 mg, Oral, Q12H  lisinopril, 40 mg, Oral,  Nightly  LORazepam, 1 mg, Oral, Nightly  metoprolol tartrate, 25 mg, Oral, Q12H  nicotine, 1 patch, Transdermal, Q24H  NIFEdipine XL, 30 mg, Oral, Q24H  pantoprazole, 40 mg, Oral, Q AM  rosuvastatin, 40 mg, Oral, Nightly  senna-docusate sodium, 2 tablet, Oral, BID  sodium chloride, 10 mL, Intravenous, Q12H      Continuous Infusions:lactated ringers, 9 mL/hr, Last Rate: 9 mL/hr (03/29/24 0811)      PRN Meds:.  acetaminophen    senna-docusate sodium **AND** polyethylene glycol **AND** bisacodyl **AND** bisacodyl    dextrose    dextrose    glucagon (human recombinant)    melatonin    nitroglycerin    ondansetron    oxyCODONE    sodium chloride    sodium chloride    Assessment & Plan   Assessment & Plan     Active Hospital Problems    Diagnosis  POA    **Diabetic foot infection [E11.628, L08.9]  Yes      Resolved Hospital Problems   No resolved problems to display.        Brief Hospital Course to date:  Justo Oquendo is a 48 y.o. male w PAD, diabetic foot infection, HTN, HLD, DM2, tobacco use disorder, chronic pain, history of ischemic limb status post transmetatarsal amputation with wound dehiscence/dry gangrene status postdebridement, irrigation, vacuum assisted wound closure. Ortho and ID follow. Would cultures grew VRE. Patient was treated with oral abx and then transitioned over to zyvox. Follow up with ID in 10 days.                                                                                                                                                                                                                                                                                                                                                                                                                                                                                                                                                                                                                                                                                                                                                                                                                                                                                                                                                                                                                                                                                                                                                                                                                                                                                                                                                                                                                                                                                                                                                                                                                                                                                                                                                                                                                                                                                                                                                                                                                                                                                                                                                                              This patient's problems and plans were partially entered by my partner and updated as appropriate by me 04/1/24.     History of ischemic limb status post transmetatarsal amputation with wound dehiscence/dry gangrene status postdebridement, irrigation, vacuum assisted wound closure  PAD s/p prior FemFem bypass  --s/p I & D left foot abscess with placement of wound vac closure by Dr Peres on 3/29/24  --surgical cultures with VRE  --pain control, resumed home  oxycodone. Per patient he no longer takes lortab  -- pt was switched over to oral zyvox for 14 days, will follow up with ID 10 days after dc   --Eliquis on hold, restarted 3/31  - anticipate DC home with Wound Vac, and nonweightbearing, and abx per ID  (Dr Alejo following), awaiting wound vac approval   --Follow-up orthopedics on April 5, 2024 with Winter Santana PA-C  [facility]  [address], [address]  [facility], [address]  Please schedule at [phone]  -Follow-up with Dr. Alejo (Infectious disease) outpatient     HTN  HLD  --cont home meds      DM  --A1c 7.8    --stated sugars at home have been running high due to not being able to obtain medications.   --patient has been eating extra food and take out, hard to use glucommander due to this so changed over to basal, bolus and SSI  --Endocrine referral at DC      Tobacco use  Chronic pain -  -- patient had followed with pain clinic. He states he no longer takes lortab just oxycodone       Expected Discharge Location and Transportation: home with wound vac   Expected Discharge   Expected Discharge Date: 4/1/2024; Expected Discharge Time:      DVT prophylaxis:  Medical and mechanical DVT prophylaxis orders are present.         AM-PAC 6 Clicks Score (PT): 19 (04/01/24 6212)    CODE STATUS:   Code Status and Medical Interventions:   Ordered at: 03/29/24 4698     Level Of Support Discussed With:    Patient     Code Status (Patient has no pulse and is not breathing):    CPR (Attempt to Resuscitate)     Medical Interventions (Patient has pulse or is breathing):    Full Support       Maria Luisa Ritter, APRN  04/01/24

## 2024-04-01 NOTE — CASE MANAGEMENT/SOCIAL WORK
Continued Stay Note  [facility]     Patient Name: Justo Oquendo  MRN: [id number]  Today's Date: 4/1/2024    Admit Date: 3/29/2024    Plan: home with wound care from [facility]   Discharge Plan       Row Name 04/01/24 0847       Plan    Plan home with wound care from [facility]    Patient/Family in Agreement with Plan yes    Plan Comments Pt reports he lives with his wife in [place]. He was independent with ADLs prior to admit and has a walker and wheelchair at home. He receives his wound care from [facility], [phone]. CM has confirmed they are following and will continue to follow for his wound vac. Pt is followed by his PCP and has drug coverage. At this time his plan for discharge is to return home. CM will fax the dc summary to [facility] at [phone]    Final Discharge Disposition Code 01 - home or self-care                   Discharge Codes    No documentation.                 Expected Discharge Date and Time       Expected Discharge Date Expected Discharge Time    Apr 1, 2024               Maria Luisa Moreno RN

## 2024-04-02 VITALS
BODY MASS INDEX: 26.64 KG/M2 | TEMPERATURE: 98.1 F | SYSTOLIC BLOOD PRESSURE: 119 MMHG | DIASTOLIC BLOOD PRESSURE: 81 MMHG | WEIGHT: 179.9 LBS | HEIGHT: 69 IN | OXYGEN SATURATION: 96 % | HEART RATE: 95 BPM | RESPIRATION RATE: 18 BRPM

## 2024-04-02 LAB
GLUCOSE BLDC GLUCOMTR-MCNC: 206 MG/DL (ref 70–130)
GLUCOSE BLDC GLUCOMTR-MCNC: 379 MG/DL (ref 70–130)

## 2024-04-02 PROCEDURE — 63710000001 INSULIN DETEMIR PER 5 UNITS: Performed by: NURSE PRACTITIONER

## 2024-04-02 PROCEDURE — 97605 NEG PRS WND THER DME<=50SQCM: CPT

## 2024-04-02 PROCEDURE — 97116 GAIT TRAINING THERAPY: CPT

## 2024-04-02 PROCEDURE — 63710000001 INSULIN LISPRO (HUMAN) PER 5 UNITS: Performed by: NURSE PRACTITIONER

## 2024-04-02 PROCEDURE — 82948 REAGENT STRIP/BLOOD GLUCOSE: CPT

## 2024-04-02 RX ORDER — NALOXONE HYDROCHLORIDE 4 MG/.1ML
SPRAY NASAL
Qty: 2 EACH | Refills: 0 | Status: SHIPPED | OUTPATIENT
Start: 2024-04-02

## 2024-04-02 RX ORDER — OXYCODONE HYDROCHLORIDE 5 MG/1
5 TABLET ORAL EVERY 8 HOURS PRN
Qty: 9 TABLET | Refills: 0 | Status: SHIPPED | OUTPATIENT
Start: 2024-04-02

## 2024-04-02 RX ORDER — LINEZOLID 600 MG/1
600 TABLET, FILM COATED ORAL EVERY 12 HOURS SCHEDULED
Qty: 25 TABLET | Refills: 0 | Status: SHIPPED | OUTPATIENT
Start: 2024-04-02 | End: 2024-04-15

## 2024-04-02 RX ADMIN — INSULIN LISPRO 8 UNITS: 100 INJECTION, SOLUTION INTRAVENOUS; SUBCUTANEOUS at 12:50

## 2024-04-02 RX ADMIN — NIFEDIPINE 30 MG: 30 TABLET, EXTENDED RELEASE ORAL at 08:59

## 2024-04-02 RX ADMIN — GABAPENTIN 600 MG: 300 CAPSULE ORAL at 05:14

## 2024-04-02 RX ADMIN — ACETAMINOPHEN 1000 MG: 500 TABLET ORAL at 12:50

## 2024-04-02 RX ADMIN — ACETAMINOPHEN 650 MG: 325 TABLET ORAL at 07:11

## 2024-04-02 RX ADMIN — ACETAMINOPHEN 1000 MG: 500 TABLET ORAL at 05:13

## 2024-04-02 RX ADMIN — INSULIN LISPRO 5 UNITS: 100 INJECTION, SOLUTION INTRAVENOUS; SUBCUTANEOUS at 12:51

## 2024-04-02 RX ADMIN — LINEZOLID 600 MG: 600 TABLET, FILM COATED ORAL at 09:04

## 2024-04-02 RX ADMIN — GABAPENTIN 600 MG: 300 CAPSULE ORAL at 14:54

## 2024-04-02 RX ADMIN — APIXABAN 5 MG: 5 TABLET, FILM COATED ORAL at 08:59

## 2024-04-02 RX ADMIN — PANTOPRAZOLE SODIUM 40 MG: 40 TABLET, DELAYED RELEASE ORAL at 05:14

## 2024-04-02 RX ADMIN — METOPROLOL TARTRATE 25 MG: 25 TABLET, FILM COATED ORAL at 08:59

## 2024-04-02 RX ADMIN — OXYCODONE HYDROCHLORIDE 20 MG: 10 TABLET ORAL at 14:59

## 2024-04-02 RX ADMIN — SENNOSIDES AND DOCUSATE SODIUM 2 TABLET: 8.6; 5 TABLET ORAL at 08:59

## 2024-04-02 RX ADMIN — OXYCODONE HYDROCHLORIDE 20 MG: 10 TABLET ORAL at 01:17

## 2024-04-02 RX ADMIN — OXYCODONE HYDROCHLORIDE 20 MG: 10 TABLET ORAL at 05:14

## 2024-04-02 RX ADMIN — OXYCODONE HYDROCHLORIDE 20 MG: 10 TABLET ORAL at 08:59

## 2024-04-02 RX ADMIN — INSULIN LISPRO 4 UNITS: 100 INJECTION, SOLUTION INTRAVENOUS; SUBCUTANEOUS at 09:02

## 2024-04-02 RX ADMIN — INSULIN LISPRO 5 UNITS: 100 INJECTION, SOLUTION INTRAVENOUS; SUBCUTANEOUS at 09:00

## 2024-04-02 RX ADMIN — Medication 1 PATCH: at 14:55

## 2024-04-02 RX ADMIN — INSULIN DETEMIR 15 UNITS: 100 INJECTION, SOLUTION SUBCUTANEOUS at 09:00

## 2024-04-02 NOTE — PROGRESS NOTES
Justo Oquendo       LOS: 1 day   Patient Care Team:  Melo Whitaker MD as PCP - General (Family Medicine)  Niall Mcfarlane MD as Consulting Physician (Cardiology)    Chief Complaint: Left forefoot wound ischemia    Subjective     Interval History:     Resting comfortably in bed this morning.  Pain tolerable, no acute events overnight.    Review of Systems:     Gen- No fevers, chills  CV- No chest pain, palpitations  Resp- No cough, dyspnea  GI- No N/V/D, abd pain    Objective     Vital Signs  Vital Signs (last 24 hours)         03/29 0700  03/30 0659 03/30 0700  03/30 0748   Most Recent      Temp (°F) 97 -  98.1       97.9 (36.6) 03/30 0407    Heart Rate 94 -  115       94 03/30 0407    Resp 14 -  18       16 03/30 0407    /74 -  161/82       137/97 03/30 0407    SpO2 (%) 93 -  100       93 03/30 0407    Flow (L/min)   2       2 03/29 0900              Physical Exam:     Alert, oriented.  No acute distress.  Nonlabored respirations.  Regular rate and rhythm.  Abdomen nondistended.  Left lower extremity: Dressing, wound VAC in place with appropriate seal, minimal serosanguineous output.     Results Review:     I reviewed the patient's new clinical results.    Medication Review:   Hospital Medications (active)         Dose Frequency Start End    !4/1 Vancomycin random level at 0500. Please hold 0600 dose until level evaluated by pharmacy  Daily 4/1/2024 4/2/2024    Route: Does not apply    acetaminophen (TYLENOL) tablet 650 mg 650 mg Every 4 Hours PRN 3/29/2024 --    Admin Instructions: If given for fever, use fever parameter: fever greater than 100.4 °F  Based on patient request - if ordered for moderate or severe pain, provider allows for administration of a medication prescribed for a lower pain scale.    Do not exceed 4 grams of acetaminophen in a 24 hr period. Max dose of 2gm for AST/ALT greater than 120 units/L.    If given for pain, use the following pain scale:   Mild Pain = Pain Score  "of 1-3, CPOT 1-2  Moderate Pain = Pain Score of 4-6, CPOT 3-4  Severe Pain = Pain Score of 7-10, CPOT 5-8    Route: Oral    aspirin EC tablet 81 mg 81 mg Nightly 3/29/2024 --    Admin Instructions: Do not crush or chew the capsules or tablets. The drug may not work as designed if the capsule or tablet is crushed or chewed. Swallow whole.  Do not exceed 4 grams of aspirin in a 24 hr period.    If given for pain, use the following pain scale:   Mild Pain = Pain Score of 1-3, CPOT 1-2  Moderate Pain = Pain Score of 4-6, CPOT 3-4  Severe Pain = Pain Score of 7-10, CPOT 5-8    Route: Oral    bisacodyl (DULCOLAX) EC tablet 5 mg 5 mg Daily PRN 3/29/2024 --    Admin Instructions: Use if no bowel movement after 12 hours.  Swallow whole. Do not crush, split, or chew tablet.    Route: Oral    Linked Group 1: Placed in \"And\" Linked Group        bisacodyl (DULCOLAX) suppository 10 mg 10 mg Daily PRN 3/29/2024 --    Admin Instructions: Use if no bowel movement after 12 hours.  Hold for diarrhea    Route: Rectal    Linked Group 1: Placed in \"And\" Linked Group        dextrose (D50W) (25 g/50 mL) IV injection 10-50 mL 10-50 mL Every 15 Minutes PRN 3/29/2024 --    Admin Instructions: Blood Glucose <70  Patient Has IV Access, Is Unresponsive, NPO or Unable to Safely Swallow  Recheck Blood Glucose Per Glucommander™    Route: Intravenous    dextrose (GLUTOSE) oral gel 15 g 15 g Every 15 Minutes PRN 3/29/2024 --    Admin Instructions: Blood Glucose <70  Patient Alert, NOT NPO, Can Safely Swallow  Recheck Blood Glucose Per Glucommander™    Route: Oral    Enoxaparin Sodium (LOVENOX) syringe 40 mg 40 mg Daily 3/30/2024 --    Admin Instructions: Give subcutaneous in abdomen only. Do not massage site after injection.    Route: Subcutaneous    gabapentin (NEURONTIN) capsule 600 mg 600 mg Every 8 Hours Scheduled 3/29/2024 --    Admin Instructions:     Route: Oral    glucagon (GLUCAGEN) injection 1 mg 1 mg Every 15 Minutes PRN 3/29/2024 --    " Admin Instructions: Blood Glucose <70  Patient Without IV Access, Unresponsive, NPO or Unable to Safely Swallow  Recheck Blood Glucose Per Glucommander™  Reconstitute powder for injection by adding 1 mL of -supplied sterile diluent or sterile water for injection to a vial containing 1 mg of the drug, to provide solutions containing 1 mg/mL. Shake vial gently to dissolve.    Route: Intramuscular    HYDROcodone-acetaminophen (NORCO)  MG per tablet 1 tablet 1 tablet Every 8 Hours PRN 3/29/2024 --    Admin Instructions: Based on patient request - if ordered for moderate or severe pain, provider allows for administration of a medication prescribed for a lower pain scale.  [SPEEDY]    Do not exceed 4 grams of acetaminophen in a 24 hr period. Max dose of 2gm for AST/ALT greater than 120 units/L        If given for pain, use the following pain scale:   Mild Pain = Pain Score of 1-3, CPOT 1-2  Moderate Pain = Pain Score of 4-6, CPOT 3-4  Severe Pain = Pain Score of 7-10, CPOT 5-8    Route: Oral    HYDROmorphone (DILAUDID) injection 1 mg 1 mg Every 2 Hours PRN 3/29/2024 4/1/2024    Admin Instructions: Based on patient request - if ordered for moderate or severe pain, provider allows for administration of a medication prescribed for a lower pain scale.      Caution: Look alike/sound alike drug alert    If given for pain, use the following pain scale:  Mild Pain = Pain Score of 1-3, CPOT 1-2  Moderate Pain = Pain Score of 4-6, CPOT 3-4  Severe Pain = Pain Score of 7-10, CPOT 5-8    Route: Intravenous    insulin detemir (LEVEMIR) injection 1-200 Units 1-200 Units Nightly - Glucommander 3/29/2024 --    Admin Instructions: Do not hold basal insulin without an order. Consider requesting a dose edit, if needed.  per Glucommander    Route: Subcutaneous    insulin lispro (humaLOG) injection 1-200 Units 1-200 Units 4 Times Daily With Meals & Nightly 3/29/2024 --    Admin Instructions: Document Total Bolus Dose Using This  MAR Entry   Total Bolus Dose Includes Scheduled Meal & Associated Correction Dose  If Patient NPO or Unable to Eat Administer Correction Insulin Only  per Glucommander    Route: Subcutaneous    insulin lispro (humaLOG) injection 1-200 Units 1-200 Units As Needed 3/29/2024 --    Admin Instructions: Correction Doses Outside of Scheduled Meal & Bedtime Per Glucommander™   Use This MAR Entry For Insulin Doses When There is NO SCHEDULED MAR Entry Available  Administer Correction Insulin Even if Patient NPO or Unable to Eat  per Glucommander    Route: Subcutaneous    lactated ringers infusion 9 mL/hr Continuous 3/29/2024 --    Admin Instructions: May switch to NS IV at KVO if renal / if indicated    Route: Intravenous    lisinopril (PRINIVIL,ZESTRIL) tablet 40 mg 40 mg Nightly 3/29/2024 --    Admin Instructions: Hold for SBP less than 100, DBP less than 60.    Route: Oral    LORazepam (ATIVAN) tablet 1 mg 1 mg Nightly 3/29/2024 --    Admin Instructions:  Caution: Look alike/sound alike drug alert    Route: Oral    metoprolol tartrate (LOPRESSOR) tablet 25 mg 25 mg Every 12 Hours Scheduled 3/29/2024 --    Admin Instructions: Hold for SBP less than 100, DBP less than 60, or heart rate less than 50. If a dose is held, please contact the provider.    Route: Oral    nicotine (NICODERM CQ) 21 MG/24HR patch 1 patch 1 patch Every 24 Hours 3/29/2024 --    Admin Instructions: Apply to clean, dry, nonhairy area of skin (typically upper arm or shoulder)  Dispose of nicotine replacement therapies and their wrappers in non-hazardous pharmaceutical waste or in regular trash.    Route: Transdermal    NIFEdipine XL (PROCARDIA XL) 24 hr tablet 30 mg 30 mg Every 24 Hours Scheduled 3/29/2024 --    Admin Instructions: Caution: Look alike/sound alike drug alert. Avoid grapefruit juice. Swallow whole. Do not crush, split or chew.    Route: Oral    nitroglycerin (NITROSTAT) SL tablet 0.4 mg 0.4 mg Every 5 Minutes PRN 3/29/2024 --    Admin  "Instructions: If Pain Unrelieved After 3 Doses Notify MD  May administer up to 3 doses per episode.    Route: Sublingual    ondansetron (ZOFRAN) injection 4 mg 4 mg Every 6 Hours PRN 3/29/2024 --    Admin Instructions: If BOTH ondansetron (ZOFRAN) and promethazine (PHENERGAN) are ordered use ondansetron first and THEN promethazine IF ondansetron is ineffective.    Route: Intravenous    oxyCODONE-acetaminophen (PERCOCET) 5-325 MG per tablet 1 tablet 1 tablet Every 4 Hours PRN 3/29/2024 4/3/2024    Admin Instructions: Based on patient request - if ordered for moderate or severe pain, provider allows for administration of a medication prescribed for a lower pain scale.  [SPEEDY]    Do not exceed 4 grams of acetaminophen in a 24 hr period. Max dose of 2gm for AST/ALT greater than 120 units/L        If given for pain, use the following pain scale:   Mild Pain = Pain Score of 1-3, CPOT 1-2  Moderate Pain = Pain Score of 4-6, CPOT 3-4  Severe Pain = Pain Score of 7-10, CPOT 5-8    Route: Oral    pantoprazole (PROTONIX) EC tablet 40 mg 40 mg Every Early Morning 3/30/2024 --    Admin Instructions: Do not crush or chew the capsules or tablets. The drug may not work as designed if the capsule or tablet is crushed or chewed. Swallow whole.  Swallow whole; do not crush, split, or chew.    Route: Oral    Pharmacy to dose vancomycin  Continuous PRN 3/29/2024 4/3/2024    Route: Does not apply    piperacillin-tazobactam (ZOSYN) 3.375 g IVPB in 100 mL NS MBP (CD) 3.375 g Every 8 Hours 3/29/2024 4/3/2024    Route: Intravenous    polyethylene glycol (MIRALAX) packet 17 g 17 g Daily PRN 3/29/2024 --    Admin Instructions: Use if no bowel movement after 12 hours. Mix in 6-8 ounces of water.  Use 4-8 ounces of water, tea, or juice for each 17 gram dose.    Route: Oral    Linked Group 1: Placed in \"And\" Linked Group        rosuvastatin (CRESTOR) tablet 40 mg 40 mg Nightly 3/29/2024 --    Admin Instructions: Avoid grapefruit juice.    Route: " "Oral    sennosides-docusate (PERICOLACE) 8.6-50 MG per tablet 2 tablet 2 tablet 2 Times Daily 3/29/2024 --    Admin Instructions: HOLD MEDICATION IF PATIENT HAS HAD BOWEL MOVEMENT. Start bowel management regimen if patient has not had a bowel movement after 12 hours.    Route: Oral    Linked Group 1: Placed in \"And\" Linked Group        sodium chloride 0.9 % flush 10 mL 10 mL Every 12 Hours Scheduled 3/29/2024 --    Route: Intravenous    sodium chloride 0.9 % flush 10 mL 10 mL As Needed 3/29/2024 --    Route: Intravenous    sodium chloride 0.9 % infusion 40 mL 40 mL As Needed 3/29/2024 --    Admin Instructions: Following administration of an IV intermittent medication, flush line with 40mL NS at 100mL/hr.    Route: Intravenous    Vancomycin HCl 1,250 mg in sodium chloride 0.9 % 250 mL VTB 1,250 mg Every 12 Hours 3/30/2024 4/4/2024    Route: Intravenous              Assessment & Plan     48-year-old male with peripheral vascular disease status post transmetatarsal amputation with wound dehiscence/dry gangrene status post debridement, irrigation, wound vacuum-assisted closure.      Diabetic foot infection    Protected heel weightbearing in walker in postoperative shoe.  Follow cultures --Enterococcus    Okay to return to work with strict nonweightbearing status, seated/sedentary duties only.  Elevate operative extremity.    He will need outpatient wound vacuum-assisted closure therapy/home health versus PT wound care follow-up for wound VAC changes.  Every 72 hour wound vacuum-assisted closure changes beginning April 1.    Follow up April 5 with Winter Santana PA-C.    [facility]  [address], [address]  [facility] [place] [facility] [place], [address]  Please schedule at [phone]    LELIA Tapia  04/02/24  07:29 EDT           "

## 2024-04-02 NOTE — CASE MANAGEMENT/SOCIAL WORK
Continued Stay Note  Psychiatric     Patient Name: Justo Oquendo  MRN: [id number]  Today's Date: 4/2/2024    Admit Date: 3/29/2024    Plan: Home with wife's assistance and Wound Vac care from [facility]   Discharge Plan       Row Name 04/02/24 1240       Plan    Plan Home with wife's assistance and Wound Vac care at [facility]    Patient/Family in Agreement with Plan yes    Plan Comments Mr. Oquendo is being discharged to home today.  He has his home wound vac applied.  Contacted [facility], ph [phone], and left voice mail to notify them of the patient's discharge.   will fax the DC summary when completed to [facility] at fax [phone].    Received call from [facility] requesting a prior auth with Mr. Oquendo's [place] for the Zyvox.  Prior auth completed and approval received from [facility].    The patient will be transported home by his wife.    Final Discharge Disposition Code 01 - home or self-care                                  Expected Discharge Date and Time       Expected Discharge Date Expected Discharge Time    Apr 2, 2024               Malena Soto RN

## 2024-04-02 NOTE — PLAN OF CARE
Goal Outcome Evaluation:  Plan of Care Reviewed With: patient, spouse        Progress: improving  Outcome Evaluation: Patient demonstrated good control with walker. He was able to keep wt off his foot safely during ambulation. He is going home with his wife and has a walker, knee scooter and w/c.      Anticipated Discharge Disposition (PT): home with home health, home with assist

## 2024-04-02 NOTE — PROGRESS NOTES
"[facility] Progress Note    Date of Admission: 3/29/2024      Antibiotics: dapto/zosyn      CC: left foot wound    S: Pt with no acute changes and no fevers awaiting Wound VAC plans for d/c soon    O:  /81 (BP Location: Right arm, Patient Position: Lying)   Pulse 95   Temp 98.1 °F (36.7 °C) (Oral)   Resp 18   Ht 175.3 cm (69.02\")   Wt 81.6 kg (179 lb 14.3 oz)   SpO2 96%   BMI 26.55 kg/m²   Temp (24hrs), Av.3 °F (36.8 °C), Min:98.1 °F (36.7 °C), Max:98.6 °F (37 °C)      PE:     GENERAL: Awake and alert, in no acute distress.   HEENT: Normocephalic, atraumatic.  PERRL. EOMI. No conjunctival injection. No icterus. Oropharynx clear without evidence of thrush or exudate. No evidence of peridontal disease.    NECK: Supple without nuchal rigidity  LYMPH: No cervical, axillary or inguinal lymphadenopathy. No neck masses  HEART: RRR; No murmur, rubs, gallops.   LUNGS: Clear to auscultation bilaterally without wheezing, rales, rhonchi. Normal respiratory effort.  ABDOMEN: Soft, nontender, nondistended. Positive bowel sounds. No rebound or guarding.   EXT:  No cyanosis, clubbing or edema  : Normal appearing genitalia without Tripp catheter.  MSK: FROM without joint effusions noted    SKIN: Warm and dry without cutaneous eruptions.  Left foot open wound with wound vac in place  NEURO: Oriented to PPT. No focal deficits.   PSYCHIATRIC: Normal insight and judgement. Cooperative with PE     Laboratory Data    Results from last 7 days   Lab Units 24  0538 24  0509 24  0904   WBC 10*3/mm3 7.40 8.14 10.80   HEMOGLOBIN g/dL 12.0* 12.4* 12.1*   HEMATOCRIT % 37.7 39.9 38.2   PLATELETS 10*3/mm3 212 221 248     Results from last 7 days   Lab Units 24  0538   SODIUM mmol/L 137   POTASSIUM mmol/L 4.7   CHLORIDE mmol/L 100   CO2 mmol/L 30.0*   BUN mg/dL 17   CREATININE mg/dL 0.74*   GLUCOSE mg/dL 172*   CALCIUM mg/dL 10.2     Results from last 7 days   Lab Units 24  0509   ALK PHOS U/L 95 "   BILIRUBIN mg/dL <0.2   ALT (SGPT) U/L 77*   AST (SGOT) U/L 29     Results from last 7 days   Lab Units 03/31/24  0509   SED RATE mm/hr 27*     Results from last 7 days   Lab Units 03/31/24  0509   CRP mg/dL 0.30       Estimated Creatinine Clearance: 137.8 mL/min (A) (by C-G formula based on SCr of 0.74 mg/dL (L)).      Microbiology:  Enterococcus faecium    Radiology:  Imaging Results (Last 24 Hours)       ** No results found for the last 24 hours. 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  Underwent a TMA in January 2024, and has completed a course of parenteral therapy, hyperbaric therapy, and has developed open wound with necrosis and concern for chronic infection admitted for I & D and wound vac.  We were asked to see for antibiotic management.     Now with VRE but  ESR only 27 and crp normal at 0.3 plan on wound vac and oral zyvox upon d/c and f/u in my office.      PLAN:    Zyvox 600 mg po bid and f/u with me around 10 days in office  With ESR only 27 and CRP normal d/w pt possible oral abx with good oral bioavailability with zyvox 600 mg po bid   F/u with me 4/11/24  Plans for d/c  today    Dieter Alejo MD  4/2/2024

## 2024-04-02 NOTE — PLAN OF CARE
" Assumed care at 19:00.  Pt awake in bed, A/O on RA, did not ambulate this shift. L Foot dressing CDI. Portable wound vac in place and funtioning. Complained of pain 2x. Slept most of the night.  Left in bed at lowest position with bed alarm on and call light within reach.    /77 (BP Location: Right arm, Patient Position: Lying)   Pulse 79   Temp 98.2 °F (36.8 °C) (Oral)   Resp 18   Ht 175.3 cm (69.02\")   Wt 81.6 kg (179 lb 14.3 oz)   SpO2 98%   BMI 26.55 kg/m²         Problem: Adult Inpatient Plan of Care  Goal: Plan of Care Review  Outcome: Ongoing, Progressing  Flowsheets (Taken 4/2/2024 0530)  Progress: no change  Plan of Care Reviewed With: patient  Outcome Evaluation: Pt A/O on RA, did not ambulate this shift. L Foot dressing CDI. Portable wound vac in place and funtioning.  Complained of pain 2x.  Slept most of the night.  Goal: Patient-Specific Goal (Individualized)  Outcome: Ongoing, Progressing  Goal: Absence of Hospital-Acquired Illness or Injury  Outcome: Ongoing, Progressing  Intervention: Identify and Manage Fall Risk  Recent Flowsheet Documentation  Taken 4/1/2024 2200 by Esperanza Garcias RN  Safety Promotion/Fall Prevention:   activity supervised   assistive device/personal items within reach   safety round/check completed  Taken 4/1/2024 2114 by Esperanza Garcias RN  Safety Promotion/Fall Prevention:   activity supervised   assistive device/personal items within reach   safety round/check completed  Intervention: Prevent Skin Injury  Recent Flowsheet Documentation  Taken 4/1/2024 2200 by Esperanza Garcias RN  Body Position: position changed independently  Taken 4/1/2024 2114 by Esperanza Garcias RN  Body Position: position changed independently  Skin Protection:   adhesive use limited   incontinence pads utilized   tubing/devices free from skin contact  Intervention: Prevent and Manage VTE (Venous Thromboembolism) Risk  Recent Flowsheet Documentation  Taken 4/1/2024 2200 by " Esperanza Garcias RN  Activity Management: activity encouraged  VTE Prevention/Management:   bilateral   sequential compression devices off   patient refused intervention  Taken 4/1/2024 2114 by Esperanza Garcias RN  Activity Management: activity encouraged  VTE Prevention/Management:   bilateral   sequential compression devices off   patient refused intervention  Range of Motion: active ROM (range of motion) encouraged  Intervention: Prevent Infection  Recent Flowsheet Documentation  Taken 4/1/2024 2200 by Esperanza Garcias RN  Infection Prevention:   environmental surveillance performed   rest/sleep promoted  Taken 4/1/2024 2114 by Esperanza Garcias RN  Infection Prevention:   environmental surveillance performed   rest/sleep promoted  Goal: Optimal Comfort and Wellbeing  Outcome: Ongoing, Progressing  Intervention: Monitor Pain and Promote Comfort  Recent Flowsheet Documentation  Taken 4/1/2024 2114 by Esperanza Garcias RN  Pain Management Interventions: see MAR  Intervention: Provide Person-Centered Care  Recent Flowsheet Documentation  Taken 4/1/2024 2114 by Esperanza Garcias RN  Trust Relationship/Rapport:   care explained   choices provided   empathic listening provided   questions answered   thoughts/feelings acknowledged  Goal: Readiness for Transition of Care  Outcome: Ongoing, Progressing     Problem: Diabetes Comorbidity  Goal: Blood Glucose Level Within Targeted Range  Outcome: Ongoing, Progressing     Problem: Hypertension Comorbidity  Goal: Blood Pressure in Desired Range  Outcome: Ongoing, Progressing  Intervention: Maintain Blood Pressure Management  Recent Flowsheet Documentation  Taken 4/1/2024 2114 by Esperanza Garcias RN  Medication Review/Management: medications reviewed     Problem: Skin Injury Risk Increased  Goal: Skin Health and Integrity  Outcome: Ongoing, Progressing  Intervention: Optimize Skin Protection  Recent Flowsheet Documentation  Taken 4/1/2024 2200 by Esperanza Garcias  RN  Head of Bed (HOB) Positioning: HOB elevated  Taken 4/1/2024 2114 by Esperanza Garcias RN  Pressure Reduction Techniques: frequent weight shift encouraged  Head of Bed (HOB) Positioning: HOB elevated  Pressure Reduction Devices: pressure-redistributing mattress utilized  Skin Protection:   adhesive use limited   incontinence pads utilized   tubing/devices free from skin contact     Problem: Fall Injury Risk  Goal: Absence of Fall and Fall-Related Injury  Outcome: Ongoing, Progressing  Intervention: Identify and Manage Contributors  Recent Flowsheet Documentation  Taken 4/1/2024 2200 by Esperanza Garcias RN  Self-Care Promotion:   independence encouraged   BADL personal objects within reach  Taken 4/1/2024 2114 by Esperanza Garcias RN  Medication Review/Management: medications reviewed  Self-Care Promotion:   independence encouraged   BADL personal objects within reach  Intervention: Promote Injury-Free Environment  Recent Flowsheet Documentation  Taken 4/1/2024 2200 by Esperanza Garcias RN  Safety Promotion/Fall Prevention:   activity supervised   assistive device/personal items within reach   safety round/check completed  Taken 4/1/2024 2114 by Esperanza Garcias RN  Safety Promotion/Fall Prevention:   activity supervised   assistive device/personal items within reach   safety round/check completed   Goal Outcome Evaluation:  Plan of Care Reviewed With: patient        Progress: no change  Outcome Evaluation: Pt A/O on RA, did not ambulate this shift. L Foot dressing CDI. Portable wound vac in place and funtioning.  Complained of pain 2x.  Slept most of the night.

## 2024-04-02 NOTE — PLAN OF CARE
Goal Outcome Evaluation:  Plan of Care Reviewed With: patient        Progress: improving  Outcome Evaluation: patient's home vac unit applied yesterday but patient did not discharge. PT checked on patient this morning and patient stated he wasnt sent home because they werent certain of insurance approval. PT printed out approval form again  and gave patient again to facilitate discharge

## 2024-04-02 NOTE — THERAPY WOUND CARE TREATMENT
Acute Care - Wound/Debridement Treatment Note  [facility]     Patient Name: Justo Oquendo  : 1975  MRN: [id number]  Today's Date: 2024                Admit Date: 3/29/2024    Visit Dx:    ICD-10-CM ICD-9-CM   1. 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 Foot abscess, left  L02.612 682.7   3. Peripheral arterial disease  I73.9 443.9   4. 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    Diabetic foot infection        Past Medical History:   Diagnosis Date    Arthritis     Back pain     Diabetes     SINCE 2017    Dyslipidemia     HTN (hypertension)     PVD (peripheral vascular disease)     Wears partial dentures     upper and lower        Past Surgical History:   Procedure Laterality Date    AORTAGRAM N/A 2017    Procedure: AORTAGRAM WITH OR WITHOUT RUNOFFS POSSIBLE STENT with left femoral cutdown;  Surgeon: Brett Ryan MD;  Location: [place] [address];  Service:     AORTAGRAM N/A 2023    Procedure: THROMBECTOMY OF LEFT GRAFT, AORTAGRAM, FEMORAL TO FEMORAL BYPASS;  Surgeon: Brett Ryan MD;  Location: [facility];  Service: Vascular;  Laterality: N/A;  FLUORO- .06 SECS  DOSE- 10 MGY  CONTRAST- 10 ML ISO    CHOLECYSTECTOMY      CYST REMOVAL      OFF OF BACK    FEMORAL POPLITEAL BYPASS Left 2024    Procedure: FEMORAL POPLITEAL BYPASS WITH POPLITEAL EXPLORATION;  Surgeon: Vin Pena MD;  Location: [facility];  Service: Vascular;  Laterality: Left;  DOSE: 9MGY  FLURO: 14 MIN, 36 SEC  CONTRAST: 50ML VISIPAQUE 320    INCISION AND DRAINAGE FOOT Left 3/29/2024    Procedure: INCISION AND DRAINAGE  FOOT ABSCESS LEFT;  Surgeon: Christo Peres Jr., MD;  Location:  [facility];  Service: Orthopedics;  Laterality: Left;    PLACEMENT OF WOUND VAC Left 3/29/2024    Procedure: PLACEMENT OF WOUND VAC;  Surgeon: Christo Peres Jr., MD;  Location:  [facility];  Service: Orthopedics;  Laterality: Left;    MO IN-SITU VEIN BYPASS FEMORAL-POPLITEAL Left 04/25/2017    Procedure: FEMORAL POPLITEAL BYPASS;  Surgeon: Brett Ryan MD;  Location:  [facility];  Service: Vascular    MO IN-SITU VEIN BYPASS FEMORAL-POPLITEAL Left 06/27/2017    Procedure: LEFT FEMORAL ENDARTECTOMYN LEFT FEMORAL POPLITEAL BYPASS;  Surgeon: Brett Ryan MD;  Location:  [facility];  Service: Vascular    TRANS METATARSAL AMPUTATION Left 01/19/2024    Procedure: AMPUTATION TRANS METATARSAL- LEFT;  Surgeon: Christo Peres Jr., MD;  Location:  [facility];  Service: Orthopedics;  Laterality: Left;           Wound 01/19/24 1149 Left distal foot Amputation stump (Active)   Dressing Appearance dry;intact 04/02/24 0859   Closure Unable to assess 04/02/24 0400   Base dressing in place, unable to visualize 04/02/24 0922   Periwound other (see comments) 04/02/24 0400   Drainage Amount none 04/02/24 0400   Care, Wound negative pressure wound therapy 04/02/24 1250       NPWT (Negative Pressure Wound Therapy) 03/29/24 0957 LLE (Active)   Therapy Setting continuous therapy 04/02/24 1250   Dressing unable to visualize 04/02/24 0922         WOUND DEBRIDEMENT                     PT Assessment (Last 12 Hours)       PT Evaluation and Treatment       Row Name 04/02/24 0922          Physical Therapy Time and Intention    Subjective Information no complaints  -KW     Document Type wound care;therapy note (daily note)  -KW     Mode of Treatment physical therapy  -KW       Row Name 04/02/24 0922          General Information    Patient Profile Reviewed yes  -KW       Row Name 04/02/24 0922          Wound 01/19/24 1149 Left distal foot Amputation stump    Wound  - Properties Group Placement Date: 01/19/24  -NH Placement Time: 1149  -NH Side: Left  -NH Orientation: distal  -NH Location: foot  -NH Primary Wound Type: Amputation s  -NH    Base dressing in place, unable to visualize  -KW     Care, Wound negative pressure wound therapy  -KW     Retired Wound - Properties Group Placement Date: 01/19/24  -NH Placement Time: 1149  -NH Side: Left  -NH Orientation: distal  -NH Location: foot  -NH Primary Wound Type: Amputation s  -NH    Retired Wound - Properties Group Date first assessed: 01/19/24  -NH Time first assessed: 1149  -NH Side: Left  -NH Location: foot  -NH Primary Wound Type: Amputation s  -NH      Row Name 04/02/24 0922          NPWT (Negative Pressure Wound Therapy) 03/29/24 0957 LLE    NPWT (Negative Pressure Wound Therapy) - Properties Group Placement Date: 03/29/24  -LG Placement Time: 0957 -LG Location: LLE  -LG    Therapy Setting continuous therapy  -KW     Dressing unable to visualize  -KW     Retired NPWT (Negative Pressure Wound Therapy) - Properties Group Placement Date: 03/29/24  -LG Placement Time: 0957 -LG Location: LLE  -LG    Retired NPWT (Negative Pressure Wound Therapy) - Properties Group Placement Date: 03/29/24  -LG Placement Time: 0957 -LG Location: LLE  -LG      Row Name 04/02/24 0922          Plan of Care Review    Plan of Care Reviewed With patient  -KW     Progress improving  -KW     Outcome Evaluation patient's home vac unit applied yesterday but patient did not discharge. PT checked on patient this morning and patient stated he wasnt sent home because they werent certain of insurance approval. PT printed out approval form again  and gave patient again to facilitate discharge  -KW       Row Name 04/02/24 0922          Positioning and Restraints    Pre-Treatment Position in bed  -KW     Post Treatment Position bed  -KW     In Bed supine;call light within reach;encouraged to call for assist  -KW               User Key  (r) = Recorded By, (t) =  Taken By, (c) = Cosigned By      Initials Name Provider Type    Svetlana Meeks, RN Registered Nurse    Dior Edwards, RN Registered Nurse    Sonali Elias, LOBITO Physical Therapist                  Physical Therapy Education       Title: PT OT SLP Therapies (Done)       Topic: Physical Therapy (Done)       Point: Mobility training (Done)       Learning Progress Summary             Patient Eager, E,D,TB, DU,VU by SC at 4/2/2024 1345    Comment: reviewed most conservative wt bearing status being NWB    Acceptance, E,D, DU by CT at 3/31/2024 1546   Family Eager, E,D,TB, DU,VU by SC at 4/2/2024 1345    Comment: reviewed most conservative wt bearing status being NWB   Significant Other Acceptance, E,D, DU by CT at 3/31/2024 1546                         Point: Home exercise program (Done)       Learning Progress Summary             Patient Eager, E,D,TB, DU,VU by SC at 4/2/2024 1345    Comment: reviewed most conservative wt bearing status being NWB    Acceptance, E,D, DU by CT at 3/31/2024 1546   Family Eager, E,D,TB, DU,VU by SC at 4/2/2024 1345    Comment: reviewed most conservative wt bearing status being NWB   Significant Other Acceptance, E,D, DU by CT at 3/31/2024 1546                         Point: Body mechanics (Done)       Learning Progress Summary             Patient Eager, E,D,TB, DU,VU by SC at 4/2/2024 1345    Comment: reviewed most conservative wt bearing status being NWB    Acceptance, E,D, DU by CT at 3/31/2024 1546   Family Eager, E,D,TB, DU,VU by SC at 4/2/2024 1345    Comment: reviewed most conservative wt bearing status being NWB   Significant Other Acceptance, E,D, DU by CT at 3/31/2024 1546                         Point: Precautions (Done)       Learning Progress Summary             Patient Eager, E,D,TB, DU,VU by SC at 4/2/2024 1345    Comment: reviewed most conservative wt bearing status being NWB    Acceptance, E,D, DU by CT at 3/31/2024 1546   Family Eager, E,D,TB, DU,VU by SC at  4/2/2024 1345    Comment: reviewed most conservative wt bearing status being NWB   Significant Other Acceptance, E,D, DU by CT at 3/31/2024 1546                                         User Key       Initials Effective Dates Name Provider Type Discipline    SC 02/03/23 -  Cinthya Menezes PT Physical Therapist PT    CT 07/07/23 -  Enzo Beckford PT Physical Therapist PT                    Recommendation and Plan  Anticipated Discharge Disposition (PT): home with home health, home with assist  Planned Therapy Interventions (PT): wound care, patient/family education  Therapy Frequency (PT): daily  Plan of Care Reviewed With: patient   Progress: improving       Progress: improving  Outcome Evaluation: patient's home vac unit applied yesterday but patient did not discharge. PT checked on patient this morning and patient stated he wasnt sent home because they werent certain of insurance approval. PT printed out approval form again  and gave patient again to facilitate discharge  Plan of Care Reviewed With: patient            Time Calculation   PT Charges       Row Name 04/02/24 1322 04/02/24 0922          Time Calculation    Start Time 1322  -SC 0922  -KW     PT Received On 04/02/24  -SC --     PT Goal Re-Cert Due Date 04/10/24  -SC --        Timed Charges    47306 - Gait Training Minutes  12  -SC --        Untimed Charges    59483-[id number] Pressure wound to 50 sqcm -- 25  -KW        Total Minutes    Timed Charges Total Minutes 12  -SC --     Untimed Charges Total Minutes -- 25  -KW      Total Minutes 12  -SC 25  -KW               User Key  (r) = Recorded By, (t) = Taken By, (c) = Cosigned By      Initials Name Provider Type    SC Cinthya Menezes PT Physical Therapist    KW Sonali Bush PT Physical Therapist                      Therapy Charges for Today       Code Description Service Date Service Provider Modifiers Qty    01621603957 HC PT NEG PRESS WOUND TO 50SQCM DME4 4/1/2024 Sonali Bush PT   1    [id number]  PT NEG PRESS WOUND TO 50SQCM DME2 4/2/2024 Sonali Bush, PT GP 1              PT G-Codes  Outcome Measure Options: AM-PAC 6 Clicks Basic Mobility (PT)  AM-PAC 6 Clicks Score (PT): 22       Sonali Bush, LOBITO  4/2/2024

## 2024-04-02 NOTE — THERAPY DISCHARGE NOTE
Patient Name: Justo Oquendo  : 1975    MRN: [id number]                              Today's Date: 2024       Admit Date: 3/29/2024    Visit Dx:     ICD-10-CM ICD-9-CM   1. 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 Foot abscess, left  L02.612 682.7   3. 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    Diabetic foot infection     Past Medical History:   Diagnosis Date    Arthritis     Back pain     Diabetes     SINCE 2017    Dyslipidemia     HTN (hypertension)     PVD (peripheral vascular disease)     Wears partial dentures     upper and lower     Past Surgical History:   Procedure Laterality Date    AORTAGRAM N/A 2017    Procedure: AORTAGRAM WITH OR WITHOUT RUNOFFS POSSIBLE STENT with left femoral cutdown;  Surgeon: Brett Ryan MD;  Location:  [facility];  Service:     AORTAGRAM N/A 2023    Procedure: THROMBECTOMY OF LEFT GRAFT, AORTAGRAM, FEMORAL TO FEMORAL BYPASS;  Surgeon: Brett Ryan MD;  Location:  [facility];  Service: Vascular;  Laterality: N/A;  FLUORO- .06 SECS  DOSE- 10 MGY  CONTRAST- 10 ML ISO    CHOLECYSTECTOMY      CYST REMOVAL      OFF OF BACK    FEMORAL POPLITEAL BYPASS Left 2024    Procedure: FEMORAL POPLITEAL BYPASS WITH POPLITEAL EXPLORATION;  Surgeon: Vin Pena MD;  Location:  [facility];  Service: Vascular;  Laterality: Left;  DOSE: 9MGY  FLURO: 14 MIN, 36 SEC  CONTRAST: 50ML VISIPAQUE 320    INCISION AND DRAINAGE FOOT Left 3/29/2024    Procedure: INCISION AND DRAINAGE FOOT ABSCESS LEFT;  Surgeon: Christo Peres Jr., MD;  Location:  [facility];  Service: Orthopedics;   Laterality: Left;    PLACEMENT OF WOUND VAC Left 3/29/2024    Procedure: PLACEMENT OF WOUND VAC;  Surgeon: Christo Peres Jr., MD;  Location:  [facility];  Service: Orthopedics;  Laterality: Left;    NM IN-SITU VEIN BYPASS FEMORAL-POPLITEAL Left 04/25/2017    Procedure: FEMORAL POPLITEAL BYPASS;  Surgeon: Brett Ryan MD;  Location:  [facility];  Service: Vascular    NM IN-SITU VEIN BYPASS FEMORAL-POPLITEAL Left 06/27/2017    Procedure: LEFT FEMORAL ENDARTECTOMYN LEFT FEMORAL POPLITEAL BYPASS;  Surgeon: Brett Ryan MD;  Location:  [facility];  Service: Vascular    TRANS METATARSAL AMPUTATION Left 01/19/2024    Procedure: AMPUTATION TRANS METATARSAL- LEFT;  Surgeon: Christo Peres Jr., MD;  Location:  [facility];  Service: Orthopedics;  Laterality: Left;      General Information       Row Name 04/02/24 1336          Physical Therapy Time and Intention    Document Type therapy note (daily note)  -SC     Mode of Treatment physical therapy  -SC       Row Name 04/02/24 1336          General Information    Patient Profile Reviewed yes  -SC     Existing Precautions/Restrictions non-weight bearing  went with more conservative wt bearing order due to multiple different orders in chart. Protective shoe on  -SC       Row Name 04/02/24 1336          Cognition    Orientation Status (Cognition) oriented x 4  -SC       Row Name 04/02/24 1336          Safety Issues, Functional Mobility    Impairments Affecting Function (Mobility) balance;endurance/activity tolerance  -SC     Comment, Safety Issues/Impairments (Mobility) alert, following commands  -SC               User Key  (r) = Recorded By, (t) = Taken By, (c) = Cosigned By      Initials Name Provider Type    SC Cinthya Menezes PT Physical Therapist                   Mobility       Row Name 04/02/24 1338          Sit-Stand Transfer    Sit-Stand [place] (Transfers) modified independence  -SC     Assistive Device (Sit-Stand Transfers) walker  front-wheeled  -SC       Row Name 04/02/24 1338          Gait/Stairs (Locomotion)    [place] Level (Gait) modified independence  -SC     Assistive Device (Gait) walker, front-wheeled  -SC     Distance in Feet (Gait) 40  -SC     Deviations/Abnormal Patterns (Gait) gait speed decreased;osmany decreased  -SC     Comment, (Gait/Stairs) Reviewed NWB as the most conservative order . Cues for sequencing walker with step to gt pattern. Patient demonstrated good sequencing with ability to avoid hitting walker bar. He demonstrated good ability to sustaing NWB L LE.  -SC       Row Name 04/02/24 1338          Mobility    Extremity Weight-bearing Status left lower extremity  -SC     Left Lower Extremity (Weight-bearing Status) non weight-bearing (NWB)  went with more conservative order  -SC               User Key  (r) = Recorded By, (t) = Taken By, (c) = Cosigned By      Initials Name Provider Type    SC Cinthya Menezes, PT Physical Therapist                   Obj/Interventions       Row Name 04/02/24 1341          Balance    Dynamic Standing Balance 1-person assist;contact guard  -SC     Position/Device Used, Standing Balance supported;walker, rolling  -SC     Comment, Balance no LOB  -SC               User Key  (r) = Recorded By, (t) = Taken By, (c) = Cosigned By      Initials Name Provider Type    SC Cinthya Menezes, PT Physical Therapist                   Goals/Plan       Row Name 04/02/24 1344          Bed Mobility Goal 1 (PT)    Activity/Assistive Device (Bed Mobility Goal 1, PT) bed mobility activities, all  -SC     [place] Level/Cues Needed (Bed Mobility Goal 1, PT) independent  -SC     Time Frame (Bed Mobility Goal 1, PT) short term goal (STG);5 days  -SC     Progress/Outcomes (Bed Mobility Goal 1, PT) goal met  -SC       Row Name 04/02/24 1344          Transfer Goal 1 (PT)    Activity/Assistive Device (Transfer Goal 1, PT) transfers, all  -SC     [place] Level/Cues Needed (Transfer Goal 1, PT) modified  independence  -SC     Time Frame (Transfer Goal 1, PT) short term goal (STG);5 days  -SC     Progress/Outcome (Transfer Goal 1, PT) goal met  -SC       Row Name 04/02/24 1344          Gait Training Goal 1 (PT)    Activity/Assistive Device (Gait Training Goal 1, PT) gait (walking locomotion)  -SC     [place] Level (Gait Training Goal 1, PT) modified independence  -SC     Distance (Gait Training Goal 1, PT) 300  -SC     Time Frame (Gait Training Goal 1, PT) short term goal (STG);5 days  -SC     Progress/Outcome (Gait Training Goal 1, PT) goal partially met  -SC               User Key  (r) = Recorded By, (t) = Taken By, (c) = Cosigned By      Initials Name Provider Type    SC Cinthya Menezes, PT Physical Therapist                   Clinical Impression       Row Name 04/02/24 1341          Pain    Additional Documentation Pain Scale: FACES Pre/Post-Treatment (Group)  -SC       Row Name 04/02/24 1341          Pain Scale: FACES Pre/Post-Treatment    Pain: FACES Scale, Pretreatment 6-->hurts even more  -SC     Posttreatment Pain Rating 6-->hurts even more  -SC     Pain Location - Side/Orientation Left  -SC     Pain Location - foot  -SC       Row Name 04/02/24 1341          Plan of Care Review    Plan of Care Reviewed With patient;spouse  -SC     Progress improving  -SC     Outcome Evaluation Patient demonstrated good control with walker. He was able to keep wt off his foot safely during ambulation. He is going home with his wife and has a walker, knee scooter and w/c.  -SC       Row Name 04/02/24 1341          Therapy Assessment/Plan (PT)    Patient/Family Therapy Goals Statement (PT) go home  -SC     Rehab Potential (PT) good, to achieve stated therapy goals  -SC     Criteria for Skilled Interventions Met (PT) yes;meets criteria;skilled treatment is necessary  -SC     Therapy Frequency (PT) daily  -SC       Row Name 04/02/24 1341          Positioning and Restraints    Pre-Treatment Position in bed  -SC     Post  Treatment Position bed  -SC     In Bed supine;call light within reach;LLE elevated  -SC               User Key  (r) = Recorded By, (t) = Taken By, (c) = Cosigned By      Initials Name Provider Type    Cinthya Marsh PT Physical Therapist                   Outcome Measures       Row Name 04/02/24 1344          How much help from another person do you currently need...    Turning from your back to your side while in flat bed without using bedrails? 4  -SC     Moving from lying on back to sitting on the side of a flat bed without bedrails? 4  -SC     Moving to and from a bed to a chair (including a wheelchair)? 4  -SC     Standing up from a chair using your arms (e.g., wheelchair, bedside chair)? 4  -SC     Climbing 3-5 steps with a railing? 2  -SC     To walk in hospital room? 4  -SC     AM-PAC 6 Clicks Score (PT) 22  -SC     Highest Level of Mobility Goal 7 --> Walk 25 feet or more  -SC       Row Name 04/02/24 1344          Functional Assessment    Outcome Measure Options AM-PAC 6 Clicks Basic Mobility (PT)  -SC               User Key  (r) = Recorded By, (t) = Taken By, (c) = Cosigned By      Initials Name Provider Type    Cinthya Marsh PT Physical Therapist                  Physical Therapy Education       Title: PT OT SLP Therapies (Done)       Topic: Physical Therapy (Done)       Point: Mobility training (Done)       Learning Progress Summary             Patient Eager, E,D,TB, DU,VU by SC at 4/2/2024 1345    Comment: reviewed most conservative wt bearing status being NWB    Acceptance, E,D, DU by CT at 3/31/2024 1546   Family Eager, E,D,TB, DU,VU by SC at 4/2/2024 1345    Comment: reviewed most conservative wt bearing status being NWB   Significant Other Acceptance, E,D, DU by CT at 3/31/2024 1546                         Point: Home exercise program (Done)       Learning Progress Summary             Patient Eager, E,D,TB, DU,VU by SC at 4/2/2024 1345    Comment: reviewed most conservative wt bearing  status being NWB    Acceptance, E,D, DU by CT at 3/31/2024 1546   Family Eager, E,D,TB, DU,VU by SC at 4/2/2024 1345    Comment: reviewed most conservative wt bearing status being NWB   Significant Other Acceptance, E,D, DU by CT at 3/31/2024 1546                         Point: Body mechanics (Done)       Learning Progress Summary             Patient Eager, E,D,TB, DU,VU by SC at 4/2/2024 1345    Comment: reviewed most conservative wt bearing status being NWB    Acceptance, E,D, DU by CT at 3/31/2024 1546   Family Eager, E,D,TB, DU,VU by SC at 4/2/2024 1345    Comment: reviewed most conservative wt bearing status being NWB   Significant Other Acceptance, E,D, DU by CT at 3/31/2024 1546                         Point: Precautions (Done)       Learning Progress Summary             Patient Eager, E,D,TB, DU,VU by SC at 4/2/2024 1345    Comment: reviewed most conservative wt bearing status being NWB    Acceptance, E,D, DU by CT at 3/31/2024 1546   Family Eager, E,D,TB, DU,VU by SC at 4/2/2024 1345    Comment: reviewed most conservative wt bearing status being NWB   Significant Other Acceptance, E,D, DU by CT at 3/31/2024 1546                                         User Key       Initials Effective Dates Name Provider Type Discipline    SC 02/03/23 -  Cinthya Menezes, PT Physical Therapist PT    CT 07/07/23 -  Enzo Beckford, LOBITO Physical Therapist PT                  PT Recommendation and Plan     Plan of Care Reviewed With: patient, spouse  Progress: improving  Outcome Evaluation: Patient demonstrated good control with walker. He was able to keep wt off his foot safely during ambulation. He is going home with his wife and has a walker, knee scooter and w/c.     Time Calculation:         PT Charges       Row Name 04/02/24 1322             Time Calculation    Start Time 1322  -SC      PT Received On 04/02/24  -SC      PT Goal Re-Cert Due Date 04/10/24  -SC         Timed Charges    99883 - Gait Training Minutes   12  -SC         Total Minutes    Timed Charges Total Minutes 12  -SC       Total Minutes 12  -SC                User Key  (r) = Recorded By, (t) = Taken By, (c) = Cosigned By      Initials Name Provider Type    Cinthya Marsh PT Physical Therapist                  Therapy Charges for Today       Code Description Service Date Service Provider Modifiers Qty    [id number] HC GAIT TRAINING EA 15 MIN 4/2/2024 Cinthya Menezes PT GP 1            PT G-Codes  Outcome Measure Options: AM-PAC 6 Clicks Basic Mobility (PT)  AM-PAC 6 Clicks Score (PT): 22    PT Discharge Summary  Anticipated Discharge Disposition (PT): home with home health, home with assist    Cinthya Menezes, PT  4/2/2024

## 2024-04-02 NOTE — PLAN OF CARE
Goal Outcome Evaluation:  Plan of Care Reviewed With: patient, spouse           Outcome Evaluation: Patient discharged home with spouse. Wound vac in place. PRN PO pain medication administered

## 2024-04-02 NOTE — DISCHARGE SUMMARY
[facility] Medicine Services  DISCHARGE SUMMARY    Patient Name: Justo Oquendo  : 1975  MRN: [id number]    Date of Admission: 3/29/2024  7:10 AM  Date of Discharge:  2024  Primary Care Physician: Melo Whitaker MD    Consults       Date and Time Order Name Status Description    3/29/2024  2:39 PM Inpatient Infectious Diseases Consult Completed     3/29/2024  9:57 AM Inpatient Hospitalist Consult              Hospital Course       Active Hospital Problems    Diagnosis  POA    **Diabetic foot infection [E11.628, L08.9]  Yes      Resolved Hospital Problems   No resolved problems to display.          Hospital Course:  Justo Oquendo is a 48 y.o. male   w PAD, diabetic foot infection, HTN, HLD, DM2, tobacco use disorder, chronic pain, history of ischemic limb status post transmetatarsal amputation with wound dehiscence/dry gangrene status postdebridement, irrigation, vacuum assisted wound closure. Ortho and ID follow. Would cultures grew VRE. Patient was treated with oral abx and then transitioned over to zyvox. Follow up with ID in 10 days.                                                                                                                                                                                                                                                                                                                                                                                                                                                                                                                                                                                                                                                                                                                                                                                                                                                                                                                                                                                                                                                                                                                                                                                                                                                                                                                                                                                                                                                                                                                                                                                                                                                                                                                                                                                                                                                                                                                                                                                                                                                                                                                                                                                History of ischemic limb status post transmetatarsal amputation with wound dehiscence/dry gangrene status postdebridement, irrigation, vacuum assisted wound closure  PAD s/p prior FemFem bypass  --s/p I & D left foot abscess with placement of wound vac closure by Dr Peres on 3/29/24  --surgical cultures with VRE  --pain control, resumed home oxycodone. Per patient he no longer takes lortab  -- pt was switched over to oral zyvox for 14 days, will follow up with ID 10 days after dc   --Eliquis on hold, restarted 3/31  - anticipate DC home with Wound Vac, and nonweightbearing, and abx per ID  (Dr Alejo following), awaiting wound vac approval   --Follow-up orthopedics on April 5, 2024 with  SERGEY TapiaC  [facility]  [address] [facility], [address]  [facility] [place], [address]  Please schedule at [phone]  -Follow-up with Dr. Alejo (Infectious disease) outpatient     HTN  HLD  --cont home meds      DM  --A1c 7.8    --stated sugars at home have been running high due to not being able to obtain medications.   --patient has been eating extra food and take out, hard to use glucommander due to this so changed over to basal, bolus and SSI  --Endocrine referral at [facility]      Tobacco use  Chronic pain -  -- patient follows with pain clinic.   --only receiving 3 days of PRN Oxycodone for pain control at [facility] today       Discharge Follow Up Recommendations for outpatient labs/diagnostics:   PCP 1 week   Pain clinic 1-2 weeks; please call to inform of additional narcotics being prescribed at [facility]  LELIA Tapia 3 days   Dr Alejo, ID, as scheduled   Referral to Endocrine     Day of Discharge     HPI:   Doing well. Pain well controlled currently rating 5/10. No new needs. Very eager to go home today. No f/c, n/v/d, soa or cp     Review of Systems  All other systems negative     Vital Signs:   Temp:  [98.1 °F (36.7 °C)-98.6 °F (37 °C)] 98.1 °F (36.7 °C)  Heart Rate:  [79-96] 95  Resp:  [18] 18  BP: (118-143)/(66-91) 119/81      Physical Exam:  Constitutional: No acute distress, awake, alert  HENT: NCAT, mucous membranes moist  Respiratory: Clear to auscultation bilaterally, respiratory effort normal   Cardiovascular: RRR, no murmurs, rubs, or gallops  Gastrointestinal: Positive bowel sounds, soft, nontender, nondistended  Musculoskeletal: No bilateral ankle edema; left foot with CDI dressing and wound vac in place   Psychiatric: Appropriate affect, cooperative  Neurologic: Oriented x 3, GASPAR, speech clear  Skin: No rashes     Pertinent  and/or Most Recent Results     LAB RESULTS:      Lab 04/01/24  0538 03/31/24  0509 03/30/24  0904 03/29/24  0741   WBC 7.40 8.14 10.80 8.85   HEMOGLOBIN 12.0*  12.4* 12.1* 12.2*   HEMATOCRIT 37.7 39.9 38.2 39.2   PLATELETS 212 221 248 258   NEUTROS ABS  --  4.56 6.84  --    IMMATURE GRANS (ABS)  --  0.15* 0.07*  --    LYMPHS ABS  --  2.26 2.85  --    MONOS ABS  --  0.92* 0.93*  --    EOS ABS  --  0.20 0.07  --    MCV 87.3 88.3 88.4 89.7   SED RATE  --  27* 34*  --    CRP  --  0.30 0.51*  --          Lab 04/01/24  0538 03/31/24  0509 03/30/24  0904 03/29/24  0741   SODIUM 137 135* 133* 136   POTASSIUM 4.7 4.4 4.2 5.2   CHLORIDE 100 97* 95* 99   CO2 30.0* 27.0 29.0 25.0   ANION GAP 7.0 11.0 9.0 12.0   BUN 17 24* 19 21*   CREATININE 0.74* 0.81 0.74* 0.79   EGFR 110.4 107.4 110.4 108.2   GLUCOSE 172* 221* 173* 370*   CALCIUM 10.2 10.1 10.7* 10.5   HEMOGLOBIN A1C  --   --   --  7.80*         Lab 03/31/24  0509   TOTAL PROTEIN 7.3   ALBUMIN 4.2   GLOBULIN 3.1   ALT (SGPT) 77*   AST (SGOT) 29   BILIRUBIN <0.2   ALK PHOS 95                     Brief Urine Lab Results  (Last result in the past 365 days)        Color   Clarity   Blood   Leuk Est   Nitrite   Protein   CREAT   Urine HCG        01/23/24 1124 Yellow   Clear   Negative   Negative   Negative   Negative                 Microbiology Results (last 10 days)       Procedure Component Value - Date/Time    Wound Culture - Wound, Foot, Left [[id number]]  (Abnormal)  (Susceptibility) Collected: 03/29/24 0830    Lab Status: Final result Specimen: Wound from Foot, Left Updated: 04/01/24 0811     Wound Culture Scant growth (1+) Enterococcus faecium, VRE     Comment: MICs to follow  Vancomycin Resistant Enterococcus species. Patient may be an isolation risk.        Gram Stain Few (2+) WBCs seen      Moderate (3+) Red blood cells      No organisms seen    Susceptibility        Enterococcus faecium, VRE      FAHEEM      Ampicillin Resistant      Linezolid Susceptible      Vancomycin Resistant                           AFB Culture - Wound, Foot, Left [[id number]] Collected: 03/29/24 0830    Lab Status: Preliminary result Specimen: Wound from  Foot, Left Updated: 03/30/24 1141     AFB Stain No acid fast bacilli seen on concentrated smear    Anaerobic Culture 10 Day Incubation - Wound, Foot, Left [[id number]]  (Normal) Collected: 03/29/24 0830    Lab Status: Preliminary result Specimen: Wound from Foot, Left Updated: 04/01/24 1025     Anaerobic Culture No anaerobes isolated at 3 days            No radiology results for the last 10 days            Results for orders placed during the hospital encounter of 11/20/23    Adult Transthoracic Echo Complete W/ Cont if Necessary Per Protocol    Interpretation Summary    Left ventricular systolic function is hyperdynamic (EF > 70%). Calculated left ventricular EF = 70.4%    Left ventricular diastolic function was normal.    No significant structural or functional valvular disease.      Plan for Follow-up of Pending Labs/Results: PCP/ ID   Pending Labs       Order Current Status    Fungus Culture - Wound, Foot, Left In process    AFB Culture - Wound, Foot, Left Preliminary result    Anaerobic Culture 10 Day Incubation - Wound, Foot, Left Preliminary result          Discharge Details        Discharge Medications        New Medications        Instructions Start Date   linezolid 600 MG tablet  Commonly known as: ZYVOX   600 mg, Oral, Every 12 Hours Scheduled      naloxone 4 MG/0.1ML nasal spray  Commonly known as: NARCAN   Call 911. Don't prime. [place] in 1 nostril for overdose. Repeat in 2-3 minutes in other nostril if no or minimal breathing/responsiveness.      oxyCODONE 5 MG immediate release tablet  Commonly known as: Roxicodone   5 mg, Oral, Every 8 Hours PRN             Changes to Medications        Instructions Start Date   metoprolol tartrate 25 MG tablet  Commonly known as: LOPRESSOR  What changed: when to take this   25 mg, Oral, Every 12 Hours Scheduled             Continue These Medications        Instructions Start Date   apixaban 5 MG tablet tablet  Commonly known as: ELIQUIS   5 mg, Oral, 2 Times Daily       aspirin 81 MG EC tablet   81 mg, Oral, Nightly, OTC      cyclobenzaprine 10 MG tablet  Commonly known as: FLEXERIL   10 mg, Oral, 3 Times Daily PRN      Farxiga 10 MG tablet  Generic drug: dapagliflozin Propanediol   10 mg, Oral, Daily      fenofibrate micronized 200 MG capsule  Commonly known as: LOFIBRA   200 mg, Oral, Every Night at Bedtime      gabapentin 600 MG tablet  Commonly known as: NEURONTIN   Take 1 tablet by mouth 3 (Three) Times a Day.      insulin degludec 100 UNIT/ML solution pen-injector injection  Commonly known as: TRESIBA FLEXTOUCH   15 Units, Subcutaneous, 2 Times Daily      lisinopril 40 MG tablet  Commonly known as: PRINIVIL,ZESTRIL   40 mg, Oral, Nightly      LORazepam 1 MG tablet  Commonly known as: ATIVAN   1 mg, Oral, Nightly      nicotine 21 MG/24HR patch  Commonly known as: NICODERM CQ   1 patch, Transdermal, Every 24 Hours      NIFEdipine CC 30 MG 24 hr tablet  Commonly known as: ADALAT CC   30 mg, Oral, Every 24 Hours Scheduled      pantoprazole 40 MG EC tablet  Commonly known as: PROTONIX   40 mg, Oral, Every Early Morning      rosuvastatin 40 MG tablet  Commonly known as: CRESTOR   40 mg, Oral, Nightly, HOLD this medication while you are on Daptomycin (antibiotic) -- resume when okay with Dr. Adhikari!      Synjardy 12.5-1000 MG tablet  Generic drug: Empagliflozin-metFORMIN HCl   1 tablet, Oral, 2 Times Daily, Patient receives samples of this med from his PCP             Stop These Medications      HYDROcodone-acetaminophen  MG per tablet  Commonly known as: NORCO     silver sulfadiazine 1 % cream  Commonly known as: SILVADENE SSD              No Known Allergies      Discharge Disposition:  Home-Health Care Svc    Diet:  Hospital:  Diet Order   Procedures    Diet: Cardiac, Diabetic; Healthy Heart (2-3 Na+); Consistent Carbohydrate; Fluid Consistency: Thin (IDDSI 0)             Restrictions or Other Recommendations:  NWB to LLE        CODE STATUS:    Code Status and Medical  Interventions:   Ordered at: 03/29/24 1225     Level Of Support Discussed With:    Patient     Code Status (Patient has no pulse and is not breathing):    CPR (Attempt to Resuscitate)     Medical Interventions (Patient has pulse or is breathing):    Full Support       No future appointments.              Lisa Berry, PRIMO  04/02/24      Time Spent on Discharge:  I spent  40  minutes on this discharge activity which included: face-to-face encounter with the patient, reviewing the data in the system, coordination of the care with the nursing staff as well as consultants, documentation, and entering orders.

## 2024-04-05 LAB
FUNGUS WND CULT: NORMAL
MYCOBACTERIUM SPEC CULT: NORMAL
NIGHT BLUE STAIN TISS: NORMAL

## 2024-04-08 LAB — BACTERIA SPEC ANAEROBE CULT: NORMAL

## 2024-04-12 LAB
FUNGUS WND CULT: NORMAL
MYCOBACTERIUM SPEC CULT: NORMAL
NIGHT BLUE STAIN TISS: NORMAL

## 2024-04-19 LAB
FUNGUS WND CULT: NORMAL
MYCOBACTERIUM SPEC CULT: NORMAL
NIGHT BLUE STAIN TISS: NORMAL

## 2024-04-26 LAB
FUNGUS WND CULT: NORMAL
MYCOBACTERIUM SPEC CULT: NORMAL
NIGHT BLUE STAIN TISS: NORMAL

## 2024-05-03 LAB
FUNGUS WND CULT: NORMAL
MYCOBACTERIUM SPEC CULT: NORMAL
NIGHT BLUE STAIN TISS: NORMAL

## 2024-05-10 LAB
FUNGUS WND CULT: NORMAL
MYCOBACTERIUM SPEC CULT: NORMAL
NIGHT BLUE STAIN TISS: NORMAL

## 2024-06-18 ENCOUNTER — HOSPITAL ENCOUNTER (OUTPATIENT)
Facility: HOSPITAL | Age: 49
Setting detail: HOSPITAL OUTPATIENT SURGERY
Discharge: HOME | End: 2024-06-18
Attending: SURGERY | Admitting: SURGERY
Payer: COMMERCIAL

## 2024-06-18 ENCOUNTER — ANESTHESIA (OUTPATIENT)
Dept: PERIOP | Facility: HOSPITAL | Age: 49
End: 2024-06-18
Payer: COMMERCIAL

## 2024-06-18 ENCOUNTER — APPOINTMENT (OUTPATIENT)
Dept: GENERAL RADIOLOGY | Facility: HOSPITAL | Age: 49
End: 2024-06-18
Payer: COMMERCIAL

## 2024-06-18 ENCOUNTER — ANESTHESIA EVENT (OUTPATIENT)
Dept: PERIOP | Facility: HOSPITAL | Age: 49
End: 2024-06-18
Payer: COMMERCIAL

## 2024-06-18 VITALS
WEIGHT: 150 LBS | BODY MASS INDEX: 22.22 KG/M2 | OXYGEN SATURATION: 99 % | DIASTOLIC BLOOD PRESSURE: 80 MMHG | RESPIRATION RATE: 15 BRPM | TEMPERATURE: 98.1 F | SYSTOLIC BLOOD PRESSURE: 139 MMHG | HEIGHT: 69 IN | HEART RATE: 106 BPM

## 2024-06-18 LAB
ANION GAP SERPL CALCULATED.3IONS-SCNC: 11 MMOL/L (ref 5–15)
BUN SERPL-MCNC: 19 MG/DL (ref 6–20)
BUN/CREAT SERPL: 24.7 (ref 7–25)
CALCIUM SPEC-SCNC: 9.9 MG/DL (ref 8.6–10.5)
CHLORIDE SERPL-SCNC: 102 MMOL/L (ref 98–107)
CO2 SERPL-SCNC: 23 MMOL/L (ref 22–29)
CREAT SERPL-MCNC: 0.77 MG/DL (ref 0.76–1.27)
DEPRECATED RDW RBC AUTO: 44.9 FL (ref 37–54)
EGFRCR SERPLBLD CKD-EPI 2021: 109.1 ML/MIN/1.73
ERYTHROCYTE [DISTWIDTH] IN BLOOD BY AUTOMATED COUNT: 14 % (ref 12.3–15.4)
GLUCOSE BLDC GLUCOMTR-MCNC: 198 MG/DL (ref 70–130)
GLUCOSE SERPL-MCNC: 186 MG/DL (ref 65–99)
HCT VFR BLD AUTO: 43.6 % (ref 37.5–51)
HGB BLD-MCNC: 14.6 G/DL (ref 13–17.7)
MCH RBC QN AUTO: 29.7 PG (ref 26.6–33)
MCHC RBC AUTO-ENTMCNC: 33.5 G/DL (ref 31.5–35.7)
MCV RBC AUTO: 88.8 FL (ref 79–97)
PLATELET # BLD AUTO: 277 10*3/MM3 (ref 140–450)
PMV BLD AUTO: 10.3 FL (ref 6–12)
POTASSIUM SERPL-SCNC: 4.6 MMOL/L (ref 3.5–5.2)
RBC # BLD AUTO: 4.91 10*6/MM3 (ref 4.14–5.8)
SODIUM SERPL-SCNC: 136 MMOL/L (ref 136–145)
WBC NRBC COR # BLD AUTO: 8.7 10*3/MM3 (ref 3.4–10.8)

## 2024-06-18 PROCEDURE — 82948 REAGENT STRIP/BLOOD GLUCOSE: CPT

## 2024-06-18 PROCEDURE — 85027 COMPLETE CBC AUTOMATED: CPT | Performed by: SURGERY

## 2024-06-18 PROCEDURE — 80048 BASIC METABOLIC PNL TOTAL CA: CPT | Performed by: SURGERY

## 2024-06-18 PROCEDURE — G0463 HOSPITAL OUTPT CLINIC VISIT: HCPCS | Performed by: SURGERY

## 2024-06-18 PROCEDURE — 25810000003 LACTATED RINGERS PER 1000 ML: Performed by: ANESTHESIOLOGY

## 2024-06-18 RX ORDER — DROPERIDOL 2.5 MG/ML
0.62 INJECTION, SOLUTION INTRAMUSCULAR; INTRAVENOUS ONCE AS NEEDED
Status: CANCELLED | OUTPATIENT
Start: 2024-06-18

## 2024-06-18 RX ORDER — SODIUM CHLORIDE 0.9 % (FLUSH) 0.9 %
10 SYRINGE (ML) INJECTION AS NEEDED
Status: CANCELLED | OUTPATIENT
Start: 2024-06-18

## 2024-06-18 RX ORDER — HYDROMORPHONE HYDROCHLORIDE 1 MG/ML
0.5 INJECTION, SOLUTION INTRAMUSCULAR; INTRAVENOUS; SUBCUTANEOUS
Status: CANCELLED | OUTPATIENT
Start: 2024-06-18

## 2024-06-18 RX ORDER — SODIUM CHLORIDE, SODIUM LACTATE, POTASSIUM CHLORIDE, CALCIUM CHLORIDE 600; 310; 30; 20 MG/100ML; MG/100ML; MG/100ML; MG/100ML
9 INJECTION, SOLUTION INTRAVENOUS CONTINUOUS
Status: DISCONTINUED | OUTPATIENT
Start: 2024-06-18 | End: 2024-06-21 | Stop reason: HOSPADM

## 2024-06-18 RX ORDER — FAMOTIDINE 20 MG/1
20 TABLET, FILM COATED ORAL ONCE
Status: COMPLETED | OUTPATIENT
Start: 2024-06-18 | End: 2024-06-18

## 2024-06-18 RX ORDER — SODIUM CHLORIDE 9 MG/ML
40 INJECTION, SOLUTION INTRAVENOUS AS NEEDED
Status: CANCELLED | OUTPATIENT
Start: 2024-06-18

## 2024-06-18 RX ORDER — LIDOCAINE HYDROCHLORIDE 10 MG/ML
0.5 INJECTION, SOLUTION EPIDURAL; INFILTRATION; INTRACAUDAL; PERINEURAL ONCE AS NEEDED
Status: COMPLETED | OUTPATIENT
Start: 2024-06-18 | End: 2024-06-18

## 2024-06-18 RX ORDER — FENTANYL CITRATE 50 UG/ML
50 INJECTION, SOLUTION INTRAMUSCULAR; INTRAVENOUS
Status: CANCELLED | OUTPATIENT
Start: 2024-06-18

## 2024-06-18 RX ORDER — SODIUM CHLORIDE 0.9 % (FLUSH) 0.9 %
10 SYRINGE (ML) INJECTION EVERY 12 HOURS SCHEDULED
Status: DISCONTINUED | OUTPATIENT
Start: 2024-06-18 | End: 2024-06-21 | Stop reason: HOSPADM

## 2024-06-18 RX ORDER — MIDAZOLAM HYDROCHLORIDE 1 MG/ML
1 INJECTION INTRAMUSCULAR; INTRAVENOUS
Status: DISCONTINUED | OUTPATIENT
Start: 2024-06-18 | End: 2024-06-21 | Stop reason: HOSPADM

## 2024-06-18 RX ORDER — FAMOTIDINE 10 MG/ML
20 INJECTION, SOLUTION INTRAVENOUS ONCE
Status: CANCELLED | OUTPATIENT
Start: 2024-06-18 | End: 2024-06-18

## 2024-06-18 RX ADMIN — FAMOTIDINE 20 MG: 20 TABLET, FILM COATED ORAL at 13:36

## 2024-06-18 RX ADMIN — SODIUM CHLORIDE, POTASSIUM CHLORIDE, SODIUM LACTATE AND CALCIUM CHLORIDE 9 ML/HR: 600; 310; 30; 20 INJECTION, SOLUTION INTRAVENOUS at 13:49

## 2024-06-18 RX ADMIN — LIDOCAINE HYDROCHLORIDE 0.5 ML: 10 INJECTION, SOLUTION EPIDURAL; INFILTRATION; INTRACAUDAL; PERINEURAL at 13:36

## 2024-06-18 NOTE — INTERVAL H&P NOTE
"[facility] Pre-op    Full history and physical note from office is attached.    /80 (BP Location: Right arm, Patient Position: Lying)   Pulse 106   Temp 98.1 °F (36.7 °C) (Temporal)   Resp 15   Ht 175.3 cm (69\")   Wt 68 kg (150 lb)   SpO2 99%   BMI 22.15 kg/m²     Immunizations:  Influenza:  No  Pneumococcal:  No  Tetanus:  UTD  Covid : x2    Review of Systems:  Constitutional-- No fever, chills or sweats. No fatigue.  CV-- No chest pain, palpitation or syncope  Resp-- No SOB, cough, hemoptysis  Skin--Left foot wound     Physical Exam:  Heart:   Regular rate and rhythm, S1 and S2 normal  Lungs: Clear to auscultation bilaterally, respirations unlabored    LAB Results:  Lab Results   Component Value Date    WBC 7.40 04/01/2024    HGB 12.0 (L) 04/01/2024    HCT 37.7 04/01/2024    MCV 87.3 04/01/2024     04/01/2024    NEUTROABS 4.56 03/31/2024    GLUCOSE 172 (H) 04/01/2024    BUN 17 04/01/2024    CREATININE 0.74 (L) 04/01/2024    EGFRIFNONA 92 06/28/2017     04/01/2024    K 4.7 04/01/2024     04/01/2024    CO2 30.0 (H) 04/01/2024    MG 1.7 01/23/2024    PHOS 3.6 01/23/2024    CALCIUM 10.2 04/01/2024    ALBUMIN 4.2 03/31/2024    AST 29 03/31/2024    ALT 77 (H) 03/31/2024    BILITOT <0.2 03/31/2024    PTT 55.3 (L) 01/18/2024    INR 1.17 (H) 01/14/2024       Cancer Staging (if applicable)  Cancer Patient: __ yes __no __unknown__N/A; If yes, clinical stage T:__ N:__M:__, stage group or __N/A      Impression: Limb ischemia left lower extremity      Plan: AORTAGRAM WITH RUNOFFS AND POSSIBLE LEFT INTERVENTION       PRIMO Kaiser   6/18/2024   13:39 EDT  "

## 2024-06-18 NOTE — NURSING NOTE
Dr. Pena contacted in the OR and made aware that patient wants to reschedule procedure due to length of time waiting and ongoing surgery. IV d/c'd tip intact. Patient transported to front of building in stable condition

## 2025-04-01 ENCOUNTER — TRANSCRIBE ORDERS (OUTPATIENT)
Dept: LAB | Facility: HOSPITAL | Age: 50
End: 2025-04-01
Payer: COMMERCIAL

## 2025-04-01 ENCOUNTER — LAB (OUTPATIENT)
Dept: LAB | Facility: HOSPITAL | Age: 50
End: 2025-04-01
Payer: COMMERCIAL

## 2025-04-01 DIAGNOSIS — R82.71 BACTERIURIA DUE TO VANCOMYCIN RESISTANT ENTEROCOCCUS: ICD-10-CM

## 2025-04-01 DIAGNOSIS — L08.89 OTHER SPECIFIED LOCAL INFECTIONS OF THE SKIN AND SUBCUTANEOUS TISSUE: Primary | ICD-10-CM

## 2025-04-01 DIAGNOSIS — I10 ESSENTIAL HYPERTENSION, BENIGN: ICD-10-CM

## 2025-04-01 DIAGNOSIS — Z16.21 BACTERIURIA DUE TO VANCOMYCIN RESISTANT ENTEROCOCCUS: ICD-10-CM

## 2025-04-01 DIAGNOSIS — Z16.21 VRE (VANCOMYCIN-RESISTANT ENTEROCOCCI): ICD-10-CM

## 2025-04-01 DIAGNOSIS — E11.52 TYPE 2 DIABETES MELLITUS WITH DIABETIC PERIPHERAL ANGIOPATHY AND GANGRENE, WITHOUT LONG-TERM CURRENT USE OF INSULIN: ICD-10-CM

## 2025-04-01 DIAGNOSIS — L08.89 OTHER SPECIFIED LOCAL INFECTIONS OF THE SKIN AND SUBCUTANEOUS TISSUE: ICD-10-CM

## 2025-04-01 DIAGNOSIS — A49.1 VRE (VANCOMYCIN-RESISTANT ENTEROCOCCI): ICD-10-CM

## 2025-04-01 LAB
ALBUMIN SERPL-MCNC: 3.9 G/DL (ref 3.5–5.2)
ALBUMIN/GLOB SERPL: 1.4 G/DL
ALP SERPL-CCNC: 69 U/L (ref 39–117)
ALT SERPL W P-5'-P-CCNC: 8 U/L (ref 1–41)
ANION GAP SERPL CALCULATED.3IONS-SCNC: 10 MMOL/L (ref 5–15)
AST SERPL-CCNC: 13 U/L (ref 1–40)
BASOPHILS # BLD AUTO: 0.03 10*3/MM3 (ref 0–0.2)
BASOPHILS NFR BLD AUTO: 0.4 % (ref 0–1.5)
BILIRUB SERPL-MCNC: <0.2 MG/DL (ref 0–1.2)
BUN SERPL-MCNC: 8 MG/DL (ref 6–20)
BUN/CREAT SERPL: 11.6 (ref 7–25)
CALCIUM SPEC-SCNC: 9.6 MG/DL (ref 8.6–10.5)
CHLORIDE SERPL-SCNC: 104 MMOL/L (ref 98–107)
CK SERPL-CCNC: 37 U/L (ref 20–200)
CO2 SERPL-SCNC: 27 MMOL/L (ref 22–29)
CREAT SERPL-MCNC: 0.69 MG/DL (ref 0.76–1.27)
CRP SERPL-MCNC: 1 MG/DL (ref 0–0.5)
DEPRECATED RDW RBC AUTO: 47 FL (ref 37–54)
EGFRCR SERPLBLD CKD-EPI 2021: 112 ML/MIN/1.73
EOSINOPHIL # BLD AUTO: 0.14 10*3/MM3 (ref 0–0.4)
EOSINOPHIL NFR BLD AUTO: 1.9 % (ref 0.3–6.2)
ERYTHROCYTE [DISTWIDTH] IN BLOOD BY AUTOMATED COUNT: 15.7 % (ref 12.3–15.4)
ERYTHROCYTE [SEDIMENTATION RATE] IN BLOOD: 61 MM/HR (ref 0–20)
GLOBULIN UR ELPH-MCNC: 2.8 GM/DL
GLUCOSE SERPL-MCNC: 112 MG/DL (ref 65–99)
HCT VFR BLD AUTO: 33.3 % (ref 37.5–51)
HGB BLD-MCNC: 9.9 G/DL (ref 13–17.7)
IMM GRANULOCYTES # BLD AUTO: 0.07 10*3/MM3 (ref 0–0.05)
IMM GRANULOCYTES NFR BLD AUTO: 0.9 % (ref 0–0.5)
LYMPHOCYTES # BLD AUTO: 1.56 10*3/MM3 (ref 0.7–3.1)
LYMPHOCYTES NFR BLD AUTO: 21 % (ref 19.6–45.3)
MCH RBC QN AUTO: 25.9 PG (ref 26.6–33)
MCHC RBC AUTO-ENTMCNC: 29.7 G/DL (ref 31.5–35.7)
MCV RBC AUTO: 87.2 FL (ref 79–97)
MONOCYTES # BLD AUTO: 0.65 10*3/MM3 (ref 0.1–0.9)
MONOCYTES NFR BLD AUTO: 8.8 % (ref 5–12)
NEUTROPHILS NFR BLD AUTO: 4.97 10*3/MM3 (ref 1.7–7)
NEUTROPHILS NFR BLD AUTO: 67 % (ref 42.7–76)
NRBC BLD AUTO-RTO: 0 /100 WBC (ref 0–0.2)
PLATELET # BLD AUTO: 318 10*3/MM3 (ref 140–450)
PMV BLD AUTO: 8.8 FL (ref 6–12)
POTASSIUM SERPL-SCNC: 4.3 MMOL/L (ref 3.5–5.2)
PROT SERPL-MCNC: 6.7 G/DL (ref 6–8.5)
RBC # BLD AUTO: 3.82 10*6/MM3 (ref 4.14–5.8)
SODIUM SERPL-SCNC: 141 MMOL/L (ref 136–145)
WBC NRBC COR # BLD AUTO: 7.42 10*3/MM3 (ref 3.4–10.8)

## 2025-04-01 PROCEDURE — 85025 COMPLETE CBC W/AUTO DIFF WBC: CPT

## 2025-04-01 PROCEDURE — 36415 COLL VENOUS BLD VENIPUNCTURE: CPT

## 2025-04-01 PROCEDURE — 86140 C-REACTIVE PROTEIN: CPT

## 2025-04-01 PROCEDURE — 80053 COMPREHEN METABOLIC PANEL: CPT

## 2025-04-01 PROCEDURE — 85652 RBC SED RATE AUTOMATED: CPT

## 2025-04-01 PROCEDURE — 82550 ASSAY OF CK (CPK): CPT

## 2025-04-10 ENCOUNTER — TRANSCRIBE ORDERS (OUTPATIENT)
Dept: LAB | Facility: HOSPITAL | Age: 50
End: 2025-04-10
Payer: COMMERCIAL

## 2025-04-10 ENCOUNTER — LAB (OUTPATIENT)
Dept: LAB | Facility: HOSPITAL | Age: 50
End: 2025-04-10
Payer: COMMERCIAL

## 2025-04-10 DIAGNOSIS — T87.44 INFECTION OF AMPUTATION STUMP OF LEFT LOWER EXTREMITY: ICD-10-CM

## 2025-04-10 DIAGNOSIS — T87.81 DEHISCENCE OF AMPUTATION STUMP: ICD-10-CM

## 2025-04-10 DIAGNOSIS — Z16.21 VRE BACTEREMIA: ICD-10-CM

## 2025-04-10 DIAGNOSIS — L03.116 CELLULITIS OF LEFT FOOT: ICD-10-CM

## 2025-04-10 DIAGNOSIS — B95.2 VRE BACTEREMIA: ICD-10-CM

## 2025-04-10 DIAGNOSIS — L03.90 CELLULITIS DUE TO MRSA: ICD-10-CM

## 2025-04-10 DIAGNOSIS — L03.116 CELLULITIS OF LEFT LOWER EXTREMITY: ICD-10-CM

## 2025-04-10 DIAGNOSIS — L08.89 PITTED KERATOLYSIS: ICD-10-CM

## 2025-04-10 DIAGNOSIS — T87.44 INFECTION OF AMPUTATION STUMP OF LEFT LOWER EXTREMITY: Primary | ICD-10-CM

## 2025-04-10 DIAGNOSIS — R78.81 VRE BACTEREMIA: ICD-10-CM

## 2025-04-10 DIAGNOSIS — B95.62 CELLULITIS DUE TO MRSA: ICD-10-CM

## 2025-04-10 LAB
ALBUMIN SERPL-MCNC: 4 G/DL (ref 3.5–5.2)
ALBUMIN/GLOB SERPL: 1.2 G/DL
ALP SERPL-CCNC: 72 U/L (ref 39–117)
ALT SERPL W P-5'-P-CCNC: 15 U/L (ref 1–41)
ANION GAP SERPL CALCULATED.3IONS-SCNC: 11 MMOL/L (ref 5–15)
AST SERPL-CCNC: 23 U/L (ref 1–40)
BASOPHILS # BLD AUTO: 0.01 10*3/MM3 (ref 0–0.2)
BASOPHILS NFR BLD AUTO: 0.2 % (ref 0–1.5)
BILIRUB SERPL-MCNC: 0.2 MG/DL (ref 0–1.2)
BUN SERPL-MCNC: 10 MG/DL (ref 6–20)
BUN/CREAT SERPL: 11.9 (ref 7–25)
CALCIUM SPEC-SCNC: 9.8 MG/DL (ref 8.6–10.5)
CHLORIDE SERPL-SCNC: 104 MMOL/L (ref 98–107)
CO2 SERPL-SCNC: 24 MMOL/L (ref 22–29)
CREAT SERPL-MCNC: 0.84 MG/DL (ref 0.76–1.27)
CRP SERPL-MCNC: 0.33 MG/DL (ref 0–0.5)
DEPRECATED RDW RBC AUTO: 52.7 FL (ref 37–54)
EGFRCR SERPLBLD CKD-EPI 2021: 105.6 ML/MIN/1.73
EOSINOPHIL # BLD AUTO: 0.18 10*3/MM3 (ref 0–0.4)
EOSINOPHIL NFR BLD AUTO: 3.1 % (ref 0.3–6.2)
ERYTHROCYTE [DISTWIDTH] IN BLOOD BY AUTOMATED COUNT: 17 % (ref 12.3–15.4)
ERYTHROCYTE [SEDIMENTATION RATE] IN BLOOD: 32 MM/HR (ref 0–20)
GLOBULIN UR ELPH-MCNC: 3.3 GM/DL
GLUCOSE SERPL-MCNC: 216 MG/DL (ref 65–99)
HCT VFR BLD AUTO: 33.9 % (ref 37.5–51)
HGB BLD-MCNC: 10.1 G/DL (ref 13–17.7)
IMM GRANULOCYTES # BLD AUTO: 0.06 10*3/MM3 (ref 0–0.05)
IMM GRANULOCYTES NFR BLD AUTO: 1 % (ref 0–0.5)
LYMPHOCYTES # BLD AUTO: 1.37 10*3/MM3 (ref 0.7–3.1)
LYMPHOCYTES NFR BLD AUTO: 23.3 % (ref 19.6–45.3)
MCH RBC QN AUTO: 26.2 PG (ref 26.6–33)
MCHC RBC AUTO-ENTMCNC: 29.8 G/DL (ref 31.5–35.7)
MCV RBC AUTO: 88.1 FL (ref 79–97)
MONOCYTES # BLD AUTO: 0.43 10*3/MM3 (ref 0.1–0.9)
MONOCYTES NFR BLD AUTO: 7.3 % (ref 5–12)
NEUTROPHILS NFR BLD AUTO: 3.82 10*3/MM3 (ref 1.7–7)
NEUTROPHILS NFR BLD AUTO: 65.1 % (ref 42.7–76)
NRBC BLD AUTO-RTO: 0 /100 WBC (ref 0–0.2)
PLATELET # BLD AUTO: 241 10*3/MM3 (ref 140–450)
PMV BLD AUTO: 10.4 FL (ref 6–12)
POTASSIUM SERPL-SCNC: 4.7 MMOL/L (ref 3.5–5.2)
PROT SERPL-MCNC: 7.3 G/DL (ref 6–8.5)
RBC # BLD AUTO: 3.85 10*6/MM3 (ref 4.14–5.8)
SODIUM SERPL-SCNC: 139 MMOL/L (ref 136–145)
WBC NRBC COR # BLD AUTO: 5.87 10*3/MM3 (ref 3.4–10.8)

## 2025-04-10 PROCEDURE — 82550 ASSAY OF CK (CPK): CPT

## 2025-04-10 PROCEDURE — 80053 COMPREHEN METABOLIC PANEL: CPT

## 2025-04-10 PROCEDURE — 85652 RBC SED RATE AUTOMATED: CPT

## 2025-04-10 PROCEDURE — 85025 COMPLETE CBC W/AUTO DIFF WBC: CPT

## 2025-04-10 PROCEDURE — 86140 C-REACTIVE PROTEIN: CPT

## 2025-04-10 PROCEDURE — 36415 COLL VENOUS BLD VENIPUNCTURE: CPT

## 2025-04-11 LAB — CK SERPL-CCNC: 61 U/L (ref 20–200)

## 2025-04-14 ENCOUNTER — TRANSCRIBE ORDERS (OUTPATIENT)
Dept: LAB | Facility: HOSPITAL | Age: 50
End: 2025-04-14
Payer: COMMERCIAL

## 2025-04-14 ENCOUNTER — LAB (OUTPATIENT)
Dept: LAB | Facility: HOSPITAL | Age: 50
End: 2025-04-14
Payer: COMMERCIAL

## 2025-04-14 DIAGNOSIS — L03.116 CELLULITIS OF LEFT LOWER EXTREMITY: ICD-10-CM

## 2025-04-14 DIAGNOSIS — T87.81 DEHISCENCE OF AMPUTATION STUMP: ICD-10-CM

## 2025-04-14 DIAGNOSIS — T87.44 INFECTION OF AMPUTATION STUMP OF LEFT LOWER EXTREMITY: ICD-10-CM

## 2025-04-14 DIAGNOSIS — T87.44 INFECTION OF AMPUTATION STUMP OF LEFT LOWER EXTREMITY: Primary | ICD-10-CM

## 2025-04-14 LAB
ALBUMIN SERPL-MCNC: 4.2 G/DL (ref 3.5–5.2)
ALBUMIN/GLOB SERPL: 1.5 G/DL
ALP SERPL-CCNC: 66 U/L (ref 39–117)
ALT SERPL W P-5'-P-CCNC: 25 U/L (ref 1–41)
ANION GAP SERPL CALCULATED.3IONS-SCNC: 10 MMOL/L (ref 5–15)
AST SERPL-CCNC: 43 U/L (ref 1–40)
BASOPHILS # BLD AUTO: 0.03 10*3/MM3 (ref 0–0.2)
BASOPHILS NFR BLD AUTO: 0.4 % (ref 0–1.5)
BILIRUB SERPL-MCNC: 0.3 MG/DL (ref 0–1.2)
BUN SERPL-MCNC: 9 MG/DL (ref 6–20)
BUN/CREAT SERPL: 11.5 (ref 7–25)
CALCIUM SPEC-SCNC: 9.4 MG/DL (ref 8.6–10.5)
CHLORIDE SERPL-SCNC: 103 MMOL/L (ref 98–107)
CK SERPL-CCNC: 132 U/L (ref 20–200)
CO2 SERPL-SCNC: 25 MMOL/L (ref 22–29)
CREAT SERPL-MCNC: 0.78 MG/DL (ref 0.76–1.27)
CRP SERPL-MCNC: <0.3 MG/DL (ref 0–0.5)
DEPRECATED RDW RBC AUTO: 52.8 FL (ref 37–54)
EGFRCR SERPLBLD CKD-EPI 2021: 108 ML/MIN/1.73
EOSINOPHIL # BLD AUTO: 0.19 10*3/MM3 (ref 0–0.4)
EOSINOPHIL NFR BLD AUTO: 2.6 % (ref 0.3–6.2)
ERYTHROCYTE [DISTWIDTH] IN BLOOD BY AUTOMATED COUNT: 16.9 % (ref 12.3–15.4)
ERYTHROCYTE [SEDIMENTATION RATE] IN BLOOD: 37 MM/HR (ref 0–20)
GLOBULIN UR ELPH-MCNC: 2.8 GM/DL
GLUCOSE SERPL-MCNC: 171 MG/DL (ref 65–99)
HCT VFR BLD AUTO: 37 % (ref 37.5–51)
HGB BLD-MCNC: 10.9 G/DL (ref 13–17.7)
IMM GRANULOCYTES # BLD AUTO: 0.03 10*3/MM3 (ref 0–0.05)
IMM GRANULOCYTES NFR BLD AUTO: 0.4 % (ref 0–0.5)
LYMPHOCYTES # BLD AUTO: 1.76 10*3/MM3 (ref 0.7–3.1)
LYMPHOCYTES NFR BLD AUTO: 24.1 % (ref 19.6–45.3)
MCH RBC QN AUTO: 25.7 PG (ref 26.6–33)
MCHC RBC AUTO-ENTMCNC: 29.5 G/DL (ref 31.5–35.7)
MCV RBC AUTO: 87.3 FL (ref 79–97)
MONOCYTES # BLD AUTO: 0.57 10*3/MM3 (ref 0.1–0.9)
MONOCYTES NFR BLD AUTO: 7.8 % (ref 5–12)
NEUTROPHILS NFR BLD AUTO: 4.72 10*3/MM3 (ref 1.7–7)
NEUTROPHILS NFR BLD AUTO: 64.7 % (ref 42.7–76)
NRBC BLD AUTO-RTO: 0 /100 WBC (ref 0–0.2)
PLATELET # BLD AUTO: 305 10*3/MM3 (ref 140–450)
PMV BLD AUTO: 10.4 FL (ref 6–12)
POTASSIUM SERPL-SCNC: 4.6 MMOL/L (ref 3.5–5.2)
PROT SERPL-MCNC: 7 G/DL (ref 6–8.5)
RBC # BLD AUTO: 4.24 10*6/MM3 (ref 4.14–5.8)
SODIUM SERPL-SCNC: 138 MMOL/L (ref 136–145)
WBC NRBC COR # BLD AUTO: 7.3 10*3/MM3 (ref 3.4–10.8)

## 2025-04-14 PROCEDURE — 80053 COMPREHEN METABOLIC PANEL: CPT

## 2025-04-14 PROCEDURE — 85652 RBC SED RATE AUTOMATED: CPT

## 2025-04-14 PROCEDURE — 36415 COLL VENOUS BLD VENIPUNCTURE: CPT

## 2025-04-14 PROCEDURE — 82550 ASSAY OF CK (CPK): CPT

## 2025-04-14 PROCEDURE — 86140 C-REACTIVE PROTEIN: CPT

## 2025-04-14 PROCEDURE — 85025 COMPLETE CBC W/AUTO DIFF WBC: CPT

## 2025-04-21 ENCOUNTER — TRANSCRIBE ORDERS (OUTPATIENT)
Dept: LAB | Facility: HOSPITAL | Age: 50
End: 2025-04-21
Payer: COMMERCIAL

## 2025-04-21 ENCOUNTER — LAB (OUTPATIENT)
Dept: LAB | Facility: HOSPITAL | Age: 50
End: 2025-04-21
Payer: COMMERCIAL

## 2025-04-21 DIAGNOSIS — T87.44 INFECTION OF LEFT BELOW KNEE AMPUTATION: Primary | ICD-10-CM

## 2025-04-21 DIAGNOSIS — T87.44 INFECTION OF LEFT BELOW KNEE AMPUTATION: ICD-10-CM

## 2025-04-21 DIAGNOSIS — T87.81 DEHISCENCE OF AMPUTATION STUMP: ICD-10-CM

## 2025-04-21 DIAGNOSIS — L03.116 CELLULITIS OF LEFT LOWER EXTREMITY: ICD-10-CM

## 2025-04-21 LAB
ALBUMIN SERPL-MCNC: 4.2 G/DL (ref 3.5–5.2)
ALBUMIN/GLOB SERPL: 1.3 G/DL
ALP SERPL-CCNC: 68 U/L (ref 39–117)
ALT SERPL W P-5'-P-CCNC: 20 U/L (ref 1–41)
ANION GAP SERPL CALCULATED.3IONS-SCNC: 9 MMOL/L (ref 5–15)
AST SERPL-CCNC: 28 U/L (ref 1–40)
BASOPHILS # BLD AUTO: 0.02 10*3/MM3 (ref 0–0.2)
BASOPHILS NFR BLD AUTO: 0.2 % (ref 0–1.5)
BILIRUB SERPL-MCNC: 0.2 MG/DL (ref 0–1.2)
BUN SERPL-MCNC: 13 MG/DL (ref 6–20)
BUN/CREAT SERPL: 17.8 (ref 7–25)
CALCIUM SPEC-SCNC: 10.2 MG/DL (ref 8.6–10.5)
CHLORIDE SERPL-SCNC: 105 MMOL/L (ref 98–107)
CK SERPL-CCNC: 277 U/L (ref 20–200)
CO2 SERPL-SCNC: 25 MMOL/L (ref 22–29)
CREAT SERPL-MCNC: 0.73 MG/DL (ref 0.76–1.27)
CRP SERPL-MCNC: 0.31 MG/DL (ref 0–0.5)
DEPRECATED RDW RBC AUTO: 52.1 FL (ref 37–54)
EGFRCR SERPLBLD CKD-EPI 2021: 110.2 ML/MIN/1.73
EOSINOPHIL # BLD AUTO: 0.2 10*3/MM3 (ref 0–0.4)
EOSINOPHIL NFR BLD AUTO: 2.3 % (ref 0.3–6.2)
ERYTHROCYTE [DISTWIDTH] IN BLOOD BY AUTOMATED COUNT: 17.1 % (ref 12.3–15.4)
ERYTHROCYTE [SEDIMENTATION RATE] IN BLOOD: 25 MM/HR (ref 0–20)
GLOBULIN UR ELPH-MCNC: 3.2 GM/DL
GLUCOSE SERPL-MCNC: 205 MG/DL (ref 65–99)
HCT VFR BLD AUTO: 36.3 % (ref 37.5–51)
HGB BLD-MCNC: 11.1 G/DL (ref 13–17.7)
IMM GRANULOCYTES # BLD AUTO: 0.04 10*3/MM3 (ref 0–0.05)
IMM GRANULOCYTES NFR BLD AUTO: 0.5 % (ref 0–0.5)
LYMPHOCYTES # BLD AUTO: 2.16 10*3/MM3 (ref 0.7–3.1)
LYMPHOCYTES NFR BLD AUTO: 25.2 % (ref 19.6–45.3)
MCH RBC QN AUTO: 26.1 PG (ref 26.6–33)
MCHC RBC AUTO-ENTMCNC: 30.6 G/DL (ref 31.5–35.7)
MCV RBC AUTO: 85.4 FL (ref 79–97)
MONOCYTES # BLD AUTO: 0.84 10*3/MM3 (ref 0.1–0.9)
MONOCYTES NFR BLD AUTO: 9.8 % (ref 5–12)
NEUTROPHILS NFR BLD AUTO: 5.32 10*3/MM3 (ref 1.7–7)
NEUTROPHILS NFR BLD AUTO: 62 % (ref 42.7–76)
NRBC BLD AUTO-RTO: 0 /100 WBC (ref 0–0.2)
PLATELET # BLD AUTO: 254 10*3/MM3 (ref 140–450)
PMV BLD AUTO: 10.7 FL (ref 6–12)
POTASSIUM SERPL-SCNC: 4.3 MMOL/L (ref 3.5–5.2)
PROT SERPL-MCNC: 7.4 G/DL (ref 6–8.5)
RBC # BLD AUTO: 4.25 10*6/MM3 (ref 4.14–5.8)
SODIUM SERPL-SCNC: 139 MMOL/L (ref 136–145)
WBC NRBC COR # BLD AUTO: 8.58 10*3/MM3 (ref 3.4–10.8)

## 2025-04-21 PROCEDURE — 86140 C-REACTIVE PROTEIN: CPT

## 2025-04-21 PROCEDURE — 36415 COLL VENOUS BLD VENIPUNCTURE: CPT

## 2025-04-21 PROCEDURE — 85025 COMPLETE CBC W/AUTO DIFF WBC: CPT

## 2025-04-21 PROCEDURE — 85652 RBC SED RATE AUTOMATED: CPT

## 2025-04-21 PROCEDURE — 82550 ASSAY OF CK (CPK): CPT

## 2025-04-21 PROCEDURE — 80053 COMPREHEN METABOLIC PANEL: CPT

## (undated) DEVICE — BNDG ELAS CO-FLEX SLF ADHR 6IN 5YD LF STRL

## (undated) DEVICE — 3M™ IOBAN™ 2 ANTIMICROBIAL INCISE DRAPE 6651EZ: Brand: IOBAN™ 2

## (undated) DEVICE — SUT PROLN 6/0 BV1 D/A 30IN 8709H

## (undated) DEVICE — SUT SILK 0/0 CT2 18IN C027D

## (undated) DEVICE — PK ATS CUST W CARDIOTOMY RESEVOIR

## (undated) DEVICE — SUT PROLN 7/0 BV1 D/A 24IN 8702H

## (undated) DEVICE — KT INTRO MINISTICK MAX W/GW NITNL/TUNG ECHO STFF 4F 21G 7CM

## (undated) DEVICE — CANNULA,ADULT,SOFT-TOUCH,7TUBE,SC: Brand: MEDLINE

## (undated) DEVICE — SUT MONOCRYL PLS ANTIB UND 3/0  PS1 27IN

## (undated) DEVICE — Device

## (undated) DEVICE — DRSNG PAD ABD 8X10IN STRL

## (undated) DEVICE — SUT SILK 0 CT1 18IN 424H

## (undated) DEVICE — AVANTI + 4F STD W/GW: Brand: AVANTI

## (undated) DEVICE — BNDG RL GZ BULKEE2 4.5IN 4.1YD STRL

## (undated) DEVICE — CANSTR SXN CRYSTALINE RIGID W/2ELBW 2500CC SURG

## (undated) DEVICE — ANTIBACTERIAL UNDYED BRAIDED (POLYGLACTIN 910), SYNTHETIC ABSORBABLE SUTURE: Brand: COATED VICRYL

## (undated) DEVICE — DECANT FLD BG 9IN STRL

## (undated) DEVICE — MEDI-VAC NON-CONDUCTIVE SUCTION TUBING: Brand: CARDINAL HEALTH

## (undated) DEVICE — CVR PROB ULTRASND/TRANSD W/GEL 18X120CM STRL

## (undated) DEVICE — TRAP FLD MINIVAC MEGADYNE 100ML

## (undated) DEVICE — 450 ML BOTTLE OF 0.05% CHLORHEXIDINE GLUCONATE IN 99.95% STERILE WATER FOR IRRIGATION, USP AND APPLICATOR.: Brand: IRRISEPT ANTIMICROBIAL WOUND LAVAGE

## (undated) DEVICE — SPNG LAP PREWSH SFTPK 18X18IN STRL PK/5

## (undated) DEVICE — SUT MNCRYL PLS ANTIB UD 4/0 PS2 18IN

## (undated) DEVICE — PK VASC 10

## (undated) DEVICE — AIRWY SZ11

## (undated) DEVICE — CATH GUIDE SOFTVU FLUSH HT PIG .035 4F 65CM

## (undated) DEVICE — SYR CONTRL PRESS/LO FIX/M/LL W/THMB/RNG 10ML

## (undated) DEVICE — NDL HYPO ECLPS SFTY 22G 1 1/2IN

## (undated) DEVICE — DRSNG GZ PETROLTM XEROFORM CURAD 4X4IN STRL

## (undated) DEVICE — BANDAGE,GAUZE,BULKEE II,4.5"X4.1YD,STRL: Brand: MEDLINE

## (undated) DEVICE — BNDG ELAS CO-FLEX SLF ADHR 4IN5YD LF STRL

## (undated) DEVICE — KT CANSTR VAC WND W/ISOLYSER SENSATRAC 500CC 5CS

## (undated) DEVICE — GLIDEX™ COATED HYDROPHILIC GUIDEWIRE: Brand: MAGIC TORQUE™

## (undated) DEVICE — 2000CC GUARDIAN II: Brand: GUARDIAN

## (undated) DEVICE — BALN NANOCROSS OTW .014 4F 2/2.5X210 150CM

## (undated) DEVICE — SPNG GZ WOVN 4X4IN 12PLY 10/BX STRL

## (undated) DEVICE — BNDG ELAS W/CLIP 6IN 10YD LF STRL

## (undated) DEVICE — SHEATH INTRO PINN CORNRY .038 6F10CM

## (undated) DEVICE — PAD GRND E/S MEGADYNE MONOPLR 2/PLT W/CORD A/ DISP

## (undated) DEVICE — PINNACLE INTRODUCER SHEATH: Brand: PINNACLE

## (undated) DEVICE — CONMED REFLEX RHS SINGLE USE, RELOADABLE, ROTATING HEAD SKIN STAPLER: Brand: CONMED REFLEX

## (undated) DEVICE — DRSNG WND BORDR/ADHS NONADHR/GZ LF 4X4IN STRL

## (undated) DEVICE — TBG INJ CONTRL EXCITE RA 1200PSI 48IN

## (undated) DEVICE — PK EXTREM LOWR 10

## (undated) DEVICE — FOGARTY - HYDRAGRIP SURGICAL - CLAMP INSERTS: Brand: FOGARTY SOFTJAW

## (undated) DEVICE — SI AVANTI+ 6F MID W/O GW&OBT: Brand: AVANTI

## (undated) DEVICE — ST INF 2NDARY 20DRP VNT/NOVNT 30IN

## (undated) DEVICE — MEDI-VAC YANKAUER SUCTION HANDLE W/BULBOUS TIP: Brand: CARDINAL HEALTH

## (undated) DEVICE — SYRINGE, LOCKING, 10CC, STERILE: Brand: BABY GORILLA/GORILLA PLATING SYSTEM

## (undated) DEVICE — SOL LR 1000ML

## (undated) DEVICE — PK ANGIO OR 10

## (undated) DEVICE — CULT ANAERB

## (undated) DEVICE — HYPODERMIC SAFETY NEEDLE: Brand: MONOJECT

## (undated) DEVICE — CONTAINER,SPECIMEN,OR STERILE,4OZ: Brand: MEDLINE

## (undated) DEVICE — GOWN SURG ORBIS LVL3 2XL STRL

## (undated) DEVICE — GOWN SURG SIRUS POLY/REINF LVL/4 3XL XLNG STRL

## (undated) DEVICE — DRP SURG U/DRP INVISISHIELD PA 48X52IN

## (undated) DEVICE — SUT VIC 2/0 CT1 36IN UD VCP945H

## (undated) DEVICE — APPL CHLORAPREP 26ML CLR

## (undated) DEVICE — TBG PENCL TELESCP MEGADYNE SMOKE EVAC 10FT

## (undated) DEVICE — DRN WND JP FLT HUBLSS SIL FUL/PERF 10MM 20CM

## (undated) DEVICE — CVR HNDL LIGHT RIGID

## (undated) DEVICE — BLANKT WARM UPPR/BDY ARM/OUT 57X196CM

## (undated) DEVICE — BNDG,ELSTC,MATRIX,STRL,4"X5YD,LF,HOOK&LP: Brand: MEDLINE

## (undated) DEVICE — PATIENT RETURN ELECTRODE, SINGLE-USE, CONTACT QUALITY MONITORING, ADULT, WITH 9FT CORD, FOR PATIENTS WEIGING OVER 33LBS. (15KG): Brand: MEGADYNE

## (undated) DEVICE — DECANT BG O JET

## (undated) DEVICE — SI AVANTI+ 7F STD W/GW  NO OBT: Brand: AVANTI

## (undated) DEVICE — GLV SURG BIOGEL LTX PF 8

## (undated) DEVICE — GLV SURG SENSICARE PI MIC PF SZ6 LF STRL

## (undated) DEVICE — FOGARTY ARTERIAL EMBOLECTOMY CATHETER 3F 80CM: Brand: FOGARTY

## (undated) DEVICE — GLV SURG BIOGELULTRATOUCH POLYISPRN PF LF SZ7 STRL

## (undated) DEVICE — GLV SURG BIOGEL LTX PF 7 1/2

## (undated) DEVICE — SUT PDS MONO 0 36 CT1 VIL PDP346H

## (undated) DEVICE — SUT SILK 0 SH 30IN K834H

## (undated) DEVICE — SUT SILK 0 TIES 30IN A306H

## (undated) DEVICE — SUT ETHLN 2/0 PS 18IN 585H

## (undated) DEVICE — KT PUMP INFUBLOCK MDL 2100 PMKITSOLIS

## (undated) DEVICE — SYR LL BD 10CC

## (undated) DEVICE — SEALANT HEMO TACHOSIL FIBRIN PTCH 9.5X4.8CM

## (undated) DEVICE — SUT PROLN SURGILENE 5.0 24IN BLU 9702H

## (undated) DEVICE — SYR LUERLOK 20CC BX/50

## (undated) DEVICE — DRP IOBAN ANTIMICRO 23X33IN

## (undated) DEVICE — GW GLIDEWIRE ADVANTAGE ANG .035IN 260X235X25CM

## (undated) DEVICE — CULT AERO SGL CA/50

## (undated) DEVICE — DEV INFL ENCORE26 SGL

## (undated) DEVICE — SKIN AFFIX SURG ADHESIVE 72/CS 0.55ML: Brand: MEDLINE

## (undated) DEVICE — DRN WND RESVR BULB JP SIL 100ML

## (undated) DEVICE — ADHS SKIN PREMIERPRO EXOFIN TOPICAL HI/VISC .5ML

## (undated) DEVICE — SYR CONTRL LUERLOK 10CC

## (undated) DEVICE — BG BND CVR CAM 36X28

## (undated) DEVICE — SUT SILK 2/0 TIES 18IN A185H

## (undated) DEVICE — DRSNG WND VAC GRANUFOAM SENSATRAC LG

## (undated) DEVICE — FOGARTY ARTERIAL EMBOLECTOMY CATHETER 4F 80CM: Brand: FOGARTY

## (undated) DEVICE — BG BND CVR CAM 36X40

## (undated) DEVICE — SUT ETHLN 3/0 PC5 18IN 1893G

## (undated) DEVICE — SUT PROLN 6/0 C1 D/A 30IN 8706H

## (undated) DEVICE — NDL PERC 1PRT THNWALL W/BASEPLT 18G 7CM

## (undated) DEVICE — SUT PDS 2/0 CT2 27IN VIL PDP333H

## (undated) DEVICE — STPLR SKIN PROXIMATE RH WD

## (undated) DEVICE — SUT SILK 3/0 TIES 18IN A184H

## (undated) DEVICE — GLV SURG SENSICARE PI MIC PF SZ9 LF STRL

## (undated) DEVICE — BNDG ELAS ELITE V/CLOSE 4IN 5YD LF STRL

## (undated) DEVICE — BLD SCLPL BP SS SZ11

## (undated) DEVICE — SYRINGE,CONTROL,LL,FINGER,GRIP: Brand: MEDLINE INDUSTRIES, INC.

## (undated) DEVICE — SUT PDS 2 0 CT1 27IN CLR Z259H

## (undated) DEVICE — NDL HYPO SURE/SNAP SFTY 22G 1.5IN LF

## (undated) DEVICE — PAD CAST SOF ROL STRL 6IN LF

## (undated) DEVICE — INTENDED FOR TISSUE SEPARATION, AND OTHER PROCEDURES THAT REQUIRE A SHARP SURGICAL BLADE TO PUNCTURE OR CUT.: Brand: BARD-PARKER ® STAINLESS STEEL BLADES

## (undated) DEVICE — CVR HNDL LT SURG ACCSSRY BLU STRL

## (undated) DEVICE — RADIFOCUS GLIDEWIRE ADVANTAGE GUIDEWIRE: Brand: GLIDEWIRE ADVANTAGE

## (undated) DEVICE — X-LARGE COTTON GLOVE: Brand: DEROYAL

## (undated) DEVICE — BLD SAW SAG SYS6 HVY DTY 18X1.27X90MM

## (undated) DEVICE — GAUZE,SPONGE,4"X4",16PLY,XRAY,STRL,LF: Brand: MEDLINE

## (undated) DEVICE — SUT VIC 3/0 CT1 27IN UD VCP258H

## (undated) DEVICE — DRSNG SURG AQUACEL AG 9X15CM

## (undated) DEVICE — ADHS LIQ MASTISOL 2/3ML

## (undated) DEVICE — CATH OMNI FLUSH 5FR

## (undated) DEVICE — SNAP KOVER: Brand: UNBRANDED

## (undated) DEVICE — LN INJ CONTRST FLXCIL HP BR F/M LL W/ADAPT/ROT 1200PSI 48IN

## (undated) DEVICE — SYS IRRISEPT JET LAVG CHG .05PCT

## (undated) DEVICE — BG BLD SYS

## (undated) DEVICE — PAD,ARMBOARD,CONV,FOAM,2X8X20",12PR/CS: Brand: MEDLINE